# Patient Record
Sex: FEMALE | Race: WHITE | NOT HISPANIC OR LATINO | Employment: FULL TIME | URBAN - METROPOLITAN AREA
[De-identification: names, ages, dates, MRNs, and addresses within clinical notes are randomized per-mention and may not be internally consistent; named-entity substitution may affect disease eponyms.]

---

## 2017-01-05 ENCOUNTER — PATIENT MESSAGE (OUTPATIENT)
Dept: FAMILY MEDICINE | Facility: CLINIC | Age: 37
End: 2017-01-05

## 2017-01-05 ENCOUNTER — TELEPHONE (OUTPATIENT)
Dept: FAMILY MEDICINE | Facility: CLINIC | Age: 37
End: 2017-01-05

## 2017-01-05 ENCOUNTER — HOSPITAL ENCOUNTER (INPATIENT)
Facility: HOSPITAL | Age: 37
LOS: 2 days | Discharge: HOME | DRG: 690 | End: 2017-01-07
Attending: EMERGENCY MEDICINE | Admitting: INTERNAL MEDICINE
Payer: COMMERCIAL

## 2017-01-05 DIAGNOSIS — N10 PYELONEPHRITIS, ACUTE: Primary | ICD-10-CM

## 2017-01-05 DIAGNOSIS — R10.9 LEFT FLANK PAIN: ICD-10-CM

## 2017-01-05 DIAGNOSIS — R10.9 FLANK PAIN: ICD-10-CM

## 2017-01-05 DIAGNOSIS — R30.0 DYSURIA: Primary | ICD-10-CM

## 2017-01-05 LAB
ALBUMIN SERPL BCP-MCNC: 4.4 G/DL
ALP SERPL-CCNC: 61 U/L
ALT SERPL W/O P-5'-P-CCNC: 28 U/L
ANION GAP SERPL CALC-SCNC: 8 MMOL/L
AST SERPL-CCNC: 17 U/L
B-HCG UR QL: NEGATIVE
BACTERIA #/AREA URNS HPF: ABNORMAL /HPF
BASOPHILS # BLD AUTO: 0.03 K/UL
BASOPHILS NFR BLD: 0.3 %
BILIRUB SERPL-MCNC: 0.3 MG/DL
BILIRUB UR QL STRIP: NEGATIVE
BUN SERPL-MCNC: 11 MG/DL
CALCIUM SERPL-MCNC: 9.7 MG/DL
CHLORIDE SERPL-SCNC: 103 MMOL/L
CLARITY UR: ABNORMAL
CO2 SERPL-SCNC: 28 MMOL/L
COLOR UR: YELLOW
CREAT SERPL-MCNC: 0.8 MG/DL
CTP QC/QA: YES
DIFFERENTIAL METHOD: NORMAL
EOSINOPHIL # BLD AUTO: 0.2 K/UL
EOSINOPHIL NFR BLD: 1.8 %
ERYTHROCYTE [DISTWIDTH] IN BLOOD BY AUTOMATED COUNT: 12.3 %
EST. GFR  (AFRICAN AMERICAN): >60 ML/MIN/1.73 M^2
EST. GFR  (NON AFRICAN AMERICAN): >60 ML/MIN/1.73 M^2
GLUCOSE SERPL-MCNC: 102 MG/DL
GLUCOSE UR QL STRIP: NEGATIVE
HCT VFR BLD AUTO: 40.7 %
HGB BLD-MCNC: 14.3 G/DL
HGB UR QL STRIP: ABNORMAL
KETONES UR QL STRIP: NEGATIVE
LEUKOCYTE ESTERASE UR QL STRIP: ABNORMAL
LIPASE SERPL-CCNC: 55 U/L
LYMPHOCYTES # BLD AUTO: 3.1 K/UL
LYMPHOCYTES NFR BLD: 30.6 %
MCH RBC QN AUTO: 30.4 PG
MCHC RBC AUTO-ENTMCNC: 35.1 %
MCV RBC AUTO: 86 FL
MICROSCOPIC COMMENT: ABNORMAL
MONOCYTES # BLD AUTO: 0.7 K/UL
MONOCYTES NFR BLD: 7.2 %
NEUTROPHILS # BLD AUTO: 6.1 K/UL
NEUTROPHILS NFR BLD: 59.8 %
NITRITE UR QL STRIP: NEGATIVE
PH UR STRIP: 6 [PH] (ref 5–8)
PLATELET # BLD AUTO: 265 K/UL
PMV BLD AUTO: 10.3 FL
POTASSIUM SERPL-SCNC: 3.6 MMOL/L
PROT SERPL-MCNC: 7.2 G/DL
PROT UR QL STRIP: NEGATIVE
RBC # BLD AUTO: 4.71 M/UL
RBC #/AREA URNS HPF: 1 /HPF (ref 0–4)
SODIUM SERPL-SCNC: 139 MMOL/L
SP GR UR STRIP: 1.01 (ref 1–1.03)
SQUAMOUS #/AREA URNS HPF: 6 /HPF
URN SPEC COLLECT METH UR: ABNORMAL
UROBILINOGEN UR STRIP-ACNC: NEGATIVE EU/DL
WBC # BLD AUTO: 10.22 K/UL
WBC #/AREA URNS HPF: 4 /HPF (ref 0–5)

## 2017-01-05 PROCEDURE — 81000 URINALYSIS NONAUTO W/SCOPE: CPT | Mod: 91

## 2017-01-05 PROCEDURE — 87591 N.GONORRHOEAE DNA AMP PROB: CPT

## 2017-01-05 PROCEDURE — 80053 COMPREHEN METABOLIC PANEL: CPT

## 2017-01-05 PROCEDURE — 81025 URINE PREGNANCY TEST: CPT | Performed by: EMERGENCY MEDICINE

## 2017-01-05 PROCEDURE — 25000003 PHARM REV CODE 250: Performed by: PHYSICIAN ASSISTANT

## 2017-01-05 PROCEDURE — 87040 BLOOD CULTURE FOR BACTERIA: CPT | Mod: 59

## 2017-01-05 PROCEDURE — 87086 URINE CULTURE/COLONY COUNT: CPT | Mod: 59

## 2017-01-05 PROCEDURE — 87077 CULTURE AEROBIC IDENTIFY: CPT | Mod: 59

## 2017-01-05 PROCEDURE — 83690 ASSAY OF LIPASE: CPT

## 2017-01-05 PROCEDURE — 96365 THER/PROPH/DIAG IV INF INIT: CPT

## 2017-01-05 PROCEDURE — 87186 SC STD MICRODIL/AGAR DIL: CPT | Mod: 59

## 2017-01-05 PROCEDURE — 63600175 PHARM REV CODE 636 W HCPCS: Performed by: PHYSICIAN ASSISTANT

## 2017-01-05 PROCEDURE — 12000002 HC ACUTE/MED SURGE SEMI-PRIVATE ROOM

## 2017-01-05 PROCEDURE — 85025 COMPLETE CBC W/AUTO DIFF WBC: CPT

## 2017-01-05 PROCEDURE — 96375 TX/PRO/DX INJ NEW DRUG ADDON: CPT

## 2017-01-05 PROCEDURE — 87088 URINE BACTERIA CULTURE: CPT | Mod: 59

## 2017-01-05 PROCEDURE — 99285 EMERGENCY DEPT VISIT HI MDM: CPT | Mod: 25

## 2017-01-05 PROCEDURE — 96367 TX/PROPH/DG ADDL SEQ IV INF: CPT

## 2017-01-05 RX ORDER — ACETAMINOPHEN 325 MG/1
650 TABLET ORAL EVERY 8 HOURS PRN
Status: DISCONTINUED | OUTPATIENT
Start: 2017-01-05 | End: 2017-01-07 | Stop reason: HOSPADM

## 2017-01-05 RX ORDER — PANTOPRAZOLE SODIUM 40 MG/10ML
40 INJECTION, POWDER, LYOPHILIZED, FOR SOLUTION INTRAVENOUS DAILY
Status: DISCONTINUED | OUTPATIENT
Start: 2017-01-06 | End: 2017-01-06

## 2017-01-05 RX ORDER — SODIUM CHLORIDE 9 MG/ML
INJECTION, SOLUTION INTRAVENOUS CONTINUOUS
Status: DISCONTINUED | OUTPATIENT
Start: 2017-01-05 | End: 2017-01-07 | Stop reason: HOSPADM

## 2017-01-05 RX ORDER — ONDANSETRON 2 MG/ML
4 INJECTION INTRAMUSCULAR; INTRAVENOUS EVERY 12 HOURS PRN
Status: DISCONTINUED | OUTPATIENT
Start: 2017-01-05 | End: 2017-01-07 | Stop reason: HOSPADM

## 2017-01-05 RX ORDER — POLYETHYLENE GLYCOL 3350 17 G/17G
17 POWDER, FOR SOLUTION ORAL DAILY
Status: DISCONTINUED | OUTPATIENT
Start: 2017-01-06 | End: 2017-01-07 | Stop reason: HOSPADM

## 2017-01-05 RX ORDER — ONDANSETRON 2 MG/ML
8 INJECTION INTRAMUSCULAR; INTRAVENOUS
Status: COMPLETED | OUTPATIENT
Start: 2017-01-05 | End: 2017-01-05

## 2017-01-05 RX ORDER — OXYCODONE AND ACETAMINOPHEN 5; 325 MG/1; MG/1
1 TABLET ORAL
Status: COMPLETED | OUTPATIENT
Start: 2017-01-05 | End: 2017-01-05

## 2017-01-05 RX ORDER — HYDROCODONE BITARTRATE AND ACETAMINOPHEN 5; 325 MG/1; MG/1
1 TABLET ORAL EVERY 4 HOURS PRN
Status: DISCONTINUED | OUTPATIENT
Start: 2017-01-05 | End: 2017-01-07 | Stop reason: HOSPADM

## 2017-01-05 RX ORDER — HYDROMORPHONE HYDROCHLORIDE 2 MG/ML
0.5 INJECTION, SOLUTION INTRAMUSCULAR; INTRAVENOUS; SUBCUTANEOUS EVERY 4 HOURS PRN
Status: DISCONTINUED | OUTPATIENT
Start: 2017-01-05 | End: 2017-01-07 | Stop reason: HOSPADM

## 2017-01-05 RX ADMIN — SODIUM CHLORIDE: 0.9 INJECTION, SOLUTION INTRAVENOUS at 11:01

## 2017-01-05 RX ADMIN — CEFTRIAXONE 1 G: 1 INJECTION, SOLUTION INTRAVENOUS at 08:01

## 2017-01-05 RX ADMIN — GENTAMICIN SULFATE 288 MG: 40 INJECTION, SOLUTION INTRAMUSCULAR; INTRAVENOUS at 09:01

## 2017-01-05 RX ADMIN — SODIUM CHLORIDE 1000 ML: 0.9 INJECTION, SOLUTION INTRAVENOUS at 08:01

## 2017-01-05 RX ADMIN — ONDANSETRON 8 MG: 2 INJECTION INTRAMUSCULAR; INTRAVENOUS at 08:01

## 2017-01-05 RX ADMIN — PROMETHAZINE HYDROCHLORIDE 6.25 MG: 25 INJECTION, SOLUTION INTRAMUSCULAR; INTRAVENOUS at 11:01

## 2017-01-05 RX ADMIN — HYDROMORPHONE HYDROCHLORIDE 0.5 MG: 2 INJECTION INTRAMUSCULAR; INTRAVENOUS; SUBCUTANEOUS at 11:01

## 2017-01-05 RX ADMIN — OXYCODONE HYDROCHLORIDE AND ACETAMINOPHEN 1 TABLET: 5; 325 TABLET ORAL at 08:01

## 2017-01-05 NOTE — TELEPHONE ENCOUNTER
Returned call, patient states that the Past 3 days she has been having back pain, fever and vomiting. She had completed her antibiotics and is still having issues. Was inquiring about her urine culture results and wanted to know if we can add a urinalysis to her lab appt today, thinking she may be getting another UTI. Informed that I would send message to Dr. Montilla to add lab and check results from recent labs. Verbalized understanding.

## 2017-01-05 NOTE — TELEPHONE ENCOUNTER
Received a message from pt that she is having urinary symptoms c/w a UTI accompanied by back pain and subjective fever. I called pt and told her that an UA and UCx has been ordered for today and I'll send over Abx to her preferred pharmacy upon review of the UA later today. Also reviewed results of most recent UA and UCx for which pt reports completing her course of Augmentin and pt is unclear on whether or not she ever had complete resolution of symptoms from that infection or these symptoms are new.

## 2017-01-05 NOTE — PROGRESS NOTES
I called pt to inform her of the results of her UA. As discussed earlier, I planned to send Abx over to her preferred pharmacy but pt states she would rather go to the ED to get IV Abx. Pt states she will go whenever she gets off from work this evening. She is aware that they will be able to see the documentation in the chart from her UA today. Pt also requested to change primary care physicians over to myself and I accepted.

## 2017-01-05 NOTE — IP AVS SNAPSHOT
[place] [address] [address] [place] [address]  Phone: [phone]           Patient Discharge Instructions     Our goal is to set you up for success. This packet includes information on your condition, medications, and your home care. It will help you to care for yourself so you don't get sicker and need to go back to the hospital.     Please ask your nurse if you have any questions.        There are many details to remember when preparing to leave the hospital. Here is what you will need to do:    1. Take your medicine. If you are prescribed medications, review your Medication List in the following pages. You may have new medications to  at the pharmacy and others that you'll need to stop taking. Review the instructions for how and when to take your medications. Talk with your doctor or nurses if you are unsure of what to do.     2. Go to your follow-up appointments. Specific follow-up information is listed in the following pages. Your may be contacted by a transition nurse or clinical provider about future appointments. Be sure we have all of the phone numbers to reach you, if needed. Please contact your provider's office if you are unable to make an appointment.     3. Watch for warning signs. Your doctor or nurse will give you detailed warning signs to watch for and when to call for assistance. These instructions may also include educational information about your condition. If you experience any of warning signs to your health, call your doctor.               [facility]  Unless otherwise directed by your provider, please contact [facility], our nurse care line that is available for 24/7 assistance.     [phone] (toll-free)    Registered nurses in the [facility] provide clinical advisement, health education, appointment booking, and other advisory services.                    ** Verify the list of medication(s) below is accurate and up to date.  Carry this with you in case of emergency. If your medications have changed, please notify your healthcare provider.             Medication List      START taking these medications        Additional Info                      ciprofloxacin HCl 500 MG tablet   Commonly known as:  CIPRO   Quantity:  24 tablet   Refills:  0   Dose:  500 mg    Instructions:  Take 1 tablet (500 mg total) by mouth 2 (two) times daily.     Begin Date    AM    Noon    PM    Bedtime         CONTINUE taking these medications        Additional Info                      alprazolam 1 MG tablet   Commonly known as:  XANAX   Quantity:  30 tablet   Refills:  5    Instructions:  TAKE ONE TABLET EVERY EVENING AS NEEDED FOR ANXIETY MAY CAUSE DROWSINESS     Begin Date    AM    Noon    PM    Bedtime       hydrocodone-acetaminophen 7.5-325mg 7.5-325 mg per tablet   Commonly known as:  NORCO   Refills:  0    Instructions:  TK 1 T PO  Q 12 H PRN     Begin Date    AM    Noon    PM    Bedtime       meloxicam 7.5 MG tablet   Commonly known as:  MOBIC   Refills:  0    Instructions:  TK 1 T PO  HS AFTER MEALS     Begin Date    AM    Noon    PM    Bedtime            Where to Get Your Medications      You can get these medications from any pharmacy     Bring a paper prescription for each of these medications     ciprofloxacin HCl 500 MG tablet                  Please bring to all follow up appointments:    1. A copy of your discharge instructions.  2. All medicines you are currently taking in their original bottles.  3. Identification and insurance card.    Please arrive 15 minutes ahead of scheduled appointment time.    Please call 24 hours in advance if you must reschedule your appointment and/or time.        Follow-up Information     Follow up with Spring Montilla MD In 1 week.    Specialty:  Wound Care    Contact information:    [address] [place] [address]  WOUND CENTER  [facility] [id number] [phone]          Discharge Instructions     Future Orders    Activity as  "tolerated       Discharge References/Attachments     PYELONEPHRITIS, DISCHARGE INSTRUCTIONS (ENGLISH)    CIPROFLOXACIN HYDROCHLORIDE ORAL TABLET (ENGLISH)        Primary Diagnosis     Your primary diagnosis was:  Acute Pyelonephritis      Admission Information     Date & Time Provider Department CSN    1/5/2017  7:13 PM Roman Dwyer MD [facility] [id number]      Care Providers     Provider Role Specialty Primary office phone    Roman Dwyer MD Attending Provider Hospitalist [phone]      Your Vitals Were     BP Pulse Temp Resp Height Weight    127/65 (BP Location: Right arm, Patient Position: Sitting, BP Method: Automatic) 86 98.5 °F (36.9 °C) (Oral) 18 5' 2" (1.575 m) 74.3 kg (163 lb 12.8 oz)    Last Period SpO2 BMI          12/14/2016 (Exact Date) 100% 29.96 kg/m2        Recent Lab Values     No lab values to display.      Pending Labs     Order Current Status    C. trachomatis/N. gonorrhoeae by AMP DNA Urine In process    Blood Culture #1 **CANNOT BE ORDERED STAT** Preliminary result    Blood Culture #2 **CANNOT BE ORDERED STAT** Preliminary result      Allergies as of 1/7/2017        Reactions    Strawberry Anaphylaxis, Hives      Advance Directives     An advance directive is a document which, in the event you are no longer able to make decisions for yourself, tells your healthcare team what kind of treatment you do or do not want to receive, or who you would like to make those decisions for you.  If you do not currently have an advance directive, [facility] encourages you to create one.  For more information call:  [phone]), [phone] ([phone]),  or log on to [email or web address].        Smoking Cessation     If you would like to quit smoking:   You may be eligible for free services if you are a [place] resident and started smoking cigarettes before September 1, 1988.  Call the Smoking Cessation Trust (SCT) toll free at (968) 397-1985 or (387) " 091-3546.   Call 1-800-QUIT-NOW if you do not meet the above criteria.            Language Assistance Services     ATTENTION: Language assistance services are available, free of charge. Please call [phone].      ATENCIÓN: Si habla cristo, tiene a tong disposición servicios gratuitos de asistencia lingüística. Llame al [phone].     CHÚ Ý: N?u b?n nói Ti?ng Vi?t, có các d?ch v? h? tr? ngôn ng? mi?n phí dành cho b?n. G?i s? [phone].         [facility] complies with applicable Federal civil rights laws and does not discriminate on the basis of race, color, national origin, age, disability, or sex.

## 2017-01-05 NOTE — IP AVS SNAPSHOT
[address] [place] [address]  Phone: [phone]           I have received a copy of my After Visit Summary and discharge instructions from [facility].    INSTRUCTIONS RECEIVED AND UNDERSTOOD BY:                     Patient/Patient Representative: ________________________________________________________________     Date/Time: ________________________________________________________________                     Instructions Given By: ________________________________________________________________     Date/Time: ________________________________________________________________

## 2017-01-05 NOTE — TELEPHONE ENCOUNTER
----- Message from Ale Hamilton sent at 1/5/2017 10:11 AM CST -----  Contact: [phone]  Pt is requesting a order for a urinalysis today if possible she is having labs done at 11 today  Please call pt at your earliest convenience.  Thanks !

## 2017-01-06 PROBLEM — F19.10 SUBSTANCE ABUSE: Status: ACTIVE | Noted: 2017-01-06

## 2017-01-06 LAB
ALBUMIN SERPL BCP-MCNC: 3.6 G/DL
ALP SERPL-CCNC: 48 U/L
ALT SERPL W/O P-5'-P-CCNC: 24 U/L
ANION GAP SERPL CALC-SCNC: 7 MMOL/L
AST SERPL-CCNC: 16 U/L
BASOPHILS # BLD AUTO: 0.02 K/UL
BASOPHILS NFR BLD: 0.3 %
BILIRUB SERPL-MCNC: 0.2 MG/DL
BUN SERPL-MCNC: 9 MG/DL
CALCIUM SERPL-MCNC: 8.8 MG/DL
CHLORIDE SERPL-SCNC: 106 MMOL/L
CO2 SERPL-SCNC: 29 MMOL/L
CREAT SERPL-MCNC: 0.7 MG/DL
DIFFERENTIAL METHOD: ABNORMAL
EOSINOPHIL # BLD AUTO: 0.1 K/UL
EOSINOPHIL NFR BLD: 1.7 %
ERYTHROCYTE [DISTWIDTH] IN BLOOD BY AUTOMATED COUNT: 12.4 %
EST. GFR  (AFRICAN AMERICAN): >60 ML/MIN/1.73 M^2
EST. GFR  (NON AFRICAN AMERICAN): >60 ML/MIN/1.73 M^2
GLUCOSE SERPL-MCNC: 59 MG/DL
HCT VFR BLD AUTO: 36.8 %
HGB BLD-MCNC: 12.8 G/DL
HIV1+2 IGG SERPL QL IA.RAPID: NEGATIVE
LYMPHOCYTES # BLD AUTO: 2.5 K/UL
LYMPHOCYTES NFR BLD: 34.1 %
MCH RBC QN AUTO: 31.4 PG
MCHC RBC AUTO-ENTMCNC: 34.8 %
MCV RBC AUTO: 90 FL
MONOCYTES # BLD AUTO: 0.7 K/UL
MONOCYTES NFR BLD: 9.9 %
NEUTROPHILS # BLD AUTO: 3.9 K/UL
NEUTROPHILS NFR BLD: 54 %
PLATELET # BLD AUTO: 202 K/UL
PMV BLD AUTO: 10.6 FL
POTASSIUM SERPL-SCNC: 3.6 MMOL/L
PROT SERPL-MCNC: 5.8 G/DL
RBC # BLD AUTO: 4.08 M/UL
SODIUM SERPL-SCNC: 142 MMOL/L
WBC # BLD AUTO: 7.25 K/UL

## 2017-01-06 PROCEDURE — 85025 COMPLETE CBC W/AUTO DIFF WBC: CPT

## 2017-01-06 PROCEDURE — 25000003 PHARM REV CODE 250: Performed by: PHYSICIAN ASSISTANT

## 2017-01-06 PROCEDURE — 99223 1ST HOSP IP/OBS HIGH 75: CPT | Mod: ,,, | Performed by: UROLOGY

## 2017-01-06 PROCEDURE — 63600175 PHARM REV CODE 636 W HCPCS: Performed by: PHYSICIAN ASSISTANT

## 2017-01-06 PROCEDURE — 25000003 PHARM REV CODE 250: Performed by: HOSPITALIST

## 2017-01-06 PROCEDURE — 36415 COLL VENOUS BLD VENIPUNCTURE: CPT

## 2017-01-06 PROCEDURE — C9113 INJ PANTOPRAZOLE SODIUM, VIA: HCPCS | Performed by: PHYSICIAN ASSISTANT

## 2017-01-06 PROCEDURE — 63600175 PHARM REV CODE 636 W HCPCS: Performed by: INTERNAL MEDICINE

## 2017-01-06 PROCEDURE — 80053 COMPREHEN METABOLIC PANEL: CPT

## 2017-01-06 PROCEDURE — 86701 HIV-1ANTIBODY: CPT

## 2017-01-06 PROCEDURE — 12000002 HC ACUTE/MED SURGE SEMI-PRIVATE ROOM

## 2017-01-06 RX ORDER — ENOXAPARIN SODIUM 100 MG/ML
40 INJECTION SUBCUTANEOUS EVERY 24 HOURS
Status: DISCONTINUED | OUTPATIENT
Start: 2017-01-06 | End: 2017-01-07 | Stop reason: HOSPADM

## 2017-01-06 RX ADMIN — CEFTRIAXONE 1 G: 1 INJECTION, SOLUTION INTRAVENOUS at 08:01

## 2017-01-06 RX ADMIN — HYDROCODONE BITARTRATE AND ACETAMINOPHEN 1 TABLET: 5; 325 TABLET ORAL at 05:01

## 2017-01-06 RX ADMIN — DICYCLOMINE HYDROCHLORIDE: 10 SOLUTION ORAL at 12:01

## 2017-01-06 RX ADMIN — PANTOPRAZOLE SODIUM 40 MG: 40 INJECTION, POWDER, FOR SOLUTION INTRAVENOUS at 08:01

## 2017-01-06 RX ADMIN — ENOXAPARIN SODIUM 40 MG: 100 INJECTION SUBCUTANEOUS at 01:01

## 2017-01-06 RX ADMIN — POLYETHYLENE GLYCOL 3350 17 G: 17 POWDER, FOR SOLUTION ORAL at 08:01

## 2017-01-06 RX ADMIN — HYDROMORPHONE HYDROCHLORIDE 0.5 MG: 2 INJECTION INTRAMUSCULAR; INTRAVENOUS; SUBCUTANEOUS at 08:01

## 2017-01-06 RX ADMIN — HYDROMORPHONE HYDROCHLORIDE 0.5 MG: 2 INJECTION INTRAMUSCULAR; INTRAVENOUS; SUBCUTANEOUS at 01:01

## 2017-01-06 RX ADMIN — HYDROMORPHONE HYDROCHLORIDE 0.5 MG: 2 INJECTION INTRAMUSCULAR; INTRAVENOUS; SUBCUTANEOUS at 04:01

## 2017-01-06 RX ADMIN — CEFTRIAXONE 1 G: 1 INJECTION, SOLUTION INTRAVENOUS at 09:01

## 2017-01-06 RX ADMIN — ONDANSETRON 4 MG: 2 INJECTION INTRAMUSCULAR; INTRAVENOUS at 11:01

## 2017-01-06 RX ADMIN — PROMETHAZINE HYDROCHLORIDE 6.25 MG: 25 INJECTION, SOLUTION INTRAMUSCULAR; INTRAVENOUS at 05:01

## 2017-01-06 NOTE — SUBJECTIVE & OBJECTIVE
Past Medical History   Diagnosis Date    Acute encephalopathy 12/24/12     secondary to drug overdose    ARF (acute renal failure) 12/24/12     secondary to drug overdose    History of cardiac arrest 12/24/12     secondary to drug overdose    Hypertension     MRSA (methicillin resistant Staphylococcus aureus) 12/24/12    Nephritis     PMDD (premenstrual dysphoric disorder)     Polysubstance abuse 12/24/12    Systolic CHF, acute 12/24/12     secondary to drug overdose       Past Surgical History   Procedure Laterality Date    Tubal ligation      Cholecystectomy         Review of patient's allergies indicates:   Allergen Reactions    Strawberry Anaphylaxis and Hives       Family History     Problem Relation (Age of Onset)    Cancer Maternal Grandmother, Maternal Grandfather    Kidney disease Mother, Daughter          Social History Main Topics    Smoking status: Current Every Day Smoker     Packs/day: 0.75     Years: 20.00    Smokeless tobacco: Not on file      Comment: 1/2 half pack/day.     Alcohol use No    Drug use: No      Comment: PCP, patient states she had not done drugs since 2012    Sexual activity: Yes     Partners: Male       Review of Systems   Constitutional: Negative.    HENT: Negative.    Eyes: Negative.    Respiratory: Negative for cough, chest tightness and shortness of breath.    Cardiovascular: Negative for chest pain.   Gastrointestinal: Positive for nausea. Negative for constipation and diarrhea.   Genitourinary: Positive for flank pain.   Neurological: Negative.    Psychiatric/Behavioral: Negative.        Objective:     Temp:  [97.7 °F (36.5 °C)-100 °F (37.8 °C)] 97.7 °F (36.5 °C)  Pulse:  [] 98  Resp:  [16-18] 18  BP: (104-144)/(67-93) 118/75     Body mass index is 30.25 kg/(m^2).            Drains          No matching active lines, drains, or airways          Physical Exam   Nursing note and vitals reviewed.  Constitutional: She is oriented to person, place, and time.  She appears well-developed.   HENT:   Head: Normocephalic.   Eyes: Conjunctivae are normal.   Neck: Normal range of motion. No tracheal deviation present. No thyromegaly present.   Cardiovascular: Normal rate, normal heart sounds and normal pulses.    Pulmonary/Chest: Effort normal and breath sounds normal. No respiratory distress. She has no wheezes.   Abdominal: Soft. She exhibits no distension and no mass. There is no hepatosplenomegaly. There is no tenderness. There is no rebound, no guarding and no CVA tenderness. No hernia.   Musculoskeletal: Normal range of motion. She exhibits no edema or tenderness.   Lymphadenopathy:     She has no cervical adenopathy.   Neurological: She is alert and oriented to person, place, and time.   Skin: Skin is warm and dry. No rash noted. No erythema.     Psychiatric: She has a normal mood and affect. Her behavior is normal. Judgment and thought content normal.       Significant Labs:    BMP:    Recent Labs  Lab 01/05/17 2025 01/06/17  0419    142   K 3.6 3.6    106   CO2 28 29   BUN 11 9   CREATININE 0.8 0.7   CALCIUM 9.7 8.8       CBC:    Recent Labs  Lab 01/05/17 2025 01/06/17  0420   WBC 10.22 7.25   HGB 14.3 12.8   HCT 40.7 36.8*    202       Blood Culture:   Recent Labs  Lab 01/05/17 2044 01/05/17 2052   LABBLOO No Growth to date No Growth to date     Urine Culture:   Recent Labs  Lab 01/05/17  1222 01/05/17  2000   LABURIN GRAM NEGATIVE ALEJANDRO> 100,000 cfu/mlIdentification and susceptibility pending GRAM NEGATIVE ALEJANDRO> 100,000 cfu/mlIdentification and susceptibility pending       Significant Imaging:  CT: I have reviewed all results within the past 24 hours and my personal findings are:  CT from December 2016, no stranding or hydro.  Very small lower pole left sided stones.

## 2017-01-06 NOTE — ED TRIAGE NOTES
? Kidney infection again, saw MD got Augmentin 12/19 told she had pylonephritis. All week this week severe back pain and dysuria. Called PCP today and went and got another UA, which showed UTI so PCP told her to come here. She had a cystoscope and saw urologist about 6months ago. She has severe hesitency.

## 2017-01-06 NOTE — ASSESSMENT & PLAN NOTE
Follow up on Ultrasound that is being performed now  Unclear if this is truly pyelonephritis or cystitis.    Treat x 2 weeks with culture specific antibiotics  Follow up with Dr. Luther as scheduled  Okay to d/c from a  standpoint but will need to follow culture

## 2017-01-06 NOTE — PLAN OF CARE
Problem: Patient Care Overview  Goal: Plan of Care Review  Outcome: Ongoing (interventions implemented as appropriate)    01/05/17 2300   Coping/Psychosocial   Plan Of Care Reviewed With patient   Pt remained free of falls during current shift. Pt reported having back pain and receiving IV pain medications. IV fluids and IV antibiotics are being administered per MD order. Urology consulted and monitoring pt's output. Plan of care and fall precautions reviewed with pt and verbalized understanding. Bed locked, lowered, SR up x2 and call light placed within reach.

## 2017-01-06 NOTE — PLAN OF CARE
Problem: Patient Care Overview  Goal: Plan of Care Review    01/06/17 1754   Coping/Psychosocial   Plan Of Care Reviewed With patient         Problem: Fall Risk (Adult)  Goal: Absence of Falls  Patient will demonstrate the desired outcomes by discharge/transition of care.     01/06/17 1754   Fall Risk (Adult)   Absence of Falls making progress toward outcome         Problem: Pain, Acute (Adult)  Intervention: Monitor/Manage Analgesia    01/06/17 1754   Safety Interventions   Medication Review/Management medications reviewed;dosing adjusted         Goal: Acceptable Pain Control/Comfort Level  Patient will demonstrate the desired outcomes by discharge/transition of care.     01/06/17 1754   Pain, Acute (Adult)   Acceptable Pain Control/Comfort Level making progress toward outcome

## 2017-01-06 NOTE — NURSING
Pt received from ER assisted by ER nurse Neil RN via wheelchair. Pt's family is at pt's bedside. Pt ambulated to bed and bed locked, lowered, SR up x2. Vitals obtained and documented. Tele monitor initiated. Pt is AAO x4. Resp are even, unlabored, clear on RA. S1, S2 cardiac sound present upon ascultation. Abd is contour, active x 4 quads and tender x4 quads, Reports having last bowel movement on 1/5. Skin is c/d/i. 20g PIV to LAC. Pt reported having lower back pain at current time, however pt is in NAD. Assessment completed and documented. See doc flow sheet. Plan of care reviewed with pt and pt verbalized understanding. Call light placed within reach. Will continue to monitor pt closely.

## 2017-01-06 NOTE — CONSULTS
[facility]  Urology  Consult Note    Patient Name: Roslyn Brewster  MRN: [id number]  Admission Date: 1/5/2017  Hospital Length of Stay: 1   Code Status: Full Code   Attending Provider: Roman Dwyer MD   Consulting Provider: TOMMY Balderas MD  Primary Care Physician: Spring Montilla MD  Principal Problem:Pyelonephritis, acute    Inpatient consult to Urology  Consult performed by: PRAVIN BALDERAS  Consult ordered by: VINCE LOPEZ          Subjective:     HPI:     Mrs. Brewster is a 35 yo F with a history of recurrent UTI's. The patient was just diagnosed with a E-coli UTI on 12/19 and completed a 19 day course of Augmentin. The patient states that she never felt really back to normal. She presents to her PCP after several days of N/V and found to have another UTI and sent to the ED. She has been started on Ceftriaxone. Her urine cultures are growing a Gram negative catherine. The patient states that today, she feels much better. She is frustrated as to why she keeps developing these infections and has a follow up appointment with urology on 1/23 with Dr. Luther at . She is non- ill appearing and resting comfortably in her room. She complains of mainly L flank pain as well. She has hd no further vomiting since admission. Denies temps.       Past Medical History   Diagnosis Date    Acute encephalopathy 12/24/12     secondary to drug overdose    ARF (acute renal failure) 12/24/12     secondary to drug overdose    History of cardiac arrest 12/24/12     secondary to drug overdose    Hypertension     MRSA (methicillin resistant Staphylococcus aureus) 12/24/12    Nephritis     PMDD (premenstrual dysphoric disorder)     Polysubstance abuse 12/24/12    Systolic CHF, acute 12/24/12     secondary to drug overdose       Past Surgical History   Procedure Laterality Date    Tubal ligation      Cholecystectomy         Review of patient's allergies indicates:   Allergen Reactions    Strawberry  Anaphylaxis and Hives       Family History     Problem Relation (Age of Onset)    Cancer Maternal Grandmother, Maternal Grandfather    Kidney disease Mother, Daughter          Social History Main Topics    Smoking status: Current Every Day Smoker     Packs/day: 0.75     Years: 20.00    Smokeless tobacco: Not on file      Comment: 1/2 half pack/day.     Alcohol use No    Drug use: No      Comment: PCP, patient states she had not done drugs since 2012    Sexual activity: Yes     Partners: Male       Review of Systems   Constitutional: Negative.    HENT: Negative.    Eyes: Negative.    Respiratory: Negative for cough, chest tightness and shortness of breath.    Cardiovascular: Negative for chest pain.   Gastrointestinal: Positive for nausea. Negative for constipation and diarrhea.   Genitourinary: Positive for flank pain.   Neurological: Negative.    Psychiatric/Behavioral: Negative.        Objective:     Temp:  [97.7 °F (36.5 °C)-100 °F (37.8 °C)] 97.7 °F (36.5 °C)  Pulse:  [] 98  Resp:  [16-18] 18  BP: (104-144)/(67-93) 118/75     Body mass index is 30.25 kg/(m^2).            Drains          No matching active lines, drains, or airways          Physical Exam   Nursing note and vitals reviewed.  Constitutional: She is oriented to person, place, and time. She appears well-developed.   HENT:   Head: Normocephalic.   Eyes: Conjunctivae are normal.   Neck: Normal range of motion. No tracheal deviation present. No thyromegaly present.   Cardiovascular: Normal rate, normal heart sounds and normal pulses.    Pulmonary/Chest: Effort normal and breath sounds normal. No respiratory distress. She has no wheezes.   Abdominal: Soft. She exhibits no distension and no mass. There is no hepatosplenomegaly. There is no tenderness. There is no rebound, no guarding and no CVA tenderness. No hernia.   Musculoskeletal: Normal range of motion. She exhibits no edema or tenderness.   Lymphadenopathy:     She has no cervical  adenopathy.   Neurological: She is alert and oriented to person, place, and time.   Skin: Skin is warm and dry. No rash noted. No erythema.     Psychiatric: She has a normal mood and affect. Her behavior is normal. Judgment and thought content normal.       Significant Labs:    BMP:    Recent Labs  Lab 01/05/17 2025 01/06/17  0419    142   K 3.6 3.6    106   CO2 28 29   BUN 11 9   CREATININE 0.8 0.7   CALCIUM 9.7 8.8       CBC:    Recent Labs  Lab 01/05/17 2025 01/06/17  0420   WBC 10.22 7.25   HGB 14.3 12.8   HCT 40.7 36.8*    202       Blood Culture:   Recent Labs  Lab 01/05/17 2044 01/05/17 2052   LABBLOO No Growth to date No Growth to date     Urine Culture:   Recent Labs  Lab 01/05/17  1222 01/05/17 2000   LABURIN GRAM NEGATIVE ALEJANDRO> 100,000 cfu/mlIdentification and susceptibility pending GRAM NEGATIVE ALEJANDRO> 100,000 cfu/mlIdentification and susceptibility pending       Significant Imaging:  CT: I have reviewed all results within the past 24 hours and my personal findings are:  CT from December 2016, no stranding or hydro.  Very small lower pole left sided stones.                    Assessment and Plan:     Left flank pain  Follow up on Ultrasound that is being performed now  Unclear if this is truly pyelonephritis or cystitis.    Treat x 2 weeks with culture specific antibiotics  Follow up with Dr. Luther as scheduled  Okay to d/c from a  standpoint but will need to follow culture      VTE Risk Mitigation         Ordered     enoxaparin injection 40 mg  Daily     Route:  Subcutaneous        01/06/17 1047     Medium Risk of VTE  Once      01/05/17 2249     Place DOMINGA hose  Until discontinued      01/05/17 2249     Place sequential compression device  Until discontinued      01/05/17 2249          Thank you for your consult.     TOMMY Balderas MD  Urology  [facility]

## 2017-01-06 NOTE — SUBJECTIVE & OBJECTIVE
Past Medical History   Diagnosis Date    Acute encephalopathy 12/24/12     secondary to drug overdose    ARF (acute renal failure) 12/24/12     secondary to drug overdose    History of cardiac arrest 12/24/12     secondary to drug overdose    Hypertension     MRSA (methicillin resistant Staphylococcus aureus) 12/24/12    Nephritis     PMDD (premenstrual dysphoric disorder)     Polysubstance abuse 12/24/12    Systolic CHF, acute 12/24/12     secondary to drug overdose       Past Surgical History   Procedure Laterality Date    Tubal ligation      Cholecystectomy         Review of patient's allergies indicates:   Allergen Reactions    Strawberry Anaphylaxis and Hives       No current facility-administered medications on file prior to encounter.      Current Outpatient Prescriptions on File Prior to Encounter   Medication Sig    alprazolam (XANAX) 1 MG tablet TAKE ONE TABLET EVERY EVENING AS NEEDED FOR ANXIETY MAY CAUSE DROWSINESS    hydrocodone-acetaminophen 7.5-325mg (NORCO) 7.5-325 mg per tablet TK 1 T PO  Q 12 H PRN    meloxicam (MOBIC) 7.5 MG tablet TK 1 T PO  HS AFTER MEALS     Family History     Problem Relation (Age of Onset)    Cancer Maternal Grandmother, Maternal Grandfather    Kidney disease Mother, Daughter        Social History Main Topics    Smoking status: Current Every Day Smoker     Packs/day: 0.75     Years: 20.00    Smokeless tobacco: Not on file      Comment: 1/2 half pack/day.     Alcohol use No    Drug use: No      Comment: PCP, patient states she had not done drugs since 2012    Sexual activity: Yes     Partners: Male     Review of Systems   Constitutional: Negative for activity change.   HENT: Negative for congestion.    Eyes: Negative for discharge and redness.   Respiratory: Negative for chest tightness and shortness of breath.    Cardiovascular: Negative for chest pain and palpitations.   Gastrointestinal: Positive for nausea and vomiting. Negative for abdominal  distention, abdominal pain and blood in stool.   Genitourinary: Positive for flank pain. Negative for difficulty urinating, dyspareunia, dysuria, frequency, hematuria and menstrual problem.   Musculoskeletal: Positive for back pain. Negative for joint swelling.   Skin: Negative for color change.   Neurological: Negative for dizziness, facial asymmetry and headaches.   Psychiatric/Behavioral: Negative for agitation.     Objective:     Vital Signs (Most Recent):  Temp: 97.7 °F (36.5 °C) (01/06/17 0742)  Pulse: 98 (01/06/17 0742)  Resp: 18 (01/06/17 0742)  BP: 118/75 (01/06/17 0742)  SpO2: 96 % (01/06/17 0742) Vital Signs (24h Range):  Temp:  [97.7 °F (36.5 °C)-100 °F (37.8 °C)] 97.7 °F (36.5 °C)  Pulse:  [] 98  Resp:  [16-18] 18  BP: (104-144)/(67-93) 118/75     Weight: 75 kg (165 lb 6.4 oz)  Body mass index is 30.25 kg/(m^2).    Physical Exam   Constitutional: She is oriented to person, place, and time. She appears well-developed and well-nourished.   HENT:   Head: Normocephalic.   Eyes: Pupils are equal, round, and reactive to light.   Neck: Normal range of motion.   Cardiovascular: Normal rate.  Exam reveals no friction rub.    No murmur heard.  Pulmonary/Chest: Effort normal and breath sounds normal. No respiratory distress. She has no wheezes. She has no rales.   Abdominal: Soft. She exhibits no distension. There is no tenderness.   Neurological: She is alert and oriented to person, place, and time.   Skin: Skin is warm and dry.   Psychiatric: She has a normal mood and affect.   Nursing note and vitals reviewed.       Significant Labs:   BMP:   Recent Labs  Lab 01/06/17  0419   GLU 59*      K 3.6      CO2 29   BUN 9   CREATININE 0.7   CALCIUM 8.8     CBC:   Recent Labs  Lab 01/05/17 2025 01/06/17  0420   WBC 10.22 7.25   HGB 14.3 12.8   HCT 40.7 36.8*    202       Significant Imaging: x-ray of abdomen- stool  Renal ultrasound pending.       All labs reviewed and interpreted by myself.

## 2017-01-06 NOTE — H&P
[facility] Hospital Medicine  History & Physical    Patient Name: Roslyn Brewster  MRN: [id number]  Admission Date: 1/5/2017  Attending Physician: Roman Dwyer MD   Primary Care Provider: Spring Montilla MD         Patient information was obtained from patient and ER records.     Subjective:     Principal Problem:Pyelonephritis, acute    Chief Complaint:  N/V/flank pain    HPI: Mrs. Brewster is a 35 yo F with a history of recurrent UTI's. The patient was just diagnosed with a E-coli UTI on 12/19 and completed a 19 day course of Augmentin. The patient states that she never felt really back to normal. She presents to her PCP after several days of N/V and found to have another UTI and sent to the ED. She has been started on Ceftriaxone. Her urine cultures are growing a Gram negative catherine. The patient states that today, she feels much better.  She is frustrated as to why she keeps developing these infections and has a follow up appointment with urology on 1/23.  She is non- ill appearing and resting comfortably in her room.  She complains of mainly L flank pain as well.  She has hd no further vomiting since admission. Denies temps.     Past Medical History   Diagnosis Date    Acute encephalopathy 12/24/12     secondary to drug overdose    ARF (acute renal failure) 12/24/12     secondary to drug overdose    History of cardiac arrest 12/24/12     secondary to drug overdose    Hypertension     MRSA (methicillin resistant Staphylococcus aureus) 12/24/12    Nephritis     PMDD (premenstrual dysphoric disorder)     Polysubstance abuse 12/24/12    Systolic CHF, acute 12/24/12     secondary to drug overdose       Past Surgical History   Procedure Laterality Date    Tubal ligation      Cholecystectomy         Review of patient's allergies indicates:   Allergen Reactions    Strawberry Anaphylaxis and Hives       No current facility-administered medications on file prior to encounter.       Current Outpatient Prescriptions on File Prior to Encounter   Medication Sig    alprazolam (XANAX) 1 MG tablet TAKE ONE TABLET EVERY EVENING AS NEEDED FOR ANXIETY MAY CAUSE DROWSINESS    hydrocodone-acetaminophen 7.5-325mg (NORCO) 7.5-325 mg per tablet TK 1 T PO  Q 12 H PRN    meloxicam (MOBIC) 7.5 MG tablet TK 1 T PO  HS AFTER MEALS     Family History     Problem Relation (Age of Onset)    Cancer Maternal Grandmother, Maternal Grandfather    Kidney disease Mother, Daughter        Social History Main Topics    Smoking status: Current Every Day Smoker     Packs/day: 0.75     Years: 20.00    Smokeless tobacco: Not on file      Comment: 1/2 half pack/day.     Alcohol use No    Drug use: No      Comment: PCP, patient states she had not done drugs since 2012    Sexual activity: Yes     Partners: Male     Review of Systems   Constitutional: Negative for activity change.   HENT: Negative for congestion.    Eyes: Negative for discharge and redness.   Respiratory: Negative for chest tightness and shortness of breath.    Cardiovascular: Negative for chest pain and palpitations.   Gastrointestinal: Positive for nausea and vomiting. Negative for abdominal distention, abdominal pain and blood in stool.   Genitourinary: Positive for flank pain. Negative for difficulty urinating, dyspareunia, dysuria, frequency, hematuria and menstrual problem.   Musculoskeletal: Positive for back pain. Negative for joint swelling.   Skin: Negative for color change.   Neurological: Negative for dizziness, facial asymmetry and headaches.   Psychiatric/Behavioral: Negative for agitation.     Objective:     Vital Signs (Most Recent):  Temp: 97.7 °F (36.5 °C) (01/06/17 0742)  Pulse: 98 (01/06/17 0742)  Resp: 18 (01/06/17 0742)  BP: 118/75 (01/06/17 0742)  SpO2: 96 % (01/06/17 0742) Vital Signs (24h Range):  Temp:  [97.7 °F (36.5 °C)-100 °F (37.8 °C)] 97.7 °F (36.5 °C)  Pulse:  [] 98  Resp:  [16-18] 18  BP: (104-144)/(67-93) 118/75      Weight: 75 kg (165 lb 6.4 oz)  Body mass index is 30.25 kg/(m^2).    Physical Exam   Constitutional: She is oriented to person, place, and time. She appears well-developed and well-nourished.   HENT:   Head: Normocephalic.   Eyes: Pupils are equal, round, and reactive to light.   Neck: Normal range of motion.   Cardiovascular: Normal rate.  Exam reveals no friction rub.    No murmur heard.  Pulmonary/Chest: Effort normal and breath sounds normal. No respiratory distress. She has no wheezes. She has no rales.   Abdominal: Soft. She exhibits no distension. There is no tenderness.   Neurological: She is alert and oriented to person, place, and time.   Skin: Skin is warm and dry.   Psychiatric: She has a normal mood and affect.   Nursing note and vitals reviewed.       Significant Labs:   BMP:   Recent Labs  Lab 01/06/17  0419   GLU 59*      K 3.6      CO2 29   BUN 9   CREATININE 0.7   CALCIUM 8.8     CBC:   Recent Labs  Lab 01/05/17 2025 01/06/17  0420   WBC 10.22 7.25   HGB 14.3 12.8   HCT 40.7 36.8*    202       Significant Imaging: x-ray of abdomen- stool  Renal ultrasound pending.       All labs reviewed and interpreted by myself.       Assessment/Plan:     * Pyelonephritis, acute  With Gram negative catherine. Likely will be E-coli. ID and (S) pending. Continue Ceftriaxone for now. Patient clinically improved.  Has urology follow up. Will check renal ultrasound as well.  No sepsis present.  HIV pending.       Tobacco abuse  Smoking cessation education.       Anxiety  Stable.        Substance abuse  Patient has a history of cocaine abuse in the past but none in years she says.         VTE Risk Mitigation         Ordered     Medium Risk of VTE  Once      01/05/17 2249     Place DOMINGA hose  Until discontinued      01/05/17 2249     Place sequential compression device  Until discontinued      01/05/17 2249      Lovenox for DVT prophylaxis.   Follow urine cultures. Renal ultrasound. Ceftriaxone for now.  Clinically very stable.     Roman Hunt MD  Department of Hospital Medicine   [facility]

## 2017-01-06 NOTE — ED PROVIDER NOTES
"Encounter Date: 1/5/2017    SCRIBE #1 NOTE: I, Roslyn Perez, am scribing for, and in the presence of,  Manjinder Coronel PA-C. I have scribed the following portions of the note - Other sections scribed: HPI/ROS.       History     Chief Complaint   Patient presents with    Flank Pain     bilat flank pain for two weeks, sent by PCP, with fevers, nausea, diahrea, told she has kidney infection, denies medical hx, finished augmentin prescription, gave UA earlier in lab todayh     Review of patient's allergies indicates:   Allergen Reactions    Strawberry Hives     HPI Comments: CC: Flank Pain    HPI: This 36 y.o. female with hypertension and multiple episodes of pyelonephritis presents to the ED c/o a one month history of bilateral flank pain that is worse on the left, suprapubic abdominal pain, nausea, intermittent vomiting, dysuria and fever. Diagnosed with pyelonephritis on 12/19 in this ED, where she was prescribed Augmentin which she finished on 12/29.  When she was reevaluated today by her PCP the infection was still present on UA, so she was advised to come to the ED. She saw a urologist, Dr. Luther at [facility], 6 mo ago and has a follow up appointment coming up on 1/10. She has had less than 10 episodes of emesis since onset of symptoms and her urine is described as "lele colored".  The patient denies CP, SOB, vaginal bleeding/discharge, pelvic pain, increased frequency or any other associated symptoms.  LMP was 12/14/2016.      The history is provided by the patient.     Past Medical History   Diagnosis Date    Acute encephalopathy 12/24/12     secondary to drug overdose    ARF (acute renal failure) 12/24/12     secondary to drug overdose    History of cardiac arrest 12/24/12     secondary to drug overdose    Hypertension     MRSA (methicillin resistant Staphylococcus aureus) 12/24/12    PMDD (premenstrual dysphoric disorder)     Polysubstance abuse 12/24/12    Systolic CHF, acute 12/24/12     secondary to " drug overdose     No past medical history pertinent negatives.  Past Surgical History   Procedure Laterality Date    Tubal ligation      Cholecystectomy       Family History   Problem Relation Age of Onset    Kidney disease Mother     Cancer Maternal Grandmother      throat    Cancer Maternal Grandfather      prostate    Kidney disease Daughter     Breast cancer Neg Hx     Colon cancer Neg Hx     Ovarian cancer Neg Hx      Social History   Substance Use Topics    Smoking status: Current Every Day Smoker     Packs/day: 0.75     Years: 20.00    Smokeless tobacco: None    Alcohol use No     Review of Systems   Constitutional: Positive for fever.   HENT: Negative for ear pain and sore throat.    Eyes: Negative for visual disturbance.   Respiratory: Negative for cough and shortness of breath.    Cardiovascular: Negative for chest pain.   Gastrointestinal: Positive for abdominal pain (lower), diarrhea, nausea and vomiting.   Genitourinary: Positive for dysuria. Negative for flank pain, frequency, pelvic pain, vaginal bleeding and vaginal discharge.   Musculoskeletal: Positive for back pain (lower).   Skin: Negative for rash.   Neurological: Negative for headaches.       Physical Exam   Initial Vitals   BP Pulse Resp Temp SpO2   01/05/17 1840 01/05/17 1840 01/05/17 1840 01/05/17 1840 01/05/17 1840   108/71 106 16 100 °F (37.8 °C) 99 %     Physical Exam    Nursing note and vitals reviewed.  Constitutional: She appears well-developed and well-nourished. She is not diaphoretic. No distress.   HENT:   Head: Normocephalic and atraumatic.   Nose: Nose normal.   Eyes: Conjunctivae and EOM are normal. Right eye exhibits no discharge. Left eye exhibits no discharge.   Neck: Normal range of motion. No tracheal deviation present. No JVD present.   Cardiovascular: Normal rate, regular rhythm and normal heart sounds. Exam reveals no friction rub.    No murmur heard.  Pulmonary/Chest: Breath sounds normal. No stridor. No  respiratory distress. She has no wheezes. She has no rhonchi. She has no rales. She exhibits no tenderness.   Abdominal: Soft. She exhibits no distension. There is tenderness (mild suprapubic). There is CVA tenderness (L). There is no rigidity, no rebound, no guarding, no tenderness at McBurney's point and negative Larry's sign.   Musculoskeletal: Normal range of motion.   Neurological: She is alert and oriented to person, place, and time.   Skin: Skin is warm and dry. No rash and no abscess noted. No erythema. No pallor.         ED Course   Procedures  Labs Reviewed   URINALYSIS - Abnormal; Notable for the following:        Result Value    Appearance, UA Hazy (*)     Occult Blood UA 1+ (*)     Leukocytes, UA 1+ (*)     All other components within normal limits   URINALYSIS MICROSCOPIC - Abnormal; Notable for the following:     Bacteria, UA Many (*)     All other components within normal limits   CULTURE, URINE   C. TRACHOMATIS/N. GONORRHOEAE BY AMP DNA   CULTURE, BLOOD   CULTURE, BLOOD   CBC W/ AUTO DIFFERENTIAL   COMPREHENSIVE METABOLIC PANEL   LIPASE   POCT URINE PREGNANCY          X-Rays:   Independently Interpreted Readings:   Abdomen: KUB shows no evidence of renal stones.      Medical Decision Making:   History:   Old Medical Records: I decided to obtain old medical records.      This is an emergent evaluation of a 36 y.o. female with a PMHx of polysubstance abuse, HTN, anxiety, and multiple episodes of pyelonephritis presenting to the ED for bilateral flank pain associated with suprapubic abdominal pain and urinary issues despite completing a course of Augmentin (sensitive per culture). Denies fever. , Temp 100, vitals otherwise WNL. Patient is non-toxic appearing and in no acute distress. L CVA TTP with mild suprapubic TTP. UPT negative. UA shows 1+ leukocyte with many bacteria, which is very decreased from her UA performed today at her PCPs office. CBC, CMP, and lipase unremarkable. CT abdomen from  12/19 shows no acute findings.     Presentation consistent with failed outpatient therapy for pyelonephritis. Vitals signs at triage concerning for borderline urosepsis. No abdominal tenderness to suggest acute intestinal obstruction, diverticulitis, or appendicitis. Negative Larry's Sign, so cholecystitis unlikely. No rash to suggest Herpes Zoster. Given the above, I have also considered but doubt ovarian torsion/cyst rupture and acute mesenteric ischemia. No pulsatile abdominal mass, abdominal tenderness, or findings on imaging from 12/19 to suggest aortic aneurysm.     Started on IV NS, Rocephin, gentamicin, Percocet, and Zofran in ED with improvement of symptoms. Remains well appearing. Will admit to Dr. Dwyer for IV antibiotics. Patient agreeable to plan.     I discussed this patient with Dr. Marie who is in agreement with my assessment and plan.          Scribe Attestation:   Scribe #1: I performed the above scribed service and the documentation accurately describes the services I performed. I attest to the accuracy of the note.    Attending Attestation:           Physician Attestation for Scribe:  Physician Attestation Statement for Scribe #1: I, Manjinder Coronel PA-C, reviewed documentation, as scribed by Roslyn Perez in my presence, and it is both accurate and complete.                 ED Course     Clinical Impression:   The primary encounter diagnosis was Pyelonephritis, acute. A diagnosis of Flank pain was also pertinent to this visit.    Disposition:   Disposition: Admitted  Condition: Fair       Manjinder Coronel PA-C  01/05/17 5319

## 2017-01-06 NOTE — NURSING
Pt reported having epigastric pain and stomach pain which the pt described as severe.  Dr. Bella notified and informed pt recently received IV dilaudid. Order placed for a GI cocktail. Will continue to monitor pt closely.

## 2017-01-06 NOTE — PLAN OF CARE
01/06/17 1555   Discharge Assessment   Assessment Type Discharge Planning Assessment   Confirmed/corrected address and phone number on facesheet? Yes   Assessment information obtained from? Patient   Prior to hospitilization cognitive status: Alert/Oriented   Prior to hospitalization functional status: Independent   Current cognitive status: Alert/Oriented   Current Functional Status: Independent   Arrived From home or self-care   Lives With child(cristian), dependent;spouse   Able to Return to Prior Arrangements yes   Is patient able to care for self after discharge? Yes   How many people do you have in your home that can help with your care after discharge? 2   Who are your caregiver(s) and their phone number(s)? spouse- [phone] mother- Chelita [phone]   Patient's perception of discharge disposition home or selfcare   Readmission Within The Last 30 Days no previous admission in last 30 days   Patient currently being followed by outpatient case management? No   Patient currently receives home health services? No   Does the patient currently use HME? No   Patient currently receives private duty nursing? N/A   Patient currently receives any other outside agency services? No   Equipment Currently Used at Home none   Do you have any problems affording any of your prescribed medications? TBD   Is the patient taking medications as prescribed? yes   Do you have any financial concerns preventing you from receiving the healthcare you need? No   Does the patient have transportation to healthcare appointments? No   On Dialysis? No   Does the patient receive services at the Coumadin Clinic? No   Are there any open cases? No   Discharge Plan A Home   Discharge Plan B Home with family   Patient/Family In Agreement With Plan yes     [facility] [id number]  ANAI HARTMANN  [address] [facility] [address]  [address]  [place] [address]  Phone: [phone] Fax: [phone]    [facility] [address]  ANAI HARTMANN [address] AT [facility] [address] [place] [address]  [place] [address]  Phone: [phone] Fax: [phone]

## 2017-01-06 NOTE — ASSESSMENT & PLAN NOTE
With Gram negative catherine. Likely will be E-coli. ID and (S) pending. Continue Ceftriaxone for now. Patient clinically improved.  Has urology follow up. Will check renal ultrasound as well.  No sepsis present.  HIV pending.

## 2017-01-07 VITALS
TEMPERATURE: 99 F | OXYGEN SATURATION: 100 % | BODY MASS INDEX: 30.14 KG/M2 | WEIGHT: 163.81 LBS | DIASTOLIC BLOOD PRESSURE: 65 MMHG | HEART RATE: 86 BPM | HEIGHT: 62 IN | SYSTOLIC BLOOD PRESSURE: 127 MMHG | RESPIRATION RATE: 18 BRPM

## 2017-01-07 PROBLEM — F19.10 SUBSTANCE ABUSE: Status: RESOLVED | Noted: 2017-01-06 | Resolved: 2017-01-07

## 2017-01-07 PROBLEM — N10 PYELONEPHRITIS, ACUTE: Status: RESOLVED | Noted: 2017-01-05 | Resolved: 2017-01-07

## 2017-01-07 LAB
ALBUMIN SERPL BCP-MCNC: 3.3 G/DL
ALP SERPL-CCNC: 50 U/L
ALT SERPL W/O P-5'-P-CCNC: 31 U/L
ANION GAP SERPL CALC-SCNC: 8 MMOL/L
AST SERPL-CCNC: 18 U/L
BACTERIA UR CULT: NORMAL
BASOPHILS # BLD AUTO: 0.01 K/UL
BASOPHILS NFR BLD: 0.1 %
BILIRUB SERPL-MCNC: 0.2 MG/DL
BUN SERPL-MCNC: 7 MG/DL
CALCIUM SERPL-MCNC: 8.3 MG/DL
CHLORIDE SERPL-SCNC: 106 MMOL/L
CO2 SERPL-SCNC: 27 MMOL/L
CREAT SERPL-MCNC: 0.7 MG/DL
DIFFERENTIAL METHOD: NORMAL
EOSINOPHIL # BLD AUTO: 0.2 K/UL
EOSINOPHIL NFR BLD: 2.7 %
ERYTHROCYTE [DISTWIDTH] IN BLOOD BY AUTOMATED COUNT: 12.6 %
EST. GFR  (AFRICAN AMERICAN): >60 ML/MIN/1.73 M^2
EST. GFR  (NON AFRICAN AMERICAN): >60 ML/MIN/1.73 M^2
GLUCOSE SERPL-MCNC: 123 MG/DL
HCT VFR BLD AUTO: 38 %
HGB BLD-MCNC: 12.8 G/DL
LYMPHOCYTES # BLD AUTO: 2.2 K/UL
LYMPHOCYTES NFR BLD: 33.5 %
MCH RBC QN AUTO: 30.8 PG
MCHC RBC AUTO-ENTMCNC: 33.7 %
MCV RBC AUTO: 92 FL
MONOCYTES # BLD AUTO: 0.6 K/UL
MONOCYTES NFR BLD: 9 %
NEUTROPHILS # BLD AUTO: 3.7 K/UL
NEUTROPHILS NFR BLD: 54.7 %
PLATELET # BLD AUTO: 201 K/UL
PMV BLD AUTO: 10.6 FL
POTASSIUM SERPL-SCNC: 3.7 MMOL/L
PROT SERPL-MCNC: 5.5 G/DL
RBC # BLD AUTO: 4.15 M/UL
SODIUM SERPL-SCNC: 141 MMOL/L
WBC # BLD AUTO: 6.69 K/UL

## 2017-01-07 PROCEDURE — 80053 COMPREHEN METABOLIC PANEL: CPT

## 2017-01-07 PROCEDURE — 63600175 PHARM REV CODE 636 W HCPCS: Performed by: PHYSICIAN ASSISTANT

## 2017-01-07 PROCEDURE — 85025 COMPLETE CBC W/AUTO DIFF WBC: CPT

## 2017-01-07 PROCEDURE — 25000003 PHARM REV CODE 250: Performed by: PHYSICIAN ASSISTANT

## 2017-01-07 PROCEDURE — 99232 SBSQ HOSP IP/OBS MODERATE 35: CPT | Mod: ,,, | Performed by: UROLOGY

## 2017-01-07 PROCEDURE — 36415 COLL VENOUS BLD VENIPUNCTURE: CPT

## 2017-01-07 RX ORDER — CIPROFLOXACIN 500 MG/1
500 TABLET ORAL 2 TIMES DAILY
Qty: 24 TABLET | Refills: 0 | Status: SHIPPED | OUTPATIENT
Start: 2017-01-07 | End: 2017-01-19

## 2017-01-07 RX ADMIN — HYDROCODONE BITARTRATE AND ACETAMINOPHEN 1 TABLET: 5; 325 TABLET ORAL at 07:01

## 2017-01-07 RX ADMIN — HYDROMORPHONE HYDROCHLORIDE 0.5 MG: 2 INJECTION INTRAMUSCULAR; INTRAVENOUS; SUBCUTANEOUS at 12:01

## 2017-01-07 RX ADMIN — ONDANSETRON 4 MG: 2 INJECTION INTRAMUSCULAR; INTRAVENOUS at 04:01

## 2017-01-07 RX ADMIN — CEFTRIAXONE 1 G: 1 INJECTION, SOLUTION INTRAVENOUS at 09:01

## 2017-01-07 RX ADMIN — HYDROMORPHONE HYDROCHLORIDE 0.5 MG: 2 INJECTION INTRAMUSCULAR; INTRAVENOUS; SUBCUTANEOUS at 04:01

## 2017-01-07 RX ADMIN — PROMETHAZINE HYDROCHLORIDE 6.25 MG: 25 INJECTION, SOLUTION INTRAMUSCULAR; INTRAVENOUS at 07:01

## 2017-01-07 NOTE — PROGRESS NOTES
Head to toe assessment performed no complaints of pain or SOB noted bed in low position call light and phone within reach will continue to monitor.

## 2017-01-07 NOTE — SUBJECTIVE & OBJECTIVE
Interval History:  No new issues. Would like to go home.       Review of Systems   Respiratory: Negative for chest tightness and shortness of breath.    Cardiovascular: Negative for chest pain and palpitations.   Gastrointestinal: Negative for abdominal distention and abdominal pain.   Musculoskeletal: Negative for back pain.     Objective:     Vital Signs (Most Recent):  Temp: 98.5 °F (36.9 °C) (01/07/17 0737)  Pulse: 86 (01/07/17 0737)  Resp: 18 (01/07/17 0737)  BP: 127/65 (01/07/17 0737)  SpO2: 100 % (01/07/17 0737) Vital Signs (24h Range):  Temp:  [97.7 °F (36.5 °C)-99.7 °F (37.6 °C)] 98.5 °F (36.9 °C)  Pulse:  [84-98] 86  Resp:  [17-19] 18  BP: (103-127)/(56-70) 127/65     Weight: 74.3 kg (163 lb 12.8 oz)  Body mass index is 29.96 kg/(m^2).    Intake/Output Summary (Last 24 hours) at 01/07/17 1022  Last data filed at 01/07/17 0944   Gross per 24 hour   Intake             5377 ml   Output              700 ml   Net             4677 ml      Physical Exam   Constitutional: She is oriented to person, place, and time. She appears well-developed and well-nourished.   HENT:   Head: Normocephalic.   Cardiovascular: Normal rate.    Pulmonary/Chest: Effort normal and breath sounds normal.   Abdominal: Soft.   Neurological: She is alert and oriented to person, place, and time.       Significant Labs:   BMP:   Recent Labs  Lab 01/07/17  0440   *      K 3.7      CO2 27   BUN 7   CREATININE 0.7   CALCIUM 8.3*     CBC:   Recent Labs  Lab 01/05/17 2025 01/06/17  0420 01/07/17  0440   WBC 10.22 7.25 6.69   HGB 14.3 12.8 12.8   HCT 40.7 36.8* 38.0    202 201       Significant Imaging:  Renal ultrasound with 2 non-obstructing L stones.

## 2017-01-07 NOTE — PLAN OF CARE
Problem: Patient Care Overview  Goal: Plan of Care Review  Outcome: Ongoing (interventions implemented as appropriate)    01/06/17 1955   Coping/Psychosocial   Plan Of Care Reviewed With patient   Pt remained free of falls during current shift. Pt reported having back aches and received prn pain medications. IV fluids and IV antibiotics administered per MD order. Urology was consulted but signed off. of care and fall precautions reviewed with pt and verbalized understanding. Bed locked, lowered, SR up x2 and call light placed within reach.

## 2017-01-07 NOTE — PROGRESS NOTES
[facility]  Urology  Progress Note    Patient Name: Roslyn Brewster  MRN: [id number]  Admission Date: 1/5/2017  Hospital Length of Stay: 2 days  Code Status: Full Code   Attending Provider: Roman Dwyer MD   Primary Care Physician: Spring Montilla MD    Subjective:     HPI:     Mrs. Brewster is a 37 yo F with a history of recurrent UTI's. The patient was just diagnosed with a E-coli UTI on 12/19 and completed a 19 day course of Augmentin. The patient states that she never felt really back to normal. She presents to her PCP after several days of N/V and found to have another UTI and sent to the ED. She has been started on Ceftriaxone. Her urine cultures are growing a Gram negative catherine. The patient states that today, she feels much better. She is frustrated as to why she keeps developing these infections and has a follow up appointment with urology on 1/23 with Dr. Luther at . She is non- ill appearing and resting comfortably in her room. She complains of mainly L flank pain as well. She has hd no further vomiting since admission. Denies temps.       Interval History: She feels better and would like to go home.    Review of Systems   Constitutional: Negative.    HENT: Negative.    Eyes: Negative.    Respiratory: Negative for cough, chest tightness and shortness of breath.    Cardiovascular: Negative for chest pain.   Gastrointestinal: Negative.  Negative for constipation, diarrhea and nausea.   Genitourinary: Negative for difficulty urinating.   Musculoskeletal: Negative.    Neurological: Negative.    Psychiatric/Behavioral: Negative.      Objective:     Temp:  [97.7 °F (36.5 °C)-99.7 °F (37.6 °C)] 98.5 °F (36.9 °C)  Pulse:  [84-98] 86  Resp:  [17-19] 18  BP: (103-127)/(56-70) 127/65     Body mass index is 29.96 kg/(m^2).      Date 01/07/17 0700 - 01/08/17 0659   Shift 4249-1664 0976-0703 4560-3110 24 Hour Total   I  N  T  A  K  E   P.O. 480   480    Shift Total  (mL/kg) 480  (6.5)    480  (6.5)   O  U  T  P  U  T   Shift Total  (mL/kg)       Weight (kg) 74.3 74.3 74.3 74.3          Drains          No matching active lines, drains, or airways          Physical Exam   Nursing note and vitals reviewed.  Constitutional: She is oriented to person, place, and time. She appears well-developed.   HENT:   Head: Normocephalic.   Eyes: Conjunctivae are normal.   Neck: Normal range of motion. No tracheal deviation present. No thyromegaly present.   Cardiovascular: Normal rate, normal heart sounds and normal pulses.    Pulmonary/Chest: Effort normal and breath sounds normal. No respiratory distress. She has no wheezes.   Abdominal: Soft. She exhibits no distension and no mass. There is no hepatosplenomegaly. There is no tenderness. There is no rebound, no guarding and no CVA tenderness. No hernia.   Musculoskeletal: Normal range of motion. She exhibits no edema or tenderness.   Lymphadenopathy:     She has no cervical adenopathy.   Neurological: She is alert and oriented to person, place, and time.   Skin: Skin is warm and dry. No rash noted. No erythema.     Psychiatric: She has a normal mood and affect. Her behavior is normal. Judgment and thought content normal.       Significant Labs:    BMP:    Recent Labs  Lab 01/05/17 2025 01/06/17  0419 01/07/17  0440    142 141   K 3.6 3.6 3.7    106 106   CO2 28 29 27   BUN 11 9 7   CREATININE 0.8 0.7 0.7   CALCIUM 9.7 8.8 8.3*       CBC:     Recent Labs  Lab 01/05/17 2025 01/06/17  0420 01/07/17  0440   WBC 10.22 7.25 6.69   HGB 14.3 12.8 12.8   HCT 40.7 36.8* 38.0    202 201       Blood Culture:   Recent Labs  Lab 01/05/17 2044 01/05/17 2052   LABBLOO No Growth to date  No Growth to date No Growth to date  No Growth to date     Urine Culture:   Recent Labs  Lab 01/05/17  1222 01/05/17  2000   LABURIN CITROBACTER AMALONATICUS> 100,000 cfu/ml CITROBACTER AMALONATICUS> 100,000 cfu/ml       Significant Imaging:  U/S: I have reviewed all  results within the past 24 hours and my personal findings are:  left renal stones, no obstruction                  Assessment/Plan:     Pyelonephritis  Treat with Cipro x 2 weeks  Follow up with Dr. Luther as scheduled  Will sign off      VTE Risk Mitigation         Ordered     enoxaparin injection 40 mg  Daily     Route:  Subcutaneous        01/06/17 1047     Medium Risk of VTE  Once      01/05/17 2249     Place DOMINGA hose  Until discontinued      01/05/17 2249     Place sequential compression device  Until discontinued      01/05/17 2249          W Lopez Balderas MD  Urology  [facility]

## 2017-01-07 NOTE — PROGRESS NOTES
Patient refuse wheelchair and ambulated in prieto independently to 3rd floor and  Into transportation.

## 2017-01-07 NOTE — DISCHARGE SUMMARY
[facility] Hospital Medicine  Discharge Summary      Patient Name: Roslyn Brewster  MRN: [id number]  Admission Date: 1/5/2017  Hospital Length of Stay: 2 days  Discharge Date and Time: 1/7/2017 10:26 AM  Attending Physician: Roman Dwyer MD   Discharging Provider: Roman Dwyer MD  Primary Care Provider: Spring Montilla MD      HPI:   Mrs. Brewster is a 37 yo F with a history of recurrent UTI's. The patient was just diagnosed with a E-coli UTI on 12/19 and completed a 19 day course of Augmentin. The patient states that she never felt really back to normal. She presents to her PCP after several days of N/V and found to have another UTI and sent to the ED. She has been started on Ceftriaxone. Her urine cultures are growing a Gram negative catherine. The patient states that today, she feels much better.  She is frustrated as to why she keeps developing these infections and has a follow up appointment with urology on 1/23.  She is non- ill appearing and resting comfortably in her room.  She complains of mainly L flank pain as well.  She has hd no further vomiting since admission. Denies temps.     * No surgery found *      Indwelling Lines/Drains at time of discharge:   Lines/Drains/Airways          No matching active lines, drains, or airways        Hospital Course:   The patient was admitted to the hospital and started on Abx.  The patient liyah out Citrobactar A in her urine. She had a renal ultrasound that showed 2 L sided non-obstructing stones that the patient was aware of. I spoke with Urology and the patient can be discharged to home today. She has a follow up appointment with urology on 1/23.  The rest of the hospital course was unremarkable. HIV was negative. The patient will be discharged to home on Cipro x 12 days.  Follow up with PCP in one week. Activity as tolerated. Regular diet.          Consults:   Consults         Status Ordering Provider     Inpatient consult to Urology  Once      Provider:  Juan R Morgan MD    Completed SELENA WEBB          Significant Diagnostic Studies: Labs:  Urine- Citrobactar A.     Renal Ultrasound:  2 L sided non-obstructing stones.     Pending Diagnostic Studies:     None        Final Active Diagnoses:    Diagnosis Date Noted POA    Anxiety [F41.9] 06/28/2016 Yes    Tobacco abuse [Z72.0] 01/10/2013 Yes     Chronic      Problems Resolved During this Admission:    Diagnosis Date Noted Date Resolved POA    PRINCIPAL PROBLEM:  Pyelonephritis, acute [N10] 01/05/2017 01/07/2017 Yes    Substance abuse [F19.10] 01/06/2017 01/07/2017 No    Acute left flank pain [R10.9] 07/11/2014 06/22/2015 Yes    Left flank pain [R10.9] 07/11/2014 01/07/2017 Yes    History of substance abuse [Z87.898] 01/10/2013 10/06/2015 Yes      No new Assessment & Plan notes have been filed under this hospital service since the last note was generated.  Service: Hospital Medicine      Discharged Condition: good    Disposition: Home or Self Care    Follow Up:  Follow-up Information     Follow up with Spring Montilla MD In 1 week.    Specialty:  Wound Care    Contact information:    [address] [place] [address]  WOUND [facility]ENTER  [facility] [id number] [phone]          Patient Instructions:     Activity as tolerated       Medications:  Reconciled Home Medications:   Current Discharge Medication List      START taking these medications    Details   ciprofloxacin HCl (CIPRO) 500 MG tablet Take 1 tablet (500 mg total) by mouth 2 (two) times daily.  Qty: 24 tablet, Refills: 0         CONTINUE these medications which have NOT CHANGED    Details   alprazolam (XANAX) 1 MG tablet TAKE ONE TABLET EVERY EVENING AS NEEDED FOR ANXIETY MAY CAUSE DROWSINESS  Qty: 30 tablet, Refills: 5    Associated Diagnoses: Anxiety      hydrocodone-acetaminophen 7.5-325mg (NORCO) 7.5-325 mg per tablet TK 1 T PO  Q 12 H PRN  Refills: 0      meloxicam (MOBIC) 7.5 MG tablet TK 1 T PO  HS AFTER MEALS  Refills: 0           Time  spent on the discharge of patient:  < 30 minutes    Roman Hunt MD  Department of Hospital Medicine  [facility]

## 2017-01-07 NOTE — ASSESSMENT & PLAN NOTE
With Gram negative catherine. Likely will be E-coli. ID and (S) pending. Continue Ceftriaxone for now. Patient clinically improved.  Has urology follow up. Will check renal ultrasound as well.  No sepsis present.  HIV negative.  Patient has Citrobactar A growing.  Urology stated ok to d/c to home on 12 more days of Cipro.  Has follow up appointment on 1/23.

## 2017-01-07 NOTE — PROGRESS NOTES
LAC IV removed site without redness, swelling, or tenderness noted. Discharge instructions and prescription given to patient who verbalize understanding.

## 2017-01-07 NOTE — PROGRESS NOTES
[facility] Hospital Medicine  Progress Note    Patient Name: Roslyn Brewster  MRN: [id number]  Patient Class: IP- Inpatient   Admission Date: 1/5/2017  Length of Stay: 2 days  Expected Discharge Date:   Attending Physician: Roman Dwyer MD  Primary Care Provider: Spring Montilla MD        Subjective:     Principal Problem:Pyelonephritis, acute    HPI:  Mrs. Brewster is a 37 yo F with a history of recurrent UTI's. The patient was just diagnosed with a E-coli UTI on 12/19 and completed a 19 day course of Augmentin. The patient states that she never felt really back to normal. She presents to her PCP after several days of N/V and found to have another UTI and sent to the ED. She has been started on Ceftriaxone. Her urine cultures are growing a Gram negative catherine. The patient states that today, she feels much better.  She is frustrated as to why she keeps developing these infections and has a follow up appointment with urology on 1/23.  She is non- ill appearing and resting comfortably in her room.  She complains of mainly L flank pain as well.  She has hd no further vomiting since admission. Denies temps.     Hospital Course:  The patient was admitted to the hospital and started on Abx.  The patient liyah out Citrobactar A in her urine. She had a renal ultrasound that showed 2 L sided non-obstructing stones that the patient was aware of. I spoke with Urology and the patient can be discharged to home today. She has a follow up appointment with urology on 1/23.  The rest of the hospital course was unremarkable. HIV was negative. The patient will be discharged to home on Cipro x 12 days.  Follow up with PCP in one week. Activity as tolerated. Regular diet.         Interval History:  No new issues. Would like to go home.       Review of Systems   Respiratory: Negative for chest tightness and shortness of breath.    Cardiovascular: Negative for chest pain and palpitations.   Gastrointestinal:  Negative for abdominal distention and abdominal pain.   Musculoskeletal: Negative for back pain.     Objective:     Vital Signs (Most Recent):  Temp: 98.5 °F (36.9 °C) (01/07/17 0737)  Pulse: 86 (01/07/17 0737)  Resp: 18 (01/07/17 0737)  BP: 127/65 (01/07/17 0737)  SpO2: 100 % (01/07/17 0737) Vital Signs (24h Range):  Temp:  [97.7 °F (36.5 °C)-99.7 °F (37.6 °C)] 98.5 °F (36.9 °C)  Pulse:  [84-98] 86  Resp:  [17-19] 18  BP: (103-127)/(56-70) 127/65     Weight: 74.3 kg (163 lb 12.8 oz)  Body mass index is 29.96 kg/(m^2).    Intake/Output Summary (Last 24 hours) at 01/07/17 1022  Last data filed at 01/07/17 0944   Gross per 24 hour   Intake             5377 ml   Output              700 ml   Net             4677 ml      Physical Exam   Constitutional: She is oriented to person, place, and time. She appears well-developed and well-nourished.   HENT:   Head: Normocephalic.   Cardiovascular: Normal rate.    Pulmonary/Chest: Effort normal and breath sounds normal.   Abdominal: Soft.   Neurological: She is alert and oriented to person, place, and time.       Significant Labs:   BMP:   Recent Labs  Lab 01/07/17  0440   *      K 3.7      CO2 27   BUN 7   CREATININE 0.7   CALCIUM 8.3*     CBC:   Recent Labs  Lab 01/05/17 2025 01/06/17  0420 01/07/17  0440   WBC 10.22 7.25 6.69   HGB 14.3 12.8 12.8   HCT 40.7 36.8* 38.0    202 201       Significant Imaging:  Renal ultrasound with 2 non-obstructing L stones.     Assessment/Plan:      * Pyelonephritis, acute  With Gram negative catherine. Likely will be E-coli. ID and (S) pending. Continue Ceftriaxone for now. Patient clinically improved.  Has urology follow up. Will check renal ultrasound as well.  No sepsis present.  HIV negative.  Patient has Citrobactar A growing.  Urology stated ok to d/c to home on 12 more days of Cipro.  Has follow up appointment on 1/23.     Tobacco abuse  Smoking cessation education.       Left flank pain  Resolved.        Anxiety  Stable.        Substance abuse  Patient has a history of cocaine abuse in the past but none in years she says.         VTE Risk Mitigation         Ordered     enoxaparin injection 40 mg  Daily     Route:  Subcutaneous        01/06/17 1047     Medium Risk of VTE  Once      01/05/17 2249     Place DOMINGA hose  Until discontinued      01/05/17 2249     Place sequential compression device  Until discontinued      01/05/17 2249          oRman Hunt MD  Department of Hospital Medicine   [facility]

## 2017-01-07 NOTE — SUBJECTIVE & OBJECTIVE
Interval History: She feels better and would like to go home.    Review of Systems   Constitutional: Negative.    HENT: Negative.    Eyes: Negative.    Respiratory: Negative for cough, chest tightness and shortness of breath.    Cardiovascular: Negative for chest pain.   Gastrointestinal: Negative.  Negative for constipation, diarrhea and nausea.   Genitourinary: Negative for difficulty urinating.   Musculoskeletal: Negative.    Neurological: Negative.    Psychiatric/Behavioral: Negative.      Objective:     Temp:  [97.7 °F (36.5 °C)-99.7 °F (37.6 °C)] 98.5 °F (36.9 °C)  Pulse:  [84-98] 86  Resp:  [17-19] 18  BP: (103-127)/(56-70) 127/65     Body mass index is 29.96 kg/(m^2).      Date 01/07/17 0700 - 01/08/17 0659   Shift 3358-4186 5434-5852 9665-0643 24 Hour Total   I  N  T  A  K  E   P.O. 480   480    Shift Total  (mL/kg) 480  (6.5)   480  (6.5)   O  U  T  P  U  T   Shift Total  (mL/kg)       Weight (kg) 74.3 74.3 74.3 74.3          Drains          No matching active lines, drains, or airways          Physical Exam   Nursing note and vitals reviewed.  Constitutional: She is oriented to person, place, and time. She appears well-developed.   HENT:   Head: Normocephalic.   Eyes: Conjunctivae are normal.   Neck: Normal range of motion. No tracheal deviation present. No thyromegaly present.   Cardiovascular: Normal rate, normal heart sounds and normal pulses.    Pulmonary/Chest: Effort normal and breath sounds normal. No respiratory distress. She has no wheezes.   Abdominal: Soft. She exhibits no distension and no mass. There is no hepatosplenomegaly. There is no tenderness. There is no rebound, no guarding and no CVA tenderness. No hernia.   Musculoskeletal: Normal range of motion. She exhibits no edema or tenderness.   Lymphadenopathy:     She has no cervical adenopathy.   Neurological: She is alert and oriented to person, place, and time.   Skin: Skin is warm and dry. No rash noted. No erythema.     Psychiatric:  She has a normal mood and affect. Her behavior is normal. Judgment and thought content normal.       Significant Labs:    BMP:    Recent Labs  Lab 01/05/17 2025 01/06/17  0419 01/07/17  0440    142 141   K 3.6 3.6 3.7    106 106   CO2 28 29 27   BUN 11 9 7   CREATININE 0.8 0.7 0.7   CALCIUM 9.7 8.8 8.3*       CBC:     Recent Labs  Lab 01/05/17 2025 01/06/17  0420 01/07/17  0440   WBC 10.22 7.25 6.69   HGB 14.3 12.8 12.8   HCT 40.7 36.8* 38.0    202 201       Blood Culture:   Recent Labs  Lab 01/05/17 2044 01/05/17 2052   LABBLOO No Growth to date  No Growth to date No Growth to date  No Growth to date     Urine Culture:   Recent Labs  Lab 01/05/17  1222 01/05/17  2000   LABURIN CITROBACTER AMALONATICUS> 100,000 cfu/ml CITROBACTER AMALONATICUS> 100,000 cfu/ml       Significant Imaging:  U/S: I have reviewed all results within the past 24 hours and my personal findings are:  left renal stones, no obstruction

## 2017-01-07 NOTE — PLAN OF CARE
01/07/17 1122   Final Note   Assessment Type Final Discharge Note   Discharge Disposition Home   Discharge planning education complete? Yes   What phone number can be called within the next 1-3 days to see how you are doing after discharge? [phone]   Hospital Follow Up  Appt(s) scheduled? No   Discharge plans and expectations educations in teach back method with documentation complete? Yes   Offered [facility] -- Bedside Delivery? n/a   Discharge/Hospital Encounter Summary to (non-[facility]) PCP n/a   Referral to Outpatient Case Management complete? n/a   Referral to / orders for Home Health Complete? n/a   30 day supply of medicines given at discharge, if documented non-compliance / non-adherence? n/a   Any social issues identified prior to discharge? n/a   Did you assess the readiness or willingness of the family or caregiver to support self management of care? n/a   Right Care Referral Info   Post Acute Recommendation No Care

## 2017-01-07 NOTE — PLAN OF CARE
Problem: Patient Care Overview  Goal: Plan of Care Review  Outcome: Ongoing (interventions implemented as appropriate)  No falls this shift bed kept in low position call light and phone remain within reach. Patient able to reposition self in bed without assist and ambulate independently within room and prieto without assist . No evidence of skin breakdown noted.      Patient complain of lower back pain decreased by PRN pain medication.      01/05/17 xray of abdomen; Moderate amount of stool without evidence of bowel obstruction. Status post cholecystectomy.  01/06/17 US of kidney; There are 2 nonobstructing left renal calculi the largest of which measures 3 mm.  Patient dx with acute pyelonephritis   Urology following and signed off patient will follow up with urology outpatient. Patient also will be d/c with cirpo PO BID x 14 days.

## 2017-01-09 LAB
C TRACH DNA SPEC QL NAA+PROBE: NEGATIVE
N GONORRHOEA DNA SPEC QL NAA+PROBE: NEGATIVE

## 2017-01-10 LAB
BACTERIA BLD CULT: NORMAL
BACTERIA BLD CULT: NORMAL

## 2017-01-23 ENCOUNTER — PATIENT MESSAGE (OUTPATIENT)
Dept: FAMILY MEDICINE | Facility: CLINIC | Age: 37
End: 2017-01-23

## 2017-01-24 ENCOUNTER — OFFICE VISIT (OUTPATIENT)
Dept: FAMILY MEDICINE | Facility: CLINIC | Age: 37
End: 2017-01-24
Payer: COMMERCIAL

## 2017-01-24 VITALS
WEIGHT: 158.5 LBS | TEMPERATURE: 99 F | HEIGHT: 62 IN | DIASTOLIC BLOOD PRESSURE: 70 MMHG | OXYGEN SATURATION: 98 % | BODY MASS INDEX: 29.17 KG/M2 | RESPIRATION RATE: 16 BRPM | SYSTOLIC BLOOD PRESSURE: 110 MMHG | HEART RATE: 91 BPM

## 2017-01-24 DIAGNOSIS — J01.00 ACUTE MAXILLARY SINUSITIS, RECURRENCE NOT SPECIFIED: Primary | ICD-10-CM

## 2017-01-24 DIAGNOSIS — R39.15 URGENCY OF MICTURITION: ICD-10-CM

## 2017-01-24 LAB
BILIRUB SERPL-MCNC: NORMAL MG/DL
BLOOD URINE, POC: NORMAL
COLOR, POC UA: NORMAL
GLUCOSE UR QL STRIP: NORMAL
KETONES UR QL STRIP: NORMAL
LEUKOCYTE ESTERASE URINE, POC: NORMAL
NITRITE, POC UA: NORMAL
PH, POC UA: 5
PROTEIN, POC: NORMAL
SPECIFIC GRAVITY, POC UA: 1.02
UROBILINOGEN, POC UA: NORMAL

## 2017-01-24 PROCEDURE — 81002 URINALYSIS NONAUTO W/O SCOPE: CPT | Mod: S$GLB,,, | Performed by: INTERNAL MEDICINE

## 2017-01-24 PROCEDURE — 3074F SYST BP LT 130 MM HG: CPT | Mod: S$GLB,,, | Performed by: INTERNAL MEDICINE

## 2017-01-24 PROCEDURE — 87086 URINE CULTURE/COLONY COUNT: CPT

## 2017-01-24 PROCEDURE — 3078F DIAST BP <80 MM HG: CPT | Mod: S$GLB,,, | Performed by: INTERNAL MEDICINE

## 2017-01-24 PROCEDURE — 1159F MED LIST DOCD IN RCRD: CPT | Mod: S$GLB,,, | Performed by: INTERNAL MEDICINE

## 2017-01-24 PROCEDURE — 99999 PR PBB SHADOW E&M-EST. PATIENT-LVL III: CPT | Mod: PBBFAC,,, | Performed by: INTERNAL MEDICINE

## 2017-01-24 PROCEDURE — 99214 OFFICE O/P EST MOD 30 MIN: CPT | Mod: 25,S$GLB,, | Performed by: INTERNAL MEDICINE

## 2017-01-24 RX ORDER — DOXYCYCLINE 100 MG/1
100 CAPSULE ORAL 2 TIMES DAILY
Qty: 14 CAPSULE | Refills: 0 | Status: SHIPPED | OUTPATIENT
Start: 2017-01-24 | End: 2017-03-07

## 2017-01-24 RX ORDER — DICLOFENAC SODIUM 10 MG/G
GEL TOPICAL
Refills: 0 | COMMUNITY
Start: 2017-01-16 | End: 2017-12-06

## 2017-01-24 RX ORDER — OXYCODONE HYDROCHLORIDE 5 MG/1
TABLET ORAL
Refills: 0 | COMMUNITY
Start: 2017-01-16 | End: 2017-08-22

## 2017-01-24 RX ORDER — PROMETHAZINE HYDROCHLORIDE AND DEXTROMETHORPHAN HYDROBROMIDE 6.25; 15 MG/5ML; MG/5ML
5 SYRUP ORAL EVERY 12 HOURS PRN
Qty: 180 ML | Refills: 0 | Status: SHIPPED | OUTPATIENT
Start: 2017-01-24 | End: 2017-02-03

## 2017-01-24 RX ORDER — LEVOCETIRIZINE DIHYDROCHLORIDE 5 MG/1
5 TABLET, FILM COATED ORAL NIGHTLY
Qty: 30 TABLET | Refills: 1 | Status: SHIPPED | OUTPATIENT
Start: 2017-01-24 | End: 2017-07-10 | Stop reason: SDUPTHER

## 2017-01-24 NOTE — PROGRESS NOTES
SUBJECTIVE     Chief Complaint   Patient presents with    URI       HPI  Roslyn Brewster is a 36 y.o. female with multiple medical diagnoses as listed in the medical history and problem list that presents for evaluation of URI x 1 week. Pt says she has nasal congestion, post-nasal drip, and dry cough with chest pain 2/2 cough. Denies any sore throat, ear pain, fever, chills, and night sweats. Pt also reports a headache. Pt has been taking cough meds, Robitussin, and Nyquil without any relief of symptoms. +sick contacts(co-workers). Denies any recent travel.    PAST MEDICAL HISTORY:  Past Medical History   Diagnosis Date    Acute encephalopathy 12/24/12     secondary to drug overdose    ARF (acute renal failure) 12/24/12     secondary to drug overdose    History of cardiac arrest 12/24/12     secondary to drug overdose    Hypertension     MRSA (methicillin resistant Staphylococcus aureus) 12/24/12    Nephritis     PMDD (premenstrual dysphoric disorder)     Polysubstance abuse 12/24/12    Systolic CHF, acute 12/24/12     secondary to drug overdose       PAST SURGICAL HISTORY:  Past Surgical History   Procedure Laterality Date    Tubal ligation      Cholecystectomy         SOCIAL HISTORY:  Social History     Social History    Marital status: Single     Spouse name: N/A    Number of children: N/A    Years of education: N/A     Occupational History    Not on file.     Social History Main Topics    Smoking status: Current Every Day Smoker     Packs/day: 0.75     Years: 20.00    Smokeless tobacco: Not on file      Comment: 1/2 half pack/day.     Alcohol use No    Drug use: No      Comment: PCP, patient states she had not done drugs since 2012    Sexual activity: Yes     Partners: Male     Other Topics Concern    Not on file     Social History Narrative       FAMILY HISTORY:  Family History   Problem Relation Age of Onset    Kidney disease Mother     Cancer Maternal Grandmother      throat     Cancer Maternal Grandfather      prostate    Kidney disease Daughter     Breast cancer Neg Hx     Colon cancer Neg Hx     Ovarian cancer Neg Hx        ALLERGIES AND MEDICATIONS: updated and reviewed.  Review of patient's allergies indicates:   Allergen Reactions    Strawberry Anaphylaxis and Hives     Current Outpatient Prescriptions   Medication Sig Dispense Refill    alprazolam (XANAX) 1 MG tablet TAKE ONE TABLET EVERY EVENING AS NEEDED FOR ANXIETY MAY CAUSE DROWSINESS 30 tablet 5    meloxicam (MOBIC) 7.5 MG tablet TK 1 T PO  HS AFTER MEALS  0    diclofenac sodium 1 % Gel APPLY 2 GRAMS TO AFFECTED AREA BID  0    doxycycline (MONODOX) 100 MG capsule Take 1 capsule (100 mg total) by mouth 2 (two) times daily. 14 capsule 0    levocetirizine (XYZAL) 5 MG tablet Take 1 tablet (5 mg total) by mouth every evening. 30 tablet 1    oxycodone (ROXICODONE) 5 MG immediate release tablet TK 1 T PO Q 8-12 H PRN  0    promethazine-dextromethorphan (PROMETHAZINE-DM) 6.25-15 mg/5 mL Syrp Take 5 mLs by mouth every 12 (twelve) hours as needed. 180 mL 0     No current facility-administered medications for this visit.        ROS  Review of Systems   Constitutional: Negative for chills and fever.   HENT: Positive for congestion and postnasal drip. Negative for hearing loss and sore throat.    Eyes: Negative for visual disturbance.   Respiratory: Positive for cough. Negative for shortness of breath.    Cardiovascular: Negative for chest pain, palpitations and leg swelling.   Gastrointestinal: Negative for abdominal pain, constipation, diarrhea, nausea and vomiting.   Genitourinary: Positive for urgency. Negative for dysuria and frequency.   Musculoskeletal: Negative for arthralgias, joint swelling and myalgias.   Skin: Negative for rash and wound.   Neurological: Positive for headaches.   Psychiatric/Behavioral: Negative for agitation and confusion. The patient is not nervous/anxious.          OBJECTIVE     Physical  "Exam  Vitals:    01/24/17 1617   BP: 110/70   Pulse: 91   Resp: 16   Temp: 98.5 °F (36.9 °C)    Body mass index is 28.99 kg/(m^2).  Weight: 71.9 kg (158 lb 8.2 oz)   Height: 5' 2" (157.5 cm)     Physical Exam   Constitutional: She is oriented to person, place, and time. She appears well-developed and well-nourished. No distress.   HENT:   Head: Normocephalic and atraumatic.   Right Ear: External ear normal.   Left Ear: External ear normal.   Nose: Right sinus exhibits maxillary sinus tenderness. Right sinus exhibits no frontal sinus tenderness. Left sinus exhibits maxillary sinus tenderness. Left sinus exhibits no frontal sinus tenderness.   Mouth/Throat: No uvula swelling. Posterior oropharyngeal erythema present. No posterior oropharyngeal edema. No tonsillar exudate.   Eyes: Conjunctivae and EOM are normal. Pupils are equal, round, and reactive to light. Right eye exhibits no discharge. Left eye exhibits no discharge. No scleral icterus.   Neck: Normal range of motion. Neck supple. No JVD present. No tracheal deviation present.   Cardiovascular: Normal rate, regular rhythm, normal heart sounds and intact distal pulses.  Exam reveals no gallop and no friction rub.    No murmur heard.  Pulmonary/Chest: Effort normal and breath sounds normal. No respiratory distress. She has no wheezes.   Abdominal: Soft. Bowel sounds are normal. She exhibits no distension and no mass. There is no tenderness. There is no rebound and no guarding.   Musculoskeletal: Normal range of motion. She exhibits no edema, tenderness or deformity.   Neurological: She is alert and oriented to person, place, and time. She exhibits normal muscle tone. Coordination normal.   Skin: Skin is warm and dry. No rash noted. No erythema.   Psychiatric: She has a normal mood and affect. Her behavior is normal. Judgment and thought content normal.         Health Maintenance       Date Due Completion Date    TETANUS VACCINE 6/28/1997 ---    Pneumococcal PPSV23 " (Medium Risk) (1) 6/28/1997 ---    Influenza Vaccine 8/1/2016 11/23/2015 (Declined)    Override on 11/23/2015: Declined    Pap Smear 8/6/2016 8/6/2015            ASSESSMENT     36 y.o. female with     1. Acute maxillary sinusitis, recurrence not specified    2. Urgency of micturition        PLAN:     1. Acute maxillary sinusitis, recurrence not specified  - Take Abx to completion  - Rest and keep hydrated  - doxycycline (MONODOX) 100 MG capsule; Take 1 capsule (100 mg total) by mouth 2 (two) times daily.  Dispense: 14 capsule; Refill: 0  - levocetirizine (XYZAL) 5 MG tablet; Take 1 tablet (5 mg total) by mouth every evening.  Dispense: 30 tablet; Refill: 1  - promethazine-dextromethorphan (PROMETHAZINE-DM) 6.25-15 mg/5 mL Syrp; Take 5 mLs by mouth every 12 (twelve) hours as needed.  Dispense: 180 mL; Refill: 0    2. Urgency of micturition  - Will f/u on UCx  - POCT URINE DIPSTICK WITHOUT MICROSCOPE  - Urinalysis  - Urine culture        RTC in 3 months     Spring Montilla MD  01/24/2017 4:40 PM        No Follow-up on file.

## 2017-01-24 NOTE — LETTER
January 24, 2017      [facility]  [address]  [address]  [place] [address]  Phone: [phone]  Fax: [phone]       Patient: Roslyn Brewster   YOB: 1980  Date of Visit: 01/24/2017    To Whom It May Concern:    Roslyn was at [facility] on 01/24/2017. She may return to work/school on 1/25/17 with no restrictions. If you have any questions or concerns, or if I can be of further assistance, please do not hesitate to contact me.    Sincerely,    Spring Montilla MD

## 2017-01-25 ENCOUNTER — LAB VISIT (OUTPATIENT)
Dept: LAB | Facility: HOSPITAL | Age: 37
End: 2017-01-25
Attending: INTERNAL MEDICINE
Payer: COMMERCIAL

## 2017-01-25 DIAGNOSIS — R30.0 DYSURIA: ICD-10-CM

## 2017-01-25 LAB
BILIRUB UR QL STRIP: NEGATIVE
CLARITY UR: ABNORMAL
COLOR UR: YELLOW
GLUCOSE UR QL STRIP: NEGATIVE
HGB UR QL STRIP: NEGATIVE
KETONES UR QL STRIP: NEGATIVE
LEUKOCYTE ESTERASE UR QL STRIP: NEGATIVE
MICROSCOPIC COMMENT: NORMAL
NITRITE UR QL STRIP: NEGATIVE
PH UR STRIP: 6 [PH] (ref 5–8)
PROT UR QL STRIP: NEGATIVE
RBC #/AREA URNS HPF: 2 /HPF (ref 0–4)
SP GR UR STRIP: 1.02 (ref 1–1.03)
SQUAMOUS #/AREA URNS HPF: 3 /HPF
URN SPEC COLLECT METH UR: ABNORMAL
UROBILINOGEN UR STRIP-ACNC: NEGATIVE EU/DL

## 2017-01-25 PROCEDURE — 81000 URINALYSIS NONAUTO W/SCOPE: CPT

## 2017-01-25 PROCEDURE — 87086 URINE CULTURE/COLONY COUNT: CPT

## 2017-01-27 LAB
BACTERIA UR CULT: NORMAL
BACTERIA UR CULT: NORMAL

## 2017-02-05 ENCOUNTER — HOSPITAL ENCOUNTER (EMERGENCY)
Facility: OTHER | Age: 37
Discharge: HOME | End: 2017-02-06
Attending: EMERGENCY MEDICINE
Payer: COMMERCIAL

## 2017-02-05 DIAGNOSIS — G43.009 MIGRAINE WITHOUT AURA AND WITHOUT STATUS MIGRAINOSUS, NOT INTRACTABLE: Primary | ICD-10-CM

## 2017-02-05 PROCEDURE — 96372 THER/PROPH/DIAG INJ SC/IM: CPT

## 2017-02-05 PROCEDURE — 99283 EMERGENCY DEPT VISIT LOW MDM: CPT | Mod: 25

## 2017-02-05 PROCEDURE — 63600175 PHARM REV CODE 636 W HCPCS: Performed by: EMERGENCY MEDICINE

## 2017-02-05 RX ORDER — PROMETHAZINE HYDROCHLORIDE 25 MG/1
25 TABLET ORAL EVERY 6 HOURS PRN
Qty: 15 TABLET | Refills: 0 | Status: SHIPPED | OUTPATIENT
Start: 2017-02-05 | End: 2017-03-07

## 2017-02-05 RX ORDER — DEXAMETHASONE SODIUM PHOSPHATE 4 MG/ML
8 INJECTION, SOLUTION INTRA-ARTICULAR; INTRALESIONAL; INTRAMUSCULAR; INTRAVENOUS; SOFT TISSUE
Status: COMPLETED | OUTPATIENT
Start: 2017-02-05 | End: 2017-02-05

## 2017-02-05 RX ORDER — PROMETHAZINE HYDROCHLORIDE 25 MG/ML
25 INJECTION, SOLUTION INTRAMUSCULAR; INTRAVENOUS
Status: COMPLETED | OUTPATIENT
Start: 2017-02-05 | End: 2017-02-05

## 2017-02-05 RX ADMIN — PROMETHAZINE HYDROCHLORIDE 25 MG: 25 INJECTION INTRAMUSCULAR; INTRAVENOUS at 11:02

## 2017-02-05 RX ADMIN — DEXAMETHASONE SODIUM PHOSPHATE 8 MG: 4 INJECTION, SOLUTION INTRAMUSCULAR; INTRAVENOUS at 11:02

## 2017-02-05 NOTE — ED AVS SNAPSHOT
[facility] EMERGENCY DEPARTMENT  [address]  [place] [address]               Roslyn Brewster   2017 10:51 PM   ED    Description:  Female : 1980   Department:  [facility] Emergency Department           Your Care was Coordinated By:     Provider Role From To    Christine Hermosillo MD Attending Provider 17 0709 --      Reason for Visit     Headache     Nausea           Diagnoses this Visit        Comments    Migraine without aura and without status migrainosus, not intractable    -  Primary       ED Disposition     None           To Do List           Follow-up Information     Follow up with Spring Montilla MD.    Specialties:  Internal Medicine, Wound Care    Why:  As needed    Contact information:    [address] [place] [address]  [address]  [place] [id number] [phone]         These Medications        Disp Refills Start End    promethazine (PHENERGAN) 25 MG tablet 15 tablet 0 2017     Take 1 tablet (25 mg total) by mouth every 6 (six) hours as needed for Nausea. - Oral    Pharmacy: [facility] [address] [place] [address] [address] AT [facility] #: [phone]         [facility] On Call     [facility] On Call Nurse Care Line -  Assistance  Registered nurses in the [facility] On Call Center provide clinical advisement, health education, appointment booking, and other advisory services.  Call for this free service at [phone].             Medications           Message regarding Medications     Verify the changes and/or additions to your medication regime listed below are the same as discussed with your clinician today.  If any of these changes or additions are incorrect, please notify your healthcare provider.        START taking these NEW medications        Refills    promethazine (PHENERGAN) 25 MG tablet 0    Sig: Take 1 tablet (25 mg total) by mouth every 6 (six) hours as needed for Nausea.    Class: Print    Route: Oral      These medications were  "administered today        Dose Freq    promethazine injection 25 mg 25 mg ED 1 Time    Sig: Inject 1 mL (25 mg total) into the muscle ED 1 Time.    Class: Normal    Route: Intramuscular    dexamethasone injection 8 mg 8 mg ED 1 Time    Sig: Inject 2 mLs (8 mg total) into the muscle ED 1 Time.    Class: Normal    Route: Intramuscular           Verify that the below list of medications is an accurate representation of the medications you are currently taking.  If none reported, the list may be blank. If incorrect, please contact your healthcare provider. Carry this list with you in case of emergency.           Current Medications     alprazolam (XANAX) 1 MG tablet TAKE ONE TABLET EVERY EVENING AS NEEDED FOR ANXIETY MAY CAUSE DROWSINESS    diclofenac sodium 1 % Gel APPLY 2 GRAMS TO AFFECTED AREA BID    doxycycline (MONODOX) 100 MG capsule Take 1 capsule (100 mg total) by mouth 2 (two) times daily.    levocetirizine (XYZAL) 5 MG tablet Take 1 tablet (5 mg total) by mouth every evening.    meloxicam (MOBIC) 7.5 MG tablet TK 1 T PO  HS AFTER MEALS    oxycodone (ROXICODONE) 5 MG immediate release tablet TK 1 T PO Q 8-12 H PRN    promethazine (PHENERGAN) 25 MG tablet Take 1 tablet (25 mg total) by mouth every 6 (six) hours as needed for Nausea.           Clinical Reference Information           Your Vitals Were     BP Pulse Temp Resp Height Weight    134/93 (BP Location: Right arm, Patient Position: Sitting) 101 98 °F (36.7 °C) (Temporal) 16 5' 2" (1.575 m) 70.8 kg (156 lb)    Last Period SpO2 BMI          01/10/2017 97% 28.53 kg/m2        Allergies as of 2/5/2017        Reactions    Strawberry Anaphylaxis, Hives      Immunizations Administered on Date of Encounter - 2/5/2017     None      ED Micro, Lab, POCT     None      ED Imaging Orders     None        Discharge Instructions         Migraine Headache: Stages and Treatment  A migraine headache tends to progress in stages. Learning these stages can help you better " "understand what is happening. Then you can learn ways to reduce pain and relieve other symptoms. Methods for relieving your symptoms include self-care and medicines.  Migraine stages  Migraines tend to progress through 4 stages. Many people don't have all stages, and stages may differ with each headache:  · Prodrome. A few hours to a day or so before the headache, you may feel tired, (yawning many times), uneasy, or lopez. You may also feel bloated or crave certain foods.  · Aura. Up to an hour before the headache starts, some migraine sufferers experience aura--flashing lights, blind spots, other vision problems, confusion, difficulty speaking, or other neurologic symptoms.  · Headache. Moderate to severe pain affects one side of the head and then can spread to both sides, often along with nausea. You may be highly sensitive to light, sound, and odors. Vomiting or diarrhea may also happen. This stage lasts 4 to 72 hours.  · Postdrome. After your headache ends, you may feel tired, achy, and "washed out." This may last for a day or so.    Self-care during a migraine  Here is what you can do:  · Use a cold compress. Wrap a thin cloth around a cold pack, a cold can of soda, or a bag of frozen vegetables. Apply this to your temple or other pain site.  · Drink fluids. If nausea makes it hard to drink, try sucking on ice.  · Rest. If possible, lie down. Try not to bend over, as this may increase your pain. Sometimes laying in a dark quiet room can help the migraine from being aggravated.    · Try caffeine. Some people find that drinking fluids with caffeine, such as coffee or tea, helps to lessen migraine pain.  Using medicines  Work with your healthcare provider to find the right medicines for you. Medicines for migraine may relieve pain (analgesics), relieve nausea, or attack the migraine's root causes (migraine-specific medicines).  Rebound headache  Taking analgesics each day, or even several times a week, may lead to " more frequent and severe headaches. These are called rebound headaches. If you think you're having rebound headaches, tell your healthcare provider. He or she can help you safely decrease your medicine. Rebound caffeine withdrawal headaches can also happen.    Date Last Reviewed: 10/9/2015  © 5871-4843 [employer]. [address] [place] [address], [place] [address]. All rights reserved. This information is not intended as a substitute for professional medical care. Always follow your healthcare professional's instructions.          Smoking Cessation     If you would like to quit smoking:   You may be eligible for free services if you are a [place] resident and started smoking cigarettes before September 1, 1988.  Call the Smoking Cessation Trust ([facility]) toll free at (144) 674-7528 or (825) 808-5718.   Call -095-QUIT-NOW if you do not meet the above criteria.             [facility] Emergency Department complies with applicable Federal civil rights laws and does not discriminate on the basis of race, color, national origin, age, disability, or sex.        Language Assistance Services     ATTENTION: Language assistance services are available, free of charge. Please call [phone].      ATENCIÓN: Si habla español, tiene a tong disposición servicios gratuitos de asistencia lingüística. Llame al [phone].     CHÚ Ý: N?u b?n nói Ti?ng Vi?t, có các d?ch v? h? tr? ngôn ng? mi?n phí dành cho b?n. G?i s? [phone].

## 2017-02-06 VITALS
HEIGHT: 62 IN | OXYGEN SATURATION: 97 % | WEIGHT: 156 LBS | HEART RATE: 101 BPM | BODY MASS INDEX: 28.71 KG/M2 | TEMPERATURE: 98 F | DIASTOLIC BLOOD PRESSURE: 88 MMHG | SYSTOLIC BLOOD PRESSURE: 129 MMHG | RESPIRATION RATE: 16 BRPM

## 2017-02-06 PROCEDURE — 63600175 PHARM REV CODE 636 W HCPCS: Performed by: EMERGENCY MEDICINE

## 2017-02-06 PROCEDURE — 96372 THER/PROPH/DIAG INJ SC/IM: CPT

## 2017-02-06 RX ORDER — SUMATRIPTAN 6 MG/.5ML
6 INJECTION, SOLUTION SUBCUTANEOUS
Status: COMPLETED | OUTPATIENT
Start: 2017-02-06 | End: 2017-02-06

## 2017-02-06 RX ORDER — SUMATRIPTAN 6 MG/.5ML
6 INJECTION, SOLUTION SUBCUTANEOUS
Status: DISCONTINUED | OUTPATIENT
Start: 2017-02-06 | End: 2017-02-06

## 2017-02-06 RX ADMIN — SUMATRIPTAN 6 MG: 6 INJECTION, SOLUTION SUBCUTANEOUS at 12:02

## 2017-02-06 NOTE — ED PROVIDER NOTES
Encounter Date: 2/5/2017       History     Chief Complaint   Patient presents with    Headache     hx of migraines, states the headache started today, took OTC meds w/o relief,     Nausea     Review of patient's allergies indicates:   Allergen Reactions    Strawberry Anaphylaxis and Hives     The history is provided by the patient and a relative.    the patient presents emergency department with a headache and nausea vomiting that started 12 hours ago.  The patient states the headache is bifrontal she has photophobia along with that.  She has a history of migraines in the past but she states she has not had a migraine for a long time.  No neck stiffness no fever.  Past Medical History   Diagnosis Date    Acute encephalopathy 12/24/12     secondary to drug overdose    ARF (acute renal failure) 12/24/12     secondary to drug overdose    History of cardiac arrest 12/24/12     secondary to drug overdose    Hypertension     MRSA (methicillin resistant Staphylococcus aureus) 12/24/12    Nephritis     PMDD (premenstrual dysphoric disorder)     Polysubstance abuse 12/24/12    Systolic CHF, acute 12/24/12     secondary to drug overdose     No past medical history pertinent negatives.  Past Surgical History   Procedure Laterality Date    Tubal ligation      Cholecystectomy       Family History   Problem Relation Age of Onset    Kidney disease Mother     Cancer Maternal Grandmother      throat    Cancer Maternal Grandfather      prostate    Kidney disease Daughter     Breast cancer Neg Hx     Colon cancer Neg Hx     Ovarian cancer Neg Hx      Social History   Substance Use Topics    Smoking status: Current Every Day Smoker     Packs/day: 0.75     Years: 20.00    Smokeless tobacco: None      Comment: 1/2 half pack/day.     Alcohol use No     Review of Systems   Constitutional: Negative for fever.   HENT: Negative for sore throat.    Respiratory: Negative for shortness of breath.    Cardiovascular:  Negative for chest pain.   Gastrointestinal: Negative for nausea.   Genitourinary: Negative for dysuria.   Musculoskeletal: Negative for back pain.   Skin: Negative for rash.   Neurological: Negative for weakness.   Hematological: Does not bruise/bleed easily.       Physical Exam   Initial Vitals   BP Pulse Resp Temp SpO2   02/05/17 2251 02/05/17 2251 02/05/17 2251 02/05/17 2251 02/05/17 2251   134/93 101 16 98 °F (36.7 °C) 97 %     Physical Exam    Nursing note and vitals reviewed.  Constitutional: She appears well-developed and well-nourished.   HENT:   Head: Normocephalic and atraumatic.   Mouth/Throat: Oropharynx is clear and moist.   Eyes: Conjunctivae and EOM are normal. Pupils are equal, round, and reactive to light.   Neck: Normal range of motion. Neck supple.   Cardiovascular: Normal rate, regular rhythm, normal heart sounds and intact distal pulses. Exam reveals no gallop and no friction rub.    No murmur heard.  Pulmonary/Chest: Breath sounds normal.   Abdominal: Soft. Bowel sounds are normal. She exhibits no distension. There is no tenderness. There is no rebound and no guarding.   Musculoskeletal: Normal range of motion. She exhibits no edema or tenderness.   Lymphadenopathy:     She has no cervical adenopathy.   Neurological: She is alert and oriented to person, place, and time. She has normal strength and normal reflexes.   Skin: Skin is warm and dry.   Psychiatric: She has a normal mood and affect. Her behavior is normal. Judgment and thought content normal.         ED Course   Procedures  Labs Reviewed - No data to display          Medical Decision Making:   ED Management:  Patient is here with a migraine headache.  She was given Phenergan and Decadron in the emergency department.  HA persists. She was requesting midrin but we don't have midrin in the ED. She was given imitrex, she has had this before. She is feeling better and will be discharged.                   ED Course     Clinical Impression:    The encounter diagnosis was Migraine without aura and without status migrainosus, not intractable.          Christine Hermosillo MD  02/06/17 0010       Christine Hermosillo MD  02/06/17 0037

## 2017-02-07 ENCOUNTER — TELEPHONE (OUTPATIENT)
Dept: SMOKING CESSATION | Facility: CLINIC | Age: 37
End: 2017-02-07

## 2017-02-08 ENCOUNTER — CLINICAL SUPPORT (OUTPATIENT)
Dept: SMOKING CESSATION | Facility: CLINIC | Age: 37
End: 2017-02-08
Payer: COMMERCIAL

## 2017-02-08 DIAGNOSIS — F17.200 NICOTINE DEPENDENCE: Primary | ICD-10-CM

## 2017-02-08 PROCEDURE — 99407 BEHAV CHNG SMOKING > 10 MIN: CPT | Mod: S$GLB,,,

## 2017-03-07 ENCOUNTER — PATIENT MESSAGE (OUTPATIENT)
Dept: FAMILY MEDICINE | Facility: CLINIC | Age: 37
End: 2017-03-07

## 2017-03-07 ENCOUNTER — OFFICE VISIT (OUTPATIENT)
Dept: FAMILY MEDICINE | Facility: CLINIC | Age: 37
End: 2017-03-07
Payer: COMMERCIAL

## 2017-03-07 VITALS
RESPIRATION RATE: 17 BRPM | DIASTOLIC BLOOD PRESSURE: 70 MMHG | TEMPERATURE: 99 F | WEIGHT: 160 LBS | BODY MASS INDEX: 29.44 KG/M2 | OXYGEN SATURATION: 97 % | HEART RATE: 82 BPM | HEIGHT: 62 IN | SYSTOLIC BLOOD PRESSURE: 100 MMHG

## 2017-03-07 DIAGNOSIS — M79.10 MYALGIA: Primary | ICD-10-CM

## 2017-03-07 PROCEDURE — 99214 OFFICE O/P EST MOD 30 MIN: CPT | Mod: S$GLB,,, | Performed by: FAMILY MEDICINE

## 2017-03-07 PROCEDURE — 3074F SYST BP LT 130 MM HG: CPT | Mod: S$GLB,,, | Performed by: FAMILY MEDICINE

## 2017-03-07 PROCEDURE — 99999 PR PBB SHADOW E&M-EST. PATIENT-LVL III: CPT | Mod: PBBFAC,,, | Performed by: FAMILY MEDICINE

## 2017-03-07 PROCEDURE — 3078F DIAST BP <80 MM HG: CPT | Mod: S$GLB,,, | Performed by: FAMILY MEDICINE

## 2017-03-07 PROCEDURE — 1160F RVW MEDS BY RX/DR IN RCRD: CPT | Mod: S$GLB,,, | Performed by: FAMILY MEDICINE

## 2017-03-07 RX ORDER — TIZANIDINE 4 MG/1
4-8 TABLET ORAL NIGHTLY PRN
Qty: 90 TABLET | Refills: 0 | Status: SHIPPED | OUTPATIENT
Start: 2017-03-07 | End: 2017-03-17

## 2017-03-09 NOTE — PROGRESS NOTES
Chief Complaint   Patient presents with    Pyelonephritis       SUBJECTIVE:  Roslyn Brewster is a 36 y.o. female here for new problem of possible kidney infection with h/o them and she has left flank and back pain, no fever or chills, no real dysuria, has been there for a couple of days, severe at times.  Currently has co-morbidities including per problem list.      Past Medical History:   Diagnosis Date    Acute encephalopathy 12/24/12    secondary to drug overdose    ARF (acute renal failure) 12/24/12    secondary to drug overdose    History of cardiac arrest 12/24/12    secondary to drug overdose    Hypertension     MRSA (methicillin resistant Staphylococcus aureus) 12/24/12    Nephritis     PMDD (premenstrual dysphoric disorder)     Polysubstance abuse 12/24/12    Systolic CHF, acute 12/24/12    secondary to drug overdose     Past Surgical History:   Procedure Laterality Date    CHOLECYSTECTOMY      TUBAL LIGATION       Social History     Social History    Marital status: Single     Spouse name: N/A    Number of children: N/A    Years of education: N/A     Occupational History    Not on file.     Social History Main Topics    Smoking status: Current Every Day Smoker     Packs/day: 0.75     Years: 20.00    Smokeless tobacco: Not on file      Comment: 1/2 half pack/day.     Alcohol use No    Drug use: No      Comment: PCP, patient states she had not done drugs since 2012    Sexual activity: Yes     Partners: Male     Other Topics Concern    Not on file     Social History Narrative     Family History   Problem Relation Age of Onset    Kidney disease Mother     Cancer Maternal Grandmother      throat    Cancer Maternal Grandfather      prostate    Kidney disease Daughter     Breast cancer Neg Hx     Colon cancer Neg Hx     Ovarian cancer Neg Hx      Current Outpatient Prescriptions on File Prior to Visit   Medication Sig Dispense Refill    alprazolam (XANAX) 1 MG tablet TAKE ONE  "TABLET EVERY EVENING AS NEEDED FOR ANXIETY MAY CAUSE DROWSINESS 30 tablet 5    diclofenac sodium 1 % Gel APPLY 2 GRAMS TO AFFECTED AREA BID  0    levocetirizine (XYZAL) 5 MG tablet Take 1 tablet (5 mg total) by mouth every evening. 30 tablet 1    meloxicam (MOBIC) 7.5 MG tablet TK 1 T PO  HS AFTER MEALS  0    oxycodone (ROXICODONE) 5 MG immediate release tablet TK 1 T PO Q 8-12 H PRN  0     No current facility-administered medications on file prior to visit.      Review of patient's allergies indicates:   Allergen Reactions    Strawberry Anaphylaxis and Hives         ROS  Per HPI  OBJECTIVE:  /70 (BP Location: Left arm, Patient Position: Sitting, BP Method: Manual)  Pulse 82  Temp 98.5 °F (36.9 °C) (Oral)   Resp 17  Ht 5' 2" (1.575 m)  Wt 72.6 kg (160 lb)  LMP 02/21/2017  SpO2 97%  BMI 29.26 kg/m2    Wt Readings from Last 3 Encounters:   03/07/17 72.6 kg (160 lb)   02/05/17 70.8 kg (156 lb)   01/24/17 71.9 kg (158 lb 8.2 oz)     BP Readings from Last 3 Encounters:   03/07/17 100/70   02/06/17 129/88   01/24/17 110/70       She appears well, in no apparent distress.  Alert and oriented times three, pleasant and cooperative. Vital signs are as documented in vital signs section.  The abdomen is soft without tenderness, guarding, mass, rebound or organomegaly. Bowel sounds are normal. No CVA tenderness or inguinal adenopathy noted.  Left lower back with increased muscle tone  No neurological deficits    Review of old Records:      Review of old labs:      Review of old imaging:      ASSESSMENT:  Problem List Items Addressed This Visit     None      Visit Diagnoses     Myalgia    -  Primary    Relevant Medications    tizanidine (ZANAFLEX) 4 MG tablet          ICD-10-CM ICD-9-CM   1. Myalgia M79.1 729.1         PLAN:  1. Myalgia  Likely strain  Urine was normal  Potential medication side effects were discussed with the patient; let me know if any occur.  Counseled patient on usual course of back pain " secondary to a known injury that in general resolves on it's own in 6-8 weeks without treatment.  Treatment that consists of R.I.C.E. With prescriptions for muscle spasm, inflammation and pain to aid with adequate rest usually speeds the recovery process.  Counseled that early return to activity at a light to moderate work load is beneficial to the healing process.  Pain that lasts beyond 8 weeks is rare and suggests a more serious etiology that will require further tests and treatments.  Initial work up rarely requires any testing for non-traumatic back pain without red flags.  Patient voiced understanding.    - tizanidine (ZANAFLEX) 4 MG tablet; Take 1-2 tablets (4-8 mg total) by mouth nightly as needed.  Dispense: 90 tablet; Refill: 0      Medication List with Changes/Refills   New Medications    TIZANIDINE (ZANAFLEX) 4 MG TABLET    Take 1-2 tablets (4-8 mg total) by mouth nightly as needed.   Current Medications    ALPRAZOLAM (XANAX) 1 MG TABLET    TAKE ONE TABLET EVERY EVENING AS NEEDED FOR ANXIETY MAY CAUSE DROWSINESS    DICLOFENAC SODIUM 1 % GEL    APPLY 2 GRAMS TO AFFECTED AREA BID    LEVOCETIRIZINE (XYZAL) 5 MG TABLET    Take 1 tablet (5 mg total) by mouth every evening.    MELOXICAM (MOBIC) 7.5 MG TABLET    TK 1 T PO  HS AFTER MEALS    OXYCODONE (ROXICODONE) 5 MG IMMEDIATE RELEASE TABLET    TK 1 T PO Q 8-12 H PRN   Discontinued Medications    DOXYCYCLINE (MONODOX) 100 MG CAPSULE    Take 1 capsule (100 mg total) by mouth 2 (two) times daily.    PROMETHAZINE (PHENERGAN) 25 MG TABLET    Take 1 tablet (25 mg total) by mouth every 6 (six) hours as needed for Nausea.       Return in about 6 weeks (around 4/18/2017), or if symptoms worsen or fail to improve, for reassess acute condition.

## 2017-04-04 ENCOUNTER — HOSPITAL ENCOUNTER (EMERGENCY)
Facility: OTHER | Age: 37
Discharge: HOME | End: 2017-04-04
Attending: EMERGENCY MEDICINE
Payer: COMMERCIAL

## 2017-04-04 VITALS
TEMPERATURE: 98 F | HEIGHT: 62 IN | HEART RATE: 80 BPM | RESPIRATION RATE: 18 BRPM | DIASTOLIC BLOOD PRESSURE: 110 MMHG | BODY MASS INDEX: 28.16 KG/M2 | WEIGHT: 153 LBS | OXYGEN SATURATION: 98 % | SYSTOLIC BLOOD PRESSURE: 142 MMHG

## 2017-04-04 DIAGNOSIS — G43.909 MIGRAINE WITHOUT STATUS MIGRAINOSUS, NOT INTRACTABLE, UNSPECIFIED MIGRAINE TYPE: Primary | ICD-10-CM

## 2017-04-04 PROCEDURE — 96375 TX/PRO/DX INJ NEW DRUG ADDON: CPT

## 2017-04-04 PROCEDURE — 63600175 PHARM REV CODE 636 W HCPCS: Performed by: EMERGENCY MEDICINE

## 2017-04-04 PROCEDURE — 25000003 PHARM REV CODE 250: Performed by: EMERGENCY MEDICINE

## 2017-04-04 PROCEDURE — 99284 EMERGENCY DEPT VISIT MOD MDM: CPT | Mod: 25

## 2017-04-04 PROCEDURE — 96374 THER/PROPH/DIAG INJ IV PUSH: CPT

## 2017-04-04 RX ORDER — BUTALBITAL, ACETAMINOPHEN AND CAFFEINE 50; 325; 40 MG/1; MG/1; MG/1
1 TABLET ORAL EVERY 4 HOURS PRN
Qty: 20 TABLET | Refills: 0 | Status: SHIPPED | OUTPATIENT
Start: 2017-04-04 | End: 2017-05-04

## 2017-04-04 RX ORDER — IBUPROFEN 200 MG
400 TABLET ORAL EVERY 6 HOURS PRN
Qty: 30 TABLET | Refills: 0
Start: 2017-04-04 | End: 2017-05-22 | Stop reason: ALTCHOICE

## 2017-04-04 RX ORDER — SODIUM CHLORIDE 9 MG/ML
1000 INJECTION, SOLUTION INTRAVENOUS ONCE
Status: COMPLETED | OUTPATIENT
Start: 2017-04-04 | End: 2017-04-04

## 2017-04-04 RX ORDER — DIPHENHYDRAMINE HYDROCHLORIDE 50 MG/ML
25 INJECTION INTRAMUSCULAR; INTRAVENOUS
Status: COMPLETED | OUTPATIENT
Start: 2017-04-04 | End: 2017-04-04

## 2017-04-04 RX ORDER — METOCLOPRAMIDE 10 MG/1
10 TABLET ORAL EVERY 6 HOURS PRN
Qty: 30 TABLET | Refills: 0 | Status: SHIPPED | OUTPATIENT
Start: 2017-04-04 | End: 2017-08-22

## 2017-04-04 RX ORDER — ONDANSETRON 2 MG/ML
8 INJECTION INTRAMUSCULAR; INTRAVENOUS
Status: COMPLETED | OUTPATIENT
Start: 2017-04-04 | End: 2017-04-04

## 2017-04-04 RX ORDER — ACETAMINOPHEN 500 MG
1000 TABLET ORAL 3 TIMES DAILY PRN
Qty: 30 TABLET | Refills: 0
Start: 2017-04-04 | End: 2017-08-22

## 2017-04-04 RX ORDER — METOCLOPRAMIDE HYDROCHLORIDE 5 MG/ML
10 INJECTION INTRAMUSCULAR; INTRAVENOUS
Status: COMPLETED | OUTPATIENT
Start: 2017-04-04 | End: 2017-04-04

## 2017-04-04 RX ADMIN — ONDANSETRON 8 MG: 2 INJECTION INTRAMUSCULAR; INTRAVENOUS at 01:04

## 2017-04-04 RX ADMIN — DIPHENHYDRAMINE HYDROCHLORIDE 25 MG: 50 INJECTION, SOLUTION INTRAMUSCULAR; INTRAVENOUS at 01:04

## 2017-04-04 RX ADMIN — SODIUM CHLORIDE 1000 ML: 0.9 INJECTION, SOLUTION INTRAVENOUS at 01:04

## 2017-04-04 RX ADMIN — METOCLOPRAMIDE 10 MG: 5 INJECTION, SOLUTION INTRAMUSCULAR; INTRAVENOUS at 01:04

## 2017-04-04 NOTE — DISCHARGE INSTRUCTIONS
Preventing Migraine Headaches: Medicines and Lifestyle Changes  A migraine is a type of severe headache. Having a migraine can be very painful. But there are steps you can take to help prevent migraines.    Medicines to help prevent migraines  · Your healthcare provider may prescribe certain medicines to help prevent migraines. These medicines may need to be taken daily. Or they may only need to be taken at times when youre likely to have a migraine.  · Common medicines used to help prevent migraines include:  ¨ Triptans (serotonin receptor agonists)  ¨ Nonsteroidal anti-inflammatory drugs (available over-the-counter)  ¨ Beta-blockers  ¨ Anticonvulsants  ¨ Tricyclic antidepressants  ¨ Calcium channel blockers  ¨ Certain vitamins, minerals, and plant extracts  ¨ Botulinum toxin injection (Botox) for certain chronic migraines   ¨ CGRP (calcitonin gene-related peptide) agnonists are being reviewed by the Food and Drug Administration (FDA)  Lifestyle changes for long-term prevention  Here are some suggestions:  · Exercise. Regular exercise can help prevent migraines and improve your health. (If exercise triggers your migraines, talk to your healthcare provider.)  · Keep regular habits. Dont skip or delay meals. Drink plenty of water. And go to bed and get up at about the same time each day. This includes weekends.  · Try alternative treatments. These are treatments that do not involve the use of medicines or surgery. They may help relieve symptoms and prevent migraines. Some treatment options include biofeedback and acupuncture. Ask your healthcare provider to tell you more about these treatments if you have questions.  · Limit caffeine. You may find that caffeine helps relieve pain during an attack. But too much caffeine can also trigger migraines. So, limit the amount of caffeine you consume.  Date Last Reviewed: 10/11/2015  © 0994-6901 [employer]. [address] [place] [address], [place] [address]. All  rights reserved. This information is not intended as a substitute for professional medical care. Always follow your healthcare professional's instructions.          Preventing Migraine Headaches: Triggers  The first step in preventing migraines is to learn what triggers them. You may then be able to control your triggers to avoid or reduce the severity of your migraines.     Know your triggers  Be aware that you may have more than one trigger, and that some triggers may work together. Common migraine triggers include:  · Food and nutrition. Skipping meals or not drinking enough water can trigger headaches. So can certain foods, such as caffeine, monosodium glutamate (MSG), aged cheese, or sausage.  · Alcohol. Red wine and other alcoholic beverages are common migraine triggers.  · Chemicals. Scents, cleaning products, gasoline, glue, perfume, and paint can be triggers. So can tobacco smoke, including secondhand smoke.  · Emotions. Stress can trigger headaches or make them worse once they begin.  · Sleep disruption. Staying up late, sleeping late, and traveling across time zones can disrupt your sleep cycle, triggering headaches.  · Hormones. Many women notice that migraines tend to happen at a certain point in their menstrual cycle. Birth control pills or hormone replacement therapy may also trigger migraines.  · Environment and weather. Air travel, changes in altitude, air pressure changes, hot sun, or bright or flashing lights can be triggers.    Control your triggers  These are some of the things you can do to try to control triggers:  · Avoid triggers if you can. For example, stay clear of alcohol and foods that trigger your headaches. Use unscented household products. Keep regular sleep habits. Manage stress to help control emotional triggers.  · Change your behavior at times when triggers can't be avoided. For example, make sure to get enough rest and drink plenty of water while you're traveling. Make sure to  carry a hat, sunglasses, and your medicines. Be alert for migraine symptoms, so you can treat a migraine early if it happens.  Date Last Reviewed: 10/9/2015  © 4965-8979 [employer]. [address] [place] [address], [place] [address]. All rights reserved. This information is not intended as a substitute for professional medical care. Always follow your healthcare professional's instructions.

## 2017-04-04 NOTE — ED AVS SNAPSHOT
[facility] EMERGENCY DEPARTMENT  [address] [place] [address]               Roslyn Brewster   2017  1:12 AM   ED    Description:  Female : 1980   Department:  [facility] Emergency Department           Your Care was Coordinated By:     None      Reason for Visit     Migraine           Diagnoses this Visit        Comments    Migraine without status migrainosus, not intractable, unspecified migraine type    -  Primary       ED Disposition     ED Disposition Condition Comment    Discharge             To Do List           Follow-up Information     Follow up with Spring Montilla MD In 1 week(s).    Specialties:  Internal Medicine, Wound Care    Contact information:    [address] [place] [address]  [address]  [place] [address]  [phone]          Follow up with [phone].    Contact information:    Establish yourself with a neurologist that specializes in migraine headaches.       These Medications        Disp Refills Start End    ibuprofen (ADVIL,MOTRIN) 200 MG tablet 30 tablet 0 2017     Take 2 tablets (400 mg total) by mouth every 6 (six) hours as needed for Pain. - Oral    Pharmacy: [facility] [address] [place] [address] EXPY AT [facility] Ph #: [phone]       acetaminophen (TYLENOL) 500 MG tablet 30 tablet 0 2017     Take 2 tablets (1,000 mg total) by mouth 3 (three) times daily as needed for Pain. - Oral    Pharmacy: [facility] [address] [place] [address] EX[place] AT [facility] Ph #: [phone]       butalbital-acetaminophen-caffeine -40 mg (FIORICET, ESGIC) -40 mg per tablet 20 tablet 0 2017    Take 1 tablet by mouth every 4 (four) hours as needed for Pain. - Oral    Pharmacy: [facility] [address] [place] AT [facility] Ph #: [phone]       metoclopramide HCl (REGLAN) 10 MG tablet 30 tablet 0 2017     Take 1 tablet (10 mg  total) by mouth every 6 (six) hours as needed. - Oral    Pharmacy: [facility] [address] [place] [address] MARY KATE [address] [place] [facility] [phone]         [facility] On Call     [facility] On Call Nurse Care Line - 24/7 Assistance  Unless otherwise directed by your provider, please contact [facility], our nurse care line that is available for 24/7 assistance.     Registered nurses in the [facility] provide: appointment scheduling, clinical advisement, health education, and other advisory services.  Call: [phone] (toll free)               Medications           Message regarding Medications     Verify the changes and/or additions to your medication regime listed below are the same as discussed with your clinician today.  If any of these changes or additions are incorrect, please notify your healthcare provider.        START taking these NEW medications        Refills    ibuprofen (ADVIL,MOTRIN) 200 MG tablet 0    Sig: Take 2 tablets (400 mg total) by mouth every 6 (six) hours as needed for Pain.    Class: No Print    Route: Oral    acetaminophen (TYLENOL) 500 MG tablet 0    Sig: Take 2 tablets (1,000 mg total) by mouth 3 (three) times daily as needed for Pain.    Class: No Print    Route: Oral    butalbital-acetaminophen-caffeine -40 mg (FIORICET, ESGIC) -40 mg per tablet 0    Sig: Take 1 tablet by mouth every 4 (four) hours as needed for Pain.    Class: Print    Route: Oral    metoclopramide HCl (REGLAN) 10 MG tablet 0    Sig: Take 1 tablet (10 mg total) by mouth every 6 (six) hours as needed.    Class: Print    Route: Oral      These medications were administered today        Dose Freq    0.9%  NaCl infusion 1,000 mL Once    Sig: Inject 1,000 mLs into the vein once.    Class: Normal    Route: Intravenous    sodium chloride 0.9% bolus 1,000 mL 1,000 mL Once    Sig: Inject 1,000 mLs into the vein once.    Class: Normal    Route: Intravenous    ondansetron  injection 8 mg 8 mg ED 1 Time    Sig: Inject 8 mg into the vein ED 1 Time.    Class: Normal    Route: Intravenous    metoclopramide HCl injection 10 mg 10 mg ED 1 Time    Sig: Inject 2 mLs (10 mg total) into the vein ED 1 Time.    Class: Normal    Route: Intravenous    diphenhydrAMINE injection 25 mg 25 mg ED 1 Time    Sig: Inject 0.5 mLs (25 mg total) into the vein ED 1 Time.    Class: Normal    Route: Intravenous           Verify that the below list of medications is an accurate representation of the medications you are currently taking.  If none reported, the list may be blank. If incorrect, please contact your healthcare provider. Carry this list with you in case of emergency.           Current Medications     SUMATRIPTAN SUCCINATE (IMITREX ORAL) Take by mouth.    0.9%  NaCl infusion Inject 1,000 mLs into the vein once.    acetaminophen (TYLENOL) 500 MG tablet Take 2 tablets (1,000 mg total) by mouth 3 (three) times daily as needed for Pain.    alprazolam (XANAX) 1 MG tablet TAKE ONE TABLET EVERY EVENING AS NEEDED FOR ANXIETY MAY CAUSE DROWSINESS    butalbital-acetaminophen-caffeine -40 mg (FIORICET, ESGIC) -40 mg per tablet Take 1 tablet by mouth every 4 (four) hours as needed for Pain.    diclofenac sodium 1 % Gel APPLY 2 GRAMS TO AFFECTED AREA BID    diphenhydrAMINE injection 25 mg Inject 0.5 mLs (25 mg total) into the vein ED 1 Time.    ibuprofen (ADVIL,MOTRIN) 200 MG tablet Take 2 tablets (400 mg total) by mouth every 6 (six) hours as needed for Pain.    levocetirizine (XYZAL) 5 MG tablet Take 1 tablet (5 mg total) by mouth every evening.    meloxicam (MOBIC) 7.5 MG tablet TK 1 T PO  HS AFTER MEALS    metoclopramide HCl (REGLAN) 10 MG tablet Take 1 tablet (10 mg total) by mouth every 6 (six) hours as needed.    metoclopramide HCl injection 10 mg Inject 2 mLs (10 mg total) into the vein ED 1 Time.    ondansetron injection 8 mg Inject 8 mg into the vein ED 1 Time.    oxycodone (ROXICODONE) 5 MG  "immediate release tablet TK 1 T PO Q 8-12 H PRN    sodium chloride 0.9% bolus 1,000 mL Inject 1,000 mLs into the vein once.           Clinical Reference Information           Your Vitals Were     BP Pulse Temp Resp Height Weight    142/110 80 98.3 °F (36.8 °C) 18 5' 2" (1.575 m) 69.4 kg (153 lb)    Last Period SpO2 BMI          04/04/2017 (Exact Date) 98% 27.98 kg/m2        Allergies as of 4/4/2017        Reactions    Strawberry Anaphylaxis, Hives      Immunizations Administered on Date of Encounter - 4/4/2017     None      ED Micro, Lab, POCT     None      ED Imaging Orders     None        Discharge Instructions           Preventing Migraine Headaches: Medicines and Lifestyle Changes  A migraine is a type of severe headache. Having a migraine can be very painful. But there are steps you can take to help prevent migraines.    Medicines to help prevent migraines  · Your healthcare provider may prescribe certain medicines to help prevent migraines. These medicines may need to be taken daily. Or they may only need to be taken at times when youre likely to have a migraine.  · Common medicines used to help prevent migraines include:  ¨ Triptans (serotonin receptor agonists)  ¨ Nonsteroidal anti-inflammatory drugs (available over-the-counter)  ¨ Beta-blockers  ¨ Anticonvulsants  ¨ Tricyclic antidepressants  ¨ Calcium channel blockers  ¨ Certain vitamins, minerals, and plant extracts  ¨ Botulinum toxin injection (Botox) for certain chronic migraines   ¨ CGRP (calcitonin gene-related peptide) agnonists are being reviewed by the Food and Drug Administration (FDA)  Lifestyle changes for long-term prevention  Here are some suggestions:  · Exercise. Regular exercise can help prevent migraines and improve your health. (If exercise triggers your migraines, talk to your healthcare provider.)  · Keep regular habits. Dont skip or delay meals. Drink plenty of water. And go to bed and get up at about the same time each day. This " includes weekends.  · Try alternative treatments. These are treatments that do not involve the use of medicines or surgery. They may help relieve symptoms and prevent migraines. Some treatment options include biofeedback and acupuncture. Ask your healthcare provider to tell you more about these treatments if you have questions.  · Limit caffeine. You may find that caffeine helps relieve pain during an attack. But too much caffeine can also trigger migraines. So, limit the amount of caffeine you consume.  Date Last Reviewed: 10/11/2015  © 0975-5574 [employer]. [address] [place] [address], [place] [address]. All rights reserved. This information is not intended as a substitute for professional medical care. Always follow your healthcare professional's instructions.          Preventing Migraine Headaches: Triggers  The first step in preventing migraines is to learn what triggers them. You may then be able to control your triggers to avoid or reduce the severity of your migraines.     Know your triggers  Be aware that you may have more than one trigger, and that some triggers may work together. Common migraine triggers include:  · Food and nutrition. Skipping meals or not drinking enough water can trigger headaches. So can certain foods, such as caffeine, monosodium glutamate (MSG), aged cheese, or sausage.  · Alcohol. Red wine and other alcoholic beverages are common migraine triggers.  · Chemicals. Scents, cleaning products, gasoline, glue, perfume, and paint can be triggers. So can tobacco smoke, including secondhand smoke.  · Emotions. Stress can trigger headaches or make them worse once they begin.  · Sleep disruption. Staying up late, sleeping late, and traveling across time zones can disrupt your sleep cycle, triggering headaches.  · Hormones. Many women notice that migraines tend to happen at a certain point in their menstrual cycle. Birth control pills or hormone replacement therapy may also trigger  migraines.  · Environment and weather. Air travel, changes in altitude, air pressure changes, hot sun, or bright or flashing lights can be triggers.    Control your triggers  These are some of the things you can do to try to control triggers:  · Avoid triggers if you can. For example, stay clear of alcohol and foods that trigger your headaches. Use unscented household products. Keep regular sleep habits. Manage stress to help control emotional triggers.  · Change your behavior at times when triggers can't be avoided. For example, make sure to get enough rest and drink plenty of water while you're traveling. Make sure to carry a hat, sunglasses, and your medicines. Be alert for migraine symptoms, so you can treat a migraine early if it happens.  Date Last Reviewed: 10/9/2015  © 7051-6668 [employer]. [address] [place] [address]. All rights reserved. This information is not intended as a substitute for professional medical care. Always follow your healthcare professional's instructions.          Smoking Cessation     If you would like to quit smoking:   You may be eligible for free services if you are a [place] resident and started smoking cigarettes before September 1, 1988.  Call the Smoking Cessation Trust ([facility]) toll free at [phone] or [phone].   Call 1-215-QUIT-NOW if you do not meet the above criteria.   Contact us via email: [email or web address]   View our website for more information: [email or web address]         [facility] Emergency Department complies with applicable Federal civil rights laws and does not discriminate on the basis of race, color, national origin, age, disability, or sex.        Language Assistance Services     ATTENTION: Language assistance services are available, free of charge. Please call [phone].      ATENCIÓN: Si habla español, tiene a tong disposición servicios gratuitos de asistencia lingüística. Llame al  [phone].     ИРИНА Ý: N?u b?n nói Ti?ng Vi?t, có các d?ch v? h? tr? ngôn ng? mi?n phí dành cho b?n. G?i s? [phone].

## 2017-04-04 NOTE — ED PROVIDER NOTES
Encounter Date: 4/4/2017       History     Chief Complaint   Patient presents with    Migraine     posterior headache x 4 hours, took imitrex pta     Review of patient's allergies indicates:   Allergen Reactions    Strawberry Anaphylaxis and Hives     Patient is a 36 y.o. female presenting with the following complaint: headaches. The history is provided by the patient.   Headache    This is a recurrent problem. The current episode started yesterday. The problem occurs constantly. The problem has been unchanged. The pain is located in the frontal region. The pain does not radiate. The pain quality is similar to prior headaches. The quality of the pain is described as throbbing. The pain is at a severity of 7/10. Associated symptoms include nausea and photophobia. Pertinent negatives include no blurred vision, coughing, drainage, ear pain, fever, neck pain, numbness, phonophobia, rhinorrhea (mILD), scalp tenderness, seizures, sore throat, tingling or vomiting. Nothing aggravates the symptoms. She has tried acetaminophen and ergotamines for the symptoms. The treatment provided mild relief. Her past medical history is significant for migraine headaches.     Past Medical History:   Diagnosis Date    Acute encephalopathy 12/24/12    secondary to drug overdose    ARF (acute renal failure) 12/24/12    secondary to drug overdose    History of cardiac arrest 12/24/12    secondary to drug overdose    Hypertension     MRSA (methicillin resistant Staphylococcus aureus) 12/24/12    Nephritis     PMDD (premenstrual dysphoric disorder)     Polysubstance abuse 12/24/12    Systolic CHF, acute 12/24/12    secondary to drug overdose     Past Surgical History:   Procedure Laterality Date    CHOLECYSTECTOMY      TUBAL LIGATION       Family History   Problem Relation Age of Onset    Kidney disease Mother     Cancer Maternal Grandmother      throat    Cancer Maternal Grandfather      prostate    Kidney disease Daughter      Breast cancer Neg Hx     Colon cancer Neg Hx     Ovarian cancer Neg Hx      Social History   Substance Use Topics    Smoking status: Current Every Day Smoker     Packs/day: 0.75     Years: 20.00    Smokeless tobacco: None      Comment: 1/2 half pack/day.     Alcohol use No     Review of Systems   Constitutional: Negative.  Negative for fever.   HENT: Negative for ear pain, rhinorrhea (mILD) and sore throat.    Eyes: Positive for photophobia. Negative for blurred vision.   Respiratory: Negative.  Negative for cough.    Cardiovascular: Negative.    Gastrointestinal: Positive for nausea. Negative for vomiting.   Endocrine: Negative.    Genitourinary: Negative.    Musculoskeletal: Negative.  Negative for neck pain.   Skin: Negative.    Allergic/Immunologic: Negative.    Neurological: Positive for headaches. Negative for tingling, seizures and numbness.   Hematological: Negative.    Psychiatric/Behavioral: Negative.    All other systems reviewed and are negative.      Physical Exam   Initial Vitals   BP Pulse Resp Temp SpO2   04/04/17 0110 04/04/17 0110 04/04/17 0110 04/04/17 0110 04/04/17 0110   142/110 80 18 98.3 °F (36.8 °C) 98 %     Physical Exam    Nursing note and vitals reviewed.  Constitutional: Vital signs are normal. She appears well-developed. She is active and cooperative.   HENT:   Head: Normocephalic and atraumatic.   Eyes: Conjunctivae, EOM and lids are normal. Pupils are equal, round, and reactive to light.   Neck: Trachea normal and full passive range of motion without pain. Neck supple. No thyroid mass present. No muscular tenderness present. Normal range of motion present. No rigidity. No Brudzinski's sign and no Kernig's sign noted.   Cardiovascular: Normal rate, regular rhythm, S1 normal, S2 normal, normal heart sounds, intact distal pulses and normal pulses.   Abdominal: Soft. Normal appearance, normal aorta and bowel sounds are normal.   Musculoskeletal: Normal range of motion.    Lymphadenopathy:     She has no axillary adenopathy.   Neurological: She is alert and oriented to person, place, and time.   Skin: Skin is warm, dry and intact.   Psychiatric: She has a normal mood and affect. Her speech is normal and behavior is normal. Judgment and thought content normal. Cognition and memory are normal.         ED Course   Procedures  Labs Reviewed - No data to display                            ED Course     Clinical Impression:   The encounter diagnosis was Migraine without status migrainosus, not intractable, unspecified migraine type.          Gonzalo Epps MD  04/04/17 0126

## 2017-05-10 ENCOUNTER — OFFICE VISIT (OUTPATIENT)
Dept: FAMILY MEDICINE | Facility: CLINIC | Age: 37
End: 2017-05-10
Payer: COMMERCIAL

## 2017-05-10 VITALS
RESPIRATION RATE: 18 BRPM | HEART RATE: 101 BPM | BODY MASS INDEX: 29.3 KG/M2 | DIASTOLIC BLOOD PRESSURE: 80 MMHG | SYSTOLIC BLOOD PRESSURE: 110 MMHG | OXYGEN SATURATION: 98 % | WEIGHT: 159.19 LBS | TEMPERATURE: 99 F | HEIGHT: 62 IN

## 2017-05-10 DIAGNOSIS — N39.0 URINARY TRACT INFECTION WITHOUT HEMATURIA, SITE UNSPECIFIED: Primary | ICD-10-CM

## 2017-05-10 DIAGNOSIS — F39 MOOD DISORDER: ICD-10-CM

## 2017-05-10 PROCEDURE — 87077 CULTURE AEROBIC IDENTIFY: CPT

## 2017-05-10 PROCEDURE — 99214 OFFICE O/P EST MOD 30 MIN: CPT | Mod: 25,S$GLB,, | Performed by: INTERNAL MEDICINE

## 2017-05-10 PROCEDURE — 87186 SC STD MICRODIL/AGAR DIL: CPT

## 2017-05-10 PROCEDURE — 81000 URINALYSIS NONAUTO W/SCOPE: CPT

## 2017-05-10 PROCEDURE — 1160F RVW MEDS BY RX/DR IN RCRD: CPT | Mod: S$GLB,,, | Performed by: INTERNAL MEDICINE

## 2017-05-10 PROCEDURE — 3079F DIAST BP 80-89 MM HG: CPT | Mod: S$GLB,,, | Performed by: INTERNAL MEDICINE

## 2017-05-10 PROCEDURE — 87086 URINE CULTURE/COLONY COUNT: CPT

## 2017-05-10 PROCEDURE — 87088 URINE BACTERIA CULTURE: CPT

## 2017-05-10 PROCEDURE — 3074F SYST BP LT 130 MM HG: CPT | Mod: S$GLB,,, | Performed by: INTERNAL MEDICINE

## 2017-05-10 PROCEDURE — 99999 PR PBB SHADOW E&M-EST. PATIENT-LVL III: CPT | Mod: PBBFAC,,, | Performed by: INTERNAL MEDICINE

## 2017-05-10 PROCEDURE — 81002 URINALYSIS NONAUTO W/O SCOPE: CPT | Mod: S$GLB,,, | Performed by: INTERNAL MEDICINE

## 2017-05-10 RX ORDER — SUMATRIPTAN SUCCINATE 25 MG/1
TABLET ORAL
Refills: 0 | COMMUNITY
Start: 2017-02-06 | End: 2017-10-08

## 2017-05-10 RX ORDER — LAMOTRIGINE 25 MG/1
25 TABLET ORAL DAILY
Qty: 30 TABLET | Refills: 1 | Status: SHIPPED | OUTPATIENT
Start: 2017-05-10 | End: 2017-08-22

## 2017-05-10 RX ORDER — SULFAMETHOXAZOLE AND TRIMETHOPRIM 800; 160 MG/1; MG/1
1 TABLET ORAL 2 TIMES DAILY
Qty: 6 TABLET | Refills: 0 | Status: SHIPPED | OUTPATIENT
Start: 2017-05-10 | End: 2017-05-11 | Stop reason: CLARIF

## 2017-05-10 RX ORDER — SULFAMETHOXAZOLE AND TRIMETHOPRIM 800; 160 MG/1; MG/1
1 TABLET ORAL 2 TIMES DAILY
Qty: 6 TABLET | Refills: 0 | Status: SHIPPED | OUTPATIENT
Start: 2017-05-10 | End: 2017-05-10 | Stop reason: CLARIF

## 2017-05-10 RX ORDER — TIZANIDINE 4 MG/1
TABLET ORAL
Refills: 0 | COMMUNITY
Start: 2017-04-06 | End: 2017-08-22

## 2017-05-10 NOTE — PROGRESS NOTES
SUBJECTIVE     No chief complaint on file.      HPI  Roslyn Brewster is a 36 y.o. female with multiple medical diagnoses as listed in the medical history and problem list that presents for evaluation of generalized body aches yesterday. Pt says she woke up feeling nauseas and had R sided flank pain. Pt also had a fever of 101 at ~11pm last night. Pt has since been taking Tylenol with relief of the fever, but she still feels achy. Denies any dysuria, increased frequency, but has some urinary urgency.    PAST MEDICAL HISTORY:  Past Medical History:   Diagnosis Date    Acute encephalopathy 12/24/12    secondary to drug overdose    ARF (acute renal failure) 12/24/12    secondary to drug overdose    History of cardiac arrest 12/24/12    secondary to drug overdose    Hypertension     MRSA (methicillin resistant Staphylococcus aureus) 12/24/12    Nephritis     PMDD (premenstrual dysphoric disorder)     Polysubstance abuse 12/24/12    Systolic CHF, acute 12/24/12    secondary to drug overdose       PAST SURGICAL HISTORY:  Past Surgical History:   Procedure Laterality Date    CHOLECYSTECTOMY      TUBAL LIGATION         SOCIAL HISTORY:  Social History     Social History    Marital status: Single     Spouse name: N/A    Number of children: N/A    Years of education: N/A     Occupational History    Not on file.     Social History Main Topics    Smoking status: Current Every Day Smoker     Packs/day: 0.75     Years: 20.00    Smokeless tobacco: Not on file      Comment: 1/2 half pack/day.     Alcohol use No    Drug use: No      Comment: PCP, patient states she had not done drugs since 2012    Sexual activity: Yes     Partners: Male     Other Topics Concern    Not on file     Social History Narrative       FAMILY HISTORY:  Family History   Problem Relation Age of Onset    Kidney disease Mother     Cancer Maternal Grandmother      throat    Cancer Maternal Grandfather      prostate    Kidney disease  Daughter     Breast cancer Neg Hx     Colon cancer Neg Hx     Ovarian cancer Neg Hx        ALLERGIES AND MEDICATIONS: updated and reviewed.  Review of patient's allergies indicates:   Allergen Reactions    Strawberry Anaphylaxis and Hives     Current Outpatient Prescriptions   Medication Sig Dispense Refill    alprazolam (XANAX) 1 MG tablet TAKE ONE TABLET EVERY EVENING AS NEEDED FOR ANXIETY MAY CAUSE DROWSINESS 30 tablet 5    levocetirizine (XYZAL) 5 MG tablet Take 1 tablet (5 mg total) by mouth every evening. 30 tablet 1    acetaminophen (TYLENOL) 500 MG tablet Take 2 tablets (1,000 mg total) by mouth 3 (three) times daily as needed for Pain. 30 tablet 0    diclofenac sodium 1 % Gel APPLY 2 GRAMS TO AFFECTED AREA BID  0    ibuprofen (ADVIL,MOTRIN) 200 MG tablet Take 2 tablets (400 mg total) by mouth every 6 (six) hours as needed for Pain. 30 tablet 0    lamotrigine (LAMICTAL) 25 MG tablet Take 1 tablet (25 mg total) by mouth once daily. 30 tablet 1    meloxicam (MOBIC) 7.5 MG tablet TK 1 T PO  HS AFTER MEALS  0    metoclopramide HCl (REGLAN) 10 MG tablet Take 1 tablet (10 mg total) by mouth every 6 (six) hours as needed. 30 tablet 0    oxycodone (ROXICODONE) 5 MG immediate release tablet TK 1 T PO Q 8-12 H PRN  0    sulfamethoxazole-trimethoprim 800-160mg (BACTRIM DS) 800-160 mg Tab Take 1 tablet by mouth 2 (two) times daily. 6 tablet 0    sumatriptan (IMITREX) 25 MG Tab   0    tizanidine (ZANAFLEX) 4 MG tablet TK SS T PO HS PRN  0    VICODIN ES 7.5-300 mg Tab TK 1 T PO Q 8 TO 12 H PRN  0     No current facility-administered medications for this visit.        ROS  Review of Systems   Constitutional: Positive for fever. Negative for chills.   HENT: Negative for hearing loss and sore throat.    Eyes: Negative for visual disturbance.   Respiratory: Negative for cough and shortness of breath.    Cardiovascular: Negative for chest pain, palpitations and leg swelling.   Gastrointestinal: Negative for  "abdominal pain, constipation, diarrhea, nausea and vomiting.   Genitourinary: Positive for urgency. Negative for dysuria and frequency.   Musculoskeletal: Positive for back pain. Negative for arthralgias, joint swelling and myalgias.   Skin: Negative for rash and wound.   Neurological: Negative for headaches.   Psychiatric/Behavioral: Positive for dysphoric mood. Negative for agitation, confusion, self-injury and suicidal ideas. The patient is hyperactive. The patient is not nervous/anxious.          OBJECTIVE     Physical Exam  Vitals:    05/10/17 1023   BP: 110/80   Pulse: 101   Resp: 18   Temp: 98.7 °F (37.1 °C)    Body mass index is 29.11 kg/(m^2).  Weight: 72.2 kg (159 lb 2.8 oz)   Height: 5' 2" (157.5 cm)     Physical Exam   Constitutional: She is oriented to person, place, and time. She appears well-developed and well-nourished. No distress.   HENT:   Head: Normocephalic and atraumatic.   Right Ear: External ear normal.   Left Ear: External ear normal.   Nose: Nose normal.   Mouth/Throat: Oropharynx is clear and moist.   Eyes: Conjunctivae and EOM are normal. Right eye exhibits no discharge. Left eye exhibits no discharge. No scleral icterus.   Neck: Normal range of motion. Neck supple. No JVD present. No tracheal deviation present.   Cardiovascular: Normal rate, regular rhythm and intact distal pulses.  Exam reveals no gallop and no friction rub.    No murmur heard.  Pulmonary/Chest: Effort normal and breath sounds normal. No respiratory distress. She has no wheezes.   Abdominal: Soft. Bowel sounds are normal. She exhibits no distension and no mass. There is no tenderness. There is CVA tenderness (right). There is no rebound and no guarding.   Musculoskeletal: Normal range of motion. She exhibits no edema, tenderness or deformity.   Neurological: She is alert and oriented to person, place, and time. She exhibits normal muscle tone. Coordination normal.   Skin: Skin is warm and dry. No rash noted. No " erythema.   Psychiatric: She has a normal mood and affect. Her behavior is normal. Judgment and thought content normal.         Health Maintenance       Date Due Completion Date    TETANUS VACCINE 6/28/1997 ---    Pneumococcal PPSV23 (Medium Risk) (1) 6/28/1997 ---    Pap Smear 8/6/2016 8/6/2015    Influenza Vaccine 8/1/2017 11/23/2015 (Declined)    Override on 11/23/2015: Declined            ASSESSMENT     36 y.o. female with     1. Urinary tract infection without hematuria, site unspecified    2. Mood disorder        PLAN:     1. Urinary tract infection without hematuria, site unspecified  - POCT URINE DIPSTICK WITHOUT MICROSCOPE; +leuk, neg nitrites, trace blood  - Urine culture  - Urinalysis  - Given hx of recurrent pyelonephritis pt presented early at the start of symptoms; pt advised to hold off on starting Abx until she hears from me with results of formal UA  - Will f/u on UCx  - sulfamethoxazole-trimethoprim 800-160mg (BACTRIM DS) 800-160 mg Tab; Take 1 tablet by mouth 2 (two) times daily.  Dispense: 6 tablet; Refill: 0    2. Mood disorder  - Pt with ?hx of Bipolar D/O diagnosis per former PCP and started on Seroquel but pt discontinued 2/2 weight gain  - She is requesting a mood stabilizer as her mood is up and down  - Will start a 30 day trial course of Lamictal as below; pt made aware of side effect profile and she voiced understanding; pt also made aware that blood levels would have to be monitored while on med  - lamotrigine (LAMICTAL) 25 MG tablet; Take 1 tablet (25 mg total) by mouth once daily.  Dispense: 30 tablet; Refill: 1      RTC in 4 weeks     Spring Montilla MD  05/10/2017 10:54 AM        No Follow-up on file.

## 2017-05-11 ENCOUNTER — TELEPHONE (OUTPATIENT)
Dept: FAMILY MEDICINE | Facility: CLINIC | Age: 37
End: 2017-05-11

## 2017-05-11 LAB
BILIRUB UR QL STRIP: NEGATIVE
CLARITY UR: ABNORMAL
COLOR UR: ABNORMAL
GLUCOSE UR QL STRIP: NEGATIVE
HGB UR QL STRIP: NEGATIVE
KETONES UR QL STRIP: NEGATIVE
LEUKOCYTE ESTERASE UR QL STRIP: NEGATIVE
MICROSCOPIC COMMENT: NORMAL
NITRITE UR QL STRIP: NEGATIVE
PH UR STRIP: 5 [PH] (ref 5–8)
PROT UR QL STRIP: NEGATIVE
SP GR UR STRIP: 1.02 (ref 1–1.03)
SQUAMOUS #/AREA URNS HPF: 2 /HPF
URN SPEC COLLECT METH UR: ABNORMAL
UROBILINOGEN UR STRIP-ACNC: NEGATIVE EU/DL

## 2017-05-11 NOTE — TELEPHONE ENCOUNTER
Called pt to inform her that Abx are not needed, so do not get the Rx filled as her UA was normal. Will f/u on UCx. Pt states she has a kidney stone and she thinks it is just sticking her. I agreed that the stone is the cause of the pt's discomfort.

## 2017-05-12 ENCOUNTER — TELEPHONE (OUTPATIENT)
Dept: FAMILY MEDICINE | Facility: CLINIC | Age: 37
End: 2017-05-12

## 2017-05-12 NOTE — TELEPHONE ENCOUNTER
Called pt back and sent a [facility] message informing pt to start Abx as her UA was normal, but UCx grew out a significant amount of bacteria. Will f/u on culture next Monday, 5/15/17 to ensure Abx are appropriate.

## 2017-05-13 LAB — BACTERIA UR CULT: NORMAL

## 2017-05-15 DIAGNOSIS — N39.0 URINARY TRACT INFECTION WITHOUT HEMATURIA, SITE UNSPECIFIED: Primary | ICD-10-CM

## 2017-05-15 RX ORDER — CIPROFLOXACIN 500 MG/1
500 TABLET ORAL EVERY 12 HOURS
Qty: 14 TABLET | Refills: 0 | Status: SHIPPED | OUTPATIENT
Start: 2017-05-15 | End: 2017-05-22

## 2017-05-22 ENCOUNTER — TELEPHONE (OUTPATIENT)
Dept: FAMILY MEDICINE | Facility: CLINIC | Age: 37
End: 2017-05-22

## 2017-05-22 ENCOUNTER — HOSPITAL ENCOUNTER (EMERGENCY)
Facility: HOSPITAL | Age: 37
Discharge: HOME | End: 2017-05-22
Attending: EMERGENCY MEDICINE
Payer: COMMERCIAL

## 2017-05-22 VITALS
BODY MASS INDEX: 27.79 KG/M2 | OXYGEN SATURATION: 100 % | RESPIRATION RATE: 20 BRPM | TEMPERATURE: 99 F | SYSTOLIC BLOOD PRESSURE: 111 MMHG | DIASTOLIC BLOOD PRESSURE: 70 MMHG | HEART RATE: 70 BPM | HEIGHT: 62 IN | WEIGHT: 151 LBS

## 2017-05-22 DIAGNOSIS — R10.9 FLANK PAIN: ICD-10-CM

## 2017-05-22 DIAGNOSIS — N12 PYELONEPHRITIS: Primary | ICD-10-CM

## 2017-05-22 LAB
AMORPH CRY URNS QL MICRO: ABNORMAL
B-HCG UR QL: NEGATIVE
BACTERIA #/AREA URNS HPF: ABNORMAL /HPF
BILIRUB UR QL STRIP: NEGATIVE
CLARITY UR: ABNORMAL
COLOR UR: YELLOW
CTP QC/QA: YES
GLUCOSE UR QL STRIP: NEGATIVE
HGB UR QL STRIP: ABNORMAL
KETONES UR QL STRIP: NEGATIVE
LEUKOCYTE ESTERASE UR QL STRIP: ABNORMAL
MICROSCOPIC COMMENT: ABNORMAL
NITRITE UR QL STRIP: NEGATIVE
PH UR STRIP: 6 [PH] (ref 5–8)
PROT UR QL STRIP: NEGATIVE
RBC #/AREA URNS HPF: 2 /HPF (ref 0–4)
SP GR UR STRIP: 1.02 (ref 1–1.03)
SQUAMOUS #/AREA URNS HPF: 6 /HPF
URN SPEC COLLECT METH UR: ABNORMAL
UROBILINOGEN UR STRIP-ACNC: NEGATIVE EU/DL
WBC #/AREA URNS HPF: 2 /HPF (ref 0–5)

## 2017-05-22 PROCEDURE — 99283 EMERGENCY DEPT VISIT LOW MDM: CPT

## 2017-05-22 PROCEDURE — 87086 URINE CULTURE/COLONY COUNT: CPT

## 2017-05-22 PROCEDURE — 87088 URINE BACTERIA CULTURE: CPT

## 2017-05-22 PROCEDURE — 25000003 PHARM REV CODE 250: Performed by: NURSE PRACTITIONER

## 2017-05-22 PROCEDURE — 81025 URINE PREGNANCY TEST: CPT | Performed by: EMERGENCY MEDICINE

## 2017-05-22 PROCEDURE — 87077 CULTURE AEROBIC IDENTIFY: CPT

## 2017-05-22 PROCEDURE — 87186 SC STD MICRODIL/AGAR DIL: CPT

## 2017-05-22 PROCEDURE — 81000 URINALYSIS NONAUTO W/SCOPE: CPT

## 2017-05-22 RX ORDER — AMOXICILLIN AND CLAVULANATE POTASSIUM 875; 125 MG/1; MG/1
1 TABLET, FILM COATED ORAL
Status: COMPLETED | OUTPATIENT
Start: 2017-05-22 | End: 2017-05-22

## 2017-05-22 RX ORDER — NAPROXEN 500 MG/1
500 TABLET ORAL 2 TIMES DAILY PRN
Qty: 30 TABLET | Refills: 0 | Status: SHIPPED | OUTPATIENT
Start: 2017-05-22 | End: 2017-08-22

## 2017-05-22 RX ORDER — AMOXICILLIN AND CLAVULANATE POTASSIUM 875; 125 MG/1; MG/1
1 TABLET, FILM COATED ORAL 2 TIMES DAILY
Qty: 28 TABLET | Refills: 0 | Status: SHIPPED | OUTPATIENT
Start: 2017-05-22 | End: 2017-06-05

## 2017-05-22 RX ADMIN — AMOXICILLIN AND CLAVULANATE POTASSIUM 1 TABLET: 875; 125 TABLET, FILM COATED ORAL at 02:05

## 2017-05-22 NOTE — PROGRESS NOTES
Pt called stating she is still not feeling well. She has side pain and fever despite full compliance with Bactrim. Reviewed pt's chart and UCx is sensitive to Ampicillin and Cipro. Pt states she can not take Cipro, so will send Augmentin po BID x 14 days. Pt aware that Rx has been sent to pharmacy. All questions/concerns addressed and pt voiced understanding.

## 2017-05-22 NOTE — DISCHARGE INSTRUCTIONS
Follow up with Dr. Montilla if symptoms do not improve.    Take the Augmentin that Dr. Montilla prescribed to you until it is gone.    Return to the emergency department for any new or worsening symptoms.

## 2017-05-22 NOTE — TELEPHONE ENCOUNTER
----- Message from Piper Marie sent at 5/22/2017  8:07 AM CDT -----  Patient is calling regarding a kidney infection. Please call at [phone] Thank you!

## 2017-05-22 NOTE — ED PROVIDER NOTES
"Encounter Date: 5/22/2017    SCRIBE #1 NOTE: I, Olive Calvillo, am scribing for, and in the presence of,  Maurice Groves NP. I have scribed the following portions of the note - Other sections scribed: HPI/ROS.       History     Chief Complaint   Patient presents with    Flank Pain     more on right but also left side x a couple weeks. " I'm taking antibiotics but the pain is so bad."     Review of patient's allergies indicates:   Allergen Reactions    Strawberry Anaphylaxis and Hives     CC: Flank Pain    HPI: This 36 y.o. F who has history of acute encephalopathy, ARF, cardiac arrest, HTN, MRSA, nephritis, PMDD, polysubstance abuse, and systolic CHF presents to the ED for evaluation of acute onset severe right flank pain for 2 weeks. The pt reports associated left side thoracic back pain for about 2 weeks as well. She also reports a 101.9 fever last night that was alleviated by treatment with tylenol. The flank pain is exacerbated by palpation. She notes that she has recurrent ploynephritis. She is currently compliant with a 14 day course of Bactrim to treat polynephritis with no relief from the flank pain. The pt denies fever,chills, N/V/D, dysuria, frequency, and any other associated symptoms.       The history is provided by the patient. No  was used.     Past Medical History:   Diagnosis Date    Acute encephalopathy 12/24/12    secondary to drug overdose    ARF (acute renal failure) 12/24/12    secondary to drug overdose    History of cardiac arrest 12/24/12    secondary to drug overdose    Hypertension     MRSA (methicillin resistant Staphylococcus aureus) 12/24/12    Nephritis     PMDD (premenstrual dysphoric disorder)     Polysubstance abuse 12/24/12    Systolic CHF, acute 12/24/12    secondary to drug overdose     Past Surgical History:   Procedure Laterality Date    CHOLECYSTECTOMY      TUBAL LIGATION       Family History   Problem Relation Age of Onset    Kidney disease " Mother     Cancer Maternal Grandmother      throat    Cancer Maternal Grandfather      prostate    Kidney disease Daughter     Breast cancer Neg Hx     Colon cancer Neg Hx     Ovarian cancer Neg Hx      Social History   Substance Use Topics    Smoking status: Current Every Day Smoker     Packs/day: 0.75     Years: 20.00    Smokeless tobacco: Not on file      Comment: 1/2 half pack/day.     Alcohol use No     Review of Systems   Constitutional: Negative for chills and fever.   HENT: Negative for sore throat.    Respiratory: Negative for shortness of breath.    Cardiovascular: Negative for chest pain.   Gastrointestinal: Negative for abdominal pain, diarrhea, nausea and vomiting.   Genitourinary: Positive for flank pain (R side). Negative for dysuria and frequency.   Musculoskeletal: Positive for back pain (left side thoracic).   Skin: Negative for rash.   Neurological: Negative for headaches.   Hematological: Does not bruise/bleed easily.       Physical Exam     Initial Vitals [05/22/17 1146]   BP Pulse Resp Temp SpO2   119/75 96 18 98.4 °F (36.9 °C) 100 %     Physical Exam    Nursing note and vitals reviewed.  Constitutional: She appears well-developed and well-nourished. She is not diaphoretic. No distress.   HENT:   Head: Normocephalic and atraumatic.   Right Ear: External ear normal.   Left Ear: External ear normal.   Nose: Nose normal.   Eyes: Conjunctivae and EOM are normal. Pupils are equal, round, and reactive to light. Right eye exhibits no discharge. Left eye exhibits no discharge. No scleral icterus.   Neck: Normal range of motion. Neck supple. No thyromegaly present. No tracheal deviation present. No JVD present.   Cardiovascular: Normal rate, regular rhythm, normal heart sounds and intact distal pulses. Exam reveals no gallop and no friction rub.    No murmur heard.  Pulmonary/Chest: Breath sounds normal. No stridor. No respiratory distress. She has no wheezes. She has no rhonchi. She has no  rales.   Abdominal: Soft. Normal appearance and bowel sounds are normal. She exhibits no distension and no mass. There is no tenderness. There is CVA tenderness (right-sided). There is no rebound, no guarding, no tenderness at McBurney's point and negative Larry's sign.   Musculoskeletal: Normal range of motion. She exhibits no edema or tenderness.   Lymphadenopathy:     She has no cervical adenopathy.   Neurological: She is alert and oriented to person, place, and time. She has normal strength and normal reflexes. She displays normal reflexes. No cranial nerve deficit or sensory deficit.   Skin: Skin is warm and dry. No rash and no abscess noted. No erythema. No pallor.   Psychiatric: She has a normal mood and affect. Her behavior is normal. Judgment and thought content normal.         ED Course   Procedures  Labs Reviewed   URINALYSIS - Abnormal; Notable for the following:        Result Value    Appearance, UA Hazy (*)     Occult Blood UA 1+ (*)     Leukocytes, UA 1+ (*)     All other components within normal limits   URINALYSIS MICROSCOPIC - Abnormal; Notable for the following:     Bacteria, UA Few (*)     All other components within normal limits   CULTURE, URINE   POCT URINE PREGNANCY             Medical Decision Making:   Clinical Tests:   Lab Tests: Ordered and Reviewed  ED Management:  This is an afebrile 36 year old female presenting with right flank x 2 weeks. She reports fever of 101.9 at home. She denies dysuria,  Pt was seen by her PCP and diagnosed with pyelonephritis. Pt was started on Bactrim by her which she has been compliant with since it was prescribed. Pt was contacted today by her PCP and told that the urine culture grew enterococcus sensitive to Cipro and ampicillin. Pt was prescribed Augmentin which she has not yet begun taking. Pt states that symptoms are unchanged since being diagnosed with pyelonephritis. On exam there is right CVA tenderness. U/A shows positive leukocytes and bacteria.  Based on these findings I suspect pyelonephritis. I will instruct the pt to begin taking the Augmentin prescribed to her by her PCP. I considered but doubt nephrolithiasis, AAA, paraspinal abscess. Pt discharged home with instructions for follow up and supportive care. ED return precautions given. Pt verbalized understanding of all information and instructions given. This case was discussed with Eben Catalan MD who agreed with the assessment and plan.    Other:   I have discussed this case with another health care provider.            Scribe Attestation:   Scribe #1: I performed the above scribed service and the documentation accurately describes the services I performed. I attest to the accuracy of the note.    Attending Attestation:     Physician Attestation Statement for NP/PA:   I discussed this assessment and plan of this patient with the NP/PA, but I did not personally examine the patient. The face to face encounter was performed by the NP/PA.    Other NP/PA Attestation Additions:      Medical Decision Makin-year-old female presents complaining of persistent flank pain along with fever at home for the last 2 weeks, after having been diagnosed with pyelonephritis and prescribed Bactrim by her primary physician.  Urinalysis grew enterococcus, which was not sensitive to Bactrim.  I have reviewed the medical record and confirmed that the patient's primary physician called in a prescription for Augmentin earlier today.  She does not septic appearing at this time.  She has no abdominal or respiratory symptoms to suggest an alternate cause of her CVA tenderness and pain.  Will treat her pain and give her her first dose of Augmentin here, followed by discharge with recommendations for supportive care, filling her new prescription, and follow-up with primary physician.        Physician Attestation for Scribe:  Physician Attestation Statement for Scribe #1: I, Maurice Groves NP, reviewed documentation, as scribed by  Olive Calvillo in my presence, and it is both accurate and complete.                 ED Course     Clinical Impression:   The primary encounter diagnosis was Pyelonephritis. A diagnosis of Flank pain was also pertinent to this visit.          Maurice Groves NP  05/22/17 8511       Eben Catalan III, MD  05/22/17 4843

## 2017-05-22 NOTE — ED TRIAGE NOTES
Patient came in PER personal transport with c/o RIGHT and LEFT and flank pain. Patient states that she had an infection and was given bactrim that she has finished with no relief.

## 2017-05-22 NOTE — TELEPHONE ENCOUNTER
Patient stated that she is not feeling any better temp 101.0 last night took tylenol pain in right side a 9 out of 10 scale.

## 2017-05-24 LAB — BACTERIA UR CULT: NORMAL

## 2017-06-19 DIAGNOSIS — F32.81 PMDD (PREMENSTRUAL DYSPHORIC DISORDER): ICD-10-CM

## 2017-06-19 DIAGNOSIS — R11.0 NAUSEA: ICD-10-CM

## 2017-06-20 RX ORDER — ALPRAZOLAM 1 MG/1
TABLET ORAL
Qty: 25 TABLET | Refills: 5 | Status: SHIPPED | OUTPATIENT
Start: 2017-06-20 | End: 2017-07-10 | Stop reason: SDUPTHER

## 2017-07-10 ENCOUNTER — OFFICE VISIT (OUTPATIENT)
Dept: FAMILY MEDICINE | Facility: CLINIC | Age: 37
End: 2017-07-10
Payer: COMMERCIAL

## 2017-07-10 VITALS
BODY MASS INDEX: 28.64 KG/M2 | DIASTOLIC BLOOD PRESSURE: 60 MMHG | OXYGEN SATURATION: 98 % | TEMPERATURE: 98 F | WEIGHT: 161.63 LBS | SYSTOLIC BLOOD PRESSURE: 90 MMHG | HEIGHT: 63 IN | HEART RATE: 87 BPM

## 2017-07-10 DIAGNOSIS — J30.9 ALLERGIC SINUSITIS: Primary | ICD-10-CM

## 2017-07-10 DIAGNOSIS — H10.12 ALLERGIC CONJUNCTIVITIS OF LEFT EYE: ICD-10-CM

## 2017-07-10 DIAGNOSIS — R11.2 NAUSEA AND VOMITING, INTRACTABILITY OF VOMITING NOT SPECIFIED, UNSPECIFIED VOMITING TYPE: ICD-10-CM

## 2017-07-10 DIAGNOSIS — F32.81 PMDD (PREMENSTRUAL DYSPHORIC DISORDER): ICD-10-CM

## 2017-07-10 LAB
BILIRUB SERPL-MCNC: NEGATIVE MG/DL
BLOOD URINE, POC: NEGATIVE
COLOR, POC UA: YELLOW
GLUCOSE UR QL STRIP: NEGATIVE
KETONES UR QL STRIP: NEGATIVE
LEUKOCYTE ESTERASE URINE, POC: ABNORMAL
NITRITE, POC UA: NEGATIVE
PH, POC UA: 7
PROTEIN, POC: NEGATIVE
SPECIFIC GRAVITY, POC UA: 1
UROBILINOGEN, POC UA: NEGATIVE

## 2017-07-10 PROCEDURE — 81000 URINALYSIS NONAUTO W/SCOPE: CPT

## 2017-07-10 PROCEDURE — 99214 OFFICE O/P EST MOD 30 MIN: CPT | Mod: 25,S$GLB,, | Performed by: INTERNAL MEDICINE

## 2017-07-10 PROCEDURE — 87086 URINE CULTURE/COLONY COUNT: CPT

## 2017-07-10 PROCEDURE — 99999 PR PBB SHADOW E&M-EST. PATIENT-LVL III: CPT | Mod: PBBFAC,,, | Performed by: INTERNAL MEDICINE

## 2017-07-10 PROCEDURE — 81002 URINALYSIS NONAUTO W/O SCOPE: CPT | Mod: S$GLB,,, | Performed by: INTERNAL MEDICINE

## 2017-07-10 RX ORDER — ALPRAZOLAM 1 MG/1
TABLET ORAL
Qty: 25 TABLET | Refills: 2 | Status: SHIPPED | OUTPATIENT
Start: 2017-07-10 | End: 2018-06-04 | Stop reason: SDUPTHER

## 2017-07-10 RX ORDER — LEVOCETIRIZINE DIHYDROCHLORIDE 5 MG/1
5 TABLET, FILM COATED ORAL NIGHTLY
Qty: 30 TABLET | Refills: 1 | Status: SHIPPED | OUTPATIENT
Start: 2017-07-10 | End: 2017-07-10 | Stop reason: SDUPTHER

## 2017-07-10 RX ORDER — LEVOCETIRIZINE DIHYDROCHLORIDE 5 MG/1
5 TABLET, FILM COATED ORAL NIGHTLY
Qty: 30 TABLET | Refills: 1 | Status: SHIPPED | OUTPATIENT
Start: 2017-07-10 | End: 2017-08-22

## 2017-07-10 RX ORDER — AZELASTINE HYDROCHLORIDE 0.5 MG/ML
1 SOLUTION/ DROPS OPHTHALMIC 2 TIMES DAILY
Qty: 4 ML | Refills: 2 | Status: SHIPPED | OUTPATIENT
Start: 2017-07-10 | End: 2017-08-22

## 2017-07-10 NOTE — PROGRESS NOTES
SUBJECTIVE     Chief Complaint   Patient presents with    Urinary Tract Infection       HPI  Roslyn Brewster is a 37 y.o. female with multiple medical diagnoses as listed in the medical history and problem list that presents for evaluation of UTI x 3 days. Pt reports feeling nauseas with vomiting(bilious, non-bloody) x 1. Pt also has some pain the mid-epigastric that is sharp at a 4/10 and intermittent in nature. Pt reports some eyes burning and joints/knees are painful. Pt also has a headache. +temp of 100.1, denies any chills or night sweats.    PAST MEDICAL HISTORY:  Past Medical History:   Diagnosis Date    Acute encephalopathy 12/24/12    secondary to drug overdose    ARF (acute renal failure) 12/24/12    secondary to drug overdose    History of cardiac arrest 12/24/12    secondary to drug overdose    Hypertension     MRSA (methicillin resistant Staphylococcus aureus) 12/24/12    Nephritis     PMDD (premenstrual dysphoric disorder)     Polysubstance abuse 12/24/12    Systolic CHF, acute 12/24/12    secondary to drug overdose       PAST SURGICAL HISTORY:  Past Surgical History:   Procedure Laterality Date    CHOLECYSTECTOMY      TUBAL LIGATION         SOCIAL HISTORY:  Social History     Social History    Marital status: Single     Spouse name: N/A    Number of children: N/A    Years of education: N/A     Occupational History    Not on file.     Social History Main Topics    Smoking status: Current Every Day Smoker     Packs/day: 0.75     Years: 20.00    Smokeless tobacco: Not on file      Comment: 1/2 half pack/day.     Alcohol use No    Drug use: No      Comment: PCP, patient states she had not done drugs since 2012    Sexual activity: Yes     Partners: Male     Other Topics Concern    Not on file     Social History Narrative    No narrative on file       FAMILY HISTORY:  Family History   Problem Relation Age of Onset    Kidney disease Mother     Cancer Maternal Grandmother       throat    Cancer Maternal Grandfather      prostate    Kidney disease Daughter     Breast cancer Neg Hx     Colon cancer Neg Hx     Ovarian cancer Neg Hx        ALLERGIES AND MEDICATIONS: updated and reviewed.  Review of patient's allergies indicates:   Allergen Reactions    Strawberry Anaphylaxis and Hives     Current Outpatient Prescriptions   Medication Sig Dispense Refill    acetaminophen (TYLENOL) 500 MG tablet Take 2 tablets (1,000 mg total) by mouth 3 (three) times daily as needed for Pain. 30 tablet 0    alprazolam (XANAX) 1 MG tablet TAKE ONE TABLET EVERY EVENING AS NEEDED FOR ANXIETY *MAY CAUSE DROWSINESS* 25 tablet 2    diclofenac sodium 1 % Gel APPLY 2 GRAMS TO AFFECTED AREA BID  0    lamotrigine (LAMICTAL) 25 MG tablet Take 1 tablet (25 mg total) by mouth once daily. 30 tablet 1    levocetirizine (XYZAL) 5 MG tablet Take 1 tablet (5 mg total) by mouth every evening. 30 tablet 1    metoclopramide HCl (REGLAN) 10 MG tablet Take 1 tablet (10 mg total) by mouth every 6 (six) hours as needed. 30 tablet 0    naproxen (NAPROSYN) 500 MG tablet Take 1 tablet (500 mg total) by mouth 2 (two) times daily as needed (for pain). 30 tablet 0    oxycodone (ROXICODONE) 5 MG immediate release tablet TK 1 T PO Q 8-12 H PRN  0    sumatriptan (IMITREX) 25 MG Tab   0    tizanidine (ZANAFLEX) 4 MG tablet TK SS T PO HS PRN  0    VICODIN ES 7.5-300 mg Tab TK 1 T PO Q 8 TO 12 H PRN  0    azelastine (OPTIVAR) 0.05 % ophthalmic solution Place 1 drop into the left eye 2 (two) times daily. 4 mL 2     No current facility-administered medications for this visit.        ROS  Review of Systems   Constitutional: Negative for activity change and unexpected weight change.   HENT: Negative for hearing loss, rhinorrhea and trouble swallowing.    Eyes: Negative for discharge and visual disturbance.   Respiratory: Negative for chest tightness and wheezing.    Cardiovascular: Negative for chest pain and palpitations.  "  Gastrointestinal: Positive for vomiting. Negative for blood in stool, constipation and diarrhea.   Endocrine: Negative for polydipsia and polyuria.   Genitourinary: Positive for difficulty urinating and dysuria. Negative for hematuria and menstrual problem.   Musculoskeletal: Positive for arthralgias. Negative for joint swelling and neck pain.   Skin: Negative for rash and wound.   Neurological: Positive for weakness and headaches.   Psychiatric/Behavioral: Negative for confusion and dysphoric mood.         OBJECTIVE     Physical Exam  Vitals:    07/10/17 1638   BP: 90/60   Pulse: 87   Temp: 97.7 °F (36.5 °C)    Body mass index is 28.63 kg/m².  Weight: 73.3 kg (161 lb 9.6 oz)   Height: 5' 3" (160 cm)     Physical Exam   Constitutional: She is oriented to person, place, and time. She appears well-developed and well-nourished. No distress.   HENT:   Head: Normocephalic and atraumatic.   Right Ear: Hearing, tympanic membrane and external ear normal.   Left Ear: Hearing and external ear normal. A middle ear effusion is present.   Nose: Right sinus exhibits maxillary sinus tenderness. Right sinus exhibits no frontal sinus tenderness. Left sinus exhibits maxillary sinus tenderness. Left sinus exhibits no frontal sinus tenderness.   Mouth/Throat: No uvula swelling. Posterior oropharyngeal erythema present. No posterior oropharyngeal edema.   Eyes: Conjunctivae and EOM are normal. Right eye exhibits no discharge. Left eye exhibits no discharge. No scleral icterus.   Neck: Normal range of motion. Neck supple. No JVD present. No tracheal deviation present.   Cardiovascular: Normal rate, regular rhythm and intact distal pulses.  Exam reveals no gallop and no friction rub.    No murmur heard.  Pulmonary/Chest: Effort normal and breath sounds normal. No respiratory distress. She has no wheezes.   Abdominal: Soft. Bowel sounds are normal. She exhibits no distension and no mass. There is no tenderness. There is no rebound and no " guarding.   Musculoskeletal: Normal range of motion. She exhibits no edema, tenderness or deformity.   Neurological: She is alert and oriented to person, place, and time. She exhibits normal muscle tone. Coordination normal.   Skin: Skin is warm and dry. No rash noted. No erythema.   Psychiatric: She has a normal mood and affect. Her behavior is normal. Judgment and thought content normal.         Health Maintenance       Date Due Completion Date    TETANUS VACCINE 06/28/1997 ---    Pneumococcal PPSV23 (Medium Risk) (1) 06/28/1997 ---    Pap Smear 08/06/2016 8/6/2015    Influenza Vaccine 08/01/2017 11/23/2015 (Declined)    Override on 11/23/2015: Declined            ASSESSMENT     37 y.o. female with     1. Allergic sinusitis    2. PMDD (premenstrual dysphoric disorder)    3. Nausea and vomiting, intractability of vomiting not specified, unspecified vomiting type    4. Allergic conjunctivitis of left eye        PLAN:     1. Allergic sinusitis  - levocetirizine (XYZAL) 5 MG tablet; Take 1 tablet (5 mg total) by mouth every evening.  Dispense: 30 tablet; Refill: 1    2. PMDD (premenstrual dysphoric disorder)  - Stable; no acute issues  - The current medical regimen is effective;  continue present plan and medications.  - alprazolam (XANAX) 1 MG tablet; TAKE ONE TABLET EVERY EVENING AS NEEDED FOR ANXIETY *MAY CAUSE DROWSINESS*  Dispense: 25 tablet; Refill: 2    3. Nausea and vomiting, intractability of vomiting not specified, unspecified vomiting type  - POCT URINE DIPSTICK WITHOUT MICROSCOPE  - Urinalysis  - Urine culture    4. Allergic conjunctivitis of left eye  - azelastine (OPTIVAR) 0.05 % ophthalmic solution; Place 1 drop into the left eye 2 (two) times daily.  Dispense: 4 mL; Refill: 2        RTC in 2 weeks as needed for any acute worsening of current condition or failure to improve    Spring Montilla MD  07/10/2017 4:45 PM        No Follow-up on file.

## 2017-07-11 LAB
BACTERIA #/AREA URNS HPF: ABNORMAL /HPF
BILIRUB UR QL STRIP: NEGATIVE
CLARITY UR: ABNORMAL
COLOR UR: YELLOW
GLUCOSE UR QL STRIP: NEGATIVE
HGB UR QL STRIP: ABNORMAL
KETONES UR QL STRIP: NEGATIVE
LEUKOCYTE ESTERASE UR QL STRIP: ABNORMAL
MICROSCOPIC COMMENT: ABNORMAL
NITRITE UR QL STRIP: NEGATIVE
PH UR STRIP: 7 [PH] (ref 5–8)
PROT UR QL STRIP: NEGATIVE
RBC #/AREA URNS HPF: 1 /HPF (ref 0–4)
SP GR UR STRIP: 1.02 (ref 1–1.03)
SQUAMOUS #/AREA URNS HPF: 15 /HPF
URN SPEC COLLECT METH UR: ABNORMAL
UROBILINOGEN UR STRIP-ACNC: NEGATIVE EU/DL
WBC #/AREA URNS HPF: 2 /HPF (ref 0–5)

## 2017-07-13 LAB — BACTERIA UR CULT: NORMAL

## 2017-07-30 ENCOUNTER — NURSE TRIAGE (OUTPATIENT)
Dept: ADMINISTRATIVE | Facility: CLINIC | Age: 37
End: 2017-07-30

## 2017-07-30 NOTE — TELEPHONE ENCOUNTER
Reason for Disposition   Skin is cut or scraped, not punctured   Puncture wound from a sharp object that was very dirty    Protocols used: ST PUNCTURE WOUND-A-AH    Ms. Brewster states she was scratched underneath her eye by her cat that is up to date on shots. She cannot recall last tetanus booster so patient was advised to go to urgent care to have her eye area evaluated and receive immunization. Patient verbalized understanding.

## 2017-08-11 ENCOUNTER — TELEPHONE (OUTPATIENT)
Dept: SMOKING CESSATION | Facility: CLINIC | Age: 37
End: 2017-08-11

## 2017-08-11 NOTE — TELEPHONE ENCOUNTER
1st attempt unable to leave a message regarding smoking cessation quit 1 episode. No answer/busy.

## 2017-08-14 ENCOUNTER — TELEPHONE (OUTPATIENT)
Dept: SMOKING CESSATION | Facility: CLINIC | Age: 37
End: 2017-08-14

## 2017-08-14 NOTE — TELEPHONE ENCOUNTER
2nd attempt unable to leave a message regarding smoking cessation quit 1 episode. No answer/busy.

## 2017-08-16 ENCOUNTER — TELEPHONE (OUTPATIENT)
Dept: SMOKING CESSATION | Facility: CLINIC | Age: 37
End: 2017-08-16

## 2017-08-16 NOTE — TELEPHONE ENCOUNTER
3rd attempt unable to leave a message regarding smoking cessation quit 1 episode will attempt another call at a later date. No answer/busy.

## 2017-08-22 ENCOUNTER — OFFICE VISIT (OUTPATIENT)
Dept: FAMILY MEDICINE | Facility: CLINIC | Age: 37
End: 2017-08-22
Payer: COMMERCIAL

## 2017-08-22 VITALS
BODY MASS INDEX: 29.19 KG/M2 | OXYGEN SATURATION: 98 % | SYSTOLIC BLOOD PRESSURE: 120 MMHG | HEIGHT: 62 IN | RESPIRATION RATE: 18 BRPM | TEMPERATURE: 99 F | DIASTOLIC BLOOD PRESSURE: 78 MMHG | WEIGHT: 158.63 LBS | HEART RATE: 89 BPM

## 2017-08-22 DIAGNOSIS — F32.81 PMDD (PREMENSTRUAL DYSPHORIC DISORDER): ICD-10-CM

## 2017-08-22 DIAGNOSIS — L70.9 ACNE, UNSPECIFIED ACNE TYPE: ICD-10-CM

## 2017-08-22 DIAGNOSIS — F39 MOOD DISORDER: Primary | ICD-10-CM

## 2017-08-22 PROCEDURE — 99214 OFFICE O/P EST MOD 30 MIN: CPT | Mod: S$GLB,,, | Performed by: INTERNAL MEDICINE

## 2017-08-22 PROCEDURE — 3074F SYST BP LT 130 MM HG: CPT | Mod: S$GLB,,, | Performed by: INTERNAL MEDICINE

## 2017-08-22 PROCEDURE — 3008F BODY MASS INDEX DOCD: CPT | Mod: S$GLB,,, | Performed by: INTERNAL MEDICINE

## 2017-08-22 PROCEDURE — 99999 PR PBB SHADOW E&M-EST. PATIENT-LVL III: CPT | Mod: PBBFAC,,, | Performed by: INTERNAL MEDICINE

## 2017-08-22 PROCEDURE — 3078F DIAST BP <80 MM HG: CPT | Mod: S$GLB,,, | Performed by: INTERNAL MEDICINE

## 2017-08-22 RX ORDER — OXYCODONE AND ACETAMINOPHEN 7.5; 325 MG/1; MG/1
TABLET ORAL
Refills: 0 | COMMUNITY
Start: 2017-07-11 | End: 2017-08-22

## 2017-08-22 RX ORDER — CLINDAMYCIN PHOSPHATE 10 MG/G
GEL TOPICAL 2 TIMES DAILY
Qty: 60 G | Refills: 2 | Status: SHIPPED | OUTPATIENT
Start: 2017-08-22 | End: 2018-01-02

## 2017-08-22 RX ORDER — VENLAFAXINE HYDROCHLORIDE 37.5 MG/1
37.5 CAPSULE, EXTENDED RELEASE ORAL DAILY
Qty: 30 CAPSULE | Refills: 1 | Status: SHIPPED | OUTPATIENT
Start: 2017-08-22 | End: 2017-09-18 | Stop reason: ALTCHOICE

## 2017-08-22 NOTE — PROGRESS NOTES
"SUBJECTIVE     Chief Complaint   Patient presents with    Depression    Medication Refill       HPI  Roslyn Brewster is a 37 y.o. female with multiple medical diagnoses as listed in the medical history and problem list that presents for evaluation of depression x 2 weeks. She recently had a father pass shortly followed by her dog. She feels flat/empty without any "go" to do anything. She has sleep disturbance, feelings of guilt, and decreased concentration/appetite. Denies any SI/HI.     PAST MEDICAL HISTORY:  Past Medical History:   Diagnosis Date    Acute encephalopathy 12/24/12    secondary to drug overdose    ARF (acute renal failure) 12/24/12    secondary to drug overdose    History of cardiac arrest 12/24/12    secondary to drug overdose    Hypertension     MRSA (methicillin resistant Staphylococcus aureus) 12/24/12    Nephritis     PMDD (premenstrual dysphoric disorder)     Polysubstance abuse 12/24/12    Systolic CHF, acute 12/24/12    secondary to drug overdose       PAST SURGICAL HISTORY:  Past Surgical History:   Procedure Laterality Date    CHOLECYSTECTOMY      TUBAL LIGATION         SOCIAL HISTORY:  Social History     Social History    Marital status: Single     Spouse name: N/A    Number of children: N/A    Years of education: N/A     Occupational History    Not on file.     Social History Main Topics    Smoking status: Current Every Day Smoker     Packs/day: 0.75     Years: 20.00    Smokeless tobacco: Not on file      Comment: 1/2 half pack/day.     Alcohol use No    Drug use: No      Comment: PCP, patient states she had not done drugs since 2012    Sexual activity: Yes     Partners: Male     Other Topics Concern    Not on file     Social History Narrative    No narrative on file       FAMILY HISTORY:  Family History   Problem Relation Age of Onset    Kidney disease Mother     Cancer Maternal Grandmother      throat    Cancer Maternal Grandfather      prostate    Kidney " disease Daughter     Breast cancer Neg Hx     Colon cancer Neg Hx     Ovarian cancer Neg Hx        ALLERGIES AND MEDICATIONS: updated and reviewed.  Review of patient's allergies indicates:   Allergen Reactions    Strawberry Anaphylaxis and Hives     Current Outpatient Prescriptions   Medication Sig Dispense Refill    alprazolam (XANAX) 1 MG tablet TAKE ONE TABLET EVERY EVENING AS NEEDED FOR ANXIETY *MAY CAUSE DROWSINESS* 25 tablet 2    diclofenac sodium 1 % Gel APPLY 2 GRAMS TO AFFECTED AREA BID  0    sumatriptan (IMITREX) 25 MG Tab   0    clindamycin phosphate 1% (CLINDAGEL) 1 % gel Apply topically 2 (two) times daily. 60 g 2    venlafaxine (EFFEXOR-XR) 37.5 MG 24 hr capsule Take 1 capsule (37.5 mg total) by mouth once daily. 30 capsule 1     No current facility-administered medications for this visit.        ROS  Review of Systems   Constitutional: Positive for activity change. Negative for chills, fever and unexpected weight change.   HENT: Negative for hearing loss, rhinorrhea, sore throat and trouble swallowing.    Eyes: Negative for discharge and visual disturbance.   Respiratory: Positive for chest tightness. Negative for cough, shortness of breath and wheezing.    Cardiovascular: Positive for palpitations. Negative for chest pain and leg swelling.   Gastrointestinal: Positive for diarrhea. Negative for abdominal pain, blood in stool, constipation, nausea and vomiting.   Endocrine: Negative for polydipsia and polyuria.   Genitourinary: Negative for difficulty urinating, dysuria, frequency, hematuria, menstrual problem and urgency.   Musculoskeletal: Negative for arthralgias, joint swelling, myalgias and neck pain.   Skin: Negative for rash and wound.   Neurological: Positive for weakness. Negative for headaches.   Psychiatric/Behavioral: Positive for dysphoric mood and sleep disturbance. Negative for confusion, self-injury and suicidal ideas.         OBJECTIVE     Physical Exam  Vitals:    08/22/17  "1205   BP: 120/78   Pulse: 89   Resp: 18   Temp: 99.2 °F (37.3 °C)    Body mass index is 29.02 kg/m².  Weight: 72 kg (158 lb 10.3 oz)   Height: 5' 2" (157.5 cm)     Physical Exam   Constitutional: She is oriented to person, place, and time. She appears well-developed and well-nourished. No distress.   HENT:   Head: Normocephalic and atraumatic.   Right Ear: External ear normal.   Left Ear: External ear normal.   Nose: Nose normal.   Mouth/Throat: Oropharynx is clear and moist.   Eyes: Conjunctivae and EOM are normal. Right eye exhibits no discharge. Left eye exhibits no discharge. No scleral icterus.   Neck: Normal range of motion. Neck supple. No JVD present. No tracheal deviation present.   Pulmonary/Chest: Effort normal. No respiratory distress.   Musculoskeletal: Normal range of motion. She exhibits no deformity.   Neurological: She is alert and oriented to person, place, and time. She exhibits normal muscle tone. Coordination normal.   Skin: Skin is warm and dry. No rash noted. No erythema.   Psychiatric: She has a normal mood and affect. Her behavior is normal. Judgment and thought content normal.         Health Maintenance       Date Due Completion Date    TETANUS VACCINE 06/28/1997 ---    Pneumococcal PPSV23 (Medium Risk) (1) 06/28/1997 ---    Pap Smear 08/06/2016 8/6/2015    Influenza Vaccine 10/01/2017 (Originally 8/1/2017) 11/23/2015 (Declined)    Override on 11/23/2015: Declined            ASSESSMENT     37 y.o. female with     1. Mood disorder    2. PMDD (premenstrual dysphoric disorder)    3. Acne, unspecified acne type        PLAN:     1. Mood disorder  - Pt has tried and failed therapy with Celexa and Seroquel in the past  - Will start trial course of Venlafaxine as below; advised on adverse side effects and need to taper to prevent withdrawal  - venlafaxine (EFFEXOR-XR) 37.5 MG 24 hr capsule; Take 1 capsule (37.5 mg total) by mouth once daily.  Dispense: 30 capsule; Refill: 1    2. PMDD " (premenstrual dysphoric disorder)  - Follicle stimulating hormone; Future  - Luteinizing hormone; Future  - Estradiol; Future  - venlafaxine (EFFEXOR-XR) 37.5 MG 24 hr capsule; Take 1 capsule (37.5 mg total) by mouth once daily.  Dispense: 30 capsule; Refill: 1  - ESTROGENS, TOTAL; Future    3. Acne, unspecified acne type  - Stable; no acute issues  - The current medical regimen is effective;  continue present plan and medications.  - clindamycin phosphate 1% (CLINDAGEL) 1 % gel; Apply topically 2 (two) times daily.  Dispense: 60 g; Refill: 2      RTC in 4 weeks for repeat assessment of current treatment plan       Spring Montilla MD  08/22/2017 12:19 PM        No Follow-up on file.

## 2017-09-18 ENCOUNTER — PATIENT MESSAGE (OUTPATIENT)
Dept: FAMILY MEDICINE | Facility: CLINIC | Age: 37
End: 2017-09-18

## 2017-09-18 DIAGNOSIS — F39 MOOD DISORDER: ICD-10-CM

## 2017-09-18 DIAGNOSIS — F41.9 ANXIETY: Primary | ICD-10-CM

## 2017-09-18 RX ORDER — ESCITALOPRAM OXALATE 10 MG/1
10 TABLET ORAL DAILY
Qty: 30 TABLET | Refills: 1 | Status: SHIPPED | OUTPATIENT
Start: 2017-09-18 | End: 2017-09-21 | Stop reason: ALTCHOICE

## 2017-09-21 ENCOUNTER — OFFICE VISIT (OUTPATIENT)
Dept: FAMILY MEDICINE | Facility: CLINIC | Age: 37
End: 2017-09-21
Payer: COMMERCIAL

## 2017-09-21 VITALS
SYSTOLIC BLOOD PRESSURE: 116 MMHG | TEMPERATURE: 99 F | DIASTOLIC BLOOD PRESSURE: 76 MMHG | HEIGHT: 63 IN | HEART RATE: 116 BPM | BODY MASS INDEX: 28.13 KG/M2 | OXYGEN SATURATION: 100 % | WEIGHT: 158.75 LBS

## 2017-09-21 DIAGNOSIS — R00.0 TACHYCARDIA: ICD-10-CM

## 2017-09-21 DIAGNOSIS — F41.9 ANXIETY: ICD-10-CM

## 2017-09-21 DIAGNOSIS — F39 MOOD DISORDER: Primary | ICD-10-CM

## 2017-09-21 PROCEDURE — 3078F DIAST BP <80 MM HG: CPT | Mod: S$GLB,,, | Performed by: FAMILY MEDICINE

## 2017-09-21 PROCEDURE — 99999 PR PBB SHADOW E&M-EST. PATIENT-LVL III: CPT | Mod: PBBFAC,,, | Performed by: FAMILY MEDICINE

## 2017-09-21 PROCEDURE — 93005 ELECTROCARDIOGRAM TRACING: CPT | Mod: S$GLB,,, | Performed by: FAMILY MEDICINE

## 2017-09-21 PROCEDURE — 99214 OFFICE O/P EST MOD 30 MIN: CPT | Mod: S$GLB,,, | Performed by: FAMILY MEDICINE

## 2017-09-21 PROCEDURE — 3074F SYST BP LT 130 MM HG: CPT | Mod: S$GLB,,, | Performed by: FAMILY MEDICINE

## 2017-09-21 PROCEDURE — 93010 ELECTROCARDIOGRAM REPORT: CPT | Mod: S$GLB,,, | Performed by: INTERNAL MEDICINE

## 2017-09-21 PROCEDURE — 3008F BODY MASS INDEX DOCD: CPT | Mod: S$GLB,,, | Performed by: FAMILY MEDICINE

## 2017-09-21 RX ORDER — PROPRANOLOL HYDROCHLORIDE 40 MG/1
40 TABLET ORAL 2 TIMES DAILY
Qty: 60 TABLET | Refills: 11 | Status: SHIPPED | OUTPATIENT
Start: 2017-09-21 | End: 2017-11-29

## 2017-09-21 RX ORDER — ATOMOXETINE 40 MG/1
40 CAPSULE ORAL DAILY
Qty: 30 CAPSULE | Refills: 11 | Status: SHIPPED | OUTPATIENT
Start: 2017-09-21 | End: 2017-09-28 | Stop reason: SDUPTHER

## 2017-09-21 NOTE — PROGRESS NOTES
Chief Complaint   Patient presents with    Medication Refill       SUBJECTIVE:  Roslyn Brewster is a 37 y.o. female here for new problem of anxiety and concentration issues and she has PMDD that is improved, and she does smoke.  She is still dealing with tachycardia, she doesn't have any trouble with her urine today she states, no chest pain, but does have palpitations noted, no leg swelling.  Currently has co-morbidities including per problem list.      Past Medical History:   Diagnosis Date    Acute encephalopathy 12/24/12    secondary to drug overdose    ARF (acute renal failure) 12/24/12    secondary to drug overdose    History of cardiac arrest 12/24/12    secondary to drug overdose    Hypertension     MRSA (methicillin resistant Staphylococcus aureus) 12/24/12    Nephritis     PMDD (premenstrual dysphoric disorder)     Polysubstance abuse 12/24/12    Systolic CHF, acute 12/24/12    secondary to drug overdose     Past Surgical History:   Procedure Laterality Date    CHOLECYSTECTOMY      TUBAL LIGATION       Social History     Social History    Marital status: Single     Spouse name: N/A    Number of children: N/A    Years of education: N/A     Occupational History    Not on file.     Social History Main Topics    Smoking status: Current Every Day Smoker     Packs/day: 0.75     Years: 20.00    Smokeless tobacco: Never Used      Comment: 1/2 half pack/day.     Alcohol use No    Drug use: No      Comment: PCP, patient states she had not done drugs since 2012    Sexual activity: Yes     Partners: Male     Other Topics Concern    Not on file     Social History Narrative    No narrative on file     Family History   Problem Relation Age of Onset    Kidney disease Mother     Cancer Maternal Grandmother      throat    Cancer Maternal Grandfather      prostate    Kidney disease Daughter     Breast cancer Neg Hx     Colon cancer Neg Hx     Ovarian cancer Neg Hx      Current Outpatient  "Prescriptions on File Prior to Visit   Medication Sig Dispense Refill    alprazolam (XANAX) 1 MG tablet TAKE ONE TABLET EVERY EVENING AS NEEDED FOR ANXIETY *MAY CAUSE DROWSINESS* 25 tablet 2    clindamycin phosphate 1% (CLINDAGEL) 1 % gel Apply topically 2 (two) times daily. 60 g 2    diclofenac sodium 1 % Gel APPLY 2 GRAMS TO AFFECTED AREA BID  0    sumatriptan (IMITREX) 25 MG Tab   0    [DISCONTINUED] escitalopram oxalate (LEXAPRO) 10 MG tablet Take 1 tablet (10 mg total) by mouth once daily. 30 tablet 1     No current facility-administered medications on file prior to visit.      Review of patient's allergies indicates:   Allergen Reactions    Strawberry Anaphylaxis and Hives         Review of Systems   HENT: Negative for hearing loss.    Eyes: Negative for discharge.   Respiratory: Negative for wheezing.    Cardiovascular: Positive for chest pain and palpitations.   Gastrointestinal: Positive for constipation. Negative for blood in stool, diarrhea and vomiting.   Genitourinary: Negative for dysuria and hematuria.   Musculoskeletal: Negative for neck pain.   Neurological: Positive for headaches. Negative for weakness.   Endo/Heme/Allergies: Negative for polydipsia.       OBJECTIVE:  /76   Pulse (!) 116   Temp 98.8 °F (37.1 °C) (Oral)   Ht 5' 3" (1.6 m)   Wt 72 kg (158 lb 11.7 oz)   LMP 08/18/2017   SpO2 100%   BMI 28.12 kg/m²     Wt Readings from Last 3 Encounters:   09/21/17 72 kg (158 lb 11.7 oz)   08/22/17 72 kg (158 lb 10.3 oz)   07/10/17 73.3 kg (161 lb 9.6 oz)     BP Readings from Last 3 Encounters:   09/21/17 116/76   08/22/17 120/78   07/10/17 90/60       She appears well, in no apparent distress.  Alert and oriented times three, pleasant and cooperative. Vital signs are as documented in vital signs section.  Tachycardic, no murmurs noted  Lungs clear  Legs without swelling        Review of old Records:  Reviewed per [facility]    Review of old labs:      Review of old " imaging:      ASSESSMENT:  Problem List Items Addressed This Visit     Mood disorder - Primary    Relevant Medications    atomoxetine (STRATTERA) 40 MG capsule    Anxiety    Relevant Medications    atomoxetine (STRATTERA) 40 MG capsule    propranolol (INDERAL) 40 MG tablet      Other Visit Diagnoses     Tachycardia        Relevant Medications    propranolol (INDERAL) 40 MG tablet    Other Relevant Orders    POCT urinalysis, dipstick or tablet reag    Urinalysis          ICD-10-CM ICD-9-CM   1. Mood disorder F39 296.90   2. Anxiety F41.9 300.00   3. Tachycardia R00.0 785.0         PLAN:  1. Mood disorder  Potential medication side effects were discussed with the patient; let me know if any occur.  Get PA if necessary can help with mood and concentration issues  - atomoxetine (STRATTERA) 40 MG capsule; Take 1 capsule (40 mg total) by mouth once daily.  Dispense: 30 capsule; Refill: 11    2. Anxiety  See above  - atomoxetine (STRATTERA) 40 MG capsule; Take 1 capsule (40 mg total) by mouth once daily.  Dispense: 30 capsule; Refill: 11  - propranolol (INDERAL) 40 MG tablet; Take 1 tablet (40 mg total) by mouth 2 (two) times daily.  Dispense: 60 tablet; Refill: 11    3. Tachycardia  Start inderal for the HR  POCT urinalysis is negative.  - POCT urinalysis, dipstick or tablet reag  - Urinalysis; Future  - propranolol (INDERAL) 40 MG tablet; Take 1 tablet (40 mg total) by mouth 2 (two) times daily.  Dispense: 60 tablet; Refill: 11      Medication List with Changes/Refills   New Medications    ATOMOXETINE (STRATTERA) 40 MG CAPSULE    Take 1 capsule (40 mg total) by mouth once daily.    PROPRANOLOL (INDERAL) 40 MG TABLET    Take 1 tablet (40 mg total) by mouth 2 (two) times daily.   Current Medications    ALPRAZOLAM (XANAX) 1 MG TABLET    TAKE ONE TABLET EVERY EVENING AS NEEDED FOR ANXIETY *MAY CAUSE DROWSINESS*    CLINDAMYCIN PHOSPHATE 1% (CLINDAGEL) 1 % GEL    Apply topically 2 (two) times daily.    DICLOFENAC SODIUM 1 %  GEL    APPLY 2 GRAMS TO AFFECTED AREA BID    SUMATRIPTAN (IMITREX) 25 MG TAB       Discontinued Medications    ESCITALOPRAM OXALATE (LEXAPRO) 10 MG TABLET    Take 1 tablet (10 mg total) by mouth once daily.       No Follow-up on file.  Answers for HPI/ROS submitted by the patient on 9/20/2017   activity change: Yes  unexpected weight change: No  rhinorrhea: No  trouble swallowing: No  visual disturbance: No  chest tightness: Yes  polyuria: No  difficulty urinating: Yes  menstrual problem: No  joint swelling: No  confusion: No  dysphoric mood: Yes

## 2017-09-22 ENCOUNTER — LAB VISIT (OUTPATIENT)
Dept: LAB | Facility: HOSPITAL | Age: 37
End: 2017-09-22
Attending: FAMILY MEDICINE
Payer: COMMERCIAL

## 2017-09-22 DIAGNOSIS — R00.0 TACHYCARDIA: ICD-10-CM

## 2017-09-22 LAB
AMORPH CRY URNS QL MICRO: NORMAL
BACTERIA #/AREA URNS HPF: NORMAL /HPF
BILIRUB UR QL STRIP: NEGATIVE
CAOX CRY URNS QL MICRO: NORMAL
CLARITY UR: CLEAR
COLOR UR: YELLOW
GLUCOSE UR QL STRIP: NEGATIVE
HGB UR QL STRIP: ABNORMAL
KETONES UR QL STRIP: NEGATIVE
LEUKOCYTE ESTERASE UR QL STRIP: NEGATIVE
MICROSCOPIC COMMENT: NORMAL
NITRITE UR QL STRIP: NEGATIVE
PH UR STRIP: 5 [PH] (ref 5–8)
PROT UR QL STRIP: NEGATIVE
RBC #/AREA URNS HPF: 1 /HPF (ref 0–4)
SP GR UR STRIP: 1.01 (ref 1–1.03)
SQUAMOUS #/AREA URNS HPF: 3 /HPF
URN SPEC COLLECT METH UR: ABNORMAL
UROBILINOGEN UR STRIP-ACNC: NEGATIVE EU/DL
WBC #/AREA URNS HPF: 1 /HPF (ref 0–5)

## 2017-09-22 PROCEDURE — 81000 URINALYSIS NONAUTO W/SCOPE: CPT

## 2017-09-27 ENCOUNTER — PATIENT MESSAGE (OUTPATIENT)
Dept: FAMILY MEDICINE | Facility: CLINIC | Age: 37
End: 2017-09-27

## 2017-09-27 DIAGNOSIS — F39 MOOD DISORDER: ICD-10-CM

## 2017-09-27 DIAGNOSIS — F41.9 ANXIETY: ICD-10-CM

## 2017-09-28 RX ORDER — ATOMOXETINE 40 MG/1
40 CAPSULE ORAL DAILY
Qty: 30 CAPSULE | Refills: 11 | Status: SHIPPED | OUTPATIENT
Start: 2017-09-28 | End: 2018-01-02

## 2017-10-08 ENCOUNTER — HOSPITAL ENCOUNTER (EMERGENCY)
Facility: HOSPITAL | Age: 37
Discharge: HOME | End: 2017-10-08
Attending: EMERGENCY MEDICINE
Payer: COMMERCIAL

## 2017-10-08 VITALS
TEMPERATURE: 98 F | HEIGHT: 62 IN | WEIGHT: 153 LBS | BODY MASS INDEX: 28.16 KG/M2 | OXYGEN SATURATION: 97 % | DIASTOLIC BLOOD PRESSURE: 70 MMHG | SYSTOLIC BLOOD PRESSURE: 115 MMHG | RESPIRATION RATE: 20 BRPM | HEART RATE: 88 BPM

## 2017-10-08 DIAGNOSIS — N12 PYELONEPHRITIS: Primary | ICD-10-CM

## 2017-10-08 DIAGNOSIS — R10.9 FLANK PAIN: ICD-10-CM

## 2017-10-08 LAB
ALBUMIN SERPL BCP-MCNC: 4.1 G/DL
ALP SERPL-CCNC: 67 U/L
ALT SERPL W/O P-5'-P-CCNC: 36 U/L
ANION GAP SERPL CALC-SCNC: 10 MMOL/L
AST SERPL-CCNC: 22 U/L
B-HCG UR QL: NEGATIVE
BACTERIA #/AREA URNS HPF: ABNORMAL /HPF
BASOPHILS # BLD AUTO: 0.01 K/UL
BASOPHILS NFR BLD: 0.1 %
BILIRUB SERPL-MCNC: 0.8 MG/DL
BILIRUB UR QL STRIP: NEGATIVE
BUN SERPL-MCNC: 9 MG/DL
CALCIUM SERPL-MCNC: 9.4 MG/DL
CHLORIDE SERPL-SCNC: 103 MMOL/L
CLARITY UR: ABNORMAL
CO2 SERPL-SCNC: 26 MMOL/L
COLOR UR: YELLOW
CREAT SERPL-MCNC: 0.8 MG/DL
CTP QC/QA: YES
DIFFERENTIAL METHOD: ABNORMAL
EOSINOPHIL # BLD AUTO: 0.1 K/UL
EOSINOPHIL NFR BLD: 1.6 %
ERYTHROCYTE [DISTWIDTH] IN BLOOD BY AUTOMATED COUNT: 12.3 %
EST. GFR  (AFRICAN AMERICAN): >60 ML/MIN/1.73 M^2
EST. GFR  (NON AFRICAN AMERICAN): >60 ML/MIN/1.73 M^2
GLUCOSE SERPL-MCNC: 104 MG/DL
GLUCOSE UR QL STRIP: NEGATIVE
HCT VFR BLD AUTO: 42.9 %
HGB BLD-MCNC: 15.3 G/DL
HGB UR QL STRIP: ABNORMAL
KETONES UR QL STRIP: NEGATIVE
LEUKOCYTE ESTERASE UR QL STRIP: ABNORMAL
LIPASE SERPL-CCNC: 9 U/L
LYMPHOCYTES # BLD AUTO: 2 K/UL
LYMPHOCYTES NFR BLD: 22.3 %
MCH RBC QN AUTO: 31.4 PG
MCHC RBC AUTO-ENTMCNC: 35.7 G/DL
MCV RBC AUTO: 88 FL
MICROSCOPIC COMMENT: ABNORMAL
MONOCYTES # BLD AUTO: 1 K/UL
MONOCYTES NFR BLD: 11.3 %
NEUTROPHILS # BLD AUTO: 5.7 K/UL
NEUTROPHILS NFR BLD: 64.7 %
NITRITE UR QL STRIP: NEGATIVE
PH UR STRIP: 5 [PH] (ref 5–8)
PLATELET # BLD AUTO: 240 K/UL
PMV BLD AUTO: 9.9 FL
POTASSIUM SERPL-SCNC: 3.6 MMOL/L
PROT SERPL-MCNC: 7.5 G/DL
PROT UR QL STRIP: NEGATIVE
RBC # BLD AUTO: 4.87 M/UL
RBC #/AREA URNS HPF: 4 /HPF (ref 0–4)
SODIUM SERPL-SCNC: 139 MMOL/L
SP GR UR STRIP: 1.02 (ref 1–1.03)
SQUAMOUS #/AREA URNS HPF: 30 /HPF
URN SPEC COLLECT METH UR: ABNORMAL
UROBILINOGEN UR STRIP-ACNC: NEGATIVE EU/DL
WBC # BLD AUTO: 8.78 K/UL
WBC #/AREA URNS HPF: 5 /HPF (ref 0–5)

## 2017-10-08 PROCEDURE — 96375 TX/PRO/DX INJ NEW DRUG ADDON: CPT

## 2017-10-08 PROCEDURE — 81000 URINALYSIS NONAUTO W/SCOPE: CPT

## 2017-10-08 PROCEDURE — 87086 URINE CULTURE/COLONY COUNT: CPT

## 2017-10-08 PROCEDURE — 99284 EMERGENCY DEPT VISIT MOD MDM: CPT | Mod: 25

## 2017-10-08 PROCEDURE — 63600175 PHARM REV CODE 636 W HCPCS: Performed by: NURSE PRACTITIONER

## 2017-10-08 PROCEDURE — 80053 COMPREHEN METABOLIC PANEL: CPT

## 2017-10-08 PROCEDURE — 85025 COMPLETE CBC W/AUTO DIFF WBC: CPT

## 2017-10-08 PROCEDURE — 96365 THER/PROPH/DIAG IV INF INIT: CPT

## 2017-10-08 PROCEDURE — 83690 ASSAY OF LIPASE: CPT

## 2017-10-08 PROCEDURE — 96361 HYDRATE IV INFUSION ADD-ON: CPT

## 2017-10-08 PROCEDURE — 25000003 PHARM REV CODE 250: Performed by: NURSE PRACTITIONER

## 2017-10-08 PROCEDURE — 81025 URINE PREGNANCY TEST: CPT | Performed by: EMERGENCY MEDICINE

## 2017-10-08 RX ORDER — DICYCLOMINE HYDROCHLORIDE 10 MG/1
20 CAPSULE ORAL
Status: COMPLETED | OUTPATIENT
Start: 2017-10-08 | End: 2017-10-08

## 2017-10-08 RX ORDER — KETOROLAC TROMETHAMINE 30 MG/ML
30 INJECTION, SOLUTION INTRAMUSCULAR; INTRAVENOUS
Status: DISCONTINUED | OUTPATIENT
Start: 2017-10-08 | End: 2017-10-08 | Stop reason: HOSPADM

## 2017-10-08 RX ORDER — PHENAZOPYRIDINE HYDROCHLORIDE 200 MG/1
200 TABLET, FILM COATED ORAL 3 TIMES DAILY
Qty: 6 TABLET | Refills: 0 | Status: SHIPPED | OUTPATIENT
Start: 2017-10-08 | End: 2017-10-18

## 2017-10-08 RX ORDER — CEPHALEXIN 500 MG/1
500 CAPSULE ORAL EVERY 8 HOURS
Qty: 21 CAPSULE | Refills: 0 | Status: SHIPPED | OUTPATIENT
Start: 2017-10-08 | End: 2017-10-13

## 2017-10-08 RX ORDER — DICYCLOMINE HYDROCHLORIDE 20 MG/1
20 TABLET ORAL 2 TIMES DAILY
Qty: 20 TABLET | Refills: 0 | Status: SHIPPED | OUTPATIENT
Start: 2017-10-08 | End: 2017-11-07

## 2017-10-08 RX ORDER — ONDANSETRON 2 MG/ML
4 INJECTION INTRAMUSCULAR; INTRAVENOUS
Status: COMPLETED | OUTPATIENT
Start: 2017-10-08 | End: 2017-10-08

## 2017-10-08 RX ADMIN — SODIUM CHLORIDE 1000 ML: 0.9 INJECTION, SOLUTION INTRAVENOUS at 12:10

## 2017-10-08 RX ADMIN — ONDANSETRON 4 MG: 2 INJECTION INTRAMUSCULAR; INTRAVENOUS at 12:10

## 2017-10-08 RX ADMIN — CEFTRIAXONE 2 G: 2 INJECTION, SOLUTION INTRAVENOUS at 01:10

## 2017-10-08 RX ADMIN — DICYCLOMINE HYDROCHLORIDE 20 MG: 10 CAPSULE ORAL at 01:10

## 2017-10-08 NOTE — ED PROVIDER NOTES
"Encounter Date: 10/8/2017    SCRIBE #1 NOTE: I, Mj Fuchs, am scribing for, and in the presence of,  Margarita Auguts NP. I have scribed the following portions of the note - Other sections scribed: HPI and ROS.       History     Chief Complaint   Patient presents with    Diarrhea     diarrhea, right side abd/flank pain, "stabbing pain when I eat;" symptoms since Friday; denies pregnancy; reports takes propranolol daily for HR; denies dysuria    Flank Pain    Abdominal Pain     CC: Diarrhea, Flank pain and Abdominal pain     HPI: This 37 y.o F with acute encephalopathy; ARF; HTN; MRSA; Nephritis; Polysubstance abuse; and Systolic CHF, acute and PMHx of cardiac arrest presents to the ED c/o acute onset of constant and moderate (7/10) diarrhea described as "yellow and watery" with associated RLQ abdominal pain and R flank pain x2 days. The pt also reports nausea and subjective fever (100.0 F). The pt describes her abdominal pain as a "sharp pain" and her R flank pain as a "stabbing pain." The pt adds, "I get pyelonephritis a lot and I don't know why." The pt denies emesis, diaphoresis, chills, dysuria and hematuria. No prior tx.       The history is provided by the patient. No  was used.     Review of patient's allergies indicates:   Allergen Reactions    Strawberry Anaphylaxis and Hives     Past Medical History:   Diagnosis Date    Acute encephalopathy 12/24/12    secondary to drug overdose    ARF (acute renal failure) 12/24/12    secondary to drug overdose    History of cardiac arrest 12/24/12    secondary to drug overdose    Hypertension     MRSA (methicillin resistant Staphylococcus aureus) 12/24/12    Nephritis     PMDD (premenstrual dysphoric disorder)     Polysubstance abuse 12/24/12    Systolic CHF, acute 12/24/12    secondary to drug overdose     Past Surgical History:   Procedure Laterality Date    CHOLECYSTECTOMY      TUBAL LIGATION       Family History   Problem " Relation Age of Onset    Kidney disease Mother     Cancer Maternal Grandmother      throat    Cancer Maternal Grandfather      prostate    Kidney disease Daughter     Breast cancer Neg Hx     Colon cancer Neg Hx     Ovarian cancer Neg Hx      Social History   Substance Use Topics    Smoking status: Former Smoker     Packs/day: 0.75     Years: 20.00    Smokeless tobacco: Never Used      Comment: 1/2 half pack/day.     Alcohol use No     Review of Systems   Constitutional: Positive for fever (subjective, 100.0F). Negative for chills and diaphoresis.   HENT: Negative for rhinorrhea and sore throat.    Eyes: Negative for redness.   Respiratory: Negative for cough and shortness of breath.    Cardiovascular: Negative for chest pain.   Gastrointestinal: Positive for diarrhea and nausea. Negative for abdominal pain and vomiting.   Genitourinary: Positive for flank pain (R). Negative for dysuria, frequency, hematuria and urgency.   Musculoskeletal: Negative for back pain and neck pain.   Skin: Negative for rash.   Psychiatric/Behavioral: The patient is not nervous/anxious.        Physical Exam     Initial Vitals [10/08/17 1024]   BP Pulse Resp Temp SpO2   106/67 102 20 99.1 °F (37.3 °C) 98 %      MAP       80         Physical Exam    Nursing note and vitals reviewed.  Constitutional: She appears well-developed and well-nourished.   HENT:   Head: Normocephalic.   Mouth/Throat: Oropharynx is clear and moist.   Eyes: Conjunctivae are normal.   Neck: Normal range of motion. Neck supple.   Cardiovascular: Normal rate, regular rhythm and normal heart sounds.   Pulmonary/Chest: Breath sounds normal.   Abdominal: Soft. She exhibits no distension. There is tenderness. There is rebound.   Hyperactive bowel sounds  Tenderness over McBurney's point  Abdomen is soft   Musculoskeletal: Normal range of motion.   Neurological: She is alert and oriented to person, place, and time.   Skin: Skin is warm and dry. Capillary refill  takes less than 2 seconds.         ED Course   Procedures  Labs Reviewed   CBC W/ AUTO DIFFERENTIAL - Abnormal; Notable for the following:        Result Value    MCH 31.4 (*)     All other components within normal limits   URINALYSIS - Abnormal; Notable for the following:     Appearance, UA Cloudy (*)     Occult Blood UA 1+ (*)     Leukocytes, UA Trace (*)     All other components within normal limits   URINALYSIS MICROSCOPIC - Abnormal; Notable for the following:     Bacteria, UA Many (*)     All other components within normal limits   CULTURE, URINE   COMPREHENSIVE METABOLIC PANEL   LIPASE   POCT URINE PREGNANCY             Medical Decision Making:   Initial Assessment:   37-year-old female presents with diarrhea and flank pain.  Differential Diagnosis:   Renal colic  Pyelonephritis  Gastroenteritis    ED Management:  Diagnosis management comments: This is an urgent evaluation of a 37-year-old female that presented to the ER with c/o diarrhea and right flank pain . Pts exam was as above.     Labs and CT were reviewed and discussed with pt.     Based on exam today - I have low suspicion for medical, surgical or other life threatening condition.  I sent a urine culture and will treat patient with 2 g of Rocephin and discharge her with Keflex based on her history of pyelonephritis.  I believe pt is safe for discharge and outpatient f/u with her urologist Dr. sam    Pt, and , verbalizes understanding of d/c instructions and will return for worsening condition.    Case discussed with attending who agrees with assessment and plan.               Scribe Attestation:   Scribe #1: I performed the above scribed service and the documentation accurately describes the services I performed. I attest to the accuracy of the note.    Attending Attestation:     Physician Attestation Statement for NP/PA:   I discussed this assessment and plan of this patient with the NP/PA, but I did not personally examine the patient. The  face to face encounter was performed by the NP/PA.        Physician Attestation for Scribe:  Physician Attestation Statement for Scribe #1: I, Margarita August NP, reviewed documentation, as scribed by Mj Fuchs in my presence, and it is both accurate and complete.                 ED Course      Clinical Impression:   The primary encounter diagnosis was Pyelonephritis. A diagnosis of Flank pain was also pertinent to this visit.    Disposition:   Disposition: Discharged  Condition: Stable                        Margarita August NP  10/08/17 5085       Eben Kennedy MD  10/10/17 3499

## 2017-10-08 NOTE — ED NOTES
Pt refused the Toradol. States the last time she had it she became nauseated with chills and a headache. Added to allergy list

## 2017-10-10 LAB — BACTERIA UR CULT: NORMAL

## 2017-11-16 ENCOUNTER — OFFICE VISIT (OUTPATIENT)
Dept: FAMILY MEDICINE | Facility: CLINIC | Age: 37
End: 2017-11-16
Payer: COMMERCIAL

## 2017-11-16 VITALS
HEART RATE: 104 BPM | DIASTOLIC BLOOD PRESSURE: 80 MMHG | TEMPERATURE: 99 F | BODY MASS INDEX: 28.82 KG/M2 | HEIGHT: 63 IN | SYSTOLIC BLOOD PRESSURE: 120 MMHG | OXYGEN SATURATION: 98 % | WEIGHT: 162.69 LBS

## 2017-11-16 DIAGNOSIS — N30.90 CYSTITIS: Primary | ICD-10-CM

## 2017-11-16 LAB
BACTERIA #/AREA URNS HPF: ABNORMAL /HPF
MICROSCOPIC COMMENT: ABNORMAL
RBC #/AREA URNS HPF: 2 /HPF (ref 0–4)
WBC #/AREA URNS HPF: 2 /HPF (ref 0–5)

## 2017-11-16 PROCEDURE — 87186 SC STD MICRODIL/AGAR DIL: CPT

## 2017-11-16 PROCEDURE — 87086 URINE CULTURE/COLONY COUNT: CPT

## 2017-11-16 PROCEDURE — 99214 OFFICE O/P EST MOD 30 MIN: CPT | Mod: S$GLB,,, | Performed by: NURSE PRACTITIONER

## 2017-11-16 PROCEDURE — 87077 CULTURE AEROBIC IDENTIFY: CPT

## 2017-11-16 PROCEDURE — 87088 URINE BACTERIA CULTURE: CPT

## 2017-11-16 PROCEDURE — 81002 URINALYSIS NONAUTO W/O SCOPE: CPT | Mod: S$GLB,,, | Performed by: NURSE PRACTITIONER

## 2017-11-16 PROCEDURE — 99999 PR PBB SHADOW E&M-EST. PATIENT-LVL III: CPT | Mod: PBBFAC,,, | Performed by: NURSE PRACTITIONER

## 2017-11-16 PROCEDURE — 81000 URINALYSIS NONAUTO W/SCOPE: CPT

## 2017-11-16 RX ORDER — OXYCODONE HYDROCHLORIDE 5 MG/1
TABLET ORAL
Refills: 0 | COMMUNITY
Start: 2017-11-10 | End: 2017-11-16

## 2017-11-16 RX ORDER — CIPROFLOXACIN 500 MG/1
500 TABLET ORAL EVERY 12 HOURS
Qty: 28 TABLET | Refills: 0 | Status: SHIPPED | OUTPATIENT
Start: 2017-11-16 | End: 2017-11-30

## 2017-11-16 RX ORDER — TRIAMCINOLONE ACETONIDE 1 MG/G
CREAM TOPICAL
Refills: 0 | COMMUNITY
Start: 2017-10-03 | End: 2017-11-16

## 2017-11-16 RX ORDER — TIZANIDINE 4 MG/1
TABLET ORAL
Refills: 0 | COMMUNITY
Start: 2017-11-10 | End: 2017-11-16

## 2017-11-16 RX ORDER — OXYCODONE AND ACETAMINOPHEN 5; 325 MG/1; MG/1
TABLET ORAL
Refills: 0 | COMMUNITY
Start: 2017-08-21 | End: 2017-11-16

## 2017-11-16 RX ORDER — OXYCODONE AND ACETAMINOPHEN 7.5; 325 MG/1; MG/1
TABLET ORAL
Refills: 0 | COMMUNITY
Start: 2017-10-03 | End: 2017-11-16

## 2017-11-16 RX ORDER — DOXYCYCLINE 100 MG/1
100 CAPSULE ORAL DAILY
Refills: 3 | COMMUNITY
Start: 2017-09-15 | End: 2017-11-16

## 2017-11-17 ENCOUNTER — PATIENT MESSAGE (OUTPATIENT)
Dept: FAMILY MEDICINE | Facility: CLINIC | Age: 37
End: 2017-11-17

## 2017-11-17 NOTE — TELEPHONE ENCOUNTER
Spoke to pt, add keflex 500 mg bid to regimen, awaiting urine culture, ER for workup if not responding to abx over the weekend, hydration, pt verbalize understanding

## 2017-11-18 ENCOUNTER — HOSPITAL ENCOUNTER (EMERGENCY)
Facility: HOSPITAL | Age: 37
Discharge: HOME | End: 2017-11-18
Attending: EMERGENCY MEDICINE
Payer: COMMERCIAL

## 2017-11-18 VITALS
BODY MASS INDEX: 28.16 KG/M2 | TEMPERATURE: 98 F | DIASTOLIC BLOOD PRESSURE: 68 MMHG | RESPIRATION RATE: 18 BRPM | WEIGHT: 153 LBS | HEIGHT: 62 IN | HEART RATE: 87 BPM | SYSTOLIC BLOOD PRESSURE: 104 MMHG | OXYGEN SATURATION: 100 %

## 2017-11-18 DIAGNOSIS — N10 PYELONEPHRITIS, ACUTE: Primary | ICD-10-CM

## 2017-11-18 DIAGNOSIS — M54.50 ACUTE RIGHT-SIDED LOW BACK PAIN WITHOUT SCIATICA: ICD-10-CM

## 2017-11-18 DIAGNOSIS — R10.9 RIGHT SIDED ABDOMINAL PAIN: ICD-10-CM

## 2017-11-18 DIAGNOSIS — R30.0 DYSURIA: ICD-10-CM

## 2017-11-18 LAB
ALBUMIN SERPL BCP-MCNC: 4 G/DL
ALP SERPL-CCNC: 65 U/L
ALT SERPL W/O P-5'-P-CCNC: 25 U/L
ANION GAP SERPL CALC-SCNC: 12 MMOL/L
AST SERPL-CCNC: 18 U/L
B-HCG UR QL: NEGATIVE
BACTERIA #/AREA URNS HPF: ABNORMAL /HPF
BACTERIA UR CULT: NORMAL
BASOPHILS # BLD AUTO: 0.01 K/UL
BASOPHILS NFR BLD: 0.1 %
BILIRUB SERPL-MCNC: 0.3 MG/DL
BILIRUB UR QL STRIP: NEGATIVE
BUN SERPL-MCNC: 14 MG/DL
CALCIUM SERPL-MCNC: 9.1 MG/DL
CHLORIDE SERPL-SCNC: 103 MMOL/L
CLARITY UR: ABNORMAL
CO2 SERPL-SCNC: 26 MMOL/L
COLOR UR: YELLOW
CREAT SERPL-MCNC: 0.8 MG/DL
CTP QC/QA: YES
DIFFERENTIAL METHOD: ABNORMAL
EOSINOPHIL # BLD AUTO: 0.2 K/UL
EOSINOPHIL NFR BLD: 2 %
ERYTHROCYTE [DISTWIDTH] IN BLOOD BY AUTOMATED COUNT: 12.5 %
EST. GFR  (AFRICAN AMERICAN): >60 ML/MIN/1.73 M^2
EST. GFR  (NON AFRICAN AMERICAN): >60 ML/MIN/1.73 M^2
GLUCOSE SERPL-MCNC: 97 MG/DL
GLUCOSE UR QL STRIP: NEGATIVE
HCT VFR BLD AUTO: 41.7 %
HGB BLD-MCNC: 14.7 G/DL
HGB UR QL STRIP: ABNORMAL
HYALINE CASTS #/AREA URNS LPF: 0 /LPF
KETONES UR QL STRIP: NEGATIVE
LEUKOCYTE ESTERASE UR QL STRIP: ABNORMAL
LIPASE SERPL-CCNC: 39 U/L
LYMPHOCYTES # BLD AUTO: 3 K/UL
LYMPHOCYTES NFR BLD: 37.1 %
MCH RBC QN AUTO: 31.5 PG
MCHC RBC AUTO-ENTMCNC: 35.3 G/DL
MCV RBC AUTO: 89 FL
MICROSCOPIC COMMENT: ABNORMAL
MONOCYTES # BLD AUTO: 0.7 K/UL
MONOCYTES NFR BLD: 8.2 %
NEUTROPHILS # BLD AUTO: 4.3 K/UL
NEUTROPHILS NFR BLD: 52.6 %
NITRITE UR QL STRIP: NEGATIVE
PH UR STRIP: 7 [PH] (ref 5–8)
PLATELET # BLD AUTO: 256 K/UL
PMV BLD AUTO: 10.2 FL
POTASSIUM SERPL-SCNC: 3.4 MMOL/L
PROT SERPL-MCNC: 7.3 G/DL
PROT UR QL STRIP: ABNORMAL
RBC # BLD AUTO: 4.67 M/UL
RBC #/AREA URNS HPF: 2 /HPF (ref 0–4)
SODIUM SERPL-SCNC: 141 MMOL/L
SP GR UR STRIP: 1.02 (ref 1–1.03)
SQUAMOUS #/AREA URNS HPF: 3 /HPF
URN SPEC COLLECT METH UR: ABNORMAL
UROBILINOGEN UR STRIP-ACNC: ABNORMAL EU/DL
WBC # BLD AUTO: 8.19 K/UL
WBC #/AREA URNS HPF: 20 /HPF (ref 0–5)

## 2017-11-18 PROCEDURE — 85025 COMPLETE CBC W/AUTO DIFF WBC: CPT

## 2017-11-18 PROCEDURE — 80053 COMPREHEN METABOLIC PANEL: CPT

## 2017-11-18 PROCEDURE — 87086 URINE CULTURE/COLONY COUNT: CPT

## 2017-11-18 PROCEDURE — 25500020 PHARM REV CODE 255: Performed by: EMERGENCY MEDICINE

## 2017-11-18 PROCEDURE — 96372 THER/PROPH/DIAG INJ SC/IM: CPT | Mod: 59

## 2017-11-18 PROCEDURE — 87186 SC STD MICRODIL/AGAR DIL: CPT

## 2017-11-18 PROCEDURE — 96361 HYDRATE IV INFUSION ADD-ON: CPT

## 2017-11-18 PROCEDURE — 81000 URINALYSIS NONAUTO W/SCOPE: CPT

## 2017-11-18 PROCEDURE — 96375 TX/PRO/DX INJ NEW DRUG ADDON: CPT

## 2017-11-18 PROCEDURE — 83690 ASSAY OF LIPASE: CPT

## 2017-11-18 PROCEDURE — 87088 URINE BACTERIA CULTURE: CPT

## 2017-11-18 PROCEDURE — 87077 CULTURE AEROBIC IDENTIFY: CPT

## 2017-11-18 PROCEDURE — 25000003 PHARM REV CODE 250: Performed by: NURSE PRACTITIONER

## 2017-11-18 PROCEDURE — 63600175 PHARM REV CODE 636 W HCPCS: Performed by: EMERGENCY MEDICINE

## 2017-11-18 PROCEDURE — 63600175 PHARM REV CODE 636 W HCPCS: Performed by: NURSE PRACTITIONER

## 2017-11-18 PROCEDURE — 96365 THER/PROPH/DIAG IV INF INIT: CPT

## 2017-11-18 PROCEDURE — 99284 EMERGENCY DEPT VISIT MOD MDM: CPT | Mod: 25

## 2017-11-18 PROCEDURE — 81025 URINE PREGNANCY TEST: CPT | Performed by: PHYSICIAN ASSISTANT

## 2017-11-18 RX ORDER — HYDROMORPHONE HYDROCHLORIDE 2 MG/ML
0.5 INJECTION, SOLUTION INTRAMUSCULAR; INTRAVENOUS; SUBCUTANEOUS
Status: DISCONTINUED | OUTPATIENT
Start: 2017-11-18 | End: 2017-11-18

## 2017-11-18 RX ORDER — HYDROMORPHONE HYDROCHLORIDE 2 MG/ML
0.25 INJECTION, SOLUTION INTRAMUSCULAR; INTRAVENOUS; SUBCUTANEOUS
Status: COMPLETED | OUTPATIENT
Start: 2017-11-18 | End: 2017-11-18

## 2017-11-18 RX ORDER — DICYCLOMINE HYDROCHLORIDE 10 MG/ML
20 INJECTION INTRAMUSCULAR
Status: COMPLETED | OUTPATIENT
Start: 2017-11-18 | End: 2017-11-18

## 2017-11-18 RX ORDER — ONDANSETRON 4 MG/1
4 TABLET, FILM COATED ORAL EVERY 8 HOURS PRN
Qty: 12 TABLET | Refills: 0 | Status: SHIPPED | OUTPATIENT
Start: 2017-11-18 | End: 2017-11-29

## 2017-11-18 RX ORDER — PHENAZOPYRIDINE HYDROCHLORIDE 100 MG/1
200 TABLET, FILM COATED ORAL
Status: COMPLETED | OUTPATIENT
Start: 2017-11-18 | End: 2017-11-18

## 2017-11-18 RX ORDER — ONDANSETRON 2 MG/ML
4 INJECTION INTRAMUSCULAR; INTRAVENOUS
Status: COMPLETED | OUTPATIENT
Start: 2017-11-18 | End: 2017-11-18

## 2017-11-18 RX ORDER — MORPHINE SULFATE 10 MG/ML
6 INJECTION INTRAMUSCULAR; INTRAVENOUS; SUBCUTANEOUS
Status: COMPLETED | OUTPATIENT
Start: 2017-11-18 | End: 2017-11-18

## 2017-11-18 RX ORDER — PHENAZOPYRIDINE HYDROCHLORIDE 200 MG/1
200 TABLET, FILM COATED ORAL 3 TIMES DAILY
Qty: 6 TABLET | Refills: 0 | Status: SHIPPED | OUTPATIENT
Start: 2017-11-18 | End: 2017-11-29

## 2017-11-18 RX ADMIN — HYDROMORPHONE HYDROCHLORIDE 0.25 MG: 2 INJECTION, SOLUTION INTRAMUSCULAR; INTRAVENOUS; SUBCUTANEOUS at 08:11

## 2017-11-18 RX ADMIN — ONDANSETRON 4 MG: 2 INJECTION INTRAMUSCULAR; INTRAVENOUS at 07:11

## 2017-11-18 RX ADMIN — PHENAZOPYRIDINE HYDROCHLORIDE 200 MG: 100 TABLET ORAL at 07:11

## 2017-11-18 RX ADMIN — MORPHINE SULFATE 6 MG: 10 INJECTION, SOLUTION INTRAMUSCULAR; INTRAVENOUS at 07:11

## 2017-11-18 RX ADMIN — LIDOCAINE HYDROCHLORIDE: 20 SOLUTION ORAL; TOPICAL at 08:11

## 2017-11-18 RX ADMIN — SODIUM CHLORIDE 1000 ML: 0.9 INJECTION, SOLUTION INTRAVENOUS at 07:11

## 2017-11-18 RX ADMIN — IOHEXOL 75 ML: 350 INJECTION, SOLUTION INTRAVENOUS at 08:11

## 2017-11-18 RX ADMIN — DICYCLOMINE HYDROCHLORIDE 20 MG: 10 INJECTION INTRAMUSCULAR at 07:11

## 2017-11-18 RX ADMIN — CEFTRIAXONE 2 G: 2 INJECTION, SOLUTION INTRAVENOUS at 09:11

## 2017-11-19 NOTE — ED TRIAGE NOTES
"Pt arrived to ER with complaints of " I have the worst bladder and kidney infection going on. I was started on Cipro and she told me to come in. When I pee, its like razor blades and feels like needles in there". Pt rates the pain as 9/10 at this time. Pt reports intermittent nausea and one episode of vomiting on Thursday.   Past Medical History:   Diagnosis Date    Acute encephalopathy 12/24/12    secondary to drug overdose    ARF (acute renal failure) 12/24/12    secondary to drug overdose    History of cardiac arrest 12/24/12    secondary to drug overdose    Hypertension     MRSA (methicillin resistant Staphylococcus aureus) 12/24/12    Nephritis     PMDD (premenstrual dysphoric disorder)     Polysubstance abuse 12/24/12    Systolic CHF, acute 12/24/12    secondary to drug overdose     Past Surgical History:   Procedure Laterality Date    CHOLECYSTECTOMY      TUBAL LIGATION         "

## 2017-11-19 NOTE — DISCHARGE INSTRUCTIONS
Please return to the Emergency Department for any new or worsening symptoms including: worsening or changes in your symptoms, not able to take medications, new or changes in your pain, fever, chest pain, shortness of breath, loss of consciousness, dizziness, weakness, or any other concerns.     Please follow up with your Primary Care Provider on Monday for reevaluation.    Please take all medication as prescribed.  Please continue to take your Cipro and Keflex as prescribed.  I will add Pyridium to help with urinary burning and pain.

## 2017-11-19 NOTE — ED PROVIDER NOTES
"Encounter Date: 11/18/2017    SCRIBE #1 NOTE: I, Jordan Whiting II, am scribing for, and in the presence of,  MEHREEN Hedrick. I have scribed the following portions of the note - Other sections scribed: HPI and ROS.       History     Chief Complaint   Patient presents with    Dysuria     " I went to the DR Wednesday and was put on Cipro, I was dx w/ a UTI. I have been having alot of pain for the past 4 days. Hx of polynephritis      CC: Dysuria     HPI: This 37 y.o. female with past medical history of frequent episodes of pyelonephritis, substance abuse, acute renal failure, cardiac arrest, acute systolic CHF, hypertension following suspected overdose.  She reports no standing effects and no persistent issues with congestive heart failure at this time. She presents today to the ED c/o acute-on-chronic, dysuria with right sided abdominal and flank pain x4 days. Pt states she was seen by her PCP for dysuria and was prescribed Cipro and reports adherent to the medication with no improvement in symptoms.  She describes pain in the right lower abdomen and right side as unlike her usual pyelonephritis symptoms.  She describes her urinary symptoms as feeling of razor blades in her side.  She has seen urology for frequent episodes of pyelonephritis without resolution of this point.  She denies fever, nausea, vomiting, or diarrhea.      The history is provided by the patient. No  was used.     Review of patient's allergies indicates:   Allergen Reactions    Strawberry Anaphylaxis and Hives    Toradol [ketorolac]      Past Medical History:   Diagnosis Date    Acute encephalopathy 12/24/12    secondary to drug overdose    ARF (acute renal failure) 12/24/12    secondary to drug overdose    History of cardiac arrest 12/24/12    secondary to drug overdose    Hypertension     MRSA (methicillin resistant Staphylococcus aureus) 12/24/12    Nephritis     PMDD (premenstrual dysphoric disorder)     " Polysubstance abuse 12/24/12    Systolic CHF, acute 12/24/12    secondary to drug overdose     Past Surgical History:   Procedure Laterality Date    CHOLECYSTECTOMY      TUBAL LIGATION       Family History   Problem Relation Age of Onset    Kidney disease Mother     Cancer Maternal Grandmother      throat    Cancer Maternal Grandfather      prostate    Kidney disease Daughter     Breast cancer Neg Hx     Colon cancer Neg Hx     Ovarian cancer Neg Hx      Social History   Substance Use Topics    Smoking status: Former Smoker     Packs/day: 0.75     Years: 20.00    Smokeless tobacco: Never Used      Comment: 1/2 half pack/day.     Alcohol use No     Review of Systems   Constitutional: Negative for chills and fever.   HENT: Negative for sore throat and trouble swallowing.    Respiratory: Negative for shortness of breath.    Cardiovascular: Negative for chest pain and leg swelling.   Gastrointestinal: Positive for abdominal pain (right lower and right lateral). Negative for nausea and vomiting.   Genitourinary: Positive for dysuria, flank pain and urgency. Negative for hematuria, vaginal bleeding and vaginal discharge.   Musculoskeletal: Positive for back pain (right lower). Negative for neck pain and neck stiffness.   Skin: Negative for rash and wound.   Neurological: Negative for syncope and weakness.   Hematological: Does not bruise/bleed easily.   Psychiatric/Behavioral: Negative for confusion.       Physical Exam     Initial Vitals [11/18/17 1805]   BP Pulse Resp Temp SpO2   (!) 140/74 109 20 98.4 °F (36.9 °C) 98 %      MAP       96         Physical Exam    Nursing note and vitals reviewed.  Constitutional: She appears well-developed and well-nourished. She is not diaphoretic. She is cooperative.  Non-toxic appearance. No distress.   HENT:   Head: Normocephalic and atraumatic.   Right Ear: External ear normal.   Left Ear: External ear normal.   Eyes: Conjunctivae and EOM are normal.   Neck: Normal  range of motion. No tracheal deviation present.   Cardiovascular: Normal rate, regular rhythm and normal heart sounds. Exam reveals no gallop and no friction rub.    No murmur heard.  Pulmonary/Chest: Effort normal and breath sounds normal. No stridor. No tachypnea and no bradypnea. No respiratory distress. She has no wheezes. She has no rhonchi. She has no rales. She exhibits no tenderness.   Abdominal: Soft. Bowel sounds are normal. She exhibits no distension and no mass. There is tenderness in the right lower quadrant and suprapubic area. There is CVA tenderness (right). There is no guarding.   Musculoskeletal:        Lumbar back: She exhibits tenderness (right lower back - muscular) and pain. She exhibits no bony tenderness, no swelling and no spasm.   Neurological: She is alert and oriented to person, place, and time. She has normal strength. No sensory deficit. Coordination and gait normal. GCS eye subscore is 4. GCS verbal subscore is 5. GCS motor subscore is 6.   Skin: Skin is warm, dry and intact. Capillary refill takes less than 2 seconds. No bruising and no rash noted. No cyanosis or erythema. Nails show no clubbing.   No bruising or erythema over the flanks.   Psychiatric: She has a normal mood and affect. Her speech is normal and behavior is normal. Thought content normal.         ED Course   Procedures  Labs Reviewed   URINALYSIS - Abnormal; Notable for the following:        Result Value    Appearance, UA Hazy (*)     Protein, UA 1+ (*)     Occult Blood UA 1+ (*)     Urobilinogen, UA 2.0-3.0 (*)     Leukocytes, UA 2+ (*)     All other components within normal limits   URINALYSIS MICROSCOPIC - Abnormal; Notable for the following:     WBC, UA 20 (*)     All other components within normal limits   CBC W/ AUTO DIFFERENTIAL - Abnormal; Notable for the following:     MCH 31.5 (*)     All other components within normal limits   COMPREHENSIVE METABOLIC PANEL - Abnormal; Notable for the following:     Potassium  3.4 (*)     All other components within normal limits   CULTURE, URINE   LIPASE   POCT URINE PREGNANCY             Medical Decision Making:   History:   Old Medical Records: I decided to obtain old medical records.  Old Records Summarized: records from previous admission(s), records from clinic visits and other records.       <> Summary of Records: She was evaluated in this emergency room previously for pyelonephritis.  Patient was seen in primary care 11/16/2017, given a prescription for Cipro.  Patient was contacted yesterday on 11/17/2017 and Keflex was added on.  Urine culture resulted showing sensitivity for Cipro.  There was no testing completed for cefazolin or Keflex.  reviewed: Patient received 25 tablets of alprazolam from her primary care doctor on 11/16/2017.  She received 45 tablets of oxycodone 5 mg on 10/19/2017 and 45 tablets of oxycodone 5 mg on 11/10/2017.       APC / Resident Notes:   This is an evaluation of a 37 y.o. female that presents to the Emergency Department for right flank pain, right lateral abdomen and right lower quadrant abdominal pain.  She was diagnosed with UTI 2 days ago started on Cipro.  She reports her symptoms have not improved.  She does have a history of pyelonephritis reports this does not feel like her usual pyelonephritis symptoms. Physical Exam shows a non-toxic, afebrile, and uncomfortable however overall well appearing female.  Breath sounds clear and equal auscultation.  Heart regular rhythm.  Her abdomen is soft with tenderness to palpation in the right far lateral mid abdomen and right lower quadrant of the abdomen.  She does have CVA tenderness on the right side.  There is some right lower lumbar muscular back pain and tenderness without bruising, erythema, or increased warmth.  Vital Signs Are Reassuring. If available, previous records reviewed. RESULTS: CBC without evidence of leukocytosis or anemia.  Normal platelet count.  CMP with potassium 3.4,, otherwise  unremarkable.  Normal lipase.  UPT negative.  UA with 2+ leukocytes and 20 WBCs.  Nitrate negative.  CT the abdomen and pelvis - follows unremarkable.  Appendix partially visualized with visualized portion within normal limits.  2 mm nonobstructing stone in the left kidney.  No stones identified in the right kidney.  Hyperdensities in the left kidney to small to characterize.  Adnexal and uterine structures unremarkable.  Small amount of free fluid in the pelvis.  Given the patient is afebrile with normal labs and is tolerating oral fluids, do not have an indication for admission at this time.  The bacteria from the culture 2 days ago is sensitive to Cipro.  We'll have her continue the Cipro and ensure she is taking the Keflex as prescribed.  Will add on Pyridium to help with urinary symptoms.    My overall impression is pyelonephritis with right abdominal pain and right flank pain. I considered, but at this time, do not suspect bowel obstruction, bowel perforation, appendicitis, significant electrolyte abnormality, urosepsis, infected stone, AAA.    After speaking with the patient, she was not taking the Keflex as she was instructed.  I advised her the importance of continuing this medication ED Course: Rocephin, IV fluids, pain medication. D/C Meds: Pyridium and Bentyl. Additional D/C Information: Continue Keflex and Cipro, hydration. The diagnosis, treatment plan, instructions for follow-up and reevaluation with PCP on Monday as well as ED return precautions were discussed and understanding was verbalized. All questions or concerns have been addressed. This case was discussed with Dr. Kohli who is in agreement with my assessment and plan. ANNA Craft, FNP-C        Scribe Attestation:   Scribe #1: I performed the above scribed service and the documentation accurately describes the services I performed. I attest to the accuracy of the note.    Attending Attestation:           Physician Attestation for  Scribe:  Physician Attestation Statement for Scribe #1: I, MEHREEN Hedrick, reviewed documentation, as scribed by Jordan Whiting II in my presence, and it is both accurate and complete.                 ED Course      Clinical Impression:   The primary encounter diagnosis was Pyelonephritis, acute. Diagnoses of Right sided abdominal pain, Acute right-sided low back pain without sciatica, and Dysuria were also pertinent to this visit.    Disposition:   Disposition: Discharged  Condition: Stable                        MEHREEN Patrick  11/18/17 5674

## 2017-11-20 ENCOUNTER — PATIENT MESSAGE (OUTPATIENT)
Dept: FAMILY MEDICINE | Facility: CLINIC | Age: 37
End: 2017-11-20

## 2017-11-20 LAB — BACTERIA UR CULT: NORMAL

## 2017-11-28 ENCOUNTER — PATIENT MESSAGE (OUTPATIENT)
Dept: FAMILY MEDICINE | Facility: CLINIC | Age: 37
End: 2017-11-28

## 2017-11-28 ENCOUNTER — TELEPHONE (OUTPATIENT)
Dept: FAMILY MEDICINE | Facility: CLINIC | Age: 37
End: 2017-11-28

## 2017-11-28 DIAGNOSIS — R30.0 DYSURIA: Primary | ICD-10-CM

## 2017-11-28 DIAGNOSIS — N39.0 RECURRENT UTI: ICD-10-CM

## 2017-11-28 RX ORDER — TAMSULOSIN HYDROCHLORIDE 0.4 MG/1
0.4 CAPSULE ORAL DAILY
Qty: 30 CAPSULE | Refills: 0 | Status: SHIPPED | OUTPATIENT
Start: 2017-11-28 | End: 2017-12-22

## 2017-11-28 NOTE — TELEPHONE ENCOUNTER
----- Message from Dawn Ohara sent at 11/28/2017  2:46 PM CST -----  Contact: self  Patient was seen for a UTI and was given Cipro and it doesn't work. Patient need to talk with doctor today. Patient can be reached at [phone].      Thanks,

## 2017-11-29 ENCOUNTER — PATIENT MESSAGE (OUTPATIENT)
Dept: FAMILY MEDICINE | Facility: CLINIC | Age: 37
End: 2017-11-29

## 2017-11-29 ENCOUNTER — HOSPITAL ENCOUNTER (EMERGENCY)
Facility: HOSPITAL | Age: 37
Discharge: HOME | End: 2017-11-29
Attending: EMERGENCY MEDICINE
Payer: COMMERCIAL

## 2017-11-29 VITALS
HEART RATE: 85 BPM | HEIGHT: 62 IN | SYSTOLIC BLOOD PRESSURE: 115 MMHG | DIASTOLIC BLOOD PRESSURE: 73 MMHG | RESPIRATION RATE: 18 BRPM | OXYGEN SATURATION: 99 % | BODY MASS INDEX: 28.52 KG/M2 | TEMPERATURE: 98 F | WEIGHT: 155 LBS

## 2017-11-29 DIAGNOSIS — N12 PYELONEPHRITIS: Primary | ICD-10-CM

## 2017-11-29 DIAGNOSIS — R10.9 RIGHT FLANK PAIN: ICD-10-CM

## 2017-11-29 DIAGNOSIS — N30.01 ACUTE CYSTITIS WITH HEMATURIA: ICD-10-CM

## 2017-11-29 LAB
ALBUMIN SERPL BCP-MCNC: 4.2 G/DL
ALP SERPL-CCNC: 66 U/L
ALT SERPL W/O P-5'-P-CCNC: 39 U/L
ANION GAP SERPL CALC-SCNC: 11 MMOL/L
AST SERPL-CCNC: 27 U/L
B-HCG UR QL: NEGATIVE
BACTERIA #/AREA URNS HPF: ABNORMAL /HPF
BASOPHILS # BLD AUTO: 0.02 K/UL
BASOPHILS NFR BLD: 0.2 %
BILIRUB SERPL-MCNC: 0.3 MG/DL
BILIRUB UR QL STRIP: NEGATIVE
BUN SERPL-MCNC: 8 MG/DL
CALCIUM SERPL-MCNC: 9.5 MG/DL
CHLORIDE SERPL-SCNC: 103 MMOL/L
CLARITY UR: CLEAR
CO2 SERPL-SCNC: 26 MMOL/L
COLOR UR: YELLOW
CREAT SERPL-MCNC: 0.7 MG/DL
CTP QC/QA: YES
DIFFERENTIAL METHOD: ABNORMAL
EOSINOPHIL # BLD AUTO: 0.2 K/UL
EOSINOPHIL NFR BLD: 1.8 %
ERYTHROCYTE [DISTWIDTH] IN BLOOD BY AUTOMATED COUNT: 12.3 %
EST. GFR  (AFRICAN AMERICAN): >60 ML/MIN/1.73 M^2
EST. GFR  (NON AFRICAN AMERICAN): >60 ML/MIN/1.73 M^2
GLUCOSE SERPL-MCNC: 110 MG/DL
GLUCOSE UR QL STRIP: NEGATIVE
HCT VFR BLD AUTO: 42.3 %
HGB BLD-MCNC: 15.1 G/DL
HGB UR QL STRIP: ABNORMAL
KETONES UR QL STRIP: NEGATIVE
LEUKOCYTE ESTERASE UR QL STRIP: ABNORMAL
LYMPHOCYTES # BLD AUTO: 3 K/UL
LYMPHOCYTES NFR BLD: 31.7 %
MCH RBC QN AUTO: 31.2 PG
MCHC RBC AUTO-ENTMCNC: 35.7 G/DL
MCV RBC AUTO: 87 FL
MICROSCOPIC COMMENT: ABNORMAL
MONOCYTES # BLD AUTO: 0.7 K/UL
MONOCYTES NFR BLD: 7.8 %
NEUTROPHILS # BLD AUTO: 5.5 K/UL
NEUTROPHILS NFR BLD: 58.5 %
NITRITE UR QL STRIP: POSITIVE
PH UR STRIP: 5 [PH] (ref 5–8)
PLATELET # BLD AUTO: 248 K/UL
PMV BLD AUTO: 9.9 FL
POTASSIUM SERPL-SCNC: 3.8 MMOL/L
PROT SERPL-MCNC: 7.6 G/DL
PROT UR QL STRIP: NEGATIVE
RBC # BLD AUTO: 4.84 M/UL
RBC #/AREA URNS HPF: 2 /HPF (ref 0–4)
SODIUM SERPL-SCNC: 140 MMOL/L
SP GR UR STRIP: 1.02 (ref 1–1.03)
SQUAMOUS #/AREA URNS HPF: 6 /HPF
URN SPEC COLLECT METH UR: ABNORMAL
UROBILINOGEN UR STRIP-ACNC: NEGATIVE EU/DL
WBC # BLD AUTO: 9.45 K/UL
WBC #/AREA URNS HPF: 15 /HPF (ref 0–5)

## 2017-11-29 PROCEDURE — 96374 THER/PROPH/DIAG INJ IV PUSH: CPT

## 2017-11-29 PROCEDURE — 80053 COMPREHEN METABOLIC PANEL: CPT

## 2017-11-29 PROCEDURE — 81025 URINE PREGNANCY TEST: CPT | Performed by: EMERGENCY MEDICINE

## 2017-11-29 PROCEDURE — 87186 SC STD MICRODIL/AGAR DIL: CPT

## 2017-11-29 PROCEDURE — 87088 URINE BACTERIA CULTURE: CPT

## 2017-11-29 PROCEDURE — 96361 HYDRATE IV INFUSION ADD-ON: CPT

## 2017-11-29 PROCEDURE — 96376 TX/PRO/DX INJ SAME DRUG ADON: CPT

## 2017-11-29 PROCEDURE — 81000 URINALYSIS NONAUTO W/SCOPE: CPT

## 2017-11-29 PROCEDURE — 99284 EMERGENCY DEPT VISIT MOD MDM: CPT | Mod: 25

## 2017-11-29 PROCEDURE — 25000003 PHARM REV CODE 250: Performed by: NURSE PRACTITIONER

## 2017-11-29 PROCEDURE — 87086 URINE CULTURE/COLONY COUNT: CPT

## 2017-11-29 PROCEDURE — 63600175 PHARM REV CODE 636 W HCPCS: Performed by: NURSE PRACTITIONER

## 2017-11-29 PROCEDURE — 25500020 PHARM REV CODE 255: Performed by: EMERGENCY MEDICINE

## 2017-11-29 PROCEDURE — 87077 CULTURE AEROBIC IDENTIFY: CPT

## 2017-11-29 PROCEDURE — 96375 TX/PRO/DX INJ NEW DRUG ADDON: CPT

## 2017-11-29 PROCEDURE — 85025 COMPLETE CBC W/AUTO DIFF WBC: CPT

## 2017-11-29 RX ORDER — HYDROMORPHONE HYDROCHLORIDE 2 MG/ML
0.5 INJECTION, SOLUTION INTRAMUSCULAR; INTRAVENOUS; SUBCUTANEOUS
Status: COMPLETED | OUTPATIENT
Start: 2017-11-29 | End: 2017-11-29

## 2017-11-29 RX ORDER — ONDANSETRON 2 MG/ML
4 INJECTION INTRAMUSCULAR; INTRAVENOUS
Status: COMPLETED | OUTPATIENT
Start: 2017-11-29 | End: 2017-11-29

## 2017-11-29 RX ORDER — NAPROXEN 500 MG/1
500 TABLET ORAL 2 TIMES DAILY WITH MEALS
Qty: 10 TABLET | Refills: 0 | Status: SHIPPED | OUTPATIENT
Start: 2017-11-29 | End: 2017-12-06

## 2017-11-29 RX ORDER — SULFAMETHOXAZOLE AND TRIMETHOPRIM 800; 160 MG/1; MG/1
1 TABLET ORAL 2 TIMES DAILY
Qty: 28 TABLET | Refills: 0 | Status: SHIPPED | OUTPATIENT
Start: 2017-11-30 | End: 2017-12-06 | Stop reason: SDUPTHER

## 2017-11-29 RX ORDER — ONDANSETRON 4 MG/1
4 TABLET, FILM COATED ORAL EVERY 6 HOURS PRN
Qty: 12 TABLET | Refills: 0 | Status: SHIPPED | OUTPATIENT
Start: 2017-11-29 | End: 2018-01-02

## 2017-11-29 RX ORDER — SULFAMETHOXAZOLE AND TRIMETHOPRIM 800; 160 MG/1; MG/1
1 TABLET ORAL
Status: COMPLETED | OUTPATIENT
Start: 2017-11-29 | End: 2017-11-29

## 2017-11-29 RX ORDER — SULFAMETHOXAZOLE AND TRIMETHOPRIM 800; 160 MG/1; MG/1
1 TABLET ORAL 2 TIMES DAILY
Qty: 14 TABLET | Refills: 0 | Status: SHIPPED | OUTPATIENT
Start: 2017-11-30 | End: 2017-11-29

## 2017-11-29 RX ADMIN — HYDROMORPHONE HYDROCHLORIDE 0.5 MG: 2 INJECTION INTRAMUSCULAR; INTRAVENOUS; SUBCUTANEOUS at 10:11

## 2017-11-29 RX ADMIN — SODIUM CHLORIDE 1000 ML: 0.9 INJECTION, SOLUTION INTRAVENOUS at 08:11

## 2017-11-29 RX ADMIN — ONDANSETRON 4 MG: 2 INJECTION INTRAMUSCULAR; INTRAVENOUS at 09:11

## 2017-11-29 RX ADMIN — ONDANSETRON 4 MG: 2 INJECTION INTRAMUSCULAR; INTRAVENOUS at 08:11

## 2017-11-29 RX ADMIN — HYDROMORPHONE HYDROCHLORIDE 0.5 MG: 2 INJECTION INTRAMUSCULAR; INTRAVENOUS; SUBCUTANEOUS at 09:11

## 2017-11-29 RX ADMIN — IOHEXOL 70 ML: 350 INJECTION, SOLUTION INTRAVENOUS at 09:11

## 2017-11-29 RX ADMIN — PROMETHAZINE HYDROCHLORIDE 12.5 MG: 25 INJECTION INTRAMUSCULAR; INTRAVENOUS at 10:11

## 2017-11-29 RX ADMIN — SULFAMETHOXAZOLE AND TRIMETHOPRIM 1 TABLET: 800; 160 TABLET ORAL at 10:11

## 2017-11-30 NOTE — ED TRIAGE NOTES
Pt presents to ED with c/o dysuria and right sided flank pain x 2 weeks. Pt was seen in ED on 11/10 and was treated for polynephrotitis. Pt reports back to ER with continuing of symptoms.

## 2017-11-30 NOTE — ED NOTES
"Requesting something for nausea, will notify doctor, states " can you see if they can do something beside zofran, cause it's not doing nothing and i've had two doses of them"   "

## 2017-11-30 NOTE — DISCHARGE INSTRUCTIONS
You have been prescribed an antibiotic.  You were given the first of this antibiotic tonight in the ED.  Take this medication 2 times daily for 14 days.  Take all of this medication even if you start to feel better.    You need to follow up with urology and your PCP for this problem.    You have been prescribed Naproxen for pain. This is an Non-Steroidal Anti-Inflammatory (NSAID) Medication. Please do not take any additional NSAIDs while you are taking this medication including (Advil, Aleve, Motrin, Ibuprofen, Mobic\meloxicam, Naprosyn, etc.). Please stop taking this medication if you experience: weakness, itching, yellow skin or eyes, joint pains, vomiting blood, blood or black stools, unusual weight gain, or swelling in your arms, legs, hands, or feet.     Please return to the Emergency Department for any new or worsening symptoms including: Difficulty urinating, abdominal pain, fever, chest pain, shortness of breath, loss of consciousness, dizziness, weakness, or any other concerns.     Please follow up with your Primary Care Provider within in the week. If you do not have one, you may contact the one listed on your discharge paperwork or you may also call the [facility] Appointment Desk at [phone] to schedule an appointment with one.     Please take all medication as prescribed.

## 2017-11-30 NOTE — ED PROVIDER NOTES
Encounter Date: 11/29/2017       History     Chief Complaint   Patient presents with    Flank Pain     right flank pain x 2-3 weeks ago.  Dx: pyelonephritis  was seen here on 11/10.  symptoms were some better then they became much worse.     CC: Flank pain    HPI: The patient is a 37-year-old female with a history of frequent UTIs and pyelonephritis, who presents today with right flank pain, dysuria, fever, chills,increased urinary frequency and urgency for 3 weeks.  She was seen by her PCP on 11/16 for similar complaints, sent home with Cipro and Keflex.  She then reported to the ED on 11/18 due to increased pain and dysuria.  She was treated with Rocephin injection, Cipro and Keflex were continued.  She reports having a few days of relief of symptoms, but symptoms then returned.  She has been taking ibuprofen at home which has not helped relieve the pain.  She admits to periods of non-adherence to antibiotic regimen.  She denies abdominal pain, bloating, diarrhea, nausea, vomiting, constipation, blood in her urine, vaginal discharge.    She has been seen by urology for this problem without resolution of symptoms.       The history is provided by the patient. No  was used.     Review of patient's allergies indicates:   Allergen Reactions    Strawberry Anaphylaxis and Hives    Morphine Other (See Comments)     Burns stomach    Toradol [ketorolac]     Tramadol      Abdominal pain     Past Medical History:   Diagnosis Date    Acute encephalopathy 12/24/12    secondary to drug overdose    ARF (acute renal failure) 12/24/12    secondary to drug overdose    History of cardiac arrest 12/24/12    secondary to drug overdose    Hypertension     MRSA (methicillin resistant Staphylococcus aureus) 12/24/12    Nephritis     PMDD (premenstrual dysphoric disorder)     Polysubstance abuse 12/24/12    Systolic CHF, acute 12/24/12    secondary to drug overdose     Past Surgical History:   Procedure  Laterality Date    CHOLECYSTECTOMY      TUBAL LIGATION       Family History   Problem Relation Age of Onset    Kidney disease Mother     Cancer Maternal Grandmother      throat    Cancer Maternal Grandfather      prostate    Kidney disease Daughter     Breast cancer Neg Hx     Colon cancer Neg Hx     Ovarian cancer Neg Hx      Social History   Substance Use Topics    Smoking status: Former Smoker     Packs/day: 0.75     Years: 20.00    Smokeless tobacco: Never Used      Comment: 1/2 half pack/day.     Alcohol use No     Review of Systems   Constitutional: Positive for chills and fever.   HENT: Negative for sore throat.    Respiratory: Negative for chest tightness, shortness of breath and wheezing.    Cardiovascular: Negative for chest pain, palpitations and leg swelling.   Gastrointestinal: Negative for abdominal pain, constipation, diarrhea, nausea and vomiting.   Genitourinary: Positive for difficulty urinating, dysuria, flank pain, frequency and urgency. Negative for hematuria, pelvic pain, vaginal discharge and vaginal pain.   Musculoskeletal: Positive for back pain.   Skin: Negative for rash.   Neurological: Negative for weakness, light-headedness, numbness and headaches.   Hematological: Does not bruise/bleed easily.       Physical Exam     Initial Vitals [11/29/17 1900]   BP Pulse Resp Temp SpO2   123/75 103 16 99.5 °F (37.5 °C) 98 %      MAP       91         Physical Exam    Constitutional: She appears well-developed and well-nourished. She is not diaphoretic. No distress.   HENT:   Head: Normocephalic and atraumatic.   Neck: Normal range of motion.   Cardiovascular: Normal rate, regular rhythm and normal heart sounds. Exam reveals no gallop and no friction rub.    No murmur heard.  Pulmonary/Chest: Breath sounds normal. No respiratory distress. She has no wheezes. She has no rhonchi. She has no rales.   Abdominal: Soft. Bowel sounds are normal. She exhibits no distension and no mass. There is no  hepatosplenomegaly. There is tenderness in the right lower quadrant and suprapubic area. There is CVA tenderness (right). There is no rigidity, no rebound, no guarding, no tenderness at McBurney's point and negative Larry's sign.   Musculoskeletal: Normal range of motion.   Neurological: She is alert and oriented to person, place, and time.   Skin: Skin is warm and dry.   Psychiatric: She has a normal mood and affect. Her behavior is normal.         ED Course   Procedures  Labs Reviewed   URINALYSIS - Abnormal; Notable for the following:        Result Value    Occult Blood UA 1+ (*)     Nitrite, UA Positive (*)     Leukocytes, UA 1+ (*)     All other components within normal limits   URINALYSIS MICROSCOPIC - Abnormal; Notable for the following:     WBC, UA 15 (*)     Bacteria, UA Many (*)     All other components within normal limits   CBC W/ AUTO DIFFERENTIAL - Abnormal; Notable for the following:     MCH 31.2 (*)     All other components within normal limits   CULTURE, URINE   COMPREHENSIVE METABOLIC PANEL   POCT URINE PREGNANCY             Medical Decision Making:   ED Management:  This is an evaluation of a 37 y.o. female with history of recurrent pyelonephritis and UTIs that presents to the Emergency Department for dysuria. ROS positive for: dysuria, fever, chills, increased urinary frequency and urgency, right-sided flank/side pain; ROS negative for abdominal pain, bloating, nausea, vomiting, diarrhea, constipation, vaginal bleeding or discharge.. The patient is a non-toxic, afebrile, and well appearing female. On physical exam, there is tenderness with palpation to the suprapubic and right lower abdominal quadrant, right CVA and flank tenderness; she has no abdominal distention or peritoneal signs.    Vital Signs: Patient is afebrile.  She is not tachycardic.  Lab\Radiology\Other Procedure RESULTS:  CBC was negativefor leukocytosis  indicating unlikely systemic infection, the H&H was stable and was within  normal limits and indicating absence of anemia. The platelet count was normal indicating the absence of a tendency toward bleeding.  The chemistry was negative for hypo-or hyper natremia, kalemia, chloridemia, or other electrolyte abnormalities; BUN and creatinine were within normal limits indicating normal kidney function, ALT and AST were within normal limits indicating normal liver function, there was no transaminitis.    UA is positive for nitrites.  There are 1+ leukocytes and many bacteria.  Microscopic UA with 15 WBCs  Urine Culture is pending.     CT of abdomen and pelvis with contrast was obtained showing no acute intra-abdominal or pelvic findings.  The kidneys, ureters, urinary bladder are within normal limits.      Given her right flank pain and CVA tenderness, I will treat her for acute uncomplicated pyelonephritis.  Since the patient is afebrile and does not exhibit leukocytosis, I believe she can be treated on an outpatient basis with antibiotics and do not believe this warrants inpatient therapy with IV antibiotics at this time. Given the above findings, I do not think the patient has an STI, bowel obstruction, bowel perforation, appendicitis, significant electrolyte abnormality, urosepsis, kidney stone, AAA.    During her stay in the ED, the patient has been given IV fluids, Dilaudid, Zofran, Phenergan, Bactrim with good relief of her symptoms. The patient will be discharged home with a 2 week course of Bactrim, Zofran and naprosyn for symptomatic relief.  The diagnosis, treatment plan, instructions for follow-up and reevaluation with her PCP and urology as well as ED return precautions have been discussed with the patient and she has verbalized an understanding of the information. All questions or concerns from the patient have been addressed.     This case was discussed with Dr. Savage  who is in agreement with my assessment and plan.                   ED Course      Clinical Impression:   The  primary encounter diagnosis was Pyelonephritis. Diagnoses of Right flank pain and Acute cystitis with hematuria were also pertinent to this visit.    Disposition:   Disposition: Discharged  Condition: Stable                        Esther Peacock NP  11/29/17 0586

## 2017-12-01 ENCOUNTER — TELEPHONE (OUTPATIENT)
Dept: ADMINISTRATIVE | Facility: HOSPITAL | Age: 37
End: 2017-12-01

## 2017-12-01 LAB — BACTERIA UR CULT: NORMAL

## 2017-12-04 ENCOUNTER — TELEPHONE (OUTPATIENT)
Dept: ADMINISTRATIVE | Facility: HOSPITAL | Age: 37
End: 2017-12-04

## 2017-12-06 ENCOUNTER — TELEPHONE (OUTPATIENT)
Dept: UROLOGY | Facility: CLINIC | Age: 37
End: 2017-12-06

## 2017-12-06 ENCOUNTER — OFFICE VISIT (OUTPATIENT)
Dept: FAMILY MEDICINE | Facility: CLINIC | Age: 37
End: 2017-12-06
Payer: COMMERCIAL

## 2017-12-06 VITALS
DIASTOLIC BLOOD PRESSURE: 80 MMHG | HEART RATE: 99 BPM | OXYGEN SATURATION: 98 % | TEMPERATURE: 99 F | WEIGHT: 165.38 LBS | HEIGHT: 62 IN | BODY MASS INDEX: 30.44 KG/M2 | RESPIRATION RATE: 16 BRPM | SYSTOLIC BLOOD PRESSURE: 106 MMHG

## 2017-12-06 DIAGNOSIS — Z23 NEED FOR PROPHYLACTIC VACCINATION AND INOCULATION AGAINST INFLUENZA: ICD-10-CM

## 2017-12-06 DIAGNOSIS — R21 RASH: ICD-10-CM

## 2017-12-06 DIAGNOSIS — N30.01 ACUTE CYSTITIS WITH HEMATURIA: Primary | ICD-10-CM

## 2017-12-06 LAB
BILIRUB SERPL-MCNC: ABNORMAL MG/DL
BILIRUB SERPL-MCNC: NORMAL MG/DL
BLOOD URINE, POC: 250
BLOOD URINE, POC: NORMAL
COLOR, POC UA: ABNORMAL
COLOR, POC UA: NORMAL
GLUCOSE UR QL STRIP: ABNORMAL
GLUCOSE UR QL STRIP: NORMAL
KETONES UR QL STRIP: ABNORMAL
KETONES UR QL STRIP: NORMAL
LEUKOCYTE ESTERASE URINE, POC: ABNORMAL
LEUKOCYTE ESTERASE URINE, POC: NORMAL
NITRITE, POC UA: ABNORMAL
NITRITE, POC UA: NORMAL
PH, POC UA: 5
PH, POC UA: 6
PROTEIN, POC: ABNORMAL
PROTEIN, POC: POSITIVE
SPECIFIC GRAVITY, POC UA: 1.01
SPECIFIC GRAVITY, POC UA: 1015
UROBILINOGEN, POC UA: ABNORMAL
UROBILINOGEN, POC UA: NORMAL

## 2017-12-06 PROCEDURE — 99214 OFFICE O/P EST MOD 30 MIN: CPT | Mod: 25,S$GLB,, | Performed by: FAMILY MEDICINE

## 2017-12-06 PROCEDURE — 99999 PR PBB SHADOW E&M-EST. PATIENT-LVL III: CPT | Mod: PBBFAC,,, | Performed by: FAMILY MEDICINE

## 2017-12-06 PROCEDURE — 87088 URINE BACTERIA CULTURE: CPT

## 2017-12-06 PROCEDURE — 87186 SC STD MICRODIL/AGAR DIL: CPT

## 2017-12-06 PROCEDURE — 87086 URINE CULTURE/COLONY COUNT: CPT

## 2017-12-06 PROCEDURE — 81001 URINALYSIS AUTO W/SCOPE: CPT | Mod: S$GLB,,, | Performed by: FAMILY MEDICINE

## 2017-12-06 PROCEDURE — 87077 CULTURE AEROBIC IDENTIFY: CPT

## 2017-12-06 PROCEDURE — 96372 THER/PROPH/DIAG INJ SC/IM: CPT | Mod: S$GLB,,, | Performed by: FAMILY MEDICINE

## 2017-12-06 RX ORDER — OXYCODONE HYDROCHLORIDE 5 MG/1
TABLET ORAL
Refills: 0 | COMMUNITY
Start: 2017-12-01 | End: 2017-12-06

## 2017-12-06 RX ORDER — NITROFURANTOIN 25; 75 MG/1; MG/1
100 CAPSULE ORAL 2 TIMES DAILY
Qty: 14 CAPSULE | Refills: 0 | Status: SHIPPED | OUTPATIENT
Start: 2017-12-06 | End: 2017-12-12 | Stop reason: ALTCHOICE

## 2017-12-06 RX ORDER — CEFTRIAXONE 1 G/1
1 INJECTION, POWDER, FOR SOLUTION INTRAMUSCULAR; INTRAVENOUS
Status: COMPLETED | OUTPATIENT
Start: 2017-12-06 | End: 2017-12-06

## 2017-12-06 RX ORDER — PHENAZOPYRIDINE HYDROCHLORIDE 200 MG/1
200 TABLET, FILM COATED ORAL 3 TIMES DAILY PRN
Qty: 30 TABLET | Refills: 0 | Status: SHIPPED | OUTPATIENT
Start: 2017-12-06 | End: 2017-12-16

## 2017-12-06 RX ADMIN — CEFTRIAXONE 1 G: 1 INJECTION, POWDER, FOR SOLUTION INTRAMUSCULAR; INTRAVENOUS at 11:12

## 2017-12-06 NOTE — PROGRESS NOTES
Flu consent signed by patient. Flu vaccine administered. Patient also tolerated Rocephin 1g IM well and without reaction.

## 2017-12-06 NOTE — PROGRESS NOTES
Patient Name: Roslyn Brewster    : 1980  MRN: [id number]    Subjective:  Roslyn is a 37 y.o. female who presents today for     1. Urinary frequency, urgency, and pain  x 1 day, right flank pain, n/v due to right flank pain, reports fever 101 yesterday.     Past Medical History  Past Medical History:   Diagnosis Date    Acute encephalopathy 12    secondary to drug overdose    ARF (acute renal failure) 12    secondary to drug overdose    History of cardiac arrest 12    secondary to drug overdose    Hypertension     MRSA (methicillin resistant Staphylococcus aureus) 12    Nephritis     PMDD (premenstrual dysphoric disorder)     Polysubstance abuse 12    Systolic CHF, acute 12    secondary to drug overdose       Past Surgical History  Past Surgical History:   Procedure Laterality Date    CHOLECYSTECTOMY      TUBAL LIGATION         Family History  Family History   Problem Relation Age of Onset    Kidney disease Mother     Cancer Maternal Grandmother      throat    Cancer Maternal Grandfather      prostate    Kidney disease Daughter     Breast cancer Neg Hx     Colon cancer Neg Hx     Ovarian cancer Neg Hx        Social History  Social History     Social History    Marital status: Single     Spouse name: N/A    Number of children: N/A    Years of education: N/A     Occupational History    Not on file.     Social History Main Topics    Smoking status: Former Smoker     Packs/day: 0.75     Years: 20.00    Smokeless tobacco: Never Used      Comment: 1/2 half pack/day.     Alcohol use No    Drug use: No      Comment: PCP, patient states she had not done drugs since     Sexual activity: Yes     Partners: Male     Other Topics Concern    Not on file     Social History Narrative    No narrative on file       Allergies  Review of patient's allergies indicates:   Allergen Reactions    Strawberry Anaphylaxis and Hives    Morphine Other (See Comments)     " Burns stomach    Toradol [ketorolac]     Tramadol      Abdominal pain    -reviewed and updated      Medications  Reviewed and updated.   Current Outpatient Prescriptions   Medication Sig Dispense Refill    alprazolam (XANAX) 1 MG tablet TAKE ONE TABLET EVERY EVENING AS NEEDED FOR ANXIETY *MAY CAUSE DROWSINESS* 25 tablet 2    atomoxetine (STRATTERA) 40 MG capsule Take 1 capsule (40 mg total) by mouth once daily. 30 capsule 11    clindamycin phosphate 1% (CLINDAGEL) 1 % gel Apply topically 2 (two) times daily. 60 g 2    nitrofurantoin, macrocrystal-monohydrate, (MACROBID) 100 MG capsule Take 1 capsule (100 mg total) by mouth 2 (two) times daily. 14 capsule 0    ondansetron (ZOFRAN) 4 MG tablet Take 1 tablet (4 mg total) by mouth every 6 (six) hours as needed for Nausea. 12 tablet 0    phenazopyridine (PYRIDIUM) 200 MG tablet Take 1 tablet (200 mg total) by mouth 3 (three) times daily as needed for Pain. 30 tablet 0    tamsulosin (FLOMAX) 0.4 mg Cp24 Take 1 capsule (0.4 mg total) by mouth once daily. 30 capsule 0     No current facility-administered medications for this visit.          Review of Systems   Constitutional: Positive for fever and malaise/fatigue.   Respiratory: Negative for shortness of breath.    Cardiovascular: Negative for chest pain.   Gastrointestinal: Positive for abdominal pain, nausea and vomiting. Negative for constipation and diarrhea.   Genitourinary: Positive for dysuria, flank pain, frequency and urgency.         Physical Exam  /80   Pulse 104   Temp 98.7 °F (37.1 °C) (Oral)   Ht 5' 3" (1.6 m)   Wt 73.8 kg (162 lb 11.2 oz)   SpO2 98%   BMI 28.82 kg/m²   Physical Exam   Constitutional: She appears well-developed. No distress.   Cardiovascular: Regular rhythm and normal heart sounds.    Borderline tachycardic   Pulmonary/Chest: Effort normal and breath sounds normal.   Abdominal: Soft. There is tenderness (right flank).   Skin: She is not diaphoretic.     poct with trace " leukocytes and 250 blood    Assessment/Plan:  Roslyn Brewster is a 37 y.o. female who presents today for :    Cystitis  Started on cipro, obtain culture, preventive measures  -     POCT URINE DIPSTICK WITHOUT MICROSCOPE  -     Urine culture  -     Urinalysis Microscopic  -     ciprofloxacin HCl (CIPRO) 500 MG tablet; Take 1 tablet (500 mg total) by mouth every 12 (twelve) hours.  Dispense: 28 tablet; Refill: 0        Return if symptoms worsen or fail to improve.

## 2017-12-06 NOTE — TELEPHONE ENCOUNTER
----- Message from Sarah Lawton sent at 12/6/2017  9:46 AM CST -----  Contact: self  Pt calling to reschedule consult appt. Please call [phone].

## 2017-12-06 NOTE — PROGRESS NOTES
"Chief Complaint   Patient presents with    Urinary Tract Infection       SUBJECTIVE:   Roslyn Brewster is a 37 y.o. female who complains of UTI despite the antibiotics with dysuria and nausea  Social History   Substance Use Topics    Smoking status: Former Smoker     Packs/day: 0.75     Years: 20.00    Smokeless tobacco: Never Used      Comment: 1/2 half pack/day.     Alcohol use No       ROS  No fever or chills  Patient denies any exertional chest pain, dyspnea, palpitations, syncope, orthopnea, edema or paroxysmal nocturnal dyspnea.    OBJECTIVE:  /80   Pulse 99   Temp 98.7 °F (37.1 °C) (Oral)   Resp 16   Ht 5' 2" (1.575 m)   Wt 75 kg (165 lb 5.5 oz)   LMP 11/07/2017   SpO2 98%   BMI 30.24 kg/m²     She appears well, vital signs are as noted. Ears normal.  Throat and pharynx normal.  Neck supple. No adenopathy in the neck. Nose is congested. Sinuses normal tender. The chest is clear, without wheezes or rales.  No CVAT    ASSESSMENT:   Viral URI vs. Viral sinusitis    ICD-10-CM ICD-9-CM   1. Acute cystitis with hematuria N30.01 595.0   2. Rash R21 782.1       PLAN:  Explained usual causes of sinusitis and the over all low likelihood of bacterial sinusitis except in rare cases or with co-morbidity or increased length of time for disease.  Most usual cause is virus or allergy.  I have reviewed pictures showing possible causes and most antibiotics that will be prescribed are to only be taken if the duration of disease on my treatment is >10 days or if patient runs consistent fever.  The mainstay of treatment is still increased water, good rest and time.  The goal of antibiotics and steroids is reserved to try to increase function and avoid work time loss.  Patient voiced understanding.    "

## 2017-12-08 ENCOUNTER — PATIENT MESSAGE (OUTPATIENT)
Dept: FAMILY MEDICINE | Facility: CLINIC | Age: 37
End: 2017-12-08

## 2017-12-09 LAB — BACTERIA UR CULT: NORMAL

## 2017-12-12 ENCOUNTER — TELEPHONE (OUTPATIENT)
Dept: FAMILY MEDICINE | Facility: CLINIC | Age: 37
End: 2017-12-12

## 2017-12-12 DIAGNOSIS — N30.01 ACUTE CYSTITIS WITH HEMATURIA: Primary | ICD-10-CM

## 2017-12-12 PROCEDURE — 90471 IMMUNIZATION ADMIN: CPT | Mod: S$GLB,,, | Performed by: FAMILY MEDICINE

## 2017-12-12 PROCEDURE — 90686 IIV4 VACC NO PRSV 0.5 ML IM: CPT | Mod: S$GLB,,, | Performed by: FAMILY MEDICINE

## 2017-12-12 RX ORDER — SULFAMETHOXAZOLE AND TRIMETHOPRIM 800; 160 MG/1; MG/1
1 TABLET ORAL 2 TIMES DAILY
Qty: 20 TABLET | Refills: 0 | Status: SHIPPED | OUTPATIENT
Start: 2017-12-12 | End: 2017-12-22

## 2017-12-13 NOTE — ED PROVIDER NOTES
Encounter Date: 11/29/2017       History     Chief Complaint   Patient presents with    Flank Pain     right flank pain x 2-3 weeks ago.  Dx: pyelonephritis  was seen here on 11/10.  symptoms were some better then they became much worse.     HPI  Review of patient's allergies indicates:   Allergen Reactions    Strawberry Anaphylaxis and Hives    Morphine Other (See Comments)     Burns stomach    Toradol [ketorolac]     Tramadol      Abdominal pain     Past Medical History:   Diagnosis Date    Acute encephalopathy 12/24/12    secondary to drug overdose    ARF (acute renal failure) 12/24/12    secondary to drug overdose    History of cardiac arrest 12/24/12    secondary to drug overdose    Hypertension     MRSA (methicillin resistant Staphylococcus aureus) 12/24/12    Nephritis     PMDD (premenstrual dysphoric disorder)     Polysubstance abuse 12/24/12    Systolic CHF, acute 12/24/12    secondary to drug overdose     Past Surgical History:   Procedure Laterality Date    CHOLECYSTECTOMY      TUBAL LIGATION       Family History   Problem Relation Age of Onset    Kidney disease Mother     Cancer Maternal Grandmother      throat    Cancer Maternal Grandfather      prostate    Kidney disease Daughter     Breast cancer Neg Hx     Colon cancer Neg Hx     Ovarian cancer Neg Hx      Social History   Substance Use Topics    Smoking status: Former Smoker     Packs/day: 0.75     Years: 20.00    Smokeless tobacco: Never Used      Comment: 1/2 half pack/day.     Alcohol use No     Review of Systems    Physical Exam     Initial Vitals [11/29/17 1900]   BP Pulse Resp Temp SpO2   123/75 103 16 99.5 °F (37.5 °C) 98 %      MAP       91         Physical Exam    ED Course   Procedures  Labs Reviewed   URINALYSIS - Abnormal; Notable for the following:        Result Value    Occult Blood UA 1+ (*)     Nitrite, UA Positive (*)     Leukocytes, UA 1+ (*)     All other components within normal limits   URINALYSIS  MICROSCOPIC - Abnormal; Notable for the following:     WBC, UA 15 (*)     Bacteria, UA Many (*)     All other components within normal limits   CBC W/ AUTO DIFFERENTIAL - Abnormal; Notable for the following:     MCH 31.2 (*)     All other components within normal limits   CULTURE, URINE   COMPREHENSIVE METABOLIC PANEL   POCT URINE PREGNANCY                               ED Course      Clinical Impression:   The primary encounter diagnosis was Pyelonephritis. Diagnoses of Right flank pain and Acute cystitis with hematuria were also pertinent to this visit.    Disposition:     Physician Attestation Statement for NP/PA:   I discussed this assessment and plan of this patient with the NP/PA, but I did not personally examine the patient. The face to face encounter was performed by the NP/PA.                                  Moe Savage MD  12/13/17 0669

## 2017-12-19 ENCOUNTER — TELEPHONE (OUTPATIENT)
Dept: FAMILY MEDICINE | Facility: CLINIC | Age: 37
End: 2017-12-19

## 2017-12-19 ENCOUNTER — HOSPITAL ENCOUNTER (EMERGENCY)
Facility: HOSPITAL | Age: 37
Discharge: HOME | End: 2017-12-19
Attending: EMERGENCY MEDICINE
Payer: COMMERCIAL

## 2017-12-19 ENCOUNTER — PATIENT MESSAGE (OUTPATIENT)
Dept: FAMILY MEDICINE | Facility: CLINIC | Age: 37
End: 2017-12-19

## 2017-12-19 VITALS
WEIGHT: 153 LBS | HEIGHT: 62 IN | SYSTOLIC BLOOD PRESSURE: 117 MMHG | TEMPERATURE: 99 F | HEART RATE: 80 BPM | DIASTOLIC BLOOD PRESSURE: 66 MMHG | BODY MASS INDEX: 28.16 KG/M2 | RESPIRATION RATE: 16 BRPM | OXYGEN SATURATION: 99 %

## 2017-12-19 DIAGNOSIS — Z87.898 HISTORY OF DYSURIA: Primary | ICD-10-CM

## 2017-12-19 LAB
ALBUMIN SERPL BCP-MCNC: 4.1 G/DL
ALP SERPL-CCNC: 63 U/L
ALT SERPL W/O P-5'-P-CCNC: 29 U/L
ANION GAP SERPL CALC-SCNC: 10 MMOL/L
AST SERPL-CCNC: 17 U/L
B-HCG UR QL: NEGATIVE
BACTERIA #/AREA URNS HPF: NORMAL /HPF
BACTERIA GENITAL QL WET PREP: ABNORMAL
BASOPHILS # BLD AUTO: 0.02 K/UL
BASOPHILS NFR BLD: 0.3 %
BILIRUB SERPL-MCNC: 0.3 MG/DL
BILIRUB UR QL STRIP: NEGATIVE
BUN SERPL-MCNC: 9 MG/DL
CALCIUM SERPL-MCNC: 9.3 MG/DL
CHLORIDE SERPL-SCNC: 104 MMOL/L
CLARITY UR: CLEAR
CLUE CELLS VAG QL WET PREP: ABNORMAL
CO2 SERPL-SCNC: 24 MMOL/L
COLOR UR: ABNORMAL
CREAT SERPL-MCNC: 0.8 MG/DL
CTP QC/QA: YES
DIFFERENTIAL METHOD: ABNORMAL
EOSINOPHIL # BLD AUTO: 0.2 K/UL
EOSINOPHIL NFR BLD: 2.3 %
ERYTHROCYTE [DISTWIDTH] IN BLOOD BY AUTOMATED COUNT: 12.4 %
EST. GFR  (AFRICAN AMERICAN): >60 ML/MIN/1.73 M^2
EST. GFR  (NON AFRICAN AMERICAN): >60 ML/MIN/1.73 M^2
FILAMENT FUNGI VAG WET PREP-#/AREA: ABNORMAL
GLUCOSE SERPL-MCNC: 107 MG/DL
GLUCOSE UR QL STRIP: NEGATIVE
HCT VFR BLD AUTO: 41.1 %
HGB BLD-MCNC: 15.1 G/DL
HGB UR QL STRIP: ABNORMAL
KETONES UR QL STRIP: NEGATIVE
LEUKOCYTE ESTERASE UR QL STRIP: ABNORMAL
LIPASE SERPL-CCNC: 36 U/L
LYMPHOCYTES # BLD AUTO: 1.9 K/UL
LYMPHOCYTES NFR BLD: 27 %
MCH RBC QN AUTO: 32.4 PG
MCHC RBC AUTO-ENTMCNC: 36.7 G/DL
MCV RBC AUTO: 88 FL
MICROSCOPIC COMMENT: NORMAL
MONOCYTES # BLD AUTO: 0.6 K/UL
MONOCYTES NFR BLD: 8.8 %
NEUTROPHILS # BLD AUTO: 4.4 K/UL
NEUTROPHILS NFR BLD: 61.6 %
NITRITE UR QL STRIP: NEGATIVE
PH UR STRIP: 6 [PH] (ref 5–8)
PLATELET # BLD AUTO: 240 K/UL
PMV BLD AUTO: 10 FL
POTASSIUM SERPL-SCNC: 3.7 MMOL/L
PROT SERPL-MCNC: 7.3 G/DL
PROT UR QL STRIP: NEGATIVE
RBC # BLD AUTO: 4.66 M/UL
RBC #/AREA URNS HPF: 1 /HPF (ref 0–4)
SODIUM SERPL-SCNC: 138 MMOL/L
SP GR UR STRIP: 1 (ref 1–1.03)
SPECIMEN SOURCE: ABNORMAL
SQUAMOUS #/AREA URNS HPF: 2 /HPF
T VAGINALIS GENITAL QL WET PREP: ABNORMAL
URN SPEC COLLECT METH UR: ABNORMAL
UROBILINOGEN UR STRIP-ACNC: NEGATIVE EU/DL
WBC # BLD AUTO: 7.07 K/UL
WBC #/AREA URNS HPF: 2 /HPF (ref 0–5)
WBC #/AREA VAG WET PREP: ABNORMAL
YEAST GENITAL QL WET PREP: ABNORMAL

## 2017-12-19 PROCEDURE — 87077 CULTURE AEROBIC IDENTIFY: CPT

## 2017-12-19 PROCEDURE — 87088 URINE BACTERIA CULTURE: CPT

## 2017-12-19 PROCEDURE — 99284 EMERGENCY DEPT VISIT MOD MDM: CPT | Mod: 25

## 2017-12-19 PROCEDURE — 81025 URINE PREGNANCY TEST: CPT | Performed by: EMERGENCY MEDICINE

## 2017-12-19 PROCEDURE — 87186 SC STD MICRODIL/AGAR DIL: CPT

## 2017-12-19 PROCEDURE — 63600175 PHARM REV CODE 636 W HCPCS: Performed by: NURSE PRACTITIONER

## 2017-12-19 PROCEDURE — 87086 URINE CULTURE/COLONY COUNT: CPT

## 2017-12-19 PROCEDURE — 96374 THER/PROPH/DIAG INJ IV PUSH: CPT

## 2017-12-19 PROCEDURE — 80053 COMPREHEN METABOLIC PANEL: CPT

## 2017-12-19 PROCEDURE — 25500020 PHARM REV CODE 255: Performed by: EMERGENCY MEDICINE

## 2017-12-19 PROCEDURE — 87591 N.GONORRHOEAE DNA AMP PROB: CPT

## 2017-12-19 PROCEDURE — 81000 URINALYSIS NONAUTO W/SCOPE: CPT

## 2017-12-19 PROCEDURE — 96361 HYDRATE IV INFUSION ADD-ON: CPT

## 2017-12-19 PROCEDURE — 87210 SMEAR WET MOUNT SALINE/INK: CPT

## 2017-12-19 PROCEDURE — 85025 COMPLETE CBC W/AUTO DIFF WBC: CPT

## 2017-12-19 PROCEDURE — 83690 ASSAY OF LIPASE: CPT

## 2017-12-19 PROCEDURE — 25000003 PHARM REV CODE 250: Performed by: NURSE PRACTITIONER

## 2017-12-19 RX ORDER — ONDANSETRON 2 MG/ML
8 INJECTION INTRAMUSCULAR; INTRAVENOUS
Status: COMPLETED | OUTPATIENT
Start: 2017-12-19 | End: 2017-12-19

## 2017-12-19 RX ORDER — CIPROFLOXACIN 500 MG/1
500 TABLET ORAL
COMMUNITY
End: 2017-12-22

## 2017-12-19 RX ORDER — NAPROXEN 500 MG/1
500 TABLET ORAL 2 TIMES DAILY WITH MEALS
Qty: 10 TABLET | Refills: 0 | Status: SHIPPED | OUTPATIENT
Start: 2017-12-19 | End: 2017-12-22

## 2017-12-19 RX ORDER — PHENAZOPYRIDINE HYDROCHLORIDE 200 MG/1
200 TABLET, FILM COATED ORAL 3 TIMES DAILY
Qty: 15 TABLET | Refills: 0 | Status: SHIPPED | OUTPATIENT
Start: 2017-12-19 | End: 2017-12-24

## 2017-12-19 RX ADMIN — SODIUM CHLORIDE 1000 ML: 0.9 INJECTION, SOLUTION INTRAVENOUS at 01:12

## 2017-12-19 RX ADMIN — ONDANSETRON 8 MG: 2 INJECTION, SOLUTION INTRAMUSCULAR; INTRAVENOUS at 01:12

## 2017-12-19 RX ADMIN — IOHEXOL 75 ML: 350 INJECTION, SOLUTION INTRAVENOUS at 02:12

## 2017-12-19 NOTE — ED TRIAGE NOTES
"Pt with dysuria, flank pain, low back pain, nausea, body aches x 1 month. Pt states, "I have a UTI and have been taking antibiotics that's not working."  "

## 2017-12-19 NOTE — DISCHARGE INSTRUCTIONS
Continue taking antibiotics (bactrim) as prescribed.  Follow-up with your primary care physician today.  Follow-up with urology today.  Continue to drink plenty of fluids.     Please return to the Emergency Department for any new or worsening symptoms including: abdominal pain, fever, chest pain, shortness of breath, loss of consciousness, dizziness, weakness, or any other concerns.     Please follow up with your Primary Care Provider within in the week. If you do not have one, you may contact the one listed on your discharge paperwork or you may also call the [facility] Appointment Desk at [phone] to schedule an appointment with one.     Please take all medication as prescribed.

## 2017-12-19 NOTE — TELEPHONE ENCOUNTER
----- Message from Alexis Chaudhry sent at 12/19/2017  8:24 AM CST -----  Contact: Self/[phone]  Patient would like to speak to the staff. She states that she's experiencing pain in her back and kidney when she urinate. Thank you.

## 2017-12-19 NOTE — TELEPHONE ENCOUNTER
Patient stated that she is experiencing pain in her back/kidney area when she urinates - pain rate 8/10; over 10/10 when she urinates-- worst pain she has experienced with this issue.  Stated this has been going on for a couple of months.  Stated she has been prescribed several different types of antibiotics but is still experiencing the pain.  Would like to have something prescribed to help with the pain as it hurts to walk and she does not want to have to deal with the pain during the [place] Holiday.  Please advise.

## 2017-12-20 LAB
C TRACH DNA SPEC QL NAA+PROBE: NOT DETECTED
N GONORRHOEA DNA SPEC QL NAA+PROBE: NOT DETECTED

## 2017-12-20 NOTE — ED PROVIDER NOTES
"Encounter Date: 12/19/2017       History     Chief Complaint   Patient presents with    Urinary Tract Infection     abx x 1 month.  "feels like I'm peeing glass".  + nausea, low grade fever.  denies vomiting or diarrhea.     The patient is a 37-year-old female with a history of frequent UTIs and pyelonephritis, who presents today with right flank pain and dysuria for one day. She reports she woke up this morning and when she urinated it felt like she was "peeing glass". She was seen by here in the ED on 11/29 for similar complaints and was discharged home on bactrim. She followed up with her PCP on 12/6 where she was given rocephin injection and treatment was changed to macrobid. She was then prescribed bactrim on 12/12 once urine cultures resulted. She reports taking the medication as prescribed.  She reports having a few days of relief of symptoms, but symptoms then returned today.  She has been taking ibuprofen at home which has not helped relieve the pain. She denies abdominal pain, bloating, diarrhea, nausea, vomiting, constipation, blood in her urine, vaginal discharge. She has a urology appointment with Dr. Balderas on Friday 12/22.       The history is provided by the patient. No  was used.     Review of patient's allergies indicates:   Allergen Reactions    Strawberry Anaphylaxis and Hives    Morphine Other (See Comments)     Burns stomach    Toradol [ketorolac]     Tramadol      Abdominal pain     Past Medical History:   Diagnosis Date    Acute encephalopathy 12/24/12    secondary to drug overdose    ARF (acute renal failure) 12/24/12    secondary to drug overdose    History of cardiac arrest 12/24/12    secondary to drug overdose    Hypertension     MRSA (methicillin resistant Staphylococcus aureus) 12/24/12    Nephritis     PMDD (premenstrual dysphoric disorder)     Polysubstance abuse 12/24/12    Systolic CHF, acute 12/24/12    secondary to drug overdose    UTI (urinary " tract infection)      Past Surgical History:   Procedure Laterality Date    CHOLECYSTECTOMY      TUBAL LIGATION       Family History   Problem Relation Age of Onset    Kidney disease Mother     Cancer Maternal Grandmother      throat    Cancer Maternal Grandfather      prostate    Kidney disease Daughter     Breast cancer Neg Hx     Colon cancer Neg Hx     Ovarian cancer Neg Hx      Social History   Substance Use Topics    Smoking status: Current Every Day Smoker     Packs/day: 0.75     Years: 20.00     Types: Cigarettes    Smokeless tobacco: Never Used      Comment: 1/2 half pack/day.     Alcohol use No     Review of Systems   Constitutional: Negative for chills and fever.   HENT: Negative for sore throat.    Respiratory: Negative for shortness of breath.    Cardiovascular: Negative for chest pain.   Gastrointestinal: Positive for nausea. Negative for abdominal distention, abdominal pain, constipation, diarrhea and vomiting.   Genitourinary: Positive for dysuria and flank pain (right). Negative for decreased urine volume, difficulty urinating, frequency, hematuria and urgency.   Musculoskeletal: Negative for back pain.   Skin: Negative for rash.   Neurological: Negative for weakness.   Hematological: Does not bruise/bleed easily.       Physical Exam     Initial Vitals [12/19/17 1154]   BP Pulse Resp Temp SpO2   120/72 95 15 98.5 °F (36.9 °C) 99 %      MAP       88         Physical Exam    Constitutional: She appears well-developed and well-nourished. She is not diaphoretic. No distress.   HENT:   Head: Normocephalic and atraumatic.   Neck: Normal range of motion.   Cardiovascular: Normal rate, regular rhythm and normal heart sounds. Exam reveals no gallop and no friction rub.    No murmur heard.  Pulmonary/Chest: Breath sounds normal. No respiratory distress. She has no wheezes. She has no rhonchi. She has no rales.   Abdominal: Soft. Bowel sounds are normal. She exhibits no distension and no mass.  There is no hepatosplenomegaly. There is tenderness (right upper/middle quadrant) in the right upper quadrant. There is CVA tenderness (right ). There is no rigidity, no rebound, no guarding, no tenderness at McBurney's point and negative Larry's sign.   Genitourinary: Vagina normal and uterus normal. There is no rash, tenderness, lesion or injury on the right labia. There is no rash, tenderness, lesion or injury on the left labia. Uterus is not tender. Cervix exhibits discharge (small amount bloody mucoid drainage). Cervix exhibits no motion tenderness and no friability. Right adnexum displays no mass, no tenderness and no fullness. Left adnexum displays no mass, no tenderness and no fullness.   Musculoskeletal: Normal range of motion.   Neurological: She is alert and oriented to person, place, and time.   Skin: Skin is warm and dry.   Psychiatric: She has a normal mood and affect. Her behavior is normal.         ED Course   Procedures  Labs Reviewed   URINALYSIS - Abnormal; Notable for the following:        Result Value    Occult Blood UA 1+ (*)     Leukocytes, UA 1+ (*)     All other components within normal limits   CBC W/ AUTO DIFFERENTIAL - Abnormal; Notable for the following:     MCH 32.4 (*)     MCHC 36.7 (*)     All other components within normal limits   VAGINAL SCREEN - Abnormal; Notable for the following:     Bacteria - Vaginal Screen Few (*)     All other components within normal limits   CULTURE, URINE   C. TRACHOMATIS/N. GONORRHOEAE BY AMP DNA   COMPREHENSIVE METABOLIC PANEL   LIPASE   URINALYSIS MICROSCOPIC   POCT URINE PREGNANCY             Medical Decision Making:   ED Management:  This is an evaluation of a 37 y.o. female with extensive history of UTI and pyelonephritis that presents to the Emergency Department for dysuria. ROS positive for: dysuria, right flank pain, nausea; ROS negative for fever, chills, abdominal pain. The patient is a non-toxic, afebrile, and well appearing female. On  physical exam, there is right CVA tenderness, righ upper/middle abdominal tenderness with palpation; she has no abdominal distention or peritoneal signs. Pelvic exam was fairly unremarkable.    Vital Signs: Temp 98.5, HR 95, /72.     Lab\Radiology\Other Procedure RESULTS:     UPT negative.  UA with 1+ occult blood, 1+ leukocytes. Negative nitrites present. Urine culture pending.    CBC was negativefor leukocytosis  indicating unlikely systemic infection, the H&H was stable and was within normal limits and indicating absence of anemia. The platelet count was normal indicating the absence of a tendency toward bleeding.    The chemistry was negative for hypo-or hyper natremia, kalemia, chloridemia, or other electrolyte abnormalities; BUN and creatinine were within normal limits indicating normal kidney function, ALT and AST were within normal limits indicating normal liver function, there was no transaminitis.  Lipase within normal limits indicating normal pancreatic function.    Vaginal screen with few bacteria.  G/C pending.    CT scan abdomen and pelvis with nonobstructive left kidney calculi. Cholecystectomy.     Given the above findings, my overall impression is UTI. Given the above findings, I do not think the patient has pyelonephritis, STI, appendicitis, pancreatitis, TOA.    During her stay in the ED, the patient has been given iv fluids, zofran with fair relief of her symptoms. The patient will be discharged home with pyridium and naprosyn. She was instructed to continue taking bactrim as prescribed until she follows-up with urology on Friday. Additional home care recommendations include increase fluid intake. The diagnosis, treatment plan, instructions for follow-up and reevaluation with her urologist as well as ED return precautions have been discussed with the patient and she has verbalized an understanding of the information. All questions or concerns from the patient have been addressed.     This  case was discussed with Dr. Savage who is in agreement with my assessment and plan.                   ED Course      Clinical Impression:   The encounter diagnosis was History of dysuria.    Disposition:   Disposition: Discharged  Condition: Stable                        Esther Peacock NP  12/20/17 8585

## 2017-12-22 ENCOUNTER — OFFICE VISIT (OUTPATIENT)
Dept: UROLOGY | Facility: CLINIC | Age: 37
End: 2017-12-22
Payer: COMMERCIAL

## 2017-12-22 VITALS
SYSTOLIC BLOOD PRESSURE: 112 MMHG | DIASTOLIC BLOOD PRESSURE: 76 MMHG | RESPIRATION RATE: 14 BRPM | HEIGHT: 62 IN | WEIGHT: 159.19 LBS | BODY MASS INDEX: 29.3 KG/M2 | HEART RATE: 68 BPM

## 2017-12-22 DIAGNOSIS — R30.0 DYSURIA: ICD-10-CM

## 2017-12-22 DIAGNOSIS — R39.15 URINARY URGENCY: ICD-10-CM

## 2017-12-22 DIAGNOSIS — N20.0 KIDNEY STONES: ICD-10-CM

## 2017-12-22 DIAGNOSIS — N11.9 CHRONIC PYELONEPHRITIS WITHOUT LESION OF RENAL MEDULLARY NECROSIS: Primary | ICD-10-CM

## 2017-12-22 LAB
BACTERIA UR CULT: NORMAL
BILIRUB SERPL-MCNC: ABNORMAL MG/DL
BLOOD URINE, POC: ABNORMAL
COLOR, POC UA: ABNORMAL
GLUCOSE UR QL STRIP: NORMAL
KETONES UR QL STRIP: ABNORMAL
LEUKOCYTE ESTERASE URINE, POC: ABNORMAL
NITRITE, POC UA: ABNORMAL
PH, POC UA: ABNORMAL
PROTEIN, POC: ABNORMAL
SPECIFIC GRAVITY, POC UA: 1015
UROBILINOGEN, POC UA: 1

## 2017-12-22 PROCEDURE — 87086 URINE CULTURE/COLONY COUNT: CPT

## 2017-12-22 PROCEDURE — 99999 PR PBB SHADOW E&M-EST. PATIENT-LVL III: CPT | Mod: PBBFAC,,, | Performed by: UROLOGY

## 2017-12-22 PROCEDURE — 99244 OFF/OP CNSLTJ NEW/EST MOD 40: CPT | Mod: 25,S$GLB,, | Performed by: UROLOGY

## 2017-12-22 PROCEDURE — 81001 URINALYSIS AUTO W/SCOPE: CPT | Mod: S$GLB,,, | Performed by: UROLOGY

## 2017-12-22 PROCEDURE — 87077 CULTURE AEROBIC IDENTIFY: CPT

## 2017-12-22 PROCEDURE — 87186 SC STD MICRODIL/AGAR DIL: CPT

## 2017-12-22 PROCEDURE — 87088 URINE BACTERIA CULTURE: CPT

## 2017-12-22 RX ORDER — CIPROFLOXACIN 2 MG/ML
400 INJECTION, SOLUTION INTRAVENOUS
Status: CANCELLED | OUTPATIENT
Start: 2017-12-22

## 2017-12-22 NOTE — PROGRESS NOTES
Subjective:       Patient ID: Roslyn Brewster is a 37 y.o. female who was referred by Demi Wang NP    Chief Complaint:   Chief Complaint   Patient presents with    Urinary Tract Infection     Patient has been having recurrent UTI for a while and has had five positive cultures with the same organism in the last month. Patient states she is taking the antibiotics she feels better for a day and symptoms return. Patient has taken bactrim, cipro, and macrobid.       Recurrent Pyelonephritis  Since around 2014 she has had multiple episodes of pyelonephritis.  She was seeing Dr. Luther at  who did a cystoscopy and per patient report did not find anything definitive.  Her symptoms include flank pain (left usually), malaise, and fever at times.  She normally does not have dysuria but current she has dysuria and frequency and urgency.  She knows about her small left kidney stone but has never required a procedure for stones.    ACTIVE MEDICAL ISSUES:  Patient Active Problem List   Diagnosis    Tobacco abuse    PMDD (premenstrual dysphoric disorder)    Routine gynecological examination    Anxiety    Mood disorder       PAST MEDICAL HISTORY  Past Medical History:   Diagnosis Date    Acute encephalopathy 12/24/12    secondary to drug overdose    ARF (acute renal failure) 12/24/12    secondary to drug overdose    History of cardiac arrest 12/24/12    secondary to drug overdose    Hypertension     MRSA (methicillin resistant Staphylococcus aureus) 12/24/12    Nephritis     PMDD (premenstrual dysphoric disorder)     Polysubstance abuse 12/24/12    Systolic CHF, acute 12/24/12    secondary to drug overdose    UTI (urinary tract infection)        PAST SURGICAL HISTORY:  Past Surgical History:   Procedure Laterality Date    CHOLECYSTECTOMY      TUBAL LIGATION         SOCIAL HISTORY:  Social History   Substance Use Topics    Smoking status: Current Every Day Smoker     Packs/day: 0.75     Years: 20.00      Types: Cigarettes    Smokeless tobacco: Never Used      Comment: 1/2 half pack/day.     Alcohol use No       FAMILY HISTORY:  Family History   Problem Relation Age of Onset    Kidney disease Mother     Cancer Maternal Grandmother      throat    Cancer Maternal Grandfather      prostate    Kidney disease Daughter     Breast cancer Neg Hx     Colon cancer Neg Hx     Ovarian cancer Neg Hx        ALLERGIES AND MEDICATIONS: updated and reviewed.  Review of patient's allergies indicates:   Allergen Reactions    Strawberry Anaphylaxis and Hives    Morphine Other (See Comments)     Burns stomach    Toradol [ketorolac]     Tramadol      Abdominal pain     Current Outpatient Prescriptions   Medication Sig    alprazolam (XANAX) 1 MG tablet TAKE ONE TABLET EVERY EVENING AS NEEDED FOR ANXIETY *MAY CAUSE DROWSINESS*    atomoxetine (STRATTERA) 40 MG capsule Take 1 capsule (40 mg total) by mouth once daily.    CEFTRIAXONE SODIUM (ROCEPHIN INJ) Inject as directed.    clindamycin phosphate 1% (CLINDAGEL) 1 % gel Apply topically 2 (two) times daily.    ondansetron (ZOFRAN) 4 MG tablet Take 1 tablet (4 mg total) by mouth every 6 (six) hours as needed for Nausea.    phenazopyridine (PYRIDIUM) 200 MG tablet Take 1 tablet (200 mg total) by mouth 3 (three) times daily.    sulfamethoxazole-trimethoprim 800-160mg (BACTRIM DS) 800-160 mg Tab Take 1 tablet by mouth 2 (two) times daily.     No current facility-administered medications for this visit.        Review of Systems   Constitutional: Negative for activity change, fatigue, fever and unexpected weight change.   Eyes: Negative for redness and visual disturbance.   Respiratory: Negative for chest tightness and shortness of breath.    Cardiovascular: Negative for chest pain and leg swelling.   Gastrointestinal: Negative for abdominal distention, abdominal pain, constipation, diarrhea, nausea and vomiting.   Genitourinary: Negative for difficulty urinating, dysuria,  "flank pain, frequency, hematuria, pelvic pain, urgency and vaginal bleeding.   Musculoskeletal: Negative for arthralgias and joint swelling.   Neurological: Negative for dizziness, weakness and headaches.   Psychiatric/Behavioral: Negative for confusion. The patient is not nervous/anxious.    All other systems reviewed and are negative.      Objective:      Vitals:    12/22/17 1523   BP: 112/76   Pulse: 68   Resp: 14   Weight: 72.2 kg (159 lb 2.8 oz)   Height: 5' 2" (1.575 m)     Physical Exam   Nursing note and vitals reviewed.  Constitutional: She is oriented to person, place, and time. She appears well-developed.   HENT:   Head: Normocephalic.   Eyes: Conjunctivae are normal.   Neck: Normal range of motion. No tracheal deviation present. No thyromegaly present.   Cardiovascular: Normal rate, normal heart sounds and normal pulses.    Pulmonary/Chest: Effort normal and breath sounds normal. No respiratory distress. She has no wheezes.   Abdominal: Soft. She exhibits no distension and no mass. There is no hepatosplenomegaly. There is no tenderness. There is no rebound, no guarding and no CVA tenderness. No hernia.   Musculoskeletal: Normal range of motion. She exhibits no edema or tenderness.   Lymphadenopathy:     She has no cervical adenopathy.   Neurological: She is alert and oriented to person, place, and time.   Skin: Skin is warm and dry. No rash noted. No erythema.     Psychiatric: She has a normal mood and affect. Her behavior is normal. Judgment and thought content normal.       Urine dipstick shows negative for all components.  Micro exam: negative for WBC's or RBC's.  CT Abdomen Pelvis With Contrast   Status: Final result   MyChart Results Release     [employer] Status: Active Results Release   PACS Images     Show images for CT Abdomen Pelvis With Contrast   External Result Report     External Result Report   Narrative     CT abdomen and pelvis with  IV contrast.    Comparison: The patient has 10 prior " abdomen and pelvic CTs.    Results: 5 mm axial images of the abdomen and pelvis were obtained after the administration of 75 cc of omni-350 IV contrast .  Delayed images of the abdomen and pelvis were also acquired. Coronal and sagittal reformat images were generated.    The lung bases are clear.  The liver, stomach, pancreas, spleen, adrenal glands all appear within normal limits.  Cholecystectomy clips.  Bilateral kidneys concentrate and excrete contrast normally.  There are 2 forming stones at the lower pole of the left kidney, the largest measures 3 mm, they are nonobstructive.  No stones seen along the bilateral ureters or within the bladder.  The uterus is present.  The ovaries demonstrate nothing unusual.  Moderate amount of retained stool.  No inflammatory changes of the bowel seen.  The appendix is normal.  The bilateral inguinal regions appear normal.  The osseous structures demonstrate no osseous lesions.   Impression         Nonobstructive left kidney calculi.  Cholecystectomy.      Electronically signed by: KATHY LUND MD  Date: 12/19/17  Time: 15:00         CT Abdomen Pelvis With Contrast   Status: Final result   [employer] Results Release     [employer] Status: Active Results Release   PACS Images     Show images for CT Abdomen Pelvis With Contrast   External Result Report     External Result Report   Narrative     Exam: [id number] 11/18/17  19:25:59 [id number] ([facility]) : CT ABDOMEN PELVIS WITH CONTRAST    Technique:    Axial CT Scan of the abdomen and pelvis was performed from the lung base to the public symphysis after the intravenous administration of  75 cc of Omni 350. Coronal and Sagittal reformats were obtained. Delayed images were also obtained    Comparison:    10/8/17    Findings:      The lung bases are within normal limits.  The heart is unremarkable.  The vessels are unremarkable.  There is no evidence of lymphadenopathy in the abdomen or pelvis.    The esophagus is unremarkable.  There is  nonspecific distention of the stomach.  The duodenum and the small bowel loops are unremarkable.  The appendix is partially visualized.  The visualized portions of the appendix are within normal limits.  The large bowel is unremarkable.    The liver is within normal limits.  The patient is status post cholecystectomy.  The biliary tree is within normal limits.  There is stable borderline splenomegaly.  The pancreas and adrenal glands are within normal limits.    There is a stable 2 mm nonobstructing stone in the lower pole of the left kidney.  No stones are identified in the right kidney.  There are subcentimeter hypodensities in the left kidney too small for complete characterization.  The ureters are within normal limits.  The urinary bladder is unremarkable.  There is no evidence of a striated nephrogram.  The uterus is unremarkable.  The adnexal structures are grossly unremarkable.    There is stable small amount of free fluid in the pelvis.  There is no evidence of free air.  There is no evidence of pneumatosis.    There are changes of bilateral breast prosthesis.  The abdominal wall is otherwise unremarkable.  The osseous structures demonstrates Schmorl's nodes in the visualized thoracic spine.  There is no evidence of a fracture.   Impression        2 mm nonobstructing stone in the lower pole of the left kidney.  No CT evidence of complicated urinary tract infection including renal abscess or obstructive uropathy.    Stable small amount of free fluid in the pelvis.    Nonspecific distention of the stomach.                    Assessment:       1. Chronic pyelonephritis without lesion of renal medullary necrosis    2. Kidney stones    3. Urinary urgency    4. Dysuria          Plan:       1. Chronic pyelonephritis without lesion of renal medullary necrosis  She is presently on Bactrim.  Plan for cystoscopy with bilateral retrograde pyelogram on Monday 1/8/2018.    - Urine culture  - POCT urinalysis, dipstick or  tablet reag    2. Kidney stones  She has a small stone and recurrent Enterococcus infections.  I will plan for left ureteroscopy also on 1/8/2018    3. Urinary urgency  stable    4. Dysuria  Urine culture            No Follow-up on file.

## 2017-12-22 NOTE — LETTER
December 22, 2017      Demi Wang, NP  [address] [place] [address]           [facility]  [address] [facility] [address]  [facility] [address]  Phone: [phone]          Patient: Roslyn Brewster   MR Number: [id number]   YOB: 1980   Date of Visit: 12/22/2017       Dear Demi Wang:    Thank you for referring Roslyn Brewster to me for evaluation. Attached you will find relevant portions of my assessment and plan of care.    If you have questions, please do not hesitate to call me. I look forward to following Roslyn Brewster along with you.    Sincerely,    PRAVIN Balderas MD    Enclosure  CC:  No Recipients    If you would like to receive this communication electronically, please contact [email or web address] or [phone] to request more information on [facility] access.    For providers and/or their staff who would like to refer a patient to [facility], please contact us through our one-stop-shop provider referral line, [facility] , at [phone].    If you feel you have received this communication in error or would no longer like to receive these types of communications, please e-mail [email or web address]

## 2017-12-22 NOTE — H&P
Subjective:       Patient ID: Roslyn Brewster is a 37 y.o. female who was referred by Demi Wang NP    Chief Complaint:   Chief Complaint   Patient presents with    Urinary Tract Infection     Patient has been having recurrent UTI for a while and has had five positive cultures with the same organism in the last month. Patient states she is taking the antibiotics she feels better for a day and symptoms return. Patient has taken bactrim, cipro, and macrobid.       Recurrent Pyelonephritis  Since around 2014 she has had multiple episodes of pyelonephritis.  She was seeing Dr. Luther at  who did a cystoscopy and per patient report did not find anything definitive.  Her symptoms include flank pain (left usually), malaise, and fever at times.  She normally does not have dysuria but current she has dysuria and frequency and urgency.  She knows about her small left kidney stone but has never required a procedure for stones.    ACTIVE MEDICAL ISSUES:  Patient Active Problem List   Diagnosis    Tobacco abuse    PMDD (premenstrual dysphoric disorder)    Routine gynecological examination    Anxiety    Mood disorder       PAST MEDICAL HISTORY  Past Medical History:   Diagnosis Date    Acute encephalopathy 12/24/12    secondary to drug overdose    ARF (acute renal failure) 12/24/12    secondary to drug overdose    History of cardiac arrest 12/24/12    secondary to drug overdose    Hypertension     MRSA (methicillin resistant Staphylococcus aureus) 12/24/12    Nephritis     PMDD (premenstrual dysphoric disorder)     Polysubstance abuse 12/24/12    Systolic CHF, acute 12/24/12    secondary to drug overdose    UTI (urinary tract infection)        PAST SURGICAL HISTORY:  Past Surgical History:   Procedure Laterality Date    CHOLECYSTECTOMY      TUBAL LIGATION         SOCIAL HISTORY:  Social History   Substance Use Topics    Smoking status: Current Every Day Smoker     Packs/day: 0.75     Years: 20.00      Types: Cigarettes    Smokeless tobacco: Never Used      Comment: 1/2 half pack/day.     Alcohol use No       FAMILY HISTORY:  Family History   Problem Relation Age of Onset    Kidney disease Mother     Cancer Maternal Grandmother      throat    Cancer Maternal Grandfather      prostate    Kidney disease Daughter     Breast cancer Neg Hx     Colon cancer Neg Hx     Ovarian cancer Neg Hx        ALLERGIES AND MEDICATIONS: updated and reviewed.  Review of patient's allergies indicates:   Allergen Reactions    Strawberry Anaphylaxis and Hives    Morphine Other (See Comments)     Burns stomach    Toradol [ketorolac]     Tramadol      Abdominal pain     Current Outpatient Prescriptions   Medication Sig    alprazolam (XANAX) 1 MG tablet TAKE ONE TABLET EVERY EVENING AS NEEDED FOR ANXIETY *MAY CAUSE DROWSINESS*    atomoxetine (STRATTERA) 40 MG capsule Take 1 capsule (40 mg total) by mouth once daily.    CEFTRIAXONE SODIUM (ROCEPHIN INJ) Inject as directed.    clindamycin phosphate 1% (CLINDAGEL) 1 % gel Apply topically 2 (two) times daily.    ondansetron (ZOFRAN) 4 MG tablet Take 1 tablet (4 mg total) by mouth every 6 (six) hours as needed for Nausea.    phenazopyridine (PYRIDIUM) 200 MG tablet Take 1 tablet (200 mg total) by mouth 3 (three) times daily.    sulfamethoxazole-trimethoprim 800-160mg (BACTRIM DS) 800-160 mg Tab Take 1 tablet by mouth 2 (two) times daily.     No current facility-administered medications for this visit.        Review of Systems   Constitutional: Negative for activity change, fatigue, fever and unexpected weight change.   Eyes: Negative for redness and visual disturbance.   Respiratory: Negative for chest tightness and shortness of breath.    Cardiovascular: Negative for chest pain and leg swelling.   Gastrointestinal: Negative for abdominal distention, abdominal pain, constipation, diarrhea, nausea and vomiting.   Genitourinary: Negative for difficulty urinating, dysuria,  "flank pain, frequency, hematuria, pelvic pain, urgency and vaginal bleeding.   Musculoskeletal: Negative for arthralgias and joint swelling.   Neurological: Negative for dizziness, weakness and headaches.   Psychiatric/Behavioral: Negative for confusion. The patient is not nervous/anxious.    All other systems reviewed and are negative.      Objective:      Vitals:    12/22/17 1523   BP: 112/76   Pulse: 68   Resp: 14   Weight: 72.2 kg (159 lb 2.8 oz)   Height: 5' 2" (1.575 m)     Physical Exam   Nursing note and vitals reviewed.  Constitutional: She is oriented to person, place, and time. She appears well-developed.   HENT:   Head: Normocephalic.   Eyes: Conjunctivae are normal.   Neck: Normal range of motion. No tracheal deviation present. No thyromegaly present.   Cardiovascular: Normal rate, normal heart sounds and normal pulses.    Pulmonary/Chest: Effort normal and breath sounds normal. No respiratory distress. She has no wheezes.   Abdominal: Soft. She exhibits no distension and no mass. There is no hepatosplenomegaly. There is no tenderness. There is no rebound, no guarding and no CVA tenderness. No hernia.   Musculoskeletal: Normal range of motion. She exhibits no edema or tenderness.   Lymphadenopathy:     She has no cervical adenopathy.   Neurological: She is alert and oriented to person, place, and time.   Skin: Skin is warm and dry. No rash noted. No erythema.     Psychiatric: She has a normal mood and affect. Her behavior is normal. Judgment and thought content normal.       Urine dipstick shows negative for all components.  Micro exam: negative for WBC's or RBC's.  CT Abdomen Pelvis With Contrast   Status: Final result   MyChart Results Release     [employer] Status: Active Results Release   PACS Images     Show images for CT Abdomen Pelvis With Contrast   External Result Report     External Result Report   Narrative     CT abdomen and pelvis with  IV contrast.    Comparison: The patient has 10 prior " abdomen and pelvic CTs.    Results: 5 mm axial images of the abdomen and pelvis were obtained after the administration of 75 cc of omni-350 IV contrast .  Delayed images of the abdomen and pelvis were also acquired. Coronal and sagittal reformat images were generated.    The lung bases are clear.  The liver, stomach, pancreas, spleen, adrenal glands all appear within normal limits.  Cholecystectomy clips.  Bilateral kidneys concentrate and excrete contrast normally.  There are 2 forming stones at the lower pole of the left kidney, the largest measures 3 mm, they are nonobstructive.  No stones seen along the bilateral ureters or within the bladder.  The uterus is present.  The ovaries demonstrate nothing unusual.  Moderate amount of retained stool.  No inflammatory changes of the bowel seen.  The appendix is normal.  The bilateral inguinal regions appear normal.  The osseous structures demonstrate no osseous lesions.   Impression         Nonobstructive left kidney calculi.  Cholecystectomy.      Electronically signed by: KATHY LUND MD  Date: 12/19/17  Time: 15:00         CT Abdomen Pelvis With Contrast   Status: Final result   [employer] Results Release     [employer] Status: Active Results Release   PACS Images     Show images for CT Abdomen Pelvis With Contrast   External Result Report     External Result Report   Narrative     Exam: [id number] 11/18/17  19:25:59 [id number] ([facility]) : CT ABDOMEN PELVIS WITH CONTRAST    Technique:    Axial CT Scan of the abdomen and pelvis was performed from the lung base to the public symphysis after the intravenous administration of  75 cc of Omni 350. Coronal and Sagittal reformats were obtained. Delayed images were also obtained    Comparison:    10/8/17    Findings:      The lung bases are within normal limits.  The heart is unremarkable.  The vessels are unremarkable.  There is no evidence of lymphadenopathy in the abdomen or pelvis.    The esophagus is unremarkable.  There is  nonspecific distention of the stomach.  The duodenum and the small bowel loops are unremarkable.  The appendix is partially visualized.  The visualized portions of the appendix are within normal limits.  The large bowel is unremarkable.    The liver is within normal limits.  The patient is status post cholecystectomy.  The biliary tree is within normal limits.  There is stable borderline splenomegaly.  The pancreas and adrenal glands are within normal limits.    There is a stable 2 mm nonobstructing stone in the lower pole of the left kidney.  No stones are identified in the right kidney.  There are subcentimeter hypodensities in the left kidney too small for complete characterization.  The ureters are within normal limits.  The urinary bladder is unremarkable.  There is no evidence of a striated nephrogram.  The uterus is unremarkable.  The adnexal structures are grossly unremarkable.    There is stable small amount of free fluid in the pelvis.  There is no evidence of free air.  There is no evidence of pneumatosis.    There are changes of bilateral breast prosthesis.  The abdominal wall is otherwise unremarkable.  The osseous structures demonstrates Schmorl's nodes in the visualized thoracic spine.  There is no evidence of a fracture.   Impression        2 mm nonobstructing stone in the lower pole of the left kidney.  No CT evidence of complicated urinary tract infection including renal abscess or obstructive uropathy.    Stable small amount of free fluid in the pelvis.    Nonspecific distention of the stomach.                    Assessment:       1. Chronic pyelonephritis without lesion of renal medullary necrosis    2. Kidney stones    3. Urinary urgency    4. Dysuria          Plan:       1. Chronic pyelonephritis without lesion of renal medullary necrosis  She is presently on Bactrim.  Plan for cystoscopy with bilateral retrograde pyelogram on Monday 1/8/2018.    - Urine culture  - POCT urinalysis, dipstick or  tablet reag    2. Kidney stones  She has a small stone and recurrent Enterococcus infections.  I will plan for left ureteroscopy also on 1/8/2018    3. Urinary urgency  stable    4. Dysuria  Urine culture            No Follow-up on file.

## 2017-12-24 LAB — BACTERIA UR CULT: NORMAL

## 2017-12-26 ENCOUNTER — TELEPHONE (OUTPATIENT)
Dept: UROLOGY | Facility: CLINIC | Age: 37
End: 2017-12-26

## 2018-01-02 ENCOUNTER — HOSPITAL ENCOUNTER (OUTPATIENT)
Dept: PREADMISSION TESTING | Facility: HOSPITAL | Age: 38
Discharge: HOME | End: 2018-01-02
Attending: UROLOGY
Payer: COMMERCIAL

## 2018-01-02 VITALS
HEART RATE: 89 BPM | DIASTOLIC BLOOD PRESSURE: 83 MMHG | TEMPERATURE: 98 F | SYSTOLIC BLOOD PRESSURE: 116 MMHG | RESPIRATION RATE: 18 BRPM | HEIGHT: 63 IN | BODY MASS INDEX: 28.35 KG/M2 | OXYGEN SATURATION: 98 % | WEIGHT: 160 LBS

## 2018-01-02 DIAGNOSIS — N20.0 KIDNEY STONES: ICD-10-CM

## 2018-01-02 DIAGNOSIS — N11.9 CHRONIC PYELONEPHRITIS WITHOUT LESION OF RENAL MEDULLARY NECROSIS: ICD-10-CM

## 2018-01-02 LAB
ANION GAP SERPL CALC-SCNC: 6 MMOL/L
BASOPHILS # BLD AUTO: 0.01 K/UL
BASOPHILS NFR BLD: 0.1 %
BUN SERPL-MCNC: 10 MG/DL
CALCIUM SERPL-MCNC: 9.8 MG/DL
CHLORIDE SERPL-SCNC: 104 MMOL/L
CO2 SERPL-SCNC: 28 MMOL/L
CREAT SERPL-MCNC: 0.8 MG/DL
DIFFERENTIAL METHOD: ABNORMAL
EOSINOPHIL # BLD AUTO: 0.1 K/UL
EOSINOPHIL NFR BLD: 1.4 %
ERYTHROCYTE [DISTWIDTH] IN BLOOD BY AUTOMATED COUNT: 12.5 %
EST. GFR  (AFRICAN AMERICAN): >60 ML/MIN/1.73 M^2
EST. GFR  (NON AFRICAN AMERICAN): >60 ML/MIN/1.73 M^2
GLUCOSE SERPL-MCNC: 108 MG/DL
HCT VFR BLD AUTO: 41.9 %
HGB BLD-MCNC: 15.1 G/DL
LYMPHOCYTES # BLD AUTO: 2.1 K/UL
LYMPHOCYTES NFR BLD: 27.9 %
MCH RBC QN AUTO: 31.5 PG
MCHC RBC AUTO-ENTMCNC: 36 G/DL
MCV RBC AUTO: 87 FL
MONOCYTES # BLD AUTO: 0.6 K/UL
MONOCYTES NFR BLD: 8.1 %
NEUTROPHILS # BLD AUTO: 4.6 K/UL
NEUTROPHILS NFR BLD: 62.5 %
PLATELET # BLD AUTO: 235 K/UL
PMV BLD AUTO: 9.6 FL
POTASSIUM SERPL-SCNC: 4.5 MMOL/L
RBC # BLD AUTO: 4.8 M/UL
SODIUM SERPL-SCNC: 138 MMOL/L
WBC # BLD AUTO: 7.39 K/UL

## 2018-01-02 PROCEDURE — 85025 COMPLETE CBC W/AUTO DIFF WBC: CPT

## 2018-01-02 PROCEDURE — 80048 BASIC METABOLIC PNL TOTAL CA: CPT

## 2018-01-02 NOTE — DISCHARGE INSTRUCTIONS
Your surgery is scheduled for___1/8/2018______________.    Call [phone] between 2 pm and 5 pm ___1/5/2018_________ to find out your arrival time for the day of surgery.    Report to SAME DAY SURGERY UNIT at _______am on the 2nd floor of the hospital.  Use the front entrance of the hospital before 6 am.  If you need wheelchair assistance, call [phone] from your cell phone,  or call 0 from the courtesy phone in the hospital lobby.    Important instructions:   Do not eat or drink after 12 midnight, including water.  It is okay to brush your teeth.  Do not have gum, candy or mints.     Please shower the night before and the morning of your surgery.       Do not wear make- up, including mascara.     You may wear deodorant only.      Do not wear powder, body lotion or cologne.     Do not wear any jewelry or have any metal on your body.     Please bring any documents given to you by your doctor.     If you are going home on the same day of surgery, you must have arrangements for a ride home.  You will not be able to drive home if you were given anesthesia or sedation.     Stop taking Aspirin, Ibuprofen, Motrin and Aleve at least 7 days before your surgery. You may use Tylenol.     Stop taking fish oil and vitamin E for least 7 days before surgery.     Wear loose fitting clothes allowing for bandages.     Please leave money and valuables home.       You may bring your cell phone.     Call the doctor if fever or illness should occur before your surgery.    Call [phone] to contact us here at [facility] if needed.

## 2018-01-08 ENCOUNTER — ANESTHESIA EVENT (OUTPATIENT)
Dept: SURGERY | Facility: HOSPITAL | Age: 38
End: 2018-01-08
Payer: COMMERCIAL

## 2018-01-08 ENCOUNTER — TELEPHONE (OUTPATIENT)
Dept: UROLOGY | Facility: CLINIC | Age: 38
End: 2018-01-08

## 2018-01-08 ENCOUNTER — SURGERY (OUTPATIENT)
Age: 38
End: 2018-01-08

## 2018-01-08 ENCOUNTER — HOSPITAL ENCOUNTER (OUTPATIENT)
Facility: HOSPITAL | Age: 38
Discharge: HOME | End: 2018-01-08
Attending: UROLOGY | Admitting: UROLOGY
Payer: COMMERCIAL

## 2018-01-08 ENCOUNTER — ANESTHESIA (OUTPATIENT)
Dept: SURGERY | Facility: HOSPITAL | Age: 38
End: 2018-01-08
Payer: COMMERCIAL

## 2018-01-08 VITALS
RESPIRATION RATE: 18 BRPM | TEMPERATURE: 98 F | SYSTOLIC BLOOD PRESSURE: 113 MMHG | WEIGHT: 160 LBS | BODY MASS INDEX: 28.35 KG/M2 | DIASTOLIC BLOOD PRESSURE: 69 MMHG | HEIGHT: 63 IN | HEART RATE: 89 BPM | OXYGEN SATURATION: 97 %

## 2018-01-08 DIAGNOSIS — N11.9 CHRONIC PYELONEPHRITIS WITHOUT LESION OF RENAL MEDULLARY NECROSIS: Primary | ICD-10-CM

## 2018-01-08 DIAGNOSIS — N20.0 KIDNEY STONES: ICD-10-CM

## 2018-01-08 DIAGNOSIS — N20.0 KIDNEY STONE: Primary | ICD-10-CM

## 2018-01-08 PROCEDURE — 36000706: Performed by: UROLOGY

## 2018-01-08 PROCEDURE — 52332 CYSTOSCOPY AND TREATMENT: CPT | Mod: 51,LT,, | Performed by: UROLOGY

## 2018-01-08 PROCEDURE — 71000033 HC RECOVERY, INTIAL HOUR: Performed by: UROLOGY

## 2018-01-08 PROCEDURE — D9220A PRA ANESTHESIA: Mod: CRNA,,, | Performed by: NURSE ANESTHETIST, CERTIFIED REGISTERED

## 2018-01-08 PROCEDURE — 71000015 HC POSTOP RECOV 1ST HR: Performed by: UROLOGY

## 2018-01-08 PROCEDURE — D9220A PRA ANESTHESIA: Mod: ANES,,, | Performed by: ANESTHESIOLOGY

## 2018-01-08 PROCEDURE — 37000009 HC ANESTHESIA EA ADD 15 MINS: Performed by: UROLOGY

## 2018-01-08 PROCEDURE — 36000707: Performed by: UROLOGY

## 2018-01-08 PROCEDURE — 27200651 HC AIRWAY, LMA: Performed by: NURSE ANESTHETIST, CERTIFIED REGISTERED

## 2018-01-08 PROCEDURE — 71000016 HC POSTOP RECOV ADDL HR: Performed by: UROLOGY

## 2018-01-08 PROCEDURE — 25500020 PHARM REV CODE 255: Performed by: UROLOGY

## 2018-01-08 PROCEDURE — 63600175 PHARM REV CODE 636 W HCPCS: Performed by: ANESTHESIOLOGY

## 2018-01-08 PROCEDURE — 25000003 PHARM REV CODE 250: Performed by: NURSE ANESTHETIST, CERTIFIED REGISTERED

## 2018-01-08 PROCEDURE — C1894 INTRO/SHEATH, NON-LASER: HCPCS | Performed by: UROLOGY

## 2018-01-08 PROCEDURE — C2617 STENT, NON-COR, TEM W/O DEL: HCPCS | Performed by: UROLOGY

## 2018-01-08 PROCEDURE — 63600175 PHARM REV CODE 636 W HCPCS: Performed by: NURSE ANESTHETIST, CERTIFIED REGISTERED

## 2018-01-08 PROCEDURE — 63600175 PHARM REV CODE 636 W HCPCS: Performed by: UROLOGY

## 2018-01-08 PROCEDURE — 37000008 HC ANESTHESIA 1ST 15 MINUTES: Performed by: UROLOGY

## 2018-01-08 PROCEDURE — 74420 UROGRAPHY RTRGR +-KUB: CPT | Mod: 26,,, | Performed by: UROLOGY

## 2018-01-08 PROCEDURE — C1758 CATHETER, URETERAL: HCPCS | Performed by: UROLOGY

## 2018-01-08 PROCEDURE — 52351 CYSTOURETERO & OR PYELOSCOPE: CPT | Mod: LT,,, | Performed by: UROLOGY

## 2018-01-08 PROCEDURE — 76000 FLUOROSCOPY <1 HR PHYS/QHP: CPT | Mod: 26,59,, | Performed by: UROLOGY

## 2018-01-08 PROCEDURE — 25000003 PHARM REV CODE 250: Performed by: UROLOGY

## 2018-01-08 PROCEDURE — C1769 GUIDE WIRE: HCPCS | Performed by: UROLOGY

## 2018-01-08 DEVICE — STENT URET PERCUFLEX 6FR 24CM: Type: IMPLANTABLE DEVICE | Site: URETER | Status: FUNCTIONAL

## 2018-01-08 RX ORDER — OXYBUTYNIN CHLORIDE 5 MG/1
5 TABLET ORAL 3 TIMES DAILY
Qty: 90 TABLET | Refills: 1 | Status: SHIPPED | OUTPATIENT
Start: 2018-01-08 | End: 2018-02-07

## 2018-01-08 RX ORDER — SODIUM CHLORIDE 0.9 G/100ML
IRRIGANT IRRIGATION
Status: DISCONTINUED | OUTPATIENT
Start: 2018-01-08 | End: 2018-01-08 | Stop reason: HOSPADM

## 2018-01-08 RX ORDER — DEXAMETHASONE SODIUM PHOSPHATE 4 MG/ML
INJECTION, SOLUTION INTRA-ARTICULAR; INTRALESIONAL; INTRAMUSCULAR; INTRAVENOUS; SOFT TISSUE
Status: DISCONTINUED | OUTPATIENT
Start: 2018-01-08 | End: 2018-01-08

## 2018-01-08 RX ORDER — LIDOCAINE HCL/PF 100 MG/5ML
SYRINGE (ML) INTRAVENOUS
Status: DISCONTINUED | OUTPATIENT
Start: 2018-01-08 | End: 2018-01-08

## 2018-01-08 RX ORDER — CIPROFLOXACIN 2 MG/ML
400 INJECTION, SOLUTION INTRAVENOUS
Status: CANCELLED | OUTPATIENT
Start: 2018-01-08

## 2018-01-08 RX ORDER — OXYBUTYNIN CHLORIDE 5 MG/1
5 TABLET ORAL 3 TIMES DAILY
Status: DISCONTINUED | OUTPATIENT
Start: 2018-01-08 | End: 2018-01-08 | Stop reason: HOSPADM

## 2018-01-08 RX ORDER — SODIUM CHLORIDE, SODIUM LACTATE, POTASSIUM CHLORIDE, CALCIUM CHLORIDE 600; 310; 30; 20 MG/100ML; MG/100ML; MG/100ML; MG/100ML
INJECTION, SOLUTION INTRAVENOUS CONTINUOUS PRN
Status: DISCONTINUED | OUTPATIENT
Start: 2018-01-08 | End: 2018-01-08

## 2018-01-08 RX ORDER — PHENAZOPYRIDINE HYDROCHLORIDE 200 MG/1
200 TABLET, FILM COATED ORAL 3 TIMES DAILY PRN
Qty: 21 TABLET | Refills: 0 | Status: SHIPPED | OUTPATIENT
Start: 2018-01-08 | End: 2018-02-07

## 2018-01-08 RX ORDER — SODIUM CHLORIDE 0.9 % (FLUSH) 0.9 %
3 SYRINGE (ML) INJECTION
Status: DISCONTINUED | OUTPATIENT
Start: 2018-01-08 | End: 2018-01-08 | Stop reason: HOSPADM

## 2018-01-08 RX ORDER — CIPROFLOXACIN 2 MG/ML
400 INJECTION, SOLUTION INTRAVENOUS
Status: COMPLETED | OUTPATIENT
Start: 2018-01-08 | End: 2018-01-08

## 2018-01-08 RX ORDER — PHENAZOPYRIDINE HYDROCHLORIDE 100 MG/1
200 TABLET, FILM COATED ORAL ONCE
Status: COMPLETED | OUTPATIENT
Start: 2018-01-08 | End: 2018-01-08

## 2018-01-08 RX ORDER — METOCLOPRAMIDE HYDROCHLORIDE 5 MG/ML
INJECTION INTRAMUSCULAR; INTRAVENOUS
Status: DISCONTINUED | OUTPATIENT
Start: 2018-01-08 | End: 2018-01-08

## 2018-01-08 RX ORDER — ONDANSETRON 2 MG/ML
INJECTION INTRAMUSCULAR; INTRAVENOUS
Status: DISCONTINUED | OUTPATIENT
Start: 2018-01-08 | End: 2018-01-08

## 2018-01-08 RX ORDER — MIDAZOLAM HYDROCHLORIDE 1 MG/ML
INJECTION, SOLUTION INTRAMUSCULAR; INTRAVENOUS
Status: DISCONTINUED | OUTPATIENT
Start: 2018-01-08 | End: 2018-01-08

## 2018-01-08 RX ORDER — HYDROCODONE BITARTRATE AND ACETAMINOPHEN 5; 325 MG/1; MG/1
1 TABLET ORAL EVERY 4 HOURS PRN
Status: DISCONTINUED | OUTPATIENT
Start: 2018-01-08 | End: 2018-01-08 | Stop reason: HOSPADM

## 2018-01-08 RX ORDER — CIPROFLOXACIN 500 MG/1
500 TABLET ORAL 2 TIMES DAILY
Qty: 6 TABLET | Refills: 0 | Status: SHIPPED | OUTPATIENT
Start: 2018-01-08 | End: 2018-01-11

## 2018-01-08 RX ORDER — LORAZEPAM 2 MG/ML
0.25 INJECTION INTRAMUSCULAR ONCE AS NEEDED
Status: DISCONTINUED | OUTPATIENT
Start: 2018-01-08 | End: 2018-01-08 | Stop reason: HOSPADM

## 2018-01-08 RX ORDER — GLYCOPYRROLATE 0.2 MG/ML
INJECTION INTRAMUSCULAR; INTRAVENOUS
Status: DISCONTINUED | OUTPATIENT
Start: 2018-01-08 | End: 2018-01-08

## 2018-01-08 RX ORDER — FENTANYL CITRATE 50 UG/ML
25 INJECTION, SOLUTION INTRAMUSCULAR; INTRAVENOUS EVERY 5 MIN PRN
Status: DISCONTINUED | OUTPATIENT
Start: 2018-01-08 | End: 2018-01-08 | Stop reason: HOSPADM

## 2018-01-08 RX ORDER — PROMETHAZINE HYDROCHLORIDE 25 MG/ML
12.5 INJECTION, SOLUTION INTRAMUSCULAR; INTRAVENOUS ONCE
Status: COMPLETED | OUTPATIENT
Start: 2018-01-08 | End: 2018-01-08

## 2018-01-08 RX ORDER — SODIUM CHLORIDE, SODIUM LACTATE, POTASSIUM CHLORIDE, CALCIUM CHLORIDE 600; 310; 30; 20 MG/100ML; MG/100ML; MG/100ML; MG/100ML
INJECTION, SOLUTION INTRAVENOUS CONTINUOUS
Status: DISCONTINUED | OUTPATIENT
Start: 2018-01-08 | End: 2018-01-08 | Stop reason: HOSPADM

## 2018-01-08 RX ORDER — HYDROCODONE BITARTRATE AND ACETAMINOPHEN 5; 325 MG/1; MG/1
1 TABLET ORAL EVERY 6 HOURS PRN
Qty: 30 TABLET | Refills: 0 | Status: SHIPPED | OUTPATIENT
Start: 2018-01-08 | End: 2018-02-07

## 2018-01-08 RX ORDER — PROPOFOL 10 MG/ML
VIAL (ML) INTRAVENOUS
Status: DISCONTINUED | OUTPATIENT
Start: 2018-01-08 | End: 2018-01-08

## 2018-01-08 RX ORDER — HYDROMORPHONE HYDROCHLORIDE 2 MG/ML
0.2 INJECTION, SOLUTION INTRAMUSCULAR; INTRAVENOUS; SUBCUTANEOUS EVERY 5 MIN PRN
Status: DISCONTINUED | OUTPATIENT
Start: 2018-01-08 | End: 2018-01-08 | Stop reason: HOSPADM

## 2018-01-08 RX ORDER — FENTANYL CITRATE 50 UG/ML
INJECTION, SOLUTION INTRAMUSCULAR; INTRAVENOUS
Status: DISCONTINUED | OUTPATIENT
Start: 2018-01-08 | End: 2018-01-08

## 2018-01-08 RX ADMIN — GLYCOPYRROLATE 0.2 MG: 0.2 INJECTION, SOLUTION INTRAMUSCULAR; INTRAVENOUS at 07:01

## 2018-01-08 RX ADMIN — HYDROMORPHONE HYDROCHLORIDE 0.2 MG: 2 INJECTION, SOLUTION INTRAMUSCULAR; INTRAVENOUS; SUBCUTANEOUS at 08:01

## 2018-01-08 RX ADMIN — MIDAZOLAM HYDROCHLORIDE 2 MG: 1 INJECTION, SOLUTION INTRAMUSCULAR; INTRAVENOUS at 07:01

## 2018-01-08 RX ADMIN — METOCLOPRAMIDE 10 MG: 5 INJECTION, SOLUTION INTRAMUSCULAR; INTRAVENOUS at 07:01

## 2018-01-08 RX ADMIN — PROPOFOL 200 MG: 10 INJECTION, EMULSION INTRAVENOUS at 07:01

## 2018-01-08 RX ADMIN — PHENAZOPYRIDINE HYDROCHLORIDE 200 MG: 100 TABLET ORAL at 08:01

## 2018-01-08 RX ADMIN — HYDROMORPHONE HYDROCHLORIDE 0.2 MG: 2 INJECTION, SOLUTION INTRAMUSCULAR; INTRAVENOUS; SUBCUTANEOUS at 09:01

## 2018-01-08 RX ADMIN — FENTANYL CITRATE 50 MCG: 50 INJECTION INTRAMUSCULAR; INTRAVENOUS at 08:01

## 2018-01-08 RX ADMIN — DEXAMETHASONE SODIUM PHOSPHATE 4 MG: 4 INJECTION, SOLUTION INTRAMUSCULAR; INTRAVENOUS at 07:01

## 2018-01-08 RX ADMIN — LIDOCAINE HYDROCHLORIDE 100 MG: 20 INJECTION, SOLUTION INTRAVENOUS at 07:01

## 2018-01-08 RX ADMIN — SODIUM CHLORIDE, SODIUM LACTATE, POTASSIUM CHLORIDE, AND CALCIUM CHLORIDE: .6; .31; .03; .02 INJECTION, SOLUTION INTRAVENOUS at 06:01

## 2018-01-08 RX ADMIN — CIPROFLOXACIN 400 MG: 2 INJECTION, SOLUTION INTRAVENOUS at 07:01

## 2018-01-08 RX ADMIN — PROMETHAZINE HYDROCHLORIDE 12.5 MG: 25 INJECTION INTRAMUSCULAR; INTRAVENOUS at 09:01

## 2018-01-08 RX ADMIN — FENTANYL CITRATE 50 MCG: 50 INJECTION INTRAMUSCULAR; INTRAVENOUS at 07:01

## 2018-01-08 RX ADMIN — SODIUM CHLORIDE 6000 ML: 0.9 IRRIGANT IRRIGATION at 07:01

## 2018-01-08 RX ADMIN — ONDANSETRON 4 MG: 2 INJECTION, SOLUTION INTRAMUSCULAR; INTRAVENOUS at 07:01

## 2018-01-08 RX ADMIN — HYDROCODONE BITARTRATE AND ACETAMINOPHEN 1 TABLET: 5; 325 TABLET ORAL at 09:01

## 2018-01-08 RX ADMIN — IOHEXOL 100 ML: 300 INJECTION, SOLUTION INTRAVENOUS at 08:01

## 2018-01-08 NOTE — BRIEF OP NOTE
[facility]  Brief Operative Note     SUMMARY     Surgery Date: 1/8/2018     Surgeon(s) and Role:     * PRAVIN Balderas MD - Primary    Assisting Surgeon: None    Pre-op Diagnosis:  Chronic pyelonephritis without lesion of renal medullary necrosis [N11.9]  Kidney stones [N20.0]    Post-op Diagnosis:  Post-Op Diagnosis Codes:     * Chronic pyelonephritis without lesion of renal medullary necrosis [N11.9]     * Kidney stones [N20.0]    Procedure(s) (LRB):  CYSTOSCOPY, RETROGRADE, URETEROSCOPY, STENT PLACEMENT (Left)    Anesthesia: General    Description of the findings of the procedure: no lesions in bladder, bilateral retrograde normal.  Left ureter small and would not accept sheath/scope up to kidney therefore stent placed.    Findings/Key Components: as above    Estimated Blood Loss: * No values recorded between 1/8/2018  7:43 AM and 1/8/2018  8:14 AM *         Specimens:   Specimen (12h ago through future)    None          Discharge Note    SUMMARY     Admit Date: 1/8/2018    Discharge Date and Time:  01/08/2018 8:15 AM    Hospital Course (synopsis of major diagnoses, care, treatment, and services provided during the course of the hospital stay): Patient was admitted for an outpatient procedure and tolerated the procedure well with no complications.       Final Diagnosis: Post-Op Diagnosis Codes:     * Chronic pyelonephritis without lesion of renal medullary necrosis [N11.9]     * Kidney stones [N20.0]    Disposition: Home or Self Care    Follow Up/Patient Instructions:     Medications:  Reconciled Home Medications:   Current Discharge Medication List      START taking these medications    Details   ciprofloxacin HCl (CIPRO) 500 MG tablet Take 1 tablet (500 mg total) by mouth 2 (two) times daily.  Qty: 6 tablet, Refills: 0    Associated Diagnoses: Chronic pyelonephritis without lesion of renal medullary necrosis; Kidney stones      hydrocodone-acetaminophen 5-325mg (NORCO) 5-325 mg per tablet  Take 1 tablet by mouth every 6 (six) hours as needed for Pain.  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  Qty: 90 tablet, Refills: 1    Associated Diagnoses: Chronic pyelonephritis without lesion of renal medullary necrosis; Kidney stones      phenazopyridine (PYRIDIUM) 200 MG tablet Take 1 tablet (200 mg total) by mouth 3 (three) times daily as needed for Pain (Burning).  Qty: 21 tablet, Refills: 0    Associated Diagnoses: Chronic pyelonephritis without lesion of renal medullary necrosis; Kidney stones         CONTINUE these medications which have NOT CHANGED    Details   alprazolam (XANAX) 1 MG tablet TAKE ONE TABLET EVERY EVENING AS NEEDED FOR ANXIETY *MAY CAUSE DROWSINESS*  Qty: 25 tablet, Refills: 2    Comments: Lowered to 25 per month for PRN use  Associated Diagnoses: PMDD (premenstrual dysphoric disorder)             Discharge Procedure Orders  Diet general     Activity as tolerated     Call MD for:   Order Comments: Significant Hematuria       Follow-up Information     W Lopez Balderas MD. Schedule an appointment as soon as possible for a visit in 2 weeks.    Specialty:  Urology  Why:  Post op Check  Contact information:  [address] [place] [id number] [phone]

## 2018-01-08 NOTE — ANESTHESIA PREPROCEDURE EVALUATION
01/08/2018  Roslyn Brewster is a 37 y.o., female.    Anesthesia Evaluation    I have reviewed the Patient Summary Reports.    I have reviewed the Nursing Notes.   I have reviewed the Medications.     Review of Systems  Anesthesia Hx:  No problems with previous Anesthesia Denies Hx of Anesthetic complications  Neg history of prior surgery. Denies Family Hx of Anesthesia complications.   Denies Personal Hx of Anesthesia complications.   Social:  No Alcohol Use, Smoker    Hematology/Oncology:  Hematology Normal   Oncology Normal     EENT/Dental:EENT/Dental Normal   Cardiovascular:   Exercise tolerance: good Hypertension CHF    Pulmonary:  Pulmonary Normal    Renal/:   Chronic Renal Disease    Hepatic/GI:  Hepatic/GI Normal    Musculoskeletal:  Musculoskeletal Normal    Neurological:  Neurology Normal    Endocrine:  Endocrine Normal    Dermatological:  Skin Normal    Psych:   anxiety          Physical Exam  General:  Well nourished    Airway/Jaw/Neck:  Airway Findings: Mouth Opening: Normal General Airway Assessment: Adult  Mallampati: II  TM Distance: Normal, at least 6 cm         Dental:  DENTAL FINDINGS: Normal   Chest/Lungs:  Chest/Lungs Clear    Heart/Vascular:  Heart Findings: Normal Heart murmur: negative       Mental Status:  Mental Status Findings:  Cooperative, Alert and Oriented         Anesthesia Plan  Type of Anesthesia, risks & benefits discussed:  Anesthesia Type:  general  Patient's Preference:   Intra-op Monitoring Plan:   Intra-op Monitoring Plan Comments:   Post Op Pain Control Plan:   Post Op Pain Control Plan Comments:   Induction:   IV  Beta Blocker:  Patient is not currently on a Beta-Blocker (No further documentation required).       Informed Consent: Patient understands risks and agrees with Anesthesia plan.  Questions answered. Anesthesia consent signed with patient.  ASA Score: 2      Day of Surgery Review of History & Physical:            Ready For Surgery From Anesthesia Perspective.

## 2018-01-08 NOTE — INTERVAL H&P NOTE
The patient has been examined and the H&P has been reviewed:    I concur with the findings and no changes have occurred since H&P was written.    Anesthesia/Surgery risks, benefits and alternative options discussed and understood by patient/family.          Active Hospital Problems    Diagnosis  POA    Chronic pyelonephritis without lesion of renal medullary necrosis [N11.9]  Yes      Resolved Hospital Problems    Diagnosis Date Resolved POA   No resolved problems to display.

## 2018-01-08 NOTE — OP NOTE
DATE OF PROCEDURE:  01/08/2018    PREOPERATIVE DIAGNOSES:  Chronic pyelonephritis and left kidney stones.    POSTOPERATIVE DIAGNOSES:  Chronic pyelonephritis and left kidney stones.    PROCEDURE PERFORMED:  Cystoscopy with bilateral retrograde pyelogram, left   ureteroscopy, left ureteral stent placement, fluoroscopy and Boothe catheter   placement.    PRIMARY SURGEON:  Justice Balderas M.D.    ANESTHESIA:  General.    ESTIMATED BLOOD LOSS:  Minimal.    DRAINS:  A 6-Central [place] 24 cm double-J stent, no string and 16-[place] African Boothe   catheter.    SPECIMENS REMOVED:  None.    COMPLICATIONS:  None.    INDICATIONS:  Roslyn Brewster is a 37-year-old woman with a history of recurrent   pyelonephritis, mostly on the left side.  She also has a small kidney stone.  It   was recommended that she undergo cystoscopy with bilateral retrograde pyelogram   for evaluation of her recurrent pyelonephritis as well as ureteroscopy for   clearance of the stone since that is the side that she always has infections on.    Roslyn Brewster was taken to the Operating Room where she was positively   identified by mio.  She was placed supine on the operating room table.    Following induction of adequate general anesthesia, she was placed in the dorsal   lithotomy position and her external genitalia were prepped and draped in usual   sterile fashion.    A preoperative timeout was performed as well as confirmation of preoperative   antibiotics and preoperative marking on the left side.    A  film was taken using fluoroscopy.    A 21-[place] rigid cystoscope was then passed per urethra into the bladder under   direct vision.  Both ureteral orifices were identified.  They were seen to be in   their orthotopic position.  She did have some squamous metaplasia noted in the   trigone.    Posterior bladder wall did show some evidence of some cystitis.  However, there   was no evidence of any fistulas.  The bladder was inspected with a 30 and 70    degree lenses.  There were no other abnormalities seen.    A 5-Citizen of [place] open-ended catheter was then used to intubate the right ureteral   orifice.  Retrograde pyelogram was taken.  There were no filling defects seen   within the ureter or the pyelocalyceal system.  There was no evidence of any   fistula and contrast was seen draining from the right ureter promptly.    I then performed a left retrograde pyelogram.  Contrast was seen traveling up   the ureter into the pyelocalyceal system.  There was no evidence of any   hydronephrosis or hydroureter.  There was no evidence of any fistulas.    I then withdrew the open-ended catheter.  Contrast mixed with white debris was   seen draining from the left side.    I reintroduced the 5-Citizen [place] open-ended catheter and then I passed a 0.035 inch   Bentson wire intubating the left ureteral orifice.  The wire was passed up into   the level of the kidney, then withdrew the scope and the catheter, leaving the   wire in place for safety.    I then reintroduced the scope and catheter and passed the second Bentson wire   alongside the first up to the level of the kidney.  I then withdrew the scope   and the catheter, leaving the wire in place.    I then attempted to pass a regular flexible ureteroscope over the wire; however,   would not go past the ureteral orifice based on fluoroscopy.  Therefore, I   withdrew the scope.    I then passed an 8/10 Citizen [place] coaxial dilator set over the wire, which passed up   to the level of the kidney without difficulty.  I then withdrew the dilator set   leaving the wire in place.    I then passed the flexible ureteroscope over the wire; however, it only   negotiated up to approximately the mid ureter.    I then passed an 11/13 Citizen [place] ureteral access sheath passing the obturator   first, which went all the way up to the mid ureter.  I then advanced the   obturator and sheath up to the same level, mid ureter, and then I withdrew the   obturator and  wire leaving the sheath in place.    I then reintroduced the ureteroscope.  The ureteroscope was passed to the mid   ureter to the mid proximal ureter; however, the ureter was very narrow at this   area.  There was no discrete stricture; however, just the entire ureter was   somewhat narrow.  Instead of actively dilating with the balloon system, I   decided to simply leave the stent for passive dilation and ureteroscopy for   further ureteroscopy at a later date.    I then withdrew the ureteroscope and the sheath together.  There was no evidence   of any injury to the ureter.    I then passed a 6-Faro[place] 24 cm double-J stent over the wire, deploying a coil   within the left kidney and a coil within the bladder, confirmed on fluoroscopy.    I then passed a 16-[place]aro[place] Boothe catheter for postoperative drainage.    Her anesthesia was reversed.  She was taken to the Recovery Room in stable   condition.      THOMPSON/IN  dd: 01/08/2018 08:20:57 (CST)  td: 01/08/2018 10:05:00 (CST)  Doc ID   #[id number]  Job ID #[id number]    CC:

## 2018-01-08 NOTE — TRANSFER OF CARE
"Anesthesia Transfer of Care Note    Patient: Roslyn Brewster    Procedure(s) Performed: Procedure(s) (LRB):  CYSTOSCOPY, RETROGRADE, URETEROSCOPY, STENT PLACEMENT (Left)    Patient location: PACU    Anesthesia Type: general    Transport from OR: Transported from OR on room air with adequate spontaneous ventilation    Post pain: adequate analgesia    Post assessment: no apparent anesthetic complications and tolerated procedure well    Post vital signs: stable    Level of consciousness: sedated and responds to stimulation    Nausea/Vomiting: no nausea/vomiting    Complications: none    Transfer of care protocol was followed      Last vitals:   Visit Vitals  /63 (BP Location: Left arm, Patient Position: Lying)   Pulse 108   Temp 37 °C (98.6 °F) (Oral)   Resp 18   Ht 5' 3" (1.6 m)   Wt 72.6 kg (160 lb)   SpO2 98%   Breastfeeding? No   BMI 28.34 kg/m²     "

## 2018-01-08 NOTE — DISCHARGE INSTRUCTIONS
ACTIVITY LEVEL: If you have received sedation or an anesthetic, you may feel sleepy for several hours. Rest until you are more awake. Gradually resume your normal activities.       DIET: You may resume your home diet. If nausea is present, increase your diet gradually with fluids and bland foods.      Medications: Pain medication should be taken only if needed and as directed. If antibiotics are prescribed, the medication should be taken until completed. You will be given an updated list of you medications.  ? No driving, alcoholic beverages or signing legal documents for next 24 hours or while taking pain medication        CALL THE DOCTOR:       · Fever over 101°F  · Severe pain that doesnt go away with medication.  · Upset stomach and vomiting that is persistent.  · Problems urinating-unable to urinate or heavy bleeding (with or without clots)    Fall Prevention  Millions of people fall every year and injure themselves. You may have had anesthesia or sedation which may increase your risk of falling. You may have health issues that put you at an increased risk of falling.     Here are ways to reduce your risk of falling.  ·   · Make your home safe by keeping walkways clear of objects you may trip over.  · Use non-slip pads under rugs. Do not use area rugs or small throw rugs.  · Use non-slip mats in bathtubs and showers.  · Install handrails and lights on staircases.  · Do not walk in poorly lit areas.  · Do not stand on chairs or wobbly ladders.  · Use caution when reaching overhead or looking upward. This position can cause a loss of balance.  · Be sure your shoes fit properly, have non-slip bottoms and are in good condition.   · Wear shoes both inside and out. Avoid going barefoot or wearing slippers.  · Be cautious when going up and down stairs, curbs, and when walking on uneven sidewalks.  · If your balance is poor, consider using a cane or walker.  · If your fall was related to alcohol use, stop or limit  alcohol intake.   · If your fall was related to use of sleeping medicines, talk to your doctor about this. You may need to reduce your dosage at bedtime if you awaken during the night to go to the bathroom.    · To reduce the need for nighttime bathroom trips:  ¨ Avoid drinking fluids for several hours before going to bed  ¨ Empty your bladder before going to bed  ¨ Men can keep a urinal at the bedside  · Stay as active as you can. Balance, flexibility, strength, and endurance all come from exercise. They all play a role in preventing falls. Ask your healthcare provider which types of activity are right for you.  · Get your vision checked on a regular basis.  · If you have pets, know where they are before you stand up or walk so you don't trip over them.  Use night lights.

## 2018-01-08 NOTE — ANESTHESIA POSTPROCEDURE EVALUATION
"Anesthesia Post Evaluation    Patient: Roslyn Brewster    Procedure(s) Performed: Procedure(s) (LRB):  CYSTOSCOPY, RETROGRADE, URETEROSCOPY, STENT PLACEMENT (Left)  PLACEMENT OF DORSEY (N/A)    Final Anesthesia Type: general  Patient location during evaluation: PACU  Patient participation: Yes- Able to Participate  Level of consciousness: awake and alert, oriented and awake  Post-procedure vital signs: reviewed and stable  Airway patency: patent  PONV status at discharge: No PONV  Anesthetic complications: no      Cardiovascular status: blood pressure returned to baseline  Respiratory status: unassisted, spontaneous ventilation and room air  Hydration status: euvolemic  Follow-up not needed.        Visit Vitals  /69   Pulse 89   Temp 36.7 °C (98 °F) (Oral)   Resp 18   Ht 5' 3" (1.6 m)   Wt 72.6 kg (160 lb)   SpO2 97%   Breastfeeding? No   BMI 28.34 kg/m²       Pain/Luci Score: Pain Assessment Performed: Yes (1/8/2018  9:15 AM)  Presence of Pain: complains of pain/discomfort (1/8/2018 10:55 AM)  Pain Rating Prior to Med Admin: 6 (1/8/2018 10:40 AM)  Pain Rating Post Med Admin: 2 (1/8/2018 10:40 AM)  Luci Score: 10 (1/8/2018 10:55 AM)  Modified Luci Score: 19 (1/8/2018 10:55 AM)      "

## 2018-01-08 NOTE — DISCHARGE SUMMARY
[facility]  Discharge Note  Short Stay    Admit Date: 1/8/2018    Discharge Date and Time: 01/08/2018 8:15 AM      Attending Physician: PRAVIN Balderas MD     Discharge Provider: TOMMY Balderas    Diagnoses:  Active Hospital Problems    Diagnosis  POA    *Chronic pyelonephritis without lesion of renal medullary necrosis [N11.9]  Yes    Kidney stones [N20.0]  Yes      Resolved Hospital Problems    Diagnosis Date Resolved POA   No resolved problems to display.       Discharged Condition: stable    Hospital Course: Patient was admitted for an outpatient procedure and tolerated the procedure well with no complications.    Final Diagnoses: Same as principal problem.    Disposition: Home or Self Care    Follow up/Patient Instructions:    Medications:  Reconciled Home Medications:   Current Discharge Medication List      START taking these medications    Details   ciprofloxacin HCl (CIPRO) 500 MG tablet Take 1 tablet (500 mg total) by mouth 2 (two) times daily.  Qty: 6 tablet, Refills: 0    Associated Diagnoses: Chronic pyelonephritis without lesion of renal medullary necrosis; Kidney stones      hydrocodone-acetaminophen 5-325mg (NORCO) 5-325 mg per tablet Take 1 tablet by mouth every 6 (six) hours as needed for Pain.  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  Qty: 90 tablet, Refills: 1    Associated Diagnoses: Chronic pyelonephritis without lesion of renal medullary necrosis; Kidney stones      phenazopyridine (PYRIDIUM) 200 MG tablet Take 1 tablet (200 mg total) by mouth 3 (three) times daily as needed for Pain (Burning).  Qty: 21 tablet, Refills: 0    Associated Diagnoses: Chronic pyelonephritis without lesion of renal medullary necrosis; Kidney stones         CONTINUE these medications which have NOT CHANGED    Details    alprazolam (XANAX) 1 MG tablet TAKE ONE TABLET EVERY EVENING AS NEEDED FOR ANXIETY *MAY CAUSE DROWSINESS*  Qty: 25 tablet, Refills: 2    Comments: Lowered to 25 per month for PRN use  Associated Diagnoses: PMDD (premenstrual dysphoric disorder)             Discharge Procedure Orders  Diet general     Activity as tolerated     Call MD for:   Order Comments: Significant Hematuria       Follow-up Information     W Lopez Balderas MD. Schedule an appointment as soon as possible for a visit in 2 weeks.    Specialty:  Urology  Why:  Post op Check  Contact information:  [address] [place] [address] [id number] [phone]                   Discharge Procedure Orders (must include Diet, Follow-up, Activity):    Discharge Procedure Orders (must include Diet, Follow-up, Activity)  Diet general     Activity as tolerated     Call MD for:   Order Comments: Significant Hematuria

## 2018-01-08 NOTE — H&P (VIEW-ONLY)
Subjective:       Patient ID: Roslyn Brewster is a 37 y.o. female who was referred by Dmei Wang NP    Chief Complaint:   Chief Complaint   Patient presents with    Urinary Tract Infection     Patient has been having recurrent UTI for a while and has had five positive cultures with the same organism in the last month. Patient states she is taking the antibiotics she feels better for a day and symptoms return. Patient has taken bactrim, cipro, and macrobid.       Recurrent Pyelonephritis  Since around 2014 she has had multiple episodes of pyelonephritis.  She was seeing Dr. Luther at  who did a cystoscopy and per patient report did not find anything definitive.  Her symptoms include flank pain (left usually), malaise, and fever at times.  She normally does not have dysuria but current she has dysuria and frequency and urgency.  She knows about her small left kidney stone but has never required a procedure for stones.    ACTIVE MEDICAL ISSUES:  Patient Active Problem List   Diagnosis    Tobacco abuse    PMDD (premenstrual dysphoric disorder)    Routine gynecological examination    Anxiety    Mood disorder       PAST MEDICAL HISTORY  Past Medical History:   Diagnosis Date    Acute encephalopathy 12/24/12    secondary to drug overdose    ARF (acute renal failure) 12/24/12    secondary to drug overdose    History of cardiac arrest 12/24/12    secondary to drug overdose    Hypertension     MRSA (methicillin resistant Staphylococcus aureus) 12/24/12    Nephritis     PMDD (premenstrual dysphoric disorder)     Polysubstance abuse 12/24/12    Systolic CHF, acute 12/24/12    secondary to drug overdose    UTI (urinary tract infection)        PAST SURGICAL HISTORY:  Past Surgical History:   Procedure Laterality Date    CHOLECYSTECTOMY      TUBAL LIGATION         SOCIAL HISTORY:  Social History   Substance Use Topics    Smoking status: Current Every Day Smoker     Packs/day: 0.75     Years: 20.00      Types: Cigarettes    Smokeless tobacco: Never Used      Comment: 1/2 half pack/day.     Alcohol use No       FAMILY HISTORY:  Family History   Problem Relation Age of Onset    Kidney disease Mother     Cancer Maternal Grandmother      throat    Cancer Maternal Grandfather      prostate    Kidney disease Daughter     Breast cancer Neg Hx     Colon cancer Neg Hx     Ovarian cancer Neg Hx        ALLERGIES AND MEDICATIONS: updated and reviewed.  Review of patient's allergies indicates:   Allergen Reactions    Strawberry Anaphylaxis and Hives    Morphine Other (See Comments)     Burns stomach    Toradol [ketorolac]     Tramadol      Abdominal pain     Current Outpatient Prescriptions   Medication Sig    alprazolam (XANAX) 1 MG tablet TAKE ONE TABLET EVERY EVENING AS NEEDED FOR ANXIETY *MAY CAUSE DROWSINESS*    atomoxetine (STRATTERA) 40 MG capsule Take 1 capsule (40 mg total) by mouth once daily.    CEFTRIAXONE SODIUM (ROCEPHIN INJ) Inject as directed.    clindamycin phosphate 1% (CLINDAGEL) 1 % gel Apply topically 2 (two) times daily.    ondansetron (ZOFRAN) 4 MG tablet Take 1 tablet (4 mg total) by mouth every 6 (six) hours as needed for Nausea.    phenazopyridine (PYRIDIUM) 200 MG tablet Take 1 tablet (200 mg total) by mouth 3 (three) times daily.    sulfamethoxazole-trimethoprim 800-160mg (BACTRIM DS) 800-160 mg Tab Take 1 tablet by mouth 2 (two) times daily.     No current facility-administered medications for this visit.        Review of Systems   Constitutional: Negative for activity change, fatigue, fever and unexpected weight change.   Eyes: Negative for redness and visual disturbance.   Respiratory: Negative for chest tightness and shortness of breath.    Cardiovascular: Negative for chest pain and leg swelling.   Gastrointestinal: Negative for abdominal distention, abdominal pain, constipation, diarrhea, nausea and vomiting.   Genitourinary: Negative for difficulty urinating, dysuria,  "flank pain, frequency, hematuria, pelvic pain, urgency and vaginal bleeding.   Musculoskeletal: Negative for arthralgias and joint swelling.   Neurological: Negative for dizziness, weakness and headaches.   Psychiatric/Behavioral: Negative for confusion. The patient is not nervous/anxious.    All other systems reviewed and are negative.      Objective:      Vitals:    12/22/17 1523   BP: 112/76   Pulse: 68   Resp: 14   Weight: 72.2 kg (159 lb 2.8 oz)   Height: 5' 2" (1.575 m)     Physical Exam   Nursing note and vitals reviewed.  Constitutional: She is oriented to person, place, and time. She appears well-developed.   HENT:   Head: Normocephalic.   Eyes: Conjunctivae are normal.   Neck: Normal range of motion. No tracheal deviation present. No thyromegaly present.   Cardiovascular: Normal rate, normal heart sounds and normal pulses.    Pulmonary/Chest: Effort normal and breath sounds normal. No respiratory distress. She has no wheezes.   Abdominal: Soft. She exhibits no distension and no mass. There is no hepatosplenomegaly. There is no tenderness. There is no rebound, no guarding and no CVA tenderness. No hernia.   Musculoskeletal: Normal range of motion. She exhibits no edema or tenderness.   Lymphadenopathy:     She has no cervical adenopathy.   Neurological: She is alert and oriented to person, place, and time.   Skin: Skin is warm and dry. No rash noted. No erythema.     Psychiatric: She has a normal mood and affect. Her behavior is normal. Judgment and thought content normal.       Urine dipstick shows negative for all components.  Micro exam: negative for WBC's or RBC's.  CT Abdomen Pelvis With Contrast   Status: Final result   MyChart Results Release     [employer] Status: Active Results Release   PACS Images     Show images for CT Abdomen Pelvis With Contrast   External Result Report     External Result Report   Narrative     CT abdomen and pelvis with  IV contrast.    Comparison: The patient has 10 prior " abdomen and pelvic CTs.    Results: 5 mm axial images of the abdomen and pelvis were obtained after the administration of 75 cc of omni-350 IV contrast .  Delayed images of the abdomen and pelvis were also acquired. Coronal and sagittal reformat images were generated.    The lung bases are clear.  The liver, stomach, pancreas, spleen, adrenal glands all appear within normal limits.  Cholecystectomy clips.  Bilateral kidneys concentrate and excrete contrast normally.  There are 2 forming stones at the lower pole of the left kidney, the largest measures 3 mm, they are nonobstructive.  No stones seen along the bilateral ureters or within the bladder.  The uterus is present.  The ovaries demonstrate nothing unusual.  Moderate amount of retained stool.  No inflammatory changes of the bowel seen.  The appendix is normal.  The bilateral inguinal regions appear normal.  The osseous structures demonstrate no osseous lesions.   Impression         Nonobstructive left kidney calculi.  Cholecystectomy.      Electronically signed by: KATHY LUND MD  Date: 12/19/17  Time: 15:00         CT Abdomen Pelvis With Contrast   Status: Final result   [employer] Results Release     [employer] Status: Active Results Release   PACS Images     Show images for CT Abdomen Pelvis With Contrast   External Result Report     External Result Report   Narrative     Exam: [id number] 11/18/17  19:25:59 [id number] ([facility]) : CT ABDOMEN PELVIS WITH CONTRAST    Technique:    Axial CT Scan of the abdomen and pelvis was performed from the lung base to the public symphysis after the intravenous administration of  75 cc of Omni 350. Coronal and Sagittal reformats were obtained. Delayed images were also obtained    Comparison:    10/8/17    Findings:      The lung bases are within normal limits.  The heart is unremarkable.  The vessels are unremarkable.  There is no evidence of lymphadenopathy in the abdomen or pelvis.    The esophagus is unremarkable.  There is  nonspecific distention of the stomach.  The duodenum and the small bowel loops are unremarkable.  The appendix is partially visualized.  The visualized portions of the appendix are within normal limits.  The large bowel is unremarkable.    The liver is within normal limits.  The patient is status post cholecystectomy.  The biliary tree is within normal limits.  There is stable borderline splenomegaly.  The pancreas and adrenal glands are within normal limits.    There is a stable 2 mm nonobstructing stone in the lower pole of the left kidney.  No stones are identified in the right kidney.  There are subcentimeter hypodensities in the left kidney too small for complete characterization.  The ureters are within normal limits.  The urinary bladder is unremarkable.  There is no evidence of a striated nephrogram.  The uterus is unremarkable.  The adnexal structures are grossly unremarkable.    There is stable small amount of free fluid in the pelvis.  There is no evidence of free air.  There is no evidence of pneumatosis.    There are changes of bilateral breast prosthesis.  The abdominal wall is otherwise unremarkable.  The osseous structures demonstrates Schmorl's nodes in the visualized thoracic spine.  There is no evidence of a fracture.   Impression        2 mm nonobstructing stone in the lower pole of the left kidney.  No CT evidence of complicated urinary tract infection including renal abscess or obstructive uropathy.    Stable small amount of free fluid in the pelvis.    Nonspecific distention of the stomach.                    Assessment:       1. Chronic pyelonephritis without lesion of renal medullary necrosis    2. Kidney stones    3. Urinary urgency    4. Dysuria          Plan:       1. Chronic pyelonephritis without lesion of renal medullary necrosis  She is presently on Bactrim.  Plan for cystoscopy with bilateral retrograde pyelogram on Monday 1/8/2018.    - Urine culture  - POCT urinalysis, dipstick or  tablet reag    2. Kidney stones  She has a small stone and recurrent Enterococcus infections.  I will plan for left ureteroscopy also on 1/8/2018    3. Urinary urgency  stable    4. Dysuria  Urine culture            No Follow-up on file.

## 2018-01-08 NOTE — TELEPHONE ENCOUNTER
----- Message from PRAVIN Balderas MD sent at 1/8/2018 10:49 AM CST -----  Roslyn Clemenscem needs to be scheduled for a left ureteroscopy on Monday 1/22/2018.  Please put in outlook.    Thanks

## 2018-01-08 NOTE — H&P
H&P        Subjective:       Patient ID: Roslyn Brewster is a 37 y.o. female who was referred by Demi Wang NP     Chief Complaint:        Chief Complaint   Patient presents with    Urinary Tract Infection       Patient has been having recurrent UTI for a while and has had five positive cultures with the same organism in the last month. Patient states she is taking the antibiotics she feels better for a day and symptoms return. Patient has taken bactrim, cipro, and macrobid.         Recurrent Pyelonephritis  Since around 2014 she has had multiple episodes of pyelonephritis.  She was seeing Dr. Luther at  who did a cystoscopy and per patient report did not find anything definitive.  Her symptoms include flank pain (left usually), malaise, and fever at times.  She normally does not have dysuria but current she has dysuria and frequency and urgency.  She knows about her small left kidney stone but has never required a procedure for stones.    She underwent cystoscopy with bilateral retrograde pyelogram, left ureteroscopy on 1/8/2018.  Her left mid/proximal ureter was too narrow to accept a flexible ureteroscope.  A Stent was placed.     ACTIVE MEDICAL ISSUES:      Patient Active Problem List   Diagnosis    Tobacco abuse    PMDD (premenstrual dysphoric disorder)    Routine gynecological examination    Anxiety    Mood disorder         PAST MEDICAL HISTORY       Past Medical History:   Diagnosis Date    Acute encephalopathy 12/24/12     secondary to drug overdose    ARF (acute renal failure) 12/24/12     secondary to drug overdose    History of cardiac arrest 12/24/12     secondary to drug overdose    Hypertension      MRSA (methicillin resistant Staphylococcus aureus) 12/24/12    Nephritis      PMDD (premenstrual dysphoric disorder)      Polysubstance abuse 12/24/12    Systolic CHF, acute 12/24/12     secondary to drug overdose    UTI (urinary tract infection)           PAST SURGICAL  HISTORY:        Past Surgical History:   Procedure Laterality Date    CHOLECYSTECTOMY        TUBAL LIGATION             SOCIAL HISTORY:         Social History   Substance Use Topics    Smoking status: Current Every Day Smoker       Packs/day: 0.75       Years: 20.00       Types: Cigarettes    Smokeless tobacco: Never Used         Comment: 1/2 half pack/day.     Alcohol use No         FAMILY HISTORY:         Family History   Problem Relation Age of Onset    Kidney disease Mother      Cancer Maternal Grandmother         throat    Cancer Maternal Grandfather         prostate    Kidney disease Daughter      Breast cancer Neg Hx      Colon cancer Neg Hx      Ovarian cancer Neg Hx           ALLERGIES AND MEDICATIONS: updated and reviewed.        Review of patient's allergies indicates:   Allergen Reactions    Strawberry Anaphylaxis and Hives    Morphine Other (See Comments)       Burns stomach    Toradol [ketorolac]      Tramadol         Abdominal pain           Current Outpatient Prescriptions   Medication Sig    alprazolam (XANAX) 1 MG tablet TAKE ONE TABLET EVERY EVENING AS NEEDED FOR ANXIETY *MAY CAUSE DROWSINESS*    atomoxetine (STRATTERA) 40 MG capsule Take 1 capsule (40 mg total) by mouth once daily.    CEFTRIAXONE SODIUM (ROCEPHIN INJ) Inject as directed.    clindamycin phosphate 1% (CLINDAGEL) 1 % gel Apply topically 2 (two) times daily.    ondansetron (ZOFRAN) 4 MG tablet Take 1 tablet (4 mg total) by mouth every 6 (six) hours as needed for Nausea.    phenazopyridine (PYRIDIUM) 200 MG tablet Take 1 tablet (200 mg total) by mouth 3 (three) times daily.    sulfamethoxazole-trimethoprim 800-160mg (BACTRIM DS) 800-160 mg Tab Take 1 tablet by mouth 2 (two) times daily.      No current facility-administered medications for this visit.          Review of Systems   Constitutional: Negative for activity change, fatigue, fever and unexpected weight change.   Eyes: Negative for redness and visual  "disturbance.   Respiratory: Negative for chest tightness and shortness of breath.    Cardiovascular: Negative for chest pain and leg swelling.   Gastrointestinal: Negative for abdominal distention, abdominal pain, constipation, diarrhea, nausea and vomiting.   Genitourinary: Negative for difficulty urinating, dysuria, flank pain, frequency, hematuria, pelvic pain, urgency and vaginal bleeding.   Musculoskeletal: Negative for arthralgias and joint swelling.   Neurological: Negative for dizziness, weakness and headaches.   Psychiatric/Behavioral: Negative for confusion. The patient is not nervous/anxious.    All other systems reviewed and are negative.      Objective:          Vitals:     12/22/17 1523   BP: 112/76   Pulse: 68   Resp: 14   Weight: 72.2 kg (159 lb 2.8 oz)   Height: 5' 2" (1.575 m)      Physical Exam   Nursing note and vitals reviewed.  Constitutional: She is oriented to person, place, and time. She appears well-developed.   HENT:   Head: Normocephalic.   Eyes: Conjunctivae are normal.   Neck: Normal range of motion. No tracheal deviation present. No thyromegaly present.   Cardiovascular: Normal rate, normal heart sounds and normal pulses.    Pulmonary/Chest: Effort normal and breath sounds normal. No respiratory distress. She has no wheezes.   Abdominal: Soft. She exhibits no distension and no mass. There is no hepatosplenomegaly. There is no tenderness. There is no rebound, no guarding and no CVA tenderness. No hernia.   Musculoskeletal: Normal range of motion. She exhibits no edema or tenderness.   Lymphadenopathy:     She has no cervical adenopathy.   Neurological: She is alert and oriented to person, place, and time.   Skin: Skin is warm and dry. No rash noted. No erythema.     Psychiatric: She has a normal mood and affect. Her behavior is normal. Judgment and thought content normal.       Urine dipstick shows negative for all components.  Micro exam: negative for WBC's or RBC's.  CT Abdomen Pelvis " With Contrast   Status: Final result   MyChart Results Release      Judys Bookt Status: Active Results Release   PACS Images      Show images for CT Abdomen Pelvis With Contrast   External Result Report      External Result Report   Narrative      CT abdomen and pelvis with  IV contrast.    Comparison: The patient has 10 prior abdomen and pelvic CTs.    Results: 5 mm axial images of the abdomen and pelvis were obtained after the administration of 75 cc of omni-350 IV contrast .  Delayed images of the abdomen and pelvis were also acquired. Coronal and sagittal reformat images were generated.    The lung bases are clear.  The liver, stomach, pancreas, spleen, adrenal glands all appear within normal limits.  Cholecystectomy clips.  Bilateral kidneys concentrate and excrete contrast normally.  There are 2 forming stones at the lower pole of the left kidney, the largest measures 3 mm, they are nonobstructive.  No stones seen along the bilateral ureters or within the bladder.  The uterus is present.  The ovaries demonstrate nothing unusual.  Moderate amount of retained stool.  No inflammatory changes of the bowel seen.  The appendix is normal.  The bilateral inguinal regions appear normal.  The osseous structures demonstrate no osseous lesions.   Impression          Nonobstructive left kidney calculi.  Cholecystectomy.      Electronically signed by: KATHY LUND MD  Date: 12/19/17  Time: 15:00          CT Abdomen Pelvis With Contrast   Status: Final result   Judys Bookt Results Release      [facility]CP[facility]annelt Status: Active Results Release   PACS Images      Show images for CT Abdomen Pelvis With Contrast   External Result Report      External Result Report   Narrative      Exam: [id number] 11/18/17  19:25:59 [id number] ([facility]) : CT ABDOMEN PELVIS WITH CONTRAST    Technique:    Axial CT Scan of the abdomen and pelvis was performed from the lung base to the public symphysis after the intravenous administration of  75 cc of Omni 350. Coronal  and Sagittal reformats were obtained. Delayed images were also obtained    Comparison:    10/8/17    Findings:      The lung bases are within normal limits.  The heart is unremarkable.  The vessels are unremarkable.  There is no evidence of lymphadenopathy in the abdomen or pelvis.    The esophagus is unremarkable.  There is nonspecific distention of the stomach.  The duodenum and the small bowel loops are unremarkable.  The appendix is partially visualized.  The visualized portions of the appendix are within normal limits.  The large bowel is unremarkable.    The liver is within normal limits.  The patient is status post cholecystectomy.  The biliary tree is within normal limits.  There is stable borderline splenomegaly.  The pancreas and adrenal glands are within normal limits.    There is a stable 2 mm nonobstructing stone in the lower pole of the left kidney.  No stones are identified in the right kidney.  There are subcentimeter hypodensities in the left kidney too small for complete characterization.  The ureters are within normal limits.  The urinary bladder is unremarkable.  There is no evidence of a striated nephrogram.  The uterus is unremarkable.  The adnexal structures are grossly unremarkable.    There is stable small amount of free fluid in the pelvis.  There is no evidence of free air.  There is no evidence of pneumatosis.    There are changes of bilateral breast prosthesis.  The abdominal wall is otherwise unremarkable.  The osseous structures demonstrates Schmorl's nodes in the visualized thoracic spine.  There is no evidence of a fracture.   Impression         2 mm nonobstructing stone in the lower pole of the left kidney.  No CT evidence of complicated urinary tract infection including renal abscess or obstructive uropathy.    Stable small amount of free fluid in the pelvis.    Nonspecific distention of the stomach.                      Assessment:       1. Chronic pyelonephritis without lesion of  renal medullary necrosis    2. Kidney stones    3. Urinary urgency    4. Dysuria           Plan:       1. Chronic pyelonephritis without lesion of renal medullary necrosis    S/p negative cystoscopy 1/8/2108     2. Kidney stones  Stent placed on 1/8/2018  Plan for completion ureteroscopy and stone extraction on Monday 1/22/2018     3. Urinary urgency  stable     4. Dysuria  Urine culture

## 2018-01-10 ENCOUNTER — NURSE TRIAGE (OUTPATIENT)
Dept: ADMINISTRATIVE | Facility: CLINIC | Age: 38
End: 2018-01-10

## 2018-01-11 NOTE — TELEPHONE ENCOUNTER
Lets see if she can come in to see Connie later today or tomorrow.  I would prefer to keep the stent so I can complete her work up and try to get the stone.

## 2018-01-11 NOTE — TELEPHONE ENCOUNTER
"On call provider for Dr PRAVIN Balderas is paged for Roslny, who is reporting severe (10/10) pain to lower left abdomen, especially when urinating. She has kidney stone with chronic pyelonephritis; had stent placed 01/08/18. Dr Ozzie Fuchs is on call for Dr Balderas, per Steph, hospital . Called x 2, but goes to voice mail, per .  Patient information given to Steph, and she will assist Roslyn in reaching Dr Fuchs.  Instructed Roslyn to call back if she does not hear from Dr Fuchs in 30 minutes.  She states she will do so.  Message to Dr Fuchs, and to Dr Balderas. Please contact caller directly with any additional care advice.      Reason for Disposition   [1] SEVERE post-op pain (e.g., excruciating, pain scale 8-10) AND [2] not controlled with pain medications    Answer Assessment - Initial Assessment Questions  1. SYMPTOM: "What's the main symptom you're concerned about?" (e.g., pain, fever, vomiting)      Left lower abdominal pain.    2. ONSET: "When did ________  start?"      Since I got home monday, but worse today.    3. SURGERY: "What surgery was performed?"      Cystoscopy with stent placed.    4. DATE of SURGERY: "When was surgery performed?"       Monday 01/08/18    5. ANESTHESIA: " What type of anesthesia did you have?" (e.g., general, spinal, epidural, local)        6. PAIN: "Is there any pain?" If so, ask: "How bad is it?"  (Scale 1-10; or mild, moderate, severe)      Pain is 9/10 when sitting, but when trying to urinate is 10/10.  Hard to walk.    7. FEVER: "Do you have a fever?" If so, ask: "What is your temperature, how was it measured, and when did it start?"      No fever.  Subjective.    8. VOMITING: "Is there any vomiting?" If yes, ask: "How many times?"      No, but nausea when I am trying to urinate from the pain.     9. BLEEDING: "Is there any bleeding?" If so, ask: "How much?" and "Where?"      Just spotting when I urinate.    10. OTHER SYMPTOMS: "Do you have any " "other symptoms?" (e.g., drainage from wound, painful urination, constipation)        Painful urination.  Constipated since 01/08/18    Protocols used: ST POST-OP SYMPTOMS AND [id number]      "

## 2018-01-11 NOTE — TELEPHONE ENCOUNTER
Patient states she is in severe pain, she is taking pain medication and it is not helping. She wants stent out. She states pain is so bad that she has not been to work all week, she can't function.

## 2018-01-11 NOTE — TELEPHONE ENCOUNTER
Spoke to patient and advised that provider wanted her to come in to address pain. Patient states that she would like to hold off on an apt for now . She will call back if she decides to come in.

## 2018-01-14 ENCOUNTER — HOSPITAL ENCOUNTER (EMERGENCY)
Facility: HOSPITAL | Age: 38
Discharge: HOME | End: 2018-01-14
Attending: EMERGENCY MEDICINE
Payer: COMMERCIAL

## 2018-01-14 VITALS
HEIGHT: 62 IN | SYSTOLIC BLOOD PRESSURE: 113 MMHG | OXYGEN SATURATION: 96 % | DIASTOLIC BLOOD PRESSURE: 75 MMHG | TEMPERATURE: 99 F | BODY MASS INDEX: 28.16 KG/M2 | RESPIRATION RATE: 16 BRPM | HEART RATE: 90 BPM | WEIGHT: 153 LBS

## 2018-01-14 DIAGNOSIS — T83.84XA PAIN DUE TO URETERAL STENT, INITIAL ENCOUNTER: Primary | ICD-10-CM

## 2018-01-14 DIAGNOSIS — K59.00 CONSTIPATION, UNSPECIFIED CONSTIPATION TYPE: ICD-10-CM

## 2018-01-14 LAB
ALBUMIN SERPL BCP-MCNC: 3.9 G/DL
ALP SERPL-CCNC: 60 U/L
ALT SERPL W/O P-5'-P-CCNC: 24 U/L
ANION GAP SERPL CALC-SCNC: 9 MMOL/L
AST SERPL-CCNC: 13 U/L
B-HCG UR QL: NEGATIVE
BACTERIA #/AREA URNS HPF: ABNORMAL /HPF
BASOPHILS # BLD AUTO: 0.01 K/UL
BASOPHILS NFR BLD: 0.2 %
BILIRUB SERPL-MCNC: 0.6 MG/DL
BILIRUB UR QL STRIP: NEGATIVE
BUN SERPL-MCNC: 14 MG/DL
CALCIUM SERPL-MCNC: 9.5 MG/DL
CHLORIDE SERPL-SCNC: 104 MMOL/L
CLARITY UR: ABNORMAL
CO2 SERPL-SCNC: 24 MMOL/L
COLOR UR: ABNORMAL
CREAT SERPL-MCNC: 0.8 MG/DL
CTP QC/QA: YES
DIFFERENTIAL METHOD: ABNORMAL
EOSINOPHIL # BLD AUTO: 0.2 K/UL
EOSINOPHIL NFR BLD: 2.9 %
ERYTHROCYTE [DISTWIDTH] IN BLOOD BY AUTOMATED COUNT: 12.2 %
EST. GFR  (AFRICAN AMERICAN): >60 ML/MIN/1.73 M^2
EST. GFR  (NON AFRICAN AMERICAN): >60 ML/MIN/1.73 M^2
GLUCOSE SERPL-MCNC: 130 MG/DL
GLUCOSE UR QL STRIP: NEGATIVE
HCT VFR BLD AUTO: 40 %
HGB BLD-MCNC: 14.5 G/DL
HGB UR QL STRIP: ABNORMAL
HYALINE CASTS #/AREA URNS LPF: 0 /LPF
KETONES UR QL STRIP: NEGATIVE
LEUKOCYTE ESTERASE UR QL STRIP: ABNORMAL
LYMPHOCYTES # BLD AUTO: 1.7 K/UL
LYMPHOCYTES NFR BLD: 25.7 %
MCH RBC QN AUTO: 32.1 PG
MCHC RBC AUTO-ENTMCNC: 36.3 G/DL
MCV RBC AUTO: 89 FL
MICROSCOPIC COMMENT: ABNORMAL
MONOCYTES # BLD AUTO: 0.7 K/UL
MONOCYTES NFR BLD: 10.6 %
NEUTROPHILS # BLD AUTO: 3.9 K/UL
NEUTROPHILS NFR BLD: 60.6 %
NITRITE UR QL STRIP: POSITIVE
PH UR STRIP: 6 [PH] (ref 5–8)
PLATELET # BLD AUTO: 247 K/UL
PMV BLD AUTO: 9.6 FL
POTASSIUM SERPL-SCNC: 3.8 MMOL/L
PROT SERPL-MCNC: 6.8 G/DL
PROT UR QL STRIP: ABNORMAL
RBC # BLD AUTO: 4.52 M/UL
RBC #/AREA URNS HPF: >100 /HPF (ref 0–4)
SODIUM SERPL-SCNC: 137 MMOL/L
SP GR UR STRIP: 1.02 (ref 1–1.03)
SQUAMOUS #/AREA URNS HPF: 4 /HPF
URN SPEC COLLECT METH UR: ABNORMAL
UROBILINOGEN UR STRIP-ACNC: ABNORMAL EU/DL
WBC # BLD AUTO: 6.49 K/UL
WBC #/AREA URNS HPF: 3 /HPF (ref 0–5)

## 2018-01-14 PROCEDURE — 96375 TX/PRO/DX INJ NEW DRUG ADDON: CPT

## 2018-01-14 PROCEDURE — 81000 URINALYSIS NONAUTO W/SCOPE: CPT

## 2018-01-14 PROCEDURE — 81025 URINE PREGNANCY TEST: CPT | Performed by: EMERGENCY MEDICINE

## 2018-01-14 PROCEDURE — 80053 COMPREHEN METABOLIC PANEL: CPT

## 2018-01-14 PROCEDURE — 85025 COMPLETE CBC W/AUTO DIFF WBC: CPT

## 2018-01-14 PROCEDURE — 63600175 PHARM REV CODE 636 W HCPCS: Performed by: EMERGENCY MEDICINE

## 2018-01-14 PROCEDURE — 96374 THER/PROPH/DIAG INJ IV PUSH: CPT

## 2018-01-14 PROCEDURE — 96376 TX/PRO/DX INJ SAME DRUG ADON: CPT

## 2018-01-14 PROCEDURE — 99285 EMERGENCY DEPT VISIT HI MDM: CPT | Mod: 25

## 2018-01-14 RX ORDER — PROMETHAZINE HYDROCHLORIDE 12.5 MG/1
12.5 TABLET ORAL EVERY 6 HOURS PRN
Qty: 18 TABLET | Refills: 0 | Status: SHIPPED | OUTPATIENT
Start: 2018-01-14 | End: 2018-02-07

## 2018-01-14 RX ORDER — POLYETHYLENE GLYCOL 3350 17 G/17G
17 POWDER, FOR SOLUTION ORAL DAILY PRN
Qty: 1 BOTTLE | Refills: 0 | Status: SHIPPED | OUTPATIENT
Start: 2018-01-14 | End: 2018-02-07

## 2018-01-14 RX ORDER — ONDANSETRON 2 MG/ML
4 INJECTION INTRAMUSCULAR; INTRAVENOUS
Status: COMPLETED | OUTPATIENT
Start: 2018-01-14 | End: 2018-01-14

## 2018-01-14 RX ORDER — OXYCODONE AND ACETAMINOPHEN 10; 325 MG/1; MG/1
1 TABLET ORAL EVERY 4 HOURS PRN
Qty: 18 TABLET | Refills: 0 | Status: SHIPPED | OUTPATIENT
Start: 2018-01-14 | End: 2018-02-07

## 2018-01-14 RX ORDER — HYDROMORPHONE HYDROCHLORIDE 2 MG/ML
1 INJECTION, SOLUTION INTRAMUSCULAR; INTRAVENOUS; SUBCUTANEOUS
Status: COMPLETED | OUTPATIENT
Start: 2018-01-14 | End: 2018-01-14

## 2018-01-14 RX ORDER — OXYCODONE AND ACETAMINOPHEN 7.5; 325 MG/1; MG/1
1 TABLET ORAL EVERY 4 HOURS PRN
COMMUNITY
End: 2018-01-14 | Stop reason: ALTCHOICE

## 2018-01-14 RX ADMIN — ONDANSETRON 4 MG: 2 INJECTION INTRAMUSCULAR; INTRAVENOUS at 05:01

## 2018-01-14 RX ADMIN — HYDROMORPHONE HYDROCHLORIDE 1 MG: 2 INJECTION INTRAMUSCULAR; INTRAVENOUS; SUBCUTANEOUS at 05:01

## 2018-01-14 RX ADMIN — HYDROMORPHONE HYDROCHLORIDE 1 MG: 2 INJECTION INTRAMUSCULAR; INTRAVENOUS; SUBCUTANEOUS at 06:01

## 2018-01-14 NOTE — DISCHARGE INSTRUCTIONS
Use medication as needed for pain.  You can start taking 1 of the Percocet pills prescribed to see how it affects your pain and to make sure it doesn't make you too sleepy.  If your pain is persistent you can take up to 2 of them as needed for pain.  Use the enema to treat her constipation as well as the MiraLAX.  Continue taking the remainder of your medications as prescribed.  Contact your urologist as soon as possible to see if he can be seen earlier for management of your stent and your kidney stone.  Return to the emergency room for any new or worsening symptoms.

## 2018-01-14 NOTE — ED TRIAGE NOTES
Pt reports to ED via personal transportation with c/o left sided flank pain and nausea; pt reports that she had a urethral stent put in on Monday, has been having pain since, but states that the pain was worse this morning; pt reports that she last took her Percocet around 2am this morning; no distress noted; pt AAOx4; pt's fiance at bedside; will continue to monitor.

## 2018-01-14 NOTE — ED PROVIDER NOTES
"Encounter Date: 1/14/2018    SCRIBE #1 NOTE: I, Brittni Dash, am scribing for, and in the presence of,  Jessika Duffy MD. I have scribed the following portions of the note - Other sections scribed: HPI, ROS, PE.       History     Chief Complaint   Patient presents with    Flank Pain     Pt reports had urinary stent placed on Monday, now having severe pain & pain meds not working. Pt reports severe left side pain with nausea.     CC: Flank Pain    HPI: 37 year old female smoker with chronic pyelonephritis and renal stones presents to the ED c/o L flank pain, L-sided abdominal pain, nausea, and urinary urgency that has been ongoing for the past 6 days and worsened this morning. Pain is 10/10. Pt states she had a urinary stent placed to the L flank 6 days ago on 1/8/18 performed by Dr. Balderas. Pt was discharged home the same day on percocet. Pt reports the pain was slightly improving but worsened this morning, waking her up out of her sleep. Pt states she has not been able to sleep because the pain medications keep her up. Pt reports she is constipated. She is not on any stool softeners. Her last BM was yesterday but, "It was just little balls. It wasn't a full one." The current pain is constant, sharp, and radiating down to her leg. Pt took a hot bath PTA with no improvement. Pt is scheduled for follow-up appointment with urology on 1/22/18. Pt reports no further complaints. She otherwise denies fever, chills, chest pain, vomiting, diarrhea, hematuria, numbness, weakness, leg swelling, and any other associated symptoms. No alleviating factors.      The history is provided by the patient. No  was used.     Review of patient's allergies indicates:   Allergen Reactions    Strawberry Anaphylaxis and Hives    Morphine Other (See Comments)     Burns stomach    Toradol [ketorolac]     Tramadol      Abdominal pain     Past Medical History:   Diagnosis Date    Acute encephalopathy 12/24/12    " secondary to drug overdose    ARF (acute renal failure) 12/24/12    secondary to drug overdose    History of cardiac arrest 12/24/12    secondary to drug overdose    Hypertension     MRSA (methicillin resistant Staphylococcus aureus) 12/24/12    Nephritis     PMDD (premenstrual dysphoric disorder)     Polysubstance abuse 12/24/12    Systolic CHF, acute 12/24/12    secondary to drug overdose    UTI (urinary tract infection)      Past Surgical History:   Procedure Laterality Date    CHOLECYSTECTOMY      TUBAL LIGATION      urinary stents       Family History   Problem Relation Age of Onset    Kidney disease Mother     Cancer Maternal Grandmother      throat    Cancer Maternal Grandfather      prostate    Kidney disease Daughter     Breast cancer Neg Hx     Colon cancer Neg Hx     Ovarian cancer Neg Hx      Social History   Substance Use Topics    Smoking status: Current Every Day Smoker     Packs/day: 0.50     Years: 20.00     Types: Cigarettes    Smokeless tobacco: Never Used      Comment: 1/2 half pack/day.     Alcohol use No     Review of Systems   Constitutional: Negative for chills, diaphoresis and fever.   HENT: Negative for ear pain and sore throat.    Eyes: Negative for photophobia and visual disturbance.   Respiratory: Negative for cough and shortness of breath.    Cardiovascular: Negative for chest pain and leg swelling.   Gastrointestinal: Positive for abdominal pain (L-sided), constipation and nausea. Negative for blood in stool, diarrhea and vomiting.   Genitourinary: Positive for flank pain (L) and urgency. Negative for dysuria and hematuria.   Musculoskeletal: Negative for back pain.   Skin: Negative for rash.   Neurological: Negative for headaches.       Physical Exam     Initial Vitals [01/14/18 0451]   BP Pulse Resp Temp SpO2   127/81 109 18 98.6 °F (37 °C) 96 %      MAP       96.33         Physical Exam    Nursing note and vitals reviewed.  Constitutional: She appears  well-developed and well-nourished.  Non-toxic appearance. She does not appear ill.   Appears uncomfortable. Moderate painful distress   HENT:   Head: Normocephalic and atraumatic.   Eyes: EOM are normal.   Neck: Neck supple. No JVD present.   Cardiovascular: Normal rate and regular rhythm.   Pulmonary/Chest: Effort normal and breath sounds normal. No respiratory distress.   Abdominal: Soft. Normal appearance and bowel sounds are normal. She exhibits no distension. There is no tenderness. There is CVA tenderness (left).   Musculoskeletal: Normal range of motion. She exhibits no edema.   Neurological: She is alert.   Skin: Skin is warm and dry.   Psychiatric: She has a normal mood and affect.         ED Course   Procedures  Labs Reviewed   CBC W/ AUTO DIFFERENTIAL - Abnormal; Notable for the following:        Result Value    MCH 32.1 (*)     MCHC 36.3 (*)     All other components within normal limits   COMPREHENSIVE METABOLIC PANEL - Abnormal; Notable for the following:     Glucose 130 (*)     All other components within normal limits   URINALYSIS - Abnormal; Notable for the following:     Color, UA Blue (*)     Appearance, UA Hazy (*)     Protein, UA 2+ (*)     Occult Blood UA 3+ (*)     Nitrite, UA Positive (*)     Urobilinogen, UA 4.0-6.0 (*)     Leukocytes, UA Trace (*)     All other components within normal limits   URINALYSIS MICROSCOPIC - Abnormal; Notable for the following:     RBC, UA >100 (*)     All other components within normal limits   POCT URINE PREGNANCY          X-Rays:   Independently Interpreted Readings:   Abdomen: Nonspecific bowel gas.  No free air under diaphragm.  No air fluid levels or signs of obstruction.     Medical Decision Making:   History:   Old Medical Records: I decided to obtain old medical records.  Old Records Summarized: records from previous admission(s).  Initial Assessment:   Left flank pain.  Patient with history of kidney stones, chronic pyelonephritis and recent ureteral  stent placement.  Patient is afebrile on arrival but in moderate painful distress.  She will be given analgesia and IV hydration.  Basic labs will be sent off to rule out leukocytosis, UTI, renal failure, electrolyte  Derangement.,  Patient will have a KUB x-ray to evaluate stent position and degree of constipation.  Differential Diagnosis:   Symptomatic kidney stone  Symptomatic ureteral stent  Inadequate analgesia  Pyelonephritis  UTI  Constipation  Independently Interpreted Test(s):   I have ordered and independently interpreted X-rays - see prior notes.  Clinical Tests:   Lab Tests: Ordered and Reviewed  Radiological Study: Ordered and Reviewed  ED Management:  Given analgesia with some pain releif.  Labs reviewed and within acceptable limits with normal renal function.  UA noted to be nitrite positive. KUB demonstrates a left ureteral stent to be in place and large colonic stool burden. Case discussed with Dr. Lewis of urology. Patient currently on Pyridium which could be responsible for the nitrite positiveUA. Patient currently taking a course of ciprofloxacin which recent urine culture showed that her past UTIs have been sensitive to.  Patient is medically stableand has no indication for acute intervention.  Discussed extensively with patient mechanisms for pain control.  Discussed with patient concern about the large opioids that she is taking and responsible use of opioids. She does have acute pain and recent intervention that could cause her pain for this reason I will prescribe her a short course of a stronger percocet.  Patient counseled extensively to avoid driving and operating heavy machinery while taking pain medications also to ensure that she is not alone and has someone to monitor her when she started these medications.. Counseled on supportive care and appropriate medication usage. Dicussed extensively concerning symptoms for which to return to ER and the importance of follow up . Patient and  family member expressed understanding and agreement with treatment plan.             Scribe Attestation:   Scribe #1: I performed the above scribed service and the documentation accurately describes the services I performed. I attest to the accuracy of the note.    Attending Attestation:           Physician Attestation for Scribe:  Physician Attestation Statement for Scribe #1: I, Jessika Duffy MD, reviewed documentation, as scribed by Brittni Dash in my presence, and it is both accurate and complete.                 ED Course      Clinical Impression:   The primary encounter diagnosis was Pain due to ureteral stent, initial encounter. A diagnosis of Constipation, unspecified constipation type was also pertinent to this visit.    Disposition:   Disposition: Discharged  Condition: Stable                        Jessika Duffy MD  01/14/18 2057

## 2018-01-14 NOTE — ED NOTES
Pt reports that her pain is 10/10 and is requesting more pain medication; MD at bedside speaking with pt.

## 2018-01-15 ENCOUNTER — ANESTHESIA EVENT (OUTPATIENT)
Dept: SURGERY | Facility: HOSPITAL | Age: 38
End: 2018-01-15
Payer: COMMERCIAL

## 2018-01-15 ENCOUNTER — HOSPITAL ENCOUNTER (OUTPATIENT)
Facility: HOSPITAL | Age: 38
Discharge: HOME | End: 2018-01-16
Attending: EMERGENCY MEDICINE | Admitting: UROLOGY
Payer: COMMERCIAL

## 2018-01-15 ENCOUNTER — SURGERY (OUTPATIENT)
Age: 38
End: 2018-01-15

## 2018-01-15 ENCOUNTER — TELEPHONE (OUTPATIENT)
Dept: UROLOGY | Facility: CLINIC | Age: 38
End: 2018-01-15

## 2018-01-15 ENCOUNTER — ANESTHESIA (OUTPATIENT)
Dept: SURGERY | Facility: HOSPITAL | Age: 38
End: 2018-01-15
Payer: COMMERCIAL

## 2018-01-15 DIAGNOSIS — N20.0 KIDNEY STONES: ICD-10-CM

## 2018-01-15 DIAGNOSIS — T83.84XD PAIN DUE TO URETERAL STENT, SUBSEQUENT ENCOUNTER: ICD-10-CM

## 2018-01-15 DIAGNOSIS — R52 INTRACTABLE PAIN: Primary | ICD-10-CM

## 2018-01-15 PROBLEM — J02.9 SORE THROAT: Status: ACTIVE | Noted: 2018-01-15

## 2018-01-15 LAB — DEPRECATED S PYO AG THROAT QL EIA: NEGATIVE

## 2018-01-15 PROCEDURE — G0378 HOSPITAL OBSERVATION PER HR: HCPCS

## 2018-01-15 PROCEDURE — 99284 EMERGENCY DEPT VISIT MOD MDM: CPT

## 2018-01-15 PROCEDURE — 87081 CULTURE SCREEN ONLY: CPT

## 2018-01-15 PROCEDURE — 63600175 PHARM REV CODE 636 W HCPCS: Performed by: NURSE ANESTHETIST, CERTIFIED REGISTERED

## 2018-01-15 PROCEDURE — D9220A PRA ANESTHESIA: Mod: CRNA,,, | Performed by: NURSE ANESTHETIST, CERTIFIED REGISTERED

## 2018-01-15 PROCEDURE — 25000003 PHARM REV CODE 250: Performed by: NURSE ANESTHETIST, CERTIFIED REGISTERED

## 2018-01-15 PROCEDURE — 37000008 HC ANESTHESIA 1ST 15 MINUTES: Performed by: UROLOGY

## 2018-01-15 PROCEDURE — 96374 THER/PROPH/DIAG INJ IV PUSH: CPT

## 2018-01-15 PROCEDURE — D9220A PRA ANESTHESIA: Mod: ANES,,, | Performed by: ANESTHESIOLOGY

## 2018-01-15 PROCEDURE — 36000706: Performed by: UROLOGY

## 2018-01-15 PROCEDURE — 96376 TX/PRO/DX INJ SAME DRUG ADON: CPT

## 2018-01-15 PROCEDURE — 87147 CULTURE TYPE IMMUNOLOGIC: CPT

## 2018-01-15 PROCEDURE — 25000003 PHARM REV CODE 250: Performed by: EMERGENCY MEDICINE

## 2018-01-15 PROCEDURE — 71000033 HC RECOVERY, INTIAL HOUR: Performed by: UROLOGY

## 2018-01-15 PROCEDURE — 36000707: Performed by: UROLOGY

## 2018-01-15 PROCEDURE — 96361 HYDRATE IV INFUSION ADD-ON: CPT

## 2018-01-15 PROCEDURE — 99220 PR INITIAL OBSERVATION CARE,LEVL III: CPT | Mod: 25,,, | Performed by: UROLOGY

## 2018-01-15 PROCEDURE — 63600175 PHARM REV CODE 636 W HCPCS: Performed by: UROLOGY

## 2018-01-15 PROCEDURE — 88300 SURGICAL PATH GROSS: CPT | Mod: 26,,,

## 2018-01-15 PROCEDURE — 63600175 PHARM REV CODE 636 W HCPCS: Performed by: EMERGENCY MEDICINE

## 2018-01-15 PROCEDURE — 96375 TX/PRO/DX INJ NEW DRUG ADDON: CPT

## 2018-01-15 PROCEDURE — 52310 CYSTOSCOPY AND TREATMENT: CPT | Mod: ,,, | Performed by: UROLOGY

## 2018-01-15 PROCEDURE — 63600175 PHARM REV CODE 636 W HCPCS: Performed by: ANESTHESIOLOGY

## 2018-01-15 PROCEDURE — 88300 SURGICAL PATH GROSS: CPT

## 2018-01-15 PROCEDURE — 87880 STREP A ASSAY W/OPTIC: CPT

## 2018-01-15 PROCEDURE — 37000009 HC ANESTHESIA EA ADD 15 MINS: Performed by: UROLOGY

## 2018-01-15 PROCEDURE — 25000003 PHARM REV CODE 250: Performed by: UROLOGY

## 2018-01-15 RX ORDER — ZOLPIDEM TARTRATE 5 MG/1
5 TABLET ORAL NIGHTLY PRN
Status: DISCONTINUED | OUTPATIENT
Start: 2018-01-15 | End: 2018-01-16 | Stop reason: HOSPADM

## 2018-01-15 RX ORDER — GLYCOPYRROLATE 0.2 MG/ML
INJECTION INTRAMUSCULAR; INTRAVENOUS
Status: DISCONTINUED | OUTPATIENT
Start: 2018-01-15 | End: 2018-01-15

## 2018-01-15 RX ORDER — HYDROMORPHONE HYDROCHLORIDE 2 MG/ML
0.5 INJECTION, SOLUTION INTRAMUSCULAR; INTRAVENOUS; SUBCUTANEOUS
Status: DISCONTINUED | OUTPATIENT
Start: 2018-01-15 | End: 2018-01-16

## 2018-01-15 RX ORDER — SODIUM CHLORIDE 0.9 % (FLUSH) 0.9 %
5 SYRINGE (ML) INJECTION
Status: DISCONTINUED | OUTPATIENT
Start: 2018-01-15 | End: 2018-01-16 | Stop reason: HOSPADM

## 2018-01-15 RX ORDER — SODIUM CHLORIDE 0.9 % (FLUSH) 0.9 %
3 SYRINGE (ML) INJECTION
Status: DISCONTINUED | OUTPATIENT
Start: 2018-01-15 | End: 2018-01-16 | Stop reason: HOSPADM

## 2018-01-15 RX ORDER — SODIUM CHLORIDE 9 MG/ML
1000 INJECTION, SOLUTION INTRAVENOUS
Status: COMPLETED | OUTPATIENT
Start: 2018-01-15 | End: 2018-01-15

## 2018-01-15 RX ORDER — ONDANSETRON 2 MG/ML
4 INJECTION INTRAMUSCULAR; INTRAVENOUS EVERY 8 HOURS PRN
Status: DISCONTINUED | OUTPATIENT
Start: 2018-01-15 | End: 2018-01-16 | Stop reason: HOSPADM

## 2018-01-15 RX ORDER — AMOXICILLIN 250 MG
1 CAPSULE ORAL 2 TIMES DAILY
Status: DISCONTINUED | OUTPATIENT
Start: 2018-01-15 | End: 2018-01-16 | Stop reason: HOSPADM

## 2018-01-15 RX ORDER — PROMETHAZINE HYDROCHLORIDE 25 MG/1
25 TABLET ORAL EVERY 6 HOURS PRN
Status: DISCONTINUED | OUTPATIENT
Start: 2018-01-15 | End: 2018-01-16 | Stop reason: HOSPADM

## 2018-01-15 RX ORDER — ONDANSETRON 2 MG/ML
4 INJECTION INTRAMUSCULAR; INTRAVENOUS
Status: COMPLETED | OUTPATIENT
Start: 2018-01-15 | End: 2018-01-15

## 2018-01-15 RX ORDER — FAMOTIDINE 20 MG/1
20 TABLET, FILM COATED ORAL EVERY 12 HOURS PRN
Status: DISCONTINUED | OUTPATIENT
Start: 2018-01-15 | End: 2018-01-16 | Stop reason: HOSPADM

## 2018-01-15 RX ORDER — CIPROFLOXACIN 2 MG/ML
400 INJECTION, SOLUTION INTRAVENOUS
Status: DISCONTINUED | OUTPATIENT
Start: 2018-01-15 | End: 2018-01-16 | Stop reason: HOSPADM

## 2018-01-15 RX ORDER — HYDROMORPHONE HYDROCHLORIDE 2 MG/ML
0.5 INJECTION, SOLUTION INTRAMUSCULAR; INTRAVENOUS; SUBCUTANEOUS EVERY 6 HOURS PRN
Status: DISCONTINUED | OUTPATIENT
Start: 2018-01-15 | End: 2018-01-15

## 2018-01-15 RX ORDER — MIDAZOLAM HYDROCHLORIDE 1 MG/ML
INJECTION, SOLUTION INTRAMUSCULAR; INTRAVENOUS
Status: DISCONTINUED | OUTPATIENT
Start: 2018-01-15 | End: 2018-01-15

## 2018-01-15 RX ORDER — ACETAMINOPHEN 325 MG/1
650 TABLET ORAL EVERY 6 HOURS PRN
Status: DISCONTINUED | OUTPATIENT
Start: 2018-01-15 | End: 2018-01-16 | Stop reason: HOSPADM

## 2018-01-15 RX ORDER — SODIUM CHLORIDE 9 MG/ML
INJECTION, SOLUTION INTRAVENOUS CONTINUOUS
Status: DISCONTINUED | OUTPATIENT
Start: 2018-01-15 | End: 2018-01-16 | Stop reason: HOSPADM

## 2018-01-15 RX ORDER — ALPRAZOLAM 0.5 MG/1
0.5 TABLET ORAL 3 TIMES DAILY PRN
Status: DISCONTINUED | OUTPATIENT
Start: 2018-01-15 | End: 2018-01-16 | Stop reason: HOSPADM

## 2018-01-15 RX ORDER — POLYETHYLENE GLYCOL 3350 17 G/17G
17 POWDER, FOR SOLUTION ORAL EVERY 12 HOURS PRN
Status: DISCONTINUED | OUTPATIENT
Start: 2018-01-15 | End: 2018-01-16 | Stop reason: HOSPADM

## 2018-01-15 RX ORDER — FENTANYL CITRATE 50 UG/ML
25 INJECTION, SOLUTION INTRAMUSCULAR; INTRAVENOUS EVERY 5 MIN PRN
Status: DISCONTINUED | OUTPATIENT
Start: 2018-01-15 | End: 2018-01-16 | Stop reason: HOSPADM

## 2018-01-15 RX ORDER — LIDOCAINE HCL/PF 100 MG/5ML
SYRINGE (ML) INTRAVENOUS
Status: DISCONTINUED | OUTPATIENT
Start: 2018-01-15 | End: 2018-01-15

## 2018-01-15 RX ORDER — ONDANSETRON 2 MG/ML
INJECTION INTRAMUSCULAR; INTRAVENOUS
Status: DISCONTINUED | OUTPATIENT
Start: 2018-01-15 | End: 2018-01-15

## 2018-01-15 RX ORDER — MAG HYDROX/ALUMINUM HYD/SIMETH 200-200-20
15 SUSPENSION, ORAL (FINAL DOSE FORM) ORAL EVERY 6 HOURS PRN
Status: DISCONTINUED | OUTPATIENT
Start: 2018-01-15 | End: 2018-01-16 | Stop reason: HOSPADM

## 2018-01-15 RX ORDER — SODIUM CHLORIDE, SODIUM LACTATE, POTASSIUM CHLORIDE, CALCIUM CHLORIDE 600; 310; 30; 20 MG/100ML; MG/100ML; MG/100ML; MG/100ML
INJECTION, SOLUTION INTRAVENOUS CONTINUOUS PRN
Status: DISCONTINUED | OUTPATIENT
Start: 2018-01-15 | End: 2018-01-15

## 2018-01-15 RX ORDER — ONDANSETRON 2 MG/ML
4 INJECTION INTRAMUSCULAR; INTRAVENOUS ONCE AS NEEDED
Status: ACTIVE | OUTPATIENT
Start: 2018-01-15 | End: 2018-01-15

## 2018-01-15 RX ORDER — OXYCODONE HYDROCHLORIDE 5 MG/1
10 TABLET ORAL EVERY 6 HOURS PRN
Status: DISCONTINUED | OUTPATIENT
Start: 2018-01-15 | End: 2018-01-16 | Stop reason: HOSPADM

## 2018-01-15 RX ORDER — HYDROMORPHONE HYDROCHLORIDE 2 MG/ML
1 INJECTION, SOLUTION INTRAMUSCULAR; INTRAVENOUS; SUBCUTANEOUS
Status: COMPLETED | OUTPATIENT
Start: 2018-01-15 | End: 2018-01-15

## 2018-01-15 RX ORDER — PROPOFOL 10 MG/ML
VIAL (ML) INTRAVENOUS
Status: DISCONTINUED | OUTPATIENT
Start: 2018-01-15 | End: 2018-01-15

## 2018-01-15 RX ORDER — PHENAZOPYRIDINE HYDROCHLORIDE 100 MG/1
200 TABLET, FILM COATED ORAL
Status: DISCONTINUED | OUTPATIENT
Start: 2018-01-15 | End: 2018-01-16 | Stop reason: HOSPADM

## 2018-01-15 RX ADMIN — HYDROMORPHONE HYDROCHLORIDE 0.5 MG: 2 INJECTION INTRAMUSCULAR; INTRAVENOUS; SUBCUTANEOUS at 02:01

## 2018-01-15 RX ADMIN — FENTANYL CITRATE 25 MCG: 50 INJECTION, SOLUTION INTRAMUSCULAR; INTRAVENOUS at 12:01

## 2018-01-15 RX ADMIN — ONDANSETRON 4 MG: 2 INJECTION INTRAMUSCULAR; INTRAVENOUS at 09:01

## 2018-01-15 RX ADMIN — HYDROMORPHONE HYDROCHLORIDE 0.5 MG: 2 INJECTION INTRAMUSCULAR; INTRAVENOUS; SUBCUTANEOUS at 08:01

## 2018-01-15 RX ADMIN — HYDROMORPHONE HYDROCHLORIDE 1 MG: 2 INJECTION INTRAMUSCULAR; INTRAVENOUS; SUBCUTANEOUS at 02:01

## 2018-01-15 RX ADMIN — ONDANSETRON 4 MG: 2 INJECTION, SOLUTION INTRAMUSCULAR; INTRAVENOUS at 11:01

## 2018-01-15 RX ADMIN — PROPOFOL 200 MG: 10 INJECTION, EMULSION INTRAVENOUS at 11:01

## 2018-01-15 RX ADMIN — GLYCOPYRROLATE 0.2 MG: 0.2 INJECTION, SOLUTION INTRAMUSCULAR; INTRAVENOUS at 11:01

## 2018-01-15 RX ADMIN — MIDAZOLAM HYDROCHLORIDE 2 MG: 1 INJECTION, SOLUTION INTRAMUSCULAR; INTRAVENOUS at 11:01

## 2018-01-15 RX ADMIN — HYDROMORPHONE HYDROCHLORIDE 0.5 MG: 2 INJECTION INTRAMUSCULAR; INTRAVENOUS; SUBCUTANEOUS at 05:01

## 2018-01-15 RX ADMIN — LIDOCAINE HYDROCHLORIDE 100 MG: 20 INJECTION, SOLUTION INTRAVENOUS at 11:01

## 2018-01-15 RX ADMIN — ONDANSETRON 4 MG: 2 INJECTION INTRAMUSCULAR; INTRAVENOUS at 02:01

## 2018-01-15 RX ADMIN — SODIUM CHLORIDE: 0.9 INJECTION, SOLUTION INTRAVENOUS at 08:01

## 2018-01-15 RX ADMIN — SODIUM CHLORIDE: 0.9 INJECTION, SOLUTION INTRAVENOUS at 09:01

## 2018-01-15 RX ADMIN — PHENAZOPYRIDINE HYDROCHLORIDE 200 MG: 100 TABLET ORAL at 12:01

## 2018-01-15 RX ADMIN — SODIUM CHLORIDE, SODIUM LACTATE, POTASSIUM CHLORIDE, AND CALCIUM CHLORIDE: .6; .31; .03; .02 INJECTION, SOLUTION INTRAVENOUS at 11:01

## 2018-01-15 RX ADMIN — DOCUSATE SODIUM AND SENNOSIDES 1 TABLET: 8.6; 5 TABLET, FILM COATED ORAL at 08:01

## 2018-01-15 RX ADMIN — CIPROFLOXACIN 400 MG: 2 INJECTION INTRAVENOUS at 11:01

## 2018-01-15 RX ADMIN — DOCUSATE SODIUM AND SENNOSIDES 1 TABLET: 8.6; 5 TABLET, FILM COATED ORAL at 12:01

## 2018-01-15 RX ADMIN — PROMETHAZINE HYDROCHLORIDE 25 MG: 25 TABLET ORAL at 02:01

## 2018-01-15 RX ADMIN — OXYCODONE HYDROCHLORIDE 10 MG: 5 TABLET ORAL at 03:01

## 2018-01-15 RX ADMIN — SODIUM CHLORIDE: 0.9 INJECTION, SOLUTION INTRAVENOUS at 05:01

## 2018-01-15 RX ADMIN — SODIUM CHLORIDE 1000 ML: 0.9 INJECTION, SOLUTION INTRAVENOUS at 02:01

## 2018-01-15 RX ADMIN — ZOLPIDEM TARTRATE 5 MG: 5 TABLET, FILM COATED ORAL at 08:01

## 2018-01-15 NOTE — TELEPHONE ENCOUNTER
Patient is in  B- she is requesting that the stent be removed today as it is causing too much pain. She is currently NPO and states that if the stent can not come out today she wants to know if she can eat. Please advise.

## 2018-01-15 NOTE — TELEPHONE ENCOUNTER
Nurse gave Morphine and dilaudid to patient, but she is c/o breakthrough pain and is requesting a heating pad- also wants to be test for strep throat. Please advise

## 2018-01-15 NOTE — PROGRESS NOTES
WRITTEN HEALTHCARE DISCHARGE INFORMATION     Things that YOU are responsible for to Manage Your Care At Home:  1. Getting your prescriptions filled.  2. Taking you medications as directed. DO NOT MISS ANY DOSES!  3. Going to your follow-up doctor appointments. This is important because it allows the doctor to monitor your progress and to determine if any changes need to be made to your treatment plan.    If you are unable to make your follow up appointments, please call the number listed and reschedule this appointment.     After discharge, if you need assistance, you can call [facility] On Call Nurse Care Line for 24/7 assistance at [phone]    If you are experience any signs or symptoms, Call your doctor or Call 911 and come to your nearest Emergency Room.    Thank you for choosing [facility] and allowing us to care for you.   From your care management team: Tracy VANCE,RSW [phone]     You should receive a call from [facility] Discharge Department within 48-72 hours to help manage your care after discharge. Please try to make sure that you answer your phone for this important phone call.     Follow-up Information     W Lopez Balderas MD On 2/2/2018.    Specialty:  Urology  Why:  Friday at 3:15pm.   Contact information:  [address] [place], [place] [id number] [phone]

## 2018-01-15 NOTE — TRANSFER OF CARE
"Anesthesia Transfer of Care Note    Patient: Roslyn Brewster    Procedure(s) Performed: Procedure(s) (LRB):  REMOVAL-STENT (Left)    Patient location: PACU    Anesthesia Type: general    Transport from OR: Transported from OR on room air with adequate spontaneous ventilation    Post pain: adequate analgesia    Post assessment: no apparent anesthetic complications    Post vital signs: stable    Level of consciousness: awake, alert and oriented    Nausea/Vomiting: no nausea/vomiting    Complications: none    Transfer of care protocol was followed      Last vitals:   Visit Vitals  BP 93/64 (BP Location: Left arm, Patient Position: Lying)   Pulse (!) 116   Temp 36.6 °C (97.9 °F) (Oral)   Resp 12   Ht 5' 2" (1.575 m)   Wt 69.4 kg (153 lb)   LMP 01/10/2018   SpO2 97%   Breastfeeding? No   BMI 27.98 kg/m²     "

## 2018-01-15 NOTE — ASSESSMENT & PLAN NOTE
I discussed ureteroscopy today vs stent removal.  Ureteroscopy would necessitate a new stent at least for a few days.  She does not want another stent so she is requesting the stent be removed.    Keep NPO  Cipro OCTOR  Will likely be able to discharge home later today

## 2018-01-15 NOTE — PLAN OF CARE
"SW met with pt at bedside. SW reviewed discharge education sheet "Stenting" with pt. Pt voiced understanding. Teachback method applied with pt. SW also reviewed with pt what she is responsible for in order to manage her health at home.     1) Getting medications taking from pharmacy.  2) Taking medications as prescribed.  3) Making Appointments that are scheduled.    Pt verbalized understanding.      01/15/18 1547   Final Note   Assessment Type Final Discharge Note   Discharge Disposition Home   What phone number can be called within the next 1-3 days to see how you are doing after discharge? [phone]   Hospital Follow Up  Appt(s) scheduled? Yes   Discharge plans and expectations educations in teach back method with documentation complete? Yes   Right Care Referral Info   Post Acute Recommendation No Care     "

## 2018-01-15 NOTE — PROGRESS NOTES
[facility]  Urology  Progress Note    Patient Name: Roslyn Brewster  MRN: [id number]  Admission Date: 1/15/2018  Hospital Length of Stay: 0 days  Code Status: Full Code   Attending Provider: PRAVIN Balderas MD   Primary Care Physician: Spring Montilla MD    Subjective:     HPI:     Recurrent Pyelonephritis  Since around 2014 she has had multiple episodes of pyelonephritis.  She was seeing Dr. Luther at  who did a cystoscopy and per patient report did not find anything definitive.  Her symptoms include flank pain (left usually), malaise, and fever at times.  She normally does not have dysuria but current she has dysuria and frequency and urgency.  She knows about her small left kidney stone but has never required a procedure for stones.    She underwent ureteroscopy on 1/8/2018.  She had a narrow ureter and ureteroscopy could not reach the kidney.   A stent was placed with planned repeat ureteroscopy later this month.  She has not been able to tolerate the stent.  She has been taking oxybutynin and Norco but she continues with significant left flank and abdominal pain.    Interval History: She is complaining of a sore throat, she is concerned it is Strep Throat    Review of Systems   Constitutional: Positive for fever.   HENT: Positive for sore throat.    Eyes: Negative.    Respiratory: Negative for cough, chest tightness and shortness of breath.    Cardiovascular: Negative for chest pain.   Gastrointestinal: Positive for nausea and vomiting. Negative for constipation and diarrhea.   Genitourinary: Positive for flank pain and pelvic pain.   Neurological: Negative.    Psychiatric/Behavioral: Negative.      Objective:     Temp:  [97.8 °F (36.6 °C)-98.5 °F (36.9 °C)] 98.5 °F (36.9 °C)  Pulse:  [] 94  Resp:  [12-18] 18  SpO2:  [95 %-98 %] 98 %  BP: ()/(57-87) 119/87     Body mass index is 27.98 kg/m².      Date 01/15/18 0700 - 01/16/18 0659   Shift 0798-9830 4901-5038 3988-4802 24 Hour  Total   I  N  T  A  K  E   I.V.  (mL/kg) 500  (7.2)   500  (7.2)    Shift Total  (mL/kg) 500  (7.2)   500  (7.2)   O  U  T  P  U  T   Urine  (mL/kg/hr)  100  100    Shift Total  (mL/kg)  100  (1.4)  100  (1.4)   Weight (kg) 69.4 69.4 69.4 69.4          Drains          No matching active lines, drains, or airways          Physical Exam   Nursing note and vitals reviewed.  Constitutional: She is oriented to person, place, and time. She appears well-developed.   tearful   HENT:   Head: Normocephalic.   Eyes: Conjunctivae are normal.   Neck: Normal range of motion. No tracheal deviation present. No thyromegaly present.   Cardiovascular: Normal rate, normal heart sounds and normal pulses.    Pulmonary/Chest: Effort normal and breath sounds normal. No respiratory distress. She has no wheezes.   Abdominal: Soft. She exhibits no distension and no mass. There is no hepatosplenomegaly. There is tenderness. There is no rebound, no guarding and no CVA tenderness. No hernia.   Tender on left to superficial exam   Musculoskeletal: Normal range of motion. She exhibits no edema or tenderness.   Lymphadenopathy:     She has no cervical adenopathy.   Neurological: She is alert and oriented to person, place, and time.   Skin: Skin is warm and dry. No rash noted. No erythema.     Psychiatric: She has a normal mood and affect. Her behavior is normal. Judgment and thought content normal.       Significant Labs:    BMP:    Recent Labs  Lab 01/14/18  0530      K 3.8      CO2 24   BUN 14   CREATININE 0.8   CALCIUM 9.5       CBC:     Recent Labs  Lab 01/14/18  0530   WBC 6.49   HGB 14.5   HCT 40.0              Significant Imaging:                    Assessment/Plan:     * Intractable pain    Stay today  Plan for CT in AM  Labs in AM        Sore throat    Will consult hospital medicine for work up of Strep Throat        Kidney stones    Will monitor, not a likely source of pain            VTE Risk Mitigation         Ordered      Low Risk of VTE  Once      01/15/18 1249          W Lopez Balderas MD  Urology  [facility]

## 2018-01-15 NOTE — ED PROVIDER NOTES
Encounter Date: 1/15/2018    SCRIBE #1 NOTE: I, Rod Son, am scribing for, and in the presence of, Jessika Duffy MD. Other sections scribed: HPI, ROS.       History     Chief Complaint   Patient presents with    Flank Pain     Pt c/o left flank pain. Pt had kidney stent placed monday due to polynephritis. PT states she has zero to very little urine output daily     CC: Flank Pain  HPI: This 37 y.o. Female smoker with Hx of chronic pyelonephritis, HTN, drug abuse, renal stents presents to the the ED c/o severe L flank pain radiating into L side of abdomen with associated nausea that developed a week ago. Pt states that she had renal stent placed by Dr. Balderas a week ago, and has not been able to tolerate the pain. Was seen in the ER yesterday and had pain medication increased, but reports that her pain continues to be uncontrolled. She denies fever, chills, chest pain, V/D, hematuria.       The history is provided by the patient.     Review of patient's allergies indicates:   Allergen Reactions    Strawberry Anaphylaxis and Hives    Morphine Other (See Comments)     Burns stomach    Toradol [ketorolac]     Tramadol      Abdominal pain     Past Medical History:   Diagnosis Date    Acute encephalopathy 12/24/12    secondary to drug overdose    ARF (acute renal failure) 12/24/12    secondary to drug overdose    History of cardiac arrest 12/24/12    secondary to drug overdose    Hypertension     MRSA (methicillin resistant Staphylococcus aureus) 12/24/12    Nephritis     PMDD (premenstrual dysphoric disorder)     Polysubstance abuse 12/24/12    Systolic CHF, acute 12/24/12    secondary to drug overdose    UTI (urinary tract infection)      Past Surgical History:   Procedure Laterality Date    CHOLECYSTECTOMY      TUBAL LIGATION      urinary stents       Family History   Problem Relation Age of Onset    Kidney disease Mother     Cancer Maternal Grandmother      throat    Cancer Maternal  Grandfather      prostate    Kidney disease Daughter     Breast cancer Neg Hx     Colon cancer Neg Hx     Ovarian cancer Neg Hx      Social History   Substance Use Topics    Smoking status: Current Every Day Smoker     Packs/day: 0.50     Years: 20.00     Types: Cigarettes    Smokeless tobacco: Never Used      Comment: 1/2 half pack/day.     Alcohol use No     Review of Systems   Constitutional: Negative for chills and fever.   HENT: Negative for congestion, ear pain and sore throat.    Eyes: Negative for pain and visual disturbance.   Respiratory: Negative for cough and shortness of breath.    Cardiovascular: Negative for chest pain.   Gastrointestinal: Positive for abdominal pain and nausea. Negative for diarrhea and vomiting.   Genitourinary: Positive for decreased urine volume and flank pain (L). Negative for dysuria and hematuria.   Musculoskeletal: Negative for arthralgias and myalgias.   Skin: Negative for rash.   Neurological: Negative for dizziness and headaches.       Physical Exam     Initial Vitals [01/15/18 0123]   BP Pulse Resp Temp SpO2   (!) 146/84 106 16 98.2 °F (36.8 °C) 95 %      MAP       104.67         Physical Exam    Nursing note and vitals reviewed.  Constitutional: She appears well-developed and well-nourished. She is not diaphoretic. She appears distressed.   Tearful.   HENT:   Head: Normocephalic and atraumatic.   Eyes: EOM are normal.   Neck: Normal range of motion.   Cardiovascular: Normal rate and regular rhythm.   Pulmonary/Chest: Breath sounds normal. No respiratory distress.   Abdominal: Bowel sounds are normal. She exhibits no distension. There is no tenderness. There is CVA tenderness. There is no rebound and no guarding.   Musculoskeletal: Normal range of motion. She exhibits no edema.   Neurological: She is alert and oriented to person, place, and time.   Skin: Skin is warm and dry.   Psychiatric: Thought content normal.         ED Course   Procedures  Labs Reviewed - No  data to display          Medical Decision Making:   History:   Old Medical Records: I decided to obtain old medical records.  Initial Assessment:   Symptomatic L ureteral stent  Clinical Tests:   Lab Tests: Reviewed  Radiological Study: Reviewed  ED Management:  Labs and imaging from yesterday reviewed. Vitals within accpetable limits. Given IV hydration and analgesia. Case discissed with Dr. Avelar of Urology who is covering for Dr. Batista. Pt to be plzced in obs for intractable pain  Other:   I have discussed this case with another health care provider.            Scribe Attestation:   Scribe #1: I performed the above scribed service and the documentation accurately describes the services I performed. I attest to the accuracy of the note.    Attending Attestation:           Physician Attestation for Scribe:  Physician Attestation Statement for Scribe #1: I, Jessika Duffy MD, reviewed documentation, as scribed by Rod Son in my presence, and it is both accurate and complete.                 ED Course      Clinical Impression:   The primary encounter diagnosis was Intractable pain. Diagnoses of Pain due to ureteral stent, subsequent encounter and Kidney stones were also pertinent to this visit.    Disposition:   Disposition: Placed in Observation  Condition: Stable                        Jessika Duffy MD  01/16/18 2486

## 2018-01-15 NOTE — H&P
[facility]  Urology  History & Physical    Patient Name: Roslyn Brewster  MRN: [id number]  Admission Date: 1/15/2018  Code Status: Full Code   Attending Provider: PRAVIN Balderas MD   Primary Care Physician: Spring Montilla MD  Principal Problem:Intractable pain    Subjective:     HPI:     Recurrent Pyelonephritis  Since around 2014 she has had multiple episodes of pyelonephritis.  She was seeing Dr. Luther at  who did a cystoscopy and per patient report did not find anything definitive.  Her symptoms include flank pain (left usually), malaise, and fever at times.  She normally does not have dysuria but current she has dysuria and frequency and urgency.  She knows about her small left kidney stone but has never required a procedure for stones.    She underwent ureteroscopy on 1/8/2018.  She had a narrow ureter and ureteroscopy could not reach the kidney.   A stent was placed with planned repeat ureteroscopy later this month.  She has not been able to tolerate the stent.  She has been taking oxybutynin and Norco but she continues with significant left flank and abdominal pain.    Past Medical History:   Diagnosis Date    Acute encephalopathy 12/24/12    secondary to drug overdose    ARF (acute renal failure) 12/24/12    secondary to drug overdose    History of cardiac arrest 12/24/12    secondary to drug overdose    Hypertension     MRSA (methicillin resistant Staphylococcus aureus) 12/24/12    Nephritis     PMDD (premenstrual dysphoric disorder)     Polysubstance abuse 12/24/12    Systolic CHF, acute 12/24/12    secondary to drug overdose    UTI (urinary tract infection)        Past Surgical History:   Procedure Laterality Date    CHOLECYSTECTOMY      TUBAL LIGATION      urinary stents         Review of patient's allergies indicates:   Allergen Reactions    Strawberry Anaphylaxis and Hives    Morphine Other (See Comments)     Burns stomach    Toradol [ketorolac]     Tramadol       Abdominal pain       Family History     Problem Relation (Age of Onset)    Cancer Maternal Grandmother, Maternal Grandfather    Kidney disease Mother, Daughter          Social History Main Topics    Smoking status: Current Every Day Smoker     Packs/day: 0.50     Years: 20.00     Types: Cigarettes    Smokeless tobacco: Never Used      Comment: 1/2 half pack/day.     Alcohol use No    Drug use: No      Comment: PCP, patient states she had not done drugs since 2012    Sexual activity: Yes     Partners: Male       Review of Systems   Constitutional: Negative.  Negative for fever.   HENT: Negative.    Eyes: Negative.    Respiratory: Negative for cough, chest tightness and shortness of breath.    Cardiovascular: Negative for chest pain.   Gastrointestinal: Negative.  Negative for constipation, diarrhea and nausea.   Genitourinary: Positive for flank pain.   Neurological: Negative.    Psychiatric/Behavioral: Negative.        Objective:     Temp:  [98.2 °F (36.8 °C)] 98.2 °F (36.8 °C)  Pulse:  [] 86  Resp:  [16] 16  SpO2:  [95 %-97 %] 97 %  BP: (110-146)/(59-84) 110/59     Body mass index is 27.98 kg/m².    No intake/output data recorded.       Drains     Drain                 Ureteral Drain/Stent 01/08/18 0806 Left ureter 6 Fr. 7 days                Physical Exam   Nursing note and vitals reviewed.  Constitutional: She is oriented to person, place, and time. She appears well-developed.   HENT:   Head: Normocephalic.   Eyes: Conjunctivae are normal.   Neck: Normal range of motion. No tracheal deviation present. No thyromegaly present.   Cardiovascular: Normal rate, normal heart sounds and normal pulses.    Pulmonary/Chest: Effort normal and breath sounds normal. No respiratory distress. She has no wheezes.   Abdominal: Soft. She exhibits no distension and no mass. There is no hepatosplenomegaly. There is no tenderness. There is no rebound, no guarding and no CVA tenderness. No hernia.   Musculoskeletal:  Normal range of motion. She exhibits no edema or tenderness.   Lymphadenopathy:     She has no cervical adenopathy.   Neurological: She is alert and oriented to person, place, and time.   Skin: Skin is warm and dry. No rash noted. No erythema.     Psychiatric: She has a normal mood and affect. Her behavior is normal. Judgment and thought content normal.       Significant Labs:    BMP:    Recent Labs  Lab 01/14/18  0530      K 3.8      CO2 24   BUN 14   CREATININE 0.8   CALCIUM 9.5       CBC:    Recent Labs  Lab 01/14/18  0530   WBC 6.49   HGB 14.5   HCT 40.0          Blood Culture: No results for input(s): LABBLOO in the last 168 hours.  Urine Culture: No results for input(s): LABURIN in the last 168 hours.    Significant Imaging:                  Assessment and Plan:     * Intractable pain    I discussed ureteroscopy today vs stent removal.  Ureteroscopy would necessitate a new stent at least for a few days.  She does not want another stent so she is requesting the stent be removed.    Keep NPO  Cipro OCTOR  Will likely be able to discharge home later today        Kidney stones    Will monitor, not a likely source of pain            VTE Risk Mitigation     None          TOMMY Balderas MD  Urology  [facility]

## 2018-01-15 NOTE — PLAN OF CARE
SW met with pt at bedside. SW explained role in . SW inquired about HELP AT HOME. Pt stated that pt will have help at home with Spouse Loco. SW reviewed HELP AT HOME and DISCHARGE PLANNING brochure of questions to think about while in the hospital. Pt voiced understanding. SW inquired about what pt feels she is RESPONSIBLE for to MANAGE HEALTH AT HOME. Pt stated going to MD appointments and medications. SW voiced that taking medications at the appropriate time is important with managing care. SW informed pt that a DISCHARGE EDUCATION SHEET will be provided during stay. Pt voiced understanding.     [facility] [place] - [place], [place] - [address]  [address]  [place] [address]  Phone: [phone] Fax: [phone]    [facility] [address] [place] [address] [place] - [address] AT [facility] [address]  [address]  [place] [address]  Phone: [phone] Fax: [phone]       01/15/18 1529   Discharge Assessment   Assessment Type Discharge Planning Assessment   Confirmed/corrected address and phone number on facesheet? Yes   Assessment information obtained from? Patient   Prior to hospitilization cognitive status: Alert/Oriented   Prior to hospitalization functional status: Independent   Current cognitive status: Alert/Oriented   Current Functional Status: Independent   Lives With spouse;child(cristian), dependent   Able to Return to Prior Arrangements yes   Is patient able to care for self after discharge? Yes   Who are your caregiver(s) and their phone number(s)? Spouse Loco   Patient's perception of discharge disposition home or selfcare   Readmission Within The Last 30 Days no previous admission in last 30 days   Equipment Currently Used at Home none   Do you have any problems affording any of your prescribed medications? No   Is the patient taking medications as prescribed? yes   Does the patient have transportation home? Yes   Transportation Available car;family or  friend will provide   Discharge Plan A Home   Discharge Plan B Home   Patient/Family In Agreement With Plan yes

## 2018-01-15 NOTE — ANESTHESIA PREPROCEDURE EVALUATION
01/15/2018  Roslyn Brewster is a 37 y.o., female.    Pre-op Assessment    I have reviewed the Patient Summary Reports.      I have reviewed the Medications.     Review of Systems  Anesthesia Hx:  No problems with previous Anesthesia Denies Hx of Anesthetic complications  Neg history of prior surgery. Denies Family Hx of Anesthesia complications.   Denies Personal Hx of Anesthesia complications.   Social:  No Alcohol Use, Smoker    Hematology/Oncology:  Hematology Normal   Oncology Normal     EENT/Dental:EENT/Dental Normal   Cardiovascular:   Exercise tolerance: good Hypertension CHF    Pulmonary:  Pulmonary Normal    Renal/:   Chronic Renal Disease    Hepatic/GI:  Hepatic/GI Normal    Musculoskeletal:  Musculoskeletal Normal    Neurological:  Neurology Normal    Endocrine:  Endocrine Normal    Dermatological:  Skin Normal    Psych:   anxiety          Physical Exam  General:  Well nourished    Airway/Jaw/Neck:  Airway Findings: Mouth Opening: Normal General Airway Assessment: Adult  Mallampati: II  TM Distance: Normal, at least 6 cm         Dental:  DENTAL FINDINGS: Normal   Chest/Lungs:  Chest/Lungs Clear    Heart/Vascular:  Heart Findings: Normal Heart murmur: negative       Mental Status:  Mental Status Findings:  Cooperative, Alert and Oriented         Anesthesia Plan  Type of Anesthesia, risks & benefits discussed:  Anesthesia Type:  general  Patient's Preference:   Intra-op Monitoring Plan: standard ASA monitors  Intra-op Monitoring Plan Comments:   Post Op Pain Control Plan: multimodal analgesia and IV/PO Opioids PRN  Post Op Pain Control Plan Comments:   Induction:   IV  Beta Blocker:  Patient is not currently on a Beta-Blocker (No further documentation required).       Informed Consent: Patient understands risks and agrees with Anesthesia plan.  Questions answered. Anesthesia consent signed with  patient.  ASA Score: 2     Day of Surgery Review of History & Physical:    H&P update referred to the surgeon.         Ready For Surgery From Anesthesia Perspective.

## 2018-01-15 NOTE — TELEPHONE ENCOUNTER
----- Message from Dawn Ohara sent at 1/15/2018  3:16 PM CST -----  Contact: Kim with [facility]  Patient is complaining of possible Strep-throat,also pain would like to have a heating pad. Please have doctor call Kim @[phone]

## 2018-01-15 NOTE — SUBJECTIVE & OBJECTIVE
Interval History: She is complaining of a sore throat, she is concerned it is Strep Throat    Review of Systems   Constitutional: Positive for fever.   HENT: Positive for sore throat.    Eyes: Negative.    Respiratory: Negative for cough, chest tightness and shortness of breath.    Cardiovascular: Negative for chest pain.   Gastrointestinal: Positive for nausea and vomiting. Negative for constipation and diarrhea.   Genitourinary: Positive for flank pain and pelvic pain.   Neurological: Negative.    Psychiatric/Behavioral: Negative.      Objective:     Temp:  [97.8 °F (36.6 °C)-98.5 °F (36.9 °C)] 98.5 °F (36.9 °C)  Pulse:  [] 94  Resp:  [12-18] 18  SpO2:  [95 %-98 %] 98 %  BP: ()/(57-87) 119/87     Body mass index is 27.98 kg/m².      Date 01/15/18 0700 - 01/16/18 0659   Shift 9373-9878 5516-6536 6301-2097 24 Hour Total   I  N  T  A  K  E   I.V.  (mL/kg) 500  (7.2)   500  (7.2)    Shift Total  (mL/kg) 500  (7.2)   500  (7.2)   O  U  T  P  U  T   Urine  (mL/kg/hr)  100  100    Shift Total  (mL/kg)  100  (1.4)  100  (1.4)   Weight (kg) 69.4 69.4 69.4 69.4          Drains          No matching active lines, drains, or airways          Physical Exam   Nursing note and vitals reviewed.  Constitutional: She is oriented to person, place, and time. She appears well-developed.   tearful   HENT:   Head: Normocephalic.   Eyes: Conjunctivae are normal.   Neck: Normal range of motion. No tracheal deviation present. No thyromegaly present.   Cardiovascular: Normal rate, normal heart sounds and normal pulses.    Pulmonary/Chest: Effort normal and breath sounds normal. No respiratory distress. She has no wheezes.   Abdominal: Soft. She exhibits no distension and no mass. There is no hepatosplenomegaly. There is tenderness. There is no rebound, no guarding and no CVA tenderness. No hernia.   Tender on left to superficial exam   Musculoskeletal: Normal range of motion. She exhibits no edema or tenderness.    Lymphadenopathy:     She has no cervical adenopathy.   Neurological: She is alert and oriented to person, place, and time.   Skin: Skin is warm and dry. No rash noted. No erythema.     Psychiatric: She has a normal mood and affect. Her behavior is normal. Judgment and thought content normal.       Significant Labs:    BMP:    Recent Labs  Lab 01/14/18  0530      K 3.8      CO2 24   BUN 14   CREATININE 0.8   CALCIUM 9.5       CBC:     Recent Labs  Lab 01/14/18  0530   WBC 6.49   HGB 14.5   HCT 40.0              Significant Imaging:

## 2018-01-15 NOTE — SUBJECTIVE & OBJECTIVE
Interval History: Stent removed today.  She tells me the pain is still present and not improved.      Review of Systems   Constitutional: Negative.  Negative for fever.   HENT: Negative.    Eyes: Negative.    Respiratory: Negative for cough, chest tightness and shortness of breath.    Cardiovascular: Negative for chest pain.   Gastrointestinal: Positive for nausea and vomiting. Negative for constipation and diarrhea.   Genitourinary: Positive for flank pain and pelvic pain.   Neurological: Negative.    Psychiatric/Behavioral: Negative.      Objective:     Temp:  [97.8 °F (36.6 °C)-98.5 °F (36.9 °C)] 98.5 °F (36.9 °C)  Pulse:  [] 94  Resp:  [12-18] 18  SpO2:  [95 %-98 %] 98 %  BP: ()/(57-87) 119/87     Body mass index is 27.98 kg/m².      Date 01/15/18 0700 - 01/16/18 0659   Shift 2876-2498 9392-2735 2610-8403 24 Hour Total   I  N  T  A  K  E   I.V.  (mL/kg) 500  (7.2)   500  (7.2)    Shift Total  (mL/kg) 500  (7.2)   500  (7.2)   O  U  T  P  U  T   Urine  (mL/kg/hr)  100  100    Shift Total  (mL/kg)  100  (1.4)  100  (1.4)   Weight (kg) 69.4 69.4 69.4 69.4          Drains          No matching active lines, drains, or airways          Physical Exam   Nursing note and vitals reviewed.  Constitutional: She is oriented to person, place, and time. She appears well-developed.   tearful   HENT:   Head: Normocephalic.   Eyes: Conjunctivae are normal.   Neck: Normal range of motion. No tracheal deviation present. No thyromegaly present.   Cardiovascular: Normal rate, normal heart sounds and normal pulses.    Pulmonary/Chest: Effort normal and breath sounds normal. No respiratory distress. She has no wheezes.   Abdominal: Soft. She exhibits no distension and no mass. There is no hepatosplenomegaly. There is tenderness. There is no rebound, no guarding and no CVA tenderness. No hernia.   Left sided tenderness to superficial exam   Musculoskeletal: Normal range of motion. She exhibits no edema or tenderness.    Lymphadenopathy:     She has no cervical adenopathy.   Neurological: She is alert and oriented to person, place, and time.   Skin: Skin is warm and dry. No rash noted. No erythema.     Psychiatric: She has a normal mood and affect. Her behavior is normal. Judgment and thought content normal.       Significant Labs:    BMP:    Recent Labs  Lab 01/14/18  0530      K 3.8      CO2 24   BUN 14   CREATININE 0.8   CALCIUM 9.5       CBC:     Recent Labs  Lab 01/14/18  0530   WBC 6.49   HGB 14.5   HCT 40.0          Blood Culture: No results for input(s): LABBLOO in the last 168 hours.  Urine Culture: No results for input(s): LABURIN in the last 168 hours.    Significant Imaging:

## 2018-01-15 NOTE — NURSING
Report to Annie; sent unopened bag of Cipro on call to surg. Verbalized understanding. Pt going to surgery via stretcher with surg tech. No distress noted.

## 2018-01-15 NOTE — ANESTHESIA POSTPROCEDURE EVALUATION
"Anesthesia Post Evaluation    Patient: Roslyn Brewster    Procedure(s) Performed: Procedure(s) (LRB):  REMOVAL-STENT (Left)    Final Anesthesia Type: general  Patient location during evaluation: PACU  Patient participation: Yes- Able to Participate  Level of consciousness: awake and alert and oriented  Post-procedure vital signs: reviewed and stable  Pain management: adequate  Airway patency: patent  PONV status at discharge: No PONV  Anesthetic complications: no      Cardiovascular status: blood pressure returned to baseline, hemodynamically stable and tachycardic  Respiratory status: unassisted, spontaneous ventilation and room air  Hydration status: euvolemic  Follow-up not needed.        Visit Vitals  BP 93/64 (BP Location: Left arm, Patient Position: Lying)   Pulse (!) 116   Temp 36.6 °C (97.9 °F) (Oral)   Resp 12   Ht 5' 2" (1.575 m)   Wt 69.4 kg (153 lb)   LMP 01/10/2018   SpO2 97%   Breastfeeding? No   BMI 27.98 kg/m²       Pain/Luci Score: Pain Assessment Performed: Yes (1/15/2018 11:45 AM)  Presence of Pain: denies (1/15/2018 11:45 AM)  Pain Rating Prior to Med Admin: 5 (1/15/2018 12:03 PM)  Luci Score: 8 (1/15/2018 11:45 AM)      "

## 2018-01-15 NOTE — TELEPHONE ENCOUNTER
----- Message from Alexis Chaudhry sent at 1/15/2018  8:27 AM CST -----  Contact: [facility]/[phone]  Kim called to state that the patient has been admitted to the [facility] in room 433B. She would like the staff to call her. Thank you.

## 2018-01-15 NOTE — NURSING
Notified Dr Vivar's nurse/Natali that pt has been admitted. Verbalized understanding, Dr Vivar's in surg and she will let him know.

## 2018-01-15 NOTE — OP NOTE
DATE OF PROCEDURE:  01/15/2018.    PREOPERATIVE DIAGNOSIS:  Intractable pain due to ureteral stent.    POSTOPERATIVE DIAGNOSIS:  Intractable pain due to ureteral stent.    PROCEDURE PERFORMED:  Cystoscopy with left ureteral stent removal.    PRIMARY SURGEON:  Justice Balderas M.D.    ANESTHESIA:  MAC.    ESTIMATED BLOOD LOSS:  Minimal.    DRAINS:  None.    COMPLICATIONS:  None.    INDICATIONS:  Roslyn Brewster is a 37-year-old woman with history of intractable   pain due to ureteral stent.  She was given her options of ureteroscopy versus   stent removal.  She elected for stent removal.    Roslyn Brewster was taken to the Operating Room where she was positively   identified by mio.  She was placed supine on the operating room table.    Following induction of adequate general anesthesia, she was placed in the dorsal   lithotomy position and her external genitalia were prepped and draped in the   usual sterile fashion.    A preoperative timeout was performed as well as confirmation of preoperative   antibiotics.    A 21-Montserratian rigid cystoscope was then passed per urethra into the bladder under   direct vision.  The bladder was inspected.  There were no lesions seen.  No   tumors seen.    The previously placed left ureteral stent was visualized, grasped with flexible   graspers and removed without difficulty.    The bladder was reinspected.  There was no evidence of any damage to the bladder   or ureter.    The bladder was then drained using the scope.  The scope was then withdrawn.    Her anesthesia was reversed.  She was taken to Recovery Room in stable   condition.      MANNY  dd: 01/15/2018 11:42:34 (CST)  td: 01/15/2018 13:40:14 (CST)  Doc ID   #[id number]  Job ID #[id number]    CC:

## 2018-01-15 NOTE — BRIEF OP NOTE
[facility]  Brief Operative Note    SUMMARY     Surgery Date: 1/15/2018     Surgeon(s) and Role:     * PRAVIN Balderas MD - Primary    Assisting Surgeon: None    Pre-op Diagnosis:  Intractable pain [R52]    Post-op Diagnosis:  Post-Op Diagnosis Codes:     * Intractable pain [R52]    Procedure(s) (LRB):  REMOVAL-STENT (Left)    Anesthesia: General    Description of Procedure: stent removed without difficulty    Description of the findings of the procedure: as above    Estimated Blood Loss: * No values recorded between 1/15/2018 11:34 AM and 1/15/2018 11:40 AM *         Specimens:   Specimen (12h ago through future)    Start     Ordered    01/15/18 1135  Specimen to Pathology - Surgery  Once     Comments:  Left ureteral stent Gross only      01/15/18 1121

## 2018-01-15 NOTE — SUBJECTIVE & OBJECTIVE
Past Medical History:   Diagnosis Date    Acute encephalopathy 12/24/12    secondary to drug overdose    ARF (acute renal failure) 12/24/12    secondary to drug overdose    History of cardiac arrest 12/24/12    secondary to drug overdose    Hypertension     MRSA (methicillin resistant Staphylococcus aureus) 12/24/12    Nephritis     PMDD (premenstrual dysphoric disorder)     Polysubstance abuse 12/24/12    Systolic CHF, acute 12/24/12    secondary to drug overdose    UTI (urinary tract infection)        Past Surgical History:   Procedure Laterality Date    CHOLECYSTECTOMY      TUBAL LIGATION      urinary stents         Review of patient's allergies indicates:   Allergen Reactions    Strawberry Anaphylaxis and Hives    Morphine Other (See Comments)     Burns stomach    Toradol [ketorolac]     Tramadol      Abdominal pain       Family History     Problem Relation (Age of Onset)    Cancer Maternal Grandmother, Maternal Grandfather    Kidney disease Mother, Daughter          Social History Main Topics    Smoking status: Current Every Day Smoker     Packs/day: 0.50     Years: 20.00     Types: Cigarettes    Smokeless tobacco: Never Used      Comment: 1/2 half pack/day.     Alcohol use No    Drug use: No      Comment: PCP, patient states she had not done drugs since 2012    Sexual activity: Yes     Partners: Male       Review of Systems   Constitutional: Negative.  Negative for fever.   HENT: Negative.    Eyes: Negative.    Respiratory: Negative for cough, chest tightness and shortness of breath.    Cardiovascular: Negative for chest pain.   Gastrointestinal: Negative.  Negative for constipation, diarrhea and nausea.   Genitourinary: Positive for flank pain.   Neurological: Negative.    Psychiatric/Behavioral: Negative.        Objective:     Temp:  [98.2 °F (36.8 °C)] 98.2 °F (36.8 °C)  Pulse:  [] 86  Resp:  [16] 16  SpO2:  [95 %-97 %] 97 %  BP: (110-146)/(59-84) 110/59     Body mass index is  27.98 kg/m².    No intake/output data recorded.       Drains     Drain                 Ureteral Drain/Stent 01/08/18 0806 Left ureter 6 Fr. 7 days                Physical Exam   Nursing note and vitals reviewed.  Constitutional: She is oriented to person, place, and time. She appears well-developed.   HENT:   Head: Normocephalic.   Eyes: Conjunctivae are normal.   Neck: Normal range of motion. No tracheal deviation present. No thyromegaly present.   Cardiovascular: Normal rate, normal heart sounds and normal pulses.    Pulmonary/Chest: Effort normal and breath sounds normal. No respiratory distress. She has no wheezes.   Abdominal: Soft. She exhibits no distension and no mass. There is no hepatosplenomegaly. There is no tenderness. There is no rebound, no guarding and no CVA tenderness. No hernia.   Musculoskeletal: Normal range of motion. She exhibits no edema or tenderness.   Lymphadenopathy:     She has no cervical adenopathy.   Neurological: She is alert and oriented to person, place, and time.   Skin: Skin is warm and dry. No rash noted. No erythema.     Psychiatric: She has a normal mood and affect. Her behavior is normal. Judgment and thought content normal.       Significant Labs:    BMP:    Recent Labs  Lab 01/14/18  0530      K 3.8      CO2 24   BUN 14   CREATININE 0.8   CALCIUM 9.5       CBC:    Recent Labs  Lab 01/14/18  0530   WBC 6.49   HGB 14.5   HCT 40.0          Blood Culture: No results for input(s): LABBLOO in the last 168 hours.  Urine Culture: No results for input(s): LABURIN in the last 168 hours.    Significant Imaging:

## 2018-01-16 VITALS
HEIGHT: 62 IN | DIASTOLIC BLOOD PRESSURE: 70 MMHG | WEIGHT: 154.81 LBS | OXYGEN SATURATION: 96 % | RESPIRATION RATE: 18 BRPM | BODY MASS INDEX: 28.49 KG/M2 | SYSTOLIC BLOOD PRESSURE: 115 MMHG | TEMPERATURE: 98 F | HEART RATE: 83 BPM

## 2018-01-16 LAB
ANION GAP SERPL CALC-SCNC: 9 MMOL/L
BASOPHILS # BLD AUTO: 0.01 K/UL
BASOPHILS NFR BLD: 0.1 %
BUN SERPL-MCNC: 5 MG/DL
CALCIUM SERPL-MCNC: 8.8 MG/DL
CHLORIDE SERPL-SCNC: 102 MMOL/L
CO2 SERPL-SCNC: 28 MMOL/L
CREAT SERPL-MCNC: 0.7 MG/DL
DIFFERENTIAL METHOD: ABNORMAL
EOSINOPHIL # BLD AUTO: 0.2 K/UL
EOSINOPHIL NFR BLD: 2.9 %
ERYTHROCYTE [DISTWIDTH] IN BLOOD BY AUTOMATED COUNT: 12.1 %
EST. GFR  (AFRICAN AMERICAN): >60 ML/MIN/1.73 M^2
EST. GFR  (NON AFRICAN AMERICAN): >60 ML/MIN/1.73 M^2
GLUCOSE SERPL-MCNC: 101 MG/DL
HCT VFR BLD AUTO: 36.8 %
HGB BLD-MCNC: 12.8 G/DL
LYMPHOCYTES # BLD AUTO: 1.7 K/UL
LYMPHOCYTES NFR BLD: 22 %
MCH RBC QN AUTO: 31.1 PG
MCHC RBC AUTO-ENTMCNC: 34.8 G/DL
MCV RBC AUTO: 90 FL
MONOCYTES # BLD AUTO: 0.9 K/UL
MONOCYTES NFR BLD: 11.3 %
NEUTROPHILS # BLD AUTO: 5 K/UL
NEUTROPHILS NFR BLD: 63.3 %
PLATELET # BLD AUTO: 254 K/UL
PMV BLD AUTO: 10.3 FL
POTASSIUM SERPL-SCNC: 3.3 MMOL/L
RBC # BLD AUTO: 4.11 M/UL
SODIUM SERPL-SCNC: 139 MMOL/L
WBC # BLD AUTO: 7.88 K/UL

## 2018-01-16 PROCEDURE — 25500020 PHARM REV CODE 255: Performed by: UROLOGY

## 2018-01-16 PROCEDURE — 63600175 PHARM REV CODE 636 W HCPCS: Performed by: UROLOGY

## 2018-01-16 PROCEDURE — 25000003 PHARM REV CODE 250: Performed by: EMERGENCY MEDICINE

## 2018-01-16 PROCEDURE — 25000003 PHARM REV CODE 250: Performed by: UROLOGY

## 2018-01-16 PROCEDURE — 36415 COLL VENOUS BLD VENIPUNCTURE: CPT

## 2018-01-16 PROCEDURE — 80048 BASIC METABOLIC PNL TOTAL CA: CPT

## 2018-01-16 PROCEDURE — 85025 COMPLETE CBC W/AUTO DIFF WBC: CPT

## 2018-01-16 PROCEDURE — G0378 HOSPITAL OBSERVATION PER HR: HCPCS

## 2018-01-16 PROCEDURE — 99213 OFFICE O/P EST LOW 20 MIN: CPT | Mod: ,,, | Performed by: UROLOGY

## 2018-01-16 RX ORDER — TAMSULOSIN HYDROCHLORIDE 0.4 MG/1
0.4 CAPSULE ORAL DAILY
Status: DISCONTINUED | OUTPATIENT
Start: 2018-01-16 | End: 2018-01-16 | Stop reason: HOSPADM

## 2018-01-16 RX ADMIN — DOCUSATE SODIUM AND SENNOSIDES 1 TABLET: 8.6; 5 TABLET, FILM COATED ORAL at 09:01

## 2018-01-16 RX ADMIN — IOHEXOL 125 ML: 350 INJECTION, SOLUTION INTRAVENOUS at 08:01

## 2018-01-16 RX ADMIN — HYDROMORPHONE HYDROCHLORIDE 0.5 MG: 2 INJECTION INTRAMUSCULAR; INTRAVENOUS; SUBCUTANEOUS at 06:01

## 2018-01-16 RX ADMIN — HYDROMORPHONE HYDROCHLORIDE 0.5 MG: 2 INJECTION INTRAMUSCULAR; INTRAVENOUS; SUBCUTANEOUS at 09:01

## 2018-01-16 RX ADMIN — HYDROMORPHONE HYDROCHLORIDE 0.5 MG: 2 INJECTION INTRAMUSCULAR; INTRAVENOUS; SUBCUTANEOUS at 03:01

## 2018-01-16 RX ADMIN — SODIUM CHLORIDE: 0.9 INJECTION, SOLUTION INTRAVENOUS at 07:01

## 2018-01-16 RX ADMIN — HYDROMORPHONE HYDROCHLORIDE 0.5 MG: 2 INJECTION INTRAMUSCULAR; INTRAVENOUS; SUBCUTANEOUS at 12:01

## 2018-01-16 RX ADMIN — TAMSULOSIN HYDROCHLORIDE 0.4 MG: 0.4 CAPSULE ORAL at 11:01

## 2018-01-16 RX ADMIN — PHENAZOPYRIDINE HYDROCHLORIDE 200 MG: 100 TABLET ORAL at 09:01

## 2018-01-16 NOTE — SUBJECTIVE & OBJECTIVE
Interval History: Rapid Strep culture negative. Stent removed yesterday by Dr Balderas. She is asking to go home. Pain well controlled.    CT - L ureteral prominence, no obstruction, ureteral jets noted.    Review of Systems   Constitutional: Negative for appetite change, chills and fever.   HENT: Negative for congestion, sore throat and trouble swallowing.    Eyes: Negative for pain and itching.   Respiratory: Negative for cough and shortness of breath.    Cardiovascular: Negative for chest pain, palpitations and leg swelling.   Gastrointestinal: Negative for abdominal distention, abdominal pain, constipation, diarrhea, nausea and vomiting.   Genitourinary: Positive for flank pain. Negative for difficulty urinating, dysuria, hematuria, nocturia and urgency.   Musculoskeletal: Negative for back pain, neck pain and neck stiffness.   Skin: Negative for rash and wound.   Neurological: Negative for dizziness and seizures.   Hematological: Negative for adenopathy. Does not bruise/bleed easily.   Psychiatric/Behavioral: Negative for confusion. The patient is not nervous/anxious.    All other systems reviewed and are negative.    Objective:     Temp:  [97.9 °F (36.6 °C)-98.7 °F (37.1 °C)] 98.4 °F (36.9 °C)  Pulse:  [] 83  Resp:  [12-18] 18  SpO2:  [95 %-98 %] 96 %  BP: ()/(57-87) 115/70     Body mass index is 28.32 kg/m².            Drains          No matching active lines, drains, or airways          Physical Exam   Vitals reviewed.  Constitutional: She is oriented to person, place, and time. She appears well-developed and well-nourished. No distress.   HENT:   Head: Normocephalic and atraumatic.   Neck: Normal range of motion.   Cardiovascular: Normal rate and regular rhythm.    Pulmonary/Chest: Effort normal and breath sounds normal. No respiratory distress.   Abdominal: Soft. She exhibits no distension and no mass. There is no tenderness. There is no rebound and no guarding.   Musculoskeletal: Normal range  of motion.   Neurological: She is alert and oriented to person, place, and time.   Skin: Skin is warm and dry. No rash noted. She is not diaphoretic. No erythema.     Psychiatric: She has a normal mood and affect. Her behavior is normal.       Significant Labs:    BMP:    Recent Labs  Lab 01/14/18  0530 01/16/18  0405    139   K 3.8 3.3*    102   CO2 24 28   BUN 14 5*   CREATININE 0.8 0.7   CALCIUM 9.5 8.8       CBC:     Recent Labs  Lab 01/14/18  0530 01/16/18  0405   WBC 6.49 7.88   HGB 14.5 12.8   HCT 40.0 36.8*    254       Urine Culture: No results for input(s): LABURIN in the last 168 hours.  Urine Studies:   Recent Labs  Lab 01/14/18  0455   COLORU Blue*   APPEARANCEUA Hazy*   PHUR 6.0   SPECGRAV 1.020   PROTEINUA 2+*   GLUCUA Negative   KETONESU Negative   BILIRUBINUA Negative   OCCULTUA 3+*   NITRITE Positive*   UROBILINOGEN 4.0-6.0*   LEUKOCYTESUR Trace*   RBCUA >100*   WBCUA 3   BACTERIA Rare   SQUAMEPITHEL 4   HYALINECASTS 0       Significant Imaging:  All pertinent imaging results/findings from the past 24 hours have been reviewed.

## 2018-01-16 NOTE — PLAN OF CARE
Problem: Patient Care Overview  Goal: Plan of Care Review  Outcome: Ongoing (interventions implemented as appropriate)  No falls, trauma or injury this shift. Pain managed with IV dilaudid every 3 hours. Iv fluids infusing with out difficulty. Patient continues to wake during the night sleep even with Ambien given. Continue with plan of care and continue to monitor patient.

## 2018-01-16 NOTE — PLAN OF CARE
"Problem: Patient Care Overview  Goal: Plan of Care Review  Outcome: Ongoing (interventions implemented as appropriate)   01/15/18 1700   Coping/Psychosocial   Plan Of Care Reviewed With patient;significant other   Awake alert and oriented. Medicated for left flank pain with IV and po narcotic as ordered and N/V as ordered. IVF hydration, ambulating to the BR instructed to use hat, clear lele urine. Pt complained of increased left flank pain and sore throat, thinks she has "strep"; Dr Mariscal's office nurse notified who notified Dr Vivar. Throat sample was collected as ordered and pending CT tomorrow. Continue plan of care as ordered.       "

## 2018-01-16 NOTE — DISCHARGE SUMMARY
[facility]  Urology  Discharge Summary      Patient Name: Roslyn Brewster  MRN: [id number]  Admission Date: 1/15/2018  Hospital Length of Stay: 0 days  Discharge Date and Time:  01/16/2018 12:42 PM  Attending Physician: PRAVIN Balderas MD   Discharging Provider: Ninoska Lewis MD  Primary Care Physician: Spring Mnotilla MD    HPI:      Recurrent Pyelonephritis  Since around 2014 she has had multiple episodes of pyelonephritis.  She was seeing Dr. Luther at  who did a cystoscopy and per patient report did not find anything definitive.  Her symptoms include flank pain (left usually), malaise, and fever at times.  She normally does not have dysuria but current she has dysuria and frequency and urgency.  She knows about her small left kidney stone but has never required a procedure for stones.    She underwent ureteroscopy on 1/8/2018.  She had a narrow ureter and ureteroscopy could not reach the kidney.   A stent was placed with planned repeat ureteroscopy later this month.  She has not been able to tolerate the stent.  She has been taking oxybutynin and Norco but she continues with significant left flank and abdominal pain.    Procedure(s) (LRB):  REMOVAL-STENT (Left)     Indwelling Lines/Drains at time of discharge:   Lines/Drains/Airways     Airway                 Oral Airway 01/15/18 1131 [address] 90 1 day                Hospital Course (synopsis of major diagnoses, care, treatment, and services provided during the course of the hospital stay): Initially with pain after stent removed on HD 1. Pain now resolved. Rapid strep negative, likely irritation from anesthesia.    Consults:   Consults         Status Ordering Provider     Inpatient consult to Hospitalist  Once     Provider:  Roslyn Syed MD    Acknowledged PRAVIN BALDERAS          Significant Diagnostic Studies: CT - no obstruction, b/l ureteral jets    Pending Diagnostic Studies:     None          Final Active Diagnoses:     Diagnosis Date Noted POA    PRINCIPAL PROBLEM:  Intractable pain [R52] 01/15/2018 Yes    Sore throat [J02.9] 01/15/2018 No    Kidney stones [N20.0] 01/08/2018 Yes      Problems Resolved During this Admission:    Diagnosis Date Noted Date Resolved POA         Discharged Condition: good    Disposition:     Follow Up:  Follow-up Information     TOMMY Balderas MD On 2/2/2018.    Specialty:  Urology  Why:  Friday at 3:15pm.   Contact information:  [address] [place], [place] [id number] [phone]                 Patient Instructions:   No discharge procedures on file.  Medications:  Reconciled Home Medications:   Current Discharge Medication List      CONTINUE these medications which have NOT CHANGED    Details   alprazolam (XANAX) 1 MG tablet TAKE ONE TABLET EVERY EVENING AS NEEDED FOR ANXIETY *MAY CAUSE DROWSINESS*  Qty: 25 tablet, Refills: 2    Comments: Lowered to 25 per month for PRN use  Associated Diagnoses: PMDD (premenstrual dysphoric disorder)      hydrocodone-acetaminophen 5-325mg (NORCO) 5-325 mg per tablet Take 1 tablet by mouth every 6 (six) hours as needed for Pain.  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  Qty: 18 tablet, Refills: 0      phenazopyridine (PYRIDIUM) 200 MG tablet Take 1 tablet (200 mg total) by mouth 3 (three) times daily as needed for Pain (Burning).  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  Qty: 1 Bottle, Refills: 0      promethazine (PHENERGAN) 12.5 MG Tab Take 1 tablet (12.5 mg total) by mouth every 6 (six) hours as needed (nausea, vomiting).  Qty: 18 tablet, Refills: 0         STOP taking these medications       sodium phosphates (FLEET ENEMA) 19-7 gram/118 mL Enem Comments:   Reason for Stopping:               Time spent on the discharge of patient: 20 minutes    Ninoska Lewis MD  Urology  [facility]

## 2018-01-16 NOTE — PLAN OF CARE
Problem: Fall Risk (Adult)  Goal: Absence of Falls  Patient will demonstrate the desired outcomes by discharge/transition of care.   Outcome: Ongoing (interventions implemented as appropriate)   01/15/18 1700   Fall Risk (Adult)   Absence of Falls making progress toward outcome   Hourly rounds, call light in reach instructed to call for assist if needed. Free of falls

## 2018-01-16 NOTE — PROGRESS NOTES
[facility] [place]  Urology  Progress Note    Patient Name: Roslyn Brewster  MRN: [id number]  Admission Date: 1/15/2018  Hospital Length of Stay: 0 days  Code Status: Full Code   Attending Provider: PRAVIN Balderas MD   Primary Care Physician: Spring Montilla MD    Subjective:     HPI:     Recurrent Pyelonephritis  Since around 2014 she has had multiple episodes of pyelonephritis.  She was seeing Dr. Luther at  who did a cystoscopy and per patient report did not find anything definitive.  Her symptoms include flank pain (left usually), malaise, and fever at times.  She normally does not have dysuria but current she has dysuria and frequency and urgency.  She knows about her small left kidney stone but has never required a procedure for stones.    She underwent ureteroscopy on 1/8/2018.  She had a narrow ureter and ureteroscopy could not reach the kidney.   A stent was placed with planned repeat ureteroscopy later this month.  She has not been able to tolerate the stent.  She has been taking oxybutynin and Norco but she continues with significant left flank and abdominal pain.    Interval History: Rapid Strep culture negative. Stent removed yesterday by Dr Balderas. She is asking to go home. Pain well controlled.    CT - L ureteral prominence, no obstruction, ureteral jets noted.    Review of Systems   Constitutional: Negative for appetite change, chills and fever.   HENT: Negative for congestion, sore throat and trouble swallowing.    Eyes: Negative for pain and itching.   Respiratory: Negative for cough and shortness of breath.    Cardiovascular: Negative for chest pain, palpitations and leg swelling.   Gastrointestinal: Negative for abdominal distention, abdominal pain, constipation, diarrhea, nausea and vomiting.   Genitourinary: Positive for flank pain. Negative for difficulty urinating, dysuria, hematuria, nocturia and urgency.   Musculoskeletal: Negative for back pain, neck pain and neck  stiffness.   Skin: Negative for rash and wound.   Neurological: Negative for dizziness and seizures.   Hematological: Negative for adenopathy. Does not bruise/bleed easily.   Psychiatric/Behavioral: Negative for confusion. The patient is not nervous/anxious.    All other systems reviewed and are negative.    Objective:     Temp:  [97.9 °F (36.6 °C)-98.7 °F (37.1 °C)] 98.4 °F (36.9 °C)  Pulse:  [] 83  Resp:  [12-18] 18  SpO2:  [95 %-98 %] 96 %  BP: ()/(57-87) 115/70     Body mass index is 28.32 kg/m².            Drains          No matching active lines, drains, or airways          Physical Exam   Vitals reviewed.  Constitutional: She is oriented to person, place, and time. She appears well-developed and well-nourished. No distress.   HENT:   Head: Normocephalic and atraumatic.   Neck: Normal range of motion.   Cardiovascular: Normal rate and regular rhythm.    Pulmonary/Chest: Effort normal and breath sounds normal. No respiratory distress.   Abdominal: Soft. She exhibits no distension and no mass. There is no tenderness. There is no rebound and no guarding.   Musculoskeletal: Normal range of motion.   Neurological: She is alert and oriented to person, place, and time.   Skin: Skin is warm and dry. No rash noted. She is not diaphoretic. No erythema.     Psychiatric: She has a normal mood and affect. Her behavior is normal.       Significant Labs:    BMP:    Recent Labs  Lab 01/14/18  0530 01/16/18  0405    139   K 3.8 3.3*    102   CO2 24 28   BUN 14 5*   CREATININE 0.8 0.7   CALCIUM 9.5 8.8       CBC:     Recent Labs  Lab 01/14/18  0530 01/16/18  0405   WBC 6.49 7.88   HGB 14.5 12.8   HCT 40.0 36.8*    254       Urine Culture: No results for input(s): LABURIN in the last 168 hours.  Urine Studies:   Recent Labs  Lab 01/14/18  0455   COLORU Blue*   APPEARANCEUA Hazy*   PHUR 6.0   SPECGRAV 1.020   PROTEINUA 2+*   GLUCUA Negative   KETONESU Negative   BILIRUBINUA Negative   OCCULTUA 3+*    NITRITE Positive*   UROBILINOGEN 4.0-6.0*   LEUKOCYTESUR Trace*   RBCUA >100*   WBCUA 3   BACTERIA Rare   SQUAMEPITHEL 4   HYALINECASTS 0       Significant Imaging:  All pertinent imaging results/findings from the past 24 hours have been reviewed.                  Assessment/Plan:     * Intractable pain    Improved  Pain medication at home. Declines need for rx at discharge.        Sore throat    Rapid Strep negative  Likely related to anesthesia        Kidney stones    Will monitor, not a likely source of pain  Noted on CT, nonobstructive            VTE Risk Mitigation         Ordered     Low Risk of VTE  Once      01/15/18 1249          Ninoska Lewis MD  Urology  [facility]

## 2018-01-18 LAB — BACTERIA THROAT CULT: NORMAL

## 2018-02-07 ENCOUNTER — OFFICE VISIT (OUTPATIENT)
Dept: FAMILY MEDICINE | Facility: CLINIC | Age: 38
End: 2018-02-07
Payer: COMMERCIAL

## 2018-02-07 VITALS
WEIGHT: 158.31 LBS | RESPIRATION RATE: 16 BRPM | TEMPERATURE: 98 F | BODY MASS INDEX: 29.13 KG/M2 | OXYGEN SATURATION: 98 % | HEIGHT: 62 IN | SYSTOLIC BLOOD PRESSURE: 120 MMHG | DIASTOLIC BLOOD PRESSURE: 80 MMHG | HEART RATE: 95 BPM

## 2018-02-07 DIAGNOSIS — R30.0 DYSURIA: Primary | ICD-10-CM

## 2018-02-07 DIAGNOSIS — R50.9 FEVER, UNSPECIFIED FEVER CAUSE: ICD-10-CM

## 2018-02-07 DIAGNOSIS — J06.9 UPPER RESPIRATORY TRACT INFECTION, UNSPECIFIED TYPE: ICD-10-CM

## 2018-02-07 LAB
BILIRUB SERPL-MCNC: NORMAL MG/DL
BLOOD URINE, POC: NORMAL
COLOR, POC UA: YELLOW
CTP QC/QA: YES
CTP QC/QA: YES
FLUAV AG NPH QL: NEGATIVE
FLUBV AG NPH QL: NEGATIVE
GLUCOSE UR QL STRIP: NORMAL
KETONES UR QL STRIP: NORMAL
LEUKOCYTE ESTERASE URINE, POC: NORMAL
NITRITE, POC UA: NORMAL
PH, POC UA: 5
PROTEIN, POC: NORMAL
S PYO RRNA THROAT QL PROBE: NEGATIVE
SPECIFIC GRAVITY, POC UA: 1.02
UROBILINOGEN, POC UA: NORMAL

## 2018-02-07 PROCEDURE — 87804 INFLUENZA ASSAY W/OPTIC: CPT | Mod: ,,, | Performed by: PHYSICIAN ASSISTANT

## 2018-02-07 PROCEDURE — 87880 STREP A ASSAY W/OPTIC: CPT | Mod: ,,, | Performed by: PHYSICIAN ASSISTANT

## 2018-02-07 PROCEDURE — 87086 URINE CULTURE/COLONY COUNT: CPT

## 2018-02-07 PROCEDURE — 81001 URINALYSIS AUTO W/SCOPE: CPT | Mod: ,,, | Performed by: PHYSICIAN ASSISTANT

## 2018-02-07 PROCEDURE — 99999 PR PBB SHADOW E&M-EST. PATIENT-LVL III: CPT | Mod: PBBFAC,,, | Performed by: PHYSICIAN ASSISTANT

## 2018-02-07 PROCEDURE — 99213 OFFICE O/P EST LOW 20 MIN: CPT | Mod: 25,S$GLB,, | Performed by: PHYSICIAN ASSISTANT

## 2018-02-07 PROCEDURE — 3008F BODY MASS INDEX DOCD: CPT | Mod: S$GLB,,, | Performed by: PHYSICIAN ASSISTANT

## 2018-02-07 PROCEDURE — 81000 URINALYSIS NONAUTO W/SCOPE: CPT

## 2018-02-07 NOTE — LETTER
February 7, 2018               [facility]  Family Medicine  [address]  [address]  [place] [address]  Phone: [phone]  Fax: [phone]   February 7, 2018     Patient: Roslyn Brewster   YOB: 1980   Date of Visit: 2/7/2018       To Whom it May Concern:    Roslyn Brewster was seen in my clinic on 2/7/2018. She may return to work on 2/13/18.    If you have any questions or concerns, please don't hesitate to call.    Sincerely,         URIEL Inman

## 2018-02-07 NOTE — PROGRESS NOTES
Subjective:       Patient ID: Roslyn Brewster is a 37 y.o. female with multiple medical diagnoses as listed in the medical history and problem list that presents for Fever (exposed to strep and flu) and URI  .    Chief Complaint: Fever (exposed to strep and flu) and URI      Fever    This is a new problem. The current episode started yesterday. The maximum temperature noted was 101 to 101.9 F. Associated symptoms include coughing, headaches and a sore throat. Pertinent negatives include no abdominal pain, congestion, diarrhea, ear pain, nausea, vomiting or wheezing. She has tried NSAIDs (1 hour ago ) for the symptoms. The treatment provided moderate relief.   URI    This is a new problem. The current episode started yesterday. Associated symptoms include coughing, headaches and a sore throat. Pertinent negatives include no abdominal pain, congestion, diarrhea, ear pain, nausea, rhinorrhea, sinus pain, sneezing, vomiting or wheezing. She has tried NSAIDs for the symptoms. The treatment provided moderate relief.     Flu and strep exposure.     Review of Systems   Constitutional: Positive for chills, fatigue and fever (101.1 last night ).        Sweats   HENT: Positive for postnasal drip and sore throat. Negative for congestion, ear pain, rhinorrhea, sinus pain, sinus pressure, sneezing and trouble swallowing.    Eyes: Negative for pain, discharge, redness and itching.   Respiratory: Positive for cough and shortness of breath. Negative for chest tightness and wheezing.    Gastrointestinal: Negative for abdominal pain, diarrhea, nausea and vomiting.        Reflux    Musculoskeletal: Positive for myalgias.   Neurological: Positive for headaches.         PAST MEDICAL HISTORY:  Past Medical History:   Diagnosis Date    Acute encephalopathy 12/24/12    secondary to drug overdose    ARF (acute renal failure) 12/24/12    secondary to drug overdose    History of cardiac arrest 12/24/12    secondary to drug overdose     "Hypertension     MRSA (methicillin resistant Staphylococcus aureus) 12/24/12    Nephritis     PMDD (premenstrual dysphoric disorder)     Polysubstance abuse 12/24/12    Systolic CHF, acute 12/24/12    secondary to drug overdose    UTI (urinary tract infection)        SOCIAL HISTORY:  Social History     Social History    Marital status: Single     Spouse name: N/A    Number of children: N/A    Years of education: N/A     Occupational History    Not on file.     Social History Main Topics    Smoking status: Current Every Day Smoker     Packs/day: 0.50     Years: 20.00     Types: Cigarettes    Smokeless tobacco: Never Used      Comment: 1/2 half pack/day.     Alcohol use No    Drug use: No      Comment: PCP, patient states she had not done drugs since 2012    Sexual activity: Yes     Partners: Male     Other Topics Concern    Not on file     Social History Narrative    No narrative on file       ALLERGIES AND MEDICATIONS: updated and reviewed.  Review of patient's allergies indicates:   Allergen Reactions    Strawberry Anaphylaxis and Hives    Morphine Other (See Comments)     Burns stomach    Toradol [ketorolac]     Tramadol      Abdominal pain     Current Outpatient Prescriptions   Medication Sig Dispense Refill    alprazolam (XANAX) 1 MG tablet TAKE ONE TABLET EVERY EVENING AS NEEDED FOR ANXIETY *MAY CAUSE DROWSINESS* (Patient taking differently: Take 0.5 mg by mouth once daily. TAKE ONE TABLET EVERY EVENING AS NEEDED FOR ANXIETY *MAY CAUSE DROWSINESS*) 25 tablet 2     No current facility-administered medications for this visit.          Objective:   /80   Pulse 95   Temp 98.3 °F (36.8 °C) (Oral)   Resp 16   Ht 5' 2" (1.575 m)   Wt 71.8 kg (158 lb 4.6 oz)   LMP 01/10/2018 (Exact Date)   SpO2 98%   BMI 28.95 kg/m²      Physical Exam   Constitutional: She is oriented to person, place, and time. No distress.   HENT:   Head: Normocephalic and atraumatic.   Right Ear: External ear and " ear canal normal. Tympanic membrane is injected.   Left Ear: External ear and ear canal normal. Tympanic membrane is injected.   Nose: Nose normal.   Mouth/Throat: Uvula is midline and mucous membranes are normal. Posterior oropharyngeal erythema (mild PND) present. No oropharyngeal exudate.   Eyes: Conjunctivae and EOM are normal.   Cardiovascular: Normal rate and regular rhythm.    Pulmonary/Chest: Effort normal and breath sounds normal.   Musculoskeletal: Normal range of motion.   Lymphadenopathy:     She has no cervical adenopathy.   Neurological: She is alert and oriented to person, place, and time.   Skin: Skin is warm. No erythema.           Assessment:       1. Dysuria    2. Fever, unspecified fever cause    3. Upper respiratory tract infection, unspecified type        Plan:       Dysuria  -     POCT URINE DIPSTICK WITH MICROSCOPE, AUTOMATED: +leuko, trace blood  -     Urinalysis; Future; Expected date: 02/07/2018  -     Urine culture; Future; Expected date: 02/07/2018    -will contact with results     Fever, unspecified fever cause  -     POCT Rapid Strep A: negative  -     POCT Influenza A/B:negative     Upper respiratory tract infection, unspecified type  OTC cold medication  Rest and fluids          No Follow-up on file.

## 2018-02-08 LAB
AMORPH CRY URNS QL MICRO: NORMAL
BACTERIA #/AREA URNS HPF: NORMAL /HPF
BILIRUB UR QL STRIP: NEGATIVE
CAOX CRY URNS QL MICRO: NORMAL
CLARITY UR: ABNORMAL
COLOR UR: ABNORMAL
GLUCOSE UR QL STRIP: NEGATIVE
HGB UR QL STRIP: ABNORMAL
KETONES UR QL STRIP: NEGATIVE
LEUKOCYTE ESTERASE UR QL STRIP: ABNORMAL
MICROSCOPIC COMMENT: NORMAL
NITRITE UR QL STRIP: NEGATIVE
PH UR STRIP: 5 [PH] (ref 5–8)
PROT UR QL STRIP: NEGATIVE
RBC #/AREA URNS HPF: 1 /HPF (ref 0–4)
SP GR UR STRIP: 1.02 (ref 1–1.03)
SQUAMOUS #/AREA URNS HPF: 4 /HPF
URN SPEC COLLECT METH UR: ABNORMAL
UROBILINOGEN UR STRIP-ACNC: NEGATIVE EU/DL
WBC #/AREA URNS HPF: 1 /HPF (ref 0–5)

## 2018-02-10 LAB — BACTERIA UR CULT: NORMAL

## 2018-02-28 ENCOUNTER — TELEPHONE (OUTPATIENT)
Dept: FAMILY MEDICINE | Facility: CLINIC | Age: 38
End: 2018-02-28

## 2018-02-28 DIAGNOSIS — T36.95XA ANTIBIOTIC-INDUCED YEAST INFECTION: Primary | ICD-10-CM

## 2018-02-28 DIAGNOSIS — B37.9 ANTIBIOTIC-INDUCED YEAST INFECTION: Primary | ICD-10-CM

## 2018-02-28 RX ORDER — FLUCONAZOLE 150 MG/1
150 TABLET ORAL ONCE
Qty: 1 TABLET | Refills: 0 | Status: SHIPPED | OUTPATIENT
Start: 2018-02-28 | End: 2018-02-28

## 2018-02-28 NOTE — TELEPHONE ENCOUNTER
Diflucan sent over to pt's pharmacy. Attempted to call pt, but when calling the 1st number listed I was told I had the wrong number. The 2nd number listed was no answer and voicemail is not set up.

## 2018-02-28 NOTE — TELEPHONE ENCOUNTER
----- Message from Nora Greenwood sent at 2/28/2018  1:19 PM CST -----  Contact: [facility]   Pt is requesting a antibiotic for a yeast infection, she was on other antibiotics from her denist. Pls call markus [phone]. Thanks.....Janeth

## 2018-03-21 DIAGNOSIS — R11.0 NAUSEA: ICD-10-CM

## 2018-03-21 DIAGNOSIS — F32.81 PMDD (PREMENSTRUAL DYSPHORIC DISORDER): ICD-10-CM

## 2018-03-22 RX ORDER — ALPRAZOLAM 1 MG/1
TABLET ORAL
Qty: 20 TABLET | Refills: 5 | Status: SHIPPED | OUTPATIENT
Start: 2018-03-22 | End: 2018-06-19 | Stop reason: SDUPTHER

## 2018-04-03 ENCOUNTER — OFFICE VISIT (OUTPATIENT)
Dept: FAMILY MEDICINE | Facility: CLINIC | Age: 38
End: 2018-04-03
Payer: COMMERCIAL

## 2018-04-03 VITALS
DIASTOLIC BLOOD PRESSURE: 62 MMHG | SYSTOLIC BLOOD PRESSURE: 116 MMHG | BODY MASS INDEX: 28.8 KG/M2 | OXYGEN SATURATION: 98 % | TEMPERATURE: 98 F | HEIGHT: 62 IN | HEART RATE: 98 BPM | WEIGHT: 156.5 LBS

## 2018-04-03 DIAGNOSIS — F39 MOOD DISORDER: ICD-10-CM

## 2018-04-03 DIAGNOSIS — M25.551 RIGHT HIP PAIN: Primary | ICD-10-CM

## 2018-04-03 PROCEDURE — 99214 OFFICE O/P EST MOD 30 MIN: CPT | Mod: S$GLB,,, | Performed by: FAMILY MEDICINE

## 2018-04-03 PROCEDURE — 3074F SYST BP LT 130 MM HG: CPT | Mod: CPTII,S$GLB,, | Performed by: FAMILY MEDICINE

## 2018-04-03 PROCEDURE — 3078F DIAST BP <80 MM HG: CPT | Mod: CPTII,S$GLB,, | Performed by: FAMILY MEDICINE

## 2018-04-03 PROCEDURE — 99999 PR PBB SHADOW E&M-EST. PATIENT-LVL III: CPT | Mod: PBBFAC,,, | Performed by: FAMILY MEDICINE

## 2018-04-03 RX ORDER — METHYLPREDNISOLONE ACETATE 80 MG/ML
80 INJECTION, SUSPENSION INTRA-ARTICULAR; INTRALESIONAL; INTRAMUSCULAR; SOFT TISSUE ONCE
Status: DISCONTINUED | OUTPATIENT
Start: 2018-04-03 | End: 2019-02-19

## 2018-04-03 RX ORDER — NAPROXEN SODIUM 550 MG/1
550 TABLET ORAL 2 TIMES DAILY WITH MEALS
Qty: 60 TABLET | Refills: 0 | Status: SHIPPED | OUTPATIENT
Start: 2018-04-03 | End: 2018-06-04

## 2018-04-03 RX ORDER — TIZANIDINE 4 MG/1
16 TABLET ORAL NIGHTLY PRN
Qty: 120 TABLET | Refills: 0 | Status: SHIPPED | OUTPATIENT
Start: 2018-04-03 | End: 2018-05-03

## 2018-04-03 RX ORDER — ATOMOXETINE 40 MG/1
40 CAPSULE ORAL DAILY
Qty: 30 CAPSULE | Refills: 5 | Status: SHIPPED | OUTPATIENT
Start: 2018-04-03 | End: 2018-06-04

## 2018-04-03 NOTE — PROGRESS NOTES
Chief Complaint   Patient presents with    Leg Pain       SUBJECTIVE:  Roslyn Brewster is a 37 y.o. female here for new problem of leg pain.  Currently has co-morbidities including per problem list.      Past Medical History:   Diagnosis Date    Acute encephalopathy 12/24/12    secondary to drug overdose    ARF (acute renal failure) 12/24/12    secondary to drug overdose    History of cardiac arrest 12/24/12    secondary to drug overdose    Hypertension     MRSA (methicillin resistant Staphylococcus aureus) 12/24/12    Nephritis     PMDD (premenstrual dysphoric disorder)     Polysubstance abuse 12/24/12    Systolic CHF, acute 12/24/12    secondary to drug overdose    UTI (urinary tract infection)      Past Surgical History:   Procedure Laterality Date    CHOLECYSTECTOMY      TUBAL LIGATION      urinary stents       Social History     Social History    Marital status: Single     Spouse name: N/A    Number of children: N/A    Years of education: N/A     Occupational History    Not on file.     Social History Main Topics    Smoking status: Current Every Day Smoker     Packs/day: 0.50     Years: 20.00     Types: Cigarettes    Smokeless tobacco: Never Used      Comment: 1/2 half pack/day.     Alcohol use No    Drug use: No      Comment: PCP, patient states she had not done drugs since 2012    Sexual activity: Yes     Partners: Male     Other Topics Concern    Not on file     Social History Narrative    No narrative on file     Family History   Problem Relation Age of Onset    Kidney disease Mother     Cancer Maternal Grandmother      throat    Cancer Maternal Grandfather      prostate    Kidney disease Daughter     Breast cancer Neg Hx     Colon cancer Neg Hx     Ovarian cancer Neg Hx      Current Outpatient Prescriptions on File Prior to Visit   Medication Sig Dispense Refill    alprazolam (XANAX) 1 MG tablet TAKE ONE TABLET EVERY EVENING AS NEEDED FOR ANXIETY *MAY CAUSE DROWSINESS*  "(Patient taking differently: Take 0.5 mg by mouth once daily. TAKE ONE TABLET EVERY EVENING AS NEEDED FOR ANXIETY *MAY CAUSE DROWSINESS*) 25 tablet 2    ALPRAZolam (XANAX) 1 MG tablet TAKE ONE TABLET BY MOUTH EVERY EVENING AS NEEDED FOR ANXIETY 20 tablet 5     No current facility-administered medications on file prior to visit.      Review of patient's allergies indicates:   Allergen Reactions    Strawberry Anaphylaxis and Hives    Morphine Other (See Comments)     Burns stomach    Toradol [ketorolac]     Tramadol      Abdominal pain         ROS    OBJECTIVE:  /62   Pulse 98   Temp 97.9 °F (36.6 °C) (Oral)   Ht 5' 2" (1.575 m)   Wt 71 kg (156 lb 8.4 oz)   LMP 04/03/2018   SpO2 98%   BMI 28.63 kg/m²     Wt Readings from Last 3 Encounters:   04/03/18 71 kg (156 lb 8.4 oz)   02/07/18 71.8 kg (158 lb 4.6 oz)   01/15/18 70.2 kg (154 lb 13.3 oz)     BP Readings from Last 3 Encounters:   04/03/18 116/62   02/07/18 120/80   01/16/18 115/70       Right inner thigh and ligament pain, joint seems normal  No trauma, no bruising  Abdomen is normal  Review of old Records:      Review of old labs:      Review of old imaging:      ASSESSMENT:  Problem List Items Addressed This Visit     Mood disorder      Other Visit Diagnoses     Right hip pain    -  Primary    Relevant Medications    methylPREDNISolone acetate injection 80 mg    atomoxetine (STRATTERA) 40 MG capsule    naproxen sodium (ANAPROX) 550 MG tablet    tiZANidine (ZANAFLEX) 4 MG tablet          ICD-10-CM ICD-9-CM   1. Right hip pain M25.551 719.45   2. Mood disorder F39 296.90         PLAN:refill medication  1. Mood disorder      2. Right hip pain  Potential medication side effects were discussed with the patient; let me know if any occur.  HEP  - methylPREDNISolone acetate injection 80 mg; Inject 1 mL (80 mg total) into the muscle once.  - atomoxetine (STRATTERA) 40 MG capsule; Take 1 capsule (40 mg total) by mouth once daily.  Dispense: 30 capsule; " Refill: 5  - naproxen sodium (ANAPROX) 550 MG tablet; Take 1 tablet (550 mg total) by mouth 2 (two) times daily with meals.  Dispense: 60 tablet; Refill: 0  - tiZANidine (ZANAFLEX) 4 MG tablet; Take 4 tablets (16 mg total) by mouth nightly as needed (titrate up slowly).  Dispense: 120 tablet; Refill: 0      Medication List with Changes/Refills   New Medications    ATOMOXETINE (STRATTERA) 40 MG CAPSULE    Take 1 capsule (40 mg total) by mouth once daily.    NAPROXEN SODIUM (ANAPROX) 550 MG TABLET    Take 1 tablet (550 mg total) by mouth 2 (two) times daily with meals.    TIZANIDINE (ZANAFLEX) 4 MG TABLET    Take 4 tablets (16 mg total) by mouth nightly as needed (titrate up slowly).   Current Medications    ALPRAZOLAM (XANAX) 1 MG TABLET    TAKE ONE TABLET EVERY EVENING AS NEEDED FOR ANXIETY *MAY CAUSE DROWSINESS*    ALPRAZOLAM (XANAX) 1 MG TABLET    TAKE ONE TABLET BY MOUTH EVERY EVENING AS NEEDED FOR ANXIETY       No Follow-up on file.

## 2018-04-18 ENCOUNTER — OFFICE VISIT (OUTPATIENT)
Dept: OBSTETRICS AND GYNECOLOGY | Facility: CLINIC | Age: 38
End: 2018-04-18
Payer: COMMERCIAL

## 2018-04-18 VITALS
SYSTOLIC BLOOD PRESSURE: 128 MMHG | BODY MASS INDEX: 29.01 KG/M2 | HEIGHT: 62 IN | WEIGHT: 157.63 LBS | DIASTOLIC BLOOD PRESSURE: 76 MMHG

## 2018-04-18 DIAGNOSIS — Z00.00 ANNUAL PHYSICAL EXAM: Primary | ICD-10-CM

## 2018-04-18 DIAGNOSIS — N85.2 ENLARGED UTERUS: ICD-10-CM

## 2018-04-18 DIAGNOSIS — Z01.419 ENCOUNTER FOR GYNECOLOGICAL EXAMINATION WITHOUT ABNORMAL FINDING: ICD-10-CM

## 2018-04-18 DIAGNOSIS — N93.9 ABNORMAL UTERINE BLEEDING: ICD-10-CM

## 2018-04-18 PROCEDURE — 3078F DIAST BP <80 MM HG: CPT | Mod: CPTII,S$GLB,, | Performed by: OBSTETRICS & GYNECOLOGY

## 2018-04-18 PROCEDURE — 88175 CYTOPATH C/V AUTO FLUID REDO: CPT

## 2018-04-18 PROCEDURE — 99999 PR PBB SHADOW E&M-EST. PATIENT-LVL III: CPT | Mod: PBBFAC,,, | Performed by: OBSTETRICS & GYNECOLOGY

## 2018-04-18 PROCEDURE — 99395 PREV VISIT EST AGE 18-39: CPT | Mod: S$GLB,,, | Performed by: OBSTETRICS & GYNECOLOGY

## 2018-04-18 PROCEDURE — 3074F SYST BP LT 130 MM HG: CPT | Mod: CPTII,S$GLB,, | Performed by: OBSTETRICS & GYNECOLOGY

## 2018-04-18 NOTE — PATIENT INSTRUCTIONS

## 2018-04-18 NOTE — PROGRESS NOTES
Subjective:      Chief Complaint:    Chief Complaint   Patient presents with    Gynecologic Exam       Menstrual History:    OB History      Para Term  AB Living    3         2    SAB TAB Ectopic Multiple Live Births                       Menarche age: 12     Patient's last menstrual period was 2018.            Objective:        History of Present Illness AND  Examination detailed DICTATE:       PRESENT ILLNESS AND PHYSICAL EXAMINATION NOTE:  The patient is 37 years of age,   here for annual exam.  Last visit was in .   3, para 2.  Pap smear in    was negative.  The patient's past history reviewed.  Last menstrual period   was 2018.  Past medical, urinary tract infection, pyelonephritis, PMDD,   substance abuse history, congestive heart failure.  Surgery, tubal ligation,   cholecystectomy, breast augmentation, retrograde and cysto.  The patient is   complaining of bleeding between menstrual cycle; however, only happened this   last episode.  Her menstrual cycle usually irregular, heavy with severe   dysmenorrhea.    REVIEW OF SYSTEMS:  HEAD, EAR, EYES, NOSE, AND THROAT:  Negative.  CARDIORESPIRATORY:  Negative.  GASTROINTESTINAL:  Negative.  GENITOURINARY:  See present illness.  NEUROMUSCULAR:  No problem.    PHYSICAL EXAMINATION:  GENERAL:  Well-developed, well-nourished, alert, oriented female, not in acute   distress.  VITAL SIGNS:  Blood pressure 128/76, weight 157.  HEAD:  Normocephalic.  EYES:  React.  NECK:  Supple.  Thyroid is not palpable.  No nodes.  CHEST:  Clear.  HEART:  Regular sinus rhythm, no murmur.  BREASTS:  No lumps, masses, discharge, skin changes, retraction, nipple changes.    Axilla negative.  Augmentation noted.  ABDOMEN:  Upper abdomen normal.  Lower abdomen normal.  No guarding, rebound or   tenderness.  Bowel sounds normal.  PELVIC:  External normal.  Vulva normal.  [place], urethral and [place] glands   are negative.  Vagina, dark blood noted.   Cervix, minimal spotting noted.    Uterus is slightly enlarged, soft.  Adnexa is negative.  Good pelvic support   noted.  RECTAL:  Negative.  MUSCULOSKELETAL:  Normal range of motion.  SKIN:  Clear.  Blood vessels palpable and equal.    IMPRESSION:  Essentially exam normal with a slightly enlarged uterus.    PLAN:  Pap smear.  We will do a pelvic ultrasound to evaluate the uterus, also   do a baseline mammogram.  Continue with calcium, vitamin D, multivitamins and   depending on the results, we will decide on followup with treatment.      GEORGINA  dd: 04/18/2018 09:08:14 (CDT)  td: 04/19/2018 05:50:06 (CDT)  Doc ID   #[id number]  Job ID #[id number]    CC:           Assessment:      Diagnosis:annual   Exam   IRREGULAR   CYCLE       ENLARGED   UTERUS       Plan:      Return in 12  months

## 2018-04-19 ENCOUNTER — PATIENT MESSAGE (OUTPATIENT)
Dept: FAMILY MEDICINE | Facility: CLINIC | Age: 38
End: 2018-04-19

## 2018-06-04 ENCOUNTER — OFFICE VISIT (OUTPATIENT)
Dept: FAMILY MEDICINE | Facility: CLINIC | Age: 38
End: 2018-06-04
Payer: COMMERCIAL

## 2018-06-04 VITALS
RESPIRATION RATE: 16 BRPM | DIASTOLIC BLOOD PRESSURE: 80 MMHG | BODY MASS INDEX: 27.99 KG/M2 | SYSTOLIC BLOOD PRESSURE: 110 MMHG | HEIGHT: 62 IN | TEMPERATURE: 99 F | WEIGHT: 152.13 LBS | OXYGEN SATURATION: 98 % | HEART RATE: 86 BPM

## 2018-06-04 DIAGNOSIS — R21 RASH: ICD-10-CM

## 2018-06-04 DIAGNOSIS — B37.9 ANTIBIOTIC-INDUCED YEAST INFECTION: ICD-10-CM

## 2018-06-04 DIAGNOSIS — N20.0 NEPHROLITHIASIS: ICD-10-CM

## 2018-06-04 DIAGNOSIS — T36.95XA ANTIBIOTIC-INDUCED YEAST INFECTION: ICD-10-CM

## 2018-06-04 DIAGNOSIS — N12 PYELONEPHRITIS: Primary | ICD-10-CM

## 2018-06-04 LAB
BILIRUB SERPL-MCNC: NEGATIVE MG/DL
BLOOD URINE, POC: 250
COLOR, POC UA: ABNORMAL
GLUCOSE UR QL STRIP: NEGATIVE
KETONES UR QL STRIP: ABNORMAL
LEUKOCYTE ESTERASE URINE, POC: ABNORMAL
NITRITE, POC UA: POSITIVE
PH, POC UA: 6
PROTEIN, POC: ABNORMAL
SPECIFIC GRAVITY, POC UA: 1.02
UROBILINOGEN, POC UA: NEGATIVE

## 2018-06-04 PROCEDURE — 99214 OFFICE O/P EST MOD 30 MIN: CPT | Mod: 25,S$GLB,, | Performed by: PHYSICIAN ASSISTANT

## 2018-06-04 PROCEDURE — 87186 SC STD MICRODIL/AGAR DIL: CPT

## 2018-06-04 PROCEDURE — 87088 URINE BACTERIA CULTURE: CPT

## 2018-06-04 PROCEDURE — 87077 CULTURE AEROBIC IDENTIFY: CPT

## 2018-06-04 PROCEDURE — 3079F DIAST BP 80-89 MM HG: CPT | Mod: CPTII,S$GLB,, | Performed by: PHYSICIAN ASSISTANT

## 2018-06-04 PROCEDURE — 81000 URINALYSIS NONAUTO W/SCOPE: CPT

## 2018-06-04 PROCEDURE — 81001 URINALYSIS AUTO W/SCOPE: CPT | Mod: S$GLB,,, | Performed by: PHYSICIAN ASSISTANT

## 2018-06-04 PROCEDURE — 3008F BODY MASS INDEX DOCD: CPT | Mod: CPTII,S$GLB,, | Performed by: PHYSICIAN ASSISTANT

## 2018-06-04 PROCEDURE — 99999 PR PBB SHADOW E&M-EST. PATIENT-LVL IV: CPT | Mod: PBBFAC,,, | Performed by: PHYSICIAN ASSISTANT

## 2018-06-04 PROCEDURE — 87086 URINE CULTURE/COLONY COUNT: CPT

## 2018-06-04 PROCEDURE — 3074F SYST BP LT 130 MM HG: CPT | Mod: CPTII,S$GLB,, | Performed by: PHYSICIAN ASSISTANT

## 2018-06-04 RX ORDER — AMOXICILLIN AND CLAVULANATE POTASSIUM 500; 125 MG/1; MG/1
1 TABLET, FILM COATED ORAL 2 TIMES DAILY
Qty: 14 TABLET | Refills: 0 | Status: SHIPPED | OUTPATIENT
Start: 2018-06-04 | End: 2018-06-11

## 2018-06-04 RX ORDER — FLUCONAZOLE 150 MG/1
150 TABLET ORAL ONCE
Qty: 1 TABLET | Refills: 0 | Status: SHIPPED | OUTPATIENT
Start: 2018-06-04 | End: 2018-06-04

## 2018-06-04 NOTE — PROGRESS NOTES
Subjective:       Patient ID: Roslyn Brewster is a 37 y.o. female with multiple medical diagnoses as listed in the medical history and problem list that presents for Urinary Tract Infection and Rash  .    Chief Complaint: Urinary Tract Infection and Rash      Urinary Tract Infection    This is a new problem. The current episode started in the past 7 days (a week ). The problem occurs every urination. The pain is at a severity of 5/10. The maximum temperature recorded prior to her arrival was 102 - 102.9 F. The fever has been present for less than 1 day. She is sexually active. There is a history of pyelonephritis. Associated symptoms include flank pain, hematuria, nausea, vomiting and rash. Pertinent negatives include no chills, discharge, frequency, sweats, urgency or bubble bath use. She has tried nothing for the symptoms.   Rash   This is a new problem. The current episode started today. The problem has been gradually worsening since onset. Location: foot now up her legs  Associated with: sun yesterday tanning; not products on skin  Associated symptoms include a fever (102-tylenol last night and this am and last dose was an hour ago ) and vomiting. Pertinent negatives include no diarrhea or shortness of breath. Past treatments include nothing.    she was in the sun the past two days.   she started with urinary symptoms about a week ago   no new products or meds.         Review of Systems   Constitutional: Positive for fever (102-tylenol last night and this am and last dose was an hour ago ). Negative for chills.   Respiratory: Negative for shortness of breath.    Gastrointestinal: Positive for nausea and vomiting. Negative for diarrhea. Abdominal pain: lower abdominal    Genitourinary: Positive for flank pain and hematuria. Negative for difficulty urinating, dysuria, frequency and urgency.   Musculoskeletal: Positive for back pain (left).   Skin: Positive for rash.   Neurological: Positive for dizziness.  Negative for light-headedness and headaches.         PAST MEDICAL HISTORY:  Past Medical History:   Diagnosis Date    Acute encephalopathy 12/24/12    secondary to drug overdose    ARF (acute renal failure) 12/24/12    secondary to drug overdose    History of cardiac arrest 12/24/12    secondary to drug overdose    Hypertension     MRSA (methicillin resistant Staphylococcus aureus) 12/24/12    Nephritis     PMDD (premenstrual dysphoric disorder)     Polysubstance abuse 12/24/12    Systolic CHF, acute 12/24/12    secondary to drug overdose    UTI (urinary tract infection)        SOCIAL HISTORY:  Social History     Social History    Marital status: Single     Spouse name: N/A    Number of children: N/A    Years of education: N/A     Occupational History    Not on file.     Social History Main Topics    Smoking status: Current Every Day Smoker     Packs/day: 0.50     Years: 20.00     Types: Cigarettes    Smokeless tobacco: Never Used      Comment: 1/2 half pack/day.     Alcohol use No    Drug use: No      Comment: PCP, patient states she had not done drugs since 2012    Sexual activity: Yes     Partners: Male     Other Topics Concern    Not on file     Social History Narrative    No narrative on file       ALLERGIES AND MEDICATIONS: updated and reviewed.  Review of patient's allergies indicates:   Allergen Reactions    Strawberry Anaphylaxis and Hives    Morphine Other (See Comments)     Burns stomach    Toradol [ketorolac]     Tramadol      Abdominal pain     Current Outpatient Prescriptions   Medication Sig Dispense Refill    ALPRAZolam (XANAX) 1 MG tablet TAKE ONE TABLET BY MOUTH EVERY EVENING AS NEEDED FOR ANXIETY 20 tablet 5    amoxicillin-clavulanate 500-125mg (AUGMENTIN) 500-125 mg Tab Take 1 tablet (500 mg total) by mouth 2 (two) times daily. 14 tablet 0    fluconazole (DIFLUCAN) 150 MG Tab Take 1 tablet (150 mg total) by mouth once. 1 tablet 0     Current Facility-Administered  "Medications   Medication Dose Route Frequency Provider Last Rate Last Dose    methylPREDNISolone acetate injection 80 mg  80 mg Intramuscular Once Hermes Colvin MD             Objective:   /80   Pulse 86   Temp 98.7 °F (37.1 °C) (Oral)   Resp 16   Ht 5' 2" (1.575 m)   Wt 69 kg (152 lb 1.9 oz)   LMP 05/22/2018 (Approximate)   SpO2 98%   BMI 27.82 kg/m²      Physical Exam   Constitutional: She is oriented to person, place, and time. No distress.   HENT:   Head: Normocephalic and atraumatic.   Cardiovascular: Normal rate and regular rhythm.    Pulmonary/Chest: Effort normal and breath sounds normal. She has no wheezes.   Abdominal: Soft. Normal appearance. There is tenderness in the suprapubic area. There is CVA tenderness (left).   Musculoskeletal: Normal range of motion.   Neurological: She is alert and oriented to person, place, and time.   Skin: Skin is warm and dry. Rash noted.   Throughout legs hypopigmented halo with central redness   Right leg more erythematous as well              Narrative     CT abdomen and pelvis with  IV contrast.    Comparison: The patient has 10 prior abdomen and pelvic CTs.    Results: 5 mm axial images of the abdomen and pelvis were obtained after the administration of 75 cc of omni-350 IV contrast .  Delayed images of the abdomen and pelvis were also acquired. Coronal and sagittal reformat images were generated.    The lung bases are clear.  The liver, stomach, pancreas, spleen, adrenal glands all appear within normal limits.  Cholecystectomy clips.  Bilateral kidneys concentrate and excrete contrast normally.  There are 2 forming stones at the lower pole of the left kidney, the largest measures 3 mm, they are nonobstructive.  No stones seen along the bilateral ureters or within the bladder.  The uterus is present.  The ovaries demonstrate nothing unusual.  Moderate amount of retained stool.  No inflammatory changes of the bowel seen.  The appendix is normal.  The " bilateral inguinal regions appear normal.  The osseous structures demonstrate no osseous lesions.   Impression         Nonobstructive left kidney calculi.  Cholecystectomy.       Assessment:       1. Pyelonephritis    2. Rash    3. Nephrolithiasis    4. Antibiotic-induced yeast infection        Plan:       Pyelonephritis  -     amoxicillin-clavulanate 500-125mg (AUGMENTIN) 500-125 mg Tab; Take 1 tablet (500 mg total) by mouth 2 (two) times daily.  Dispense: 14 tablet; Refill: 0  Call nephrology if no improvement in 2-3 days.     Rash  -     Ambulatory referral to Dermatology  appt tomorrow     Nephrolithiasis  -     amoxicillin-clavulanate 500-125mg (AUGMENTIN) 500-125 mg Tab; Take 1 tablet (500 mg total) by mouth 2 (two) times daily.  Dispense: 14 tablet; Refill: 0    Antibiotic-induced yeast infection  -     fluconazole (DIFLUCAN) 150 MG Tab; Take 1 tablet (150 mg total) by mouth once.  Dispense: 1 tablet; Refill: 0            No Follow-up on file.

## 2018-06-05 LAB
BACTERIA #/AREA URNS HPF: ABNORMAL /HPF
BILIRUB UR QL STRIP: NEGATIVE
CLARITY UR: CLEAR
COLOR UR: YELLOW
GLUCOSE UR QL STRIP: NEGATIVE
HGB UR QL STRIP: ABNORMAL
HYALINE CASTS #/AREA URNS LPF: 0 /LPF
KETONES UR QL STRIP: NEGATIVE
LEUKOCYTE ESTERASE UR QL STRIP: ABNORMAL
MICROSCOPIC COMMENT: ABNORMAL
NITRITE UR QL STRIP: NEGATIVE
NON-SQ EPI CELLS #/AREA URNS HPF: ABNORMAL /HPF
PH UR STRIP: 6 [PH] (ref 5–8)
PROT UR QL STRIP: ABNORMAL
RBC #/AREA URNS HPF: 20 /HPF (ref 0–4)
SP GR UR STRIP: 1.01 (ref 1–1.03)
SQUAMOUS #/AREA URNS HPF: ABNORMAL /HPF
URN SPEC COLLECT METH UR: ABNORMAL
UROBILINOGEN UR STRIP-ACNC: NEGATIVE EU/DL
WBC #/AREA URNS HPF: 30 /HPF (ref 0–5)
WBC CLUMPS URNS QL MICRO: ABNORMAL
YEAST URNS QL MICRO: ABNORMAL

## 2018-06-07 LAB — BACTERIA UR CULT: NORMAL

## 2018-06-19 ENCOUNTER — PATIENT MESSAGE (OUTPATIENT)
Dept: FAMILY MEDICINE | Facility: CLINIC | Age: 38
End: 2018-06-19

## 2018-06-19 DIAGNOSIS — R11.0 NAUSEA: ICD-10-CM

## 2018-06-19 DIAGNOSIS — F32.81 PMDD (PREMENSTRUAL DYSPHORIC DISORDER): ICD-10-CM

## 2018-06-19 DIAGNOSIS — F39 MOOD DISORDER: Primary | ICD-10-CM

## 2018-06-19 RX ORDER — ESCITALOPRAM OXALATE 10 MG/1
10 TABLET ORAL DAILY
Qty: 30 TABLET | Refills: 1 | Status: SHIPPED | OUTPATIENT
Start: 2018-06-19 | End: 2018-12-02

## 2018-06-19 RX ORDER — ALPRAZOLAM 1 MG/1
1 TABLET ORAL NIGHTLY
Qty: 20 TABLET | Refills: 5 | Status: SHIPPED | OUTPATIENT
Start: 2018-06-19 | End: 2018-10-30 | Stop reason: SDUPTHER

## 2018-06-20 ENCOUNTER — PATIENT MESSAGE (OUTPATIENT)
Dept: FAMILY MEDICINE | Facility: CLINIC | Age: 38
End: 2018-06-20

## 2018-06-25 ENCOUNTER — OFFICE VISIT (OUTPATIENT)
Dept: FAMILY MEDICINE | Facility: CLINIC | Age: 38
End: 2018-06-25
Payer: COMMERCIAL

## 2018-06-25 VITALS
BODY MASS INDEX: 27.07 KG/M2 | HEART RATE: 81 BPM | DIASTOLIC BLOOD PRESSURE: 84 MMHG | SYSTOLIC BLOOD PRESSURE: 110 MMHG | TEMPERATURE: 99 F | HEIGHT: 63 IN | WEIGHT: 152.75 LBS | OXYGEN SATURATION: 98 %

## 2018-06-25 DIAGNOSIS — N30.00 ACUTE CYSTITIS WITHOUT HEMATURIA: Primary | ICD-10-CM

## 2018-06-25 DIAGNOSIS — N39.0 RECURRENT UTI: Primary | ICD-10-CM

## 2018-06-25 LAB
BACTERIA #/AREA URNS HPF: ABNORMAL /HPF
BILIRUB SERPL-MCNC: NORMAL MG/DL
BILIRUB UR QL STRIP: NEGATIVE
BLOOD URINE, POC: NORMAL
CLARITY UR: ABNORMAL
COLOR UR: ABNORMAL
COLOR, POC UA: YELLOW
GLUCOSE UR QL STRIP: NEGATIVE
GLUCOSE UR QL STRIP: NORMAL
HGB UR QL STRIP: ABNORMAL
KETONES UR QL STRIP: NEGATIVE
KETONES UR QL STRIP: NORMAL
LEUKOCYTE ESTERASE UR QL STRIP: ABNORMAL
LEUKOCYTE ESTERASE URINE, POC: NORMAL
MICROSCOPIC COMMENT: ABNORMAL
NITRITE UR QL STRIP: NEGATIVE
NITRITE, POC UA: NORMAL
PH UR STRIP: 6 [PH] (ref 5–8)
PH, POC UA: 6
PROT UR QL STRIP: NEGATIVE
PROTEIN, POC: NORMAL
RBC #/AREA URNS HPF: 2 /HPF (ref 0–4)
SP GR UR STRIP: 1.02 (ref 1–1.03)
SPECIFIC GRAVITY, POC UA: 1.01
SQUAMOUS #/AREA URNS HPF: 3 /HPF
URN SPEC COLLECT METH UR: ABNORMAL
UROBILINOGEN UR STRIP-ACNC: NEGATIVE EU/DL
UROBILINOGEN, POC UA: NORMAL
WBC #/AREA URNS HPF: 4 /HPF (ref 0–5)

## 2018-06-25 PROCEDURE — 87186 SC STD MICRODIL/AGAR DIL: CPT

## 2018-06-25 PROCEDURE — 96372 THER/PROPH/DIAG INJ SC/IM: CPT | Mod: S$GLB,,, | Performed by: INTERNAL MEDICINE

## 2018-06-25 PROCEDURE — 3079F DIAST BP 80-89 MM HG: CPT | Mod: CPTII,S$GLB,, | Performed by: INTERNAL MEDICINE

## 2018-06-25 PROCEDURE — 87077 CULTURE AEROBIC IDENTIFY: CPT

## 2018-06-25 PROCEDURE — 87088 URINE BACTERIA CULTURE: CPT

## 2018-06-25 PROCEDURE — 81002 URINALYSIS NONAUTO W/O SCOPE: CPT | Mod: S$GLB,,, | Performed by: INTERNAL MEDICINE

## 2018-06-25 PROCEDURE — 99214 OFFICE O/P EST MOD 30 MIN: CPT | Mod: 25,S$GLB,, | Performed by: INTERNAL MEDICINE

## 2018-06-25 PROCEDURE — 99999 PR PBB SHADOW E&M-EST. PATIENT-LVL III: CPT | Mod: PBBFAC,,, | Performed by: INTERNAL MEDICINE

## 2018-06-25 PROCEDURE — 87086 URINE CULTURE/COLONY COUNT: CPT

## 2018-06-25 PROCEDURE — 81000 URINALYSIS NONAUTO W/SCOPE: CPT

## 2018-06-25 PROCEDURE — 3008F BODY MASS INDEX DOCD: CPT | Mod: CPTII,S$GLB,, | Performed by: INTERNAL MEDICINE

## 2018-06-25 PROCEDURE — 3074F SYST BP LT 130 MM HG: CPT | Mod: CPTII,S$GLB,, | Performed by: INTERNAL MEDICINE

## 2018-06-25 RX ORDER — CEPHALEXIN 500 MG/1
500 CAPSULE ORAL EVERY 12 HOURS
Qty: 20 CAPSULE | Refills: 0 | Status: SHIPPED | OUTPATIENT
Start: 2018-06-25 | End: 2018-07-05

## 2018-06-25 RX ORDER — CEFTRIAXONE 500 MG/1
500 INJECTION, POWDER, FOR SOLUTION INTRAMUSCULAR; INTRAVENOUS
Status: COMPLETED | OUTPATIENT
Start: 2018-06-25 | End: 2018-06-25

## 2018-06-25 RX ADMIN — CEFTRIAXONE 500 MG: 500 INJECTION, POWDER, FOR SOLUTION INTRAMUSCULAR; INTRAVENOUS at 11:06

## 2018-06-25 NOTE — PROGRESS NOTES
SUBJECTIVE     Chief Complaint   Patient presents with    Urinary Tract Infection     ongoing x couple weeks. been treated once before.       HPI  Roslyn Brewster is a 37 y.o. female with multiple medical diagnoses as listed in the medical history and problem list that presents for evaluation of UTI x 2 weeks. Pt reports nausea, low back pain, and foul smelling urine. She just received and completed a course of Augmentin without any relief of symptoms. Denies any dysuria, increased urinary frequency, urgency, or hematuria. +subjective fever, chills, and night sweats. Pt has been taking Tylenol with minimal relief of pain.     PAST MEDICAL HISTORY:  Past Medical History:   Diagnosis Date    Acute encephalopathy 12/24/12    secondary to drug overdose    ARF (acute renal failure) 12/24/12    secondary to drug overdose    History of cardiac arrest 12/24/12    secondary to drug overdose    Hypertension     MRSA (methicillin resistant Staphylococcus aureus) 12/24/12    Nephritis     PMDD (premenstrual dysphoric disorder)     Polysubstance abuse 12/24/12    Systolic CHF, acute 12/24/12    secondary to drug overdose    UTI (urinary tract infection)        PAST SURGICAL HISTORY:  Past Surgical History:   Procedure Laterality Date    CHOLECYSTECTOMY      TUBAL LIGATION      urinary stents         SOCIAL HISTORY:  Social History     Social History    Marital status: Single     Spouse name: N/A    Number of children: N/A    Years of education: N/A     Occupational History    Not on file.     Social History Main Topics    Smoking status: Current Every Day Smoker     Packs/day: 0.50     Years: 20.00     Types: Cigarettes    Smokeless tobacco: Never Used      Comment: 1/2 half pack/day.     Alcohol use No    Drug use: No      Comment: PCP, patient states she had not done drugs since 2012    Sexual activity: Yes     Partners: Male     Other Topics Concern    Not on file     Social History Narrative     No narrative on file       FAMILY HISTORY:  Family History   Problem Relation Age of Onset    Kidney disease Mother     Cancer Maternal Grandmother         throat    Cancer Maternal Grandfather         prostate    Kidney disease Daughter     Breast cancer Neg Hx     Colon cancer Neg Hx     Ovarian cancer Neg Hx        ALLERGIES AND MEDICATIONS: updated and reviewed.  Review of patient's allergies indicates:   Allergen Reactions    Strawberry Anaphylaxis and Hives    Morphine Other (See Comments)     Burns stomach    Toradol [ketorolac]     Tramadol      Abdominal pain     Current Outpatient Prescriptions   Medication Sig Dispense Refill    ALPRAZolam (XANAX) 1 MG tablet Take 1 tablet (1 mg total) by mouth every evening. 20 tablet 5    cephALEXin (KEFLEX) 500 MG capsule Take 1 capsule (500 mg total) by mouth every 12 (twelve) hours. for 10 days 20 capsule 0    escitalopram oxalate (LEXAPRO) 10 MG tablet Take 1 tablet (10 mg total) by mouth once daily. 30 tablet 1     Current Facility-Administered Medications   Medication Dose Route Frequency Provider Last Rate Last Dose    methylPREDNISolone acetate injection 80 mg  80 mg Intramuscular Once Hermes Colvin MD           ROS  Review of Systems   Constitutional: Negative for activity change and unexpected weight change.   HENT: Negative for hearing loss, rhinorrhea and trouble swallowing.    Eyes: Negative for discharge and visual disturbance.   Respiratory: Negative for chest tightness and wheezing.    Cardiovascular: Negative for chest pain and palpitations.   Gastrointestinal: Positive for vomiting. Negative for blood in stool, constipation and diarrhea.   Endocrine: Negative for polydipsia and polyuria.   Genitourinary: Positive for difficulty urinating, dysuria and hematuria. Negative for menstrual problem.   Musculoskeletal: Negative for arthralgias, joint swelling and neck pain.   Skin: Negative for rash and wound.   Neurological: Positive for  "weakness. Negative for headaches.   Psychiatric/Behavioral: Negative for confusion and dysphoric mood.         OBJECTIVE     Physical Exam  Vitals:    06/25/18 1104   BP: 110/84   Pulse: 81   Temp: 98.6 °F (37 °C)    Body mass index is 27.06 kg/m².  Weight: 69.3 kg (152 lb 12.5 oz)   Height: 5' 3" (160 cm)     Physical Exam   Constitutional: She is oriented to person, place, and time. She appears well-developed and well-nourished. No distress.   HENT:   Head: Normocephalic and atraumatic.   Right Ear: External ear normal.   Left Ear: External ear normal.   Nose: Nose normal.   Mouth/Throat: Oropharynx is clear and moist.   Eyes: Conjunctivae and EOM are normal. Right eye exhibits no discharge. Left eye exhibits no discharge. No scleral icterus.   Neck: Normal range of motion. Neck supple. No JVD present. No tracheal deviation present.   Cardiovascular: Normal rate, regular rhythm and intact distal pulses.  Exam reveals no gallop and no friction rub.    No murmur heard.  Pulmonary/Chest: Effort normal and breath sounds normal. No respiratory distress. She has no wheezes.   Abdominal: Soft. Bowel sounds are normal. She exhibits no distension and no mass. There is no tenderness. There is no rebound and no guarding.   Musculoskeletal: Normal range of motion. She exhibits no edema, tenderness or deformity.   Neurological: She is alert and oriented to person, place, and time. She exhibits normal muscle tone. Coordination normal.   Skin: Skin is warm and dry. No rash noted. No erythema.   Psychiatric: She has a normal mood and affect. Her behavior is normal. Judgment and thought content normal.         Health Maintenance       Date Due Completion Date    TETANUS VACCINE 06/28/1997 ---    Pneumococcal PPSV23 (Medium Risk) (1) 02/23/2019 (Originally 6/28/1997) ---    Influenza Vaccine 08/01/2018 12/12/2017    Override on 11/16/2017: Declined    Override on 11/23/2015: Declined    Pap Smear 04/18/2019 4/18/2018    "         ASSESSMENT     37 y.o. female with     1. Acute cystitis without hematuria        PLAN:     1. Acute cystitis without hematuria  - Pt advised to take Abx to completion  - cefTRIAXone injection 500 mg; Inject 0.5 g (500 mg total) into the muscle one time.  - cephALEXin (KEFLEX) 500 MG capsule; Take 1 capsule (500 mg total) by mouth every 12 (twelve) hours. for 10 days  Dispense: 20 capsule; Refill: 0  - POCT URINE DIPSTICK WITHOUT MICROSCOPE  - Urinalysis; Future  - Urine culture; Future  - Urinalysis  - Urine culture        RTC in 3-5 days for any acute worsening of current condition or failure to improve     Spring Montilla MD  06/25/2018 11:14 AM        No Follow-up on file.

## 2018-06-27 LAB — BACTERIA UR CULT: NORMAL

## 2018-08-14 ENCOUNTER — OFFICE VISIT (OUTPATIENT)
Dept: FAMILY MEDICINE | Facility: CLINIC | Age: 38
End: 2018-08-14
Payer: COMMERCIAL

## 2018-08-14 VITALS
HEART RATE: 93 BPM | TEMPERATURE: 99 F | BODY MASS INDEX: 28.24 KG/M2 | OXYGEN SATURATION: 99 % | DIASTOLIC BLOOD PRESSURE: 70 MMHG | SYSTOLIC BLOOD PRESSURE: 102 MMHG | WEIGHT: 153.44 LBS | HEIGHT: 62 IN

## 2018-08-14 DIAGNOSIS — B37.9 ANTIBIOTIC-INDUCED YEAST INFECTION: ICD-10-CM

## 2018-08-14 DIAGNOSIS — N39.0 RECURRENT UTI: ICD-10-CM

## 2018-08-14 DIAGNOSIS — N10 ACUTE PYELONEPHRITIS: Primary | ICD-10-CM

## 2018-08-14 DIAGNOSIS — T36.95XA ANTIBIOTIC-INDUCED YEAST INFECTION: ICD-10-CM

## 2018-08-14 LAB
BACTERIA #/AREA URNS HPF: ABNORMAL /HPF
BILIRUB SERPL-MCNC: NORMAL MG/DL
BILIRUB UR QL STRIP: NEGATIVE
BLOOD URINE, POC: NORMAL
CLARITY UR: CLEAR
COLOR UR: ABNORMAL
COLOR, POC UA: NORMAL
GLUCOSE UR QL STRIP: NEGATIVE
GLUCOSE UR QL STRIP: NORMAL
HGB UR QL STRIP: ABNORMAL
KETONES UR QL STRIP: NEGATIVE
KETONES UR QL STRIP: NORMAL
LEUKOCYTE ESTERASE UR QL STRIP: NEGATIVE
LEUKOCYTE ESTERASE URINE, POC: NORMAL
MICROSCOPIC COMMENT: ABNORMAL
NITRITE UR QL STRIP: NEGATIVE
NITRITE, POC UA: NORMAL
PH UR STRIP: 6 [PH] (ref 5–8)
PH, POC UA: 6
PROT UR QL STRIP: NEGATIVE
PROTEIN, POC: NORMAL
RBC #/AREA URNS HPF: 2 /HPF (ref 0–4)
SP GR UR STRIP: 1.01 (ref 1–1.03)
SPECIFIC GRAVITY, POC UA: 1.01
SQUAMOUS #/AREA URNS HPF: 3 /HPF
URN SPEC COLLECT METH UR: ABNORMAL
UROBILINOGEN UR STRIP-ACNC: NEGATIVE EU/DL
UROBILINOGEN, POC UA: NORMAL
WBC #/AREA URNS HPF: 1 /HPF (ref 0–5)

## 2018-08-14 PROCEDURE — 81000 URINALYSIS NONAUTO W/SCOPE: CPT

## 2018-08-14 PROCEDURE — 99214 OFFICE O/P EST MOD 30 MIN: CPT | Mod: 25,S$GLB,, | Performed by: INTERNAL MEDICINE

## 2018-08-14 PROCEDURE — 3078F DIAST BP <80 MM HG: CPT | Mod: CPTII,S$GLB,, | Performed by: INTERNAL MEDICINE

## 2018-08-14 PROCEDURE — 3074F SYST BP LT 130 MM HG: CPT | Mod: CPTII,S$GLB,, | Performed by: INTERNAL MEDICINE

## 2018-08-14 PROCEDURE — 87086 URINE CULTURE/COLONY COUNT: CPT

## 2018-08-14 PROCEDURE — 81002 URINALYSIS NONAUTO W/O SCOPE: CPT | Mod: S$GLB,,, | Performed by: INTERNAL MEDICINE

## 2018-08-14 PROCEDURE — 99999 PR PBB SHADOW E&M-EST. PATIENT-LVL III: CPT | Mod: PBBFAC,,, | Performed by: INTERNAL MEDICINE

## 2018-08-14 PROCEDURE — 87186 SC STD MICRODIL/AGAR DIL: CPT

## 2018-08-14 PROCEDURE — 87077 CULTURE AEROBIC IDENTIFY: CPT

## 2018-08-14 PROCEDURE — 87088 URINE BACTERIA CULTURE: CPT

## 2018-08-14 PROCEDURE — 3008F BODY MASS INDEX DOCD: CPT | Mod: CPTII,S$GLB,, | Performed by: INTERNAL MEDICINE

## 2018-08-14 RX ORDER — FLUCONAZOLE 150 MG/1
150 TABLET ORAL ONCE
Refills: 0 | COMMUNITY
Start: 2018-06-04 | End: 2018-08-14 | Stop reason: SDUPTHER

## 2018-08-14 RX ORDER — AMOXICILLIN AND CLAVULANATE POTASSIUM 875; 125 MG/1; MG/1
1 TABLET, FILM COATED ORAL 2 TIMES DAILY
Qty: 20 TABLET | Refills: 0 | Status: SHIPPED | OUTPATIENT
Start: 2018-08-14 | End: 2018-08-24

## 2018-08-14 RX ORDER — FLUCONAZOLE 150 MG/1
150 TABLET ORAL ONCE
Qty: 1 TABLET | Refills: 0 | Status: SHIPPED | OUTPATIENT
Start: 2018-08-14 | End: 2018-08-14

## 2018-08-14 RX ORDER — CEPHALEXIN 250 MG/1
250 CAPSULE ORAL DAILY
Qty: 90 CAPSULE | Refills: 0 | Status: SHIPPED | OUTPATIENT
Start: 2018-08-24 | End: 2018-12-02

## 2018-08-14 NOTE — PROGRESS NOTES
SUBJECTIVE     Chief Complaint   Patient presents with    Urinary Tract Infection     sx x 1 week        HPI  Roslyn Brewster is a 38 y.o. female with multiple medical diagnoses as listed in the medical history and problem list that presents for evaluation of UTI x 2-3 days. Pt reports B/L lower back pain like shooting spasms at a 9/10 and constant in nature with radiation to the mid-back. Denies any trauma, falls, or heavy lifting. Denies any dysuria, increased urinary frequency, or urgency. Denies any hematuria. +foul smelling urine. +fever of 102 last night with last known fever of 101 at 6am. Denies any chills, but has night sweats.     PAST MEDICAL HISTORY:  Past Medical History:   Diagnosis Date    Acute encephalopathy 12/24/12    secondary to drug overdose    ARF (acute renal failure) 12/24/12    secondary to drug overdose    History of cardiac arrest 12/24/12    secondary to drug overdose    Hypertension     MRSA (methicillin resistant Staphylococcus aureus) 12/24/12    Nephritis     PMDD (premenstrual dysphoric disorder)     Polysubstance abuse 12/24/12    Systolic CHF, acute 12/24/12    secondary to drug overdose    UTI (urinary tract infection)        PAST SURGICAL HISTORY:  Past Surgical History:   Procedure Laterality Date    CHOLECYSTECTOMY      TUBAL LIGATION      urinary stents         SOCIAL HISTORY:  Social History     Socioeconomic History    Marital status: Single     Spouse name: Not on file    Number of children: Not on file    Years of education: Not on file    Highest education level: Not on file   Social Needs    Financial resource strain: Not on file    Food insecurity - worry: Not on file    Food insecurity - inability: Not on file    Transportation needs - medical: Not on file    Transportation needs - non-medical: Not on file   Occupational History    Not on file   Tobacco Use    Smoking status: Current Every Day Smoker     Packs/day: 0.50     Years: 20.00      Pack years: 10.00     Types: Cigarettes    Smokeless tobacco: Never Used    Tobacco comment: 1/2 half pack/day.    Substance and Sexual Activity    Alcohol use: No    Drug use: No     Comment: PCP, patient states she had not done drugs since 2012    Sexual activity: Yes     Partners: Male   Other Topics Concern    Not on file   Social History Narrative    Not on file       FAMILY HISTORY:  Family History   Problem Relation Age of Onset    Kidney disease Mother     Cancer Maternal Grandmother         throat    Cancer Maternal Grandfather         prostate    Kidney disease Daughter     Breast cancer Neg Hx     Colon cancer Neg Hx     Ovarian cancer Neg Hx        ALLERGIES AND MEDICATIONS: updated and reviewed.  Review of patient's allergies indicates:   Allergen Reactions    Strawberry Anaphylaxis and Hives    Morphine Other (See Comments)     Burns stomach    Toradol [ketorolac]     Tramadol      Abdominal pain     Current Outpatient Medications   Medication Sig Dispense Refill    ALPRAZolam (XANAX) 1 MG tablet Take 1 tablet (1 mg total) by mouth every evening. 20 tablet 5    escitalopram oxalate (LEXAPRO) 10 MG tablet Take 1 tablet (10 mg total) by mouth once daily. 30 tablet 1    amoxicillin-clavulanate 875-125mg (AUGMENTIN) 875-125 mg per tablet Take 1 tablet by mouth 2 (two) times daily. for 10 days 20 tablet 0    [START ON 8/24/2018] cephALEXin (KEFLEX) 250 MG capsule Take 1 capsule (250 mg total) by mouth once daily. 90 capsule 0    fluconazole (DIFLUCAN) 150 MG Tab Take 1 tablet (150 mg total) by mouth once. for 1 dose 1 tablet 0     Current Facility-Administered Medications   Medication Dose Route Frequency Provider Last Rate Last Dose    methylPREDNISolone acetate injection 80 mg  80 mg Intramuscular Once Hermes Colvin MD           ROS  Review of Systems   Constitutional: Negative for activity change and unexpected weight change.   HENT: Negative for hearing loss, rhinorrhea and  "trouble swallowing.    Eyes: Negative for discharge and visual disturbance.   Respiratory: Negative for chest tightness and wheezing.    Cardiovascular: Negative for chest pain and palpitations.   Gastrointestinal: Positive for nausea. Negative for blood in stool, constipation, diarrhea and vomiting.   Endocrine: Negative for polydipsia and polyuria.   Genitourinary: Positive for difficulty urinating, dysuria and flank pain. Negative for frequency, hematuria, menstrual problem and urgency.   Musculoskeletal: Positive for back pain. Negative for arthralgias, joint swelling and neck pain.   Skin: Negative for rash and wound.   Neurological: Positive for weakness. Negative for headaches.   Psychiatric/Behavioral: Negative for confusion and dysphoric mood.         OBJECTIVE     Physical Exam  Vitals:    08/14/18 1025   BP: 102/70   Pulse: 93   Temp: 98.8 °F (37.1 °C)    Body mass index is 28.06 kg/m².  Weight: 69.6 kg (153 lb 7 oz)   Height: 5' 2" (157.5 cm)     Physical Exam   Constitutional: She is oriented to person, place, and time. She appears well-developed and well-nourished. No distress.   HENT:   Head: Normocephalic and atraumatic.   Right Ear: External ear normal.   Left Ear: External ear normal.   Nose: Nose normal.   Mouth/Throat: Oropharynx is clear and moist.   Eyes: Conjunctivae and EOM are normal. Right eye exhibits no discharge. Left eye exhibits no discharge. No scleral icterus.   Neck: Normal range of motion. Neck supple. No JVD present. No tracheal deviation present.   Cardiovascular: Normal rate, regular rhythm and intact distal pulses. Exam reveals no gallop and no friction rub.   No murmur heard.  Pulmonary/Chest: Effort normal and breath sounds normal. No respiratory distress. She has no wheezes.   Abdominal: Soft. Bowel sounds are normal. She exhibits no distension and no mass. There is no tenderness. There is CVA tenderness (B/L). There is no rebound and no guarding.   Musculoskeletal: Normal " range of motion. She exhibits no edema, tenderness or deformity.   Neurological: She is alert and oriented to person, place, and time. She exhibits normal muscle tone. Coordination normal.   Skin: Skin is warm and dry. No rash noted. No erythema.   Psychiatric: She has a normal mood and affect. Her behavior is normal. Judgment and thought content normal.         Health Maintenance       Date Due Completion Date    TETANUS VACCINE 06/28/1997 ---    Influenza Vaccine 08/01/2018 12/12/2017    Override on 11/16/2017: Declined    Override on 11/23/2015: Declined    Pneumococcal PPSV23 (Medium Risk) (1) 02/23/2019 (Originally 6/28/1997) ---    Pap Smear 04/18/2019 4/18/2018            ASSESSMENT     38 y.o. female with     1. Acute pyelonephritis    2. Recurrent UTI    3. Antibiotic-induced yeast infection        PLAN:     1. Acute pyelonephritis  - Pt to start oral Abx as below and take to completion  - POCT URINE DIPSTICK WITHOUT MICROSCOPE  - Urinalysis  - Urine culture  - amoxicillin-clavulanate 875-125mg (AUGMENTIN) 875-125 mg per tablet; Take 1 tablet by mouth 2 (two) times daily. for 10 days  Dispense: 20 tablet; Refill: 0    2. Recurrent UTI  - Upon completion of Abx as above; will start trial course of daily prophylactic Keflex as below  - cephALEXin (KEFLEX) 250 MG capsule; Take 1 capsule (250 mg total) by mouth once daily.  Dispense: 90 capsule; Refill: 0    3. Antibiotic-induced yeast infection  - fluconazole (DIFLUCAN) 150 MG Tab; Take 1 tablet (150 mg total) by mouth once. for 1 dose  Dispense: 1 tablet; Refill: 0        RTC in 1-2 weeks as needed for any acute worsening of current condition or failure to improve     Spring Montilla MD  08/14/2018 10:57 AM        No Follow-up on file.

## 2018-08-16 LAB — BACTERIA UR CULT: NORMAL

## 2018-08-21 ENCOUNTER — OFFICE VISIT (OUTPATIENT)
Dept: NEPHROLOGY | Facility: CLINIC | Age: 38
End: 2018-08-21
Payer: COMMERCIAL

## 2018-08-21 VITALS
SYSTOLIC BLOOD PRESSURE: 98 MMHG | WEIGHT: 151.44 LBS | BODY MASS INDEX: 27.87 KG/M2 | HEIGHT: 62 IN | OXYGEN SATURATION: 98 % | DIASTOLIC BLOOD PRESSURE: 62 MMHG | HEART RATE: 84 BPM

## 2018-08-21 DIAGNOSIS — N39.0 UTI (URINARY TRACT INFECTION), UNCOMPLICATED: Primary | ICD-10-CM

## 2018-08-21 PROCEDURE — 99499 UNLISTED E&M SERVICE: CPT | Mod: S$GLB,,, | Performed by: INTERNAL MEDICINE

## 2018-08-21 PROCEDURE — 99999 PR PBB SHADOW E&M-EST. PATIENT-LVL III: CPT | Mod: PBBFAC,,, | Performed by: INTERNAL MEDICINE

## 2018-08-21 RX ORDER — FLUCONAZOLE 150 MG/1
TABLET ORAL
COMMUNITY
Start: 2018-08-14 | End: 2018-12-02

## 2018-08-21 NOTE — LETTER
August 28, 2018      Spring Montlila MD  [address]  [address]  [place] [address]           [facility]  [address]  [place] [address]  Phone: [phone]          Patient: Roslyn Brewster   MR Number: [id number]   YOB: 1980   Date of Visit: 8/21/2018       Dear Dr. Spring Montilla:    Thank you for referring Rolsyn Brewster to me for evaluation. Attached you will find relevant portions of my assessment and plan of care.    If you have questions, please do not hesitate to call me. I look forward to following Roslyn Brewster along with you.    Sincerely,    Lee Fischer MD    Enclosure  CC:  No Recipients    If you would like to receive this communication electronically, please contact [email or web address] or [phone] to request more information on [employer] access.    For providers and/or their staff who would like to refer a patient to [facility], please contact us through our one-stop-shop provider referral line, [facility], at [phone].    If you feel you have received this communication in error or would no longer like to receive these types of communications, please e-mail [email or web address]

## 2018-08-29 ENCOUNTER — TELEPHONE (OUTPATIENT)
Dept: PSYCHIATRY | Facility: CLINIC | Age: 38
End: 2018-08-29

## 2018-08-29 NOTE — PROGRESS NOTES
Ms. Brewster is a 38 year old woman with medical history of pyelonephritis presenting for evaluation of recurrent UTI.  Reviewed prior renal US and recent urine studies (patient with well-preserved renal function).  Advised on 2L daily fluid intake, along with sodium/protein restriction.  Will consult UroGyn for further management recommendations.  Patient voiced understanding of above, agreeable to plan.

## 2018-09-02 ENCOUNTER — HOSPITAL ENCOUNTER (EMERGENCY)
Facility: HOSPITAL | Age: 38
Discharge: HOME | End: 2018-09-02
Attending: EMERGENCY MEDICINE
Payer: COMMERCIAL

## 2018-09-02 VITALS
WEIGHT: 147 LBS | HEART RATE: 92 BPM | OXYGEN SATURATION: 100 % | RESPIRATION RATE: 19 BRPM | TEMPERATURE: 98 F | BODY MASS INDEX: 27.05 KG/M2 | DIASTOLIC BLOOD PRESSURE: 82 MMHG | HEIGHT: 62 IN | SYSTOLIC BLOOD PRESSURE: 134 MMHG

## 2018-09-02 DIAGNOSIS — M89.8X1 PAIN OF LEFT CLAVICLE: Primary | ICD-10-CM

## 2018-09-02 DIAGNOSIS — M89.8X1 CLAVICLE PAIN: ICD-10-CM

## 2018-09-02 LAB
B-HCG UR QL: NEGATIVE
CTP QC/QA: YES

## 2018-09-02 PROCEDURE — 99284 EMERGENCY DEPT VISIT MOD MDM: CPT | Mod: 25

## 2018-09-02 PROCEDURE — 81025 URINE PREGNANCY TEST: CPT | Performed by: PHYSICIAN ASSISTANT

## 2018-09-03 NOTE — ED TRIAGE NOTES
"38 y.o. Female presents to the ED with chief complaint of shoulder pain. Patient states, x4 days ago, patient was lifting some wood and felt her left shoulder pop. Patient states now, "there is a bone that is popped out of my left shoulder." Patient states when she lifts her shoulder, pain is 10/10. When arm is left to side, pain is minimal. Patient resting in bed in NAD. Side rails up x2.   "

## 2018-09-03 NOTE — ED PROVIDER NOTES
"Encounter Date: 9/2/2018    SORT:  38 y.o. female presenting to ED for L shoulder pain after moving a pile of wood. Denies CP and SOB. Limited triage exam:  Non-toxic. If orders were placed, they are pending.     Manjinder Coronel PA-C  09/02/2018  8:22 PM     History     Chief Complaint   Patient presents with    Shoulder Pain     " Two days ago I was picking up some wood and I felt something pop in my left shoulder. It was broken years ago."      HPI  Review of patient's allergies indicates:   Allergen Reactions    Strawberry Anaphylaxis and Hives    Morphine Other (See Comments)     Burns stomach    Toradol [ketorolac]     Tramadol      Abdominal pain     Past Medical History:   Diagnosis Date    Acute encephalopathy 12/24/12    secondary to drug overdose    ARF (acute renal failure) 12/24/12    secondary to drug overdose    History of cardiac arrest 12/24/12    secondary to drug overdose    Hypertension     MRSA (methicillin resistant Staphylococcus aureus) 12/24/12    Nephritis     PMDD (premenstrual dysphoric disorder)     Polysubstance abuse 12/24/12    Systolic CHF, acute 12/24/12    secondary to drug overdose    UTI (urinary tract infection)      Past Surgical History:   Procedure Laterality Date    CHOLECYSTECTOMY      TUBAL LIGATION      urinary stents       Family History   Problem Relation Age of Onset    Kidney disease Mother     Cancer Maternal Grandmother         throat    Cancer Maternal Grandfather         prostate    Kidney disease Daughter     Breast cancer Neg Hx     Colon cancer Neg Hx     Ovarian cancer Neg Hx      Social History     Tobacco Use    Smoking status: Current Every Day Smoker     Packs/day: 0.50     Years: 20.00     Pack years: 10.00     Types: Cigarettes    Smokeless tobacco: Never Used    Tobacco comment: 1/2 half pack/day.    Substance Use Topics    Alcohol use: No    Drug use: No     Comment: PCP, patient states she had not done drugs since 2012 "     Review of Systems    Physical Exam     Initial Vitals [09/02/18 2020]   BP Pulse Resp Temp SpO2   128/64 92 20 98.2 °F (36.8 °C) 100 %      MAP       --         Physical Exam    ED Course   Procedures  Labs Reviewed   POCT URINE PREGNANCY          Imaging Results    None                               Clinical Impression:   There were no encounter diagnoses.

## 2018-09-23 ENCOUNTER — HOSPITAL ENCOUNTER (EMERGENCY)
Facility: HOSPITAL | Age: 38
Discharge: HOME | End: 2018-09-23
Attending: EMERGENCY MEDICINE
Payer: COMMERCIAL

## 2018-09-23 VITALS
RESPIRATION RATE: 20 BRPM | TEMPERATURE: 99 F | HEART RATE: 95 BPM | OXYGEN SATURATION: 100 % | SYSTOLIC BLOOD PRESSURE: 134 MMHG | BODY MASS INDEX: 27.05 KG/M2 | WEIGHT: 147 LBS | DIASTOLIC BLOOD PRESSURE: 75 MMHG | HEIGHT: 62 IN

## 2018-09-23 DIAGNOSIS — K04.7 DENTAL ABSCESS: Primary | ICD-10-CM

## 2018-09-23 PROCEDURE — 99283 EMERGENCY DEPT VISIT LOW MDM: CPT

## 2018-09-23 PROCEDURE — 25000003 PHARM REV CODE 250: Performed by: EMERGENCY MEDICINE

## 2018-09-23 RX ORDER — PENICILLIN V POTASSIUM 250 MG/1
250 TABLET, FILM COATED ORAL 4 TIMES DAILY
Qty: 35 TABLET | Refills: 0 | Status: SHIPPED | OUTPATIENT
Start: 2018-09-23 | End: 2018-09-30

## 2018-09-23 RX ORDER — OXYCODONE AND ACETAMINOPHEN 5; 325 MG/1; MG/1
2 TABLET ORAL
Status: COMPLETED | OUTPATIENT
Start: 2018-09-23 | End: 2018-09-23

## 2018-09-23 RX ORDER — PENICILLIN V POTASSIUM 250 MG/1
500 TABLET, FILM COATED ORAL
Status: COMPLETED | OUTPATIENT
Start: 2018-09-23 | End: 2018-09-23

## 2018-09-23 RX ORDER — OXYCODONE AND ACETAMINOPHEN 5; 325 MG/1; MG/1
1 TABLET ORAL EVERY 4 HOURS PRN
Qty: 4 TABLET | Refills: 0 | Status: SHIPPED | OUTPATIENT
Start: 2018-09-23 | End: 2018-12-02

## 2018-09-23 RX ORDER — ONDANSETRON 4 MG/1
4 TABLET, ORALLY DISINTEGRATING ORAL
Status: COMPLETED | OUTPATIENT
Start: 2018-09-23 | End: 2018-09-23

## 2018-09-23 RX ADMIN — ONDANSETRON 4 MG: 4 TABLET, ORALLY DISINTEGRATING ORAL at 06:09

## 2018-09-23 RX ADMIN — OXYCODONE HYDROCHLORIDE AND ACETAMINOPHEN 2 TABLET: 5; 325 TABLET ORAL at 05:09

## 2018-09-23 RX ADMIN — PENICILLIN V POTASIUM 500 MG: 250 TABLET ORAL at 05:09

## 2018-09-23 NOTE — ED PROVIDER NOTES
Encounter Date: 9/23/2018    SCRIBE #1 NOTE: I, Hyacinth Cee, am scribing for, and in the presence of,  Dr. Alvarez. I have scribed the following portions of the note - Other sections scribed: HPI, ROS, PE.       History     Chief Complaint   Patient presents with    Dental Problem     Patient had right lower molar pulled yesterday  Patient with severe right lower gum and jaw pain     Chief Complaint: Dental Pain  38 y.o female complains of dental pain after having her right lower molar removed yesterday.  She said she had the tooth pulled in an emergency situation She had a broken crown so the tooth was removed and she says she was not prescribed antibiotics. She also notes the dentist did not pack the wound. She has been using a 2% lidocaine mouth wash for her pain. She says her dental pain radiates to her right ear. She has associated right sided facial swelling. No fever      The history is provided by the patient.     Review of patient's allergies indicates:   Allergen Reactions    Strawberry Anaphylaxis and Hives    Morphine Other (See Comments)     Burns stomach    Toradol [ketorolac]     Tramadol      Abdominal pain     Past Medical History:   Diagnosis Date    Acute encephalopathy 12/24/12    secondary to drug overdose    ARF (acute renal failure) 12/24/12    secondary to drug overdose    History of cardiac arrest 12/24/12    secondary to drug overdose    Hypertension     MRSA (methicillin resistant Staphylococcus aureus) 12/24/12    Nephritis     PMDD (premenstrual dysphoric disorder)     Polysubstance abuse 12/24/12    Systolic CHF, acute 12/24/12    secondary to drug overdose    UTI (urinary tract infection)      Past Surgical History:   Procedure Laterality Date    CHOLECYSTECTOMY      CYSTOSCOPY, RETROGRADE, URETEROSCOPY, STENT PLACEMENT Left 1/8/2018    Per[facility] OR    PLACEMENT OF DORSEY N/A 1/8/2018    [facility] OR     REMOVAL-STENT Left 1/15/2018    [facility]    TUBAL LIGATION      urinary stents       Family History   Problem Relation Age of Onset    Kidney disease Mother     Cancer Maternal Grandmother         throat    Cancer Maternal Grandfather         prostate    Kidney disease Daughter     Breast cancer Neg Hx     Colon cancer Neg Hx     Ovarian cancer Neg Hx      Social History     Tobacco Use    Smoking status: Current Every Day Smoker     Packs/day: 0.50     Years: 20.00     Pack years: 10.00     Types: Cigarettes    Smokeless tobacco: Never Used    Tobacco comment: 1/2 half pack/day.    Substance Use Topics    Alcohol use: No    Drug use: No     Comment: PCP, patient states she had not done drugs since 2012     Review of Systems   Constitutional: Negative for chills and fever.   HENT: Positive for dental problem and facial swelling.        Physical Exam     Initial Vitals [09/23/18 1738]   BP Pulse Resp Temp SpO2   134/75 95 20 98.5 °F (36.9 °C) 100 %      MAP       --         Physical Exam    Nursing note and vitals reviewed.  Constitutional: She appears well-developed and well-nourished. She is not diaphoretic. No distress.   HENT:   Head: Normocephalic.       Mouth/Throat: Uvula is midline, oropharynx is clear and moist and mucous membranes are normal.       Eyes: EOM are normal.   Pulmonary/Chest: No respiratory distress.   Neurological: She is alert and oriented to person, place, and time.   Skin: Skin is warm and dry.         ED Course   Procedures  Labs Reviewed - No data to display       Imaging Results    None          Medical Decision Making:   Initial Assessment:   This is a 38 y.o female presents with right lower dental pain. Physical exam findings are significant for right sided facial swelling and tendernes and swelling around the area where the 2nd right lower molar is located.   ED Management:  I will order 2 tablets of 5-325 mg of oxycodone and 500  mg of penicillin v potassium.   Procedure note:  Topical Lidocaine was applied to the gum area and  0.5% bupivacaine (.5 cc)  was instilled into the gum area with improvement of her pain.  Patient will be discharged on penicillin and 4  Percocet.  She will follow up with her regular dentist tomorrow.            Scribe Attestation:   Scribe #1: I performed the above scribed service and the documentation accurately describes the services I performed. I attest to the accuracy of the note.           I, Dr. Kaycee Alvarez, personally performed the services described in this documentation. All medical record entries made by the scribe were at my direction and in my presence.  I have reviewed the chart and agree that the record reflects my personal performance and is accurate and complete. Kaycee Alvarez MD.  6:10 PM 09/23/2018          Clinical Impression:     1. Dental abscess                               Kaycee Alvarez MD  09/23/18 8224

## 2018-10-30 ENCOUNTER — PATIENT MESSAGE (OUTPATIENT)
Dept: FAMILY MEDICINE | Facility: CLINIC | Age: 38
End: 2018-10-30

## 2018-10-30 DIAGNOSIS — F32.81 PMDD (PREMENSTRUAL DYSPHORIC DISORDER): ICD-10-CM

## 2018-10-30 DIAGNOSIS — R11.0 NAUSEA: ICD-10-CM

## 2018-10-30 RX ORDER — NITROFURANTOIN 25; 75 MG/1; MG/1
100 CAPSULE ORAL 2 TIMES DAILY
Qty: 10 CAPSULE | Refills: 0 | Status: SHIPPED | OUTPATIENT
Start: 2018-10-30 | End: 2018-11-04

## 2018-10-30 RX ORDER — ALPRAZOLAM 1 MG/1
1 TABLET ORAL NIGHTLY
Qty: 20 TABLET | Refills: 5 | Status: SHIPPED | OUTPATIENT
Start: 2018-10-30 | End: 2019-05-13

## 2018-10-31 ENCOUNTER — PATIENT MESSAGE (OUTPATIENT)
Dept: FAMILY MEDICINE | Facility: CLINIC | Age: 38
End: 2018-10-31

## 2018-12-02 ENCOUNTER — HOSPITAL ENCOUNTER (EMERGENCY)
Facility: HOSPITAL | Age: 38
Discharge: HOME | End: 2018-12-03
Attending: EMERGENCY MEDICINE
Payer: COMMERCIAL

## 2018-12-02 DIAGNOSIS — R10.9 FLANK PAIN: ICD-10-CM

## 2018-12-02 DIAGNOSIS — N12 PYELONEPHRITIS: Primary | ICD-10-CM

## 2018-12-02 DIAGNOSIS — N20.0 RENAL STONE: ICD-10-CM

## 2018-12-02 LAB
ALBUMIN SERPL BCP-MCNC: 4.3 G/DL
ALP SERPL-CCNC: 67 U/L
ALT SERPL W/O P-5'-P-CCNC: 24 U/L
ANION GAP SERPL CALC-SCNC: 10 MMOL/L
AST SERPL-CCNC: 16 U/L
B-HCG UR QL: NEGATIVE
BACTERIA #/AREA URNS HPF: ABNORMAL /HPF
BASOPHILS # BLD AUTO: 0.02 K/UL
BASOPHILS NFR BLD: 0.2 %
BILIRUB SERPL-MCNC: 0.4 MG/DL
BILIRUB UR QL STRIP: NEGATIVE
BUN SERPL-MCNC: 12 MG/DL
CALCIUM SERPL-MCNC: 9.6 MG/DL
CHLORIDE SERPL-SCNC: 103 MMOL/L
CLARITY UR: CLEAR
CO2 SERPL-SCNC: 26 MMOL/L
COLOR UR: YELLOW
CREAT SERPL-MCNC: 0.8 MG/DL
CTP QC/QA: YES
DIFFERENTIAL METHOD: ABNORMAL
EOSINOPHIL # BLD AUTO: 0.2 K/UL
EOSINOPHIL NFR BLD: 2 %
ERYTHROCYTE [DISTWIDTH] IN BLOOD BY AUTOMATED COUNT: 12.7 %
EST. GFR  (AFRICAN AMERICAN): >60 ML/MIN/1.73 M^2
EST. GFR  (NON AFRICAN AMERICAN): >60 ML/MIN/1.73 M^2
GLUCOSE SERPL-MCNC: 109 MG/DL
GLUCOSE UR QL STRIP: NEGATIVE
HCT VFR BLD AUTO: 41.7 %
HGB BLD-MCNC: 14.7 G/DL
HGB UR QL STRIP: ABNORMAL
KETONES UR QL STRIP: NEGATIVE
LACTATE SERPL-SCNC: 2.5 MMOL/L
LEUKOCYTE ESTERASE UR QL STRIP: ABNORMAL
LYMPHOCYTES # BLD AUTO: 2.9 K/UL
LYMPHOCYTES NFR BLD: 32.3 %
MCH RBC QN AUTO: 31.3 PG
MCHC RBC AUTO-ENTMCNC: 35.3 G/DL
MCV RBC AUTO: 89 FL
MICROSCOPIC COMMENT: ABNORMAL
MONOCYTES # BLD AUTO: 0.7 K/UL
MONOCYTES NFR BLD: 7.8 %
NEUTROPHILS # BLD AUTO: 5.1 K/UL
NEUTROPHILS NFR BLD: 57.7 %
NITRITE UR QL STRIP: NEGATIVE
PH UR STRIP: 6 [PH] (ref 5–8)
PLATELET # BLD AUTO: 142 K/UL
PMV BLD AUTO: 10.2 FL
POTASSIUM SERPL-SCNC: 3.6 MMOL/L
PROT SERPL-MCNC: 7.1 G/DL
PROT UR QL STRIP: NEGATIVE
RBC # BLD AUTO: 4.69 M/UL
RBC #/AREA URNS HPF: 5 /HPF (ref 0–4)
SODIUM SERPL-SCNC: 139 MMOL/L
SP GR UR STRIP: 1.02 (ref 1–1.03)
SQUAMOUS #/AREA URNS HPF: 10 /HPF
URN SPEC COLLECT METH UR: ABNORMAL
UROBILINOGEN UR STRIP-ACNC: NEGATIVE EU/DL
WBC # BLD AUTO: 8.92 K/UL
WBC #/AREA URNS HPF: 28 /HPF (ref 0–5)
WBC CLUMPS URNS QL MICRO: ABNORMAL

## 2018-12-02 PROCEDURE — 87086 URINE CULTURE/COLONY COUNT: CPT

## 2018-12-02 PROCEDURE — 63600175 PHARM REV CODE 636 W HCPCS: Performed by: PHYSICIAN ASSISTANT

## 2018-12-02 PROCEDURE — 96375 TX/PRO/DX INJ NEW DRUG ADDON: CPT

## 2018-12-02 PROCEDURE — 96361 HYDRATE IV INFUSION ADD-ON: CPT

## 2018-12-02 PROCEDURE — 80053 COMPREHEN METABOLIC PANEL: CPT

## 2018-12-02 PROCEDURE — 93010 ELECTROCARDIOGRAM REPORT: CPT | Mod: ,,, | Performed by: INTERNAL MEDICINE

## 2018-12-02 PROCEDURE — 99284 EMERGENCY DEPT VISIT MOD MDM: CPT | Mod: 25

## 2018-12-02 PROCEDURE — 87088 URINE BACTERIA CULTURE: CPT

## 2018-12-02 PROCEDURE — 93005 ELECTROCARDIOGRAM TRACING: CPT

## 2018-12-02 PROCEDURE — 25000003 PHARM REV CODE 250: Performed by: EMERGENCY MEDICINE

## 2018-12-02 PROCEDURE — 87040 BLOOD CULTURE FOR BACTERIA: CPT

## 2018-12-02 PROCEDURE — 96365 THER/PROPH/DIAG IV INF INIT: CPT

## 2018-12-02 PROCEDURE — 63600175 PHARM REV CODE 636 W HCPCS: Performed by: EMERGENCY MEDICINE

## 2018-12-02 PROCEDURE — 87186 SC STD MICRODIL/AGAR DIL: CPT

## 2018-12-02 PROCEDURE — 83605 ASSAY OF LACTIC ACID: CPT

## 2018-12-02 PROCEDURE — 81025 URINE PREGNANCY TEST: CPT | Performed by: EMERGENCY MEDICINE

## 2018-12-02 PROCEDURE — 81000 URINALYSIS NONAUTO W/SCOPE: CPT

## 2018-12-02 PROCEDURE — 96376 TX/PRO/DX INJ SAME DRUG ADON: CPT

## 2018-12-02 PROCEDURE — 85025 COMPLETE CBC W/AUTO DIFF WBC: CPT

## 2018-12-02 PROCEDURE — 87077 CULTURE AEROBIC IDENTIFY: CPT

## 2018-12-02 RX ORDER — ONDANSETRON 2 MG/ML
4 INJECTION INTRAMUSCULAR; INTRAVENOUS
Status: COMPLETED | OUTPATIENT
Start: 2018-12-02 | End: 2018-12-02

## 2018-12-02 RX ORDER — HYDROMORPHONE HYDROCHLORIDE 2 MG/ML
1 INJECTION, SOLUTION INTRAMUSCULAR; INTRAVENOUS; SUBCUTANEOUS
Status: COMPLETED | OUTPATIENT
Start: 2018-12-02 | End: 2018-12-02

## 2018-12-02 RX ORDER — ACETAMINOPHEN 500 MG
1000 TABLET ORAL
Status: COMPLETED | OUTPATIENT
Start: 2018-12-02 | End: 2018-12-02

## 2018-12-02 RX ADMIN — CEFTRIAXONE 1 G: 1 INJECTION, SOLUTION INTRAVENOUS at 09:12

## 2018-12-02 RX ADMIN — ACETAMINOPHEN 1000 MG: 500 TABLET, FILM COATED ORAL at 09:12

## 2018-12-02 RX ADMIN — ONDANSETRON 4 MG: 2 INJECTION INTRAMUSCULAR; INTRAVENOUS at 09:12

## 2018-12-02 RX ADMIN — HYDROMORPHONE HYDROCHLORIDE 1 MG: 2 INJECTION, SOLUTION INTRAMUSCULAR; INTRAVENOUS; SUBCUTANEOUS at 09:12

## 2018-12-02 RX ADMIN — SODIUM CHLORIDE 2000 ML: 0.9 INJECTION, SOLUTION INTRAVENOUS at 09:12

## 2018-12-02 RX ADMIN — ONDANSETRON 4 MG: 2 INJECTION INTRAMUSCULAR; INTRAVENOUS at 11:12

## 2018-12-03 ENCOUNTER — PATIENT MESSAGE (OUTPATIENT)
Dept: FAMILY MEDICINE | Facility: CLINIC | Age: 38
End: 2018-12-03

## 2018-12-03 VITALS
WEIGHT: 143 LBS | RESPIRATION RATE: 16 BRPM | SYSTOLIC BLOOD PRESSURE: 106 MMHG | OXYGEN SATURATION: 99 % | HEIGHT: 62 IN | TEMPERATURE: 98 F | DIASTOLIC BLOOD PRESSURE: 58 MMHG | HEART RATE: 85 BPM | BODY MASS INDEX: 26.31 KG/M2

## 2018-12-03 RX ORDER — NITROFURANTOIN 25; 75 MG/1; MG/1
100 CAPSULE ORAL 2 TIMES DAILY
Qty: 28 CAPSULE | Refills: 0 | Status: SHIPPED | OUTPATIENT
Start: 2018-12-03 | End: 2018-12-17

## 2018-12-03 RX ORDER — CIPROFLOXACIN 500 MG/1
500 TABLET ORAL 2 TIMES DAILY
Qty: 28 TABLET | Refills: 0 | Status: SHIPPED | OUTPATIENT
Start: 2018-12-03 | End: 2018-12-17

## 2018-12-03 RX ORDER — PHENAZOPYRIDINE HYDROCHLORIDE 200 MG/1
200 TABLET, FILM COATED ORAL 3 TIMES DAILY
Qty: 9 TABLET | Refills: 0 | Status: SHIPPED | OUTPATIENT
Start: 2018-12-03 | End: 2018-12-13

## 2018-12-03 NOTE — ED PROVIDER NOTES
Encounter Date: 12/2/2018       History     Chief Complaint   Patient presents with    Flank Pain     left flank pain with dysuria and mild hematuria; reports hx frequent kidney infection with intermittent stones; reports this incident's symptoms started with the last week     38 y.o. female Past Medical History:  12/24/12: Acute encephalopathy      Comment:  secondary to drug overdose  12/24/12: ARF (acute renal failure)      Comment:  secondary to drug overdose  12/24/12: History of cardiac arrest      Comment:  secondary to drug overdose  No date: Hypertension  12/24/12: MRSA (methicillin resistant Staphylococcus aureus)  No date: Nephritis  No date: PMDD (premenstrual dysphoric disorder)  12/24/12: Polysubstance abuse  12/24/12: Systolic CHF, acute      Comment:  secondary to drug overdose  No date: UTI (urinary tract infection)     Notes numerous prior kidney infections. States that she has had dysuria for the past few days and then started today with L flank pain, denies n/v, endorses fevers/chills .           Review of patient's allergies indicates:   Allergen Reactions    Strawberry Anaphylaxis and Hives    Morphine Other (See Comments)     Burns stomach    Toradol [ketorolac]     Tramadol      Abdominal pain     Past Medical History:   Diagnosis Date    Acute encephalopathy 12/24/12    secondary to drug overdose    ARF (acute renal failure) 12/24/12    secondary to drug overdose    History of cardiac arrest 12/24/12    secondary to drug overdose    Hypertension     MRSA (methicillin resistant Staphylococcus aureus) 12/24/12    Nephritis     PMDD (premenstrual dysphoric disorder)     Polysubstance abuse 12/24/12    Systolic CHF, acute 12/24/12    secondary to drug overdose    UTI (urinary tract infection)      Past Surgical History:   Procedure Laterality Date    CHOLECYSTECTOMY      CYSTOSCOPY, RETROGRADE, URETEROSCOPY, STENT PLACEMENT Left 1/8/2018    [facility]    PLACEMENT OF DORSEY N/A 1/8/2018    [facility] OR    REMOVAL-STENT Left 1/15/2018    [facility] OR    TUBAL LIGATION      urinary stents       Family History   Problem Relation Age of Onset    Kidney disease Mother     Cancer Maternal Grandmother         throat    Cancer Maternal Grandfather         prostate    Kidney disease Daughter     Breast cancer Neg Hx     Colon cancer Neg Hx     Ovarian cancer Neg Hx      Social History     Tobacco Use    Smoking status: Current Every Day Smoker     Packs/day: 0.50     Years: 20.00     Pack years: 10.00     Types: Cigarettes    Smokeless tobacco: Never Used    Tobacco comment: 1/2 half pack/day.    Substance Use Topics    Alcohol use: No    Drug use: No     Comment: PCP, patient states she had not done drugs since 2012     Review of Systems   Constitutional: Negative for fever.   HENT: Negative for sore throat.    Respiratory: Negative for shortness of breath.    Cardiovascular: Negative for chest pain.   Gastrointestinal: Negative for nausea.   Genitourinary: Negative for dysuria.   Musculoskeletal: Negative for back pain.   Skin: Negative for rash.   Neurological: Negative for weakness.   Hematological: Does not bruise/bleed easily.   All other systems reviewed and are negative.      Physical Exam     Initial Vitals [12/02/18 2058]   BP Pulse Resp Temp SpO2   132/77 102 16 (!) 101.3 °F (38.5 °C) 100 %      MAP       --         Physical Exam    Nursing note and vitals reviewed.  Constitutional: She appears well-developed and well-nourished.   HENT:   Head: Normocephalic and atraumatic.   Eyes: Conjunctivae and EOM are normal. Pupils are equal, round, and reactive to light.   Neck: Normal range of motion.   Cardiovascular: Normal rate.   Pulmonary/Chest: No respiratory distress.   Abdominal: She exhibits no distension.   Musculoskeletal: Normal range of motion.   Neurological: She is  alert. No cranial nerve deficit. GCS score is 15. GCS eye subscore is 4. GCS verbal subscore is 5. GCS motor subscore is 6.   Skin: Skin is warm and dry.   Psychiatric: She has a normal mood and affect. Thought content normal.       Mild cva ttp, no abd ttp  ED Course   Procedures  Labs Reviewed   CULTURE, BLOOD   CULTURE, BLOOD   CBC W/ AUTO DIFFERENTIAL   COMPREHENSIVE METABOLIC PANEL   LACTIC ACID, PLASMA   URINALYSIS, REFLEX TO URINE CULTURE   POCT URINE PREGNANCY          Imaging Results    None          Medical Decision Making:   Initial Assessment:   Pt here with dysuria/flank pain  Differential Diagnosis:   Differential includes uti/pyelo, review of records shows pt has had ecoli pan sensitive on prior utis', will do screening labs, ua/ ct to r/o stone.                        CT Abdomen Pelvis  Without Contrast   Final Result      4 mm nonobstructing stone in the lower pole of the left kidney.  No evidence of obstructive uropathy.      Marked distention of the stomach and proximal aspect of the duodenum.  The findings may represent evolving gastric outlet obstruction.  Suggest clinical correlation and follow-up.      Hepatosplenomegaly.      Diverticulosis coli without evidence of acute diverticulitis.      Nonspecific prominence of the right adnexa.  Pelvic ultrasound may be obtained for further assessment.         Electronically signed by: Nikita Christensen MD   Date:    12/03/2018   Time:    00:01      X-Ray Chest AP Portable   Final Result      1. No acute cardiopulmonary process.         Electronically signed by: Tommy Grier MD   Date:    12/02/2018   Time:    21:27          Labs Reviewed   CBC W/ AUTO DIFFERENTIAL - Abnormal; Notable for the following components:       Result Value    MCH 31.3 (*)     Platelets 142 (*)     All other components within normal limits   LACTIC ACID, PLASMA - Abnormal; Notable for the following components:    Lactate (Lactic Acid) 2.5 (*)     All other components within  normal limits   URINALYSIS, REFLEX TO URINE CULTURE - Abnormal; Notable for the following components:    Occult Blood UA 1+ (*)     Leukocytes, UA Trace (*)     All other components within normal limits    Narrative:     Preferred Collection Type->Urine, Clean Catch   URINALYSIS MICROSCOPIC - Abnormal; Notable for the following components:    RBC, UA 5 (*)     WBC, UA 28 (*)     WBC Clumps, UA Occasional (*)     Bacteria, UA Few (*)     All other components within normal limits    Narrative:     Preferred Collection Type->Urine, Clean Catch   CULTURE, BLOOD   CULTURE, BLOOD   CULTURE, URINE   COMPREHENSIVE METABOLIC PANEL   LACTIC ACID, PLASMA   POCT URINE PREGNANCY       Evaluation c/w pyelonephritis. Labs reviewed.  Clinical Impression:   The primary encounter diagnosis was Pyelonephritis. Diagnoses of Flank pain and Renal stone were also pertinent to this visit.                             Adilson Goss MD  12/03/18 0022

## 2018-12-03 NOTE — ED TRIAGE NOTES
Pt reports to ED via personal transportation with c/o left flank pain, dysuria, hematuria (1 episode), and frequency ongoing for a few days; pt reports a hx with frequent UTIs and has seen a few urologists; pt took Macrobid & amoxacillin for the past 2 days; pt AAOx4; spouse at bedside; no acute distress noted;

## 2018-12-04 ENCOUNTER — PATIENT MESSAGE (OUTPATIENT)
Dept: FAMILY MEDICINE | Facility: CLINIC | Age: 38
End: 2018-12-04

## 2018-12-04 LAB — BACTERIA UR CULT: NORMAL

## 2018-12-04 RX ORDER — TAMSULOSIN HYDROCHLORIDE 0.4 MG/1
0.4 CAPSULE ORAL DAILY
Qty: 30 CAPSULE | Refills: 0 | Status: SHIPPED | OUTPATIENT
Start: 2018-12-04 | End: 2019-01-07

## 2018-12-07 LAB
BACTERIA BLD CULT: NORMAL
BACTERIA BLD CULT: NORMAL

## 2018-12-19 ENCOUNTER — HOSPITAL ENCOUNTER (EMERGENCY)
Facility: HOSPITAL | Age: 38
Discharge: HOME | End: 2018-12-19
Attending: EMERGENCY MEDICINE
Payer: COMMERCIAL

## 2018-12-19 VITALS
RESPIRATION RATE: 16 BRPM | BODY MASS INDEX: 27.6 KG/M2 | TEMPERATURE: 99 F | DIASTOLIC BLOOD PRESSURE: 59 MMHG | SYSTOLIC BLOOD PRESSURE: 102 MMHG | WEIGHT: 150 LBS | HEART RATE: 72 BPM | HEIGHT: 62 IN | OXYGEN SATURATION: 97 %

## 2018-12-19 DIAGNOSIS — N12 PYELONEPHRITIS: Primary | ICD-10-CM

## 2018-12-19 DIAGNOSIS — R10.9 LEFT FLANK PAIN: ICD-10-CM

## 2018-12-19 LAB
ALBUMIN SERPL BCP-MCNC: 4.4 G/DL
ALP SERPL-CCNC: 70 U/L
ALT SERPL W/O P-5'-P-CCNC: 11 U/L
ANION GAP SERPL CALC-SCNC: 10 MMOL/L
AST SERPL-CCNC: 13 U/L
B-HCG UR QL: NEGATIVE
BACTERIA #/AREA URNS HPF: ABNORMAL /HPF
BASOPHILS # BLD AUTO: 0.02 K/UL
BASOPHILS NFR BLD: 0.2 %
BILIRUB SERPL-MCNC: 0.3 MG/DL
BILIRUB UR QL STRIP: NEGATIVE
BUN SERPL-MCNC: 11 MG/DL
CALCIUM SERPL-MCNC: 8.9 MG/DL
CHLORIDE SERPL-SCNC: 105 MMOL/L
CLARITY UR: ABNORMAL
CO2 SERPL-SCNC: 25 MMOL/L
COLOR UR: YELLOW
CREAT SERPL-MCNC: 0.7 MG/DL
CTP QC/QA: YES
DIFFERENTIAL METHOD: ABNORMAL
EOSINOPHIL # BLD AUTO: 0.2 K/UL
EOSINOPHIL NFR BLD: 2.1 %
ERYTHROCYTE [DISTWIDTH] IN BLOOD BY AUTOMATED COUNT: 12.7 %
EST. GFR  (AFRICAN AMERICAN): >60 ML/MIN/1.73 M^2
EST. GFR  (NON AFRICAN AMERICAN): >60 ML/MIN/1.73 M^2
GLUCOSE SERPL-MCNC: 99 MG/DL
GLUCOSE UR QL STRIP: NEGATIVE
HCT VFR BLD AUTO: 40.9 %
HGB BLD-MCNC: 14.6 G/DL
HGB UR QL STRIP: ABNORMAL
HYALINE CASTS #/AREA URNS LPF: 0 /LPF
KETONES UR QL STRIP: NEGATIVE
LEUKOCYTE ESTERASE UR QL STRIP: ABNORMAL
LIPASE SERPL-CCNC: 44 U/L
LYMPHOCYTES # BLD AUTO: 2 K/UL
LYMPHOCYTES NFR BLD: 23.1 %
MCH RBC QN AUTO: 31.3 PG
MCHC RBC AUTO-ENTMCNC: 35.7 G/DL
MCV RBC AUTO: 88 FL
MICROSCOPIC COMMENT: ABNORMAL
MONOCYTES # BLD AUTO: 0.6 K/UL
MONOCYTES NFR BLD: 7.2 %
NEUTROPHILS # BLD AUTO: 5.9 K/UL
NEUTROPHILS NFR BLD: 67.1 %
NITRITE UR QL STRIP: NEGATIVE
PH UR STRIP: 6 [PH] (ref 5–8)
PLATELET # BLD AUTO: 283 K/UL
PMV BLD AUTO: 9.8 FL
POTASSIUM SERPL-SCNC: 3.3 MMOL/L
PROT SERPL-MCNC: 7.3 G/DL
PROT UR QL STRIP: ABNORMAL
RBC # BLD AUTO: 4.66 M/UL
RBC #/AREA URNS HPF: 4 /HPF (ref 0–4)
SODIUM SERPL-SCNC: 140 MMOL/L
SP GR UR STRIP: 1.02 (ref 1–1.03)
SQUAMOUS #/AREA URNS HPF: 4 /HPF
URN SPEC COLLECT METH UR: ABNORMAL
UROBILINOGEN UR STRIP-ACNC: ABNORMAL EU/DL
WBC # BLD AUTO: 8.85 K/UL
WBC #/AREA URNS HPF: 8 /HPF (ref 0–5)

## 2018-12-19 PROCEDURE — 96376 TX/PRO/DX INJ SAME DRUG ADON: CPT

## 2018-12-19 PROCEDURE — 96361 HYDRATE IV INFUSION ADD-ON: CPT

## 2018-12-19 PROCEDURE — 99284 EMERGENCY DEPT VISIT MOD MDM: CPT | Mod: 25

## 2018-12-19 PROCEDURE — 25000003 PHARM REV CODE 250: Performed by: PHYSICIAN ASSISTANT

## 2018-12-19 PROCEDURE — 81000 URINALYSIS NONAUTO W/SCOPE: CPT

## 2018-12-19 PROCEDURE — 83690 ASSAY OF LIPASE: CPT

## 2018-12-19 PROCEDURE — 80053 COMPREHEN METABOLIC PANEL: CPT

## 2018-12-19 PROCEDURE — 96375 TX/PRO/DX INJ NEW DRUG ADDON: CPT

## 2018-12-19 PROCEDURE — 96365 THER/PROPH/DIAG IV INF INIT: CPT

## 2018-12-19 PROCEDURE — 87086 URINE CULTURE/COLONY COUNT: CPT

## 2018-12-19 PROCEDURE — 63600175 PHARM REV CODE 636 W HCPCS: Performed by: PHYSICIAN ASSISTANT

## 2018-12-19 PROCEDURE — 85025 COMPLETE CBC W/AUTO DIFF WBC: CPT

## 2018-12-19 PROCEDURE — 63600175 PHARM REV CODE 636 W HCPCS: Performed by: NURSE PRACTITIONER

## 2018-12-19 PROCEDURE — 81025 URINE PREGNANCY TEST: CPT | Performed by: PHYSICIAN ASSISTANT

## 2018-12-19 RX ORDER — CEPHALEXIN 500 MG/1
500 CAPSULE ORAL EVERY 12 HOURS
Qty: 28 CAPSULE | Refills: 0 | Status: SHIPPED | OUTPATIENT
Start: 2018-12-19 | End: 2019-01-02

## 2018-12-19 RX ORDER — ONDANSETRON 2 MG/ML
4 INJECTION INTRAMUSCULAR; INTRAVENOUS
Status: COMPLETED | OUTPATIENT
Start: 2018-12-19 | End: 2018-12-19

## 2018-12-19 RX ORDER — HYDROMORPHONE HYDROCHLORIDE 2 MG/ML
0.5 INJECTION, SOLUTION INTRAMUSCULAR; INTRAVENOUS; SUBCUTANEOUS
Status: COMPLETED | OUTPATIENT
Start: 2018-12-19 | End: 2018-12-19

## 2018-12-19 RX ORDER — PHENAZOPYRIDINE HYDROCHLORIDE 200 MG/1
200 TABLET, FILM COATED ORAL 3 TIMES DAILY
Qty: 12 TABLET | Refills: 0 | Status: SHIPPED | OUTPATIENT
Start: 2018-12-19 | End: 2018-12-29

## 2018-12-19 RX ORDER — HYDROCODONE BITARTRATE AND ACETAMINOPHEN 5; 325 MG/1; MG/1
1 TABLET ORAL EVERY 6 HOURS PRN
Qty: 12 TABLET | Refills: 0 | Status: SHIPPED | OUTPATIENT
Start: 2018-12-19 | End: 2019-01-07

## 2018-12-19 RX ORDER — FLUOXETINE 10 MG/1
10 CAPSULE ORAL DAILY
COMMUNITY
End: 2019-01-30

## 2018-12-19 RX ORDER — PRAZOSIN HYDROCHLORIDE 1 MG/1
1 CAPSULE ORAL 2 TIMES DAILY
COMMUNITY
End: 2019-05-13

## 2018-12-19 RX ORDER — LAMOTRIGINE 25 MG/1
25 TABLET ORAL DAILY
COMMUNITY
End: 2019-01-07 | Stop reason: DRUGHIGH

## 2018-12-19 RX ADMIN — HYDROMORPHONE HYDROCHLORIDE 0.5 MG: 2 INJECTION INTRAMUSCULAR; INTRAVENOUS; SUBCUTANEOUS at 04:12

## 2018-12-19 RX ADMIN — HYDROMORPHONE HYDROCHLORIDE 0.5 MG: 2 INJECTION INTRAMUSCULAR; INTRAVENOUS; SUBCUTANEOUS at 06:12

## 2018-12-19 RX ADMIN — CEFTRIAXONE 1 G: 1 INJECTION, SOLUTION INTRAVENOUS at 06:12

## 2018-12-19 RX ADMIN — SODIUM CHLORIDE 1000 ML: 0.9 INJECTION, SOLUTION INTRAVENOUS at 04:12

## 2018-12-19 RX ADMIN — ONDANSETRON 4 MG: 2 INJECTION INTRAMUSCULAR; INTRAVENOUS at 05:12

## 2018-12-19 NOTE — ED TRIAGE NOTES
Pt. Reports left side flank pain. Pt. Reports constant need to urinate. Pt. States pain with urination. Pt. States pain is 8/10.

## 2018-12-19 NOTE — ED PROVIDER NOTES
Encounter Date: 12/19/2018  SORT:   37 y/o female with history of HTN, polysubstance abuse presenting for evaluation of 3 day history of L flank pain and lower abdominal pain, dysuria, urinary urgency and frequency and chills.  Seen here on 12/2/18 for similar symptoms, diagnosed with pyelonephritis. Completed course of cipro and macrobid with resolution of symptoms. Urine culture shows sensitivity to those meds. Denies fever, nausea, vomiting. Last saw Urology [facility] 1/2018; has appt with Urogynecologist 1/2019. Initial orders placed. ABELARDO Godinez PA-C   SCRIBE #1 NOTE: I, Kiko Mccallum, chris scribing for, and in the presence of,  Lopez Teixeira PA-C. I have scribed the following portions of the note - Other sections scribed: HPI/ROS.       History     Chief Complaint   Patient presents with    Dysuria     Pt reports painful urination and lower abdominal pain nausea, Pt reports history of uti.      Flank Pain     pain x 2 days      CC: Flank Pain; Dysuria     HPI: This 38 y.o. female presents to the ED for an emergent evaluation of acute onset, severe (8/10) L flank pain with associated dysuria x 2 days. There is also associated pain to the L side of abdomen, nausea, and diaphoresis. Pain becomes worse with ambulation and movement. Pt was evaluated at this ED on 12/2/18 where it was noted that she has a stone present to the L kidney. She attempted tx with Flomax and Ibuprofen with no relief. No alleviating factors. Otherwise, pt denies fever, chills, hematuria, vomiting, diarrhea, and any other associated symptoms.         The history is provided by the patient. No  was used.     Review of patient's allergies indicates:   Allergen Reactions    Strawberry Anaphylaxis and Hives    Morphine Other (See Comments)     Burns stomach    Toradol [ketorolac]     Tramadol      Abdominal pain     Past Medical History:   Diagnosis Date    Acute encephalopathy 12/24/12    secondary to drug overdose     ARF (acute renal failure) 12/24/12    secondary to drug overdose    History of cardiac arrest 12/24/12    secondary to drug overdose    Hypertension     MRSA (methicillin resistant Staphylococcus aureus) 12/24/12    Nephritis     PMDD (premenstrual dysphoric disorder)     Polysubstance abuse 12/24/12    Systolic CHF, acute 12/24/12    secondary to drug overdose    UTI (urinary tract infection)      Past Surgical History:   Procedure Laterality Date    CHOLECYSTECTOMY      CYSTOSCOPY, RETROGRADE, URETEROSCOPY, STENT PLACEMENT Left 1/8/2018    [facility]    PLACEMENT OF DORSEY N/A 1/8/2018    [facility]    REMOVAL-STENT Left 1/15/2018    [facility]    TUBAL LIGATION      urinary stents       Family History   Problem Relation Age of Onset    Kidney disease Mother     Cancer Maternal Grandmother         throat    Cancer Maternal Grandfather         prostate    Kidney disease Daughter     Breast cancer Neg Hx     Colon cancer Neg Hx     Ovarian cancer Neg Hx      Social History     Tobacco Use    Smoking status: Current Every Day Smoker     Packs/day: 0.50     Years: 20.00     Pack years: 10.00     Types: Cigarettes    Smokeless tobacco: Never Used    Tobacco comment: 1/2 half pack/day.    Substance Use Topics    Alcohol use: No    Drug use: No     Comment: PCP, patient states she had not done drugs since 2012     Review of Systems   Constitutional: Positive for diaphoresis. Negative for chills and fever.   HENT: Negative for congestion, ear pain, rhinorrhea and sore throat.    Eyes: Negative for pain and visual disturbance.   Respiratory: Negative for cough and shortness of breath.    Cardiovascular: Negative for chest pain.   Gastrointestinal: Positive for abdominal pain and nausea. Negative for diarrhea and vomiting.   Genitourinary: Positive for dysuria and flank pain (L). Negative for  hematuria.   Musculoskeletal: Negative for back pain and neck pain.   Skin: Negative for rash.   Neurological: Negative for headaches.   All other systems reviewed and are negative.      Physical Exam     Initial Vitals [12/19/18 1550]   BP Pulse Resp Temp SpO2   114/72 110 20 99.3 °F (37.4 °C) 97 %      MAP       --         Physical Exam    Vitals reviewed.  Constitutional: She appears well-developed and well-nourished. She is not diaphoretic. No distress.   HENT:   Head: Normocephalic and atraumatic.   Right Ear: External ear normal.   Left Ear: External ear normal.   Nose: Nose normal.   Eyes: Conjunctivae are normal. No scleral icterus.   Neck: Normal range of motion. Neck supple.   Cardiovascular: Normal rate, regular rhythm, normal heart sounds and intact distal pulses.   Pulmonary/Chest: Breath sounds normal. No respiratory distress. She has no wheezes. She has no rhonchi. She has no rales. She exhibits no tenderness.   Abdominal: Soft. She exhibits no distension, no pulsatile midline mass and no mass. There is tenderness in the suprapubic area. There is CVA tenderness (left). There is no rebound and no guarding.   Musculoskeletal: Normal range of motion.   Neurological: She is alert and oriented to person, place, and time.   Skin: Skin is warm and dry.         ED Course   Procedures  Labs Reviewed   URINALYSIS, REFLEX TO URINE CULTURE - Abnormal; Notable for the following components:       Result Value    Appearance, UA Hazy (*)     Protein, UA 2+ (*)     Occult Blood UA 1+ (*)     Urobilinogen, UA 2.0-3.0 (*)     Leukocytes, UA 3+ (*)     All other components within normal limits    Narrative:     Preferred Collection Type->Urine, Clean Catch   COMPREHENSIVE METABOLIC PANEL - Abnormal; Notable for the following components:    Potassium 3.3 (*)     All other components within normal limits   CBC W/ AUTO DIFFERENTIAL - Abnormal; Notable for the following components:    MCH 31.3 (*)     All other components  within normal limits   URINALYSIS MICROSCOPIC - Abnormal; Notable for the following components:    WBC, UA 8 (*)     All other components within normal limits    Narrative:     Preferred Collection Type->Urine, Clean Catch   CULTURE, URINE   LIPASE   POCT URINE PREGNANCY          Imaging Results          US Retroperitoneal Complete (Final result)  Result time 12/19/18 18:17:00   Procedure changed from US Retroperitoneal Limited     Final result by Shawn Guerrero MD (12/19/18 18:17:00)                 Impression:      Left renal lower pole 2 small nonobstructing nephroliths.      Electronically signed by: Shawn Guerrero MD  Date:    12/19/2018  Time:    18:17             Narrative:    EXAMINATION:  US RETROPERITONEAL COMPLETE    CLINICAL HISTORY:  left flank pain; Unspecified abdominal pain    TECHNIQUE:  Ultrasound of the kidneys and urinary bladder was performed including color flow and Doppler evaluation of the kidneys.    COMPARISON:  CT abdomen and pelvis 12/02/2018    FINDINGS:  Right kidney: The right kidney measures 11.3 cm. No cortical thinning. No loss of corticomedullary distinction. Resistive index measures 0.68.  No mass. No renal stone. No hydronephrosis.    Left kidney: The left kidney measures 11.6 cm. No cortical thinning. No loss of corticomedullary distinction. Resistive index measures 0.63.  No mass. Two small echogenic foci ranging 4-6 mm noted at the left renal lower pole.  No hydronephrosis.    The bladder is partially distended at the time of scanning and has an unremarkable appearance.                              X-Rays:   Independently Interpreted Readings:   Other Readings:  Renal ultrasound shows no evidence of hydronephrosis    Medical Decision Making:   ED Management:  38-year-old female with history of kidney stones and pyelonephritis presents with left flank pain and dysuria.  She is in obvious discomfort, but nontoxic and afebrile.  Slightly tachycardic initially.  CVA tenderness with  left abdominal tenderness. UA with hematuria and evidence for possible infection.  Will send for culture.  Retroperitoneal ultrasound obtained over CT given amount of abdominal CTs patient has had.  No evidence of hydronephrosis on ultrasound.  Lab evaluation shows no leukocytosis or kidney injury. Patient treated with IV fluids, Rocephin, discharged with antibiotics, pain medication, and given return precautions.  She has a urology appointment next month.            Scribe Attestation:   Scribe #1: I performed the above scribed service and the documentation accurately describes the services I performed. I attest to the accuracy of the note.    Attending Attestation:           Physician Attestation for Scribe:  Physician Attestation Statement for Scribe #1: I, Lopez Teixeira PA-C, reviewed documentation, as scribed by Kiko Mccallum in my presence, and it is both accurate and complete.                    Clinical Impression:   The primary encounter diagnosis was Pyelonephritis. A diagnosis of Left flank pain was also pertinent to this visit.                             Lopez Teixeira PA-C  12/19/18 3178       Lopez Teixeira PA-C  12/30/18 1142

## 2018-12-20 ENCOUNTER — TELEPHONE (OUTPATIENT)
Dept: FAMILY MEDICINE | Facility: CLINIC | Age: 38
End: 2018-12-20

## 2018-12-20 NOTE — TELEPHONE ENCOUNTER
Please advise.     Received the following via IV:      1651   0.9 % sodium chloride 1000 mL   1652   hydromorphone HCl/PF 0.5 mg   1714   ondansetron HCl/PF 4 mg        1819   hydromorphone HCl/PF 0.5 mg   1859   ceftriaxone sodium 1 g

## 2018-12-20 NOTE — TELEPHONE ENCOUNTER
----- Message from Nora Greenwood sent at 12/20/2018 10:39 AM CST -----  Contact: self  [phone]  Pt is requesting to speak to you asap, she had ti go to the  [facility] ER last night , the gave her  IV and now she has a very bad rash on her right arm .Pls call pt [phone]. Thanks......Janeth

## 2018-12-21 LAB — BACTERIA UR CULT: NORMAL

## 2019-01-02 DIAGNOSIS — F32.81 PMDD (PREMENSTRUAL DYSPHORIC DISORDER): ICD-10-CM

## 2019-01-02 DIAGNOSIS — R11.0 NAUSEA: ICD-10-CM

## 2019-01-02 RX ORDER — ALPRAZOLAM 1 MG/1
1 TABLET ORAL NIGHTLY
Qty: 20 TABLET | Refills: 5 | OUTPATIENT
Start: 2019-01-02

## 2019-01-02 RX ORDER — ALPRAZOLAM 1 MG/1
TABLET ORAL
Qty: 20 TABLET | OUTPATIENT
Start: 2019-01-02

## 2019-01-07 ENCOUNTER — OFFICE VISIT (OUTPATIENT)
Dept: FAMILY MEDICINE | Facility: CLINIC | Age: 39
End: 2019-01-07
Payer: COMMERCIAL

## 2019-01-07 VITALS
WEIGHT: 152.13 LBS | BODY MASS INDEX: 27.99 KG/M2 | SYSTOLIC BLOOD PRESSURE: 116 MMHG | DIASTOLIC BLOOD PRESSURE: 64 MMHG | HEIGHT: 62 IN | RESPIRATION RATE: 16 BRPM | OXYGEN SATURATION: 98 % | HEART RATE: 97 BPM | TEMPERATURE: 99 F

## 2019-01-07 DIAGNOSIS — J01.90 ACUTE SINUSITIS, RECURRENCE NOT SPECIFIED, UNSPECIFIED LOCATION: Primary | ICD-10-CM

## 2019-01-07 PROCEDURE — 99214 PR OFFICE/OUTPT VISIT, EST, LEVL IV, 30-39 MIN: ICD-10-PCS | Mod: S$GLB,,, | Performed by: PHYSICIAN ASSISTANT

## 2019-01-07 PROCEDURE — 3078F DIAST BP <80 MM HG: CPT | Mod: CPTII,S$GLB,, | Performed by: PHYSICIAN ASSISTANT

## 2019-01-07 PROCEDURE — 3078F PR MOST RECENT DIASTOLIC BLOOD PRESSURE < 80 MM HG: ICD-10-PCS | Mod: CPTII,S$GLB,, | Performed by: PHYSICIAN ASSISTANT

## 2019-01-07 PROCEDURE — 99214 OFFICE O/P EST MOD 30 MIN: CPT | Mod: S$GLB,,, | Performed by: PHYSICIAN ASSISTANT

## 2019-01-07 PROCEDURE — 3008F BODY MASS INDEX DOCD: CPT | Mod: CPTII,S$GLB,, | Performed by: PHYSICIAN ASSISTANT

## 2019-01-07 PROCEDURE — 3074F SYST BP LT 130 MM HG: CPT | Mod: CPTII,S$GLB,, | Performed by: PHYSICIAN ASSISTANT

## 2019-01-07 PROCEDURE — 99999 PR PBB SHADOW E&M-EST. PATIENT-LVL IV: ICD-10-PCS | Mod: PBBFAC,,, | Performed by: PHYSICIAN ASSISTANT

## 2019-01-07 PROCEDURE — 3008F PR BODY MASS INDEX (BMI) DOCUMENTED: ICD-10-PCS | Mod: CPTII,S$GLB,, | Performed by: PHYSICIAN ASSISTANT

## 2019-01-07 PROCEDURE — 99999 PR PBB SHADOW E&M-EST. PATIENT-LVL IV: CPT | Mod: PBBFAC,,, | Performed by: PHYSICIAN ASSISTANT

## 2019-01-07 PROCEDURE — 3074F PR MOST RECENT SYSTOLIC BLOOD PRESSURE < 130 MM HG: ICD-10-PCS | Mod: CPTII,S$GLB,, | Performed by: PHYSICIAN ASSISTANT

## 2019-01-07 RX ORDER — FLUTICASONE PROPIONATE 50 MCG
1 SPRAY, SUSPENSION (ML) NASAL 2 TIMES DAILY
Qty: 16 G | Refills: 12 | Status: SHIPPED | OUTPATIENT
Start: 2019-01-07 | End: 2019-02-06

## 2019-01-07 RX ORDER — AMOXICILLIN AND CLAVULANATE POTASSIUM 500; 125 MG/1; MG/1
1 TABLET, FILM COATED ORAL 2 TIMES DAILY
Qty: 14 TABLET | Refills: 0 | Status: SHIPPED | OUTPATIENT
Start: 2019-01-07 | End: 2019-01-14

## 2019-01-07 RX ORDER — PROMETHAZINE HYDROCHLORIDE AND DEXTROMETHORPHAN HYDROBROMIDE 6.25; 15 MG/5ML; MG/5ML
5 SYRUP ORAL 4 TIMES DAILY PRN
Qty: 120 ML | Refills: 0 | Status: SHIPPED | OUTPATIENT
Start: 2019-01-07 | End: 2019-01-14

## 2019-01-07 RX ORDER — LAMOTRIGINE 100 MG/1
50 TABLET ORAL 2 TIMES DAILY
Refills: 0 | COMMUNITY
Start: 2019-01-02 | End: 2019-06-06 | Stop reason: SDUPTHER

## 2019-01-07 RX ORDER — ALBUTEROL SULFATE 90 UG/1
2 AEROSOL, METERED RESPIRATORY (INHALATION) EVERY 6 HOURS PRN
Qty: 1 EACH | Refills: 11 | Status: SHIPPED | OUTPATIENT
Start: 2019-01-07 | End: 2019-05-13

## 2019-01-07 RX ORDER — QUETIAPINE FUMARATE 100 MG/1
TABLET, FILM COATED ORAL
Refills: 0 | COMMUNITY
Start: 2019-01-02 | End: 2019-01-30

## 2019-01-07 RX ORDER — DESLORATADINE 5 MG/1
5 TABLET ORAL DAILY
Qty: 30 TABLET | Refills: 11 | Status: SHIPPED | OUTPATIENT
Start: 2019-01-07 | End: 2019-05-13

## 2019-01-07 NOTE — PROGRESS NOTES
Subjective:       Patient ID: Roslyn Brewster is a 38 y.o. female with multiple medical diagnoses as listed in the medical history and problem list that presents for URI  .    Chief Complaint: URI      URI    This is a new problem. The current episode started yesterday. There has been no fever. Associated symptoms include chest pain, congestion, coughing, ear pain, rhinorrhea and a sore throat. Pertinent negatives include no abdominal pain, diarrhea, headaches, nausea, rash, sinus pain, sneezing, vomiting or wheezing. She has tried acetaminophen for the symptoms.   Cough   The current episode started yesterday. The problem has been rapidly worsening. The problem occurs every few minutes. The cough is non-productive. Associated symptoms include chest pain, chills, ear pain, myalgias, nasal congestion, postnasal drip, rhinorrhea, a sore throat and shortness of breath. Pertinent negatives include no ear congestion, eye redness, fever, headaches, heartburn, hemoptysis, rash, sweats, weight loss or wheezing. Risk factors for lung disease include smoking/tobacco exposure. She has tried OTC cough suppressant for the symptoms. The treatment provided no relief. Her past medical history is significant for bronchitis. There is no history of asthma, bronchiectasis, COPD, emphysema, environmental allergies or pneumonia.     Review of Systems   Constitutional: Positive for chills. Negative for fever and weight loss.   HENT: Positive for congestion, ear pain, postnasal drip, rhinorrhea and sore throat. Negative for sinus pressure, sinus pain, sneezing and trouble swallowing.    Eyes: Positive for discharge and itching. Negative for photophobia, pain and redness.   Respiratory: Positive for cough, chest tightness and shortness of breath. Negative for hemoptysis and wheezing.    Cardiovascular: Positive for chest pain.   Gastrointestinal: Negative for abdominal pain, constipation, diarrhea, heartburn, nausea and vomiting.    Musculoskeletal: Positive for myalgias.   Skin: Negative for rash.   Allergic/Immunologic: Negative for environmental allergies.   Neurological: Negative for headaches.         PAST MEDICAL HISTORY:  Past Medical History:   Diagnosis Date    Acute encephalopathy 12/24/12    secondary to drug overdose    ARF (acute renal failure) 12/24/12    secondary to drug overdose    History of cardiac arrest 12/24/12    secondary to drug overdose    Hypertension     MRSA (methicillin resistant Staphylococcus aureus) 12/24/12    Nephritis     PMDD (premenstrual dysphoric disorder)     Polysubstance abuse 12/24/12    Systolic CHF, acute 12/24/12    secondary to drug overdose    UTI (urinary tract infection)        SOCIAL HISTORY:  Social History     Socioeconomic History    Marital status: Single     Spouse name: Not on file    Number of children: Not on file    Years of education: Not on file    Highest education level: Not on file   Social Needs    Financial resource strain: Not on file    Food insecurity - worry: Not on file    Food insecurity - inability: Not on file    Transportation needs - medical: Not on file    Transportation needs - non-medical: Not on file   Occupational History    Not on file   Tobacco Use    Smoking status: Current Every Day Smoker     Packs/day: 0.50     Years: 20.00     Pack years: 10.00     Types: Cigarettes    Smokeless tobacco: Never Used    Tobacco comment: 1/2 half pack/day.    Substance and Sexual Activity    Alcohol use: No    Drug use: No     Comment: PCP, patient states she had not done drugs since 2012    Sexual activity: Yes     Partners: Male   Other Topics Concern    Not on file   Social History Narrative    Not on file       ALLERGIES AND MEDICATIONS: updated and reviewed.  Review of patient's allergies indicates:   Allergen Reactions    Strawberry Anaphylaxis and Hives    Morphine Other (See Comments)     Burns stomach    Toradol [ketorolac]      "Tramadol      Abdominal pain     Current Outpatient Medications   Medication Sig Dispense Refill    ALPRAZolam (XANAX) 1 MG tablet Take 1 tablet (1 mg total) by mouth every evening. 20 tablet 5    FLUoxetine 10 MG capsule Take 10 mg by mouth once daily.      lamoTRIgine (LAMICTAL) 100 MG tablet TK 1 T PO BID  0    prazosin (MINIPRESS) 1 MG Cap Take 1 mg by mouth 2 (two) times daily.      QUEtiapine (SEROQUEL) 100 MG Tab TK 1 T PO QHS  0    albuterol (PROVENTIL/VENTOLIN HFA) 90 mcg/actuation inhaler Inhale 2 puffs into the lungs every 6 (six) hours as needed for Wheezing. 1 each 11    amoxicillin-clavulanate 500-125mg (AUGMENTIN) 500-125 mg Tab Take 1 tablet (500 mg total) by mouth 2 (two) times daily. for 7 days 14 tablet 0    desloratadine (CLARINEX) 5 mg tablet Take 1 tablet (5 mg total) by mouth once daily. 30 tablet 11    fluticasone (FLONASE) 50 mcg/actuation nasal spray 1 spray (50 mcg total) by Each Nare route 2 (two) times daily. 16 g 12    promethazine-dextromethorphan (PROMETHAZINE-DM) 6.25-15 mg/5 mL Syrp Take 5 mLs by mouth 4 (four) times daily as needed. 120 mL 0     Current Facility-Administered Medications   Medication Dose Route Frequency Provider Last Rate Last Dose    methylPREDNISolone acetate injection 80 mg  80 mg Intramuscular Once Hermes Colvin MD             Objective:   /64   Pulse 97   Temp 98.5 °F (36.9 °C) (Oral)   Resp 16   Ht 5' 2" (1.575 m)   Wt 69 kg (152 lb 1.9 oz)   LMP 12/14/2018 (Approximate)   SpO2 98%   BMI 27.82 kg/m²      Physical Exam   Constitutional: She is oriented to person, place, and time. No distress.   HENT:   Head: Normocephalic and atraumatic.   Right Ear: External ear and ear canal normal. No middle ear effusion.   Left Ear: External ear and ear canal normal.  No middle ear effusion.   Nose: Rhinorrhea present. No mucosal edema or nose lacerations. Right sinus exhibits no maxillary sinus tenderness and no frontal sinus tenderness. Left " sinus exhibits no maxillary sinus tenderness and no frontal sinus tenderness.   Mouth/Throat: Uvula is midline and mucous membranes are normal. No oropharyngeal exudate or posterior oropharyngeal erythema. No tonsillar exudate.   Air fluid levels   Bilateral purulent nasal discharge    Eyes: Conjunctivae and EOM are normal.   Cardiovascular: Normal rate and regular rhythm.   Pulmonary/Chest: Effort normal and breath sounds normal. She has no wheezes.   Neg egophony    Lymphadenopathy:     She has no cervical adenopathy.   Neurological: She is alert and oriented to person, place, and time.   Skin: Skin is warm.           Assessment:       1. Acute sinusitis, recurrence not specified, unspecified location        Plan:       Acute sinusitis, recurrence not specified, unspecified location  -     amoxicillin-clavulanate 500-125mg (AUGMENTIN) 500-125 mg Tab; Take 1 tablet (500 mg total) by mouth 2 (two) times daily. for 7 days  Dispense: 14 tablet; Refill: 0  -     desloratadine (CLARINEX) 5 mg tablet; Take 1 tablet (5 mg total) by mouth once daily.  Dispense: 30 tablet; Refill: 11  -     fluticasone (FLONASE) 50 mcg/actuation nasal spray; 1 spray (50 mcg total) by Each Nare route 2 (two) times daily.  Dispense: 16 g; Refill: 12    Other orders  -     albuterol (PROVENTIL/VENTOLIN HFA) 90 mcg/actuation inhaler; Inhale 2 puffs into the lungs every 6 (six) hours as needed for Wheezing.  Dispense: 1 each; Refill: 11  -     promethazine-dextromethorphan (PROMETHAZINE-DM) 6.25-15 mg/5 mL Syrp; Take 5 mLs by mouth 4 (four) times daily as needed.  Dispense: 120 mL; Refill: 0            No Follow-up on file.

## 2019-01-07 NOTE — PROGRESS NOTES
Answers for HPI/ROS submitted by the patient on 1/7/2019   Cough  Onset: yesterday  Progression since onset: rapidly worsening  Frequency: every few minutes  Cough characteristics: non-productive  chest pain: Yes  chills: Yes  ear congestion: No  ear pain: Yes  fever: Yes  headaches: No  heartburn: No  hemoptysis: No  myalgias: Yes  nasal congestion: Yes  postnasal drip: No  rash: No  rhinorrhea: No  shortness of breath: Yes  sore throat: Yes  sweats: No  weight loss: No  wheezing: No  Risk factors for lung disease: smoking/tobacco exposure  asthma: No  bronchiectasis: No  bronchitis: Yes  COPD: No  emphysema: No  environmental allergies: No  pneumonia: No  Treatments tried: OTC cough suppressant  Improvement on treatment: no relief

## 2019-01-08 ENCOUNTER — PATIENT MESSAGE (OUTPATIENT)
Dept: FAMILY MEDICINE | Facility: CLINIC | Age: 39
End: 2019-01-08

## 2019-01-20 ENCOUNTER — PATIENT MESSAGE (OUTPATIENT)
Dept: FAMILY MEDICINE | Facility: CLINIC | Age: 39
End: 2019-01-20

## 2019-01-21 ENCOUNTER — TELEPHONE (OUTPATIENT)
Dept: FAMILY MEDICINE | Facility: CLINIC | Age: 39
End: 2019-01-21

## 2019-01-21 DIAGNOSIS — J20.9 ACUTE BRONCHITIS, UNSPECIFIED ORGANISM: Primary | ICD-10-CM

## 2019-01-21 RX ORDER — METHYLPREDNISOLONE 4 MG/1
TABLET ORAL
Qty: 1 PACKAGE | Refills: 0 | Status: SHIPPED | OUTPATIENT
Start: 2019-01-21 | End: 2019-01-30

## 2019-01-21 NOTE — TELEPHONE ENCOUNTER
Spoke with pt she states she is worse, the cough makes her head and chest hurt. She states you saw her boyfriend and he was given a z pack and he is getting better. Pt wants to know if there is anything you can give her. Also states she currently has no fever.

## 2019-01-21 NOTE — TELEPHONE ENCOUNTER
Please Advise.  Do you need patient to come in to be reevaluated or will you just call something in?

## 2019-01-22 ENCOUNTER — TELEPHONE (OUTPATIENT)
Dept: FAMILY MEDICINE | Facility: CLINIC | Age: 39
End: 2019-01-22

## 2019-01-22 NOTE — TELEPHONE ENCOUNTER
Returned a phone call as requested per pt on [employer], but no answer after several rings and no voicemail available. Sent message to pt informing her that I prefer to continue communicating via [employer].

## 2019-01-30 ENCOUNTER — OFFICE VISIT (OUTPATIENT)
Dept: FAMILY MEDICINE | Facility: CLINIC | Age: 39
End: 2019-01-30
Payer: COMMERCIAL

## 2019-01-30 VITALS
DIASTOLIC BLOOD PRESSURE: 80 MMHG | HEIGHT: 62 IN | SYSTOLIC BLOOD PRESSURE: 118 MMHG | HEART RATE: 84 BPM | BODY MASS INDEX: 27.99 KG/M2 | WEIGHT: 152.13 LBS | RESPIRATION RATE: 17 BRPM | OXYGEN SATURATION: 98 % | TEMPERATURE: 99 F

## 2019-01-30 DIAGNOSIS — R10.9 LEFT FLANK PAIN: ICD-10-CM

## 2019-01-30 DIAGNOSIS — J20.9 ACUTE BRONCHITIS, UNSPECIFIED ORGANISM: Primary | ICD-10-CM

## 2019-01-30 DIAGNOSIS — F39 MOOD DISORDER: ICD-10-CM

## 2019-01-30 PROCEDURE — 3074F SYST BP LT 130 MM HG: CPT | Mod: CPTII,S$GLB,, | Performed by: INTERNAL MEDICINE

## 2019-01-30 PROCEDURE — 99214 PR OFFICE/OUTPT VISIT, EST, LEVL IV, 30-39 MIN: ICD-10-PCS | Mod: 25,S$GLB,, | Performed by: INTERNAL MEDICINE

## 2019-01-30 PROCEDURE — 99999 PR PBB SHADOW E&M-EST. PATIENT-LVL IV: CPT | Mod: PBBFAC,,, | Performed by: INTERNAL MEDICINE

## 2019-01-30 PROCEDURE — 3008F PR BODY MASS INDEX (BMI) DOCUMENTED: ICD-10-PCS | Mod: CPTII,S$GLB,, | Performed by: INTERNAL MEDICINE

## 2019-01-30 PROCEDURE — 3079F PR MOST RECENT DIASTOLIC BLOOD PRESSURE 80-89 MM HG: ICD-10-PCS | Mod: CPTII,S$GLB,, | Performed by: INTERNAL MEDICINE

## 2019-01-30 PROCEDURE — 87086 URINE CULTURE/COLONY COUNT: CPT

## 2019-01-30 PROCEDURE — 3074F PR MOST RECENT SYSTOLIC BLOOD PRESSURE < 130 MM HG: ICD-10-PCS | Mod: CPTII,S$GLB,, | Performed by: INTERNAL MEDICINE

## 2019-01-30 PROCEDURE — 81003 URINALYSIS AUTO W/O SCOPE: CPT

## 2019-01-30 PROCEDURE — 99999 PR PBB SHADOW E&M-EST. PATIENT-LVL IV: ICD-10-PCS | Mod: PBBFAC,,, | Performed by: INTERNAL MEDICINE

## 2019-01-30 PROCEDURE — 96372 PR INJECTION,THERAP/PROPH/DIAG2ST, IM OR SUBCUT: ICD-10-PCS | Mod: S$GLB,,, | Performed by: INTERNAL MEDICINE

## 2019-01-30 PROCEDURE — 99214 OFFICE O/P EST MOD 30 MIN: CPT | Mod: 25,S$GLB,, | Performed by: INTERNAL MEDICINE

## 2019-01-30 PROCEDURE — 3008F BODY MASS INDEX DOCD: CPT | Mod: CPTII,S$GLB,, | Performed by: INTERNAL MEDICINE

## 2019-01-30 PROCEDURE — 96372 THER/PROPH/DIAG INJ SC/IM: CPT | Mod: S$GLB,,, | Performed by: INTERNAL MEDICINE

## 2019-01-30 PROCEDURE — 3079F DIAST BP 80-89 MM HG: CPT | Mod: CPTII,S$GLB,, | Performed by: INTERNAL MEDICINE

## 2019-01-30 RX ORDER — DOXYCYCLINE HYCLATE 100 MG
100 TABLET ORAL 2 TIMES DAILY
Qty: 20 TABLET | Refills: 0 | Status: SHIPPED | OUTPATIENT
Start: 2019-01-30 | End: 2019-02-09

## 2019-01-30 RX ORDER — TRIAMCINOLONE ACETONIDE 40 MG/ML
40 INJECTION, SUSPENSION INTRA-ARTICULAR; INTRAMUSCULAR
Status: COMPLETED | OUTPATIENT
Start: 2019-01-30 | End: 2019-01-30

## 2019-01-30 RX ORDER — PROMETHAZINE HYDROCHLORIDE AND CODEINE PHOSPHATE 6.25; 1 MG/5ML; MG/5ML
5 SOLUTION ORAL NIGHTLY PRN
Qty: 120 ML | Refills: 0 | Status: SHIPPED | OUTPATIENT
Start: 2019-01-30 | End: 2019-02-09

## 2019-01-30 RX ORDER — FLUOXETINE HYDROCHLORIDE 20 MG/1
20 CAPSULE ORAL DAILY
Qty: 90 CAPSULE | Refills: 1 | Status: SHIPPED | OUTPATIENT
Start: 2019-01-30 | End: 2019-06-06 | Stop reason: SDUPTHER

## 2019-01-30 RX ADMIN — TRIAMCINOLONE ACETONIDE 40 MG: 40 INJECTION, SUSPENSION INTRA-ARTICULAR; INTRAMUSCULAR at 03:01

## 2019-01-30 NOTE — PROGRESS NOTES
SUBJECTIVE     Chief Complaint   Patient presents with    Cough    Sore Throat       HPI  Roslyn Brewster is a 38 y.o. female with multiple medical diagnoses as listed in the medical history and problem list that presents for evaluation of URI x 3 weeks. Pt reports a productive cough and R sided sore throat. +nausea and SOB. Denies any fever, chills, or night sweats. Pt has been taking a 10 day course of Abx and completed a Medrol Dose pack with minimal improvement of symptoms. Denies any recent travel. +sick contacts(co-workers and family with URI).    PAST MEDICAL HISTORY:  Past Medical History:   Diagnosis Date    Acute encephalopathy 12/24/12    secondary to drug overdose    ARF (acute renal failure) 12/24/12    secondary to drug overdose    History of cardiac arrest 12/24/12    secondary to drug overdose    Hypertension     MRSA (methicillin resistant Staphylococcus aureus) 12/24/12    Nephritis     PMDD (premenstrual dysphoric disorder)     Polysubstance abuse 12/24/12    Systolic CHF, acute 12/24/12    secondary to drug overdose    UTI (urinary tract infection)        PAST SURGICAL HISTORY:  Past Surgical History:   Procedure Laterality Date    CHOLECYSTECTOMY      CYSTOSCOPY, RETROGRADE, URETEROSCOPY, STENT PLACEMENT Left 1/8/2018    [facility]    PLACEMENT OF DORSEY N/A 1/8/2018    [facility]    REMOVAL-STENT Left 1/15/2018    [facility] at [facility]    TUBAL LIGATION      urinary stents         SOCIAL HISTORY:  Social History     Socioeconomic History    Marital status: Single     Spouse name: Not on file    Number of children: Not on file    Years of education: Not on file    Highest education level: Not on file   Social Needs    Financial resource strain: Not on file    Food insecurity - worry: Not on file    Food insecurity - inability: Not on file    Transportation needs - medical: Not  on file    Transportation needs - non-medical: Not on file   Occupational History    Not on file   Tobacco Use    Smoking status: Current Every Day Smoker     Packs/day: 0.50     Years: 20.00     Pack years: 10.00     Types: Cigarettes    Smokeless tobacco: Never Used    Tobacco comment: 1/2 half pack/day.    Substance and Sexual Activity    Alcohol use: No    Drug use: No     Comment: PCP, patient states she had not done drugs since 2012    Sexual activity: Yes     Partners: Male   Other Topics Concern    Not on file   Social History Narrative    Not on file       FAMILY HISTORY:  Family History   Problem Relation Age of Onset    Kidney disease Mother     Cancer Maternal Grandmother         throat    Cancer Maternal Grandfather         prostate    Kidney disease Daughter     Breast cancer Neg Hx     Colon cancer Neg Hx     Ovarian cancer Neg Hx        ALLERGIES AND MEDICATIONS: updated and reviewed.  Review of patient's allergies indicates:   Allergen Reactions    Strawberry Anaphylaxis and Hives    Morphine Other (See Comments)     Burns stomach    Toradol [ketorolac]     Tramadol      Abdominal pain     Current Outpatient Medications   Medication Sig Dispense Refill    albuterol (PROVENTIL/VENTOLIN HFA) 90 mcg/actuation inhaler Inhale 2 puffs into the lungs every 6 (six) hours as needed for Wheezing. 1 each 11    ALPRAZolam (XANAX) 1 MG tablet Take 1 tablet (1 mg total) by mouth every evening. 20 tablet 5    desloratadine (CLARINEX) 5 mg tablet Take 1 tablet (5 mg total) by mouth once daily. 30 tablet 11    fluticasone (FLONASE) 50 mcg/actuation nasal spray 1 spray (50 mcg total) by Each Nare route 2 (two) times daily. 16 g 12    lamoTRIgine (LAMICTAL) 100 MG tablet Take 50 mg by mouth 2 (two) times daily.  0    prazosin (MINIPRESS) 1 MG Cap Take 1 mg by mouth 2 (two) times daily.      doxycycline (VIBRA-TABS) 100 MG tablet Take 1 tablet (100 mg total) by mouth 2 (two) times daily. for  "10 days 20 tablet 0    FLUoxetine 20 MG capsule Take 1 capsule (20 mg total) by mouth once daily. 90 capsule 1    promethazine-codeine 6.25-10 mg/5 ml (PHENERGAN WITH CODEINE) 6.25-10 mg/5 mL syrup Take 5 mLs by mouth nightly as needed for Cough. 120 mL 0     Current Facility-Administered Medications   Medication Dose Route Frequency Provider Last Rate Last Dose    methylPREDNISolone acetate injection 80 mg  80 mg Intramuscular Once Hermes Colvin MD           ROS  Review of Systems   Constitutional: Negative for chills and fever.   HENT: Negative for hearing loss and sore throat.    Eyes: Negative for visual disturbance.   Respiratory: Positive for cough and shortness of breath. Negative for chest tightness and wheezing.    Cardiovascular: Negative for chest pain, palpitations and leg swelling.   Gastrointestinal: Positive for nausea. Negative for abdominal pain, constipation, diarrhea and vomiting.   Genitourinary: Negative for dysuria, frequency and urgency.   Musculoskeletal: Negative for arthralgias, joint swelling and myalgias.   Skin: Negative for rash and wound.   Neurological: Negative for headaches.   Psychiatric/Behavioral: Negative for agitation and confusion. The patient is not nervous/anxious.          OBJECTIVE     Physical Exam  Vitals:    01/30/19 1431   BP: 118/80   Pulse: 84   Resp: 17   Temp: 98.9 °F (37.2 °C)    Body mass index is 27.82 kg/m².  Weight: 69 kg (152 lb 1.9 oz)   Height: 5' 2" (157.5 cm)     Physical Exam   Constitutional: She is oriented to person, place, and time. She appears well-developed and well-nourished. No distress.   HENT:   Head: Normocephalic and atraumatic.   Right Ear: External ear normal.   Left Ear: External ear normal.   Nose: Nose normal.   Mouth/Throat: No uvula swelling. Posterior oropharyngeal erythema present. No posterior oropharyngeal edema.   Eyes: Conjunctivae and EOM are normal. Right eye exhibits no discharge. Left eye exhibits no discharge. No scleral " icterus.   Neck: Normal range of motion. Neck supple. No JVD present. No tracheal deviation present.   Cardiovascular: Normal rate, regular rhythm and intact distal pulses. Exam reveals no gallop and no friction rub.   No murmur heard.  Pulmonary/Chest: Effort normal and breath sounds normal. No respiratory distress. She has no wheezes.   No egophany, no dullness to percussion, normal fremitus   Abdominal: Soft. Bowel sounds are normal. She exhibits no distension and no mass. There is no tenderness. There is CVA tenderness (left). There is no rebound and no guarding.   Musculoskeletal: Normal range of motion. She exhibits no edema, tenderness or deformity.   Neurological: She is alert and oriented to person, place, and time. She exhibits normal muscle tone. Coordination normal.   Skin: Skin is warm and dry. No rash noted. No erythema.   Psychiatric: She has a normal mood and affect. Her behavior is normal. Judgment and thought content normal.         Health Maintenance       Date Due Completion Date    TETANUS VACCINE 06/28/1997 ---    Influenza Vaccine 08/01/2018 12/12/2017    Override on 11/16/2017: Declined    Override on 11/23/2015: Declined    Pneumococcal Vaccine (Medium Risk) (1 of 1 - PPSV23) 02/23/2019 (Originally 6/28/1998) ---    Pap Smear 04/18/2019 4/18/2018            ASSESSMENT     38 y.o. female with     1. Acute bronchitis, unspecified organism    2. Mood disorder    3. Left flank pain        PLAN:     1. Acute bronchitis, unspecified organism  - No improvement with 7 days of Augmentin followed by 7 days or Medrol dose pack, so will change to Doxycycline as below  - doxycycline (VIBRA-TABS) 100 MG tablet; Take 1 tablet (100 mg total) by mouth 2 (two) times daily. for 10 days  Dispense: 20 tablet; Refill: 0  - triamcinolone acetonide injection 40 mg  - promethazine-codeine 6.25-10 mg/5 ml (PHENERGAN WITH CODEINE) 6.25-10 mg/5 mL syrup; Take 5 mLs by mouth nightly as needed for Cough.  Dispense: 120  mL; Refill: 0    2. Mood disorder  - Not well controlled  - Will increase Prozac from 10 to 20 mg po 24  - FLUoxetine 20 MG capsule; Take 1 capsule (20 mg total) by mouth once daily.  Dispense: 90 capsule; Refill: 1    3. Left flank pain  - R/O UTI  - Urinalysis  - Urine culture        RTC in 1-2 weeks as needed for any acute worsening of current condition or failure to improve       Spring Montilla MD  01/30/2019 2:45 PM        No Follow-up on file.

## 2019-01-31 LAB
BILIRUB UR QL STRIP: NEGATIVE
CLARITY UR: ABNORMAL
COLOR UR: YELLOW
GLUCOSE UR QL STRIP: NEGATIVE
HGB UR QL STRIP: NEGATIVE
KETONES UR QL STRIP: NEGATIVE
LEUKOCYTE ESTERASE UR QL STRIP: NEGATIVE
NITRITE UR QL STRIP: NEGATIVE
PH UR STRIP: 7 [PH] (ref 5–8)
PROT UR QL STRIP: NEGATIVE
SP GR UR STRIP: 1.02 (ref 1–1.03)
URN SPEC COLLECT METH UR: ABNORMAL
UROBILINOGEN UR STRIP-ACNC: NEGATIVE EU/DL

## 2019-02-02 LAB — BACTERIA UR CULT: NORMAL

## 2019-02-19 ENCOUNTER — OFFICE VISIT (OUTPATIENT)
Dept: FAMILY MEDICINE | Facility: CLINIC | Age: 39
End: 2019-02-19
Payer: COMMERCIAL

## 2019-02-19 ENCOUNTER — HOSPITAL ENCOUNTER (OUTPATIENT)
Dept: RADIOLOGY | Facility: HOSPITAL | Age: 39
Discharge: HOME | End: 2019-02-19
Attending: INTERNAL MEDICINE
Payer: COMMERCIAL

## 2019-02-19 VITALS
TEMPERATURE: 98 F | SYSTOLIC BLOOD PRESSURE: 104 MMHG | RESPIRATION RATE: 18 BRPM | DIASTOLIC BLOOD PRESSURE: 72 MMHG | HEART RATE: 98 BPM | BODY MASS INDEX: 27.87 KG/M2 | WEIGHT: 151.44 LBS | HEIGHT: 62 IN | OXYGEN SATURATION: 98 %

## 2019-02-19 DIAGNOSIS — J20.9 BRONCHITIS WITH BRONCHOSPASM: Primary | ICD-10-CM

## 2019-02-19 DIAGNOSIS — R11.0 NAUSEA: ICD-10-CM

## 2019-02-19 DIAGNOSIS — J20.9 BRONCHITIS WITH BRONCHOSPASM: ICD-10-CM

## 2019-02-19 DIAGNOSIS — H65.92 FLUID LEVEL BEHIND TYMPANIC MEMBRANE OF LEFT EAR: ICD-10-CM

## 2019-02-19 PROCEDURE — 99214 PR OFFICE/OUTPT VISIT, EST, LEVL IV, 30-39 MIN: ICD-10-PCS | Mod: S$GLB,,, | Performed by: INTERNAL MEDICINE

## 2019-02-19 PROCEDURE — 99999 PR PBB SHADOW E&M-EST. PATIENT-LVL III: CPT | Mod: PBBFAC,,, | Performed by: INTERNAL MEDICINE

## 2019-02-19 PROCEDURE — 3008F PR BODY MASS INDEX (BMI) DOCUMENTED: ICD-10-PCS | Mod: CPTII,S$GLB,, | Performed by: INTERNAL MEDICINE

## 2019-02-19 PROCEDURE — 3074F PR MOST RECENT SYSTOLIC BLOOD PRESSURE < 130 MM HG: ICD-10-PCS | Mod: CPTII,S$GLB,, | Performed by: INTERNAL MEDICINE

## 2019-02-19 PROCEDURE — 3078F PR MOST RECENT DIASTOLIC BLOOD PRESSURE < 80 MM HG: ICD-10-PCS | Mod: CPTII,S$GLB,, | Performed by: INTERNAL MEDICINE

## 2019-02-19 PROCEDURE — 3074F SYST BP LT 130 MM HG: CPT | Mod: CPTII,S$GLB,, | Performed by: INTERNAL MEDICINE

## 2019-02-19 PROCEDURE — 3008F BODY MASS INDEX DOCD: CPT | Mod: CPTII,S$GLB,, | Performed by: INTERNAL MEDICINE

## 2019-02-19 PROCEDURE — 99214 OFFICE O/P EST MOD 30 MIN: CPT | Mod: S$GLB,,, | Performed by: INTERNAL MEDICINE

## 2019-02-19 PROCEDURE — 3078F DIAST BP <80 MM HG: CPT | Mod: CPTII,S$GLB,, | Performed by: INTERNAL MEDICINE

## 2019-02-19 PROCEDURE — 71046 X-RAY EXAM CHEST 2 VIEWS: CPT | Mod: TC,FY

## 2019-02-19 PROCEDURE — 71046 X-RAY EXAM CHEST 2 VIEWS: CPT | Mod: 26,,, | Performed by: RADIOLOGY

## 2019-02-19 PROCEDURE — 71046 XR CHEST PA AND LATERAL: ICD-10-PCS | Mod: 26,,, | Performed by: RADIOLOGY

## 2019-02-19 PROCEDURE — 99999 PR PBB SHADOW E&M-EST. PATIENT-LVL III: ICD-10-PCS | Mod: PBBFAC,,, | Performed by: INTERNAL MEDICINE

## 2019-02-19 RX ORDER — PREDNISONE 50 MG/1
50 TABLET ORAL DAILY
Qty: 5 TABLET | Refills: 0 | Status: SHIPPED | OUTPATIENT
Start: 2019-02-19 | End: 2019-02-24

## 2019-02-19 NOTE — PROGRESS NOTES
SUBJECTIVE     Chief Complaint   Patient presents with    Follow-up     cold cough       HPI  Roslyn Brewster is a 38 y.o. female with multiple medical diagnoses as listed in the medical history and problem list that presents for evaluation of URI x 2 months. Pt reports a productive cough, L back and chest pain, SOB, chest tightness, and sore throat. +nausea, but denies any vomiting. Denies any fever, chills, but has had night sweats. Pt has been taking Augmentin, Medrol Dose Pack, Doxycycline, antihistamines, and cough syrup without improvement. Denies any sick contacts/recent travel.    PAST MEDICAL HISTORY:  Past Medical History:   Diagnosis Date    Acute encephalopathy 12/24/12    secondary to drug overdose    ARF (acute renal failure) 12/24/12    secondary to drug overdose    History of cardiac arrest 12/24/12    secondary to drug overdose    Hypertension     MRSA (methicillin resistant Staphylococcus aureus) 12/24/12    Nephritis     PMDD (premenstrual dysphoric disorder)     Polysubstance abuse 12/24/12    Systolic CHF, acute 12/24/12    secondary to drug overdose    UTI (urinary tract infection)        PAST SURGICAL HISTORY:  Past Surgical History:   Procedure Laterality Date    CHOLECYSTECTOMY      CYSTOSCOPY, RETROGRADE, URETEROSCOPY, STENT PLACEMENT Left 1/8/2018    [facility] OR    PLACEMENT OF DORSEY N/A 1/8/2018    [facility] at [facility] OR    REMOVAL-STENT Left 1/15/2018    [facility] OR    TUBAL LIGATION      urinary stents         SOCIAL HISTORY:  Social History     Socioeconomic History    Marital status: Single     Spouse name: Not on file    Number of children: Not on file    Years of education: Not on file    Highest education level: Not on file   Social Needs    Financial resource strain: Not on file    Food insecurity - worry: Not on file    Food insecurity - inability: Not on file     Transportation needs - medical: Not on file    Transportation needs - non-medical: Not on file   Occupational History    Not on file   Tobacco Use    Smoking status: Current Every Day Smoker     Packs/day: 0.50     Years: 20.00     Pack years: 10.00     Types: Cigarettes    Smokeless tobacco: Never Used    Tobacco comment: 1/2 half pack/day.    Substance and Sexual Activity    Alcohol use: No    Drug use: No     Comment: PCP, patient states she had not done drugs since 2012    Sexual activity: Yes     Partners: Male   Other Topics Concern    Not on file   Social History Narrative    Not on file       FAMILY HISTORY:  Family History   Problem Relation Age of Onset    Kidney disease Mother     Cancer Maternal Grandmother         throat    Cancer Maternal Grandfather         prostate    Kidney disease Daughter     Breast cancer Neg Hx     Colon cancer Neg Hx     Ovarian cancer Neg Hx        ALLERGIES AND MEDICATIONS: updated and reviewed.  Review of patient's allergies indicates:   Allergen Reactions    Strawberry Anaphylaxis and Hives    Morphine Other (See Comments)     Burns stomach    Toradol [ketorolac]     Tramadol      Abdominal pain     Current Outpatient Medications   Medication Sig Dispense Refill    albuterol (PROVENTIL/VENTOLIN HFA) 90 mcg/actuation inhaler Inhale 2 puffs into the lungs every 6 (six) hours as needed for Wheezing. 1 each 11    ALPRAZolam (XANAX) 1 MG tablet Take 1 tablet (1 mg total) by mouth every evening. 20 tablet 5    desloratadine (CLARINEX) 5 mg tablet Take 1 tablet (5 mg total) by mouth once daily. 30 tablet 11    lamoTRIgine (LAMICTAL) 100 MG tablet Take 50 mg by mouth 2 (two) times daily.  0    FLUoxetine 20 MG capsule Take 1 capsule (20 mg total) by mouth once daily. 90 capsule 1    prazosin (MINIPRESS) 1 MG Cap Take 1 mg by mouth 2 (two) times daily.      predniSONE (DELTASONE) 50 MG Tab Take 1 tablet (50 mg total) by mouth once daily. for 5 days 5  "tablet 0     No current facility-administered medications for this visit.        ROS  Review of Systems   Constitutional: Negative for chills and fever.   HENT: Negative for hearing loss and sore throat.    Eyes: Negative for visual disturbance.   Respiratory: Positive for cough, chest tightness, shortness of breath and wheezing.    Cardiovascular: Negative for chest pain, palpitations and leg swelling.   Gastrointestinal: Positive for nausea. Negative for abdominal pain, constipation, diarrhea and vomiting.   Genitourinary: Negative for dysuria, frequency and urgency.   Musculoskeletal: Negative for arthralgias, joint swelling and myalgias.   Skin: Negative for rash and wound.   Neurological: Negative for headaches.   Psychiatric/Behavioral: Negative for agitation and confusion. The patient is not nervous/anxious.          OBJECTIVE     Physical Exam  Vitals:    02/19/19 1133   BP: 104/72   Pulse: 98   Resp: 18   Temp: 98.3 °F (36.8 °C)    Body mass index is 27.7 kg/m².  Weight: 68.7 kg (151 lb 7.3 oz)   Height: 5' 2" (157.5 cm)     Physical Exam   Constitutional: She is oriented to person, place, and time. She appears well-developed and well-nourished. No distress.   HENT:   Head: Normocephalic and atraumatic.   Right Ear: Hearing, tympanic membrane and external ear normal.   Left Ear: Hearing and external ear normal. A middle ear effusion is present.   Nose: Nose normal.   Mouth/Throat: Oropharynx is clear and moist.   Eyes: Conjunctivae and EOM are normal. Right eye exhibits no discharge. Left eye exhibits no discharge. No scleral icterus.   Neck: Normal range of motion. Neck supple. No JVD present. No tracheal deviation present.   Cardiovascular: Normal rate, regular rhythm and intact distal pulses. Exam reveals no gallop and no friction rub.   No murmur heard.  Pulmonary/Chest: Effort normal. No respiratory distress. She has decreased breath sounds in the right lower field and the left lower field. She has " wheezes in the right upper field and the left upper field.   Abdominal: Soft. Bowel sounds are normal. She exhibits no distension and no mass. There is no tenderness. There is no rebound and no guarding.   Musculoskeletal: Normal range of motion. She exhibits no edema, tenderness or deformity.   Neurological: She is alert and oriented to person, place, and time. She exhibits normal muscle tone. Coordination normal.   Skin: Skin is warm and dry. No rash noted. No erythema.   Psychiatric: She has a normal mood and affect. Her behavior is normal. Judgment and thought content normal.         Health Maintenance       Date Due Completion Date    TETANUS VACCINE 06/28/1997 ---    Influenza Vaccine 08/01/2018 12/12/2017    Override on 11/16/2017: Declined    Override on 11/23/2015: Declined    Pap Smear 04/18/2019 4/18/2018    Pneumococcal Vaccine (Medium Risk) (1 of 1 - PPSV23) 02/23/2019 (Originally 6/28/1998) ---            ASSESSMENT     38 y.o. female with     1. Bronchitis with bronchospasm    2. Fluid level behind tympanic membrane of left ear    3. Nausea        PLAN:     1. Bronchitis with bronchospasm  - Continue symptomatic treatment with rest, increase fluid intake, tylenol or ibuprofen PRN fever(temp >/= 100.4) or body aches. Okay to take OTC antihistamines, i.e. Bendaryl, Claritin, Allegra, etc. as needed.  - Okay to gargle with warm, salt water or use throat lozenges as needed  - Continue cough meds as previously prescribed  - X-Ray Chest PA And Lateral; Future  - predniSONE (DELTASONE) 50 MG Tab; Take 1 tablet (50 mg total) by mouth once daily. for 5 days  Dispense: 5 tablet; Refill: 0    2. Fluid level behind tympanic membrane of left ear  - Pt to take OTC antihistamines and decongestants as above     3. Nausea  - Stable; no acute issues  - Monitor        RTC in 1-2 weeks as needed for any acute worsening of current condition or failure to improve       Spring Montilla MD  02/19/2019 11:38 AM        No  Follow-up on file.

## 2019-03-06 DIAGNOSIS — R11.0 NAUSEA: ICD-10-CM

## 2019-03-06 DIAGNOSIS — F32.81 PMDD (PREMENSTRUAL DYSPHORIC DISORDER): ICD-10-CM

## 2019-03-06 RX ORDER — ALPRAZOLAM 1 MG/1
TABLET ORAL
Qty: 20 TABLET | Refills: 0 | Status: SHIPPED | OUTPATIENT
Start: 2019-03-06 | End: 2019-04-08 | Stop reason: SDUPTHER

## 2019-03-17 ENCOUNTER — HOSPITAL ENCOUNTER (EMERGENCY)
Facility: HOSPITAL | Age: 39
Discharge: HOME | End: 2019-03-17
Attending: EMERGENCY MEDICINE
Payer: COMMERCIAL

## 2019-03-17 VITALS
HEART RATE: 88 BPM | SYSTOLIC BLOOD PRESSURE: 111 MMHG | TEMPERATURE: 99 F | HEIGHT: 62 IN | RESPIRATION RATE: 18 BRPM | WEIGHT: 149 LBS | DIASTOLIC BLOOD PRESSURE: 65 MMHG | BODY MASS INDEX: 27.42 KG/M2 | OXYGEN SATURATION: 99 %

## 2019-03-17 DIAGNOSIS — J06.9 UPPER RESPIRATORY TRACT INFECTION, UNSPECIFIED TYPE: Primary | ICD-10-CM

## 2019-03-17 LAB
B-HCG UR QL: NEGATIVE
CTP QC/QA: YES
FLUAV AG NPH QL: NEGATIVE
FLUBV AG NPH QL: NEGATIVE
S PYO RRNA THROAT QL PROBE: NEGATIVE

## 2019-03-17 PROCEDURE — 81025 URINE PREGNANCY TEST: CPT | Mod: ER | Performed by: NURSE PRACTITIONER

## 2019-03-17 PROCEDURE — 87081 CULTURE SCREEN ONLY: CPT

## 2019-03-17 PROCEDURE — 99284 EMERGENCY DEPT VISIT MOD MDM: CPT | Mod: 25,ER

## 2019-03-17 PROCEDURE — 87804 INFLUENZA ASSAY W/OPTIC: CPT | Mod: ER

## 2019-03-17 PROCEDURE — 25000242 PHARM REV CODE 250 ALT 637 W/ HCPCS: Mod: ER | Performed by: NURSE PRACTITIONER

## 2019-03-17 PROCEDURE — 87880 STREP A ASSAY W/OPTIC: CPT | Mod: ER

## 2019-03-17 PROCEDURE — 94760 N-INVAS EAR/PLS OXIMETRY 1: CPT | Mod: ER

## 2019-03-17 PROCEDURE — 63600175 PHARM REV CODE 636 W HCPCS: Mod: ER | Performed by: NURSE PRACTITIONER

## 2019-03-17 PROCEDURE — 94640 AIRWAY INHALATION TREATMENT: CPT | Mod: ER

## 2019-03-17 PROCEDURE — 96372 THER/PROPH/DIAG INJ SC/IM: CPT | Mod: ER

## 2019-03-17 RX ORDER — CETIRIZINE HYDROCHLORIDE 10 MG/1
10 TABLET ORAL DAILY PRN
Qty: 30 TABLET | Refills: 0 | Status: SHIPPED | OUTPATIENT
Start: 2019-03-17 | End: 2019-05-13

## 2019-03-17 RX ORDER — BENZONATATE 200 MG/1
200 CAPSULE ORAL 3 TIMES DAILY PRN
Qty: 30 CAPSULE | Refills: 0 | Status: SHIPPED | OUTPATIENT
Start: 2019-03-17 | End: 2019-03-27

## 2019-03-17 RX ORDER — IPRATROPIUM BROMIDE AND ALBUTEROL SULFATE 2.5; .5 MG/3ML; MG/3ML
3 SOLUTION RESPIRATORY (INHALATION) ONCE
Status: COMPLETED | OUTPATIENT
Start: 2019-03-17 | End: 2019-03-17

## 2019-03-17 RX ORDER — DEXAMETHASONE SODIUM PHOSPHATE 4 MG/ML
12 INJECTION, SOLUTION INTRA-ARTICULAR; INTRALESIONAL; INTRAMUSCULAR; INTRAVENOUS; SOFT TISSUE
Status: COMPLETED | OUTPATIENT
Start: 2019-03-17 | End: 2019-03-17

## 2019-03-17 RX ADMIN — DEXAMETHASONE SODIUM PHOSPHATE 12 MG: 4 INJECTION, SOLUTION INTRAMUSCULAR; INTRAVENOUS at 07:03

## 2019-03-17 RX ADMIN — IPRATROPIUM BROMIDE AND ALBUTEROL SULFATE 3 ML: .5; 3 SOLUTION RESPIRATORY (INHALATION) at 07:03

## 2019-03-17 RX ADMIN — IPRATROPIUM BROMIDE AND ALBUTEROL SULFATE 3 ML: .5; 3 SOLUTION RESPIRATORY (INHALATION) at 06:03

## 2019-03-17 NOTE — ED PROVIDER NOTES
5Encounter Date: 3/17/2019    SCRIBE #1 NOTE: I, Hyacinth Cee, am scribing for, and in the presence of,  Kelly Jimenez NP. I have scribed the following portions of the note - Other sections scribed: HPI, ROS, PE.       History   No chief complaint on file.    38 y.o female with HTN presents with a sore throat that started today. She notes having cold symptoms for a couple of days (cough, fever, and congestion). She denies SOB or N/V/D. Current smoker.       The history is provided by the patient. No  was used.     Review of patient's allergies indicates:   Allergen Reactions    Strawberry Anaphylaxis and Hives    Morphine Other (See Comments)     Burns stomach    Toradol [ketorolac]     Tramadol      Abdominal pain     Past Medical History:   Diagnosis Date    Acute encephalopathy 12/24/12    secondary to drug overdose    ARF (acute renal failure) 12/24/12    secondary to drug overdose    History of cardiac arrest 12/24/12    secondary to drug overdose    Hypertension     MRSA (methicillin resistant Staphylococcus aureus) 12/24/12    Nephritis     PMDD (premenstrual dysphoric disorder)     Polysubstance abuse 12/24/12    Systolic CHF, acute 12/24/12    secondary to drug overdose    UTI (urinary tract infection)      Past Surgical History:   Procedure Laterality Date    CHOLECYSTECTOMY      CYSTOSCOPY, RETROGRADE, URETEROSCOPY, STENT PLACEMENT Left 1/8/2018    [facility]    PLACEMENT OF DORSEY N/A 1/8/2018    [facility] OR    REMOVAL-STENT Left 1/15/2018    [facility] at [facility]    TUBAL LIGATION      urinary stents       Family History   Problem Relation Age of Onset    Kidney disease Mother     Cancer Maternal Grandmother         throat    Cancer Maternal Grandfather         prostate    Kidney disease Daughter     Breast cancer Neg Hx     Colon cancer Neg Hx     Ovarian cancer Neg Hx       Social History     Tobacco Use    Smoking status: Current Every Day Smoker     Packs/day: 0.50     Years: 20.00     Pack years: 10.00     Types: Cigarettes    Smokeless tobacco: Never Used    Tobacco comment: 1/2 half pack/day.    Substance Use Topics    Alcohol use: No    Drug use: No     Comment: PCP, patient states she had not done drugs since 2012     Review of Systems   Constitutional: Positive for fever.   HENT: Positive for congestion and sore throat.    Eyes: Negative.    Respiratory: Positive for cough. Negative for shortness of breath.    Gastrointestinal: Negative for diarrhea, nausea and vomiting.   Endocrine: Negative.    Musculoskeletal: Negative.    Allergic/Immunologic: Negative.    Neurological: Negative.    Hematological: Negative.    Psychiatric/Behavioral: Negative.    All other systems reviewed and are negative.      Physical Exam     Initial Vitals   BP Pulse Resp Temp SpO2   -- -- -- -- --      MAP       --         Physical Exam    Nursing note and vitals reviewed.  Constitutional: She appears well-developed. She is not diaphoretic.   HENT:   Right Ear: External ear normal.   Left Ear: External ear normal.   Nose: Nose normal.   Mouth/Throat: Uvula is midline and mucous membranes are normal. Posterior oropharyngeal erythema present. No oropharyngeal exudate, posterior oropharyngeal edema or tonsillar abscesses.   Eyes: Conjunctivae are normal.   Neck: Normal range of motion. Neck supple.   Cardiovascular: Normal rate, regular rhythm, S1 normal, S2 normal and normal heart sounds.   Pulmonary/Chest: Breath sounds normal. No respiratory distress.   Abdominal: Soft. There is no tenderness.   Musculoskeletal: Normal range of motion.   Neurological: She is alert and oriented to person, place, and time. No cranial nerve deficit.   Skin: Skin is warm, dry and intact.   Psychiatric: She has a normal mood and affect.         ED Course   Procedures  Labs Reviewed   CULTURE, STREP A,  THROAT    POCT URINE PREGNANCY   POCT INFLUENZA A/B   POCT RAPID STREP A          Imaging Results    None       Imaging Results          X-Ray Chest PA And Lateral (Final result)  Result time 03/17/19 18:52:50    Final result by Mague Gardner MD (03/17/19 18:52:50)                 Impression:      No acute cardiopulmonary process identified.      Electronically signed by: Mageu Gardner MD  Date:    03/17/2019  Time:    18:52             Narrative:    EXAMINATION:  XR CHEST PA AND LATERAL    CLINICAL HISTORY:  cough;    TECHNIQUE:  PA and lateral views of the chest were performed.    COMPARISON:  February 2019.    FINDINGS:  Cardiac silhouette is normal in size.  Lungs are symmetrically expanded.  No evidence of focal consolidative process, pneumothorax, or significant effusion.  No acute osseous abnormality identified.  Remote healed fracture deformity is seen at the left clavicle.                                    Medical Decision Making:   Initial Assessment:   38 y.o female with HTN presents with a sore throat that started today. She notes having cold symptoms for a couple of days (cough, fever, and congestion). She denies SOB or N/V/D. Current smoker.   Differential Diagnosis:   Influenza, Strep swab, Acute sinusitis, Acute bronchitis, Pneumonia   Clinical Tests:   Lab Tests: Ordered and Reviewed  Radiological Study: Ordered and Reviewed  ED Management:  DuoNeb x2. Wheezing resolved.   Decadron 12 mg IM.   Discharge home with Tessalon Perles and Zyrtec.  Follow-up with PCP in 1-2 days.  Return ED for worsening of symptoms            Scribe Attestation:   Scribe #1: I performed the above scribed service and the documentation accurately describes the services I performed. I attest to the accuracy of the note.    This document was produced by a scribe under my direction and in my presence. I agree with the content of the note and have made any necessary edits.     MEHREEN Ayala    03/17/2019 7:47 PM            Clinical Impression:     1. Upper respiratory tract infection, unspecified type                                 Kelly Jimenez, MEHREEN  03/17/19 1947

## 2019-03-19 ENCOUNTER — PATIENT MESSAGE (OUTPATIENT)
Dept: FAMILY MEDICINE | Facility: CLINIC | Age: 39
End: 2019-03-19

## 2019-03-19 DIAGNOSIS — R05.3 CHRONIC COUGH: ICD-10-CM

## 2019-03-20 ENCOUNTER — CLINICAL SUPPORT (OUTPATIENT)
Dept: FAMILY MEDICINE | Facility: CLINIC | Age: 39
End: 2019-03-20
Payer: COMMERCIAL

## 2019-03-20 VITALS
HEART RATE: 98 BPM | SYSTOLIC BLOOD PRESSURE: 110 MMHG | OXYGEN SATURATION: 94 % | TEMPERATURE: 98 F | RESPIRATION RATE: 20 BRPM | DIASTOLIC BLOOD PRESSURE: 70 MMHG

## 2019-03-20 DIAGNOSIS — R06.02 SOB (SHORTNESS OF BREATH): Primary | ICD-10-CM

## 2019-03-20 LAB — BACTERIA THROAT CULT: NORMAL

## 2019-03-20 PROCEDURE — 94640 AIRWAY INHALATION TREATMENT: CPT | Mod: S$GLB,,, | Performed by: INTERNAL MEDICINE

## 2019-03-20 PROCEDURE — 94640 PR INHAL RX, AIRWAY OBST/DX SPUTUM INDUCT: ICD-10-PCS | Mod: S$GLB,,, | Performed by: INTERNAL MEDICINE

## 2019-03-20 PROCEDURE — 99999 PR PBB SHADOW E&M-EST. PATIENT-LVL II: ICD-10-PCS | Mod: PBBFAC,,,

## 2019-03-20 PROCEDURE — 99999 PR PBB SHADOW E&M-EST. PATIENT-LVL II: CPT | Mod: PBBFAC,,,

## 2019-03-20 RX ORDER — ALBUTEROL SULFATE 0.83 MG/ML
2.5 SOLUTION RESPIRATORY (INHALATION)
Status: COMPLETED | OUTPATIENT
Start: 2019-03-20 | End: 2019-03-20

## 2019-03-20 RX ADMIN — ALBUTEROL SULFATE 2.5 MG: 0.83 SOLUTION RESPIRATORY (INHALATION) at 12:03

## 2019-03-20 NOTE — PROGRESS NOTES
Administered Albuterol 2.5 mg orally via nebulizer.  Patient tolerated treatment well, no adverse reactions noted.

## 2019-03-27 ENCOUNTER — PATIENT MESSAGE (OUTPATIENT)
Dept: FAMILY MEDICINE | Facility: CLINIC | Age: 39
End: 2019-03-27

## 2019-03-27 ENCOUNTER — TELEPHONE (OUTPATIENT)
Dept: ADMINISTRATIVE | Facility: HOSPITAL | Age: 39
End: 2019-03-27

## 2019-03-27 DIAGNOSIS — T36.95XA ANTIBIOTIC-INDUCED YEAST INFECTION: Primary | ICD-10-CM

## 2019-03-27 DIAGNOSIS — B37.9 ANTIBIOTIC-INDUCED YEAST INFECTION: Primary | ICD-10-CM

## 2019-03-27 RX ORDER — FLUCONAZOLE 150 MG/1
150 TABLET ORAL
Qty: 3 TABLET | Refills: 0 | Status: SHIPPED | OUTPATIENT
Start: 2019-03-27 | End: 2019-05-13

## 2019-04-01 ENCOUNTER — CLINICAL SUPPORT (OUTPATIENT)
Dept: SMOKING CESSATION | Facility: CLINIC | Age: 39
End: 2019-04-01
Payer: COMMERCIAL

## 2019-04-01 DIAGNOSIS — F17.200 NICOTINE DEPENDENCE: Primary | ICD-10-CM

## 2019-04-01 PROCEDURE — 99999 PR PBB SHADOW E&M-EST. PATIENT-LVL I: CPT | Mod: PBBFAC,,,

## 2019-04-01 PROCEDURE — 99404 PR PREVENT COUNSEL,INDIV,60 MIN: ICD-10-PCS | Mod: S$GLB,,,

## 2019-04-01 PROCEDURE — 99404 PREV MED CNSL INDIV APPRX 60: CPT | Mod: S$GLB,,,

## 2019-04-01 PROCEDURE — 99999 PR PBB SHADOW E&M-EST. PATIENT-LVL I: ICD-10-PCS | Mod: PBBFAC,,,

## 2019-04-01 RX ORDER — IBUPROFEN 200 MG
1 TABLET ORAL DAILY
Qty: 14 PATCH | Refills: 0 | Status: SHIPPED | OUTPATIENT
Start: 2019-04-01 | End: 2019-04-01

## 2019-04-01 RX ORDER — VARENICLINE TARTRATE 0.5 (11)-1
KIT ORAL
Qty: 1 PACKAGE | Refills: 0 | Status: SHIPPED | OUTPATIENT
Start: 2019-04-01 | End: 2019-04-08 | Stop reason: SDDI

## 2019-04-01 RX ORDER — IBUPROFEN 200 MG
1 TABLET ORAL DAILY
Qty: 14 PATCH | Refills: 0 | Status: SHIPPED | OUTPATIENT
Start: 2019-04-01 | End: 2019-04-17 | Stop reason: SDUPTHER

## 2019-04-01 NOTE — PROGRESS NOTES
Patient will be participating in biweekly tobacco cessation meetings and will begin the prescribed tobacco cessation medication regime of Chantix starter pack  and 21 mg nicotine patch QD  Patient denies any low moods or SI/HI . She has used Chantix on a previous quit attempt without any s/e.   She currently smokes 20 cigarettes per day.  Pt started on rate reduction and wait time of 15 min prior to smoking. Pt's exhaled carbon monoxide level was  11 ppm as per Smokerlyzer. (non- smoker = 0-5 ppm.) Will see pt back in office inext week.

## 2019-04-02 RX ORDER — METRONIDAZOLE 500 MG/1
500 TABLET ORAL EVERY 12 HOURS
Qty: 14 TABLET | Refills: 0 | Status: SHIPPED | OUTPATIENT
Start: 2019-04-02 | End: 2019-04-09

## 2019-04-06 DIAGNOSIS — F32.81 PMDD (PREMENSTRUAL DYSPHORIC DISORDER): ICD-10-CM

## 2019-04-06 DIAGNOSIS — R11.0 NAUSEA: ICD-10-CM

## 2019-04-08 ENCOUNTER — CLINICAL SUPPORT (OUTPATIENT)
Dept: SMOKING CESSATION | Facility: CLINIC | Age: 39
End: 2019-04-08
Payer: COMMERCIAL

## 2019-04-08 DIAGNOSIS — F17.200 NICOTINE DEPENDENCE: Primary | ICD-10-CM

## 2019-04-08 DIAGNOSIS — R11.0 NAUSEA: ICD-10-CM

## 2019-04-08 DIAGNOSIS — F32.81 PMDD (PREMENSTRUAL DYSPHORIC DISORDER): ICD-10-CM

## 2019-04-08 PROCEDURE — 99999 PR PBB SHADOW E&M-EST. PATIENT-LVL I: CPT | Mod: PBBFAC,,,

## 2019-04-08 PROCEDURE — 99999 PR PBB SHADOW E&M-EST. PATIENT-LVL I: ICD-10-PCS | Mod: PBBFAC,,,

## 2019-04-08 PROCEDURE — 99402 PR PREVENT COUNSEL,INDIV,30 MIN: ICD-10-PCS | Mod: S$GLB,,,

## 2019-04-08 PROCEDURE — 99402 PREV MED CNSL INDIV APPRX 30: CPT | Mod: S$GLB,,,

## 2019-04-08 RX ORDER — MICONAZOLE NITRATE 2 %
CREAM (GRAM) TOPICAL
Qty: 190 EACH | Refills: 0 | Status: SHIPPED | OUTPATIENT
Start: 2019-04-08 | End: 2019-08-02

## 2019-04-08 RX ORDER — ALPRAZOLAM 1 MG/1
TABLET ORAL
Qty: 20 TABLET | OUTPATIENT
Start: 2019-04-08

## 2019-04-08 RX ORDER — ALPRAZOLAM 1 MG/1
1 TABLET ORAL NIGHTLY
Qty: 20 TABLET | Refills: 0 | Status: SHIPPED | OUTPATIENT
Start: 2019-04-08 | End: 2019-05-08 | Stop reason: SDUPTHER

## 2019-04-08 NOTE — PROGRESS NOTES
Individual Follow-Up Form    4/8/2019    Quit Date:     Clinical Status of Patient: Outpatient    Length of Service: 30 minutes    Continuing Medication: yes  Patches    Other Medications: Chantix     Target Symptoms: Withdrawal and medication side effects. The following were  rated moderate (3) to severe (4) on TCRS:  · Moderate (3): restless sleep, vivid dreaming.    · Severe (4): none    Comments: Patient presents for follow up smoking 7 cigarettes per day. Pt remains on tobacco cessation medication of  21 mg nicotine patch QD and today added  2 mg nicotine  gum PRN (1-2 per hour in place of cigarettes.) No adverse effects noted at this time. Patient was taking 0.5 mg Chantix QD but she discontinued taking it because of very vivid dreaming and restless sleep. She said she feels very comfortable using the patches and the gum.  Pt doing well with rate reduction and wait times prior to smoking.   Reviewed coping strategies/habitual behavior/relapse prevention with patient. Exhaled carbon monoxide level was 8 ppm per Smokerlyzer  ( non- smoker = 0-5 ppm .)  Will see pt back in office next week. Congratulated patient for such a great start to becoming smoke free.       Diagnosis: F17.200    Next Visit: 1 week

## 2019-04-08 NOTE — Clinical Note
Patient presents for follow up smoking 7 cigarettes per day. Pt remains on tobacco cessation medication of  21 mg nicotine patch QD and today added  2 mg nicotine  gum PRN (1-2 per hour in place of cigarettes.) No adverse effects noted at this time. Patient was taking 0.5 mg Chantix QD but she discontinued taking it because of very vivid dreaming and restless sleep. She said she feels very comfortable using the patches and the gum.  Pt doing well with rate reduction and wait times prior to smoking.   Reviewed coping strategies/habitual behavior/relapse prevention with patient. Exhaled carbon monoxide level was 8 ppm per Smokerlyzer  ( non- smoker = 0-5 ppm .)  Will see pt back in office next week. Congratulated patient for such a great start to becoming smoke free.

## 2019-04-11 ENCOUNTER — CLINICAL SUPPORT (OUTPATIENT)
Dept: SMOKING CESSATION | Facility: CLINIC | Age: 39
End: 2019-04-11
Payer: COMMERCIAL

## 2019-04-11 DIAGNOSIS — F17.200 NICOTINE DEPENDENCE: Primary | ICD-10-CM

## 2019-04-11 PROCEDURE — 99407 PR TOBACCO USE CESSATION INTENSIVE >10 MINUTES: ICD-10-PCS | Mod: S$GLB,,,

## 2019-04-11 PROCEDURE — 99407 BEHAV CHNG SMOKING > 10 MIN: CPT | Mod: S$GLB,,,

## 2019-04-17 ENCOUNTER — CLINICAL SUPPORT (OUTPATIENT)
Dept: SMOKING CESSATION | Facility: CLINIC | Age: 39
End: 2019-04-17
Payer: COMMERCIAL

## 2019-04-17 DIAGNOSIS — F17.200 NICOTINE DEPENDENCE: ICD-10-CM

## 2019-04-17 PROCEDURE — 99407 BEHAV CHNG SMOKING > 10 MIN: CPT | Mod: S$GLB,,,

## 2019-04-17 PROCEDURE — 99407 PR TOBACCO USE CESSATION INTENSIVE >10 MINUTES: ICD-10-PCS | Mod: S$GLB,,,

## 2019-04-17 RX ORDER — IBUPROFEN 200 MG
1 TABLET ORAL DAILY
Qty: 28 PATCH | Refills: 0 | Status: SHIPPED | OUTPATIENT
Start: 2019-04-17 | End: 2019-05-15

## 2019-04-29 ENCOUNTER — PATIENT MESSAGE (OUTPATIENT)
Dept: FAMILY MEDICINE | Facility: CLINIC | Age: 39
End: 2019-04-29

## 2019-05-07 DIAGNOSIS — R11.0 NAUSEA: ICD-10-CM

## 2019-05-07 DIAGNOSIS — F32.81 PMDD (PREMENSTRUAL DYSPHORIC DISORDER): ICD-10-CM

## 2019-05-07 RX ORDER — ALPRAZOLAM 1 MG/1
TABLET ORAL
Qty: 20 TABLET | OUTPATIENT
Start: 2019-05-07

## 2019-05-08 RX ORDER — ALPRAZOLAM 1 MG/1
1 TABLET ORAL NIGHTLY
Qty: 20 TABLET | Refills: 0 | Status: SHIPPED | OUTPATIENT
Start: 2019-05-08 | End: 2019-06-06 | Stop reason: SDUPTHER

## 2019-05-13 ENCOUNTER — OFFICE VISIT (OUTPATIENT)
Dept: FAMILY MEDICINE | Facility: CLINIC | Age: 39
End: 2019-05-13
Payer: COMMERCIAL

## 2019-05-13 VITALS
HEART RATE: 97 BPM | HEIGHT: 62 IN | RESPIRATION RATE: 16 BRPM | TEMPERATURE: 99 F | WEIGHT: 160.25 LBS | OXYGEN SATURATION: 98 % | SYSTOLIC BLOOD PRESSURE: 118 MMHG | DIASTOLIC BLOOD PRESSURE: 80 MMHG | BODY MASS INDEX: 29.49 KG/M2

## 2019-05-13 DIAGNOSIS — M75.41 IMPINGEMENT SYNDROME OF RIGHT SHOULDER: Primary | ICD-10-CM

## 2019-05-13 DIAGNOSIS — L08.9 INFECTED CYST OF SKIN: ICD-10-CM

## 2019-05-13 DIAGNOSIS — F39 MOOD DISORDER: ICD-10-CM

## 2019-05-13 DIAGNOSIS — L72.9 INFECTED CYST OF SKIN: ICD-10-CM

## 2019-05-13 PROBLEM — J02.9 SORE THROAT: Status: RESOLVED | Noted: 2018-01-15 | Resolved: 2019-05-13

## 2019-05-13 PROCEDURE — 3079F PR MOST RECENT DIASTOLIC BLOOD PRESSURE 80-89 MM HG: ICD-10-PCS | Mod: CPTII,S$GLB,, | Performed by: PHYSICIAN ASSISTANT

## 2019-05-13 PROCEDURE — 3074F PR MOST RECENT SYSTOLIC BLOOD PRESSURE < 130 MM HG: ICD-10-PCS | Mod: CPTII,S$GLB,, | Performed by: PHYSICIAN ASSISTANT

## 2019-05-13 PROCEDURE — 3008F PR BODY MASS INDEX (BMI) DOCUMENTED: ICD-10-PCS | Mod: CPTII,S$GLB,, | Performed by: PHYSICIAN ASSISTANT

## 2019-05-13 PROCEDURE — 3079F DIAST BP 80-89 MM HG: CPT | Mod: CPTII,S$GLB,, | Performed by: PHYSICIAN ASSISTANT

## 2019-05-13 PROCEDURE — 99214 OFFICE O/P EST MOD 30 MIN: CPT | Mod: S$GLB,,, | Performed by: PHYSICIAN ASSISTANT

## 2019-05-13 PROCEDURE — 99999 PR PBB SHADOW E&M-EST. PATIENT-LVL IV: ICD-10-PCS | Mod: PBBFAC,,, | Performed by: PHYSICIAN ASSISTANT

## 2019-05-13 PROCEDURE — 3074F SYST BP LT 130 MM HG: CPT | Mod: CPTII,S$GLB,, | Performed by: PHYSICIAN ASSISTANT

## 2019-05-13 PROCEDURE — 99214 PR OFFICE/OUTPT VISIT, EST, LEVL IV, 30-39 MIN: ICD-10-PCS | Mod: S$GLB,,, | Performed by: PHYSICIAN ASSISTANT

## 2019-05-13 PROCEDURE — 99999 PR PBB SHADOW E&M-EST. PATIENT-LVL IV: CPT | Mod: PBBFAC,,, | Performed by: PHYSICIAN ASSISTANT

## 2019-05-13 PROCEDURE — 3008F BODY MASS INDEX DOCD: CPT | Mod: CPTII,S$GLB,, | Performed by: PHYSICIAN ASSISTANT

## 2019-05-13 RX ORDER — INDOMETHACIN 50 MG/1
50 CAPSULE ORAL 2 TIMES DAILY WITH MEALS
Qty: 30 CAPSULE | Refills: 0 | Status: SHIPPED | OUTPATIENT
Start: 2019-05-13 | End: 2019-06-06 | Stop reason: SDUPTHER

## 2019-05-13 RX ORDER — SULFAMETHOXAZOLE AND TRIMETHOPRIM 800; 160 MG/1; MG/1
1 TABLET ORAL 2 TIMES DAILY
Qty: 14 TABLET | Refills: 0 | Status: SHIPPED | OUTPATIENT
Start: 2019-05-13 | End: 2019-05-20

## 2019-05-13 RX ORDER — METHYLPREDNISOLONE 4 MG/1
TABLET ORAL
Qty: 1 PACKAGE | Refills: 0 | Status: SHIPPED | OUTPATIENT
Start: 2019-05-13 | End: 2019-06-06 | Stop reason: SDUPTHER

## 2019-05-13 RX ORDER — BACLOFEN 10 MG/1
10 TABLET ORAL NIGHTLY PRN
Qty: 30 TABLET | Refills: 0 | Status: SHIPPED | OUTPATIENT
Start: 2019-05-13 | End: 2019-06-06 | Stop reason: SDUPTHER

## 2019-05-13 NOTE — PROGRESS NOTES
Subjective:       Patient ID: Roslyn Brewster is a 38 y.o. female with multiple medical diagnoses as listed in the medical history and problem list that presents for Shoulder Pain (right, for over a week, fell 1 1/2 weeks ago)  .    Chief Complaint: Shoulder Pain (right, for over a week, fell 1 1/2 weeks ago)      Shoulder Pain    Pain location: right anterior shoulder and neck pain radiates down her hand to and 4th and 5th digit  This is a new (right handed ) problem. The current episode started 1 to 4 weeks ago (1.5 weeks did recall falling 2 on steps and caught herself with her right arm ). There has been no history of extremity trauma. The problem occurs constantly. The problem has been gradually worsening (intense and frequency ). The quality of the pain is described as burning and sharp. The pain is at a severity of 7/10. Associated symptoms include a limited range of motion, numbness and tingling. Pertinent negatives include no joint locking, joint swelling or stiffness. The symptoms are aggravated by lying down and activity. She has tried NSAIDS, heat and movement (aleve and mobic once a day) for the symptoms. The treatment provided no relief.     Review of Systems   Musculoskeletal: Negative for stiffness.   Neurological: Positive for tingling and numbness.         PAST MEDICAL HISTORY:  Past Medical History:   Diagnosis Date    Acute encephalopathy 12/24/12    secondary to drug overdose    ARF (acute renal failure) 12/24/12    secondary to drug overdose    History of cardiac arrest 12/24/12    secondary to drug overdose    Hypertension     MRSA (methicillin resistant Staphylococcus aureus) 12/24/12    Nephritis     PMDD (premenstrual dysphoric disorder)     Polysubstance abuse 12/24/12    Systolic CHF, acute 12/24/12    secondary to drug overdose    UTI (urinary tract infection)        SOCIAL HISTORY:  Social History     Socioeconomic History    Marital status: Single     Spouse name: Not on  file    Number of children: Not on file    Years of education: Not on file    Highest education level: Not on file   Occupational History    Not on file   Social Needs    Financial resource strain: Not on file    Food insecurity:     Worry: Not on file     Inability: Not on file    Transportation needs:     Medical: Not on file     Non-medical: Not on file   Tobacco Use    Smoking status: Current Every Day Smoker     Packs/day: 0.50     Years: 20.00     Pack years: 10.00     Types: Cigarettes    Smokeless tobacco: Never Used   Substance and Sexual Activity    Alcohol use: No    Drug use: No     Types: Cocaine, Methamphetamines     Comment: PCP, patient states she had not done drugs since 2012    Sexual activity: Yes     Partners: Male   Lifestyle    Physical activity:     Days per week: Not on file     Minutes per session: Not on file    Stress: Not on file   Relationships    Social connections:     Talks on phone: Not on file     Gets together: Not on file     Attends [employer] service: Not on file     Active member of club or organization: Not on file     Attends meetings of clubs or organizations: Not on file     Relationship status: Not on file   Other Topics Concern    Not on file   Social History Narrative    Not on file       ALLERGIES AND MEDICATIONS: updated and reviewed.  Review of patient's allergies indicates:   Allergen Reactions    Strawberry Anaphylaxis and Hives    Morphine Other (See Comments)     Burns stomach    Toradol [ketorolac]     Tramadol      Abdominal pain     Current Outpatient Medications   Medication Sig Dispense Refill    ALPRAZolam (XANAX) 1 MG tablet Take 1 tablet (1 mg total) by mouth every evening. 20 tablet 0    FLUoxetine 20 MG capsule Take 1 capsule (20 mg total) by mouth once daily. 90 capsule 1    lamoTRIgine (LAMICTAL) 100 MG tablet Take 50 mg by mouth 2 (two) times daily.  0    nicotine, polacrilex, (NICORETTE) 2 mg Gum 1-2 per hour as needed in  "place of a cigarette, max 20 per day. - Oral 190 each 0    baclofen (LIORESAL) 10 MG tablet Take 1 tablet (10 mg total) by mouth nightly as needed. 30 tablet 0    indomethacin (INDOCIN) 50 MG capsule Take 1 capsule (50 mg total) by mouth 2 (two) times daily with meals. 30 capsule 0    methylPREDNISolone (MEDROL DOSEPACK) 4 mg tablet Take as directed 1 Package 0    nicotine (NICODERM CQ) 21 mg/24 hr Place 1 patch onto the skin once daily. 28 patch 0    sulfamethoxazole-trimethoprim 800-160mg (BACTRIM DS) 800-160 mg Tab Take 1 tablet by mouth 2 (two) times daily. for 7 days 14 tablet 0     No current facility-administered medications for this visit.          Objective:   /80   Pulse 97   Temp 98.8 °F (37.1 °C) (Oral)   Resp 16   Ht 5' 2" (1.575 m)   Wt 72.7 kg (160 lb 4.4 oz)   LMP 04/28/2019 (Approximate)   SpO2 98%   BMI 29.31 kg/m²      Physical Exam   Constitutional: She is oriented to person, place, and time. No distress.   HENT:   Head: Normocephalic and atraumatic.   Eyes: Conjunctivae and EOM are normal.   Cardiovascular: Normal rate and regular rhythm.   Pulmonary/Chest: Effort normal and breath sounds normal.   Musculoskeletal:        Right shoulder: She exhibits tenderness and pain. She exhibits normal range of motion, no bony tenderness, no swelling, no effusion, no crepitus, no deformity, no spasm, normal pulse and normal strength.        Left shoulder: Normal.   Pain with internal rotation  Pain with resistance  Pain with Neer's    Neurological: She is alert and oriented to person, place, and time.   Skin: Skin is warm. No erythema.                Assessment:       1. Impingement syndrome of right shoulder    2. Infected cyst of skin    3. Mood disorder        Plan:       Impingement syndrome of right shoulder  -     methylPREDNISolone (MEDROL DOSEPACK) 4 mg tablet; Take as directed  Dispense: 1 Package; Refill: 0  -     baclofen (LIORESAL) 10 MG tablet; Take 1 tablet (10 mg total) by " mouth nightly as needed.  Dispense: 30 tablet; Refill: 0  -     indomethacin (INDOCIN) 50 MG capsule; Take 1 capsule (50 mg total) by mouth 2 (two) times daily with meals.  Dispense: 30 capsule; Refill: 0  Ice and heat  If not improved told her Dr. Hager could do so steroid injection into the joint     Infected cyst of skin  -     sulfamethoxazole-trimethoprim 800-160mg (BACTRIM DS) 800-160 mg Tab; Take 1 tablet by mouth 2 (two) times daily. for 7 days  Dispense: 14 tablet; Refill: 0  Derm if not improved. Its over her achilles area.     Mood disorder  The current medical regimen is effective;  continue present plan and medications.            No follow-ups on file.

## 2019-05-13 NOTE — PATIENT INSTRUCTIONS
Epidermoid Cyst (Sebaceous Cyst), Infected (Antibiotic Treatment)  You have an epidermoid cyst. This is a small, painless lump under your skin. An epidermoid cyst (often called a sebaceous cyst, epidermal cyst, or epidermal inclusion cyst) is a term most often used for 2 similar types of cysts:  · Epidermoid cysts. These cysts form slowly under the skin. They can be found on most parts of the body. But they are most often found on areas with more hair such as the scalp, face, upper back, and genitals.  · Pilar cysts. These are similar to epidermoid cysts. But they start from a different part of the hair follicle. They are more likely to be on the scalp.  Here are some general facts about these cysts:  · A cyst is a sac filled with material that is often cheesy, fatty, oily, or stringy. The material inside can be thick. Or it can be a liquid.  · You can usually move the cyst slightly if you try.  · The cysts can be smaller than a pea or as large as a few inches.  · The cysts are usually not painful, unless they become inflamed or infected.  · The area around the cyst may smell bad. If the cyst breaks open, the material inside it often smells bad as well.  Your cyst became infected and your healthcare provider wanted to treat it with antibiotics. If the antibiotics dont clear up the infection, the cyst will need to be drained by making a small cut (incision). Local anesthesia will be used to numb the area.  Home care  · Resist the temptation to squeeze or pop the cyst, stick a needle in it, or cut it open. This often leads to a worsening infection and scarring.  · Take the antibiotic as directed until it is all used up.  · Soak the affected area in hot water or apply a hot pack (a thin, clean towel soaked in hot water) for 20 minutes at a time. Do this 3 to 4 times a day.  · Apply antibiotic cream or ointment 3 times a day.  · You may use over-the-counter pain medicine to control pain, unless another medicine was  given. If you have chronic liver or kidney disease or ever had a stomach ulcer or GI bleeding, talk with your healthcare provider before using these medicines.  Prevention  Once this infection has healed, reduce the risk of future infections by:  · Keeping the cyst area clean by bathing or showering daily  · Avoiding tight-fitting clothing in the cyst area  Follow-up care  Follow up with your healthcare provider, or as advised. If a gauze packing was put in your wound, it should be removed in a few days as advised by your healthcare provider. Check your wound every day for the signs listed below.  When to seek medical advice  Call your healthcare provider right away if any of these occur:  · Pus coming from the cyst  · Increasing redness around the wound  · Increasing local pain or swelling  · Fever of 100.4°F (38ºC) or higher, or as directed by your provider  Date Last Reviewed: 10/5/2016  © 0194-9120 [employer]. [address] [place] [address]. All rights reserved. This information is not intended as a substitute for professional medical care. Always follow your healthcare professional's instructions.        Shoulder Impingement Syndrome  The rotator cuff is a group of muscles and tendons that surround the shoulder joint. These muscles and tendons hold the arm in its joint. They help the shoulder move. The rotator cuff muscles and tendons can become irritated from repeated rubbing against the shoulder bone. This is called shoulder impingement syndrome or rotator cuff tendonitis.     If your case is mild, you may only need to rest the shoulder and then do certain exercises to strengthen the muscles. You can also take anti-inflammatory medicines. Steroid injections into the shoulder can ease inflammation. But you can have only a limited number of these. If the condition gets worse, your shoulder muscles may become thin and weak. This can lead to a rotator cuff tear.  Symptoms of shoulder  impingement syndrome may include:  · Shoulder pain that gets worse when you raise your arm overhead  · Weakness of the shoulder muscles when you use your arm overhead  · Popping and clicking when you move your shoulder  · Shoulder pain that wakes you up at night, especially when you sleep on the affected shoulder  · Sudden pain in your shoulder when you lift or reach  Home care  Follow these tips to take care of yourself at home:  · Avoid activities that make your pain worse. These include raising your arms overhead, repeating the same motion over and over, or lifting heavy objects.  · Dont hold your arm in one position for a long time. Keep it moving.  · Put an ice pack on the sore area for 20 minutes every 1 to 2 hours for the first day. You can make an ice pack by putting ice cubes in a plastic bag. Wrap the bag in a towel before putting it on your shoulder. A frozen bag of peas or something similar can also be used as an ice pack. Use the ice packs 3 to 4 times a day for the next 2 days. Continue using the ice to relieve of pain and swelling as needed.  · You may take acetaminophen or ibuprofen to control pain, unless another medicine was prescribed. If prednisone was prescribed, dont take anti-inflammatory medicines. If you have chronic liver or kidney disease or ever had a stomach ulcer or gastrointestinal bleeding, talk with your doctor before using these medicines.  · After your symptoms ease, you may get physical therapy or start a home exercise program. This can strengthen your shoulder muscles and help your range of motion. Talk with your doctor about what is best for your condition.  Follow-up care  Follow up with your healthcare provider, or as advised.  When to seek medical advice  Call your healthcare provider right away if any of these occur:  · Shoulder pain that gets worse and wakes you up at night  · Your shoulder or arm swells  · Numbness, tingling, or pain that travels down the arm to the  hand  · Loss of shoulder strength  · Fever or chills  Date Last Reviewed: 8/1/2016  © 7463-0399 [employer]. [address] [place] [address], [place] [address]. All rights reserved. This information is not intended as a substitute for professional medical care. Always follow your healthcare professional's instructions.

## 2019-06-03 ENCOUNTER — PATIENT MESSAGE (OUTPATIENT)
Dept: FAMILY MEDICINE | Facility: CLINIC | Age: 39
End: 2019-06-03

## 2019-06-03 DIAGNOSIS — F41.9 ANXIETY: Primary | ICD-10-CM

## 2019-06-05 RX ORDER — DULOXETIN HYDROCHLORIDE 30 MG/1
30 CAPSULE, DELAYED RELEASE ORAL DAILY
Qty: 30 CAPSULE | Refills: 11 | Status: SHIPPED | OUTPATIENT
Start: 2019-06-05 | End: 2019-06-06

## 2019-06-06 ENCOUNTER — OFFICE VISIT (OUTPATIENT)
Dept: FAMILY MEDICINE | Facility: CLINIC | Age: 39
End: 2019-06-06
Payer: COMMERCIAL

## 2019-06-06 VITALS — HEIGHT: 65 IN | BODY MASS INDEX: 27.18 KG/M2 | WEIGHT: 163.13 LBS

## 2019-06-06 DIAGNOSIS — R11.0 NAUSEA: ICD-10-CM

## 2019-06-06 DIAGNOSIS — F32.81 PMDD (PREMENSTRUAL DYSPHORIC DISORDER): ICD-10-CM

## 2019-06-06 DIAGNOSIS — F39 MOOD DISORDER: Primary | ICD-10-CM

## 2019-06-06 PROCEDURE — 99999 PR PBB SHADOW E&M-EST. PATIENT-LVL II: CPT | Mod: PBBFAC,,, | Performed by: FAMILY MEDICINE

## 2019-06-06 PROCEDURE — 3008F BODY MASS INDEX DOCD: CPT | Mod: CPTII,S$GLB,, | Performed by: FAMILY MEDICINE

## 2019-06-06 PROCEDURE — 3008F PR BODY MASS INDEX (BMI) DOCUMENTED: ICD-10-PCS | Mod: CPTII,S$GLB,, | Performed by: FAMILY MEDICINE

## 2019-06-06 PROCEDURE — 99214 PR OFFICE/OUTPT VISIT, EST, LEVL IV, 30-39 MIN: ICD-10-PCS | Mod: S$GLB,,, | Performed by: FAMILY MEDICINE

## 2019-06-06 PROCEDURE — 99999 PR PBB SHADOW E&M-EST. PATIENT-LVL II: ICD-10-PCS | Mod: PBBFAC,,, | Performed by: FAMILY MEDICINE

## 2019-06-06 PROCEDURE — 99214 OFFICE O/P EST MOD 30 MIN: CPT | Mod: S$GLB,,, | Performed by: FAMILY MEDICINE

## 2019-06-06 RX ORDER — LAMOTRIGINE 25 MG/1
50 TABLET ORAL 2 TIMES DAILY
Qty: 120 TABLET | Refills: 5 | Status: SHIPPED | OUTPATIENT
Start: 2019-06-06 | End: 2019-06-21 | Stop reason: SINTOL

## 2019-06-06 RX ORDER — ALPRAZOLAM 1 MG/1
1 TABLET ORAL NIGHTLY
Qty: 20 TABLET | Refills: 4 | Status: SHIPPED | OUTPATIENT
Start: 2019-06-06 | End: 2019-10-22 | Stop reason: SDUPTHER

## 2019-06-09 NOTE — PROGRESS NOTES
Chief Complaint   Patient presents with    Medication Refill       SUBJECTIVE:  Roslyn Brewster is a 38 y.o. female here for new problem of mood disorder and she is doing better with sexual function off of the paxil but she is have more manic thoughts she states.  She wants to get a new regimen..  Currently has co-morbidities including per problem list.      Past Medical History:   Diagnosis Date    Acute encephalopathy 12/24/12    secondary to drug overdose    ARF (acute renal failure) 12/24/12    secondary to drug overdose    History of cardiac arrest 12/24/12    secondary to drug overdose    Hypertension     MRSA (methicillin resistant Staphylococcus aureus) 12/24/12    Nephritis     PMDD (premenstrual dysphoric disorder)     Polysubstance abuse 12/24/12    Systolic CHF, acute 12/24/12    secondary to drug overdose    UTI (urinary tract infection)      Past Surgical History:   Procedure Laterality Date    CHOLECYSTECTOMY      CYSTOSCOPY, RETROGRADE, URETEROSCOPY, STENT PLACEMENT Left 1/8/2018    [facility]    PLACEMENT OF DORSEY N/A 1/8/2018    [facility]    REMOVAL-STENT Left 1/15/2018    [facility]    TUBAL LIGATION      urinary stents       Social History     Socioeconomic History    Marital status: Single     Spouse name: Not on file    Number of children: Not on file    Years of education: Not on file    Highest education level: Not on file   Occupational History    Not on file   Social Needs    Financial resource strain: Not on file    Food insecurity:     Worry: Not on file     Inability: Not on file    Transportation needs:     Medical: Not on file     Non-medical: Not on file   Tobacco Use    Smoking status: Current Every Day Smoker     Packs/day: 0.50     Years: 20.00     Pack years: 10.00     Types: Cigarettes    Smokeless tobacco: Never Used   Substance and Sexual  "Activity    Alcohol use: No    Drug use: No     Types: Cocaine, Methamphetamines     Comment: PCP, patient states she had not done drugs since 2012    Sexual activity: Yes     Partners: Male   Lifestyle    Physical activity:     Days per week: Not on file     Minutes per session: Not on file    Stress: Not on file   Relationships    Social connections:     Talks on phone: Not on file     Gets together: Not on file     Attends Shinto service: Not on file     Active member of club or organization: Not on file     Attends meetings of clubs or organizations: Not on file     Relationship status: Not on file   Other Topics Concern    Not on file   Social History Narrative    Not on file     Family History   Problem Relation Age of Onset    Kidney disease Mother     Cancer Maternal Grandmother         throat    Cancer Maternal Grandfather         prostate    Kidney disease Daughter     Breast cancer Neg Hx     Colon cancer Neg Hx     Ovarian cancer Neg Hx      Current Outpatient Medications on File Prior to Visit   Medication Sig Dispense Refill    nicotine, polacrilex, (NICORETTE) 2 mg Gum 1-2 per hour as needed in place of a cigarette, max 20 per day. - Oral 190 each 0     No current facility-administered medications on file prior to visit.      Review of patient's allergies indicates:   Allergen Reactions    Strawberry Anaphylaxis and Hives    Morphine Other (See Comments)     Burns stomach    Toradol [ketorolac]     Tramadol      Abdominal pain         ROS  Answers for HPI/ROS submitted by the patient on 6/5/2019   activity change: No  unexpected weight change: No  rhinorrhea: No  trouble swallowing: No  visual disturbance: No  chest tightness: No  polyuria: No  difficulty urinating: No  menstrual problem: No  joint swelling: No  arthralgias: No  confusion: No  dysphoric mood: No    OBJECTIVE:  Ht 5' 5" (1.651 m)   Wt 74 kg (163 lb 2.3 oz)   BMI 27.15 kg/m²     Wt Readings from Last 3 " Encounters:   06/06/19 74 kg (163 lb 2.3 oz)   05/13/19 72.7 kg (160 lb 4.4 oz)   03/17/19 67.6 kg (149 lb)     BP Readings from Last 3 Encounters:   05/13/19 118/80   03/20/19 110/70   03/17/19 111/65       She appears well, in no apparent distress.  Alert and oriented times three, pleasant and cooperative. Vital signs are as documented in vital signs section.  S1 and S2 normal, no murmurs, clicks, gallops or rubs. Regular rate and rhythm. Chest is clear; no wheezes or rales. No edema or JVD.      Review of old Records:  Reviewed per epic and [facility]    Review of old labs:  Reviewed prior labs    Review of old imaging:  Reviewed per epic    ASSESSMENT:  Problem List Items Addressed This Visit     PMDD (premenstrual dysphoric disorder)    Relevant Medications    ALPRAZolam (XANAX) 1 MG tablet    Mood disorder - Primary    Relevant Medications    lamoTRIgine (LAMICTAL) 25 MG tablet    cariprazine (VRAYLAR) 6 mg Cap      Other Visit Diagnoses     Nausea        Relevant Medications    ALPRAZolam (XANAX) 1 MG tablet          ICD-10-CM ICD-9-CM   1. Mood disorder F39 296.90   2. Nausea R11.0 787.02   3. PMDD (premenstrual dysphoric disorder) F32.81 625.4         PLAN:  1. Nausea  Passed since her kidney issues, still rare issues with it.  - ALPRAZolam (XANAX) 1 MG tablet; Take 1 tablet (1 mg total) by mouth every evening.  Dispense: 20 tablet; Refill: 4    2. PMDD (premenstrual dysphoric disorder)  The current medical regimen is effective;  continue present plan and medications.  Use more PRN  - ALPRAZolam (XANAX) 1 MG tablet; Take 1 tablet (1 mg total) by mouth every evening.  Dispense: 20 tablet; Refill: 4    3. Mood disorder  Start with vraylar as adjuct, hard to get her with a specialist.  - lamoTRIgine (LAMICTAL) 25 MG tablet; Take 2 tablets (50 mg total) by mouth 2 (two) times daily.  Dispense: 120 tablet; Refill: 5  - cariprazine (VRAYLAR) 6 mg Cap; Take 6 mg by mouth once daily.  Dispense: 30 capsule; Refill:  2      Medication List with Changes/Refills   New Medications    CARIPRAZINE (VRAYLAR) 6 MG CAP    Take 6 mg by mouth once daily.   Current Medications    NICOTINE, POLACRILEX, (NICORETTE) 2 MG GUM    1-2 per hour as needed in place of a cigarette, max 20 per day. - Oral   Changed and/or Refilled Medications    Modified Medication Previous Medication    ALPRAZOLAM (XANAX) 1 MG TABLET ALPRAZolam (XANAX) 1 MG tablet       Take 1 tablet (1 mg total) by mouth every evening.    Take 1 tablet (1 mg total) by mouth every evening.    LAMOTRIGINE (LAMICTAL) 25 MG TABLET lamoTRIgine (LAMICTAL) 100 MG tablet       Take 2 tablets (50 mg total) by mouth 2 (two) times daily.    Take 50 mg by mouth 2 (two) times daily.   Discontinued Medications    BACLOFEN (LIORESAL) 10 MG TABLET    Take 1 tablet (10 mg total) by mouth nightly as needed.    DULOXETINE (CYMBALTA) 30 MG CAPSULE    Take 1 capsule (30 mg total) by mouth once daily.    FLUOXETINE 20 MG CAPSULE    Take 1 capsule (20 mg total) by mouth once daily.    INDOMETHACIN (INDOCIN) 50 MG CAPSULE    Take 1 capsule (50 mg total) by mouth 2 (two) times daily with meals.    METHYLPREDNISOLONE (MEDROL DOSEPACK) 4 MG TABLET    Take as directed       No follow-ups on file.

## 2019-06-10 ENCOUNTER — PATIENT MESSAGE (OUTPATIENT)
Dept: FAMILY MEDICINE | Facility: CLINIC | Age: 39
End: 2019-06-10

## 2019-06-10 DIAGNOSIS — F39 MOOD DISORDER: ICD-10-CM

## 2019-06-10 DIAGNOSIS — F32.81 PMDD (PREMENSTRUAL DYSPHORIC DISORDER): Primary | ICD-10-CM

## 2019-06-21 RX ORDER — FLUOXETINE HYDROCHLORIDE 90 MG/1
90 CAPSULE, DELAYED RELEASE PELLETS ORAL
Qty: 4 CAPSULE | Refills: 11 | Status: SHIPPED | OUTPATIENT
Start: 2019-06-21 | End: 2019-07-26

## 2019-06-24 ENCOUNTER — PATIENT MESSAGE (OUTPATIENT)
Dept: FAMILY MEDICINE | Facility: CLINIC | Age: 39
End: 2019-06-24

## 2019-07-03 DIAGNOSIS — Z12.39 BREAST CANCER SCREENING: ICD-10-CM

## 2019-07-22 ENCOUNTER — PATIENT MESSAGE (OUTPATIENT)
Dept: FAMILY MEDICINE | Facility: CLINIC | Age: 39
End: 2019-07-22

## 2019-07-22 DIAGNOSIS — F39 MOOD DISORDER: ICD-10-CM

## 2019-07-22 DIAGNOSIS — F32.81 PMDD (PREMENSTRUAL DYSPHORIC DISORDER): ICD-10-CM

## 2019-07-26 ENCOUNTER — PATIENT MESSAGE (OUTPATIENT)
Dept: FAMILY MEDICINE | Facility: CLINIC | Age: 39
End: 2019-07-26

## 2019-07-26 RX ORDER — FLUOXETINE HYDROCHLORIDE 90 MG/1
90 CAPSULE, DELAYED RELEASE PELLETS ORAL
Qty: 4 CAPSULE | Refills: 11 | Status: SHIPPED | OUTPATIENT
Start: 2019-07-26 | End: 2019-08-21 | Stop reason: SDUPTHER

## 2019-07-26 RX ORDER — FLUOXETINE 10 MG/1
10 CAPSULE ORAL
Qty: 12 CAPSULE | Refills: 11 | Status: SHIPPED | OUTPATIENT
Start: 2019-07-26 | End: 2019-08-21

## 2019-08-02 ENCOUNTER — OFFICE VISIT (OUTPATIENT)
Dept: FAMILY MEDICINE | Facility: CLINIC | Age: 39
End: 2019-08-02
Payer: COMMERCIAL

## 2019-08-02 VITALS
OXYGEN SATURATION: 98 % | HEIGHT: 62 IN | WEIGHT: 170.63 LBS | DIASTOLIC BLOOD PRESSURE: 70 MMHG | SYSTOLIC BLOOD PRESSURE: 110 MMHG | TEMPERATURE: 98 F | BODY MASS INDEX: 31.4 KG/M2 | HEART RATE: 93 BPM | RESPIRATION RATE: 16 BRPM

## 2019-08-02 DIAGNOSIS — N30.01 ACUTE CYSTITIS WITH HEMATURIA: ICD-10-CM

## 2019-08-02 DIAGNOSIS — R82.90 ABNORMAL URINE ODOR: Primary | ICD-10-CM

## 2019-08-02 PROBLEM — R05.3 CHRONIC COUGH: Status: RESOLVED | Noted: 2019-03-19 | Resolved: 2019-08-02

## 2019-08-02 LAB
BACTERIA #/AREA URNS HPF: ABNORMAL /HPF
BILIRUB UR QL STRIP: NEGATIVE
CLARITY UR: ABNORMAL
COLOR UR: YELLOW
GLUCOSE UR QL STRIP: NEGATIVE
HGB UR QL STRIP: ABNORMAL
KETONES UR QL STRIP: NEGATIVE
LEUKOCYTE ESTERASE UR QL STRIP: ABNORMAL
MICROSCOPIC COMMENT: ABNORMAL
NITRITE UR QL STRIP: NEGATIVE
PH UR STRIP: 5 [PH] (ref 5–8)
PROT UR QL STRIP: NEGATIVE
RBC #/AREA URNS HPF: 3 /HPF (ref 0–4)
SP GR UR STRIP: 1.01 (ref 1–1.03)
SQUAMOUS #/AREA URNS HPF: 8 /HPF
URN SPEC COLLECT METH UR: ABNORMAL
UROBILINOGEN UR STRIP-ACNC: NEGATIVE EU/DL
WBC #/AREA URNS HPF: 7 /HPF (ref 0–5)

## 2019-08-02 PROCEDURE — 99999 PR PBB SHADOW E&M-EST. PATIENT-LVL III: CPT | Mod: PBBFAC,,, | Performed by: PHYSICIAN ASSISTANT

## 2019-08-02 PROCEDURE — 3074F PR MOST RECENT SYSTOLIC BLOOD PRESSURE < 130 MM HG: ICD-10-PCS | Mod: CPTII,S$GLB,, | Performed by: PHYSICIAN ASSISTANT

## 2019-08-02 PROCEDURE — 3078F PR MOST RECENT DIASTOLIC BLOOD PRESSURE < 80 MM HG: ICD-10-PCS | Mod: CPTII,S$GLB,, | Performed by: PHYSICIAN ASSISTANT

## 2019-08-02 PROCEDURE — 3074F SYST BP LT 130 MM HG: CPT | Mod: CPTII,S$GLB,, | Performed by: PHYSICIAN ASSISTANT

## 2019-08-02 PROCEDURE — 99999 PR PBB SHADOW E&M-EST. PATIENT-LVL III: ICD-10-PCS | Mod: PBBFAC,,, | Performed by: PHYSICIAN ASSISTANT

## 2019-08-02 PROCEDURE — 3078F DIAST BP <80 MM HG: CPT | Mod: CPTII,S$GLB,, | Performed by: PHYSICIAN ASSISTANT

## 2019-08-02 PROCEDURE — 87086 URINE CULTURE/COLONY COUNT: CPT

## 2019-08-02 PROCEDURE — 81000 URINALYSIS NONAUTO W/SCOPE: CPT

## 2019-08-02 PROCEDURE — 99214 PR OFFICE/OUTPT VISIT, EST, LEVL IV, 30-39 MIN: ICD-10-PCS | Mod: S$GLB,,, | Performed by: PHYSICIAN ASSISTANT

## 2019-08-02 PROCEDURE — 99214 OFFICE O/P EST MOD 30 MIN: CPT | Mod: S$GLB,,, | Performed by: PHYSICIAN ASSISTANT

## 2019-08-02 PROCEDURE — 3008F PR BODY MASS INDEX (BMI) DOCUMENTED: ICD-10-PCS | Mod: CPTII,S$GLB,, | Performed by: PHYSICIAN ASSISTANT

## 2019-08-02 PROCEDURE — 3008F BODY MASS INDEX DOCD: CPT | Mod: CPTII,S$GLB,, | Performed by: PHYSICIAN ASSISTANT

## 2019-08-02 RX ORDER — PHENAZOPYRIDINE HYDROCHLORIDE 100 MG/1
100 TABLET, FILM COATED ORAL 3 TIMES DAILY PRN
Qty: 9 TABLET | Refills: 0 | Status: SHIPPED | OUTPATIENT
Start: 2019-08-02 | End: 2019-08-12

## 2019-08-02 RX ORDER — NITROFURANTOIN 25; 75 MG/1; MG/1
100 CAPSULE ORAL 2 TIMES DAILY
Qty: 6 CAPSULE | Refills: 0 | Status: SHIPPED | OUTPATIENT
Start: 2019-08-02 | End: 2019-08-05

## 2019-08-02 NOTE — PROGRESS NOTES
Subjective:       Patient ID: Roslyn Brewster is a 39 y.o. female with multiple medical diagnoses as listed in the medical history and problem list that presents for Urinary Tract Infection  .    Chief Complaint: Urinary Tract Infection      Urinary Tract Infection    This is a new problem. The current episode started in the past 7 days. The problem has been gradually worsening. The maximum temperature recorded prior to her arrival was 101 - 101.9 F. The fever has been present for less than 1 day. She is sexually active. There is a history of pyelonephritis. Associated symptoms include chills, frequency and urgency. Pertinent negatives include no discharge, flank pain, hematuria, nausea, sweats, vomiting or bubble bath use. Associated symptoms comments: Urinary odor . She has tried nothing for the symptoms. Her past medical history is significant for recurrent UTIs.     Review of Systems   Constitutional: Positive for chills and fever.        Night sweats    Gastrointestinal: Negative for abdominal pain, diarrhea, nausea and vomiting.   Genitourinary: Positive for frequency and urgency. Negative for flank pain and hematuria.   Musculoskeletal: Positive for back pain (low).         PAST MEDICAL HISTORY:  Past Medical History:   Diagnosis Date    Acute encephalopathy 12/24/12    secondary to drug overdose    ARF (acute renal failure) 12/24/12    secondary to drug overdose    History of cardiac arrest 12/24/12    secondary to drug overdose    Hypertension     MRSA (methicillin resistant Staphylococcus aureus) 12/24/12    Nephritis     PMDD (premenstrual dysphoric disorder)     Polysubstance abuse 12/24/12    Systolic CHF, acute 12/24/12    secondary to drug overdose    UTI (urinary tract infection)        SOCIAL HISTORY:  Social History     Socioeconomic History    Marital status: Single     Spouse name: Not on file    Number of children: Not on file    Years of education: Not on file    Highest  education level: Not on file   Occupational History    Not on file   Social Needs    Financial resource strain: Not on file    Food insecurity:     Worry: Not on file     Inability: Not on file    Transportation needs:     Medical: Not on file     Non-medical: Not on file   Tobacco Use    Smoking status: Current Every Day Smoker     Packs/day: 0.50     Years: 20.00     Pack years: 10.00     Types: Cigarettes    Smokeless tobacco: Never Used   Substance and Sexual Activity    Alcohol use: No    Drug use: No     Types: Cocaine, Methamphetamines     Comment: PCP, patient states she had not done drugs since 2012    Sexual activity: Yes     Partners: Male   Lifestyle    Physical activity:     Days per week: Not on file     Minutes per session: Not on file    Stress: Not on file   Relationships    Social connections:     Talks on phone: Not on file     Gets together: Not on file     Attends Confucianist service: Not on file     Active member of club or organization: Not on file     Attends meetings of clubs or organizations: Not on file     Relationship status: Not on file   Other Topics Concern    Not on file   Social History Narrative    Not on file       ALLERGIES AND MEDICATIONS: updated and reviewed.  Review of patient's allergies indicates:   Allergen Reactions    Strawberry Anaphylaxis and Hives    Morphine Other (See Comments)     Burns stomach    Toradol [ketorolac]     Tramadol      Abdominal pain     Current Outpatient Medications   Medication Sig Dispense Refill    ALPRAZolam (XANAX) 1 MG tablet Take 1 tablet (1 mg total) by mouth every evening. 20 tablet 4    FLUoxetine (PROZAC WEEKLY) 90 mg DR capsule Take 1 capsule (90 mg total) by mouth every 7 days. 4 capsule 11    FLUoxetine 10 MG capsule Take 1 capsule (10 mg total) by mouth 3 (three) times a week. 12 capsule 11    nitrofurantoin, macrocrystal-monohydrate, (MACROBID) 100 MG capsule Take 1 capsule (100 mg total) by mouth 2 (two) times  "daily. for 3 days 6 capsule 0    phenazopyridine (PYRIDIUM) 100 MG tablet Take 1 tablet (100 mg total) by mouth 3 (three) times daily as needed for Pain. 9 tablet 0     No current facility-administered medications for this visit.          Objective:   /70   Pulse 93   Temp 98 °F (36.7 °C) (Oral)   Resp 16   Ht 5' 2" (1.575 m)   Wt 77.4 kg (170 lb 10.2 oz)   LMP 07/21/2019   SpO2 98%   BMI 31.21 kg/m²      Physical Exam   Constitutional: She is oriented to person, place, and time. She appears well-developed and well-nourished. No distress.   Cardiovascular: Normal rate and normal heart sounds.   Pulmonary/Chest: Effort normal and breath sounds normal.   Abdominal: Soft. Normal appearance. There is no tenderness. There is no CVA tenderness.   Musculoskeletal:        Lumbar back: She exhibits tenderness.   Neurological: She is alert and oriented to person, place, and time.   Skin: Skin is warm and dry. No rash noted.   Psychiatric: She has a normal mood and affect. Her behavior is normal.           Assessment:       1. Abnormal urine odor    2. Acute cystitis with hematuria        Plan:       Abnormal urine odor  -     POCT urinalysis, dipstick or tablet reag    Acute cystitis with hematuria  -     Urine culture; Future; Expected date: 08/02/2019  -     Urinalysis; Future; Expected date: 08/03/2019  -     nitrofurantoin, macrocrystal-monohydrate, (MACROBID) 100 MG capsule; Take 1 capsule (100 mg total) by mouth 2 (two) times daily. for 3 days  Dispense: 6 capsule; Refill: 0  -     phenazopyridine (PYRIDIUM) 100 MG tablet; Take 1 tablet (100 mg total) by mouth 3 (three) times daily as needed for Pain.  Dispense: 9 tablet; Refill: 0    -will contact with results           No follow-ups on file.  "

## 2019-08-04 LAB — BACTERIA UR CULT: NORMAL

## 2019-08-06 ENCOUNTER — HOSPITAL ENCOUNTER (OUTPATIENT)
Dept: RADIOLOGY | Facility: HOSPITAL | Age: 39
Discharge: HOME | End: 2019-08-06
Attending: INTERNAL MEDICINE
Payer: COMMERCIAL

## 2019-08-06 DIAGNOSIS — Z12.39 BREAST CANCER SCREENING: ICD-10-CM

## 2019-08-06 PROCEDURE — 77063 BREAST TOMOSYNTHESIS BI: CPT | Mod: 26,,, | Performed by: RADIOLOGY

## 2019-08-06 PROCEDURE — 77063 MAMMO DIGITAL SCREENING BILAT WITH TOMOSYNTHESIS_CAD: ICD-10-PCS | Mod: 26,,, | Performed by: RADIOLOGY

## 2019-08-06 PROCEDURE — 77067 MAMMO DIGITAL SCREENING BILAT WITH TOMOSYNTHESIS_CAD: ICD-10-PCS | Mod: 26,,, | Performed by: RADIOLOGY

## 2019-08-06 PROCEDURE — 77067 SCR MAMMO BI INCL CAD: CPT | Mod: 26,,, | Performed by: RADIOLOGY

## 2019-08-06 PROCEDURE — 77067 SCR MAMMO BI INCL CAD: CPT | Mod: TC

## 2019-08-07 ENCOUNTER — HOSPITAL ENCOUNTER (EMERGENCY)
Facility: HOSPITAL | Age: 39
Discharge: HOME | End: 2019-08-07
Attending: EMERGENCY MEDICINE
Payer: COMMERCIAL

## 2019-08-07 VITALS
DIASTOLIC BLOOD PRESSURE: 57 MMHG | HEART RATE: 83 BPM | OXYGEN SATURATION: 96 % | HEIGHT: 62 IN | RESPIRATION RATE: 17 BRPM | BODY MASS INDEX: 30 KG/M2 | SYSTOLIC BLOOD PRESSURE: 103 MMHG | WEIGHT: 163 LBS | TEMPERATURE: 98 F

## 2019-08-07 DIAGNOSIS — N20.0 LEFT NEPHROLITHIASIS: Primary | ICD-10-CM

## 2019-08-07 DIAGNOSIS — R10.9 ACUTE LEFT FLANK PAIN: ICD-10-CM

## 2019-08-07 LAB
ALBUMIN SERPL BCP-MCNC: 4.3 G/DL (ref 3.5–5.2)
ALP SERPL-CCNC: 63 U/L (ref 55–135)
ALT SERPL W/O P-5'-P-CCNC: 69 U/L (ref 10–44)
ANION GAP SERPL CALC-SCNC: 11 MMOL/L (ref 8–16)
AST SERPL-CCNC: 31 U/L (ref 10–40)
B-HCG UR QL: NEGATIVE
BACTERIA #/AREA URNS HPF: ABNORMAL /HPF
BASOPHILS # BLD AUTO: 0.02 K/UL (ref 0–0.2)
BASOPHILS NFR BLD: 0.2 % (ref 0–1.9)
BILIRUB SERPL-MCNC: 0.3 MG/DL (ref 0.1–1)
BILIRUB UR QL STRIP: NEGATIVE
BUN SERPL-MCNC: 11 MG/DL (ref 6–20)
CALCIUM SERPL-MCNC: 9.5 MG/DL (ref 8.7–10.5)
CHLORIDE SERPL-SCNC: 103 MMOL/L (ref 95–110)
CLARITY UR: ABNORMAL
CO2 SERPL-SCNC: 26 MMOL/L (ref 23–29)
COLOR UR: YELLOW
CREAT SERPL-MCNC: 0.8 MG/DL (ref 0.5–1.4)
CTP QC/QA: YES
DIFFERENTIAL METHOD: ABNORMAL
EOSINOPHIL # BLD AUTO: 0.2 K/UL (ref 0–0.5)
EOSINOPHIL NFR BLD: 1.8 % (ref 0–8)
ERYTHROCYTE [DISTWIDTH] IN BLOOD BY AUTOMATED COUNT: 12.2 % (ref 11.5–14.5)
EST. GFR  (AFRICAN AMERICAN): >60 ML/MIN/1.73 M^2
EST. GFR  (NON AFRICAN AMERICAN): >60 ML/MIN/1.73 M^2
GLUCOSE SERPL-MCNC: 108 MG/DL (ref 70–110)
GLUCOSE UR QL STRIP: NEGATIVE
HCT VFR BLD AUTO: 44.1 % (ref 37–48.5)
HGB BLD-MCNC: 15.1 G/DL (ref 12–16)
HGB UR QL STRIP: ABNORMAL
KETONES UR QL STRIP: NEGATIVE
LEUKOCYTE ESTERASE UR QL STRIP: ABNORMAL
LYMPHOCYTES # BLD AUTO: 2.2 K/UL (ref 1–4.8)
LYMPHOCYTES NFR BLD: 24.7 % (ref 18–48)
MCH RBC QN AUTO: 31.1 PG (ref 27–31)
MCHC RBC AUTO-ENTMCNC: 34.2 G/DL (ref 32–36)
MCV RBC AUTO: 91 FL (ref 82–98)
MICROSCOPIC COMMENT: ABNORMAL
MONOCYTES # BLD AUTO: 0.7 K/UL (ref 0.3–1)
MONOCYTES NFR BLD: 7.3 % (ref 4–15)
NEUTROPHILS # BLD AUTO: 6 K/UL (ref 1.8–7.7)
NEUTROPHILS NFR BLD: 66.7 % (ref 38–73)
NITRITE UR QL STRIP: NEGATIVE
PH UR STRIP: 6 [PH] (ref 5–8)
PLATELET # BLD AUTO: 253 K/UL (ref 150–350)
PMV BLD AUTO: 10.2 FL (ref 9.2–12.9)
POTASSIUM SERPL-SCNC: 3.5 MMOL/L (ref 3.5–5.1)
PROT SERPL-MCNC: 7.3 G/DL (ref 6–8.4)
PROT UR QL STRIP: NEGATIVE
RBC # BLD AUTO: 4.86 M/UL (ref 4–5.4)
RBC #/AREA URNS HPF: 5 /HPF (ref 0–4)
SODIUM SERPL-SCNC: 140 MMOL/L (ref 136–145)
SP GR UR STRIP: 1.02 (ref 1–1.03)
SQUAMOUS #/AREA URNS HPF: 10 /HPF
URN SPEC COLLECT METH UR: ABNORMAL
UROBILINOGEN UR STRIP-ACNC: NEGATIVE EU/DL
WBC # BLD AUTO: 9.04 K/UL (ref 3.9–12.7)
WBC #/AREA URNS HPF: 3 /HPF (ref 0–5)

## 2019-08-07 PROCEDURE — 96374 THER/PROPH/DIAG INJ IV PUSH: CPT

## 2019-08-07 PROCEDURE — 25000003 PHARM REV CODE 250: Performed by: PHYSICIAN ASSISTANT

## 2019-08-07 PROCEDURE — 99284 EMERGENCY DEPT VISIT MOD MDM: CPT | Mod: 25

## 2019-08-07 PROCEDURE — 25000003 PHARM REV CODE 250: Performed by: NURSE PRACTITIONER

## 2019-08-07 PROCEDURE — 63600175 PHARM REV CODE 636 W HCPCS: Performed by: PHYSICIAN ASSISTANT

## 2019-08-07 PROCEDURE — 63600175 PHARM REV CODE 636 W HCPCS: Performed by: NURSE PRACTITIONER

## 2019-08-07 PROCEDURE — 96361 HYDRATE IV INFUSION ADD-ON: CPT

## 2019-08-07 PROCEDURE — 81025 URINE PREGNANCY TEST: CPT | Performed by: PHYSICIAN ASSISTANT

## 2019-08-07 PROCEDURE — 81000 URINALYSIS NONAUTO W/SCOPE: CPT

## 2019-08-07 PROCEDURE — 96376 TX/PRO/DX INJ SAME DRUG ADON: CPT

## 2019-08-07 PROCEDURE — 96375 TX/PRO/DX INJ NEW DRUG ADDON: CPT

## 2019-08-07 PROCEDURE — 85025 COMPLETE CBC W/AUTO DIFF WBC: CPT

## 2019-08-07 PROCEDURE — 80053 COMPREHEN METABOLIC PANEL: CPT

## 2019-08-07 RX ORDER — PROMETHAZINE HYDROCHLORIDE 25 MG/1
25 TABLET ORAL EVERY 6 HOURS PRN
Qty: 15 TABLET | Refills: 0 | Status: SHIPPED | OUTPATIENT
Start: 2019-08-07 | End: 2019-09-18

## 2019-08-07 RX ORDER — OXYCODONE AND ACETAMINOPHEN 5; 325 MG/1; MG/1
1 TABLET ORAL EVERY 6 HOURS PRN
Qty: 12 TABLET | Refills: 0 | Status: SHIPPED | OUTPATIENT
Start: 2019-08-07 | End: 2019-08-10

## 2019-08-07 RX ORDER — ACETAMINOPHEN 325 MG/1
650 TABLET ORAL
Status: COMPLETED | OUTPATIENT
Start: 2019-08-07 | End: 2019-08-07

## 2019-08-07 RX ORDER — ONDANSETRON 2 MG/ML
8 INJECTION INTRAMUSCULAR; INTRAVENOUS
Status: COMPLETED | OUTPATIENT
Start: 2019-08-07 | End: 2019-08-07

## 2019-08-07 RX ORDER — HYDROMORPHONE HYDROCHLORIDE 2 MG/ML
1 INJECTION, SOLUTION INTRAMUSCULAR; INTRAVENOUS; SUBCUTANEOUS
Status: COMPLETED | OUTPATIENT
Start: 2019-08-07 | End: 2019-08-07

## 2019-08-07 RX ORDER — TAMSULOSIN HYDROCHLORIDE 0.4 MG/1
0.4 CAPSULE ORAL DAILY
Qty: 10 CAPSULE | Refills: 0 | Status: SHIPPED | OUTPATIENT
Start: 2019-08-07 | End: 2019-09-01 | Stop reason: SDUPTHER

## 2019-08-07 RX ORDER — IBUPROFEN 600 MG/1
600 TABLET ORAL
Status: COMPLETED | OUTPATIENT
Start: 2019-08-07 | End: 2019-08-07

## 2019-08-07 RX ORDER — NITROFURANTOIN (MACROCRYSTALS) 100 MG/1
100 CAPSULE ORAL EVERY 12 HOURS
COMMUNITY
End: 2019-09-18

## 2019-08-07 RX ADMIN — ONDANSETRON 8 MG: 2 INJECTION INTRAMUSCULAR; INTRAVENOUS at 07:08

## 2019-08-07 RX ADMIN — IBUPROFEN 600 MG: 600 TABLET, FILM COATED ORAL at 09:08

## 2019-08-07 RX ADMIN — ACETAMINOPHEN 650 MG: 325 TABLET, FILM COATED ORAL at 07:08

## 2019-08-07 RX ADMIN — HYDROMORPHONE HYDROCHLORIDE 1 MG: 2 INJECTION, SOLUTION INTRAMUSCULAR; INTRAVENOUS; SUBCUTANEOUS at 07:08

## 2019-08-07 RX ADMIN — SODIUM CHLORIDE 1000 ML: 0.9 INJECTION, SOLUTION INTRAVENOUS at 08:08

## 2019-08-07 RX ADMIN — HYDROMORPHONE HYDROCHLORIDE 1 MG: 2 INJECTION, SOLUTION INTRAMUSCULAR; INTRAVENOUS; SUBCUTANEOUS at 09:08

## 2019-08-08 NOTE — DISCHARGE INSTRUCTIONS
Please stay well hydrated and get plenty of rest.    Take ibuprofen for pain and inflammation, 600 mg every 6 hr.  Take oxycodone for breakthrough pain, as needed.  Please do not take oxycodone while working, drinking alcohol, driving/operating heavy machinery, swimming. It may cause drowsiness, impair judgment, and reduce physical capabilities.    You can take Phenergan to control nausea and vomiting, as needed.  Phenergan may also cause drowsiness, so please use with caution.    Take Flomax once daily until your symptoms have resolved.    Follow-up with Urology as soon as possible for further evaluation and management of your kidney stones.    Return to the emergency room for any new or worsening symptoms or concerns.

## 2019-08-08 NOTE — ED PROVIDER NOTES
Encounter Date: 8/7/2019    SCRIBE #1 NOTE: I, Susana Rodriguez, am scribing for, and in the presence of,  Yisel Roth NP. I have scribed the following portions of the note - Other sections scribed: HPI, ROS.       History     Chief Complaint   Patient presents with    Back Pain     Pt reports back pain, vomiting, diarrhea. Pt states she has hx of UTI's. Last UTI was 6 months ago.      CC: Back Pain    HPI: The patient is a 39 y.o female with a PMHx of kidney stones and recurrent UTIs that presents to the ED with bilateral back pain that began 1 week ago. The patient reports that the pain is worse on the left side than the right of her back. She had an appointment at her PCP, Dr. Colvin's office, where the nurse practitioner reported a UA showing a possible UTI. Therefore, she was prescribed macrobid which did not alleviate Sx. The patient has a frequent Hx of UTIs prompting her to see a urologist who did not find a cause for her reoccurring UTI. The patients reports malaise that began a few days ago with intermittent nausea, emesis, diarrhea, and diaphoresis. The patient reported that she took tylenol for pain around 3 or 4 pm today with no relief. No alleviating factors.     The history is provided by the patient. No  was used.     Review of patient's allergies indicates:   Allergen Reactions    Strawberry Anaphylaxis and Hives    Morphine Other (See Comments)     Burns stomach    Toradol [ketorolac]     Tramadol      Abdominal pain     Past Medical History:   Diagnosis Date    Acute encephalopathy 12/24/12    secondary to drug overdose    ARF (acute renal failure) 12/24/12    secondary to drug overdose    History of cardiac arrest 12/24/12    secondary to drug overdose    Hypertension     MRSA (methicillin resistant Staphylococcus aureus) 12/24/12    Nephritis     PMDD (premenstrual dysphoric disorder)     Polysubstance abuse 12/24/12    Systolic CHF, acute 12/24/12    secondary to  drug overdose    UTI (urinary tract infection)      Past Surgical History:   Procedure Laterality Date    AUGMENTATION OF BREAST Bilateral 2001    CHOLECYSTECTOMY      CYSTOSCOPY, RETROGRADE, URETEROSCOPY, STENT PLACEMENT Left 1/8/2018    [facility]    PLACEMENT OF DORSEY N/A 1/8/2018    [facility] OR    REMOVAL-STENT Left 1/15/2018    [facility]    TUBAL LIGATION      urinary stents       Family History   Problem Relation Age of Onset    Kidney disease Mother     Ovarian cancer Mother     Cancer Maternal Grandmother         throat    Cancer Maternal Grandfather         prostate    Kidney disease Daughter     Breast cancer Neg Hx     Colon cancer Neg Hx      Social History     Tobacco Use    Smoking status: Current Every Day Smoker     Packs/day: 0.50     Years: 20.00     Pack years: 10.00     Types: Cigarettes    Smokeless tobacco: Never Used   Substance Use Topics    Alcohol use: No    Drug use: No     Types: Cocaine, Methamphetamines     Comment: PCP, patient states she had not done drugs since 2012     Review of Systems   Constitutional: Positive for diaphoresis. Negative for fever.        (+) malaise   HENT: Negative for sore throat.    Respiratory: Negative for shortness of breath.    Cardiovascular: Negative for chest pain.   Gastrointestinal: Positive for diarrhea and nausea.   Genitourinary: Negative for dysuria.   Musculoskeletal: Positive for back pain (bilateral back pain; worse on left side).   Skin: Negative for rash.   Neurological: Negative for weakness.   Hematological: Does not bruise/bleed easily.       Physical Exam     Initial Vitals [08/07/19 1909]   BP Pulse Resp Temp SpO2   (!) 115/59 101 18 (!) 100.8 °F (38.2 °C) 98 %      MAP       --         Physical Exam    Nursing note and vitals reviewed.  Constitutional: She appears well-developed and well-nourished. She is not  diaphoretic.   Eyes: Conjunctivae and EOM are normal. Pupils are equal, round, and reactive to light.   Neck: Normal range of motion. Neck supple.   Pulmonary/Chest: No respiratory distress.   Abdominal: Soft. Normal appearance. There is no rigidity, no rebound, no guarding, no tenderness at McBurney's point and negative Larry's sign.   No anterior abdominal tenderness. Bilateral lumbar back/flank tenderness (L>R)   Musculoskeletal: Normal range of motion.   Neurological: She is alert and oriented to person, place, and time. She has normal strength.   Skin: Skin is warm and dry.   Psychiatric: She has a normal mood and affect.         ED Course   Procedures  Labs Reviewed   URINALYSIS, REFLEX TO URINE CULTURE - Abnormal; Notable for the following components:       Result Value    Appearance, UA Hazy (*)     Occult Blood UA 1+ (*)     Leukocytes, UA 1+ (*)     All other components within normal limits    Narrative:     Preferred Collection Type->Urine, Clean Catch   URINALYSIS MICROSCOPIC - Abnormal; Notable for the following components:    RBC, UA 5 (*)     Bacteria Few (*)     All other components within normal limits    Narrative:     Preferred Collection Type->Urine, Clean Catch   CBC W/ AUTO DIFFERENTIAL - Abnormal; Notable for the following components:    Mean Corpuscular Hemoglobin 31.1 (*)     All other components within normal limits   COMPREHENSIVE METABOLIC PANEL - Abnormal; Notable for the following components:    ALT 69 (*)     All other components within normal limits   POCT URINE PREGNANCY          Imaging Results          US Retroperitoneal Complete (Final result)  Result time 08/07/19 21:36:07   Procedure changed from US Retroperitoneal Limited     Final result by Tommy Grier MD (08/07/19 21:36:07)                 Impression:      Elevated right renal resistive index as compared to the left although the right renal index is at the upper limit of normal.  There is a cyst of index appears  grossly stable as compared to the previous examination, without findings of hydronephrosis or obstruction.  Finding is nonspecific.    Left nonobstructive nephrolithiasis, no hydronephrosis.  Distal left ureteral calculus is not excluded noting no secondary findings to support the same.    Findings suggesting hepatic steatosis, correlation with LFTs advised.      Electronically signed by: Tommy Grier MD  Date:    08/07/2019  Time:    21:36             Narrative:    EXAMINATION:  US RETROPERITONEAL COMPLETE    CLINICAL HISTORY:  acute left flank pain; Unspecified abdominal pain    TECHNIQUE:  Ultrasound of the kidneys and urinary bladder was performed including color flow and Doppler evaluation of the kidneys.    COMPARISON:  Ultrasound 12/19/2018, CT 12/02/2018    FINDINGS:  Right kidney: The right kidney measures 11.1 cm. No cortical thinning. No loss of corticomedullary distinction. Resistive index measures 0.7.  No mass. No renal stone. No hydronephrosis.    Left kidney: The left kidney measures 11.8 cm. No cortical thinning. No loss of corticomedullary distinction. Resistive index measures 0.5.  No mass. There is nonobstructive nephrolithiasis, largest calculus within the lower pole measures up to 0.8 cm.  No hydronephrosis.    The urinary bladder is decompressed, limiting evaluation.    The hepatic parenchyma is echogenic suggesting steatosis.                                 Medical Decision Making:   History:   Old Medical Records: I decided to obtain old medical records.  Old Records Summarized: records from clinic visits and records from previous admission(s).  Initial Assessment:   Nontoxic, interactive.  Mild-to-moderate tenderness over bilateral flanks.  Differential Diagnosis:   UTI/pyelo, nephrolithiasis, hydronephrosis, renal insufficiency  Clinical Tests:   Lab Tests: Ordered and Reviewed  The following lab test(s) were unremarkable: CBC, CMP, Urinalysis and UPT       <> Summary of Lab: No  leukocytosis, left shift, renal sufficiency, transaminitis.  UPT is negative. Urine not convincing for infection.  Culture is pending.  Radiological Study: Ordered and Reviewed  ED Management:  This is an urgent evaluation of a 39-year-old female that presents to the emergency room complaining of bilateral flank pain for several days.  Patient reports associated nausea and vomiting when pain abruptly starts; symptoms are waxing and waning.  She has a history significant for recurring UTIs and kidney stones.  I decided to obtain old medical records; she was seen at her PCPs office on 08/02/2019 and treated for a presumptive urinary tract infection with Macrobid.  Her urine culture from that day showed no significant growth bacterium patient reports no improvement of her symptoms after taking the antibiotics.  She presented to the emergency room with a low-grade fever and a heart rate of 101.  She was otherwise nontoxic-appearing.  She was referred for labs and retroperitoneal ultrasound.  Labs are unremarkable. Her renal function was normal.  Urine not convincing for infection, but will send culture again.  Retroperitoneal ultrasound showed left nonobstructed nephrolithiasis with the largest stone measuring approximately 0.8 cm.  There is no hydronephrosis.  Symptoms are most consistent with renal colic.  I believe she is stable for discharge and outpatient management.  Will Rx Flomax, pain control, and antiemetics.  Encouraged hydration and rest.  To follow up with Urology as soon as possible for further evaluation and management of her symptoms.  Return precautions.  Case discussed with attending, , and she is in agreement with plan.              Scribe Attestation:   Scribe #1: I performed the above scribed service and the documentation accurately describes the services I performed. I attest to the accuracy of the note.           I, Yisel Roth, personally performed the services described in this  documentation. All medical record entries made by the scribe were at my direction and in my presence.  I have reviewed the chart and agree that the record reflects my personal performance and is accurate and complete.       Clinical Impression:       ICD-10-CM ICD-9-CM   1. Left nephrolithiasis N20.0 592.0   2. Acute left flank pain R10.9 789.09     338.19         Disposition:   Disposition: Discharged  Condition: Stable                        Yisel Roth NP  08/07/19 2233       Yisel Roth NP  08/15/19 1342

## 2019-08-21 ENCOUNTER — PATIENT MESSAGE (OUTPATIENT)
Dept: FAMILY MEDICINE | Facility: CLINIC | Age: 39
End: 2019-08-21

## 2019-08-21 RX ORDER — FLUOXETINE HYDROCHLORIDE 40 MG/1
40 CAPSULE ORAL
Qty: 90 CAPSULE | Refills: 3 | Status: SHIPPED | OUTPATIENT
Start: 2019-08-21 | End: 2019-08-23 | Stop reason: SDUPTHER

## 2019-08-23 ENCOUNTER — PATIENT MESSAGE (OUTPATIENT)
Dept: FAMILY MEDICINE | Facility: CLINIC | Age: 39
End: 2019-08-23

## 2019-08-23 RX ORDER — FLUOXETINE HYDROCHLORIDE 40 MG/1
40 CAPSULE ORAL DAILY
Qty: 90 CAPSULE | Refills: 3 | Status: SHIPPED | OUTPATIENT
Start: 2019-08-23 | End: 2020-11-17 | Stop reason: ALTCHOICE

## 2019-09-01 ENCOUNTER — HOSPITAL ENCOUNTER (EMERGENCY)
Facility: HOSPITAL | Age: 39
Discharge: HOME | End: 2019-09-01
Attending: EMERGENCY MEDICINE
Payer: COMMERCIAL

## 2019-09-01 VITALS
BODY MASS INDEX: 28.52 KG/M2 | WEIGHT: 155 LBS | HEIGHT: 62 IN | RESPIRATION RATE: 18 BRPM | SYSTOLIC BLOOD PRESSURE: 114 MMHG | HEART RATE: 90 BPM | TEMPERATURE: 98 F | DIASTOLIC BLOOD PRESSURE: 60 MMHG | OXYGEN SATURATION: 98 %

## 2019-09-01 DIAGNOSIS — R10.9 LEFT FLANK PAIN: Primary | ICD-10-CM

## 2019-09-01 DIAGNOSIS — R30.0 DYSURIA: ICD-10-CM

## 2019-09-01 LAB
ALBUMIN SERPL BCP-MCNC: 4.3 G/DL (ref 3.5–5.2)
ALP SERPL-CCNC: 63 U/L (ref 55–135)
ALT SERPL W/O P-5'-P-CCNC: 73 U/L (ref 10–44)
ANION GAP SERPL CALC-SCNC: 10 MMOL/L (ref 8–16)
AST SERPL-CCNC: 31 U/L (ref 10–40)
B-HCG UR QL: NEGATIVE
BASOPHILS # BLD AUTO: 0.02 K/UL (ref 0–0.2)
BASOPHILS NFR BLD: 0.3 % (ref 0–1.9)
BILIRUB SERPL-MCNC: 0.5 MG/DL (ref 0.1–1)
BILIRUB UR QL STRIP: NEGATIVE
BUN SERPL-MCNC: 16 MG/DL (ref 6–20)
CALCIUM SERPL-MCNC: 9.2 MG/DL (ref 8.7–10.5)
CHLORIDE SERPL-SCNC: 103 MMOL/L (ref 95–110)
CLARITY UR: CLEAR
CO2 SERPL-SCNC: 25 MMOL/L (ref 23–29)
COLOR UR: YELLOW
CREAT SERPL-MCNC: 1.1 MG/DL (ref 0.5–1.4)
CTP QC/QA: YES
DIFFERENTIAL METHOD: ABNORMAL
EOSINOPHIL # BLD AUTO: 0.2 K/UL (ref 0–0.5)
EOSINOPHIL NFR BLD: 2.7 % (ref 0–8)
ERYTHROCYTE [DISTWIDTH] IN BLOOD BY AUTOMATED COUNT: 12.3 % (ref 11.5–14.5)
EST. GFR  (AFRICAN AMERICAN): >60 ML/MIN/1.73 M^2
EST. GFR  (NON AFRICAN AMERICAN): >60 ML/MIN/1.73 M^2
GLUCOSE SERPL-MCNC: 108 MG/DL (ref 70–110)
GLUCOSE UR QL STRIP: NEGATIVE
HCT VFR BLD AUTO: 40.1 % (ref 37–48.5)
HGB BLD-MCNC: 14 G/DL (ref 12–16)
HGB UR QL STRIP: ABNORMAL
KETONES UR QL STRIP: NEGATIVE
LEUKOCYTE ESTERASE UR QL STRIP: NEGATIVE
LYMPHOCYTES # BLD AUTO: 1.3 K/UL (ref 1–4.8)
LYMPHOCYTES NFR BLD: 17 % (ref 18–48)
MCH RBC QN AUTO: 31.4 PG (ref 27–31)
MCHC RBC AUTO-ENTMCNC: 34.9 G/DL (ref 32–36)
MCV RBC AUTO: 90 FL (ref 82–98)
MICROSCOPIC COMMENT: NORMAL
MONOCYTES # BLD AUTO: 1 K/UL (ref 0.3–1)
MONOCYTES NFR BLD: 12.2 % (ref 4–15)
NEUTROPHILS # BLD AUTO: 5.3 K/UL (ref 1.8–7.7)
NEUTROPHILS NFR BLD: 67.8 % (ref 38–73)
NITRITE UR QL STRIP: NEGATIVE
PH UR STRIP: 6 [PH] (ref 5–8)
PLATELET # BLD AUTO: 224 K/UL (ref 150–350)
PMV BLD AUTO: 9.7 FL (ref 9.2–12.9)
POTASSIUM SERPL-SCNC: 4 MMOL/L (ref 3.5–5.1)
PROT SERPL-MCNC: 7.1 G/DL (ref 6–8.4)
PROT UR QL STRIP: NEGATIVE
RBC # BLD AUTO: 4.46 M/UL (ref 4–5.4)
RBC #/AREA URNS HPF: 1 /HPF (ref 0–4)
SODIUM SERPL-SCNC: 138 MMOL/L (ref 136–145)
SP GR UR STRIP: 1.01 (ref 1–1.03)
SQUAMOUS #/AREA URNS HPF: 5 /HPF
URN SPEC COLLECT METH UR: ABNORMAL
UROBILINOGEN UR STRIP-ACNC: NEGATIVE EU/DL
WBC # BLD AUTO: 7.9 K/UL (ref 3.9–12.7)
WBC #/AREA URNS HPF: 1 /HPF (ref 0–5)

## 2019-09-01 PROCEDURE — 63600175 PHARM REV CODE 636 W HCPCS: Performed by: PHYSICIAN ASSISTANT

## 2019-09-01 PROCEDURE — 99284 EMERGENCY DEPT VISIT MOD MDM: CPT | Mod: 25

## 2019-09-01 PROCEDURE — 87086 URINE CULTURE/COLONY COUNT: CPT

## 2019-09-01 PROCEDURE — 25000003 PHARM REV CODE 250: Performed by: PHYSICIAN ASSISTANT

## 2019-09-01 PROCEDURE — 81000 URINALYSIS NONAUTO W/SCOPE: CPT

## 2019-09-01 PROCEDURE — 81025 URINE PREGNANCY TEST: CPT | Performed by: PHYSICIAN ASSISTANT

## 2019-09-01 PROCEDURE — 96361 HYDRATE IV INFUSION ADD-ON: CPT

## 2019-09-01 PROCEDURE — 96374 THER/PROPH/DIAG INJ IV PUSH: CPT

## 2019-09-01 PROCEDURE — 96375 TX/PRO/DX INJ NEW DRUG ADDON: CPT

## 2019-09-01 PROCEDURE — 80053 COMPREHEN METABOLIC PANEL: CPT

## 2019-09-01 PROCEDURE — 85025 COMPLETE CBC W/AUTO DIFF WBC: CPT

## 2019-09-01 RX ORDER — CYCLOBENZAPRINE HCL 10 MG
10 TABLET ORAL 3 TIMES DAILY PRN
Qty: 15 TABLET | Refills: 0 | Status: SHIPPED | OUTPATIENT
Start: 2019-09-01 | End: 2019-09-06

## 2019-09-01 RX ORDER — HYDROMORPHONE HYDROCHLORIDE 2 MG/ML
0.5 INJECTION, SOLUTION INTRAMUSCULAR; INTRAVENOUS; SUBCUTANEOUS
Status: COMPLETED | OUTPATIENT
Start: 2019-09-01 | End: 2019-09-01

## 2019-09-01 RX ORDER — TAMSULOSIN HYDROCHLORIDE 0.4 MG/1
0.4 CAPSULE ORAL DAILY
Qty: 10 CAPSULE | Refills: 0 | Status: SHIPPED | OUTPATIENT
Start: 2019-09-01 | End: 2019-09-18

## 2019-09-01 RX ORDER — OXYCODONE AND ACETAMINOPHEN 10; 325 MG/1; MG/1
1 TABLET ORAL
Status: COMPLETED | OUTPATIENT
Start: 2019-09-01 | End: 2019-09-01

## 2019-09-01 RX ORDER — ONDANSETRON 4 MG/1
4 TABLET, ORALLY DISINTEGRATING ORAL EVERY 12 HOURS PRN
Qty: 10 TABLET | Refills: 0 | Status: SHIPPED | OUTPATIENT
Start: 2019-09-01 | End: 2019-09-18

## 2019-09-01 RX ORDER — IBUPROFEN 600 MG/1
600 TABLET ORAL
Status: COMPLETED | OUTPATIENT
Start: 2019-09-01 | End: 2019-09-01

## 2019-09-01 RX ORDER — ONDANSETRON 2 MG/ML
4 INJECTION INTRAMUSCULAR; INTRAVENOUS
Status: COMPLETED | OUTPATIENT
Start: 2019-09-01 | End: 2019-09-01

## 2019-09-01 RX ORDER — IBUPROFEN 600 MG/1
600 TABLET ORAL EVERY 6 HOURS PRN
Qty: 20 TABLET | Refills: 0 | Status: SHIPPED | OUTPATIENT
Start: 2019-09-01 | End: 2019-09-18

## 2019-09-01 RX ADMIN — SODIUM CHLORIDE 1000 ML: 0.9 INJECTION, SOLUTION INTRAVENOUS at 04:09

## 2019-09-01 RX ADMIN — OXYCODONE HYDROCHLORIDE AND ACETAMINOPHEN 1 TABLET: 10; 325 TABLET ORAL at 06:09

## 2019-09-01 RX ADMIN — HYDROMORPHONE HYDROCHLORIDE 0.5 MG: 2 INJECTION, SOLUTION INTRAMUSCULAR; INTRAVENOUS; SUBCUTANEOUS at 05:09

## 2019-09-01 RX ADMIN — IBUPROFEN 600 MG: 600 TABLET, FILM COATED ORAL at 04:09

## 2019-09-01 RX ADMIN — ONDANSETRON HYDROCHLORIDE 4 MG: 2 SOLUTION INTRAMUSCULAR; INTRAVENOUS at 05:09

## 2019-09-01 NOTE — ED TRIAGE NOTES
Pt cc Flank Pain Pt reports left flank pain x several days. yesterday started w/ difficuly passing urine. states has to push hard to get anything out. + nausea and vomiting

## 2019-09-01 NOTE — ED PROVIDER NOTES
"Encounter Date: 9/1/2019    SCRIBE #1 NOTE: I, Jas Sullivan, am scribing for, and in the presence of,  Lopez Teixeira PA-C. I have scribed the following portions of the note - Other sections scribed: HPI, ROS.       History     Chief Complaint   Patient presents with    Flank Pain     left flank pain x several days.  yesterday started w/ difficuly passing urine.  states has to push hard to get anything out.  + nausea and vomiting.     Time seen by provider: 4:02 PM    This is a 39 y.o. female with PMHx of hypertension, recurrent UTIs, kidney stones who presents to the ED with complaint of left flank pain for several days. Patient describes the pain as sharp and shooting, "8/10" intensity, with radiation into her back and LLQ. Patient states that she is already taking ibuprofen at home. Patient reports difficulty urinating since yesterday, and straining to pass a small amount of urine despite normal fluid intake. She denies hematuria or other color change in urine. Patient also reports nausea and vomiting last night and today. She denies constipation, diarrhea, fever, injury or rash.        Review of patient's allergies indicates:   Allergen Reactions    Strawberry Anaphylaxis and Hives    Morphine Other (See Comments)     Burns stomach    Toradol [ketorolac]     Tramadol      Abdominal pain     Past Medical History:   Diagnosis Date    Acute encephalopathy 12/24/12    secondary to drug overdose    ARF (acute renal failure) 12/24/12    secondary to drug overdose    History of cardiac arrest 12/24/12    secondary to drug overdose    Hypertension     MRSA (methicillin resistant Staphylococcus aureus) 12/24/12    Nephritis     PMDD (premenstrual dysphoric disorder)     Polysubstance abuse 12/24/12    Systolic CHF, acute 12/24/12    secondary to drug overdose    UTI (urinary tract infection)      Past Surgical History:   Procedure Laterality Date    AUGMENTATION OF BREAST Bilateral 2001    CHOLECYSTECTOMY   "    CYSTOSCOPY, RETROGRADE, URETEROSCOPY, STENT PLACEMENT Left 1/8/2018    [facility]formed [facility]    PLACEMENT OF DORSEY N/A 1/8/2018    [facility]    REMOVAL-STENT Left 1/15/2018    [facility]    TUBAL LIGATION      urinary stents       Family History   Problem Relation Age of Onset    Kidney disease Mother     Ovarian cancer Mother     Cancer Maternal Grandmother         throat    Cancer Maternal Grandfather         prostate    Kidney disease Daughter     Breast cancer Neg Hx     Colon cancer Neg Hx      Social History     Tobacco Use    Smoking status: Current Every Day Smoker     Packs/day: 0.50     Years: 20.00     Pack years: 10.00     Types: Cigarettes    Smokeless tobacco: Never Used   Substance Use Topics    Alcohol use: No    Drug use: No     Types: Cocaine, Methamphetamines     Comment: PCP, patient states she had not done drugs since 2012     Review of Systems   Constitutional: Negative for chills and fever.   HENT: Negative for sore throat.    Eyes: Negative for visual disturbance.   Respiratory: Negative for shortness of breath.    Cardiovascular: Negative for chest pain.   Gastrointestinal: Positive for abdominal pain, nausea and vomiting.   Genitourinary: Positive for decreased urine volume, difficulty urinating and flank pain.   Musculoskeletal: Positive for back pain.   Skin: Negative for rash.   Neurological: Negative for headaches.   Psychiatric/Behavioral: Negative for confusion.       Physical Exam     Initial Vitals [09/01/19 1546]   BP Pulse Resp Temp SpO2   111/64 106 18 98.2 °F (36.8 °C) 98 %      MAP       --         Physical Exam    Vitals reviewed.  Constitutional: She appears well-developed and well-nourished. She is not diaphoretic. No distress.   HENT:   Head: Normocephalic and atraumatic.   Right Ear: External ear normal.   Left Ear: External ear normal.   Nose: Nose normal.    Eyes: Conjunctivae are normal. No scleral icterus.   Neck: Normal range of motion. Neck supple.   Cardiovascular: Normal rate, regular rhythm, normal heart sounds and intact distal pulses.   Pulmonary/Chest: Breath sounds normal. No respiratory distress. She has no wheezes. She has no rhonchi. She has no rales. She exhibits no tenderness.   Abdominal: Soft. She exhibits no distension and no mass. There is no tenderness. There is CVA tenderness (Left). There is no rebound and no guarding.   Musculoskeletal: Normal range of motion.   Neurological: She is alert and oriented to person, place, and time.   Skin: Skin is warm and dry.         ED Course   Procedures  Labs Reviewed   URINALYSIS, REFLEX TO URINE CULTURE - Abnormal; Notable for the following components:       Result Value    Occult Blood UA 1+ (*)     All other components within normal limits    Narrative:     Preferred Collection Type->Urine, Clean Catch   CBC W/ AUTO DIFFERENTIAL - Abnormal; Notable for the following components:    Mean Corpuscular Hemoglobin 31.4 (*)     Lymph% 17.0 (*)     All other components within normal limits   COMPREHENSIVE METABOLIC PANEL - Abnormal; Notable for the following components:    ALT 73 (*)     All other components within normal limits   CULTURE, URINE   URINALYSIS MICROSCOPIC    Narrative:     Preferred Collection Type->Urine, Clean Catch   POCT URINE PREGNANCY          Imaging Results          US Retroperitoneal Complete (Final result)  Result time 09/01/19 18:19:29   Procedure changed from US Retroperitoneal Limited     Final result by Mague Gardner MD (09/01/19 18:19:29)                 Impression:      No significant abnormalities identified.  No evidence of hydronephrosis.      Electronically signed by: Mague Gardner MD  Date:    09/01/2019  Time:    18:19             Narrative:    EXAMINATION:  US RETROPERITONEAL COMPLETE    CLINICAL HISTORY:  flank pain ; Unspecified abdominal pain    TECHNIQUE:  Ultrasound  of the kidneys and urinary bladder was performed including color flow and Doppler evaluation of the kidneys.    COMPARISON:  08/07/2019.    FINDINGS:  The kidneys measure 11.1 cm on the right and 12.3 cm on the left. There is preserved corticomedullary differentiation and normal cortical thickness.  No evidence of renal stones or hydronephrosis.  No solid renal mass seen.  Perfusion to the kidneys is normal. Resistive indices are normal and as follow: 0.62 on the right and 0.66 on the left. The urinary bladder appears normal.                              X-Rays:   Independently Interpreted Readings:   Other Readings:  Retroperitoneal ultrasound with no evidence of hydronephrosis    Medical Decision Making:   History:   Old Medical Records: I decided to obtain old medical records.  Initial Assessment:   39-year-old female with history of kidney stones and UTIs presents with dysuria and left flank pain radiating to left abdomen. She is well appearing and comfortable, afebrile with reassuring VS. She has mild left CVA tenderness, with otherwise normal exam. Will evaluate for infection vs obstructive uropathy with UA and US. Pt has had many CT abd/pelvis exams.  Will avoid radiation if possible.  Clinical Tests:   Lab Tests: Ordered and Reviewed  ED Management:  UA with 1+ occult blood, otherwise unremarkable. Urine sent for culture.  Kidney function within normal limits. Retroperitoneal ultrasound with no hydronephrosis or other evidence of obstructive uropathy.  Patient treated with ibuprofen initially, with no improvement.  She is allergic to Toradol and morphine.  Patient given 0.5 mg of Dilaudid with report of improvement temporarily.  On re-evaluation, patient continues to appear comfortable.  I do not doubt patient is in pain, but will attempt to avoid prescriptions for opioids.  Given patient's flank pain and dysuria, and hx of stones and infections, will encourage urology follow-up.  She was discharged with  return precautions.            Scribe Attestation:   Scribe #1: I performed the above scribed service and the documentation accurately describes the services I performed. I attest to the accuracy of the note.               Clinical Impression:       ICD-10-CM ICD-9-CM   1. Left flank pain R10.9 789.09   2. Dysuria R30.0 788.1                              I, Lopez Teixeira, personally performed the services described in this documentation. All medical record entries made by the scribe were at my direction and in my presence.  I have reviewed the chart and agree that the record reflects my personal performance and is accurate and complete.       Lopez Teixeira, CATHY  09/01/19 1910

## 2019-09-03 ENCOUNTER — OFFICE VISIT (OUTPATIENT)
Dept: FAMILY MEDICINE | Facility: CLINIC | Age: 39
End: 2019-09-03
Payer: COMMERCIAL

## 2019-09-03 VITALS
DIASTOLIC BLOOD PRESSURE: 78 MMHG | HEIGHT: 62 IN | BODY MASS INDEX: 30.59 KG/M2 | WEIGHT: 166.25 LBS | SYSTOLIC BLOOD PRESSURE: 116 MMHG | TEMPERATURE: 99 F | RESPIRATION RATE: 16 BRPM | OXYGEN SATURATION: 98 % | HEART RATE: 85 BPM

## 2019-09-03 DIAGNOSIS — J01.90 ACUTE BACTERIAL SINUSITIS: Primary | ICD-10-CM

## 2019-09-03 DIAGNOSIS — B96.89 ACUTE BACTERIAL SINUSITIS: Primary | ICD-10-CM

## 2019-09-03 DIAGNOSIS — H65.92 MIDDLE EAR EFFUSION, LEFT: ICD-10-CM

## 2019-09-03 LAB — BACTERIA UR CULT: NORMAL

## 2019-09-03 PROCEDURE — 3008F BODY MASS INDEX DOCD: CPT | Mod: CPTII,S$GLB,, | Performed by: INTERNAL MEDICINE

## 2019-09-03 PROCEDURE — 99999 PR PBB SHADOW E&M-EST. PATIENT-LVL III: CPT | Mod: PBBFAC,,, | Performed by: INTERNAL MEDICINE

## 2019-09-03 PROCEDURE — 3078F PR MOST RECENT DIASTOLIC BLOOD PRESSURE < 80 MM HG: ICD-10-PCS | Mod: CPTII,S$GLB,, | Performed by: INTERNAL MEDICINE

## 2019-09-03 PROCEDURE — 99214 PR OFFICE/OUTPT VISIT, EST, LEVL IV, 30-39 MIN: ICD-10-PCS | Mod: 25,S$GLB,, | Performed by: INTERNAL MEDICINE

## 2019-09-03 PROCEDURE — 3008F PR BODY MASS INDEX (BMI) DOCUMENTED: ICD-10-PCS | Mod: CPTII,S$GLB,, | Performed by: INTERNAL MEDICINE

## 2019-09-03 PROCEDURE — 3078F DIAST BP <80 MM HG: CPT | Mod: CPTII,S$GLB,, | Performed by: INTERNAL MEDICINE

## 2019-09-03 PROCEDURE — 96372 THER/PROPH/DIAG INJ SC/IM: CPT | Mod: S$GLB,,, | Performed by: INTERNAL MEDICINE

## 2019-09-03 PROCEDURE — 99214 OFFICE O/P EST MOD 30 MIN: CPT | Mod: 25,S$GLB,, | Performed by: INTERNAL MEDICINE

## 2019-09-03 PROCEDURE — 99999 PR PBB SHADOW E&M-EST. PATIENT-LVL III: ICD-10-PCS | Mod: PBBFAC,,, | Performed by: INTERNAL MEDICINE

## 2019-09-03 PROCEDURE — 96372 PR INJECTION,THERAP/PROPH/DIAG2ST, IM OR SUBCUT: ICD-10-PCS | Mod: S$GLB,,, | Performed by: INTERNAL MEDICINE

## 2019-09-03 PROCEDURE — 3074F SYST BP LT 130 MM HG: CPT | Mod: CPTII,S$GLB,, | Performed by: INTERNAL MEDICINE

## 2019-09-03 PROCEDURE — 3074F PR MOST RECENT SYSTOLIC BLOOD PRESSURE < 130 MM HG: ICD-10-PCS | Mod: CPTII,S$GLB,, | Performed by: INTERNAL MEDICINE

## 2019-09-03 RX ORDER — DOXYCYCLINE HYCLATE 100 MG
100 TABLET ORAL 2 TIMES DAILY
Qty: 14 TABLET | Refills: 0 | Status: SHIPPED | OUTPATIENT
Start: 2019-09-03 | End: 2019-09-10

## 2019-09-03 RX ORDER — PROMETHAZINE HYDROCHLORIDE AND DEXTROMETHORPHAN HYDROBROMIDE 6.25; 15 MG/5ML; MG/5ML
5 SYRUP ORAL EVERY 12 HOURS PRN
Qty: 180 ML | Refills: 0 | Status: SHIPPED | OUTPATIENT
Start: 2019-09-03 | End: 2019-09-13

## 2019-09-03 RX ORDER — METHYLPREDNISOLONE ACETATE 40 MG/ML
40 INJECTION, SUSPENSION INTRA-ARTICULAR; INTRALESIONAL; INTRAMUSCULAR; SOFT TISSUE
Status: COMPLETED | OUTPATIENT
Start: 2019-09-03 | End: 2019-09-03

## 2019-09-03 RX ADMIN — METHYLPREDNISOLONE ACETATE 40 MG: 40 INJECTION, SUSPENSION INTRA-ARTICULAR; INTRALESIONAL; INTRAMUSCULAR; SOFT TISSUE at 11:09

## 2019-09-03 NOTE — PROGRESS NOTES
After obtaining consent, and per orders of Dr. Montilla, injection of Methylprednisolone 40mg/mL given IM into the right dorso gluteal by Lauren Miranda. Patient instructed to remain in clinic for 20 minutes afterwards, and to report any adverse reaction to me immediately.

## 2019-09-03 NOTE — PROGRESS NOTES
SUBJECTIVE     Chief Complaint   Patient presents with    Sinus Problem    Fever    Cough    ears hurt       HPI  Roslyn Brewster is a 39 y.o. female with multiple medical diagnoses as listed in the medical history and problem list that presents for evaluation of URI x 2 days. Pt reports symptoms started with back pain, but then progressed to generalized body aches, productive cough, post-nasal drip, and B/L otalgia. +fever(101.4 at 2am), chills, and night sweats. Pt has been taking Tylenol with some improvement of fever only. Denies any recent travel. +sick contacts(grandchildren with RSV).    PAST MEDICAL HISTORY:  Past Medical History:   Diagnosis Date    Acute encephalopathy 12/24/12    secondary to drug overdose    ARF (acute renal failure) 12/24/12    secondary to drug overdose    History of cardiac arrest 12/24/12    secondary to drug overdose    Hypertension     MRSA (methicillin resistant Staphylococcus aureus) 12/24/12    Nephritis     PMDD (premenstrual dysphoric disorder)     Polysubstance abuse 12/24/12    Systolic CHF, acute 12/24/12    secondary to drug overdose    UTI (urinary tract infection)        PAST SURGICAL HISTORY:  Past Surgical History:   Procedure Laterality Date    AUGMENTATION OF BREAST Bilateral 2001    CHOLECYSTECTOMY      CYSTOSCOPY, RETROGRADE, URETEROSCOPY, STENT PLACEMENT Left 1/8/2018    [facility]    PLACEMENT OF DORSEY N/A 1/8/2018    [facility]    REMOVAL-STENT Left 1/15/2018    [facility]    TUBAL LIGATION      urinary stents         SOCIAL HISTORY:  Social History     Socioeconomic History    Marital status: Single     Spouse name: Not on file    Number of children: Not on file    Years of education: Not on file    Highest education level: Not on file   Occupational History    Not on file   Social Needs    Financial resource strain: Not on file     Food insecurity:     Worry: Not on file     Inability: Not on file    Transportation needs:     Medical: Not on file     Non-medical: Not on file   Tobacco Use    Smoking status: Current Every Day Smoker     Packs/day: 0.50     Years: 20.00     Pack years: 10.00     Types: Cigarettes    Smokeless tobacco: Never Used   Substance and Sexual Activity    Alcohol use: No    Drug use: No     Types: Cocaine, Methamphetamines     Comment: PCP, patient states she had not done drugs since 2012    Sexual activity: Yes     Partners: Male   Lifestyle    Physical activity:     Days per week: Not on file     Minutes per session: Not on file    Stress: Not on file   Relationships    Social connections:     Talks on phone: Not on file     Gets together: Not on file     Attends Mosque service: Not on file     Active member of club or organization: Not on file     Attends meetings of clubs or organizations: Not on file     Relationship status: Not on file   Other Topics Concern    Not on file   Social History Narrative    Not on file       FAMILY HISTORY:  Family History   Problem Relation Age of Onset    Kidney disease Mother     Ovarian cancer Mother     Cancer Maternal Grandmother         throat    Cancer Maternal Grandfather         prostate    Kidney disease Daughter     Breast cancer Neg Hx     Colon cancer Neg Hx        ALLERGIES AND MEDICATIONS: updated and reviewed.  Review of patient's allergies indicates:   Allergen Reactions    Strawberry Anaphylaxis and Hives    Morphine Other (See Comments)     Burns stomach    Toradol [ketorolac]     Tramadol      Abdominal pain     Current Outpatient Medications   Medication Sig Dispense Refill    ALPRAZolam (XANAX) 1 MG tablet Take 1 tablet (1 mg total) by mouth every evening. 20 tablet 4    FLUoxetine 40 MG capsule Take 1 capsule (40 mg total) by mouth once daily. 90 capsule 3    ibuprofen (ADVIL,MOTRIN) 600 MG tablet Take 1 tablet (600 mg total) by  mouth every 6 (six) hours as needed for Pain. 20 tablet 0    cyclobenzaprine (FLEXERIL) 10 MG tablet Take 1 tablet (10 mg total) by mouth 3 (three) times daily as needed for Muscle spasms. 15 tablet 0    doxycycline (VIBRA-TABS) 100 MG tablet Take 1 tablet (100 mg total) by mouth 2 (two) times daily. for 7 days 14 tablet 0    nitrofurantoin (MACRODANTIN) 100 MG capsule Take 100 mg by mouth every 12 (twelve) hours.      ondansetron (ZOFRAN-ODT) 4 MG TbDL Take 1 tablet (4 mg total) by mouth every 12 (twelve) hours as needed. 10 tablet 0    promethazine (PHENERGAN) 25 MG tablet Take 1 tablet (25 mg total) by mouth every 6 (six) hours as needed for Nausea. 15 tablet 0    promethazine-dextromethorphan (PROMETHAZINE-DM) 6.25-15 mg/5 mL Syrp Take 5 mLs by mouth every 12 (twelve) hours as needed. 180 mL 0    tamsulosin (FLOMAX) 0.4 mg Cap Take 1 capsule (0.4 mg total) by mouth once daily. for 10 days 10 capsule 0     No current facility-administered medications for this visit.        ROS  Review of Systems   Constitutional: Positive for fever. Negative for chills.   HENT: Positive for ear pain. Negative for hearing loss and sore throat.    Eyes: Negative for visual disturbance.   Respiratory: Positive for cough. Negative for shortness of breath.    Cardiovascular: Negative for chest pain, palpitations and leg swelling.   Gastrointestinal: Negative for abdominal pain, constipation, diarrhea, nausea and vomiting.   Genitourinary: Negative for dysuria, frequency and urgency.   Musculoskeletal: Negative for arthralgias, joint swelling and myalgias.   Skin: Negative for rash and wound.   Neurological: Negative for headaches.   Psychiatric/Behavioral: Negative for agitation and confusion. The patient is not nervous/anxious.          OBJECTIVE     Physical Exam  Vitals:    09/03/19 1031   BP: 116/78   Pulse: 85   Resp: 16   Temp: 98.6 °F (37 °C)    Body mass index is 30.4 kg/m².  Weight: 75.4 kg (166 lb 3.6 oz)   Height: 5'  "2" (157.5 cm)     Physical Exam   Constitutional: She is oriented to person, place, and time. She appears well-developed and well-nourished. No distress.   HENT:   Head: Normocephalic and atraumatic.   Right Ear: Hearing, tympanic membrane and external ear normal.   Left Ear: Hearing and external ear normal. A middle ear effusion is present.   Nose: No rhinorrhea. Right sinus exhibits maxillary sinus tenderness. Right sinus exhibits no frontal sinus tenderness. Left sinus exhibits maxillary sinus tenderness. Left sinus exhibits no frontal sinus tenderness.   Mouth/Throat: No uvula swelling. Posterior oropharyngeal erythema present. No posterior oropharyngeal edema.   Eyes: Conjunctivae and EOM are normal. Right eye exhibits no discharge. Left eye exhibits no discharge. No scleral icterus.   Neck: Normal range of motion. Neck supple. No JVD present. No tracheal deviation present.   Cardiovascular: Normal rate, regular rhythm and intact distal pulses. Exam reveals no gallop and no friction rub.   No murmur heard.  Pulmonary/Chest: Effort normal. No respiratory distress. She has wheezes in the left upper field.   Abdominal: Soft. Bowel sounds are normal. She exhibits no distension and no mass. There is no tenderness. There is no rebound and no guarding.   Musculoskeletal: Normal range of motion. She exhibits no edema, tenderness or deformity.   Neurological: She is alert and oriented to person, place, and time. She exhibits normal muscle tone. Coordination normal.   Skin: Skin is warm and dry. No rash noted. No erythema.   Psychiatric: She has a normal mood and affect. Her behavior is normal. Judgment and thought content normal.         Health Maintenance       Date Due Completion Date    TETANUS VACCINE 06/28/1997 ---    Influenza Vaccine (1) 09/01/2019 12/12/2017    Pneumococcal Vaccine (Medium Risk) (1 of 1 - PPSV23) 06/09/2020 (Originally 6/28/1998) ---    Mammogram 08/06/2020 8/6/2019    Pap Smear 04/18/2021 " 4/18/2018            ASSESSMENT     39 y.o. female with     1. Acute bacterial sinusitis    2. Middle ear effusion, left        PLAN:     1. Acute bacterial sinusitis  - Continue symptomatic treatment with rest, increase fluid intake, tylenol or ibuprofen PRN fever(temp >/= 100.4) or body aches. Okay to take OTC antihistamines, i.e. Bendaryl, Claritin, Allegra, etc. as needed.  - Okay to gargle with warm, salt water or use throat lozenges as needed  - Pt advised to take Abx to completion  - methylPREDNISolone acetate injection 40 mg  - doxycycline (VIBRA-TABS) 100 MG tablet; Take 1 tablet (100 mg total) by mouth 2 (two) times daily. for 7 days  Dispense: 14 tablet; Refill: 0  - promethazine-dextromethorphan (PROMETHAZINE-DM) 6.25-15 mg/5 mL Syrp; Take 5 mLs by mouth every 12 (twelve) hours as needed.  Dispense: 180 mL; Refill: 0    2. Middle ear effusion, left  - Pt to take OTC antihistamines as above        RTC in 1-2 weeks as needed for any acute worsening of current condition or failure to improve       Spring Montilla MD  09/03/2019 10:43 AM        No follow-ups on file.

## 2019-09-03 NOTE — PROGRESS NOTES
Advised patient of Flu injection either she can get it done here in our office or at her Pharmacy.

## 2019-09-05 ENCOUNTER — PATIENT MESSAGE (OUTPATIENT)
Dept: FAMILY MEDICINE | Facility: CLINIC | Age: 39
End: 2019-09-05

## 2019-09-12 RX ORDER — PROMETHAZINE HYDROCHLORIDE AND CODEINE PHOSPHATE 6.25; 1 MG/5ML; MG/5ML
5 SOLUTION ORAL EVERY 12 HOURS PRN
Qty: 120 ML | Refills: 0 | Status: SHIPPED | OUTPATIENT
Start: 2019-09-12 | End: 2019-09-18

## 2019-09-12 RX ORDER — METHYLPREDNISOLONE 4 MG/1
TABLET ORAL
Qty: 1 PACKAGE | Refills: 0 | Status: SHIPPED | OUTPATIENT
Start: 2019-09-12 | End: 2019-09-18 | Stop reason: ALTCHOICE

## 2019-09-18 ENCOUNTER — OFFICE VISIT (OUTPATIENT)
Dept: FAMILY MEDICINE | Facility: CLINIC | Age: 39
End: 2019-09-18
Payer: COMMERCIAL

## 2019-09-18 ENCOUNTER — HOSPITAL ENCOUNTER (OUTPATIENT)
Dept: RADIOLOGY | Facility: HOSPITAL | Age: 39
Discharge: HOME | End: 2019-09-18
Attending: NURSE PRACTITIONER
Payer: COMMERCIAL

## 2019-09-18 VITALS
HEART RATE: 95 BPM | BODY MASS INDEX: 32.33 KG/M2 | OXYGEN SATURATION: 97 % | HEIGHT: 62 IN | DIASTOLIC BLOOD PRESSURE: 64 MMHG | TEMPERATURE: 99 F | WEIGHT: 175.69 LBS | SYSTOLIC BLOOD PRESSURE: 90 MMHG

## 2019-09-18 DIAGNOSIS — R06.02 SOB (SHORTNESS OF BREATH): ICD-10-CM

## 2019-09-18 DIAGNOSIS — R06.2 WHEEZING: Primary | ICD-10-CM

## 2019-09-18 DIAGNOSIS — J18.9 PNEUMONIA OF RIGHT LOWER LOBE DUE TO INFECTIOUS ORGANISM: ICD-10-CM

## 2019-09-18 DIAGNOSIS — R06.2 WHEEZING: ICD-10-CM

## 2019-09-18 PROCEDURE — 3074F SYST BP LT 130 MM HG: CPT | Mod: CPTII,S$GLB,, | Performed by: NURSE PRACTITIONER

## 2019-09-18 PROCEDURE — 71046 X-RAY EXAM CHEST 2 VIEWS: CPT | Mod: TC,FY,PO

## 2019-09-18 PROCEDURE — 3078F PR MOST RECENT DIASTOLIC BLOOD PRESSURE < 80 MM HG: ICD-10-PCS | Mod: CPTII,S$GLB,, | Performed by: NURSE PRACTITIONER

## 2019-09-18 PROCEDURE — 3008F BODY MASS INDEX DOCD: CPT | Mod: CPTII,S$GLB,, | Performed by: NURSE PRACTITIONER

## 2019-09-18 PROCEDURE — 99215 OFFICE O/P EST HI 40 MIN: CPT | Mod: 25,S$GLB,, | Performed by: NURSE PRACTITIONER

## 2019-09-18 PROCEDURE — 3008F PR BODY MASS INDEX (BMI) DOCUMENTED: ICD-10-PCS | Mod: CPTII,S$GLB,, | Performed by: NURSE PRACTITIONER

## 2019-09-18 PROCEDURE — 71046 XR CHEST PA AND LATERAL: ICD-10-PCS | Mod: 26,,, | Performed by: RADIOLOGY

## 2019-09-18 PROCEDURE — 99215 PR OFFICE/OUTPT VISIT, EST, LEVL V, 40-54 MIN: ICD-10-PCS | Mod: 25,S$GLB,, | Performed by: NURSE PRACTITIONER

## 2019-09-18 PROCEDURE — 3074F PR MOST RECENT SYSTOLIC BLOOD PRESSURE < 130 MM HG: ICD-10-PCS | Mod: CPTII,S$GLB,, | Performed by: NURSE PRACTITIONER

## 2019-09-18 PROCEDURE — 94640 AIRWAY INHALATION TREATMENT: CPT | Mod: S$GLB,,, | Performed by: NURSE PRACTITIONER

## 2019-09-18 PROCEDURE — 94640 PR INHAL RX, AIRWAY OBST/DX SPUTUM INDUCT: ICD-10-PCS | Mod: S$GLB,,, | Performed by: NURSE PRACTITIONER

## 2019-09-18 PROCEDURE — 99999 PR PBB SHADOW E&M-EST. PATIENT-LVL IV: ICD-10-PCS | Mod: PBBFAC,,, | Performed by: NURSE PRACTITIONER

## 2019-09-18 PROCEDURE — 3078F DIAST BP <80 MM HG: CPT | Mod: CPTII,S$GLB,, | Performed by: NURSE PRACTITIONER

## 2019-09-18 PROCEDURE — 71046 X-RAY EXAM CHEST 2 VIEWS: CPT | Mod: 26,,, | Performed by: RADIOLOGY

## 2019-09-18 PROCEDURE — 99999 PR PBB SHADOW E&M-EST. PATIENT-LVL IV: CPT | Mod: PBBFAC,,, | Performed by: NURSE PRACTITIONER

## 2019-09-18 RX ORDER — LEVALBUTEROL INHALATION SOLUTION 1.25 MG/3ML
1.25 SOLUTION RESPIRATORY (INHALATION)
Status: COMPLETED | OUTPATIENT
Start: 2019-09-18 | End: 2019-09-18

## 2019-09-18 RX ORDER — HYDROCODONE POLISTIREX AND CHLORPHENIRAMINE POLISTIREX 10; 8 MG/5ML; MG/5ML
5 SUSPENSION, EXTENDED RELEASE ORAL EVERY 12 HOURS PRN
Qty: 70 ML | Refills: 0 | Status: SHIPPED | OUTPATIENT
Start: 2019-09-18 | End: 2019-09-26

## 2019-09-18 RX ADMIN — LEVALBUTEROL INHALATION SOLUTION 1.25 MG: 1.25 SOLUTION RESPIRATORY (INHALATION) at 06:09

## 2019-09-18 NOTE — PATIENT INSTRUCTIONS
Albuterol inhaler as directed  Cough medication 5 ml every 12 hours as needed for work   Drink lots of fluids

## 2019-09-18 NOTE — LETTER
September 18, 2019      [facility]  [address]  [place] [address]  Phone: [phone]  Fax: [phone]       Patient: Roslyn Brewster   YOB: 1980  Date of Visit: 09/18/2019    To Whom It May Concern:    Magdiel Brewster  was at [facility] on 09/18/2019. She may return to work/school on 9/20/2019 with no restrictions. If you have any questions or concerns, or if I can be of further assistance, please do not hesitate to contact me.    Sincerely,    Rodney Banda, MEHREEN-C

## 2019-09-19 RX ORDER — AMOXICILLIN AND CLAVULANATE POTASSIUM 875; 125 MG/1; MG/1
1 TABLET, FILM COATED ORAL 2 TIMES DAILY
Qty: 20 TABLET | Refills: 0 | Status: SHIPPED | OUTPATIENT
Start: 2019-09-19 | End: 2019-09-26

## 2019-09-26 ENCOUNTER — PATIENT MESSAGE (OUTPATIENT)
Dept: FAMILY MEDICINE | Facility: CLINIC | Age: 39
End: 2019-09-26

## 2019-09-26 ENCOUNTER — OFFICE VISIT (OUTPATIENT)
Dept: FAMILY MEDICINE | Facility: CLINIC | Age: 39
End: 2019-09-26
Payer: COMMERCIAL

## 2019-09-26 VITALS
RESPIRATION RATE: 16 BRPM | BODY MASS INDEX: 31.64 KG/M2 | HEART RATE: 86 BPM | TEMPERATURE: 98 F | HEIGHT: 62 IN | SYSTOLIC BLOOD PRESSURE: 92 MMHG | DIASTOLIC BLOOD PRESSURE: 70 MMHG | WEIGHT: 171.94 LBS | OXYGEN SATURATION: 97 %

## 2019-09-26 DIAGNOSIS — R09.82 POST-NASAL DRIP: ICD-10-CM

## 2019-09-26 DIAGNOSIS — M94.0 COSTOCHONDRITIS: ICD-10-CM

## 2019-09-26 DIAGNOSIS — J18.9 PNEUMONIA OF RIGHT LOWER LOBE DUE TO INFECTIOUS ORGANISM: Primary | ICD-10-CM

## 2019-09-26 PROBLEM — N20.0 KIDNEY STONES: Status: RESOLVED | Noted: 2018-01-08 | Resolved: 2019-09-26

## 2019-09-26 PROBLEM — N93.9 ABNORMAL UTERINE BLEEDING: Status: RESOLVED | Noted: 2018-04-18 | Resolved: 2019-09-26

## 2019-09-26 PROCEDURE — 99214 OFFICE O/P EST MOD 30 MIN: CPT | Mod: S$GLB,,, | Performed by: NURSE PRACTITIONER

## 2019-09-26 PROCEDURE — 99214 PR OFFICE/OUTPT VISIT, EST, LEVL IV, 30-39 MIN: ICD-10-PCS | Mod: S$GLB,,, | Performed by: NURSE PRACTITIONER

## 2019-09-26 PROCEDURE — 3008F BODY MASS INDEX DOCD: CPT | Mod: CPTII,S$GLB,, | Performed by: NURSE PRACTITIONER

## 2019-09-26 PROCEDURE — 99999 PR PBB SHADOW E&M-EST. PATIENT-LVL IV: CPT | Mod: PBBFAC,,, | Performed by: NURSE PRACTITIONER

## 2019-09-26 PROCEDURE — 99999 PR PBB SHADOW E&M-EST. PATIENT-LVL IV: ICD-10-PCS | Mod: PBBFAC,,, | Performed by: NURSE PRACTITIONER

## 2019-09-26 PROCEDURE — 3008F PR BODY MASS INDEX (BMI) DOCUMENTED: ICD-10-PCS | Mod: CPTII,S$GLB,, | Performed by: NURSE PRACTITIONER

## 2019-09-26 PROCEDURE — 3078F DIAST BP <80 MM HG: CPT | Mod: CPTII,S$GLB,, | Performed by: NURSE PRACTITIONER

## 2019-09-26 PROCEDURE — 3078F PR MOST RECENT DIASTOLIC BLOOD PRESSURE < 80 MM HG: ICD-10-PCS | Mod: CPTII,S$GLB,, | Performed by: NURSE PRACTITIONER

## 2019-09-26 PROCEDURE — 3074F SYST BP LT 130 MM HG: CPT | Mod: CPTII,S$GLB,, | Performed by: NURSE PRACTITIONER

## 2019-09-26 PROCEDURE — 3074F PR MOST RECENT SYSTOLIC BLOOD PRESSURE < 130 MM HG: ICD-10-PCS | Mod: CPTII,S$GLB,, | Performed by: NURSE PRACTITIONER

## 2019-09-26 RX ORDER — DESLORATADINE 5 MG/1
5 TABLET ORAL DAILY
Qty: 90 TABLET | Refills: 0 | Status: ON HOLD | OUTPATIENT
Start: 2019-09-26 | End: 2020-01-02 | Stop reason: HOSPADM

## 2019-09-26 RX ORDER — NAPROXEN 500 MG/1
500 TABLET ORAL 2 TIMES DAILY
Qty: 20 TABLET | Refills: 0 | Status: SHIPPED | OUTPATIENT
Start: 2019-09-26 | End: 2019-10-06

## 2019-09-26 RX ORDER — BENZONATATE 200 MG/1
200 CAPSULE ORAL 3 TIMES DAILY PRN
Qty: 30 CAPSULE | Refills: 0 | Status: SHIPPED | OUTPATIENT
Start: 2019-09-26 | End: 2019-10-06

## 2019-09-26 RX ORDER — HYDROCODONE POLISTIREX AND CHLORPHENIRAMINE POLISTIREX 10; 8 MG/5ML; MG/5ML
5 SUSPENSION, EXTENDED RELEASE ORAL EVERY 12 HOURS PRN
Qty: 70 ML | Refills: 0 | Status: CANCELLED | OUTPATIENT
Start: 2019-09-26

## 2019-09-26 NOTE — PATIENT INSTRUCTIONS
Here are some useful anti-cough medications that are available over-the-counter:   Chlor-Trimeton 1 or 2 pills before bedtime   Delsym 12 hr cough medicine twice a day as needed

## 2019-09-26 NOTE — PROGRESS NOTES
Subjective:        Chief Complaint  Chief Complaint   Patient presents with    Cough    Chest Pain       HPI  Roslyn Brewster is a 39 y.o. female with multiple medical diagnoses as listed in the medical history and problem list that presents for persistent dry cough and rib pain when coughing.  Patient is new to me. She reports that she completed the antibiotics and cough syrup. She is still using the inhaler PRN SOB/wheezing. She denies any productive cough, fever, chills, sore throat, ear pain. She denies currently using any OTC medications for symptom relief.     HPI from 9/18/2019 (Initial Pneumonia Diagnosis):  39-year-old female presents to the clinic today with complaint of fever of 102 about a week ago, sinus congestion, coughing, wheezing, shortness of breath, and ribcage pain on the left side secondary to excessive coughing for the past 1 and half weeks.  She denies any sore throat, ear pain, nausea, vomiting, or diarrhea.  She denies any cardiac chest pain, heart palpitations, or swelling to lower extremities.  She denies any headaches, dizziness, or blurred vision.  She was seen on 9/3/2019 and was treated with doxycycline, Phenergan DM, steroid injection, and steroid Dosepak.  She said initially she felt better for a couple of days but then the coughing came back.  She says her biggest problem now is the coughing, wheezing and left upper rib cage area pain. She has tried multiple cough medicines including Phenergan DM, Tessalon Perles, NyQuil, and Tussin with codeine.  Tx: Tussionex, augmentin, albuterol inh    PAST MEDICAL HISTORY:  Past Medical History:   Diagnosis Date    Abnormal uterine bleeding 4/18/2018    Acute encephalopathy 12/24/12    secondary to drug overdose    ARF (acute renal failure) 12/24/12    secondary to drug overdose    History of cardiac arrest 12/24/12    secondary to drug overdose    Hypertension     Kidney stones 1/8/2018    MRSA (methicillin resistant  Staphylococcus aureus) 12/24/12    Nephritis     PMDD (premenstrual dysphoric disorder)     Polysubstance abuse 12/24/12    Systolic CHF, acute 12/24/12    secondary to drug overdose    UTI (urinary tract infection)        PAST SURGICAL HISTORY:  Past Surgical History:   Procedure Laterality Date    AUGMENTATION OF BREAST Bilateral 2001    CHOLECYSTECTOMY      TUBAL LIGATION      urinary stents         SOCIAL HISTORY:  Social History     Socioeconomic History    Marital status: Single     Spouse name: Not on file    Number of children: Not on file    Years of education: Not on file    Highest education level: Not on file   Occupational History    Not on file   Social Needs    Financial resource strain: Not on file    Food insecurity:     Worry: Not on file     Inability: Not on file    Transportation needs:     Medical: Not on file     Non-medical: Not on file   Tobacco Use    Smoking status: Current Every Day Smoker     Packs/day: 0.50     Years: 20.00     Pack years: 10.00     Types: Cigarettes    Smokeless tobacco: Never Used   Substance and Sexual Activity    Alcohol use: No    Drug use: No     Types: Cocaine, Methamphetamines     Comment: PCP, patient states she had not done drugs since 2012    Sexual activity: Yes     Partners: Male   Lifestyle    Physical activity:     Days per week: Not on file     Minutes per session: Not on file    Stress: Not on file   Relationships    Social connections:     Talks on phone: Not on file     Gets together: Not on file     Attends Nondenominational service: Not on file     Active member of club or organization: Not on file     Attends meetings of clubs or organizations: Not on file     Relationship status: Not on file   Other Topics Concern    Not on file   Social History Narrative    Not on file       FAMILY HISTORY:  Family History   Problem Relation Age of Onset    Kidney disease Mother     Ovarian cancer Mother     Cancer Maternal Grandmother          throat    Cancer Maternal Grandfather         prostate    Kidney disease Daughter     Breast cancer Neg Hx     Colon cancer Neg Hx        ALLERGIES AND MEDICATIONS: updated and reviewed.  Review of patient's allergies indicates:   Allergen Reactions    Strawberry Anaphylaxis and Hives    Morphine Other (See Comments)     Burns stomach    Toradol [ketorolac]     Tramadol      Abdominal pain     Current Outpatient Medications   Medication Sig Dispense Refill    albuterol sulfate (PROAIR RESPICLICK) 90 mcg/actuation AePB Inhale 180 mcg into the lungs every 4 (four) hours. Rescue 1 each 0    ALPRAZolam (XANAX) 1 MG tablet Take 1 tablet (1 mg total) by mouth every evening. 20 tablet 4    FLUoxetine 40 MG capsule Take 1 capsule (40 mg total) by mouth once daily. 90 capsule 3    benzonatate (TESSALON) 200 MG capsule Take 1 capsule (200 mg total) by mouth 3 (three) times daily as needed for Cough. 30 capsule 0    desloratadine (CLARINEX) 5 mg tablet Take 1 tablet (5 mg total) by mouth once daily. 90 tablet 0    naproxen (NAPROSYN) 500 MG tablet Take 1 tablet (500 mg total) by mouth 2 (two) times daily. for 10 days 20 tablet 0     No current facility-administered medications for this visit.          ROS  Review of Systems   Constitutional: Negative for chills and fever.   HENT: Positive for postnasal drip, rhinorrhea and sore throat. Negative for congestion, ear pain, sinus pressure, sinus pain, sneezing and trouble swallowing.    Respiratory: Positive for cough (nonproductive), shortness of breath and wheezing. Negative for chest tightness.    Cardiovascular: Positive for chest pain.   Musculoskeletal: Positive for myalgias.   Skin: Negative for rash.   Allergic/Immunologic: Negative for environmental allergies.   Neurological: Negative for headaches.   Psychiatric/Behavioral: Negative for behavioral problems and confusion.         Objective:     Physical Exam  Vitals:    09/26/19 1642   BP: 92/70   BP  "Location: Right arm   Patient Position: Sitting   BP Method: Small (Manual)   Pulse: 86   Resp: 16   Temp: 98 °F (36.7 °C)   TempSrc: Oral   SpO2: 97%   Weight: 78 kg (171 lb 15.3 oz)   Height: 5' 2" (1.575 m)    Body mass index is 31.45 kg/m².  Weight: 78 kg (171 lb 15.3 oz)   Height: 5' 2" (157.5 cm)   Physical Exam   Constitutional: She appears well-developed and well-nourished. No distress.   HENT:   Head: Normocephalic and atraumatic.   Mouth/Throat: Oropharynx is clear and moist. No oropharyngeal exudate.   Eyes: EOM are normal. Right eye exhibits no discharge. Left eye exhibits no discharge.   Neck: Normal range of motion.   Cardiovascular: Normal rate. Exam reveals no gallop and no friction rub.   No murmur heard.  Pulmonary/Chest: Effort normal and breath sounds normal. No stridor. No respiratory distress. She has no decreased breath sounds. She has no wheezes. She exhibits tenderness (midsternal).   Lymphadenopathy:     She has no cervical adenopathy.   Neurological: She is alert. No cranial nerve deficit.   Skin: Skin is warm and dry.   Psychiatric: She has a normal mood and affect.       Assessment:     1. Pneumonia of right lower lobe due to infectious organism    2. Costochondritis    3. Post-nasal drip      Plan:     Roslyn was seen today for cough and chest pain.    Diagnoses and all orders for this visit:    Pneumonia of right lower lobe due to infectious organism  Costochondritis  -     naproxen (NAPROSYN) 500 MG tablet; Take 1 tablet (500 mg total) by mouth 2 (two) times daily. for 10 days  -     benzonatate (TESSALON) 200 MG capsule; Take 1 capsule (200 mg total) by mouth 3 (three) times daily as needed for Cough.  -     Encouraged patient to utilize delsym 12 hour cough syrup    Post-nasal drip  -     desloratadine (CLARINEX) 5 mg tablet; Take 1 tablet (5 mg total) by mouth once daily.  -     Discussed symptoms of allergic rhinitis and reviewed treatment modalities, including antihistamines, " nasal steroids and advantages/limitations of OTC products (i.e., OTC decongestants, sinus rinse kits). Therapy as noted/per orders. Consider ENT evaluation in the future if needed.      Health Maintenance       Date Due Completion Date    TETANUS VACCINE 06/28/1997 ---    Influenza Vaccine (1) 09/01/2019 12/12/2017    Pneumococcal Vaccine (Medium Risk) (1 of 1 - PPSV23) 06/09/2020 (Originally 6/28/1998) ---    Mammogram 08/06/2020 8/6/2019    Pap Smear 04/18/2021 4/18/2018            Health Maintenance reviewed, addressed as per orders    Follow-up: Follow up if symptoms worsen or fail to improve.    The patient expressed understanding and no barriers to adherence were identified.     1. The patient indicates understanding of these issues and agrees with the plan. Brief care plan is updated and reviewed with the patient as applicable.     2. The patient is given an After Visit Summary that lists all medications with directions, allergies, orders placed during this encounter and follow-up instructions.     3. I have reviewed the patient's medical information including past medical, family, and social history sections including the medications and allergies.     4. We discussed the patient's current medications. I reconciled the patient's medication list and prepared and supplied needed refills.       Rosalee Le, DNP, APRN, FNP-c  Family Medicine    My collaborating physicians are Dr. Hermes Colvin and Dr. Harry Drake

## 2019-10-08 ENCOUNTER — CLINICAL SUPPORT (OUTPATIENT)
Dept: SMOKING CESSATION | Facility: CLINIC | Age: 39
End: 2019-10-08
Payer: COMMERCIAL

## 2019-10-08 DIAGNOSIS — F17.200 NICOTINE DEPENDENCE: Primary | ICD-10-CM

## 2019-10-08 PROCEDURE — 99407 PR TOBACCO USE CESSATION INTENSIVE >10 MINUTES: ICD-10-PCS | Mod: S$GLB,,,

## 2019-10-08 PROCEDURE — 99407 BEHAV CHNG SMOKING > 10 MIN: CPT | Mod: S$GLB,,,

## 2019-10-08 NOTE — PROGRESS NOTES
Spoke with patient today in regards to smoking cessation progress for 3 & 6 month follow up, states not tobacco free at this time. Patient not ready to  scheduled an appointment for Quit #2, will call when ready. Informed patient of benefit period, future follow ups, and contact information. Will complete smart form 3 & 6 month  for Quit attempt #2.

## 2019-10-22 ENCOUNTER — TELEPHONE (OUTPATIENT)
Dept: FAMILY MEDICINE | Facility: CLINIC | Age: 39
End: 2019-10-22

## 2019-10-22 ENCOUNTER — PATIENT MESSAGE (OUTPATIENT)
Dept: FAMILY MEDICINE | Facility: CLINIC | Age: 39
End: 2019-10-22

## 2019-10-22 DIAGNOSIS — R11.0 NAUSEA: ICD-10-CM

## 2019-10-22 DIAGNOSIS — F32.81 PMDD (PREMENSTRUAL DYSPHORIC DISORDER): ICD-10-CM

## 2019-10-22 NOTE — TELEPHONE ENCOUNTER
Last Office Visit Info:   The patient's last visit with Hermes Colvin MD was on 6/6/2019.    The patient's last visit in current department was on 9/3/2019.  Last refill 6-6-19        Last CBC Results:   Lab Results   Component Value Date    WBC 7.90 09/01/2019    HGB 14.0 09/01/2019    HCT 40.1 09/01/2019     09/01/2019       Last CMP Results  Lab Results   Component Value Date     09/01/2019    K 4.0 09/01/2019     09/01/2019    CO2 25 09/01/2019    BUN 16 09/01/2019    CREATININE 1.1 09/01/2019    CALCIUM 9.2 09/01/2019    ALBUMIN 4.3 09/01/2019    AST 31 09/01/2019    ALT 73 (H) 09/01/2019       Last Lipids  Lab Results   Component Value Date    CHOL 191 12/19/2016    TRIG 170 (H) 12/19/2016    HDL 35 (L) 12/19/2016    LDLCALC 122.0 12/19/2016       Last A1C  No results found for: HGBA1C    Last TSH  Lab Results   Component Value Date    TSH 1.566 12/19/2016         Current Med Refills  Medication List with Changes/Refills   Current Medications    ALBUTEROL SULFATE (PROAIR RESPICLICK) 90 MCG/ACTUATION AEPB    Inhale 180 mcg into the lungs every 4 (four) hours. Rescue       Start Date: 9/18/2019 End Date: --    ALPRAZOLAM (XANAX) 1 MG TABLET    Take 1 tablet (1 mg total) by mouth every evening.       Start Date: 6/6/2019  End Date: --    DESLORATADINE (CLARINEX) 5 MG TABLET    Take 1 tablet (5 mg total) by mouth once daily.       Start Date: 9/26/2019 End Date: 9/25/2020    FLUOXETINE 40 MG CAPSULE    Take 1 capsule (40 mg total) by mouth once daily.       Start Date: 8/23/2019 End Date: 8/22/2020

## 2019-10-30 RX ORDER — PROPRANOLOL HYDROCHLORIDE 60 MG/1
60 CAPSULE, EXTENDED RELEASE ORAL DAILY
Qty: 90 CAPSULE | Refills: 0 | Status: ON HOLD | OUTPATIENT
Start: 2019-10-30 | End: 2020-01-02 | Stop reason: HOSPADM

## 2019-10-30 RX ORDER — ALPRAZOLAM 1 MG/1
1 TABLET ORAL NIGHTLY
Qty: 30 TABLET | Refills: 4 | Status: SHIPPED | OUTPATIENT
Start: 2019-10-30 | End: 2020-05-13 | Stop reason: SDUPTHER

## 2019-11-01 ENCOUNTER — PATIENT MESSAGE (OUTPATIENT)
Dept: FAMILY MEDICINE | Facility: CLINIC | Age: 39
End: 2019-11-01

## 2019-11-01 NOTE — TELEPHONE ENCOUNTER
Last Office Visit Info:   The patient's last visit with Hermes Colvin MD was on 6/6/2019.    The patient's last visit in current department was on 9/3/2019.        Last CBC Results:   Lab Results   Component Value Date    WBC 7.90 09/01/2019    HGB 14.0 09/01/2019    HCT 40.1 09/01/2019     09/01/2019       Last CMP Results  Lab Results   Component Value Date     09/01/2019    K 4.0 09/01/2019     09/01/2019    CO2 25 09/01/2019    BUN 16 09/01/2019    CREATININE 1.1 09/01/2019    CALCIUM 9.2 09/01/2019    ALBUMIN 4.3 09/01/2019    AST 31 09/01/2019    ALT 73 (H) 09/01/2019       Last Lipids  Lab Results   Component Value Date    CHOL 191 12/19/2016    TRIG 170 (H) 12/19/2016    HDL 35 (L) 12/19/2016    LDLCALC 122.0 12/19/2016       Last A1C  No results found for: HGBA1C    Last TSH  Lab Results   Component Value Date    TSH 1.566 12/19/2016         Current Med Refills  Medication List with Changes/Refills   Current Medications    ALBUTEROL SULFATE (PROAIR RESPICLICK) 90 MCG/ACTUATION AEPB    Inhale 180 mcg into the lungs every 4 (four) hours. Rescue       Start Date: 9/18/2019 End Date: --    ALPRAZOLAM (XANAX) 1 MG TABLET    Take 1 tablet (1 mg total) by mouth every evening.       Start Date: 10/30/2019End Date: --    DESLORATADINE (CLARINEX) 5 MG TABLET    Take 1 tablet (5 mg total) by mouth once daily.       Start Date: 9/26/2019 End Date: 9/25/2020    FLUOXETINE 40 MG CAPSULE    Take 1 capsule (40 mg total) by mouth once daily.       Start Date: 8/23/2019 End Date: 8/22/2020    PROPRANOLOL (INDERAL LA) 60 MG 24 HR CAPSULE    Take 1 capsule (60 mg total) by mouth once daily.       Start Date: 10/30/2019End Date: 10/29/2020

## 2019-11-04 ENCOUNTER — HOSPITAL ENCOUNTER (EMERGENCY)
Facility: HOSPITAL | Age: 39
Discharge: HOME | End: 2019-11-04
Attending: EMERGENCY MEDICINE
Payer: COMMERCIAL

## 2019-11-04 VITALS
SYSTOLIC BLOOD PRESSURE: 106 MMHG | OXYGEN SATURATION: 96 % | TEMPERATURE: 99 F | WEIGHT: 170 LBS | RESPIRATION RATE: 18 BRPM | BODY MASS INDEX: 31.28 KG/M2 | HEART RATE: 94 BPM | HEIGHT: 62 IN | DIASTOLIC BLOOD PRESSURE: 66 MMHG

## 2019-11-04 DIAGNOSIS — R10.9 FLANK PAIN: Primary | ICD-10-CM

## 2019-11-04 DIAGNOSIS — R52 GENERALIZED BODY ACHES: ICD-10-CM

## 2019-11-04 DIAGNOSIS — R11.2 NON-INTRACTABLE VOMITING WITH NAUSEA, UNSPECIFIED VOMITING TYPE: ICD-10-CM

## 2019-11-04 DIAGNOSIS — R10.9 LEFT FLANK PAIN: ICD-10-CM

## 2019-11-04 LAB
ALBUMIN SERPL BCP-MCNC: 4.5 G/DL (ref 3.5–5.2)
ALP SERPL-CCNC: 63 U/L (ref 55–135)
ALT SERPL W/O P-5'-P-CCNC: 67 U/L (ref 10–44)
ANION GAP SERPL CALC-SCNC: 9 MMOL/L (ref 8–16)
AST SERPL-CCNC: 29 U/L (ref 10–40)
B-HCG UR QL: NEGATIVE
BACTERIA #/AREA URNS HPF: ABNORMAL /HPF
BASOPHILS # BLD AUTO: 0.01 K/UL (ref 0–0.2)
BASOPHILS NFR BLD: 0.1 % (ref 0–1.9)
BILIRUB SERPL-MCNC: 0.7 MG/DL (ref 0.1–1)
BILIRUB UR QL STRIP: NEGATIVE
BUN SERPL-MCNC: 16 MG/DL (ref 6–20)
CALCIUM SERPL-MCNC: 8.8 MG/DL (ref 8.7–10.5)
CHLORIDE SERPL-SCNC: 104 MMOL/L (ref 95–110)
CLARITY UR: ABNORMAL
CO2 SERPL-SCNC: 25 MMOL/L (ref 23–29)
COLOR UR: YELLOW
CREAT SERPL-MCNC: 0.8 MG/DL (ref 0.5–1.4)
CTP QC/QA: YES
CTP QC/QA: YES
DIFFERENTIAL METHOD: ABNORMAL
EOSINOPHIL # BLD AUTO: 0.1 K/UL (ref 0–0.5)
EOSINOPHIL NFR BLD: 0.6 % (ref 0–8)
ERYTHROCYTE [DISTWIDTH] IN BLOOD BY AUTOMATED COUNT: 12.4 % (ref 11.5–14.5)
EST. GFR  (AFRICAN AMERICAN): >60 ML/MIN/1.73 M^2
EST. GFR  (NON AFRICAN AMERICAN): >60 ML/MIN/1.73 M^2
FLUAV AG NPH QL: NEGATIVE
FLUBV AG NPH QL: NEGATIVE
GLUCOSE SERPL-MCNC: 128 MG/DL (ref 70–110)
GLUCOSE UR QL STRIP: NEGATIVE
HCT VFR BLD AUTO: 45.4 % (ref 37–48.5)
HGB BLD-MCNC: 15.4 G/DL (ref 12–16)
HGB UR QL STRIP: ABNORMAL
HYALINE CASTS #/AREA URNS LPF: 0 /LPF
IMM GRANULOCYTES # BLD AUTO: 0.07 K/UL (ref 0–0.04)
IMM GRANULOCYTES NFR BLD AUTO: 0.7 % (ref 0–0.5)
KETONES UR QL STRIP: NEGATIVE
LACTATE SERPL-SCNC: 1.9 MMOL/L (ref 0.5–2.2)
LACTATE SERPL-SCNC: 2.9 MMOL/L (ref 0.5–2.2)
LEUKOCYTE ESTERASE UR QL STRIP: NEGATIVE
LIPASE SERPL-CCNC: 17 U/L (ref 4–60)
LYMPHOCYTES # BLD AUTO: 0.8 K/UL (ref 1–4.8)
LYMPHOCYTES NFR BLD: 7.4 % (ref 18–48)
MCH RBC QN AUTO: 30.9 PG (ref 27–31)
MCHC RBC AUTO-ENTMCNC: 33.9 G/DL (ref 32–36)
MCV RBC AUTO: 91 FL (ref 82–98)
MICROSCOPIC COMMENT: ABNORMAL
MONOCYTES # BLD AUTO: 0.6 K/UL (ref 0.3–1)
MONOCYTES NFR BLD: 5.8 % (ref 4–15)
NEUTROPHILS # BLD AUTO: 8.8 K/UL (ref 1.8–7.7)
NEUTROPHILS NFR BLD: 85.4 % (ref 38–73)
NITRITE UR QL STRIP: NEGATIVE
NRBC BLD-RTO: 0 /100 WBC
PH UR STRIP: 5 [PH] (ref 5–8)
PLATELET # BLD AUTO: 201 K/UL (ref 150–350)
PMV BLD AUTO: 10.3 FL (ref 9.2–12.9)
POTASSIUM SERPL-SCNC: 4 MMOL/L (ref 3.5–5.1)
PROT SERPL-MCNC: 7.4 G/DL (ref 6–8.4)
PROT UR QL STRIP: ABNORMAL
RBC # BLD AUTO: 4.99 M/UL (ref 4–5.4)
RBC #/AREA URNS HPF: 3 /HPF (ref 0–4)
SODIUM SERPL-SCNC: 138 MMOL/L (ref 136–145)
SP GR UR STRIP: 1.03 (ref 1–1.03)
SQUAMOUS #/AREA URNS HPF: 10 /HPF
URN SPEC COLLECT METH UR: ABNORMAL
UROBILINOGEN UR STRIP-ACNC: NEGATIVE EU/DL
WBC # BLD AUTO: 10.27 K/UL (ref 3.9–12.7)
WBC #/AREA URNS HPF: 5 /HPF (ref 0–5)

## 2019-11-04 PROCEDURE — 63600175 PHARM REV CODE 636 W HCPCS: Performed by: PHYSICIAN ASSISTANT

## 2019-11-04 PROCEDURE — 93010 ELECTROCARDIOGRAM REPORT: CPT | Mod: ,,, | Performed by: INTERNAL MEDICINE

## 2019-11-04 PROCEDURE — 93005 ELECTROCARDIOGRAM TRACING: CPT

## 2019-11-04 PROCEDURE — 25000003 PHARM REV CODE 250: Performed by: PHYSICIAN ASSISTANT

## 2019-11-04 PROCEDURE — 83605 ASSAY OF LACTIC ACID: CPT | Mod: 91

## 2019-11-04 PROCEDURE — 87040 BLOOD CULTURE FOR BACTERIA: CPT | Mod: 59

## 2019-11-04 PROCEDURE — 87502 INFLUENZA DNA AMP PROBE: CPT

## 2019-11-04 PROCEDURE — 96376 TX/PRO/DX INJ SAME DRUG ADON: CPT

## 2019-11-04 PROCEDURE — 83605 ASSAY OF LACTIC ACID: CPT

## 2019-11-04 PROCEDURE — 96365 THER/PROPH/DIAG IV INF INIT: CPT

## 2019-11-04 PROCEDURE — 81025 URINE PREGNANCY TEST: CPT | Performed by: PHYSICIAN ASSISTANT

## 2019-11-04 PROCEDURE — 96361 HYDRATE IV INFUSION ADD-ON: CPT

## 2019-11-04 PROCEDURE — 93010 EKG 12-LEAD: ICD-10-PCS | Mod: ,,, | Performed by: INTERNAL MEDICINE

## 2019-11-04 PROCEDURE — 99285 EMERGENCY DEPT VISIT HI MDM: CPT | Mod: 25

## 2019-11-04 PROCEDURE — 96375 TX/PRO/DX INJ NEW DRUG ADDON: CPT

## 2019-11-04 PROCEDURE — 80053 COMPREHEN METABOLIC PANEL: CPT

## 2019-11-04 PROCEDURE — 81000 URINALYSIS NONAUTO W/SCOPE: CPT

## 2019-11-04 PROCEDURE — 85025 COMPLETE CBC W/AUTO DIFF WBC: CPT

## 2019-11-04 PROCEDURE — 83690 ASSAY OF LIPASE: CPT

## 2019-11-04 RX ORDER — DICYCLOMINE HYDROCHLORIDE 20 MG/1
20 TABLET ORAL 4 TIMES DAILY
Qty: 28 TABLET | Refills: 0 | Status: SHIPPED | OUTPATIENT
Start: 2019-11-04 | End: 2019-11-11

## 2019-11-04 RX ORDER — PROMETHAZINE HYDROCHLORIDE 25 MG/1
25 SUPPOSITORY RECTAL EVERY 6 HOURS PRN
Qty: 15 SUPPOSITORY | Refills: 0 | Status: SHIPPED | OUTPATIENT
Start: 2019-11-04 | End: 2019-11-09

## 2019-11-04 RX ORDER — ONDANSETRON 2 MG/ML
4 INJECTION INTRAMUSCULAR; INTRAVENOUS
Status: COMPLETED | OUTPATIENT
Start: 2019-11-04 | End: 2019-11-04

## 2019-11-04 RX ORDER — ACETAMINOPHEN 500 MG
1000 TABLET ORAL
Status: COMPLETED | OUTPATIENT
Start: 2019-11-04 | End: 2019-11-04

## 2019-11-04 RX ORDER — ONDANSETRON 2 MG/ML
8 INJECTION INTRAMUSCULAR; INTRAVENOUS
Status: COMPLETED | OUTPATIENT
Start: 2019-11-04 | End: 2019-11-04

## 2019-11-04 RX ORDER — OSELTAMIVIR PHOSPHATE 75 MG/1
75 CAPSULE ORAL 2 TIMES DAILY
Qty: 10 CAPSULE | Refills: 0 | Status: SHIPPED | OUTPATIENT
Start: 2019-11-04 | End: 2019-11-09

## 2019-11-04 RX ORDER — ONDANSETRON 4 MG/1
4 TABLET, ORALLY DISINTEGRATING ORAL EVERY 6 HOURS PRN
Qty: 15 TABLET | Refills: 0 | Status: SHIPPED | OUTPATIENT
Start: 2019-11-04 | End: 2019-11-09

## 2019-11-04 RX ORDER — HYDROMORPHONE HYDROCHLORIDE 2 MG/ML
0.5 INJECTION, SOLUTION INTRAMUSCULAR; INTRAVENOUS; SUBCUTANEOUS
Status: COMPLETED | OUTPATIENT
Start: 2019-11-04 | End: 2019-11-04

## 2019-11-04 RX ORDER — ACETAMINOPHEN 500 MG
500 TABLET ORAL EVERY 4 HOURS PRN
Qty: 20 TABLET | Refills: 0 | Status: SHIPPED | OUTPATIENT
Start: 2019-11-04 | End: 2019-11-09

## 2019-11-04 RX ORDER — DIPHENHYDRAMINE HCL 25 MG
25 CAPSULE ORAL
Status: COMPLETED | OUTPATIENT
Start: 2019-11-04 | End: 2019-11-04

## 2019-11-04 RX ADMIN — HYDROMORPHONE HYDROCHLORIDE 0.5 MG: 2 INJECTION, SOLUTION INTRAMUSCULAR; INTRAVENOUS; SUBCUTANEOUS at 06:11

## 2019-11-04 RX ADMIN — ACETAMINOPHEN 1000 MG: 500 TABLET ORAL at 05:11

## 2019-11-04 RX ADMIN — PROMETHAZINE HYDROCHLORIDE 6.25 MG: 25 INJECTION INTRAMUSCULAR; INTRAVENOUS at 07:11

## 2019-11-04 RX ADMIN — DIPHENHYDRAMINE HYDROCHLORIDE 25 MG: 25 CAPSULE ORAL at 08:11

## 2019-11-04 RX ADMIN — SODIUM CHLORIDE 2313 ML: 0.9 INJECTION, SOLUTION INTRAVENOUS at 05:11

## 2019-11-04 RX ADMIN — ONDANSETRON HYDROCHLORIDE 8 MG: 2 SOLUTION INTRAMUSCULAR; INTRAVENOUS at 05:11

## 2019-11-04 RX ADMIN — HYDROMORPHONE HYDROCHLORIDE 0.5 MG: 2 INJECTION, SOLUTION INTRAMUSCULAR; INTRAVENOUS; SUBCUTANEOUS at 07:11

## 2019-11-04 RX ADMIN — ONDANSETRON HYDROCHLORIDE 4 MG: 2 SOLUTION INTRAMUSCULAR; INTRAVENOUS at 06:11

## 2019-11-04 NOTE — ED TRIAGE NOTES
Patient with reports of back pain and n/v that started earlier today. Denies dysuria, hematuria, fever, chills.

## 2019-11-05 ENCOUNTER — PATIENT MESSAGE (OUTPATIENT)
Dept: FAMILY MEDICINE | Facility: CLINIC | Age: 39
End: 2019-11-05

## 2019-11-05 RX ORDER — PERMETHRIN 50 MG/G
CREAM TOPICAL ONCE
COMMUNITY
End: 2019-11-05 | Stop reason: SDUPTHER

## 2019-11-05 RX ORDER — PERMETHRIN 50 MG/G
CREAM TOPICAL ONCE
Qty: 60 G | Refills: 1 | Status: SHIPPED | OUTPATIENT
Start: 2019-11-05 | End: 2019-11-05

## 2019-11-05 NOTE — ED PROVIDER NOTES
Encounter Date: 11/4/2019       History     Chief Complaint   Patient presents with    Back Pain     Arrives to ER complaining of lower back pain, vomiting, reports hx of pyleonephritis. No fever, chills.     Emesis     CC: Back Pain; Emesis    HPI:   38 y/o female with history of HTN, nephrolithiasis presenting for evaluation of L flank pain that began this morning awakening her from sleep. 10/10 in severity worse with movement. Feels similar to episodes of pyelonephritis. Per pt, had stent placed previously Urology placed Stent and Dr. Epps- last one placed 2 years. She has had 4 episodes of diarrhea in 2 hours this morning and nausea and vomiting x 2 today. Reports subjective fever 100 F, nasal congestion, rhinorrhea. Reports she has only had one episode of urination today. Additionally reports rash few days ago to generalized worst to upper extremities and chest. nontender and nonpruritic.   Denies dysuria, hematuria, urinary urgency of frequency, pelvic pain, vaginal discharge, STI concern. Denies sick contacts, CP, SOB, difficulty breathing or swallowing, new soaps, lotions foods, emdications detergents, hematemesis, melena, hematochezia.         Review of patient's allergies indicates:   Allergen Reactions    Strawberry Anaphylaxis and Hives    Morphine Other (See Comments)     Burns stomach    Toradol [ketorolac]     Tramadol      Abdominal pain     Past Medical History:   Diagnosis Date    Abnormal uterine bleeding 4/18/2018    Acute encephalopathy 12/24/12    secondary to drug overdose    ARF (acute renal failure) 12/24/12    secondary to drug overdose    History of cardiac arrest 12/24/12    secondary to drug overdose    Hypertension     Kidney stones 1/8/2018    MRSA (methicillin resistant Staphylococcus aureus) 12/24/12    Nephritis     PMDD (premenstrual dysphoric disorder)     Polysubstance abuse 12/24/12    Systolic CHF, acute 12/24/12    secondary to drug overdose     UTI (urinary tract infection)      Past Surgical History:   Procedure Laterality Date    AUGMENTATION OF BREAST Bilateral 2001    CHOLECYSTECTOMY      TUBAL LIGATION      urinary stents       Family History   Problem Relation Age of Onset    Kidney disease Mother     Ovarian cancer Mother     Cancer Maternal Grandmother         throat    Cancer Maternal Grandfather         prostate    Kidney disease Daughter     Breast cancer Neg Hx     Colon cancer Neg Hx      Social History     Tobacco Use    Smoking status: Current Every Day Smoker     Packs/day: 0.50     Years: 20.00     Pack years: 10.00     Types: Cigarettes    Smokeless tobacco: Never Used   Substance Use Topics    Alcohol use: No    Drug use: No     Types: Cocaine, Methamphetamines     Comment: PCP, patient states she had not done drugs since 2012     Review of Systems   Constitutional: Positive for chills and fever.   HENT: Negative for congestion, ear pain, rhinorrhea and sore throat.    Eyes: Negative for redness.   Respiratory: Positive for cough.    Gastrointestinal: Positive for abdominal pain, nausea and vomiting.   Genitourinary: Positive for flank pain. Negative for dysuria, frequency, hematuria, pelvic pain, urgency and vaginal discharge.   Musculoskeletal: Negative for back pain and neck pain.   Skin: Positive for rash.   Neurological: Negative for speech difficulty, weakness and numbness.   Psychiatric/Behavioral: Negative for confusion.       Physical Exam     Initial Vitals [11/04/19 1613]   BP Pulse Resp Temp SpO2   125/60 (!) 118 20 99.8 °F (37.7 °C) 97 %      MAP       --         Physical Exam    Nursing note and vitals reviewed.  Constitutional: She appears well-developed and well-nourished.   HENT:   Head: Normocephalic.   Right Ear: External ear normal.   Left Ear: External ear normal.   Nose: Nose normal.   Mouth/Throat: Oropharynx is clear and moist.   Eyes: Conjunctivae are normal.   Cardiovascular: Normal rate and  regular rhythm. Exam reveals no gallop and no friction rub.    No murmur heard.  Pulmonary/Chest: Breath sounds normal. She has no wheezes. She has no rhonchi. She has no rales.   Abdominal: Soft. Bowel sounds are normal. She exhibits no distension. There is no tenderness. There is no rigidity, no rebound, no guarding, no CVA tenderness, no tenderness at McBurney's point and negative Larry's sign.   Significant left flank and LLQ ttp    Musculoskeletal: Normal range of motion.   TTP of lumbar paraspinal musculature     Lymphadenopathy:     She has no cervical adenopathy.   Neurological: She is alert. She has normal strength. No cranial nerve deficit or sensory deficit.   Skin: Skin is warm and dry.   Psychiatric: She has a normal mood and affect.         ED Course   Procedures  Labs Reviewed   URINALYSIS, REFLEX TO URINE CULTURE - Abnormal; Notable for the following components:       Result Value    Appearance, UA Cloudy (*)     Protein, UA 1+ (*)     Occult Blood UA 2+ (*)     All other components within normal limits    Narrative:     Preferred Collection Type->Urine, Clean Catch   CBC W/ AUTO DIFFERENTIAL - Abnormal; Notable for the following components:    Immature Granulocytes 0.7 (*)     Gran # (ANC) 8.8 (*)     Immature Grans (Abs) 0.07 (*)     Lymph # 0.8 (*)     Gran% 85.4 (*)     Lymph% 7.4 (*)     All other components within normal limits   COMPREHENSIVE METABOLIC PANEL - Abnormal; Notable for the following components:    Glucose 128 (*)     ALT 67 (*)     All other components within normal limits   LACTIC ACID, PLASMA - Abnormal; Notable for the following components:    Lactate (Lactic Acid) 2.9 (*)     All other components within normal limits   URINALYSIS MICROSCOPIC - Abnormal; Notable for the following components:    Bacteria Few (*)     All other components within normal limits    Narrative:     Preferred Collection Type->Urine, Clean Catch   CULTURE, BLOOD    Narrative:     Aerobic and anaerobic    CULTURE, BLOOD    Narrative:     Aerobic and anaerobic   LIPASE   LACTIC ACID, PLASMA   POCT URINE PREGNANCY   POCT INFLUENZA A/B        ECG Results          EKG 12-lead (Final result)  Result time 11/05/19 20:45:02    Final result by Interface, Lab In [facility] (11/05/19 20:45:02)                 Narrative:    Test Reason : R10.9,    Vent. Rate : 121 BPM     Atrial Rate : 121 BPM     P-R Int : 144 ms          QRS Dur : 076 ms      QT Int : 422 ms       P-R-T Axes : 057 060 043 degrees     QTc Int : 599 ms    Sinus tachycardia  ST and T wave abnormality, consider inferior ischemia  Abnormal ECG  When compared with ECG of 02-DEC-2018 21:13,  Significant changes have occurred  Confirmed by Michael Lema MD ([id number]) on 11/5/2019 8:44:52 PM    Referred By: SYLVESTER   SELF           Confirmed By:Michael Lema MD                            Imaging Results          CT Renal Stone Study ABD Pelvis WO (Final result)  Result time 11/04/19 19:02:04    Final result by Sara Horner MD (11/04/19 19:02:04)                 Impression:      Nonobstructing left lower pole renal calculus.  No hydronephrosis.    Cholecystectomy.    Hepatosplenomegaly.  Hepatic steatosis.      Electronically signed by: Sara Horner  Date:    11/04/2019  Time:    19:02             Narrative:    EXAMINATION:  CT RENAL STONE STUDY ABDOMEN PELVIS WITHOUT    CLINICAL HISTORY:  Flank pain    TECHNIQUE:  5 mm unenhanced axial images from the lung bases through the greater trochanters were performed.  Coronal and sagittal reformatted images were provided.    COMPARISON:  12/02/2018    FINDINGS:  Within the limits of a noncontrast examination, the pancreas, and adrenal glands are unremarkable.  The gallbladder is surgically absent..    The liver and spleen are enlarged.  There is fatty infiltration of the liver.    A 3 mm nonobstructing calculus seen at the lower pole of the left kidney.  There is no hydronephrosis.    There is no gross abdominal  adenopathy or ascites. A tiny fat containing umbilical hernia is present.    There are no pelvic masses or adenopathy.  There is no free pelvic fluid.    At the lung bases, there is mild bibasilar atelectatic change.  Bilateral breast prostheses are noted.                               X-Ray Chest AP Portable (Final result)  Result time 11/04/19 17:49:36    Final result by Dinesh Hung MD (11/04/19 17:49:36)                 Impression:      Normal.      Electronically signed by: Dinesh Hung  Date:    11/04/2019  Time:    17:49             Narrative:    EXAMINATION:  XR CHEST AP PORTABLE    CLINICAL HISTORY:  Sepsis;    TECHNIQUE:  Single frontal view of the chest was performed.    COMPARISON:  09/18/2019 chest    FINDINGS:  Single AP portable view at 17:05.    The heart is normal size.  Hilar shadows and lungs are normal.  Trachea appears normal.  No pneumothorax or pleural effusion or abdominal free air or fracture.                                 Medical Decision Making:   Initial Assessment:   39-year-old female with history of nephrolithiasis and hypertension presenting for evaluation of left flank pain  Patient is febrile with temperature upper limits of normal, tachycardic.  UPT negative.  UA negative for infection.  Remarkable for few bacteria and 2 + occult blood.  CC the leukocytosis.  CMP without renal insufficiency.  Dilaudid and Zofran ordered.  1920 pt still having pain and nausea. Additional Dilaudid and phenergan ordered.  Influenza swab is negative. CT with nonobstructing calculus in the lower pole of the left kidney with no hydronephrosis.  Doubt that this is etiology of patient's pain at this time.  No evidence of bowel obstruction, diverticulitis, acute surgical abdomen.  Initial lactate elevated.  Repeat lactate normal.  Considered but doubt sepsis at this time.  Improved after Dilaudid.  Patient tolerating p.o..  The patient follow up with primary care and GI, Urology for further  evaluation management. tx with tamiflu for flu like symptoms. Pain control and nausea control. Return ER for worsening symptoms or as needed    Discussed with Dr. Spivey who agrees with assessment and plan.                       Clinical Impression:       ICD-10-CM ICD-9-CM   1. Flank pain R10.9 789.09   2. Left flank pain R10.9 789.09   3. Non-intractable vomiting with nausea, unspecified vomiting type R11.2 787.01   4. Generalized body aches R52 780.96                                Callie Godinez PA-C  11/07/19 155

## 2019-11-05 NOTE — DISCHARGE INSTRUCTIONS
Take Bentyl for abdominal pain, Tylenol for pain, Zofran and Phenergan for nausea as prescribed.   Take Tamiflu for possible flu as prescribed. Follow up with primary care in 2 days. Return to ER for worsening symptoms, inability to tolerate by mouth or as needed.

## 2019-11-05 NOTE — TELEPHONE ENCOUNTER
Last Office Visit Info:   The patient's last visit with  was on 9/3/2019.    The patient's last visit in current department was on 9/3/2019.        Last CBC Results:   Lab Results   Component Value Date    WBC 10.27 11/04/2019    HGB 15.4 11/04/2019    HCT 45.4 11/04/2019     11/04/2019       Last CMP Results  Lab Results   Component Value Date     11/04/2019    K 4.0 11/04/2019     11/04/2019    CO2 25 11/04/2019    BUN 16 11/04/2019    CREATININE 0.8 11/04/2019    CALCIUM 8.8 11/04/2019    ALBUMIN 4.5 11/04/2019    AST 29 11/04/2019    ALT 67 (H) 11/04/2019       Last Lipids  Lab Results   Component Value Date    CHOL 191 12/19/2016    TRIG 170 (H) 12/19/2016    HDL 35 (L) 12/19/2016    LDLCALC 122.0 12/19/2016       Last A1C  No results found for: HGBA1C    Last TSH  Lab Results   Component Value Date    TSH 1.566 12/19/2016         Current Med Refills  Medication List with Changes/Refills   Current Medications    ACETAMINOPHEN (TYLENOL) 500 MG TABLET    Take 1 tablet (500 mg total) by mouth every 4 (four) hours as needed.       Start Date: 11/4/2019 End Date: 11/9/2019    ALBUTEROL SULFATE (PROAIR RESPICLICK) 90 MCG/ACTUATION AEPB    Inhale 180 mcg into the lungs every 4 (four) hours. Rescue       Start Date: 9/18/2019 End Date: --    ALPRAZOLAM (XANAX) 1 MG TABLET    Take 1 tablet (1 mg total) by mouth every evening.       Start Date: 10/30/2019End Date: --    DESLORATADINE (CLARINEX) 5 MG TABLET    Take 1 tablet (5 mg total) by mouth once daily.       Start Date: 9/26/2019 End Date: 9/25/2020    DICYCLOMINE (BENTYL) 20 MG TABLET    Take 1 tablet (20 mg total) by mouth 4 (four) times daily. for 7 days       Start Date: 11/4/2019 End Date: 11/11/2019    FLUOXETINE 40 MG CAPSULE    Take 1 capsule (40 mg total) by mouth once daily.       Start Date: 8/23/2019 End Date: 8/22/2020    ONDANSETRON (ZOFRAN-ODT) 4 MG TBDL    Take 1 tablet (4 mg total) by mouth every 6 (six) hours as needed.        Start Date: 11/4/2019 End Date: 11/9/2019    OSELTAMIVIR (TAMIFLU) 75 MG CAPSULE    Take 1 capsule (75 mg total) by mouth 2 (two) times daily. for 5 days       Start Date: 11/4/2019 End Date: 11/9/2019    PROMETHAZINE (PHENERGAN) 25 MG SUPPOSITORY    Place 1 suppository (25 mg total) rectally every 6 (six) hours as needed for Nausea.       Start Date: 11/4/2019 End Date: 11/9/2019    PROPRANOLOL (INDERAL LA) 60 MG 24 HR CAPSULE    Take 1 capsule (60 mg total) by mouth once daily.       Start Date: 10/30/2019End Date: 10/29/2020

## 2019-11-06 ENCOUNTER — PATIENT MESSAGE (OUTPATIENT)
Dept: FAMILY MEDICINE | Facility: CLINIC | Age: 39
End: 2019-11-06

## 2019-11-07 DIAGNOSIS — R11.0 NAUSEA: ICD-10-CM

## 2019-11-07 DIAGNOSIS — F32.81 PMDD (PREMENSTRUAL DYSPHORIC DISORDER): ICD-10-CM

## 2019-11-09 LAB
BACTERIA BLD CULT: NORMAL
BACTERIA BLD CULT: NORMAL

## 2019-11-11 RX ORDER — ALPRAZOLAM 1 MG/1
TABLET ORAL
Qty: 20 TABLET | OUTPATIENT
Start: 2019-11-11

## 2019-11-18 ENCOUNTER — PATIENT MESSAGE (OUTPATIENT)
Dept: FAMILY MEDICINE | Facility: CLINIC | Age: 39
End: 2019-11-18

## 2019-11-18 DIAGNOSIS — R16.2 HEPATOSPLENOMEGALY: ICD-10-CM

## 2019-11-20 ENCOUNTER — OFFICE VISIT (OUTPATIENT)
Dept: FAMILY MEDICINE | Facility: CLINIC | Age: 39
End: 2019-11-20
Payer: COMMERCIAL

## 2019-11-20 VITALS
DIASTOLIC BLOOD PRESSURE: 76 MMHG | HEIGHT: 62 IN | WEIGHT: 174.19 LBS | OXYGEN SATURATION: 97 % | HEART RATE: 104 BPM | TEMPERATURE: 100 F | SYSTOLIC BLOOD PRESSURE: 110 MMHG | BODY MASS INDEX: 32.05 KG/M2

## 2019-11-20 DIAGNOSIS — J20.9 ACUTE BRONCHITIS, UNSPECIFIED ORGANISM: Primary | ICD-10-CM

## 2019-11-20 DIAGNOSIS — F17.210 CIGARETTE SMOKER: ICD-10-CM

## 2019-11-20 PROCEDURE — 3078F DIAST BP <80 MM HG: CPT | Mod: CPTII,S$GLB,, | Performed by: NURSE PRACTITIONER

## 2019-11-20 PROCEDURE — 3074F PR MOST RECENT SYSTOLIC BLOOD PRESSURE < 130 MM HG: ICD-10-PCS | Mod: CPTII,S$GLB,, | Performed by: NURSE PRACTITIONER

## 2019-11-20 PROCEDURE — 3074F SYST BP LT 130 MM HG: CPT | Mod: CPTII,S$GLB,, | Performed by: NURSE PRACTITIONER

## 2019-11-20 PROCEDURE — 99999 PR PBB SHADOW E&M-EST. PATIENT-LVL III: ICD-10-PCS | Mod: PBBFAC,,, | Performed by: NURSE PRACTITIONER

## 2019-11-20 PROCEDURE — 3008F PR BODY MASS INDEX (BMI) DOCUMENTED: ICD-10-PCS | Mod: CPTII,S$GLB,, | Performed by: NURSE PRACTITIONER

## 2019-11-20 PROCEDURE — 99214 PR OFFICE/OUTPT VISIT, EST, LEVL IV, 30-39 MIN: ICD-10-PCS | Mod: S$GLB,,, | Performed by: NURSE PRACTITIONER

## 2019-11-20 PROCEDURE — 3078F PR MOST RECENT DIASTOLIC BLOOD PRESSURE < 80 MM HG: ICD-10-PCS | Mod: CPTII,S$GLB,, | Performed by: NURSE PRACTITIONER

## 2019-11-20 PROCEDURE — 99999 PR PBB SHADOW E&M-EST. PATIENT-LVL III: CPT | Mod: PBBFAC,,, | Performed by: NURSE PRACTITIONER

## 2019-11-20 PROCEDURE — 99214 OFFICE O/P EST MOD 30 MIN: CPT | Mod: S$GLB,,, | Performed by: NURSE PRACTITIONER

## 2019-11-20 PROCEDURE — 3008F BODY MASS INDEX DOCD: CPT | Mod: CPTII,S$GLB,, | Performed by: NURSE PRACTITIONER

## 2019-11-20 RX ORDER — PREDNISONE 20 MG/1
60 TABLET ORAL DAILY
Qty: 15 TABLET | Refills: 0 | Status: ON HOLD | OUTPATIENT
Start: 2019-11-20 | End: 2020-01-02 | Stop reason: HOSPADM

## 2019-11-20 RX ORDER — FLUTICASONE PROPIONATE 50 MCG
1 SPRAY, SUSPENSION (ML) NASAL DAILY
Qty: 15.8 ML | Refills: 0 | Status: ON HOLD | OUTPATIENT
Start: 2019-11-20 | End: 2020-01-02 | Stop reason: HOSPADM

## 2019-11-20 RX ORDER — AMOXICILLIN AND CLAVULANATE POTASSIUM 875; 125 MG/1; MG/1
1 TABLET, FILM COATED ORAL EVERY 12 HOURS
Qty: 14 TABLET | Refills: 0 | Status: ON HOLD | OUTPATIENT
Start: 2019-11-20 | End: 2020-01-02 | Stop reason: HOSPADM

## 2019-11-20 RX ORDER — PROMETHAZINE HYDROCHLORIDE AND DEXTROMETHORPHAN HYDROBROMIDE 6.25; 15 MG/5ML; MG/5ML
5 SYRUP ORAL NIGHTLY PRN
Qty: 118 ML | Refills: 0 | Status: SHIPPED | OUTPATIENT
Start: 2019-11-20 | End: 2019-11-30

## 2019-11-20 RX ORDER — DESLORATADINE 5 MG/1
5 TABLET ORAL DAILY
Qty: 30 TABLET | Refills: 1 | Status: ON HOLD | OUTPATIENT
Start: 2019-11-20 | End: 2020-01-02 | Stop reason: HOSPADM

## 2019-11-20 NOTE — PROGRESS NOTES
Routine Office Visit    Patient Name: Roslyn Brewster    : 1980  MRN: [id number]    Chief Complaint:  Cough    Subjective:  Roslyn is a 39 y.o. female who presents today for:    1.  Cough - patient reports today for number of complaints.  States that 5 days ago she developed nasal congestion, runny nose, as well as green nasal discharge, and now she feels like her symptoms have moved to my chest.  Endorses a productive cough as well as shortness of breath in the last 3-4 days.  States that she had a fever of 100.8 yesterday.  She took her temperature this morning and got 100° so she took a Tylenol today.  She denies any wheezing, chest pain, or palpitations.  She has been recently treated for pneumonia with antibiotics in the last 2 months.  She is a smoker and states that she smokes less than a half pack per day, and she has recently significantly cut down the amount of cigarettes she is smoking.  She has a ProAir inhaler which she used once yesterday for the symptoms which helped.  She has not gotten the flu shot this year.  She states that multiple people at her office have been sick with similar symptoms.  Patient is new to me and this is the extent of her concerns at this time.    Past Medical History  Past Medical History:   Diagnosis Date    Abnormal uterine bleeding 2018    Acute encephalopathy 12    secondary to drug overdose    ARF (acute renal failure) 12    secondary to drug overdose    History of cardiac arrest 12    secondary to drug overdose    Hypertension     Kidney stones 2018    MRSA (methicillin resistant Staphylococcus aureus) 12    Nephritis     PMDD (premenstrual dysphoric disorder)     Polysubstance abuse 12    Systolic CHF, acute 12    secondary to drug overdose    UTI (urinary tract infection)        Past Surgical History  Past Surgical History:   Procedure Laterality Date    AUGMENTATION OF BREAST Bilateral      CHOLECYSTECTOMY      TUBAL LIGATION      urinary stents         Family History  Family History   Problem Relation Age of Onset    Kidney disease Mother     Ovarian cancer Mother     Cancer Maternal Grandmother         throat    Cancer Maternal Grandfather         prostate    Kidney disease Daughter     Breast cancer Neg Hx     Colon cancer Neg Hx        Social History  Social History     Socioeconomic History    Marital status: Single     Spouse name: Not on file    Number of children: Not on file    Years of education: Not on file    Highest education level: Not on file   Occupational History    Not on file   Social Needs    Financial resource strain: Not on file    Food insecurity:     Worry: Not on file     Inability: Not on file    Transportation needs:     Medical: Not on file     Non-medical: Not on file   Tobacco Use    Smoking status: Current Every Day Smoker     Packs/day: 0.50     Years: 20.00     Pack years: 10.00     Types: Cigarettes    Smokeless tobacco: Never Used   Substance and Sexual Activity    Alcohol use: No    Drug use: No     Types: Cocaine, Methamphetamines     Comment: PCP, patient states she had not done drugs since 2012    Sexual activity: Yes     Partners: Male   Lifestyle    Physical activity:     Days per week: Not on file     Minutes per session: Not on file    Stress: Not on file   Relationships    Social connections:     Talks on phone: Not on file     Gets together: Not on file     Attends [employer] service: Not on file     Active member of club or organization: Not on file     Attends meetings of clubs or organizations: Not on file     Relationship status: Not on file   Other Topics Concern    Not on file   Social History Narrative    Not on file       Current Medications  Current Outpatient Medications on File Prior to Visit   Medication Sig Dispense Refill    albuterol sulfate (PROAIR RESPICLICK) 90 mcg/actuation AePB Inhale 180 mcg into the lungs  "every 4 (four) hours. Rescue 1 each 0    ALPRAZolam (XANAX) 1 MG tablet Take 1 tablet (1 mg total) by mouth every evening. 30 tablet 4    desloratadine (CLARINEX) 5 mg tablet Take 1 tablet (5 mg total) by mouth once daily. 90 tablet 0    FLUoxetine 40 MG capsule Take 1 capsule (40 mg total) by mouth once daily. 90 capsule 3    propranolol (INDERAL LA) 60 MG 24 hr capsule Take 1 capsule (60 mg total) by mouth once daily. 90 capsule 0     No current facility-administered medications on file prior to visit.        Allergies   Review of patient's allergies indicates:   Allergen Reactions    Strawberry Anaphylaxis and Hives    Morphine Other (See Comments)     Burns stomach    Toradol [ketorolac]     Tramadol      Abdominal pain       Review of Systems (Pertinent positives)  Review of Systems   Constitutional: Positive for chills and fever. Negative for diaphoresis, malaise/fatigue and weight loss.   HENT: Positive for congestion. Negative for ear discharge, ear pain, hearing loss, nosebleeds, sinus pain, sore throat and tinnitus.         +rhinorrhea, +nasal congestion   Eyes: Negative.    Respiratory: Positive for cough, sputum production and shortness of breath. Negative for hemoptysis, wheezing and stridor.    Cardiovascular: Negative for chest pain, palpitations, orthopnea, claudication, leg swelling and PND.   Gastrointestinal: Negative.    Genitourinary: Negative.    Musculoskeletal: Negative.    Neurological: Negative.    Endo/Heme/Allergies: Negative.    Psychiatric/Behavioral: Negative.        /76 (BP Location: Right arm)   Pulse 104   Temp 99.6 °F (37.6 °C) (Oral)   Ht 5' 2" (1.575 m)   Wt 79 kg (174 lb 2.6 oz)   SpO2 97%   BMI 31.85 kg/m²     Physical Exam   Constitutional: She is oriented to person, place, and time. She appears well-developed and well-nourished.  Non-toxic appearance. She does not have a sickly appearance. She does not appear ill. No distress.   Patient resting comfortably " on examination table, in no acute distress, conversing appropriately   HENT:   Head: Normocephalic and atraumatic.   Right Ear: Tympanic membrane, external ear and ear canal normal.   Left Ear: Tympanic membrane, external ear and ear canal normal.   Nose: Mucosal edema and rhinorrhea present. Right sinus exhibits no maxillary sinus tenderness and no frontal sinus tenderness. Left sinus exhibits no maxillary sinus tenderness and no frontal sinus tenderness.   Mouth/Throat: Uvula is midline and mucous membranes are normal. No oropharyngeal exudate, posterior oropharyngeal edema, posterior oropharyngeal erythema or tonsillar abscesses. Tonsils are 0 on the right. Tonsils are 0 on the left. No tonsillar exudate.       Eyes: Pupils are equal, round, and reactive to light. Conjunctivae and EOM are normal.   Neck: Normal range of motion. Neck supple. No JVD present.   Cardiovascular: Normal rate, regular rhythm, normal heart sounds and intact distal pulses. Exam reveals no gallop and no friction rub.   No murmur heard.  Clinically euvolemic no JVD no lower extremity swelling   Pulmonary/Chest: Effort normal. No accessory muscle usage or stridor. No tachypnea. No respiratory distress. She has decreased breath sounds in the right lower field and the left lower field. She has wheezes in the right lower field and the left lower field. She has no rhonchi. She has no rales.   Abdominal: Soft. Bowel sounds are normal. She exhibits no distension and no mass. There is no tenderness. There is no rebound and no guarding. No hernia.   Musculoskeletal: Normal range of motion.   Lymphadenopathy:     She has no cervical adenopathy.   Neurological: She is alert and oriented to person, place, and time.   Skin: Skin is warm and dry. Capillary refill takes less than 2 seconds. She is not diaphoretic.   Vitals reviewed.       Assessment/Plan:  Roslyn Brewster is a 39 y.o. female who presents today for :    Roslyn was seen today for cough  and chest congestion.    Diagnoses and all orders for this visit:    Acute bronchitis, unspecified organism  -     X-Ray Chest PA And Lateral; Future  -     predniSONE (DELTASONE) 20 MG tablet; Take 3 tablets (60 mg total) by mouth once daily.  -     fluticasone propionate (FLONASE) 50 mcg/actuation nasal spray; 1 spray (50 mcg total) by Each Nostril route once daily.  -     desloratadine (CLARINEX) 5 mg tablet; Take 1 tablet (5 mg total) by mouth once daily.  -     promethazine-dextromethorphan (PROMETHAZINE-DM) 6.25-15 mg/5 mL Syrp; Take 5 mLs by mouth nightly as needed.  -     amoxicillin-clavulanate 875-125mg (AUGMENTIN) 875-125 mg per tablet; Take 1 tablet by mouth every 12 (twelve) hours.    Cigarette smoker     Patient has recently been treated for pneumonia with antibiotics in the past 2 months.  Will recheck chest x-ray given that she does have fevers.  Chest x-ray negative for any acute process.  Will send steroids for wheezing and antibiotics since patient is a smoker and is more than likely a colonizer.  She has slight wheezes on exam.  I offered to give the patient a breathing treatment today, however she refused this at this time.  I offered to send the patient to smoking cessation counseling, however she states that she already saw them.  She states that she has significantly cut down the amount of cigarettes she is smoking, so she will just continue with the recommendations from this previous visit.  She denies any referral smoking cessation counseling at this time.  Will add Flonase and Clarinex daily to assist with upper respiratory symptoms.  Patient states that she has Tessalon at her house and I encouraged her to take these during the day.  She is requesting a refill of her Phenergan DM which I will provide.  Encouraged the patient to only take this medication at night and to not drive or operate machinery while on this medication as this medication can make her sleepy.  Encouraged patient to  rest and stay hydrated.  Use warm salt water gargles or tea with honey as needed for any sore throat/postnasal drip.  She is to follow-up if symptoms worsen or do not improve.  Go to the emergency room for any shortness of breath or wheezing unrelieved by albuterol.  Patient verbalized understanding of instructions.        This office note has been dictated.  This dictation has been generated using M-Modal Fluency Direct dictation; some phonetic errors may occur.   My collaborating physician is Dr. Hermes Colvin.

## 2019-11-22 ENCOUNTER — TELEPHONE (OUTPATIENT)
Dept: SURGERY | Facility: CLINIC | Age: 39
End: 2019-11-22

## 2019-11-22 NOTE — TELEPHONE ENCOUNTER
Pt notified of Dr. Gil's next availabel appt.  She is going to call [facility] to get a sooner appt.        ----- Message from Nicole Metcalf sent at 11/22/2019  2:57 PM CST -----  Contact: ANNAMARIA GALARZA [[id number]]  [phone]    Patient is being referred by Dr. Spring Montilla to [facility] for a Consult.

## 2019-11-25 ENCOUNTER — TELEPHONE (OUTPATIENT)
Dept: FAMILY MEDICINE | Facility: CLINIC | Age: 39
End: 2019-11-25

## 2019-11-25 NOTE — TELEPHONE ENCOUNTER
No answer, unable to leave message. Mailing letter for patient to schedule her Gastro appointment. Thanks.

## 2019-12-03 ENCOUNTER — PATIENT OUTREACH (OUTPATIENT)
Dept: ADMINISTRATIVE | Facility: OTHER | Age: 39
End: 2019-12-03

## 2019-12-05 ENCOUNTER — OFFICE VISIT (OUTPATIENT)
Dept: GASTROENTEROLOGY | Facility: CLINIC | Age: 39
End: 2019-12-05
Payer: COMMERCIAL

## 2019-12-05 VITALS
BODY MASS INDEX: 31.57 KG/M2 | WEIGHT: 172.63 LBS | HEART RATE: 96 BPM | DIASTOLIC BLOOD PRESSURE: 80 MMHG | SYSTOLIC BLOOD PRESSURE: 111 MMHG

## 2019-12-05 DIAGNOSIS — R10.9 ABDOMINAL PAIN, UNSPECIFIED ABDOMINAL LOCATION: Primary | ICD-10-CM

## 2019-12-05 PROCEDURE — 3074F PR MOST RECENT SYSTOLIC BLOOD PRESSURE < 130 MM HG: ICD-10-PCS | Mod: CPTII,S$GLB,, | Performed by: INTERNAL MEDICINE

## 2019-12-05 PROCEDURE — 99999 PR PBB SHADOW E&M-EST. PATIENT-LVL III: CPT | Mod: PBBFAC,,, | Performed by: INTERNAL MEDICINE

## 2019-12-05 PROCEDURE — 3008F PR BODY MASS INDEX (BMI) DOCUMENTED: ICD-10-PCS | Mod: CPTII,S$GLB,, | Performed by: INTERNAL MEDICINE

## 2019-12-05 PROCEDURE — 99204 OFFICE O/P NEW MOD 45 MIN: CPT | Mod: S$GLB,,, | Performed by: INTERNAL MEDICINE

## 2019-12-05 PROCEDURE — 3079F DIAST BP 80-89 MM HG: CPT | Mod: CPTII,S$GLB,, | Performed by: INTERNAL MEDICINE

## 2019-12-05 PROCEDURE — 3008F BODY MASS INDEX DOCD: CPT | Mod: CPTII,S$GLB,, | Performed by: INTERNAL MEDICINE

## 2019-12-05 PROCEDURE — 99999 PR PBB SHADOW E&M-EST. PATIENT-LVL III: ICD-10-PCS | Mod: PBBFAC,,, | Performed by: INTERNAL MEDICINE

## 2019-12-05 PROCEDURE — 3074F SYST BP LT 130 MM HG: CPT | Mod: CPTII,S$GLB,, | Performed by: INTERNAL MEDICINE

## 2019-12-05 PROCEDURE — 3079F PR MOST RECENT DIASTOLIC BLOOD PRESSURE 80-89 MM HG: ICD-10-PCS | Mod: CPTII,S$GLB,, | Performed by: INTERNAL MEDICINE

## 2019-12-05 PROCEDURE — 99204 PR OFFICE/OUTPT VISIT, NEW, LEVL IV, 45-59 MIN: ICD-10-PCS | Mod: S$GLB,,, | Performed by: INTERNAL MEDICINE

## 2019-12-05 RX ORDER — OMEPRAZOLE 40 MG/1
40 CAPSULE, DELAYED RELEASE ORAL DAILY
Qty: 30 CAPSULE | Refills: 11 | Status: SHIPPED | OUTPATIENT
Start: 2019-12-05 | End: 2020-11-17 | Stop reason: ALTCHOICE

## 2019-12-05 NOTE — PATIENT INSTRUCTIONS
EGD Prep Instructions    [facility] [address] Clinic Office [phone]  Endoscopy Lab [phone]    You are scheduled for an EGD with Dr. Tripathi on 1/2/2020at [facility].  You will check in at Admit on the first floor of the hospital. Please contact the office two days before procedure date to reschedule if needed.    You may not have anything to eat after 7pm and nothing to drink after midnight.  You can drink clear liquids between 7pm and midnight.    You MAY take your blood pressure, heart, and seizure medication on the morning of the procedure, with a SIP of water.  Hold ALL other medications until after the procedure.    If you are on blood thinners THAT YOU HAVE BEEN INSTRUCTED TO HOLD BY YOUR DOCTOR FOR THIS PROCEDURE, then do NOT take this the morning of your EGD.  Do NOT stop these medications on your own, they must be approved to be held by your doctor.  Your EGD can NOT be done if you are on these medications.  Examples of blood thinners include: Coumadin, Aggrenox, Plavix, Pradaxa, Reapro, Pletal, Xarelto, Ticagrelor, Brilinta, Eliquis, and high dose aspirin (325 mg).  You do not have to stop baby aspirin 81 mg.

## 2019-12-05 NOTE — H&P (VIEW-ONLY)
"Subjective:       Patient ID: Roslyn Brewster is a 39 y.o. female.    Chief Complaint: Gastroesophageal Reflux (months); Heartburn; and Abdominal Pain    Patient here today to establish care with aforementioned complaints.  Patient reports several month history of abdominal pain. Patient characterizes pain as intermittent epigastric burning that is worse with eating a can occur without eating.  It does cause her to avoid foods.  She has taken multiple over-the-counter antacids, including Prevacid, Tums, and Nexium with only moderate relief.  She does take it and response to symptoms.  During a recent bout of abdominal pain she presented to the emergency department out of concern for pyelonephritis.  After being excluded patient was referred for GI evaluation.    Patient does have history of cholecystectomy many years ago.  She recalls having a valvular issue".    Patient denies family history of GI malignancy or colon cancer.    CT stone study 11/2019  Impression    Nonobstructing left lower pole renal calculus.  No hydronephrosis.    Cholecystectomy.    Hepatosplenomegaly.  Hepatic steatosis.        Past Medical History:   Diagnosis Date    Abnormal uterine bleeding 4/18/2018    Acute encephalopathy 12/24/12    secondary to drug overdose    ARF (acute renal failure) 12/24/12    secondary to drug overdose    History of cardiac arrest 12/24/12    secondary to drug overdose    Hypertension     Kidney stones 1/8/2018    MRSA (methicillin resistant Staphylococcus aureus) 12/24/12    Nephritis     PMDD (premenstrual dysphoric disorder)     Polysubstance abuse 12/24/12    Systolic CHF, acute 12/24/12    secondary to drug overdose    UTI (urinary tract infection)        Past Surgical History:   Procedure Laterality Date    AUGMENTATION OF BREAST Bilateral 2001    CHOLECYSTECTOMY      TUBAL LIGATION      urinary stents         Social History  Social History     Tobacco Use    Smoking status: Current " Every Day Smoker     Packs/day: 0.50     Years: 20.00     Pack years: 10.00     Types: Cigarettes    Smokeless tobacco: Never Used   Substance Use Topics    Alcohol use: No    Drug use: No     Types: Cocaine, Methamphetamines     Comment: PCP, patient states she had not done drugs since 2012       Family History   Problem Relation Age of Onset    Kidney disease Mother     Ovarian cancer Mother     Cancer Maternal Grandmother         throat    Cancer Maternal Grandfather         prostate    Kidney disease Daughter     Breast cancer Neg Hx     Colon cancer Neg Hx        Review of Systems   Constitutional: Negative for chills, fever and unexpected weight change.   HENT: Negative for congestion and trouble swallowing.    Eyes: Negative for photophobia and visual disturbance.   Respiratory: Negative for cough and shortness of breath.    Cardiovascular: Negative for chest pain and leg swelling.   Gastrointestinal: Positive for abdominal pain and nausea. Negative for abdominal distention and vomiting.   Genitourinary: Negative for dysuria and hematuria.   Musculoskeletal: Negative for arthralgias and myalgias.   Skin: Negative for color change and rash.   Neurological: Negative for dizziness, light-headedness and numbness.   Psychiatric/Behavioral: Negative for agitation and confusion.           Objective:      Physical Exam   Constitutional: She is oriented to person, place, and time. She appears well-developed and well-nourished.   HENT:   Head: Normocephalic and atraumatic.   Eyes: Pupils are equal, round, and reactive to light. EOM are normal. No scleral icterus.   Neck: Normal range of motion. Neck supple.   Cardiovascular: Normal rate, regular rhythm, normal heart sounds and intact distal pulses.   Pulmonary/Chest: Effort normal and breath sounds normal. No respiratory distress.   Abdominal: Soft. Bowel sounds are normal. She exhibits no distension. There is no tenderness.   Musculoskeletal: Normal range of  motion. She exhibits no edema.   Neurological: She is alert and oriented to person, place, and time.   No asterixis   Skin: Skin is warm and dry. No rash noted. No erythema.   Psychiatric: She has a normal mood and affect. Her behavior is normal.   Nursing note and vitals reviewed.        Pertinent labs and imaging studies reviewed    Assessment:       1. Abdominal pain, unspecified abdominal location        Plan:       EGD to exclude intraluminal causes  Schedule PPI    (Portions of this note were dictated using voice recognition software and may contain dictation related errors in spelling/grammar/syntax not found on text review)

## 2019-12-30 ENCOUNTER — TELEPHONE (OUTPATIENT)
Dept: ENDOSCOPY | Facility: HOSPITAL | Age: 39
End: 2019-12-30

## 2019-12-30 NOTE — TELEPHONE ENCOUNTER
Spoke with patient about arrival time @ *. 0830    NPO status reviewed: Patient must have nothing to eat after midnight.  Pt may have CLEAR liquids ONLY until completely NPO 3 hrs @ *0530    Medications: Do not take Insulin or oral diabetic medications the day of the procedure.  Take as prescribed: heart, seizure and blood pressure medication in the morning with a sip of water (less than an ounce).  Take any breathing medications and bring inhalers to hospital with you Leave all valuables and jewelry at home.     Wear comfortable clothes to procedure to change into hospital gown You cannot drive for 24 hours after your procedure because you will receive sedation for your procedure to make you comfortable.  A ride must be provided at discharge.

## 2020-01-02 ENCOUNTER — ANESTHESIA EVENT (OUTPATIENT)
Dept: ENDOSCOPY | Facility: HOSPITAL | Age: 40
End: 2020-01-02
Payer: COMMERCIAL

## 2020-01-02 ENCOUNTER — ANESTHESIA (OUTPATIENT)
Dept: ENDOSCOPY | Facility: HOSPITAL | Age: 40
End: 2020-01-02
Payer: COMMERCIAL

## 2020-01-02 ENCOUNTER — HOSPITAL ENCOUNTER (OUTPATIENT)
Facility: HOSPITAL | Age: 40
Discharge: HOME | End: 2020-01-02
Attending: INTERNAL MEDICINE | Admitting: INTERNAL MEDICINE
Payer: COMMERCIAL

## 2020-01-02 VITALS
HEIGHT: 62 IN | TEMPERATURE: 99 F | RESPIRATION RATE: 16 BRPM | SYSTOLIC BLOOD PRESSURE: 108 MMHG | DIASTOLIC BLOOD PRESSURE: 61 MMHG | HEART RATE: 84 BPM | BODY MASS INDEX: 31.28 KG/M2 | OXYGEN SATURATION: 98 % | WEIGHT: 170 LBS

## 2020-01-02 DIAGNOSIS — K21.9 GASTROESOPHAGEAL REFLUX DISEASE, ESOPHAGITIS PRESENCE NOT SPECIFIED: Primary | ICD-10-CM

## 2020-01-02 DIAGNOSIS — K21.9 GERD (GASTROESOPHAGEAL REFLUX DISEASE): ICD-10-CM

## 2020-01-02 LAB
B-HCG UR QL: NEGATIVE
CTP QC/QA: YES

## 2020-01-02 PROCEDURE — 25000003 PHARM REV CODE 250: Performed by: NURSE ANESTHETIST, CERTIFIED REGISTERED

## 2020-01-02 PROCEDURE — 43239 EGD BIOPSY SINGLE/MULTIPLE: CPT | Performed by: INTERNAL MEDICINE

## 2020-01-02 PROCEDURE — 88342 IMHCHEM/IMCYTCHM 1ST ANTB: CPT | Mod: 26,,, | Performed by: PATHOLOGY

## 2020-01-02 PROCEDURE — 88342 CHG IMMUNOCYTOCHEMISTRY: ICD-10-PCS | Mod: 26,,, | Performed by: PATHOLOGY

## 2020-01-02 PROCEDURE — 63600175 PHARM REV CODE 636 W HCPCS: Performed by: NURSE ANESTHETIST, CERTIFIED REGISTERED

## 2020-01-02 PROCEDURE — 43239 EGD BIOPSY SINGLE/MULTIPLE: CPT | Mod: ,,, | Performed by: INTERNAL MEDICINE

## 2020-01-02 PROCEDURE — 43239 PR EGD, FLEX, W/BIOPSY, SGL/MULTI: ICD-10-PCS | Mod: ,,, | Performed by: INTERNAL MEDICINE

## 2020-01-02 PROCEDURE — 88305 TISSUE EXAM BY PATHOLOGIST: ICD-10-PCS | Mod: 26,,, | Performed by: PATHOLOGY

## 2020-01-02 PROCEDURE — 88305 TISSUE EXAM BY PATHOLOGIST: CPT | Performed by: PATHOLOGY

## 2020-01-02 PROCEDURE — 37000008 HC ANESTHESIA 1ST 15 MINUTES: Performed by: INTERNAL MEDICINE

## 2020-01-02 PROCEDURE — 81025 URINE PREGNANCY TEST: CPT | Performed by: INTERNAL MEDICINE

## 2020-01-02 PROCEDURE — 88342 IMHCHEM/IMCYTCHM 1ST ANTB: CPT | Performed by: PATHOLOGY

## 2020-01-02 PROCEDURE — 88305 TISSUE EXAM BY PATHOLOGIST: CPT | Mod: 26,,, | Performed by: PATHOLOGY

## 2020-01-02 PROCEDURE — 27201012 HC FORCEPS, HOT/COLD, DISP: Performed by: INTERNAL MEDICINE

## 2020-01-02 PROCEDURE — 37000009 HC ANESTHESIA EA ADD 15 MINS: Performed by: INTERNAL MEDICINE

## 2020-01-02 PROCEDURE — 63600175 PHARM REV CODE 636 W HCPCS: Performed by: INTERNAL MEDICINE

## 2020-01-02 RX ORDER — SODIUM CHLORIDE 0.9 % (FLUSH) 0.9 %
10 SYRINGE (ML) INJECTION
Status: DISCONTINUED | OUTPATIENT
Start: 2020-01-02 | End: 2020-01-02 | Stop reason: HOSPADM

## 2020-01-02 RX ORDER — PROPOFOL 10 MG/ML
VIAL (ML) INTRAVENOUS
Status: DISCONTINUED | OUTPATIENT
Start: 2020-01-02 | End: 2020-01-02

## 2020-01-02 RX ORDER — GLYCOPYRROLATE 0.2 MG/ML
INJECTION INTRAMUSCULAR; INTRAVENOUS
Status: DISCONTINUED | OUTPATIENT
Start: 2020-01-02 | End: 2020-01-02

## 2020-01-02 RX ORDER — SODIUM CHLORIDE 9 MG/ML
INJECTION, SOLUTION INTRAVENOUS CONTINUOUS
Status: DISCONTINUED | OUTPATIENT
Start: 2020-01-02 | End: 2020-01-02 | Stop reason: HOSPADM

## 2020-01-02 RX ORDER — LIDOCAINE HCL/PF 100 MG/5ML
SYRINGE (ML) INTRAVENOUS
Status: DISCONTINUED | OUTPATIENT
Start: 2020-01-02 | End: 2020-01-02

## 2020-01-02 RX ADMIN — LIDOCAINE HYDROCHLORIDE 75 MG: 20 INJECTION, SOLUTION INTRAVENOUS at 09:01

## 2020-01-02 RX ADMIN — SODIUM CHLORIDE: 0.9 INJECTION, SOLUTION INTRAVENOUS at 08:01

## 2020-01-02 RX ADMIN — PROPOFOL 80 MG: 10 INJECTION, EMULSION INTRAVENOUS at 09:01

## 2020-01-02 RX ADMIN — PROPOFOL 50 MG: 10 INJECTION, EMULSION INTRAVENOUS at 09:01

## 2020-01-02 RX ADMIN — TOPICAL ANESTHETIC 1 EACH: 200 SPRAY DENTAL; PERIODONTAL at 09:01

## 2020-01-02 RX ADMIN — PROPOFOL 40 MG: 10 INJECTION, EMULSION INTRAVENOUS at 09:01

## 2020-01-02 RX ADMIN — GLYCOPYRROLATE 0.1 MCG: 0.2 INJECTION, SOLUTION INTRAMUSCULAR; INTRAVENOUS at 09:01

## 2020-01-02 NOTE — [employer] PATIENT INSTRUCTIONS
Discharge Summary/Instructions after an Endoscopic Procedure  Patient Name: Roslyn Brewster  Patient MRN: [id number]  Patient YOB: 1980 Thursday, January 02, 2020  Moe Tripathi MD  RESTRICTIONS:  During your procedure today, you received medications for sedation.  These   medications may affect your judgment, balance and coordination.  Therefore,   for 24 hours, you have the following restrictions:   - DO NOT drive a car, operate machinery, make legal/financial decisions,   sign important papers or drink alcohol.    ACTIVITY:  Today: no heavy lifting, straining or running due to procedural   sedation/anesthesia.  The following day: return to full activity including work.  DIET:  Eat and drink normally unless instructed otherwise.     TREATMENT FOR COMMON SIDE EFFECTS:  - Mild abdominal pain, nausea, belching, bloating or excessive gas:  rest,   eat lightly and use a heating pad.  - Sore Throat: treat with throat lozenges and/or gargle with warm salt   water.  - Because air was used during the procedure, expelling large amounts of air   from your rectum or belching is normal.  - If a bowel prep was taken, you may not have a bowel movement for 1-3 days.    This is normal.  SYMPTOMS TO WATCH FOR AND REPORT TO YOUR PHYSICIAN:  1. Abdominal pain or bloating, other than gas cramps.  2. Chest pain.  3. Back pain.  4. Signs of infection such as: chills or fever occurring within 24 hours   after the procedure.  5. Rectal bleeding, which would show as bright red, maroon, or black stools.   (A tablespoon of blood from the rectum is not serious, especially if   hemorrhoids are present.)  6. Vomiting.  7. Weakness or dizziness.  GO DIRECTLY TO THE NEAREST EMERGENCY ROOM IF YOU HAVE ANY OF THE FOLLOWING:      Difficulty breathing              Chills and/or fever over 101 F   Persistent vomiting and/or vomiting blood   Severe abdominal pain   Severe chest pain   Black, tarry stools   Bleeding- more than one  tablespoon   Any other symptom or condition that you feel may need urgent attention  Your doctor recommends these additional instructions:  If any biopsies were taken, your doctors clinic will contact you in 1 to 2   weeks with any results.  - Discharge patient to home.   - Resume previous diet.   - Continue present medications.   - Await pathology results.   - Patient has a contact number available for emergencies.  The signs and   symptoms of potential delayed complications were discussed with the   patient.  Return to normal activities tomorrow.  Written discharge   instructions were provided to the patient.  For questions, problems or results please call your physician - Moe Tripathi MD at Work:  [phone].  EMERGENCY PHONE NUMBER: [phone],  LAB RESULTS: [phone]  IF A COMPLICATION OR EMERGENCY SITUATION ARISES AND YOU ARE UNABLE TO REACH   YOUR PHYSICIAN - GO DIRECTLY TO THE EMERGENCY ROOM.  Moe Tripathi MD  1/2/2020 9:29:28 AM  This report has been verified and signed electronically.  [employer]

## 2020-01-02 NOTE — TRANSFER OF CARE
"Anesthesia Transfer of Care Note    Patient: Roslyn Brewster    Procedure(s) Performed: Procedure(s) (LRB):  EGD (ESOPHAGOGASTRODUODENOSCOPY) (N/A)    Patient location: GI    Anesthesia Type: MAC    Transport from OR: Transported from OR on room air with adequate spontaneous ventilation    Post pain: adequate analgesia    Post assessment: no apparent anesthetic complications and tolerated procedure well    Post vital signs: stable    Level of consciousness: responds to stimulation and sedated    Nausea/Vomiting: no nausea/vomiting    Complications: none    Transfer of care protocol was followed      Last vitals:   Visit Vitals  /77 (BP Location: Left arm, Patient Position: Lying)   Pulse 93   Temp 36.9 °C (98.5 °F) (Skin)   Resp 16   Ht 5' 2" (1.575 m)   Wt 77.1 kg (170 lb)   LMP 12/15/2019 (Approximate)   SpO2 100%   Breastfeeding? No   BMI 31.09 kg/m²     "

## 2020-01-02 NOTE — ANESTHESIA PREPROCEDURE EVALUATION
01/02/2020  Roslyn Brewster is a 39 y.o., female.  Patient Active Problem List   Diagnosis    Cigarette nicotine dependence without complication    PMDD (premenstrual dysphoric disorder)    Anxiety    Mood disorder    Chronic pyelonephritis without lesion of renal medullary necrosis    Enlarged uterus    Hepatosplenomegaly       Anesthesia Evaluation         Review of Systems  Social:  Smoker, Social Alcohol Use   Renal/:   Chronic Renal Disease    Hepatic/GI:   GERD Liver Disease,    Psych:   Psychiatric History          Physical Exam  General:  Well nourished    Airway/Jaw/Neck:  Airway Findings: Mouth Opening: Normal Tongue: Normal  General Airway Assessment: Adult  Mallampati: II      Dental:  Dental Findings: In tact   Chest/Lungs:  Chest/Lungs Findings: Clear to auscultation, Normal Respiratory Rate     Heart/Vascular:  Heart Findings: Rate: Normal  Rhythm: Regular Rhythm        Mental Status:  Mental Status Findings:  Cooperative, Alert and Oriented         Anesthesia Plan  Type of Anesthesia, risks & benefits discussed:  Anesthesia Type:  MAC  Patient's Preference: MAC  Intra-op Monitoring Plan:   Intra-op Monitoring Plan Comments:   Post Op Pain Control Plan:   Post Op Pain Control Plan Comments:   Induction:   IV  Beta Blocker:  Patient is not currently on a Beta-Blocker (No further documentation required).       Informed Consent: Patient understands risks and agrees with Anesthesia plan.  Questions answered. Anesthesia consent signed with patient.  ASA Score: 2     Day of Surgery Review of History & Physical:            Ready For Surgery From Anesthesia Perspective.

## 2020-01-02 NOTE — ANESTHESIA POSTPROCEDURE EVALUATION
Anesthesia Post Evaluation    Patient: Roslyn Brewster    Procedure(s) Performed: Procedure(s) (LRB):  EGD (ESOPHAGOGASTRODUODENOSCOPY) (N/A)    Final Anesthesia Type: MAC    Patient location during evaluation: GI PACU  Patient participation: Yes- Able to Participate  Level of consciousness: awake and alert  Post-procedure vital signs: reviewed and stable  Pain management: adequate  Airway patency: patent    PONV status at discharge: No PONV  Anesthetic complications: no      Cardiovascular status: hemodynamically stable  Respiratory status: unassisted, spontaneous ventilation and room air  Hydration status: euvolemic  Follow-up not needed.          Vitals Value Taken Time   /77 1/2/2020  9:45 AM   Temp 36.9 °C (98.5 °F) 1/2/2020  9:30 AM   Pulse 93 1/2/2020  9:45 AM   Resp 16 1/2/2020  9:45 AM   SpO2 100 % 1/2/2020  9:45 AM         No case tracking events are documented in the log.      Pain/Luci Score: Luci Score: 10 (1/2/2020  9:45 AM)

## 2020-01-17 LAB
FINAL PATHOLOGIC DIAGNOSIS: NORMAL
GROSS: NORMAL

## 2020-01-21 NOTE — PROGRESS NOTES
Pathology reviewed. No source of abdominal pain noted. If symptoms persist please have patient follow up with me.

## 2020-02-16 ENCOUNTER — HOSPITAL ENCOUNTER (EMERGENCY)
Facility: HOSPITAL | Age: 40
Discharge: HOME | End: 2020-02-16
Attending: EMERGENCY MEDICINE
Payer: COMMERCIAL

## 2020-02-16 VITALS
RESPIRATION RATE: 18 BRPM | SYSTOLIC BLOOD PRESSURE: 122 MMHG | OXYGEN SATURATION: 98 % | TEMPERATURE: 98 F | BODY MASS INDEX: 25.61 KG/M2 | DIASTOLIC BLOOD PRESSURE: 76 MMHG | WEIGHT: 140 LBS | HEART RATE: 99 BPM

## 2020-02-16 DIAGNOSIS — S91.209A AVULSION OF TOENAIL, INITIAL ENCOUNTER: Primary | ICD-10-CM

## 2020-02-16 LAB
B-HCG UR QL: NEGATIVE
CTP QC/QA: YES

## 2020-02-16 PROCEDURE — 25000003 PHARM REV CODE 250: Performed by: PHYSICIAN ASSISTANT

## 2020-02-16 PROCEDURE — 99283 EMERGENCY DEPT VISIT LOW MDM: CPT | Mod: 25

## 2020-02-16 PROCEDURE — 81025 URINE PREGNANCY TEST: CPT | Performed by: PHYSICIAN ASSISTANT

## 2020-02-16 RX ORDER — HYDROCODONE BITARTRATE AND ACETAMINOPHEN 5; 325 MG/1; MG/1
1 TABLET ORAL
Status: COMPLETED | OUTPATIENT
Start: 2020-02-16 | End: 2020-02-16

## 2020-02-16 RX ORDER — HYDROCODONE BITARTRATE AND ACETAMINOPHEN 5; 325 MG/1; MG/1
1 TABLET ORAL EVERY 6 HOURS PRN
Qty: 3 TABLET | Refills: 0 | Status: SHIPPED | OUTPATIENT
Start: 2020-02-16 | End: 2020-02-17 | Stop reason: SDUPTHER

## 2020-02-16 RX ADMIN — HYDROCODONE BITARTRATE AND ACETAMINOPHEN 1 TABLET: 5; 325 TABLET ORAL at 01:02

## 2020-02-16 NOTE — ED TRIAGE NOTES
Pt presents to ED with c/o left great toe pain 10/10 since 20 mins ago. Pt states that she hit toe on dresser and toe nail lifted. Bleeding controlled. Bandage in place. Pt denies taking meds PTA. NAD noted.

## 2020-02-16 NOTE — ED PROVIDER NOTES
Encounter Date: 2/16/2020    SCRIBE #1 NOTE: I, Juan Pablo Bunn, am scribing for, and in the presence of,  Manjinder Coronel PA-C. I have scribed the following portions of the note - Other sections scribed: HPI and ROS.       History     Chief Complaint   Patient presents with    Toe Injury     c/o hitting toe on dresser causing L GREAT toe nail to lift 15 mins PTA. pt tearful in triage, bleeding controlled.     CC: Toe Injury    HPI:  This is a 39 y.o. female with no pertinent PMHx. She presents to the Emergency Department with a cc of right great toe injury which occurred approximately 30 minutes PTS. Per patient, she was attempting to move a dresser when it got caught on the carpet. When she managed to pull the dresser free, it collided with her toe lifting the nail from the nail bed. The patient reports associated toe pain. There were no alleviating or worsening factors reported. Patient reports no prior history of similar symptoms. She is allergic to Morphine and Toradol.         The history is provided by the patient.     Review of patient's allergies indicates:   Allergen Reactions    Strawberry Anaphylaxis and Hives    Morphine Other (See Comments)     Burns stomach    Toradol [ketorolac]     Tramadol      Abdominal pain     Past Medical History:   Diagnosis Date    Abnormal uterine bleeding 4/18/2018    Acute encephalopathy 12/24/12    secondary to drug overdose    ARF (acute renal failure) 12/24/12    secondary to drug overdose    History of cardiac arrest 12/24/12    secondary to drug overdose    Hypertension     Kidney stones 1/8/2018    MRSA (methicillin resistant Staphylococcus aureus) 12/24/12    Nephritis     PMDD (premenstrual dysphoric disorder)     Polysubstance abuse 12/24/12    Systolic CHF, acute 12/24/12    secondary to drug overdose    UTI (urinary tract infection)      Past Surgical History:   Procedure Laterality Date    AUGMENTATION OF BREAST Bilateral 2001    CHOLECYSTECTOMY       ESOPHAGOGASTRODUODENOSCOPY N/A 1/2/2020    Procedure: EGD (ESOPHAGOGASTRODUODENOSCOPY);  Surgeon: Moe Tripathi MD;  Location: [facility];  Service: Endoscopy;  Laterality: N/A;    TUBAL LIGATION      urinary stents       Family History   Problem Relation Age of Onset    Kidney disease Mother     Ovarian cancer Mother     Cancer Maternal Grandmother         throat    Cancer Maternal Grandfather         prostate    Kidney disease Daughter     Breast cancer Neg Hx     Colon cancer Neg Hx      Social History     Tobacco Use    Smoking status: Current Every Day Smoker     Packs/day: 0.50     Years: 20.00     Pack years: 10.00     Types: Cigarettes    Smokeless tobacco: Never Used   Substance Use Topics    Alcohol use: No    Drug use: No     Types: Cocaine, Methamphetamines     Comment: PCP, patient states she had not done drugs since 2012     Review of Systems   Constitutional: Negative for chills and fever.   HENT: Negative for ear pain and sore throat.    Eyes: Negative for visual disturbance.   Respiratory: Negative for cough and shortness of breath.    Cardiovascular: Negative for chest pain.   Gastrointestinal: Negative for abdominal pain, diarrhea, nausea and vomiting.   Genitourinary: Negative for dysuria.   Musculoskeletal: Positive for arthralgias.   Skin: Positive for wound.   Neurological: Negative for headaches.   Psychiatric/Behavioral: Negative for confusion.       Physical Exam     Initial Vitals [02/16/20 1251]   BP Pulse Resp Temp SpO2   126/76 99 19 98.1 °F (36.7 °C) 98 %      MAP       --         Physical Exam    Nursing note and vitals reviewed.  Constitutional: She appears well-developed and well-nourished. She is not diaphoretic. No distress.   HENT:   Head: Normocephalic and atraumatic.   Nose: Nose normal.   Eyes: Conjunctivae and EOM are normal. Right eye exhibits no discharge. Left eye exhibits no discharge.   Neck: Normal range of motion. No tracheal deviation  present. No JVD present.   Cardiovascular: Normal rate, regular rhythm and normal heart sounds. Exam reveals no friction rub.    No murmur heard.  Pulmonary/Chest: Breath sounds normal. No stridor. No respiratory distress. She has no wheezes. She has no rhonchi. She has no rales. She exhibits no tenderness.   Musculoskeletal: Normal range of motion.   Left great toe has partially avulsed toenail but is still maintained within the nail bed.  No subungual hematoma.  Full ROM of digit.  Capillary refill less 2 sec.  Sensation intact and equal. Fully bears weight on the left lower extremity and is ambulatory.   Neurological: She is alert and oriented to person, place, and time.   Skin: Skin is warm and dry. No rash and no abscess noted. No erythema. No pallor.         ED Course   Splint Application  Date/Time: 2/16/2020 2:30 PM  Performed by: Manjinder Coronel PA-C  Authorized by: Jose Sidhu Jr., MD   Consent Done: Yes  Consent: Verbal consent obtained.  Consent given by: patient  Location: L great toe.  Splint type: post op shoe.  Post-procedure: The splinted body part was neurovascularly unchanged following the procedure.  Patient tolerance: Patient tolerated the procedure well with no immediate complications        Labs Reviewed   POCT URINE PREGNANCY          Imaging Results          X-Ray Toe 2 or More Views Left (Final result)  Result time 02/16/20 13:56:13    Final result by Tommy Grier MD (02/16/20 13:56:13)                 Impression:      1. No acute displaced fracture or dislocation of the great toe.      Electronically signed by: Tommy Grier MD  Date:    02/16/2020  Time:    13:56             Narrative:    EXAMINATION:  XR TOE 2 OR MORE VIEWS LEFT    CLINICAL HISTORY:  L great toe pain s/p trauma;    TECHNIQUE:  Three views of the left toes were performed    COMPARISON:  None.    FINDINGS:  Three views left toes.    Allowing for positioning, no convincing acute displaced fracture or  dislocation of the visualized toes.  No radiopaque foreign body.                                 Medical Decision Making:   History:   Old Medical Records: I decided to obtain old medical records.  Independently Interpreted Test(s):   I have ordered and independently interpreted X-rays - see prior notes.  Clinical Tests:   Lab Tests: Ordered and Reviewed  Radiological Study: Ordered and Reviewed  ED Management:  Patient has partially avulsed toenail. Maintained within nailbed and aligned approprietly. No subungual hematoma or acute fracture.  No vascular compromise.  No active infection.  Ambulatory.  Placed in postop shoe for comfort.  Offer crutches but she declines.  Sent home with supportive care and advised follow-up with PCP and podiatry.  Strict return precautions discussed with patient who is agreeable to the plan.            Scribe Attestation:   Scribe #1: I performed the above scribed service and the documentation accurately describes the services I performed. I attest to the accuracy of the note.                          Clinical Impression:     1. Avulsion of toenail, initial encounter          Disposition:   Disposition: Discharged  Condition: Stable    Scribe attestation: I, Manjinder Coronel PA-C, personally performed the services described in this documentation. All medical record entries made by the scribe were at my direction and in my presence.  I have reviewed the chart and agree that the record reflects my personal performance and is accurate and complete.                   Manjinder Coronel PA-C  02/16/20 [id number]

## 2020-02-17 ENCOUNTER — OFFICE VISIT (OUTPATIENT)
Dept: FAMILY MEDICINE | Facility: CLINIC | Age: 40
End: 2020-02-17
Payer: COMMERCIAL

## 2020-02-17 VITALS
HEIGHT: 62 IN | TEMPERATURE: 99 F | WEIGHT: 138.88 LBS | SYSTOLIC BLOOD PRESSURE: 130 MMHG | DIASTOLIC BLOOD PRESSURE: 80 MMHG | HEART RATE: 103 BPM | OXYGEN SATURATION: 99 % | BODY MASS INDEX: 25.55 KG/M2

## 2020-02-17 DIAGNOSIS — F39 MOOD DISORDER: ICD-10-CM

## 2020-02-17 DIAGNOSIS — S91.209A AVULSION OF TOENAIL OF LEFT FOOT: Primary | ICD-10-CM

## 2020-02-17 PROCEDURE — 3075F SYST BP GE 130 - 139MM HG: CPT | Mod: CPTII,S$GLB,, | Performed by: FAMILY MEDICINE

## 2020-02-17 PROCEDURE — 3075F PR MOST RECENT SYSTOLIC BLOOD PRESS GE 130-139MM HG: ICD-10-PCS | Mod: CPTII,S$GLB,, | Performed by: FAMILY MEDICINE

## 2020-02-17 PROCEDURE — 3079F PR MOST RECENT DIASTOLIC BLOOD PRESSURE 80-89 MM HG: ICD-10-PCS | Mod: CPTII,S$GLB,, | Performed by: FAMILY MEDICINE

## 2020-02-17 PROCEDURE — 99999 PR PBB SHADOW E&M-EST. PATIENT-LVL III: CPT | Mod: PBBFAC,,, | Performed by: FAMILY MEDICINE

## 2020-02-17 PROCEDURE — 3008F PR BODY MASS INDEX (BMI) DOCUMENTED: ICD-10-PCS | Mod: CPTII,S$GLB,, | Performed by: FAMILY MEDICINE

## 2020-02-17 PROCEDURE — 3008F BODY MASS INDEX DOCD: CPT | Mod: CPTII,S$GLB,, | Performed by: FAMILY MEDICINE

## 2020-02-17 PROCEDURE — 99214 PR OFFICE/OUTPT VISIT, EST, LEVL IV, 30-39 MIN: ICD-10-PCS | Mod: S$GLB,,, | Performed by: FAMILY MEDICINE

## 2020-02-17 PROCEDURE — 99999 PR PBB SHADOW E&M-EST. PATIENT-LVL III: ICD-10-PCS | Mod: PBBFAC,,, | Performed by: FAMILY MEDICINE

## 2020-02-17 PROCEDURE — 3079F DIAST BP 80-89 MM HG: CPT | Mod: CPTII,S$GLB,, | Performed by: FAMILY MEDICINE

## 2020-02-17 PROCEDURE — 99214 OFFICE O/P EST MOD 30 MIN: CPT | Mod: S$GLB,,, | Performed by: FAMILY MEDICINE

## 2020-02-17 RX ORDER — HYDROCODONE BITARTRATE AND ACETAMINOPHEN 5; 325 MG/1; MG/1
1 TABLET ORAL EVERY 6 HOURS PRN
Qty: 28 TABLET | Refills: 0 | Status: SHIPPED | OUTPATIENT
Start: 2020-02-17 | End: 2020-02-20

## 2020-02-17 NOTE — PROGRESS NOTES
Chief Complaint   Patient presents with    Follow-up     from ER        SUBJECTIVE:  Roslyn Brewster is a 39 y.o. female here for new problem of left great toe avulsion of the nail.  With pain and some swelling and she is walking with hard sole shoe.  Currently has co-morbidities including per problem list.      Past Medical History:   Diagnosis Date    Abnormal uterine bleeding 4/18/2018    Acute encephalopathy 12/24/12    secondary to drug overdose    ARF (acute renal failure) 12/24/12    secondary to drug overdose    History of cardiac arrest 12/24/12    secondary to drug overdose    Hypertension     Kidney stones 1/8/2018    MRSA (methicillin resistant Staphylococcus aureus) 12/24/12    Nephritis     PMDD (premenstrual dysphoric disorder)     Polysubstance abuse 12/24/12    Systolic CHF, acute 12/24/12    secondary to drug overdose    UTI (urinary tract infection)      Past Surgical History:   Procedure Laterality Date    AUGMENTATION OF BREAST Bilateral 2001    CHOLECYSTECTOMY      ESOPHAGOGASTRODUODENOSCOPY N/A 1/2/2020    Procedure: EGD (ESOPHAGOGASTRODUODENOSCOPY);  Surgeon: Moe Tripathi MD;  Location: [facility];  Service: Endoscopy;  Laterality: N/A;    TUBAL LIGATION      urinary stents       Social History     Socioeconomic History    Marital status: Single     Spouse name: Not on file    Number of children: Not on file    Years of education: Not on file    Highest education level: Not on file   Occupational History    Not on file   Social Needs    Financial resource strain: Hard    Food insecurity:     Worry: Never true     Inability: Never true    Transportation needs:     Medical: No     Non-medical: No   Tobacco Use    Smoking status: Current Every Day Smoker     Packs/day: 0.50     Years: 20.00     Pack years: 10.00     Types: Cigarettes    Smokeless tobacco: Never Used   Substance and Sexual Activity    Alcohol use: No     Frequency: Monthly or less      Drinks per session: 3 or 4     Binge frequency: Never    Drug use: No     Types: Cocaine, Methamphetamines     Comment: PCP, patient states she had not done drugs since 2012    Sexual activity: Yes     Partners: Male   Lifestyle    Physical activity:     Days per week: 1 day     Minutes per session: 10 min    Stress: To some extent   Relationships    Social connections:     Talks on phone: More than three times a week     Gets together: Three times a week     Attends Jehovah's witness service: Not on file     Active member of club or organization: No     Attends meetings of clubs or organizations: Never     Relationship status: Living with partner   Other Topics Concern    Not on file   Social History Narrative    Not on file     Family History   Problem Relation Age of Onset    Kidney disease Mother     Ovarian cancer Mother     Cancer Maternal Grandmother         throat    Cancer Maternal Grandfather         prostate    Kidney disease Daughter     Breast cancer Neg Hx     Colon cancer Neg Hx      Current Outpatient Medications on File Prior to Visit   Medication Sig Dispense Refill    ALPRAZolam (XANAX) 1 MG tablet Take 1 tablet (1 mg total) by mouth every evening. 30 tablet 4    FLUoxetine 40 MG capsule Take 1 capsule (40 mg total) by mouth once daily. 90 capsule 3    omeprazole (PRILOSEC) 40 MG capsule Take 1 capsule (40 mg total) by mouth once daily. 30 capsule 11    [DISCONTINUED] HYDROcodone-acetaminophen (NORCO) 5-325 mg per tablet Take 1 tablet by mouth every 6 (six) hours as needed for Pain. 3 tablet 0     No current facility-administered medications on file prior to visit.      Review of patient's allergies indicates:   Allergen Reactions    Strawberry Anaphylaxis and Hives    Morphine Other (See Comments)     Burns stomach    Toradol [ketorolac]     Tramadol      Abdominal pain         Review of Systems   HENT: Negative for hearing loss.    Eyes: Negative for discharge.   Respiratory:  "Negative for wheezing.    Cardiovascular: Negative for chest pain and palpitations.   Gastrointestinal: Negative for blood in stool, constipation, diarrhea and vomiting.   Genitourinary: Negative for dysuria and hematuria.   Musculoskeletal: Negative for neck pain.   Neurological: Negative for weakness and headaches.   Endo/Heme/Allergies: Negative for polydipsia.       OBJECTIVE:  /80   Pulse 103   Temp 99.3 °F (37.4 °C) (Oral)   Ht 5' 2" (1.575 m)   Wt 63 kg (138 lb 14.2 oz)   LMP 02/03/2020   SpO2 99%   BMI 25.40 kg/m²     Wt Readings from Last 3 Encounters:   02/17/20 63 kg (138 lb 14.2 oz)   02/16/20 63.5 kg (140 lb)   01/02/20 77.1 kg (170 lb)     BP Readings from Last 3 Encounters:   02/17/20 130/80   02/16/20 122/76   01/02/20 108/61       Left toe with avulsed nail and pain and swelling is better.  Has mild swelling.    Review of old Records:  Reviewed ER notes    Review of old labs:      Review of old imaging:  Reviewed imaging of the foot/toe    ASSESSMENT:  Problem List Items Addressed This Visit     Mood disorder      Other Visit Diagnoses     Avulsion of toenail of left foot    -  Primary    Relevant Medications    HYDROcodone-acetaminophen (NORCO) 5-325 mg per tablet          ICD-10-CM ICD-9-CM   1. Avulsion of toenail of left foot S91.209A 893.0   2. Mood disorder F39 296.90         PLAN:  Problem List Items Addressed This Visit     Mood disorder      Other Visit Diagnoses     Avulsion of toenail of left foot    -  Primary    Relevant Medications    HYDROcodone-acetaminophen (NORCO) 5-325 mg per tablet      Apply a compressive ACE bandage. Rest and elevate the affected painful area.  Apply cold compresses intermittently as needed.  As pain recedes, begin normal activities slowly as tolerated.  Call if symptoms persist.      Medication List with Changes/Refills   Current Medications    ALPRAZOLAM (XANAX) 1 MG TABLET    Take 1 tablet (1 mg total) by mouth every evening.    FLUOXETINE 40 MG " CAPSULE    Take 1 capsule (40 mg total) by mouth once daily.    OMEPRAZOLE (PRILOSEC) 40 MG CAPSULE    Take 1 capsule (40 mg total) by mouth once daily.   Changed and/or Refilled Medications    Modified Medication Previous Medication    HYDROCODONE-ACETAMINOPHEN (NORCO) 5-325 MG PER TABLET HYDROcodone-acetaminophen (NORCO) 5-325 mg per tablet       Take 1 tablet by mouth every 6 (six) hours as needed for Pain.    Take 1 tablet by mouth every 6 (six) hours as needed for Pain.         No follow-ups on file.

## 2020-02-27 ENCOUNTER — PATIENT MESSAGE (OUTPATIENT)
Dept: FAMILY MEDICINE | Facility: CLINIC | Age: 40
End: 2020-02-27

## 2020-02-27 ENCOUNTER — OFFICE VISIT (OUTPATIENT)
Dept: FAMILY MEDICINE | Facility: CLINIC | Age: 40
End: 2020-02-27
Payer: COMMERCIAL

## 2020-02-27 VITALS
SYSTOLIC BLOOD PRESSURE: 108 MMHG | HEART RATE: 102 BPM | DIASTOLIC BLOOD PRESSURE: 70 MMHG | BODY MASS INDEX: 33.15 KG/M2 | OXYGEN SATURATION: 97 % | TEMPERATURE: 101 F | WEIGHT: 180.13 LBS | HEIGHT: 62 IN

## 2020-02-27 DIAGNOSIS — N30.01 ACUTE CYSTITIS WITH HEMATURIA: ICD-10-CM

## 2020-02-27 DIAGNOSIS — R68.89 FLU-LIKE SYMPTOMS: Primary | ICD-10-CM

## 2020-02-27 LAB
BACTERIA #/AREA URNS HPF: ABNORMAL /HPF
BILIRUB SERPL-MCNC: NEGATIVE MG/DL
BILIRUB UR QL STRIP: NEGATIVE
BLOOD URINE, POC: NORMAL
CLARITY UR: CLEAR
COLOR UR: YELLOW
COLOR, POC UA: NORMAL
CTP QC/QA: YES
GLUCOSE UR QL STRIP: NEGATIVE
GLUCOSE UR QL STRIP: NORMAL
HGB UR QL STRIP: ABNORMAL
KETONES UR QL STRIP: NEGATIVE
KETONES UR QL STRIP: NEGATIVE
LEUKOCYTE ESTERASE UR QL STRIP: NEGATIVE
LEUKOCYTE ESTERASE URINE, POC: NEGATIVE
MICROSCOPIC COMMENT: ABNORMAL
NITRITE UR QL STRIP: NEGATIVE
NITRITE, POC UA: NEGATIVE
PH UR STRIP: 6 [PH] (ref 5–8)
PH, POC UA: 6
POC MOLECULAR INFLUENZA A AGN: NEGATIVE
POC MOLECULAR INFLUENZA B AGN: NEGATIVE
PROT UR QL STRIP: NEGATIVE
PROTEIN, POC: NEGATIVE
RBC #/AREA URNS HPF: 10 /HPF (ref 0–4)
SP GR UR STRIP: 1.01 (ref 1–1.03)
SPECIFIC GRAVITY, POC UA: 1.01
SQUAMOUS #/AREA URNS HPF: 3 /HPF
URN SPEC COLLECT METH UR: ABNORMAL
UROBILINOGEN UR STRIP-ACNC: NEGATIVE EU/DL
UROBILINOGEN, POC UA: NORMAL

## 2020-02-27 PROCEDURE — 3078F DIAST BP <80 MM HG: CPT | Mod: CPTII,S$GLB,, | Performed by: INTERNAL MEDICINE

## 2020-02-27 PROCEDURE — 87086 URINE CULTURE/COLONY COUNT: CPT

## 2020-02-27 PROCEDURE — 87502 INFLUENZA DNA AMP PROBE: CPT | Mod: QW,S$GLB,, | Performed by: INTERNAL MEDICINE

## 2020-02-27 PROCEDURE — 99999 PR PBB SHADOW E&M-EST. PATIENT-LVL III: CPT | Mod: PBBFAC,,, | Performed by: INTERNAL MEDICINE

## 2020-02-27 PROCEDURE — 87502 POCT INFLUENZA A/B MOLECULAR: ICD-10-PCS | Mod: QW,S$GLB,, | Performed by: INTERNAL MEDICINE

## 2020-02-27 PROCEDURE — 81001 POCT URINALYSIS, DIPSTICK OR TABLET REAGENT, AUTOMATED, WITH MICROSCOP: ICD-10-PCS | Mod: S$GLB,,, | Performed by: INTERNAL MEDICINE

## 2020-02-27 PROCEDURE — 3008F BODY MASS INDEX DOCD: CPT | Mod: CPTII,S$GLB,, | Performed by: INTERNAL MEDICINE

## 2020-02-27 PROCEDURE — 3074F PR MOST RECENT SYSTOLIC BLOOD PRESSURE < 130 MM HG: ICD-10-PCS | Mod: CPTII,S$GLB,, | Performed by: INTERNAL MEDICINE

## 2020-02-27 PROCEDURE — 99214 OFFICE O/P EST MOD 30 MIN: CPT | Mod: 25,S$GLB,, | Performed by: INTERNAL MEDICINE

## 2020-02-27 PROCEDURE — 99999 PR PBB SHADOW E&M-EST. PATIENT-LVL III: ICD-10-PCS | Mod: PBBFAC,,, | Performed by: INTERNAL MEDICINE

## 2020-02-27 PROCEDURE — 3074F SYST BP LT 130 MM HG: CPT | Mod: CPTII,S$GLB,, | Performed by: INTERNAL MEDICINE

## 2020-02-27 PROCEDURE — 99214 PR OFFICE/OUTPT VISIT, EST, LEVL IV, 30-39 MIN: ICD-10-PCS | Mod: 25,S$GLB,, | Performed by: INTERNAL MEDICINE

## 2020-02-27 PROCEDURE — 3078F PR MOST RECENT DIASTOLIC BLOOD PRESSURE < 80 MM HG: ICD-10-PCS | Mod: CPTII,S$GLB,, | Performed by: INTERNAL MEDICINE

## 2020-02-27 PROCEDURE — 81001 URINALYSIS AUTO W/SCOPE: CPT | Mod: S$GLB,,, | Performed by: INTERNAL MEDICINE

## 2020-02-27 PROCEDURE — 81000 URINALYSIS NONAUTO W/SCOPE: CPT

## 2020-02-27 PROCEDURE — 3008F PR BODY MASS INDEX (BMI) DOCUMENTED: ICD-10-PCS | Mod: CPTII,S$GLB,, | Performed by: INTERNAL MEDICINE

## 2020-02-27 RX ORDER — OSELTAMIVIR PHOSPHATE 75 MG/1
75 CAPSULE ORAL 2 TIMES DAILY
Qty: 10 CAPSULE | Refills: 0 | Status: SHIPPED | OUTPATIENT
Start: 2020-02-27 | End: 2020-03-03

## 2020-02-27 RX ORDER — PROMETHAZINE HYDROCHLORIDE AND DEXTROMETHORPHAN HYDROBROMIDE 6.25; 15 MG/5ML; MG/5ML
5 SYRUP ORAL 2 TIMES DAILY PRN
Qty: 120 ML | Refills: 0 | Status: SHIPPED | OUTPATIENT
Start: 2020-02-27 | End: 2020-03-08

## 2020-02-27 RX ORDER — LEVOFLOXACIN 750 MG/1
750 TABLET ORAL DAILY
Qty: 7 TABLET | Refills: 0 | Status: SHIPPED | OUTPATIENT
Start: 2020-02-27 | End: 2020-03-13 | Stop reason: ALTCHOICE

## 2020-02-27 NOTE — PROGRESS NOTES
SUBJECTIVE     No chief complaint on file.      HPI  Roslyn Brewster is a 39 y.o. female with multiple medical diagnoses as listed in the medical history and problem list that presents for evaluation of URI x 2 days. Pt reports a productive cough, runny nose, body aches, fever, chills, and night sweats. Pt has been taking Dayquil, Ibuprofen, and Nyquil without improvement of symptoms. Denies any recent travel. +sick contacts(granddaughter with viral meningitis).     PAST MEDICAL HISTORY:  Past Medical History:   Diagnosis Date    Abnormal uterine bleeding 4/18/2018    Acute encephalopathy 12/24/12    secondary to drug overdose    ARF (acute renal failure) 12/24/12    secondary to drug overdose    History of cardiac arrest 12/24/12    secondary to drug overdose    Hypertension     Kidney stones 1/8/2018    MRSA (methicillin resistant Staphylococcus aureus) 12/24/12    Nephritis     PMDD (premenstrual dysphoric disorder)     Polysubstance abuse 12/24/12    Systolic CHF, acute 12/24/12    secondary to drug overdose    UTI (urinary tract infection)        PAST SURGICAL HISTORY:  Past Surgical History:   Procedure Laterality Date    AUGMENTATION OF BREAST Bilateral 2001    CHOLECYSTECTOMY      ESOPHAGOGASTRODUODENOSCOPY N/A 1/2/2020    Procedure: EGD (ESOPHAGOGASTRODUODENOSCOPY);  Surgeon: Moe Tripathi MD;  Location: [facility];  Service: Endoscopy;  Laterality: N/A;    TUBAL LIGATION      urinary stents         SOCIAL HISTORY:  Social History     Socioeconomic History    Marital status: Single     Spouse name: Not on file    Number of children: Not on file    Years of education: Not on file    Highest education level: Not on file   Occupational History    Not on file   Social Needs    Financial resource strain: Hard    Food insecurity:     Worry: Never true     Inability: Never true    Transportation needs:     Medical: No     Non-medical: No   Tobacco Use    Smoking status:  Current Every Day Smoker     Packs/day: 0.50     Years: 20.00     Pack years: 10.00     Types: Cigarettes    Smokeless tobacco: Never Used   Substance and Sexual Activity    Alcohol use: No     Frequency: Monthly or less     Drinks per session: 3 or 4     Binge frequency: Never    Drug use: No     Types: Cocaine, Methamphetamines     Comment: PCP, patient states she had not done drugs since 2012    Sexual activity: Yes     Partners: Male   Lifestyle    Physical activity:     Days per week: 1 day     Minutes per session: 10 min    Stress: To some extent   Relationships    Social connections:     Talks on phone: More than three times a week     Gets together: Three times a week     Attends Latter day service: Not on file     Active member of club or organization: No     Attends meetings of clubs or organizations: Never     Relationship status: Living with partner   Other Topics Concern    Not on file   Social History Narrative    Not on file       FAMILY HISTORY:  Family History   Problem Relation Age of Onset    Kidney disease Mother     Ovarian cancer Mother     Cancer Maternal Grandmother         throat    Cancer Maternal Grandfather         prostate    Kidney disease Daughter     Breast cancer Neg Hx     Colon cancer Neg Hx        ALLERGIES AND MEDICATIONS: updated and reviewed.  Review of patient's allergies indicates:   Allergen Reactions    Strawberry Anaphylaxis and Hives    Morphine Other (See Comments)     Burns stomach    Toradol [ketorolac]     Tramadol      Abdominal pain     Current Outpatient Medications   Medication Sig Dispense Refill    ALPRAZolam (XANAX) 1 MG tablet Take 1 tablet (1 mg total) by mouth every evening. 30 tablet 4    FLUoxetine 40 MG capsule Take 1 capsule (40 mg total) by mouth once daily. 90 capsule 3    omeprazole (PRILOSEC) 40 MG capsule Take 1 capsule (40 mg total) by mouth once daily. 30 capsule 11    levoFLOXacin (LEVAQUIN) 750 MG tablet Take 1 tablet (750  "mg total) by mouth once daily. 7 tablet 0    oseltamivir (TAMIFLU) 75 MG capsule Take 1 capsule (75 mg total) by mouth 2 (two) times daily. for 5 days 10 capsule 0    promethazine-dextromethorphan (PROMETHAZINE-DM) 6.25-15 mg/5 mL Syrp Take 5 mLs by mouth 2 (two) times daily as needed. 120 mL 0     No current facility-administered medications for this visit.        ROS  Review of Systems   Constitutional: Positive for chills and fever.   HENT: Negative for hearing loss and sore throat.    Eyes: Negative for visual disturbance.   Respiratory: Positive for cough. Negative for shortness of breath.    Cardiovascular: Negative for chest pain, palpitations and leg swelling.   Gastrointestinal: Negative for abdominal pain, constipation, diarrhea, nausea and vomiting.   Genitourinary: Positive for hematuria. Negative for dysuria, frequency and urgency.   Musculoskeletal: Positive for myalgias. Negative for arthralgias and joint swelling.   Skin: Negative for rash and wound.   Neurological: Negative for headaches.   Psychiatric/Behavioral: Negative for agitation and confusion. The patient is not nervous/anxious.          OBJECTIVE     Physical Exam  Vitals:    02/27/20 1538   BP: 108/70   Pulse: 102   Temp: (!) 101.2 °F (38.4 °C)    Body mass index is 32.94 kg/m².  Weight: 81.7 kg (180 lb 1.9 oz)   Height: 5' 2" (157.5 cm)     Physical Exam   Constitutional: She is oriented to person, place, and time. She appears well-developed and well-nourished. No distress.   HENT:   Head: Normocephalic and atraumatic.   Right Ear: Hearing, tympanic membrane and external ear normal.   Left Ear: Hearing, tympanic membrane and external ear normal.   Nose: No rhinorrhea. Right sinus exhibits maxillary sinus tenderness and frontal sinus tenderness. Left sinus exhibits maxillary sinus tenderness and frontal sinus tenderness.   Mouth/Throat: No uvula swelling. Posterior oropharyngeal erythema present. No posterior oropharyngeal edema.   Eyes: " Conjunctivae and EOM are normal. Right eye exhibits no discharge. Left eye exhibits no discharge. No scleral icterus.   Neck: Normal range of motion. Neck supple. No JVD present. No tracheal deviation present.   Cardiovascular: Normal rate, regular rhythm and intact distal pulses. Exam reveals no gallop and no friction rub.   No murmur heard.  Pulmonary/Chest: Effort normal and breath sounds normal. No respiratory distress. She has no wheezes.   Abdominal: Soft. Bowel sounds are normal. She exhibits no distension and no mass. There is no tenderness. There is CVA tenderness (left). There is no rebound and no guarding.   Musculoskeletal: Normal range of motion. She exhibits no edema, tenderness or deformity.   Neurological: She is alert and oriented to person, place, and time. She exhibits normal muscle tone. Coordination normal.   Skin: Skin is warm and dry. No rash noted. No erythema.   Psychiatric: She has a normal mood and affect. Her behavior is normal. Judgment and thought content normal.         Health Maintenance       Date Due Completion Date    HIV Screening 06/28/1994 ---    TETANUS VACCINE 06/28/1997 ---    Pneumococcal Vaccine (Medium Risk) (1 of 1 - PPSV23) 06/09/2020 (Originally 6/28/1998) ---    Mammogram 08/06/2020 8/6/2019            ASSESSMENT     39 y.o. female with     1. Flu-like symptoms    2. Acute cystitis with hematuria        PLAN:     1. Flu-like symptoms  - POCT Influenza A/B Molecular; neg  - Continue symptomatic treatment with rest, increase fluid intake, tylenol or ibuprofen PRN fever(temp >/= 100.4) or body aches. Okay to take OTC antihistamines, i.e. Bendaryl, Claritin, Allegra, etc. as needed.  - Okay to gargle with warm, salt water or use throat lozenges as needed]  - oseltamivir (TAMIFLU) 75 MG capsule; Take 1 capsule (75 mg total) by mouth 2 (two) times daily. for 5 days  Dispense: 10 capsule; Refill: 0  - promethazine-dextromethorphan (PROMETHAZINE-DM) 6.25-15 mg/5 mL Syrp; Take  5 mLs by mouth 2 (two) times daily as needed.  Dispense: 120 mL; Refill: 0    2. Acute cystitis with hematuria  - Pt to take Abx to completion  - Urinalysis  - Urine culture  - POCT urinalysis, dipstick or tablet reag  - levoFLOXacin (LEVAQUIN) 750 MG tablet; Take 1 tablet (750 mg total) by mouth once daily.  Dispense: 7 tablet; Refill: 0      RTC in 1-2 weeks as needed for any acute worsening of current condition or failure to improve        Spring Montilla MD  02/27/2020 3:56 PM        No follow-ups on file.

## 2020-02-29 LAB — BACTERIA UR CULT: NORMAL

## 2020-03-13 ENCOUNTER — OFFICE VISIT (OUTPATIENT)
Dept: FAMILY MEDICINE | Facility: CLINIC | Age: 40
End: 2020-03-13
Payer: COMMERCIAL

## 2020-03-13 VITALS
BODY MASS INDEX: 32.46 KG/M2 | SYSTOLIC BLOOD PRESSURE: 120 MMHG | OXYGEN SATURATION: 97 % | WEIGHT: 176.38 LBS | RESPIRATION RATE: 16 BRPM | HEIGHT: 62 IN | HEART RATE: 100 BPM | TEMPERATURE: 98 F | DIASTOLIC BLOOD PRESSURE: 76 MMHG

## 2020-03-13 DIAGNOSIS — Z72.0 TOBACCO ABUSE: ICD-10-CM

## 2020-03-13 DIAGNOSIS — J32.9 BACTERIAL SINUSITIS: Primary | ICD-10-CM

## 2020-03-13 DIAGNOSIS — B96.89 BACTERIAL SINUSITIS: Primary | ICD-10-CM

## 2020-03-13 PROCEDURE — 99214 OFFICE O/P EST MOD 30 MIN: CPT | Mod: S$GLB,,, | Performed by: PHYSICIAN ASSISTANT

## 2020-03-13 PROCEDURE — 99214 PR OFFICE/OUTPT VISIT, EST, LEVL IV, 30-39 MIN: ICD-10-PCS | Mod: S$GLB,,, | Performed by: PHYSICIAN ASSISTANT

## 2020-03-13 PROCEDURE — 3074F PR MOST RECENT SYSTOLIC BLOOD PRESSURE < 130 MM HG: ICD-10-PCS | Mod: CPTII,S$GLB,, | Performed by: PHYSICIAN ASSISTANT

## 2020-03-13 PROCEDURE — 3074F SYST BP LT 130 MM HG: CPT | Mod: CPTII,S$GLB,, | Performed by: PHYSICIAN ASSISTANT

## 2020-03-13 PROCEDURE — 3078F DIAST BP <80 MM HG: CPT | Mod: CPTII,S$GLB,, | Performed by: PHYSICIAN ASSISTANT

## 2020-03-13 PROCEDURE — 3008F PR BODY MASS INDEX (BMI) DOCUMENTED: ICD-10-PCS | Mod: CPTII,S$GLB,, | Performed by: PHYSICIAN ASSISTANT

## 2020-03-13 PROCEDURE — 99999 PR PBB SHADOW E&M-EST. PATIENT-LVL III: CPT | Mod: PBBFAC,,, | Performed by: PHYSICIAN ASSISTANT

## 2020-03-13 PROCEDURE — 99999 PR PBB SHADOW E&M-EST. PATIENT-LVL III: ICD-10-PCS | Mod: PBBFAC,,, | Performed by: PHYSICIAN ASSISTANT

## 2020-03-13 PROCEDURE — 3078F PR MOST RECENT DIASTOLIC BLOOD PRESSURE < 80 MM HG: ICD-10-PCS | Mod: CPTII,S$GLB,, | Performed by: PHYSICIAN ASSISTANT

## 2020-03-13 PROCEDURE — 3008F BODY MASS INDEX DOCD: CPT | Mod: CPTII,S$GLB,, | Performed by: PHYSICIAN ASSISTANT

## 2020-03-13 RX ORDER — DESLORATADINE 5 MG/1
5 TABLET ORAL DAILY
Qty: 30 TABLET | Refills: 11 | Status: SHIPPED | OUTPATIENT
Start: 2020-03-13 | End: 2020-11-17 | Stop reason: ALTCHOICE

## 2020-03-13 RX ORDER — ALBUTEROL SULFATE 90 UG/1
2 AEROSOL, METERED RESPIRATORY (INHALATION) EVERY 6 HOURS PRN
Qty: 18 G | Refills: 0 | Status: SHIPPED | OUTPATIENT
Start: 2020-03-13 | End: 2020-07-17 | Stop reason: SDUPTHER

## 2020-03-13 RX ORDER — DOXYCYCLINE HYCLATE 100 MG
100 TABLET ORAL 2 TIMES DAILY
Qty: 14 TABLET | Refills: 0 | Status: SHIPPED | OUTPATIENT
Start: 2020-03-13 | End: 2020-03-20

## 2020-03-13 RX ORDER — FLUTICASONE PROPIONATE 50 MCG
1 SPRAY, SUSPENSION (ML) NASAL 2 TIMES DAILY
Qty: 16 G | Refills: 12 | Status: SHIPPED | OUTPATIENT
Start: 2020-03-13 | End: 2020-04-12

## 2020-03-13 NOTE — PROGRESS NOTES
Subjective:       Patient ID: Roslyn Brewster is a 39 y.o. female with multiple medical diagnoses as listed in the medical history and problem list that presents for URI (2 weeks, moved to chest)  .    Chief Complaint: URI (2 weeks, moved to chest)      Cough   This is a new problem. The current episode started 1 to 4 weeks ago. The problem has been unchanged. Cough characteristics: am productive  Associated symptoms include myalgias, rhinorrhea, shortness of breath and wheezing. Pertinent negatives include no chills, ear pain, eye redness, fever, headaches, nasal congestion, postnasal drip or sore throat. Treatments tried: nyquil 2 nights ago, tamiflu and levaquin 2/27         Review of Systems   Constitutional: Negative for chills, fatigue and fever.   HENT: Positive for rhinorrhea, sinus pressure and sneezing. Negative for congestion, ear pain, postnasal drip, sinus pain, sore throat and trouble swallowing.    Eyes: Negative for photophobia, pain, discharge, redness and itching.   Respiratory: Positive for cough, chest tightness, shortness of breath and wheezing.         1-2 cigs yesterday   Gastrointestinal: Negative for abdominal pain, constipation, diarrhea, nausea and vomiting.   Musculoskeletal: Positive for myalgias.   Neurological: Negative for headaches.         PAST MEDICAL HISTORY:  Past Medical History:   Diagnosis Date    Abnormal uterine bleeding 4/18/2018    Acute encephalopathy 12/24/12    secondary to drug overdose    ARF (acute renal failure) 12/24/12    secondary to drug overdose    History of cardiac arrest 12/24/12    secondary to drug overdose    Hypertension     Kidney stones 1/8/2018    MRSA (methicillin resistant Staphylococcus aureus) 12/24/12    Nephritis     PMDD (premenstrual dysphoric disorder)     Polysubstance abuse 12/24/12    Systolic CHF, acute 12/24/12    secondary to drug overdose    UTI (urinary tract infection)        SOCIAL HISTORY:  Social History      Socioeconomic History    Marital status: Single     Spouse name: Not on file    Number of children: Not on file    Years of education: Not on file    Highest education level: Not on file   Occupational History    Not on file   Social Needs    Financial resource strain: Hard    Food insecurity:     Worry: Never true     Inability: Never true    Transportation needs:     Medical: No     Non-medical: No   Tobacco Use    Smoking status: Current Every Day Smoker     Packs/day: 0.50     Years: 20.00     Pack years: 10.00     Types: Cigarettes    Smokeless tobacco: Never Used   Substance and Sexual Activity    Alcohol use: No     Frequency: Monthly or less     Drinks per session: 3 or 4     Binge frequency: Never    Drug use: No     Types: Cocaine, Methamphetamines     Comment: PCP, patient states she had not done drugs since 2012    Sexual activity: Yes     Partners: Male   Lifestyle    Physical activity:     Days per week: 1 day     Minutes per session: 10 min    Stress: To some extent   Relationships    Social connections:     Talks on phone: More than three times a week     Gets together: Three times a week     Attends [employer]an service: Not on file     Active member of club or organization: No     Attends meetings of clubs or organizations: Never     Relationship status: Living with partner   Other Topics Concern    Not on file   Social History Narrative    Not on file       ALLERGIES AND MEDICATIONS: updated and reviewed.  Review of patient's allergies indicates:   Allergen Reactions    Strawberry Anaphylaxis and Hives    Morphine Other (See Comments)     Burns stomach    Toradol [ketorolac]     Tramadol      Abdominal pain     Current Outpatient Medications   Medication Sig Dispense Refill    ALPRAZolam (XANAX) 1 MG tablet Take 1 tablet (1 mg total) by mouth every evening. 30 tablet 4    FLUoxetine 40 MG capsule Take 1 capsule (40 mg total) by mouth once daily. 90 capsule 3    albuterol  "(PROVENTIL/VENTOLIN HFA) 90 mcg/actuation inhaler Inhale 2 puffs into the lungs every 6 (six) hours as needed for Wheezing. 18 g 0    desloratadine (CLARINEX) 5 mg tablet Take 1 tablet (5 mg total) by mouth once daily. 30 tablet 11    doxycycline (VIBRA-TABS) 100 MG tablet Take 1 tablet (100 mg total) by mouth 2 (two) times daily. With food for 7 days 14 tablet 0    fluticasone propionate (FLONASE) 50 mcg/actuation nasal spray 1 spray (50 mcg total) by Each Nostril route 2 (two) times daily. 16 g 12    omeprazole (PRILOSEC) 40 MG capsule Take 1 capsule (40 mg total) by mouth once daily. (Patient not taking: Reported on 3/13/2020) 30 capsule 11     No current facility-administered medications for this visit.          Objective:   /76   Pulse 100   Temp 98.4 °F (36.9 °C) (Oral)   Resp 16   Ht 5' 2" (1.575 m)   Wt 80 kg (176 lb 5.9 oz)   LMP 03/04/2020   SpO2 97%   BMI 32.26 kg/m²      Physical Exam   Constitutional: No distress.   HENT:   Head: Normocephalic and atraumatic.   Right Ear: External ear and ear canal normal. No middle ear effusion.   Left Ear: External ear and ear canal normal.  No middle ear effusion.   Nose: Mucosal edema and rhinorrhea present. Right sinus exhibits no maxillary sinus tenderness and no frontal sinus tenderness. Left sinus exhibits no maxillary sinus tenderness and no frontal sinus tenderness.   Mouth/Throat: Uvula is midline and mucous membranes are normal. No oropharyngeal exudate or posterior oropharyngeal erythema.   Nasal congestion  Left purulent discharge  PND   Eyes: Conjunctivae and EOM are normal.   Cardiovascular: Normal rate and regular rhythm.   Pulmonary/Chest: Effort normal and breath sounds normal. She has no wheezes.   Lymphadenopathy:     She has no cervical adenopathy.   Skin: Skin is warm.           Assessment:       1. Bacterial sinusitis    2. Tobacco abuse        Plan:       Bacterial sinusitis  -     desloratadine (CLARINEX) 5 mg tablet; Take 1 " tablet (5 mg total) by mouth once daily.  Dispense: 30 tablet; Refill: 11  -     fluticasone propionate (FLONASE) 50 mcg/actuation nasal spray; 1 spray (50 mcg total) by Each Nostril route 2 (two) times daily.  Dispense: 16 g; Refill: 12  -     doxycycline (VIBRA-TABS) 100 MG tablet; Take 1 tablet (100 mg total) by mouth 2 (two) times daily. With food for 7 days  Dispense: 14 tablet; Refill: 0    Tobacco abuse  -     albuterol (PROVENTIL/VENTOLIN HFA) 90 mcg/actuation inhaler; Inhale 2 puffs into the lungs every 6 (six) hours as needed for Wheezing.  Dispense: 18 g; Refill: 0            No follow-ups on file.

## 2020-03-19 ENCOUNTER — PATIENT MESSAGE (OUTPATIENT)
Dept: FAMILY MEDICINE | Facility: CLINIC | Age: 40
End: 2020-03-19

## 2020-03-19 DIAGNOSIS — R05.9 COUGH: Primary | ICD-10-CM

## 2020-03-20 RX ORDER — PROMETHAZINE HYDROCHLORIDE AND DEXTROMETHORPHAN HYDROBROMIDE 6.25; 15 MG/5ML; MG/5ML
5 SYRUP ORAL EVERY 4 HOURS PRN
Qty: 240 ML | Refills: 1 | Status: SHIPPED | OUTPATIENT
Start: 2020-03-20 | End: 2020-03-27

## 2020-03-21 ENCOUNTER — PATIENT MESSAGE (OUTPATIENT)
Dept: FAMILY MEDICINE | Facility: CLINIC | Age: 40
End: 2020-03-21

## 2020-03-26 ENCOUNTER — PATIENT MESSAGE (OUTPATIENT)
Dept: FAMILY MEDICINE | Facility: CLINIC | Age: 40
End: 2020-03-26

## 2020-03-26 ENCOUNTER — APPOINTMENT (OUTPATIENT)
Dept: RADIOLOGY | Facility: HOSPITAL | Age: 40
End: 2020-03-26
Attending: INTERNAL MEDICINE
Payer: COMMERCIAL

## 2020-03-26 DIAGNOSIS — J06.9 UPPER RESPIRATORY INFECTION WITH COUGH AND CONGESTION: ICD-10-CM

## 2020-03-26 DIAGNOSIS — J06.9 UPPER RESPIRATORY INFECTION WITH COUGH AND CONGESTION: Primary | ICD-10-CM

## 2020-03-26 PROCEDURE — 71046 XR CHEST PA AND LATERAL: ICD-10-PCS | Mod: 26,,, | Performed by: RADIOLOGY

## 2020-03-26 PROCEDURE — 71046 X-RAY EXAM CHEST 2 VIEWS: CPT | Mod: TC,FY,PN

## 2020-03-26 PROCEDURE — 71046 X-RAY EXAM CHEST 2 VIEWS: CPT | Mod: 26,,, | Performed by: RADIOLOGY

## 2020-03-27 ENCOUNTER — OFFICE VISIT (OUTPATIENT)
Dept: FAMILY MEDICINE | Facility: CLINIC | Age: 40
End: 2020-03-27
Payer: COMMERCIAL

## 2020-03-27 DIAGNOSIS — Z71.2 ENCOUNTER TO DISCUSS X-RAY RESULTS: ICD-10-CM

## 2020-03-27 DIAGNOSIS — J45.41 MODERATE PERSISTENT REACTIVE AIRWAY DISEASE WITH ACUTE EXACERBATION: Primary | ICD-10-CM

## 2020-03-27 PROCEDURE — 99214 OFFICE O/P EST MOD 30 MIN: CPT | Mod: 95,,, | Performed by: INTERNAL MEDICINE

## 2020-03-27 PROCEDURE — 99214 PR OFFICE/OUTPT VISIT, EST, LEVL IV, 30-39 MIN: ICD-10-PCS | Mod: 95,,, | Performed by: INTERNAL MEDICINE

## 2020-03-27 RX ORDER — CODEINE PHOSPHATE AND GUAIFENESIN 10; 100 MG/5ML; MG/5ML
5 SOLUTION ORAL 3 TIMES DAILY PRN
Qty: 120 ML | Refills: 0 | Status: SHIPPED | OUTPATIENT
Start: 2020-03-27 | End: 2020-03-30

## 2020-03-27 RX ORDER — MONTELUKAST SODIUM 10 MG/1
10 TABLET ORAL NIGHTLY
Qty: 30 TABLET | Refills: 0 | Status: SHIPPED | OUTPATIENT
Start: 2020-03-27 | End: 2020-04-26

## 2020-03-27 NOTE — PROGRESS NOTES
SUBJECTIVE     No chief complaint on file.      HPI  Roslyn Brewster is a 39 y.o. female with multiple medical diagnoses as listed in the medical history and problem list that presents for evaluation of URI x 1 month. Pt reports her cough has gotten deeper over the past month. She has chest congestion, but cough remains dry. +headache, runny nose, and SOB. Denies any fever, chills, or night sweats. Pt has been taking 2 courses of Abx, Flonase, Clarinex, and an inhaler without any improvement. Denies any sick contacts/recent travel.    PAST MEDICAL HISTORY:  Past Medical History:   Diagnosis Date    Abnormal uterine bleeding 4/18/2018    Acute encephalopathy 12/24/12    secondary to drug overdose    ARF (acute renal failure) 12/24/12    secondary to drug overdose    History of cardiac arrest 12/24/12    secondary to drug overdose    Hypertension     Kidney stones 1/8/2018    MRSA (methicillin resistant Staphylococcus aureus) 12/24/12    Nephritis     PMDD (premenstrual dysphoric disorder)     Polysubstance abuse 12/24/12    Systolic CHF, acute 12/24/12    secondary to drug overdose    UTI (urinary tract infection)        PAST SURGICAL HISTORY:  Past Surgical History:   Procedure Laterality Date    AUGMENTATION OF BREAST Bilateral 2001    CHOLECYSTECTOMY      ESOPHAGOGASTRODUODENOSCOPY N/A 1/2/2020    Procedure: EGD (ESOPHAGOGASTRODUODENOSCOPY);  Surgeon: Moe Tripathi MD;  Location: [facility];  Service: Endoscopy;  Laterality: N/A;    TUBAL LIGATION      urinary stents         SOCIAL HISTORY:  Social History     Socioeconomic History    Marital status: Single     Spouse name: Not on file    Number of children: Not on file    Years of education: Not on file    Highest education level: Not on file   Occupational History    Not on file   Social Needs    Financial resource strain: Hard    Food insecurity:     Worry: Never true     Inability: Never true    Transportation needs:      Medical: No     Non-medical: No   Tobacco Use    Smoking status: Current Every Day Smoker     Packs/day: 0.50     Years: 20.00     Pack years: 10.00     Types: Cigarettes    Smokeless tobacco: Never Used   Substance and Sexual Activity    Alcohol use: No     Frequency: Monthly or less     Drinks per session: 3 or 4     Binge frequency: Never    Drug use: No     Types: Cocaine, Methamphetamines     Comment: PCP, patient states she had not done drugs since 2012    Sexual activity: Yes     Partners: Male   Lifestyle    Physical activity:     Days per week: 1 day     Minutes per session: 10 min    Stress: To some extent   Relationships    Social connections:     Talks on phone: More than three times a week     Gets together: Three times a week     Attends Evangelical service: Not on file     Active member of club or organization: No     Attends meetings of clubs or organizations: Never     Relationship status: Living with partner   Other Topics Concern    Not on file   Social History Narrative    Not on file       FAMILY HISTORY:  Family History   Problem Relation Age of Onset    Kidney disease Mother     Ovarian cancer Mother     Cancer Maternal Grandmother         throat    Cancer Maternal Grandfather         prostate    Kidney disease Daughter     Breast cancer Neg Hx     Colon cancer Neg Hx        ALLERGIES AND MEDICATIONS: updated and reviewed.  Review of patient's allergies indicates:   Allergen Reactions    Strawberry Anaphylaxis and Hives    Morphine Other (See Comments)     Burns stomach    Toradol [ketorolac]     Tramadol      Abdominal pain     Current Outpatient Medications   Medication Sig Dispense Refill    albuterol (PROVENTIL/VENTOLIN HFA) 90 mcg/actuation inhaler Inhale 2 puffs into the lungs every 6 (six) hours as needed for Wheezing. 18 g 0    ALPRAZolam (XANAX) 1 MG tablet Take 1 tablet (1 mg total) by mouth every evening. 30 tablet 4    desloratadine (CLARINEX) 5 mg tablet Take  1 tablet (5 mg total) by mouth once daily. 30 tablet 11    FLUoxetine 40 MG capsule Take 1 capsule (40 mg total) by mouth once daily. 90 capsule 3    fluticasone propionate (FLONASE) 50 mcg/actuation nasal spray 1 spray (50 mcg total) by Each Nostril route 2 (two) times daily. 16 g 12    guaifenesin-codeine 100-10 mg/5 ml (TUSSI-ORGANIDIN NR)  mg/5 mL syrup Take 5 mLs by mouth 3 (three) times daily as needed for Cough or Congestion. 120 mL 0    montelukast (SINGULAIR) 10 mg tablet Take 1 tablet (10 mg total) by mouth every evening. 30 tablet 0    omeprazole (PRILOSEC) 40 MG capsule Take 1 capsule (40 mg total) by mouth once daily. (Patient not taking: Reported on 3/13/2020) 30 capsule 11     No current facility-administered medications for this visit.        ROS  Review of Systems   Constitutional: Negative for chills and fever.   HENT: Positive for congestion and rhinorrhea. Negative for hearing loss and sore throat.    Eyes: Negative for visual disturbance.   Respiratory: Positive for cough and shortness of breath.    Cardiovascular: Positive for chest pain (rib pain). Negative for palpitations and leg swelling.   Gastrointestinal: Negative for abdominal pain, constipation, diarrhea, nausea and vomiting.   Genitourinary: Negative for dysuria, frequency and urgency.   Musculoskeletal: Negative for arthralgias, joint swelling and myalgias.   Skin: Negative for rash and wound.   Neurological: Positive for headaches.   Psychiatric/Behavioral: Negative for agitation and confusion. The patient is not nervous/anxious.          OBJECTIVE     Physical Exam  There were no vitals filed for this visit. There is no height or weight on file to calculate BMI.            Physical Exam   Constitutional: She is oriented to person, place, and time. She appears well-developed and well-nourished. No distress.   HENT:   Head: Normocephalic and atraumatic.   Right Ear: External ear normal.   Left Ear: External ear normal.    Nose: Nose normal.   Mouth/Throat: Oropharynx is clear and moist.   Eyes: Conjunctivae and EOM are normal. Right eye exhibits no discharge. Left eye exhibits no discharge. No scleral icterus.   Neck: Normal range of motion. Neck supple. No JVD present. No tracheal deviation present.   Pulmonary/Chest: Effort normal. No respiratory distress.   Musculoskeletal: Normal range of motion. She exhibits no deformity.   Neurological: She is alert and oriented to person, place, and time. She exhibits normal muscle tone. Coordination normal.   Skin: Skin is warm and dry. No rash noted. No erythema.   Psychiatric: She has a normal mood and affect. Her behavior is normal. Judgment and thought content normal.         Health Maintenance       Date Due Completion Date    HIV Screening 06/28/1994 ---    TETANUS VACCINE 06/28/1997 ---    Pneumococcal Vaccine (Medium Risk) (1 of 1 - PPSV23) 06/09/2020 (Originally 6/28/1998) ---    Mammogram 08/06/2020 8/6/2019            ASSESSMENT     39 y.o. female with     1. Moderate persistent reactive airway disease with acute exacerbation    2. Encounter to discuss x-ray results        PLAN:     1. Moderate persistent reactive airway disease with acute exacerbation  - Pt has failed 2 courses of Abx; start Singulair as below and okay to continue all meds as previously prescribed  - Pt advised to quit smoking; she voiced understanding  - montelukast (SINGULAIR) 10 mg tablet; Take 1 tablet (10 mg total) by mouth every evening.  Dispense: 30 tablet; Refill: 0  - guaifenesin-codeine 100-10 mg/5 ml (TUSSI-ORGANIDIN NR)  mg/5 mL syrup; Take 5 mLs by mouth 3 (three) times daily as needed for Cough or Congestion.  Dispense: 120 mL; Refill: 0    2. Encounter to discuss x-ray results  - Discussed recent xray results  - All questions/concerns addressed  - Pt voiced understanding        RTC in 1-2 weeks as needed for any acute worsening of current condition or failure to improve     Consult Start  Time: 03/27/2020 08:15  Consult End Time: 03/27/2020 08:26            Spring Montilla MD  03/27/2020 8:16 AM        No follow-ups on file.

## 2020-03-30 DIAGNOSIS — R05.9 COUGH: Primary | ICD-10-CM

## 2020-03-30 RX ORDER — PROMETHAZINE HYDROCHLORIDE AND DEXTROMETHORPHAN HYDROBROMIDE 6.25; 15 MG/5ML; MG/5ML
5 SYRUP ORAL 2 TIMES DAILY PRN
Qty: 120 ML | Refills: 0 | Status: SHIPPED | OUTPATIENT
Start: 2020-03-30 | End: 2020-04-09

## 2020-04-01 DIAGNOSIS — R05.9 COUGH: Primary | ICD-10-CM

## 2020-04-01 RX ORDER — BENZONATATE 200 MG/1
200 CAPSULE ORAL 3 TIMES DAILY PRN
Qty: 30 CAPSULE | Refills: 0 | Status: SHIPPED | OUTPATIENT
Start: 2020-04-01 | End: 2020-04-11

## 2020-04-06 ENCOUNTER — PATIENT MESSAGE (OUTPATIENT)
Dept: FAMILY MEDICINE | Facility: CLINIC | Age: 40
End: 2020-04-06

## 2020-04-07 DIAGNOSIS — J41.8 MIXED SIMPLE AND MUCOPURULENT CHRONIC BRONCHITIS: Primary | ICD-10-CM

## 2020-04-07 RX ORDER — PREDNISONE 50 MG/1
50 TABLET ORAL DAILY
Qty: 5 TABLET | Refills: 0 | Status: SHIPPED | OUTPATIENT
Start: 2020-04-07 | End: 2020-04-12

## 2020-05-13 DIAGNOSIS — F32.81 PMDD (PREMENSTRUAL DYSPHORIC DISORDER): ICD-10-CM

## 2020-05-13 DIAGNOSIS — R11.0 NAUSEA: ICD-10-CM

## 2020-05-13 RX ORDER — ALPRAZOLAM 1 MG/1
TABLET ORAL
Qty: 30 TABLET | Refills: 4 | OUTPATIENT
Start: 2020-05-13

## 2020-05-13 RX ORDER — ALPRAZOLAM 1 MG/1
1 TABLET ORAL NIGHTLY
Qty: 30 TABLET | Refills: 4 | Status: SHIPPED | OUTPATIENT
Start: 2020-05-13 | End: 2020-12-11

## 2020-05-13 NOTE — TELEPHONE ENCOUNTER
----- Message from Yamileth Marie sent at 5/13/2020 11:51 AM CDT -----  Type: RX Refill Request    Who Called: Self     Have you contacted your pharmacy: yes    Refill or New Rx: refill     RX Name and Strength: ALPRAZolam (XANAX) 1 MG tablet      Preferred Pharmacy with phone number:[facility] - [place], [place] - [address] [phone] (Phone)  [phone] (Fax)        Local or Mail Order: Local     Ordering Provider: Dr. Colvin     Would the patient rather a call back or a response [facility]?  Call     Best Call Back Number: [phone]

## 2020-05-14 ENCOUNTER — TELEPHONE (OUTPATIENT)
Dept: SMOKING CESSATION | Facility: CLINIC | Age: 40
End: 2020-05-14

## 2020-05-14 NOTE — TELEPHONE ENCOUNTER
1st attempt unable to leave a message regarding smoking cessation quit 2 episode. No voicemail setup.

## 2020-06-05 ENCOUNTER — PATIENT MESSAGE (OUTPATIENT)
Dept: FAMILY MEDICINE | Facility: CLINIC | Age: 40
End: 2020-06-05

## 2020-06-05 DIAGNOSIS — Z00.00 ANNUAL PHYSICAL EXAM: Primary | ICD-10-CM

## 2020-06-05 NOTE — TELEPHONE ENCOUNTER
Patient requesting labs and lab visit prior to appointment.  Please advise.    Office visit scheduled for 7/11/2020.

## 2020-06-10 ENCOUNTER — LAB VISIT (OUTPATIENT)
Dept: LAB | Facility: HOSPITAL | Age: 40
End: 2020-06-10
Attending: FAMILY MEDICINE
Payer: COMMERCIAL

## 2020-06-10 DIAGNOSIS — Z00.00 ANNUAL PHYSICAL EXAM: ICD-10-CM

## 2020-06-10 LAB
ALBUMIN SERPL BCP-MCNC: 4.2 G/DL (ref 3.5–5.2)
ALP SERPL-CCNC: 69 U/L (ref 55–135)
ALT SERPL W/O P-5'-P-CCNC: 129 U/L (ref 10–44)
ANION GAP SERPL CALC-SCNC: 9 MMOL/L (ref 8–16)
AST SERPL-CCNC: 58 U/L (ref 10–40)
BACTERIA #/AREA URNS HPF: ABNORMAL /HPF
BASOPHILS # BLD AUTO: 0.04 K/UL (ref 0–0.2)
BASOPHILS NFR BLD: 0.4 % (ref 0–1.9)
BILIRUB SERPL-MCNC: 0.4 MG/DL (ref 0.1–1)
BILIRUB UR QL STRIP: NEGATIVE
BUN SERPL-MCNC: 9 MG/DL (ref 6–20)
CALCIUM SERPL-MCNC: 9.2 MG/DL (ref 8.7–10.5)
CHLORIDE SERPL-SCNC: 104 MMOL/L (ref 95–110)
CHOLEST SERPL-MCNC: 210 MG/DL (ref 120–199)
CHOLEST/HDLC SERPL: 6.8 {RATIO} (ref 2–5)
CLARITY UR: ABNORMAL
CO2 SERPL-SCNC: 26 MMOL/L (ref 23–29)
COLOR UR: YELLOW
CREAT SERPL-MCNC: 0.8 MG/DL (ref 0.5–1.4)
DIFFERENTIAL METHOD: ABNORMAL
EOSINOPHIL # BLD AUTO: 0.2 K/UL (ref 0–0.5)
EOSINOPHIL NFR BLD: 2.2 % (ref 0–8)
ERYTHROCYTE [DISTWIDTH] IN BLOOD BY AUTOMATED COUNT: 12 % (ref 11.5–14.5)
EST. GFR  (AFRICAN AMERICAN): >60 ML/MIN/1.73 M^2
EST. GFR  (NON AFRICAN AMERICAN): >60 ML/MIN/1.73 M^2
FSH SERPL-ACNC: 6.9 MIU/ML
GLUCOSE SERPL-MCNC: 130 MG/DL (ref 70–110)
GLUCOSE UR QL STRIP: NEGATIVE
HCT VFR BLD AUTO: 45.1 % (ref 37–48.5)
HDLC SERPL-MCNC: 31 MG/DL (ref 40–75)
HDLC SERPL: 14.8 % (ref 20–50)
HGB BLD-MCNC: 15.4 G/DL (ref 12–16)
HGB UR QL STRIP: ABNORMAL
IMM GRANULOCYTES # BLD AUTO: 0.07 K/UL (ref 0–0.04)
IMM GRANULOCYTES NFR BLD AUTO: 0.7 % (ref 0–0.5)
KETONES UR QL STRIP: NEGATIVE
LDLC SERPL CALC-MCNC: ABNORMAL MG/DL (ref 63–159)
LEUKOCYTE ESTERASE UR QL STRIP: ABNORMAL
LYMPHOCYTES # BLD AUTO: 2.5 K/UL (ref 1–4.8)
LYMPHOCYTES NFR BLD: 26.2 % (ref 18–48)
MCH RBC QN AUTO: 31.2 PG (ref 27–31)
MCHC RBC AUTO-ENTMCNC: 34.1 G/DL (ref 32–36)
MCV RBC AUTO: 91 FL (ref 82–98)
MICROSCOPIC COMMENT: ABNORMAL
MONOCYTES # BLD AUTO: 0.8 K/UL (ref 0.3–1)
MONOCYTES NFR BLD: 8.4 % (ref 4–15)
NEUTROPHILS # BLD AUTO: 5.8 K/UL (ref 1.8–7.7)
NEUTROPHILS NFR BLD: 62.1 % (ref 38–73)
NITRITE UR QL STRIP: NEGATIVE
NONHDLC SERPL-MCNC: 179 MG/DL
NRBC BLD-RTO: 0 /100 WBC
PH UR STRIP: 5 [PH] (ref 5–8)
PLATELET # BLD AUTO: 240 K/UL (ref 150–350)
PMV BLD AUTO: 10.9 FL (ref 9.2–12.9)
POTASSIUM SERPL-SCNC: 3.6 MMOL/L (ref 3.5–5.1)
PROT SERPL-MCNC: 7.2 G/DL (ref 6–8.4)
PROT UR QL STRIP: NEGATIVE
RBC # BLD AUTO: 4.94 M/UL (ref 4–5.4)
RBC #/AREA URNS HPF: 0 /HPF (ref 0–4)
SODIUM SERPL-SCNC: 139 MMOL/L (ref 136–145)
SP GR UR STRIP: 1.02 (ref 1–1.03)
SQUAMOUS #/AREA URNS HPF: 8 /HPF
T4 FREE SERPL-MCNC: 0.93 NG/DL (ref 0.71–1.51)
TRIGL SERPL-MCNC: 537 MG/DL (ref 30–150)
TSH SERPL DL<=0.005 MIU/L-ACNC: 3.62 UIU/ML (ref 0.4–4)
URN SPEC COLLECT METH UR: ABNORMAL
UROBILINOGEN UR STRIP-ACNC: NEGATIVE EU/DL
WBC # BLD AUTO: 9.38 K/UL (ref 3.9–12.7)
WBC #/AREA URNS HPF: 2 /HPF (ref 0–5)

## 2020-06-10 PROCEDURE — 82306 VITAMIN D 25 HYDROXY: CPT

## 2020-06-10 PROCEDURE — 84443 ASSAY THYROID STIM HORMONE: CPT

## 2020-06-10 PROCEDURE — 83001 ASSAY OF GONADOTROPIN (FSH): CPT

## 2020-06-10 PROCEDURE — 84439 ASSAY OF FREE THYROXINE: CPT

## 2020-06-10 PROCEDURE — 85025 COMPLETE CBC W/AUTO DIFF WBC: CPT

## 2020-06-10 PROCEDURE — 80061 LIPID PANEL: CPT

## 2020-06-10 PROCEDURE — 81000 URINALYSIS NONAUTO W/SCOPE: CPT

## 2020-06-10 PROCEDURE — 80053 COMPREHEN METABOLIC PANEL: CPT

## 2020-06-11 LAB — 25(OH)D3+25(OH)D2 SERPL-MCNC: 31 NG/ML (ref 30–96)

## 2020-06-19 ENCOUNTER — CLINICAL SUPPORT (OUTPATIENT)
Dept: SMOKING CESSATION | Facility: CLINIC | Age: 40
End: 2020-06-19
Payer: COMMERCIAL

## 2020-06-19 DIAGNOSIS — F17.200 NICOTINE DEPENDENCE: Primary | ICD-10-CM

## 2020-06-19 PROCEDURE — 99407 PR TOBACCO USE CESSATION INTENSIVE >10 MINUTES: ICD-10-PCS | Mod: S$GLB,,, | Performed by: INTERNAL MEDICINE

## 2020-06-19 PROCEDURE — 99407 BEHAV CHNG SMOKING > 10 MIN: CPT | Mod: S$GLB,,, | Performed by: INTERNAL MEDICINE

## 2020-06-19 NOTE — PROGRESS NOTES
Spoke with patient today in regard to smoking cessation progress for 12 month telephone follow up, she states tobacco free. Patient states using the prescribed tobacco cessation medication of nicotine gum and other method on her own to help aid in her quit. Commended patient on the accomplishment. Informed patient of benefit period and contact information if any further help or support is needed. Will resolve episode and complete smart form for Quit attempt #2.

## 2020-07-17 ENCOUNTER — TELEPHONE (OUTPATIENT)
Dept: FAMILY MEDICINE | Facility: CLINIC | Age: 40
End: 2020-07-17

## 2020-07-17 ENCOUNTER — LAB VISIT (OUTPATIENT)
Dept: FAMILY MEDICINE | Facility: CLINIC | Age: 40
End: 2020-07-17
Payer: COMMERCIAL

## 2020-07-17 ENCOUNTER — OFFICE VISIT (OUTPATIENT)
Dept: FAMILY MEDICINE | Facility: CLINIC | Age: 40
End: 2020-07-17
Payer: COMMERCIAL

## 2020-07-17 DIAGNOSIS — Z20.822 SUSPECTED COVID-19 VIRUS INFECTION: Primary | ICD-10-CM

## 2020-07-17 DIAGNOSIS — Z72.0 TOBACCO ABUSE: ICD-10-CM

## 2020-07-17 PROCEDURE — 99214 PR OFFICE/OUTPT VISIT, EST, LEVL IV, 30-39 MIN: ICD-10-PCS | Mod: 95,,, | Performed by: PHYSICIAN ASSISTANT

## 2020-07-17 PROCEDURE — U0003 INFECTIOUS AGENT DETECTION BY NUCLEIC ACID (DNA OR RNA); SEVERE ACUTE RESPIRATORY SYNDROME CORONAVIRUS 2 (SARS-COV-2) (CORONAVIRUS DISEASE [COVID-19]), AMPLIFIED PROBE TECHNIQUE, MAKING USE OF HIGH THROUGHPUT TECHNOLOGIES AS DESCRIBED BY CMS-2020-01-R: HCPCS

## 2020-07-17 PROCEDURE — 99214 OFFICE O/P EST MOD 30 MIN: CPT | Mod: 95,,, | Performed by: PHYSICIAN ASSISTANT

## 2020-07-17 RX ORDER — ALBUTEROL SULFATE 90 UG/1
2 AEROSOL, METERED RESPIRATORY (INHALATION) EVERY 6 HOURS PRN
Qty: 18 G | Refills: 0 | Status: SHIPPED | OUTPATIENT
Start: 2020-07-17 | End: 2020-11-17 | Stop reason: ALTCHOICE

## 2020-07-17 NOTE — TELEPHONE ENCOUNTER
----- Message from No Mon sent at 7/17/2020 11:08 AM CDT -----  Name of Who is Calling: ANNAMARIA GALARZA [[id number]]      What is the request in detail: Pt states she needs an order for COVID19 placed in the system. Please contact to further discuss and advise.        Can the clinic reply by [facility]: N      What Number [facility]:  [phone]

## 2020-07-17 NOTE — LETTER
[address] [address], [facility] [[email or web address]] A ? [place], [address] ? Phone [phone] ? Fax [phone]           Return to Work/School    Patient: Roslyn Brewster  YOB: 1980   Date: 07/17/2020      To Whom It May Concern:     Roslyn Brewster was in contact with/seen in my office on 07/17/2020. COVID-19 is present in our communities across the state. Not all patients are eligible or appropriate to be tested. In this situation, your employee meets the following criteria:     Roslyn Brewster has met the criteria for COVID-19 testing based upon symptoms, travel, and/or potential exposure. The test has been completed and is pending results at this time. During this time the employee is not able to work and should be quarantined per the Centers for Disease Control timelines.      If you have any questions or concerns, or if I can be of further assistance, please do not hesitate to contact me.     Sincerely,      URIEL Mckeon

## 2020-07-17 NOTE — PROGRESS NOTES
Answers for HPI/ROS submitted by the patient on 7/17/2020   Cough  Chronicity: new  Onset: yesterday  Progression since onset: unchanged  Frequency: every few hours  Cough characteristics: non-productive  chest pain: Yes  chills: No  ear congestion: Yes  ear pain: No  fever: No  headaches: Yes  heartburn: No  hemoptysis: No  myalgias: Yes  nasal congestion: Yes  postnasal drip: Yes  rash: No  rhinorrhea: No  shortness of breath: No  sore throat: Yes  sweats: Yes  weight loss: No  wheezing: No  Aggravated by: nothing  asthma: No  bronchiectasis: No  bronchitis: Yes  COPD: No  emphysema: No  environmental allergies: Yes  pneumonia: Yes  Treatments tried: OTC cough suppressant  Improvement on treatment: mild

## 2020-07-17 NOTE — PROGRESS NOTES
Subjective:       Patient ID: Roslyn Brewster is a 40 y.o. female with multiple medical diagnoses as listed in the medical history and problem list that presents for COVID-19 Concerns  .    Chief Complaint: COVID-19 Concerns    The patient location is: at home  The chief complaint leading to consultation is: covid concern  Visit type: Virtual visit with synchronous audio and video  Quality of audio: good  Quality of sound: good  Each patient to whom he or she provides medical services by telemedicine is:  (1) informed of the relationship between the physician and patient and the respective role of any other health care provider with respect to management of the patient; and (2) notified that he or she may decline to receive medical services by telemedicine and may withdraw from such care at any time.    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 Symptoms x 2 days.   Cough  This is a new problem. The current episode started yesterday. The problem has been unchanged. The problem occurs every few hours. The cough is non-productive. Associated symptoms include ear congestion, headaches, myalgias, nasal congestion, postnasal drip, a sore throat and sweats. Pertinent negatives include no chest pain, chills, ear pain, eye redness, fever, heartburn, hemoptysis, rash, rhinorrhea, shortness of breath, weight loss or wheezing. Nothing aggravates the symptoms. She has tried OTC cough suppressant for the symptoms. The treatment provided mild relief. Her past medical history is significant for bronchitis, environmental allergies and pneumonia. There is no history of asthma, bronchiectasis, COPD or emphysema.        Review of Systems   Constitutional: Positive for diaphoresis and fatigue. Negative for chills, fever and weight loss.   HENT: Positive for postnasal drip and sore throat. Negative for ear pain and rhinorrhea.    Eyes: Negative for photophobia, pain, discharge, redness and itching.   Respiratory: Positive for cough and  chest tightness. Negative for hemoptysis, shortness of breath and wheezing.    Cardiovascular: Negative for chest pain and palpitations.   Gastrointestinal: Positive for nausea. Negative for abdominal pain, diarrhea, heartburn and vomiting.   Musculoskeletal: Positive for myalgias.   Skin: Negative for rash.   Allergic/Immunologic: Positive for environmental allergies.   Neurological: Positive for headaches.         PAST MEDICAL HISTORY:  Past Medical History:   Diagnosis Date    Abnormal uterine bleeding 4/18/2018    Acute encephalopathy 12/24/12    secondary to drug overdose    ARF (acute renal failure) 12/24/12    secondary to drug overdose    History of cardiac arrest 12/24/12    secondary to drug overdose    Hypertension     Kidney stones 1/8/2018    MRSA (methicillin resistant Staphylococcus aureus) 12/24/12    Nephritis     PMDD (premenstrual dysphoric disorder)     Polysubstance abuse 12/24/12    Systolic CHF, acute 12/24/12    secondary to drug overdose    UTI (urinary tract infection)        SOCIAL HISTORY:  Social History     Socioeconomic History    Marital status: Single     Spouse name: Not on file    Number of children: Not on file    Years of education: Not on file    Highest education level: Not on file   Occupational History    Not on file   Social Needs    Financial resource strain: Hard    Food insecurity     Worry: Never true     Inability: Never true    Transportation needs     Medical: No     Non-medical: No   Tobacco Use    Smoking status: Current Every Day Smoker     Packs/day: 0.50     Years: 20.00     Pack years: 10.00     Types: Cigarettes    Smokeless tobacco: Never Used   Substance and Sexual Activity    Alcohol use: No     Frequency: Monthly or less     Drinks per session: 3 or 4     Binge frequency: Never    Drug use: No     Types: Cocaine, Methamphetamines     Comment: PCP, patient states she had not done drugs since 2012    Sexual activity: Yes     Partners:  Male   Lifestyle    Physical activity     Days per week: 1 day     Minutes per session: 10 min    Stress: To some extent   Relationships    Social connections     Talks on phone: More than three times a week     Gets together: Three times a week     Attends Anabaptism service: Not on file     Active member of club or organization: No     Attends meetings of clubs or organizations: Never     Relationship status: Living with partner   Other Topics Concern    Not on file   Social History Narrative    Not on file       ALLERGIES AND MEDICATIONS: updated and reviewed.  Review of patient's allergies indicates:   Allergen Reactions    Strawberry Anaphylaxis and Hives    Morphine Other (See Comments)     Burns stomach    Toradol [ketorolac]     Tramadol      Abdominal pain     Current Outpatient Medications   Medication Sig Dispense Refill    albuterol (PROVENTIL/VENTOLIN HFA) 90 mcg/actuation inhaler Inhale 2 puffs into the lungs every 6 (six) hours as needed for Wheezing. 18 g 0    ALPRAZolam (XANAX) 1 MG tablet Take 1 tablet (1 mg total) by mouth every evening. 30 tablet 4    desloratadine (CLARINEX) 5 mg tablet Take 1 tablet (5 mg total) by mouth once daily. 30 tablet 11    FLUoxetine 40 MG capsule Take 1 capsule (40 mg total) by mouth once daily. 90 capsule 3    omeprazole (PRILOSEC) 40 MG capsule Take 1 capsule (40 mg total) by mouth once daily. (Patient not taking: Reported on 3/13/2020) 30 capsule 11     No current facility-administered medications for this visit.          Objective:   There were no vitals taken for this visit.     Physical Exam  Constitutional:       General: She is not in acute distress.     Appearance: She is not ill-appearing.   Pulmonary:      Comments: No cough  Neurological:      Mental Status: She is alert and oriented to person, place, and time.             Assessment:       1. Suspected Covid-19 Virus Infection    2. Tobacco abuse        Plan:       Suspected Covid-19 Virus  Infection  -     COVID-19 Routine Screening  -     albuterol (PROVENTIL/VENTOLIN HFA) 90 mcg/actuation inhaler; Inhale 2 puffs into the lungs every 6 (six) hours as needed for Wheezing.  Dispense: 18 g; Refill: 0    Tobacco abuse      Letter given for work  Take OTC allergy meds for PND  Rest and fluid  ER precaution discussed   Consult Start Time: 07/17/2020 15:19  Consult End Time: 07/17/2020 15:29          Instructions for Patients with Confirmed or Suspected COVID-19    If you are awaiting your test result, you will either be called or it will be released to the patient portal.  If you have any questions about your test, please visit [email or web address] or call our COVID-19 information line at [phone].      Preventing the Spread of Coronavirus Disease 2019 (COVID-19) in Homes and Residential Communities -- Patients     Prevention steps for people with confirmed or suspected COVID-19 (including persons under investigation) who do not need to be hospitalized and people with confirmed COVID-19 who were hospitalized and determined to be medically stable to go home.      Stay home except to get medical care.    Separate yourself from other people and animals in your home.    Call ahead before visiting your doctor.    Wear a face mask.    Cover your coughs and sneezes.    Clean your hands often.    Avoid sharing personal household items.    Clean all high-touch surfaces every day.    Monitor your symptoms. Seek prompt medical attention if your illness is worsening (e.g., difficulty breathing). Before seeking care, call your healthcare provider.    If you have a medical emergency and must call 911, notify the dispatcher that you have or are being evaluated for COVID-19. If possible, put on a face mask before emergency medical services arrive.    Use the following symptom-based strategy to return to normal activity following a suspected or confirmed case of COVID-19. Continue isolation until:    o At least 3 days (72 hours) have passed since recovery defined as resolution of fever without the use of fever-reducing medications and improvement in respiratory symptoms (e.g. cough, shortness of breath), and   o At least 10 days have passed since symptoms first appeared.     Precautions for household members, intimate partners and caregivers in a non-healthcare setting of a patient with symptomatic laboratory-confirmed COVID-19 or a patient under investigation.     Household members, intimate partners and caregivers in a non-healthcare setting may have close contact with a person with symptomatic, laboratory-confirmed COVID-19 or a person under investigation. Close contacts should monitor their health; they should call their healthcare provider right away if they develop symptoms suggestive of COVID-19 (e.g., fever, cough, shortness of breath). Close contacts should also follow these recommendations:     · Stay home for the duration of the time recommended by healthcare provider, except to get medical care. Separate yourself from other people and animals in the home.  · Monitor the patients symptoms. If the patient is getting sicker, call his or her healthcare provider. If the patient has a medical emergency and you need to call 911, notify the dispatch personnel that the patient has or is being evaluated for COVID-19.   · Wear a facemask when around other people such as sharing a room or vehicle and before entering a healthcare provider's office.  · Cover coughs and sneezes with a tissue. Throw used tissues in a lined trash can immediately and wash hands.  · Clean hands often with soap and water for at least 20 seconds or with an alcohol-based hand , rubbing hands together until they feel dry. Avoid touching your eyes, nose, and mouth with unwashed hands.  · Clean all high-touch; surfaces every day, including counters, tabletops, doorknobs, bathroom fixtures, toilets, phones, keyboards, tablets,  bedside tables, etc. Use a household cleaning spray or wipe according to label instructions.  · Avoid sharing personal household items such as dishes, drinking glasses, cups, towels, bedding, etc. After these items are used, they should be washed thoroughly with soap and water.  · Use the following symptom-based strategy to return to normal activity following a suspected or confirmed case of COVID-19. Continue isolation until:   · At least 3 days (72 hours) have passed since recovery defined as resolution of fever without the use of fever-reducing medications and improvement in respiratory symptoms (e.g. cough, shortness of breath), and   · At least 10 days have passed since symptoms first appeared.          No follow-ups on file.

## 2020-07-19 LAB — SARS-COV-2 RNA RESP QL NAA+PROBE: NOT DETECTED

## 2020-07-28 ENCOUNTER — HOSPITAL ENCOUNTER (EMERGENCY)
Facility: HOSPITAL | Age: 40
Discharge: HOME | End: 2020-07-28
Attending: EMERGENCY MEDICINE
Payer: COMMERCIAL

## 2020-07-28 VITALS
TEMPERATURE: 98 F | SYSTOLIC BLOOD PRESSURE: 96 MMHG | DIASTOLIC BLOOD PRESSURE: 52 MMHG | BODY MASS INDEX: 28.16 KG/M2 | HEIGHT: 62 IN | WEIGHT: 153 LBS | HEART RATE: 85 BPM | OXYGEN SATURATION: 97 % | RESPIRATION RATE: 18 BRPM

## 2020-07-28 DIAGNOSIS — R10.9 FLANK PAIN: Primary | ICD-10-CM

## 2020-07-28 LAB
ALBUMIN SERPL BCP-MCNC: 4.3 G/DL (ref 3.5–5.2)
ALP SERPL-CCNC: 72 U/L (ref 55–135)
ALT SERPL W/O P-5'-P-CCNC: 110 U/L (ref 10–44)
ANION GAP SERPL CALC-SCNC: 10 MMOL/L (ref 8–16)
AST SERPL-CCNC: 44 U/L (ref 10–40)
B-HCG UR QL: NEGATIVE
BASOPHILS # BLD AUTO: 0.04 K/UL (ref 0–0.2)
BASOPHILS NFR BLD: 0.5 % (ref 0–1.9)
BILIRUB SERPL-MCNC: 0.4 MG/DL (ref 0.1–1)
BILIRUB UR QL STRIP: NEGATIVE
BUN SERPL-MCNC: 13 MG/DL (ref 6–20)
CALCIUM SERPL-MCNC: 9.6 MG/DL (ref 8.7–10.5)
CHLORIDE SERPL-SCNC: 102 MMOL/L (ref 95–110)
CLARITY UR: ABNORMAL
CO2 SERPL-SCNC: 28 MMOL/L (ref 23–29)
COLOR UR: YELLOW
CREAT SERPL-MCNC: 0.9 MG/DL (ref 0.5–1.4)
CTP QC/QA: YES
DIFFERENTIAL METHOD: ABNORMAL
EOSINOPHIL # BLD AUTO: 0.2 K/UL (ref 0–0.5)
EOSINOPHIL NFR BLD: 2.3 % (ref 0–8)
ERYTHROCYTE [DISTWIDTH] IN BLOOD BY AUTOMATED COUNT: 12.2 % (ref 11.5–14.5)
EST. GFR  (AFRICAN AMERICAN): >60 ML/MIN/1.73 M^2
EST. GFR  (NON AFRICAN AMERICAN): >60 ML/MIN/1.73 M^2
GLUCOSE SERPL-MCNC: 122 MG/DL (ref 70–110)
GLUCOSE UR QL STRIP: NEGATIVE
HCT VFR BLD AUTO: 43.2 % (ref 37–48.5)
HGB BLD-MCNC: 15.4 G/DL (ref 12–16)
HGB UR QL STRIP: ABNORMAL
IMM GRANULOCYTES # BLD AUTO: 0.07 K/UL (ref 0–0.04)
IMM GRANULOCYTES NFR BLD AUTO: 0.8 % (ref 0–0.5)
KETONES UR QL STRIP: NEGATIVE
LEUKOCYTE ESTERASE UR QL STRIP: ABNORMAL
LYMPHOCYTES # BLD AUTO: 2.6 K/UL (ref 1–4.8)
LYMPHOCYTES NFR BLD: 29.8 % (ref 18–48)
MCH RBC QN AUTO: 31.8 PG (ref 27–31)
MCHC RBC AUTO-ENTMCNC: 35.6 G/DL (ref 32–36)
MCV RBC AUTO: 89 FL (ref 82–98)
MICROSCOPIC COMMENT: ABNORMAL
MONOCYTES # BLD AUTO: 0.7 K/UL (ref 0.3–1)
MONOCYTES NFR BLD: 8.2 % (ref 4–15)
NEUTROPHILS # BLD AUTO: 5 K/UL (ref 1.8–7.7)
NEUTROPHILS NFR BLD: 58.4 % (ref 38–73)
NITRITE UR QL STRIP: NEGATIVE
NRBC BLD-RTO: 0 /100 WBC
PH UR STRIP: 6 [PH] (ref 5–8)
PLATELET # BLD AUTO: 243 K/UL (ref 150–350)
PMV BLD AUTO: 9.9 FL (ref 9.2–12.9)
POTASSIUM SERPL-SCNC: 3.6 MMOL/L (ref 3.5–5.1)
PROT SERPL-MCNC: 7.3 G/DL (ref 6–8.4)
PROT UR QL STRIP: NEGATIVE
RBC # BLD AUTO: 4.84 M/UL (ref 4–5.4)
RBC #/AREA URNS HPF: 4 /HPF (ref 0–4)
SODIUM SERPL-SCNC: 140 MMOL/L (ref 136–145)
SP GR UR STRIP: 1.02 (ref 1–1.03)
SQUAMOUS #/AREA URNS HPF: 9 /HPF
URN SPEC COLLECT METH UR: ABNORMAL
UROBILINOGEN UR STRIP-ACNC: NEGATIVE EU/DL
WBC # BLD AUTO: 8.63 K/UL (ref 3.9–12.7)
WBC #/AREA URNS HPF: 6 /HPF (ref 0–5)

## 2020-07-28 PROCEDURE — 63600175 PHARM REV CODE 636 W HCPCS: Performed by: EMERGENCY MEDICINE

## 2020-07-28 PROCEDURE — 87086 URINE CULTURE/COLONY COUNT: CPT

## 2020-07-28 PROCEDURE — 81000 URINALYSIS NONAUTO W/SCOPE: CPT

## 2020-07-28 PROCEDURE — 85025 COMPLETE CBC W/AUTO DIFF WBC: CPT

## 2020-07-28 PROCEDURE — 99284 EMERGENCY DEPT VISIT MOD MDM: CPT | Mod: 25

## 2020-07-28 PROCEDURE — 80053 COMPREHEN METABOLIC PANEL: CPT

## 2020-07-28 PROCEDURE — 96375 TX/PRO/DX INJ NEW DRUG ADDON: CPT

## 2020-07-28 PROCEDURE — 81025 URINE PREGNANCY TEST: CPT | Performed by: NURSE PRACTITIONER

## 2020-07-28 PROCEDURE — 25000003 PHARM REV CODE 250: Performed by: NURSE PRACTITIONER

## 2020-07-28 PROCEDURE — 25000003 PHARM REV CODE 250: Performed by: EMERGENCY MEDICINE

## 2020-07-28 PROCEDURE — 96365 THER/PROPH/DIAG IV INF INIT: CPT

## 2020-07-28 RX ORDER — FENTANYL CITRATE 50 UG/ML
50 INJECTION, SOLUTION INTRAMUSCULAR; INTRAVENOUS
Status: COMPLETED | OUTPATIENT
Start: 2020-07-28 | End: 2020-07-28

## 2020-07-28 RX ORDER — BACLOFEN 10 MG/1
10 TABLET ORAL 3 TIMES DAILY
Qty: 30 TABLET | Refills: 0 | Status: SHIPPED | OUTPATIENT
Start: 2020-07-28 | End: 2020-10-07

## 2020-07-28 RX ORDER — ONDANSETRON 2 MG/ML
8 INJECTION INTRAMUSCULAR; INTRAVENOUS
Status: COMPLETED | OUTPATIENT
Start: 2020-07-28 | End: 2020-07-28

## 2020-07-28 RX ORDER — TAMSULOSIN HYDROCHLORIDE 0.4 MG/1
0.4 CAPSULE ORAL
Status: COMPLETED | OUTPATIENT
Start: 2020-07-28 | End: 2020-07-28

## 2020-07-28 RX ORDER — NITROFURANTOIN 25; 75 MG/1; MG/1
100 CAPSULE ORAL 2 TIMES DAILY
Qty: 14 CAPSULE | Refills: 0 | Status: SHIPPED | OUTPATIENT
Start: 2020-07-28 | End: 2020-08-04

## 2020-07-28 RX ADMIN — FENTANYL CITRATE 50 MCG: 50 INJECTION, SOLUTION INTRAMUSCULAR; INTRAVENOUS at 07:07

## 2020-07-28 RX ADMIN — CEFTRIAXONE 1 G: 1 INJECTION, SOLUTION INTRAVENOUS at 07:07

## 2020-07-28 RX ADMIN — TAMSULOSIN HYDROCHLORIDE 0.4 MG: 0.4 CAPSULE ORAL at 07:07

## 2020-07-28 RX ADMIN — ONDANSETRON 8 MG: 2 INJECTION INTRAMUSCULAR; INTRAVENOUS at 07:07

## 2020-07-28 RX ADMIN — SODIUM CHLORIDE 1000 ML: 0.9 INJECTION, SOLUTION INTRAVENOUS at 07:07

## 2020-07-28 NOTE — PROVIDER PROGRESS NOTES - EMERGENCY DEPT.
Emergency Department TeleTRIAGE Encounter Note      CHIEF COMPLAINT    Chief Complaint   Patient presents with    Flank Pain     left side pain hx of kidney infections       VITAL SIGNS   Initial Vitals [07/28/20 1637]   BP Pulse Resp Temp SpO2   (!) 130/95 102 18 98.2 °F (36.8 °C) 98 %      MAP       --            ALLERGIES    Review of patient's allergies indicates:   Allergen Reactions    Strawberry Anaphylaxis and Hives    Morphine Other (See Comments)     Burns stomach    Toradol [ketorolac]     Tramadol      Abdominal pain       PROVIDER TRIAGE NOTE  41 y/o female which presents with left sided flank pain with associated hematuria and fever. The patient has a history of frequent kidney infections.       ORDERS  Labs Reviewed - No data to display    ED Orders (720h ago, onward)    Start Ordered     Status Ordering Provider    07/28/20 1645 07/28/20 1644  Saline lock IV  Once      Ordered ANNAMARIA INCOLAS    07/28/20 1645 07/28/20 1644  CBC auto differential  STAT      Ordered MARIA ISABELANNAMARIA DIANE    07/28/20 1645 07/28/20 1644  Comprehensive metabolic panel  STAT      Ordered MARIA ISABELANNAMARIA DIANE    07/28/20 1645 07/28/20 1644  Urinalysis, Reflex to Urine Culture Urine, Clean Catch  STAT      Ordered MARIA ISABELANNAMARIA DIANE    07/28/20 1645 07/28/20 1644  POCT urine pregnancy  Once      Ordered MARIA ISABELANNAMARIA DIANE    07/28/20 1645 07/28/20 1644  sodium chloride 0.9% bolus 1,000 mL  ED 1 Time      Ordered ANNAMARIA NICOLAS            Virtual Visit Note: The provider triage portion of this emergency department evaluation and documentation was performed via [employer] a HIPAA-compliant telemedicine application, in concert with a tele-presenter in the room. A face to face patient evaluation with one of my colleagues will occur once the patient is placed in an emergency department room.      DISCLAIMER: This note was prepared with [employer] voice recognition transcription software. Garbled syntax, mangled pronouns,  and other bizarre constructions may be attributed to that software system.

## 2020-07-29 NOTE — ED PROVIDER NOTES
Encounter Date: 7/28/2020       History     Chief Complaint   Patient presents with    Flank Pain     left side pain hx of kidney infections     40 y.o. female Past Medical History:  4/18/2018: Abnormal uterine bleeding  12/24/12: Acute encephalopathy      Comment:  secondary to drug overdose  12/24/12: ARF (acute renal failure)      Comment:  secondary to drug overdose  12/24/12: History of cardiac arrest      Comment:  secondary to drug overdose  No date: Hypertension  1/8/2018: Kidney stones  12/24/12: MRSA (methicillin resistant Staphylococcus aureus)  No date: Nephritis  No date: PMDD (premenstrual dysphoric disorder)  12/24/12: Polysubstance abuse  12/24/12: Systolic CHF, acute      Comment:  secondary to drug overdose  No date: UTI (urinary tract infection)     pmh renal stones and pyelo, here for evaluation of 2 days of L flank pain, notes some hematuria, no dysuria, no f/c, n/v, diarrhea/dysuria or other complaints.        Review of patient's allergies indicates:   Allergen Reactions    Strawberry Anaphylaxis and Hives    Morphine Other (See Comments)     Burns stomach    Toradol [ketorolac]     Tramadol      Abdominal pain     Past Medical History:   Diagnosis Date    Abnormal uterine bleeding 4/18/2018    Acute encephalopathy 12/24/12    secondary to drug overdose    ARF (acute renal failure) 12/24/12    secondary to drug overdose    History of cardiac arrest 12/24/12    secondary to drug overdose    Hypertension     Kidney stones 1/8/2018    MRSA (methicillin resistant Staphylococcus aureus) 12/24/12    Nephritis     PMDD (premenstrual dysphoric disorder)     Polysubstance abuse 12/24/12    Systolic CHF, acute 12/24/12    secondary to drug overdose    UTI (urinary tract infection)      Past Surgical History:   Procedure Laterality Date    AUGMENTATION OF BREAST Bilateral 2001    CHOLECYSTECTOMY      ESOPHAGOGASTRODUODENOSCOPY N/A 1/2/2020    Procedure: EGD  (ESOPHAGOGASTRODUODENOSCOPY);  Surgeon: Moe Tripathi MD;  Location: [facility];  Service: Endoscopy;  Laterality: N/A;    TUBAL LIGATION      urinary stents       Family History   Problem Relation Age of Onset    Kidney disease Mother     Ovarian cancer Mother     Cancer Maternal Grandmother         throat    Cancer Maternal Grandfather         prostate    Kidney disease Daughter     Breast cancer Neg Hx     Colon cancer Neg Hx      Social History     Tobacco Use    Smoking status: Current Every Day Smoker     Packs/day: 0.50     Years: 20.00     Pack years: 10.00     Types: Cigarettes    Smokeless tobacco: Never Used   Substance Use Topics    Alcohol use: No     Frequency: Monthly or less     Drinks per session: 3 or 4     Binge frequency: Never    Drug use: No     Types: Cocaine, Methamphetamines     Comment: PCP, patient states she had not done drugs since 2012     Review of Systems   Constitutional: Negative for fever.   HENT: Negative for sore throat.    Respiratory: Negative for shortness of breath.    Cardiovascular: Negative for chest pain.   Gastrointestinal: Negative for nausea.   Genitourinary: Negative for dysuria.   Musculoskeletal: Negative for back pain.   Skin: Negative for rash.   Neurological: Negative for weakness.   Hematological: Does not bruise/bleed easily.   All other systems reviewed and are negative.      Physical Exam     Initial Vitals [07/28/20 1637]   BP Pulse Resp Temp SpO2   (!) 130/95 102 18 98.2 °F (36.8 °C) 98 %      MAP       --         Physical Exam    Nursing note and vitals reviewed.  Constitutional: She appears well-developed and well-nourished.   HENT:   Head: Normocephalic and atraumatic.   Eyes: Conjunctivae and EOM are normal. Pupils are equal, round, and reactive to light.   Neck: Normal range of motion.   Cardiovascular: Normal rate and regular rhythm.   Pulmonary/Chest: Breath sounds normal. No respiratory distress.   Abdominal: She exhibits no  distension.   Musculoskeletal: Normal range of motion.   Neurological: She is alert. No cranial nerve deficit. GCS score is 15. GCS eye subscore is 4. GCS verbal subscore is 5. GCS motor subscore is 6.   Skin: Skin is warm and dry.   Psychiatric: She has a normal mood and affect. Thought content normal.       Mild L cva ttp  ED Course   Procedures  Labs Reviewed   URINALYSIS, REFLEX TO URINE CULTURE - Abnormal; Notable for the following components:       Result Value    Appearance, UA Hazy (*)     Occult Blood UA 2+ (*)     Leukocytes, UA 1+ (*)     All other components within normal limits    Narrative:     Specimen Source->Urine   URINALYSIS MICROSCOPIC - Abnormal; Notable for the following components:    WBC, UA 6 (*)     All other components within normal limits    Narrative:     Specimen Source->Urine   CBC W/ AUTO DIFFERENTIAL   COMPREHENSIVE METABOLIC PANEL   POCT URINE PREGNANCY          Imaging Results    None                                Labs Reviewed   CBC W/ AUTO DIFFERENTIAL - Abnormal; Notable for the following components:       Result Value    Mean Corpuscular Hemoglobin 31.8 (*)     Immature Granulocytes 0.8 (*)     Immature Grans (Abs) 0.07 (*)     All other components within normal limits   COMPREHENSIVE METABOLIC PANEL - Abnormal; Notable for the following components:    Glucose 122 (*)     AST 44 (*)      (*)     All other components within normal limits   URINALYSIS, REFLEX TO URINE CULTURE - Abnormal; Notable for the following components:    Appearance, UA Hazy (*)     Occult Blood UA 2+ (*)     Leukocytes, UA 1+ (*)     All other components within normal limits    Narrative:     Specimen Source->Urine   URINALYSIS MICROSCOPIC - Abnormal; Notable for the following components:    WBC, UA 6 (*)     All other components within normal limits    Narrative:     Specimen Source->Urine   CULTURE, URINE   POCT URINE PREGNANCY       CT Renal Stone Study ABD Pelvis WO   Final Result       Nonobstructing left lower pole renal calculus, unchanged.  No hydronephrosis.      Cholecystectomy.      Hepatosplenomegaly.  Hepatic steatosis.         Electronically signed by: Shawn Guerrero MD   Date:    07/28/2020   Time:    20:27          Urine is equivocal, will treat with cipro given hx multiple pyelo  Will also place on baclofen           Clinical Impression:       ICD-10-CM ICD-9-CM   1. Flank pain  R10.9 789.09                                Adilson Goss MD  07/28/20 2035

## 2020-07-29 NOTE — ED TRIAGE NOTES
"Pt presents to ED via personal transportation with c/o left sided flank pain. Pt states "my pain started on yesterday and has been getting worst since." no acute distress noted.   "

## 2020-07-29 NOTE — DISCHARGE INSTRUCTIONS
Thank you for coming to our Emergency Department today. It is important to remember that some problems are difficult to diagnose and may not be found during your first visit. Be sure to follow up with your primary care doctor and review any labs/imaging that was performed with them. If you do not have a primary care doctor, you may contact the one listed on your discharge paperwork or you may also call the [facility] Appointment Desk at [phone] to schedule an appointment with one.     All medications may potentially have side effects and it is impossible to predict which medications may give you side effects. If you feel that you are having a negative effect of any medication you should immediately stop taking them and seek medical attention.    Return to the ER with any questions/concerns, new/concerning symptoms, worsening or failure to improve. Do not drive or make any important decisions for 24 hours if you have received any pain medications, sedatives or mood altering drugs during your ER visit.

## 2020-07-30 LAB — BACTERIA UR CULT: NORMAL

## 2020-09-03 ENCOUNTER — TELEPHONE (OUTPATIENT)
Dept: FAMILY MEDICINE | Facility: CLINIC | Age: 40
End: 2020-09-03

## 2020-09-04 DIAGNOSIS — Z12.39 BREAST CANCER SCREENING: ICD-10-CM

## 2020-10-01 ENCOUNTER — PATIENT MESSAGE (OUTPATIENT)
Dept: FAMILY MEDICINE | Facility: CLINIC | Age: 40
End: 2020-10-01

## 2020-10-01 DIAGNOSIS — U07.1 COVID-19 VIRUS INFECTION: Primary | ICD-10-CM

## 2020-10-02 ENCOUNTER — PATIENT MESSAGE (OUTPATIENT)
Dept: FAMILY MEDICINE | Facility: CLINIC | Age: 40
End: 2020-10-02

## 2020-10-07 ENCOUNTER — HOSPITAL ENCOUNTER (EMERGENCY)
Facility: HOSPITAL | Age: 40
Discharge: HOME | End: 2020-10-08
Attending: EMERGENCY MEDICINE
Payer: COMMERCIAL

## 2020-10-07 DIAGNOSIS — U07.1 LAB TEST POSITIVE FOR DETECTION OF COVID-19 VIRUS: Primary | ICD-10-CM

## 2020-10-07 DIAGNOSIS — N12 PYELONEPHRITIS: Primary | ICD-10-CM

## 2020-10-07 LAB
ALBUMIN SERPL BCP-MCNC: 4.4 G/DL (ref 3.5–5.2)
ALP SERPL-CCNC: 72 U/L (ref 55–135)
ALT SERPL W/O P-5'-P-CCNC: 98 U/L (ref 10–44)
ANION GAP SERPL CALC-SCNC: 9 MMOL/L (ref 8–16)
AST SERPL-CCNC: 43 U/L (ref 10–40)
B-HCG UR QL: NEGATIVE
BACTERIA #/AREA URNS HPF: ABNORMAL /HPF
BASOPHILS # BLD AUTO: 0.05 K/UL (ref 0–0.2)
BASOPHILS NFR BLD: 0.5 % (ref 0–1.9)
BILIRUB SERPL-MCNC: 0.3 MG/DL (ref 0.1–1)
BILIRUB UR QL STRIP: NEGATIVE
BUN SERPL-MCNC: 11 MG/DL (ref 6–20)
CALCIUM SERPL-MCNC: 10 MG/DL (ref 8.7–10.5)
CAOX CRY URNS QL MICRO: ABNORMAL
CHLORIDE SERPL-SCNC: 104 MMOL/L (ref 95–110)
CLARITY UR: CLEAR
CO2 SERPL-SCNC: 27 MMOL/L (ref 23–29)
COLOR UR: YELLOW
CREAT SERPL-MCNC: 0.7 MG/DL (ref 0.5–1.4)
CTP QC/QA: YES
DIFFERENTIAL METHOD: ABNORMAL
EOSINOPHIL # BLD AUTO: 0.2 K/UL (ref 0–0.5)
EOSINOPHIL NFR BLD: 1.9 % (ref 0–8)
ERYTHROCYTE [DISTWIDTH] IN BLOOD BY AUTOMATED COUNT: 11.9 % (ref 11.5–14.5)
EST. GFR  (AFRICAN AMERICAN): >60 ML/MIN/1.73 M^2
EST. GFR  (NON AFRICAN AMERICAN): >60 ML/MIN/1.73 M^2
GLUCOSE SERPL-MCNC: 116 MG/DL (ref 70–110)
GLUCOSE UR QL STRIP: NEGATIVE
HCT VFR BLD AUTO: 43.3 % (ref 37–48.5)
HGB BLD-MCNC: 15.6 G/DL (ref 12–16)
HGB UR QL STRIP: ABNORMAL
HYALINE CASTS #/AREA URNS LPF: 0 /LPF
IMM GRANULOCYTES # BLD AUTO: 0.1 K/UL (ref 0–0.04)
IMM GRANULOCYTES NFR BLD AUTO: 0.9 % (ref 0–0.5)
KETONES UR QL STRIP: NEGATIVE
LEUKOCYTE ESTERASE UR QL STRIP: NEGATIVE
LYMPHOCYTES # BLD AUTO: 2.7 K/UL (ref 1–4.8)
LYMPHOCYTES NFR BLD: 24.8 % (ref 18–48)
MCH RBC QN AUTO: 32.8 PG (ref 27–31)
MCHC RBC AUTO-ENTMCNC: 36 G/DL (ref 32–36)
MCV RBC AUTO: 91 FL (ref 82–98)
MICROSCOPIC COMMENT: ABNORMAL
MONOCYTES # BLD AUTO: 0.8 K/UL (ref 0.3–1)
MONOCYTES NFR BLD: 7.2 % (ref 4–15)
NEUTROPHILS # BLD AUTO: 7 K/UL (ref 1.8–7.7)
NEUTROPHILS NFR BLD: 64.7 % (ref 38–73)
NITRITE UR QL STRIP: NEGATIVE
NRBC BLD-RTO: 0 /100 WBC
PH UR STRIP: 5 [PH] (ref 5–8)
PLATELET # BLD AUTO: 268 K/UL (ref 150–350)
PMV BLD AUTO: 10.1 FL (ref 9.2–12.9)
POTASSIUM SERPL-SCNC: 3.8 MMOL/L (ref 3.5–5.1)
PROT SERPL-MCNC: 7.4 G/DL (ref 6–8.4)
PROT UR QL STRIP: ABNORMAL
RBC # BLD AUTO: 4.75 M/UL (ref 4–5.4)
RBC #/AREA URNS HPF: 5 /HPF (ref 0–4)
SODIUM SERPL-SCNC: 140 MMOL/L (ref 136–145)
SP GR UR STRIP: 1.03 (ref 1–1.03)
SQUAMOUS #/AREA URNS HPF: 8 /HPF
URN SPEC COLLECT METH UR: ABNORMAL
UROBILINOGEN UR STRIP-ACNC: ABNORMAL EU/DL
WBC # BLD AUTO: 10.81 K/UL (ref 3.9–12.7)
WBC #/AREA URNS HPF: 5 /HPF (ref 0–5)

## 2020-10-07 PROCEDURE — 81000 URINALYSIS NONAUTO W/SCOPE: CPT

## 2020-10-07 PROCEDURE — 81025 URINE PREGNANCY TEST: CPT | Performed by: EMERGENCY MEDICINE

## 2020-10-07 PROCEDURE — 63600175 PHARM REV CODE 636 W HCPCS: Performed by: PHYSICIAN ASSISTANT

## 2020-10-07 PROCEDURE — 80053 COMPREHEN METABOLIC PANEL: CPT

## 2020-10-07 PROCEDURE — 99284 EMERGENCY DEPT VISIT MOD MDM: CPT | Mod: 25

## 2020-10-07 PROCEDURE — 25000003 PHARM REV CODE 250: Performed by: PHYSICIAN ASSISTANT

## 2020-10-07 PROCEDURE — 96365 THER/PROPH/DIAG IV INF INIT: CPT

## 2020-10-07 PROCEDURE — 96375 TX/PRO/DX INJ NEW DRUG ADDON: CPT

## 2020-10-07 PROCEDURE — 85025 COMPLETE CBC W/AUTO DIFF WBC: CPT

## 2020-10-07 PROCEDURE — 96367 TX/PROPH/DG ADDL SEQ IV INF: CPT

## 2020-10-07 RX ORDER — ONDANSETRON 2 MG/ML
4 INJECTION INTRAMUSCULAR; INTRAVENOUS
Status: COMPLETED | OUTPATIENT
Start: 2020-10-07 | End: 2020-10-07

## 2020-10-07 RX ORDER — HYDROMORPHONE HYDROCHLORIDE 2 MG/ML
0.5 INJECTION, SOLUTION INTRAMUSCULAR; INTRAVENOUS; SUBCUTANEOUS
Status: COMPLETED | OUTPATIENT
Start: 2020-10-07 | End: 2020-10-07

## 2020-10-07 RX ORDER — ONDANSETRON 4 MG/1
4 TABLET, ORALLY DISINTEGRATING ORAL EVERY 12 HOURS PRN
Qty: 10 TABLET | Refills: 0 | Status: SHIPPED | OUTPATIENT
Start: 2020-10-07 | End: 2020-11-17 | Stop reason: ALTCHOICE

## 2020-10-07 RX ORDER — CEPHALEXIN 500 MG/1
500 CAPSULE ORAL EVERY 6 HOURS
Qty: 56 CAPSULE | Refills: 0 | Status: SHIPPED | OUTPATIENT
Start: 2020-10-07 | End: 2020-10-21

## 2020-10-07 RX ADMIN — PROMETHAZINE HYDROCHLORIDE 12.5 MG: 25 INJECTION INTRAMUSCULAR; INTRAVENOUS at 11:10

## 2020-10-07 RX ADMIN — CEFTRIAXONE 1 G: 1 INJECTION, SOLUTION INTRAVENOUS at 09:10

## 2020-10-07 RX ADMIN — ONDANSETRON 4 MG: 2 INJECTION INTRAMUSCULAR; INTRAVENOUS at 09:10

## 2020-10-07 RX ADMIN — HYDROMORPHONE HYDROCHLORIDE 0.5 MG: 2 INJECTION INTRAMUSCULAR; INTRAVENOUS; SUBCUTANEOUS at 09:10

## 2020-10-07 NOTE — FIRST PROVIDER EVALUATION
Emergency Department TeleTriage Encounter Note      CHIEF COMPLAINT    Chief Complaint   Patient presents with    Flank Pain     left side pain sharpstarted today radiating to right side COVID POSITIVE 10-1-20       VITAL SIGNS   Initial Vitals [10/07/20 1839]   BP Pulse Resp Temp SpO2   -- -- -- -- 98 %      MAP       --            ALLERGIES    Review of patient's allergies indicates:   Allergen Reactions    Strawberry Anaphylaxis and Hives    Morphine Other (See Comments)     Burns stomach    Toradol [ketorolac]     Tramadol      Abdominal pain       PROVIDER TRIAGE NOTE  This is a teletriage evaluation of a 40 y.o. female presenting to the ED with c/o left flank pain this afternoon.  Pt took Ibuprofen for pain with no relief.  Pt tested covid positive last week.  Pt has history of kidney stones.     Initial orders will be placed and care will be transferred to an alternate provider when patient is roomed for a full evaluation. Any additional orders and the final disposition will be determined by that provider.         ORDERS  Labs Reviewed   CBC W/ AUTO DIFFERENTIAL   COMPREHENSIVE METABOLIC PANEL   URINALYSIS, REFLEX TO URINE CULTURE   POCT URINE PREGNANCY       ED Orders (720h ago, onward)    Start Ordered     Status Ordering Provider    10/07/20 1845 10/07/20 1844  Saline lock IV  Once      Ordered GUANACO EVANS    10/07/20 1845 10/07/20 1844  CBC auto differential  STAT  Collect    Ordered GUANACO EVANS    10/07/20 1845 10/07/20 1844  Comprehensive metabolic panel  STAT  Collect    Ordered GUANACO EVANS    10/07/20 1845 10/07/20 1844  Urinalysis, Reflex to Urine Culture Urine, Clean Catch  STAT      Ordered GUANACO EVANS    10/07/20 1842 10/07/20 1842  POCT urine pregnancy  Once      Ordered ALLYSON HERNANDEZ            Virtual Visit Note: The provider triage portion of this emergency department evaluation and documentation was performed via [employer], a HIPAA-compliant telemedicine  application, in concert with a tele-presenter in the room. A face to face patient evaluation with one of my colleagues will occur once the patient is placed in an emergency department room.      DISCLAIMER: This note was prepared with [employer] voice recognition transcription software. Garbled syntax, mangled pronouns, and other bizarre constructions may be attributed to that software system.

## 2020-10-08 ENCOUNTER — TELEPHONE (OUTPATIENT)
Dept: FAMILY MEDICINE | Facility: CLINIC | Age: 40
End: 2020-10-08

## 2020-10-08 VITALS
WEIGHT: 170 LBS | HEART RATE: 88 BPM | HEIGHT: 62 IN | TEMPERATURE: 98 F | OXYGEN SATURATION: 99 % | DIASTOLIC BLOOD PRESSURE: 72 MMHG | BODY MASS INDEX: 31.28 KG/M2 | SYSTOLIC BLOOD PRESSURE: 131 MMHG | RESPIRATION RATE: 18 BRPM

## 2020-10-08 NOTE — TELEPHONE ENCOUNTER
----- Message from Hermes Colvin MD sent at 10/7/2020  1:08 PM CDT -----  Regarding: RE: Covid Concerns 19  Get her a virtual  ----- Message -----  From: Phong Reddy  Sent: 10/7/2020   9:15 AM CDT  To: Hermes Colvin MD  Subject: Covid Concerns 19                                Patient is calling for retesting, covid 19 concerns, patient would like to be retested and would like a call back on the best route to go.

## 2020-10-08 NOTE — ED PROVIDER NOTES
.Encounter Date: 10/7/2020       History     Chief Complaint   Patient presents with    Flank Pain     left side pain sharpstarted today radiating to right side COVID POSITIVE 10-1-20     Chief Complaint:  Flank pain  History of  Present Illness: History obtained from patient. This 40 y.o. female who has past medical history of hypertension, kidney stones presents to the ED complaining of left-sided flank pain that radiates around to her abdomen that has been gradually worsening over the last few days.  She reports associated nausea without vomiting.  Denies diarrhea, constipation, dysuria, hematuria, urinary frequency, vaginal discharge, vaginal bleeding, fever, chest pain, shortness of breath.  Patient states she recently tested positive for COVID-19.  No prior treatment for symptoms.        Review of patient's allergies indicates:   Allergen Reactions    Strawberry Anaphylaxis and Hives    Morphine Other (See Comments)     Burns stomach    Toradol [ketorolac]     Tramadol      Abdominal pain     Past Medical History:   Diagnosis Date    Abnormal uterine bleeding 4/18/2018    Acute encephalopathy 12/24/12    secondary to drug overdose    ARF (acute renal failure) 12/24/12    secondary to drug overdose    History of cardiac arrest 12/24/12    secondary to drug overdose    Hypertension     Kidney stones 1/8/2018    MRSA (methicillin resistant Staphylococcus aureus) 12/24/12    Nephritis     PMDD (premenstrual dysphoric disorder)     Polysubstance abuse 12/24/12    Systolic CHF, acute 12/24/12    secondary to drug overdose    UTI (urinary tract infection)      Past Surgical History:   Procedure Laterality Date    AUGMENTATION OF BREAST Bilateral 2001    CHOLECYSTECTOMY      ESOPHAGOGASTRODUODENOSCOPY N/A 1/2/2020    Procedure: EGD (ESOPHAGOGASTRODUODENOSCOPY);  Surgeon: Moe Tripathi MD;  Location: [facility];  Service: Endoscopy;  Laterality: N/A;    TUBAL LIGATION      urinary stents        Family History   Problem Relation Age of Onset    Kidney disease Mother     Ovarian cancer Mother     Cancer Maternal Grandmother         throat    Cancer Maternal Grandfather         prostate    Kidney disease Daughter     Breast cancer Neg Hx     Colon cancer Neg Hx      Social History     Tobacco Use    Smoking status: Current Every Day Smoker     Packs/day: 0.50     Years: 20.00     Pack years: 10.00     Types: Cigarettes    Smokeless tobacco: Current User   Substance Use Topics    Alcohol use: No     Frequency: Monthly or less     Drinks per session: 3 or 4     Binge frequency: Never    Drug use: No     Types: Cocaine, Methamphetamines     Comment: PCP, patient states she had not done drugs since 2012     Review of Systems   Constitutional: Negative for chills and fever.   HENT: Negative for congestion, rhinorrhea and sore throat.    Eyes: Negative for visual disturbance.   Respiratory: Negative for cough and shortness of breath.    Cardiovascular: Negative for chest pain.   Gastrointestinal: Positive for nausea. Negative for abdominal pain, diarrhea and vomiting.   Genitourinary: Positive for flank pain. Negative for dysuria, frequency and hematuria.   Musculoskeletal: Negative for back pain.   Skin: Negative for rash.   Neurological: Negative for dizziness, weakness and headaches.       Physical Exam     Initial Vitals   BP Pulse Resp Temp SpO2   10/07/20 2101 10/07/20 2101 10/07/20 2101 10/07/20 2101 10/07/20 1839   138/82 92 20 98.2 °F (36.8 °C) 98 %      MAP       --                Physical Exam    Nursing note and vitals reviewed.  Constitutional: She appears well-developed and well-nourished. No distress.   HENT:   Head: Normocephalic and atraumatic.   Right Ear: Tympanic membrane normal.   Left Ear: Tympanic membrane normal.   Nose: Nose normal.   Mouth/Throat: Uvula is midline, oropharynx is clear and moist and mucous membranes are normal.   Eyes: EOM are normal. Pupils are equal,  round, and reactive to light.   Neck: Trachea normal, normal range of motion, full passive range of motion without pain and phonation normal. Neck supple. No stridor present. No spinous process tenderness and no muscular tenderness present. Normal range of motion present. No neck rigidity.   Cardiovascular: Normal rate, regular rhythm and normal heart sounds. Exam reveals no gallop and no friction rub.    No murmur heard.  Pulmonary/Chest: Effort normal and breath sounds normal. No respiratory distress. She has no wheezes. She has no rhonchi. She has no rales.   Abdominal: Soft. Bowel sounds are normal. There is no abdominal tenderness. There is CVA tenderness (Left). There is no rebound, no guarding, no tenderness at McBurney's point and negative Larry's sign.   Musculoskeletal: Normal range of motion.   Neurological: She is alert and oriented to person, place, and time. She has normal strength. No cranial nerve deficit or sensory deficit.   Skin: Skin is warm and dry. Capillary refill takes less than 2 seconds.   Psychiatric: She has a normal mood and affect.         ED Course   Procedures  Labs Reviewed   CBC W/ AUTO DIFFERENTIAL - Abnormal; Notable for the following components:       Result Value    Mean Corpuscular Hemoglobin 32.8 (*)     Immature Granulocytes 0.9 (*)     Immature Grans (Abs) 0.10 (*)     All other components within normal limits   COMPREHENSIVE METABOLIC PANEL - Abnormal; Notable for the following components:    Glucose 116 (*)     AST 43 (*)     ALT 98 (*)     All other components within normal limits   URINALYSIS, REFLEX TO URINE CULTURE - Abnormal; Notable for the following components:    Protein, UA 1+ (*)     Occult Blood UA 1+ (*)     Urobilinogen, UA 2.0-3.0 (*)     All other components within normal limits    Narrative:     Specimen Source->Urine   URINALYSIS MICROSCOPIC - Abnormal; Notable for the following components:    RBC, UA 5 (*)     Bacteria Moderate (*)     All other  components within normal limits    Narrative:     Specimen Source->Urine   POCT URINE PREGNANCY          Imaging Results          CT Renal Stone Study ABD Pelvis WO (Final result)  Result time 10/07/20 21:46:33    Final result by Bryan Franklin MD (10/07/20 21:46:33)                 Impression:      There is no evidence for ureteral calculus or obstructive uropathy bilaterally.    Nonobstructing calculus at the lower pole of the left kidney again noted.    Mild prominent appearance of the region of the lower uterine segment/cervix for which clinical and historical correlation and evaluation is recommended.      Electronically signed by: Bryan Franklin  Date:    10/07/2020  Time:    21:46             Narrative:    EXAMINATION:  CT RENAL STONE STUDY ABD PELVIS WO    CLINICAL HISTORY:  Flank pain, kidney stone suspected;    TECHNIQUE:  Low dose axial images, sagittal and coronal reformations were obtained from the lung bases to the pubic symphysis.  Contrast was not administered.    COMPARISON:  July 28, 2020    FINDINGS:  There is no evidence for hydronephrosis or perinephric inflammatory change bilaterally.  Nonobstructing calculus at the lower pole of the left kidney again noted.  The ureters appear normal in caliber along their visualized course to the urinary bladder, there is no evidence for hydroureter, ureteral calculus or obstructive uropathy bilaterally.  The urinary bladder appears unremarkable for degree of distention.    The lung bases demonstrate mild motion artifact and atelectatic change, incompletely imaged bilateral breast implants are noted, there is diminished attenuation of the liver consistent with diffuse fatty infiltrate.  The gallbladder is surgically absent.  When accounting for limitations of the exam there is no evidence for acute process of the stomach, liver, pancreas, spleen or adrenal glands.  The abdominal aorta appears normal in caliber otherwise not well evaluated on this exam.   There is prominent appearance of the region of the lower uterine segment/cervix for which clinical and historical correlation and evaluation is recommended.  There is no evidence for dominant adnexal mass or cystic collection.  There is no evidence for small bowel obstructive process.  The appendix is identified, it does not appear inflamed.  There is no evidence for inflammatory or obstructive process of the colon.  There is no evidence for free intraperitoneal air.  The visualized osseous structures appear intact.  Mild chronic change noted.                                 Medical Decision Making:   Differential Diagnosis:   Differential diagnosis includes but is not limited to:  UTI, pyelonephritis, nephrolithiasis, obstructive uropathy, diverticulitis  Clinical Tests:   Lab Tests: Ordered and Reviewed  Radiological Study: Ordered and Reviewed  ED Management:  This is an evaluation of a 40 y.o. female who presents to the ED for left flank pain.  Vital signs are stable.   Afebrile.  Patient is nontoxic appearing and in no acute distress.     HPI and physical exam as above.    Labs are reassuring.    UA shows moderate bacteriuria.  CT shows nonobstructing calculus in the lower pole the left kidney which is unchanged from previous evaluation.  No other acute intra-abdominal pathology.  Given CVA tenderness, will treat for pyelonephritis..  Given Rocephin in the ED.  Will discharge home with Keflex.    Patient given return precautions and instructed to return to the emergency department for any new or worsening symptoms. Patient verbalized understanding and agreed with plan.                                Clinical Impression:       ICD-10-CM ICD-9-CM   1. Pyelonephritis  N12 590.80                          ED Disposition Condition    Discharge Stable        ED Prescriptions     Medication Sig Dispense Start Date End Date Auth. Provider    cephALEXin (KEFLEX) 500 MG capsule Take 1 capsule (500 mg total) by mouth every  6 (six) hours. for 14 days 56 capsule 10/7/2020 10/21/2020 Antione Rizo PA-C    ondansetron (ZOFRAN-ODT) 4 MG TbDL Take 1 tablet (4 mg total) by mouth every 12 (twelve) hours as needed. 10 tablet 10/7/2020  Antione Rizo PA-C        Follow-up Information     Follow up With Specialties Details Why Contact Info    Hermes Colvin MD Family Medicine   [address] [address]  [phone]      [facility] Emergency Medicine Go in 1 day If symptoms worsen [address]  [facility] [id number] [phone]                                       Antione Rizo PA-C  10/08/20 0046

## 2020-10-12 ENCOUNTER — TELEPHONE (OUTPATIENT)
Dept: FAMILY MEDICINE | Facility: CLINIC | Age: 40
End: 2020-10-12

## 2020-10-12 NOTE — TELEPHONE ENCOUNTER
Call placed to schedule appointment. Pt states that she was already tested for COVID-19 at [facility] in [place]. That her test was negative.

## 2020-10-12 NOTE — TELEPHONE ENCOUNTER
----- Message from Hermse Colvin MD sent at 10/12/2020  7:19 AM CDT -----  Regarding: FW: Covid Concerns 19  Needs visit  ----- Message -----  From: Phong Reddy  Sent: 10/7/2020   9:15 AM CDT  To: Hermes Colvin MD  Subject: Covid Concerns 19                                Patient is calling for retesting, covid 19 concerns, patient would like to be retested and would like a call back on the best route to go.

## 2020-10-26 ENCOUNTER — LAB VISIT (OUTPATIENT)
Dept: LAB | Facility: HOSPITAL | Age: 40
End: 2020-10-26
Attending: FAMILY MEDICINE
Payer: COMMERCIAL

## 2020-10-26 ENCOUNTER — OFFICE VISIT (OUTPATIENT)
Dept: FAMILY MEDICINE | Facility: CLINIC | Age: 40
End: 2020-10-26
Payer: COMMERCIAL

## 2020-10-26 VITALS
OXYGEN SATURATION: 96 % | RESPIRATION RATE: 16 BRPM | WEIGHT: 168.44 LBS | DIASTOLIC BLOOD PRESSURE: 80 MMHG | SYSTOLIC BLOOD PRESSURE: 118 MMHG | TEMPERATURE: 99 F | BODY MASS INDEX: 31 KG/M2 | HEART RATE: 99 BPM | HEIGHT: 62 IN

## 2020-10-26 DIAGNOSIS — R30.0 DYSURIA: Primary | ICD-10-CM

## 2020-10-26 DIAGNOSIS — R10.9 ABDOMINAL PAIN, UNSPECIFIED ABDOMINAL LOCATION: ICD-10-CM

## 2020-10-26 LAB
ANION GAP SERPL CALC-SCNC: 9 MMOL/L (ref 8–16)
BASOPHILS # BLD AUTO: 0.04 K/UL (ref 0–0.2)
BASOPHILS NFR BLD: 0.3 % (ref 0–1.9)
BUN SERPL-MCNC: 7 MG/DL (ref 6–20)
CALCIUM SERPL-MCNC: 9.3 MG/DL (ref 8.7–10.5)
CHLORIDE SERPL-SCNC: 101 MMOL/L (ref 95–110)
CO2 SERPL-SCNC: 29 MMOL/L (ref 23–29)
CREAT SERPL-MCNC: 0.7 MG/DL (ref 0.5–1.4)
DIFFERENTIAL METHOD: ABNORMAL
EOSINOPHIL # BLD AUTO: 0.1 K/UL (ref 0–0.5)
EOSINOPHIL NFR BLD: 0.9 % (ref 0–8)
ERYTHROCYTE [DISTWIDTH] IN BLOOD BY AUTOMATED COUNT: 12.3 % (ref 11.5–14.5)
EST. GFR  (AFRICAN AMERICAN): >60 ML/MIN/1.73 M^2
EST. GFR  (NON AFRICAN AMERICAN): >60 ML/MIN/1.73 M^2
GLUCOSE SERPL-MCNC: 135 MG/DL (ref 70–110)
HCT VFR BLD AUTO: 44.3 % (ref 37–48.5)
HGB BLD-MCNC: 15.3 G/DL (ref 12–16)
IMM GRANULOCYTES # BLD AUTO: 0.12 K/UL (ref 0–0.04)
IMM GRANULOCYTES NFR BLD AUTO: 0.9 % (ref 0–0.5)
LYMPHOCYTES # BLD AUTO: 2.3 K/UL (ref 1–4.8)
LYMPHOCYTES NFR BLD: 16.9 % (ref 18–48)
MCH RBC QN AUTO: 31.7 PG (ref 27–31)
MCHC RBC AUTO-ENTMCNC: 34.5 G/DL (ref 32–36)
MCV RBC AUTO: 92 FL (ref 82–98)
MONOCYTES # BLD AUTO: 1.1 K/UL (ref 0.3–1)
MONOCYTES NFR BLD: 8.3 % (ref 4–15)
NEUTROPHILS # BLD AUTO: 9.8 K/UL (ref 1.8–7.7)
NEUTROPHILS NFR BLD: 72.7 % (ref 38–73)
NRBC BLD-RTO: 0 /100 WBC
PLATELET # BLD AUTO: 265 K/UL (ref 150–350)
PMV BLD AUTO: 10 FL (ref 9.2–12.9)
POTASSIUM SERPL-SCNC: 3.3 MMOL/L (ref 3.5–5.1)
RBC # BLD AUTO: 4.83 M/UL (ref 4–5.4)
SODIUM SERPL-SCNC: 139 MMOL/L (ref 136–145)
WBC # BLD AUTO: 13.46 K/UL (ref 3.9–12.7)

## 2020-10-26 PROCEDURE — 99215 PR OFFICE/OUTPT VISIT, EST, LEVL V, 40-54 MIN: ICD-10-PCS | Mod: 25,S$GLB,, | Performed by: FAMILY MEDICINE

## 2020-10-26 PROCEDURE — 96372 THER/PROPH/DIAG INJ SC/IM: CPT | Mod: S$GLB,,, | Performed by: FAMILY MEDICINE

## 2020-10-26 PROCEDURE — 99999 PR PBB SHADOW E&M-EST. PATIENT-LVL III: CPT | Mod: PBBFAC,,, | Performed by: FAMILY MEDICINE

## 2020-10-26 PROCEDURE — 99999 PR PBB SHADOW E&M-EST. PATIENT-LVL III: ICD-10-PCS | Mod: PBBFAC,,, | Performed by: FAMILY MEDICINE

## 2020-10-26 PROCEDURE — 85025 COMPLETE CBC W/AUTO DIFF WBC: CPT

## 2020-10-26 PROCEDURE — 80048 BASIC METABOLIC PNL TOTAL CA: CPT

## 2020-10-26 PROCEDURE — 99215 OFFICE O/P EST HI 40 MIN: CPT | Mod: 25,S$GLB,, | Performed by: FAMILY MEDICINE

## 2020-10-26 PROCEDURE — 3008F PR BODY MASS INDEX (BMI) DOCUMENTED: ICD-10-PCS | Mod: CPTII,S$GLB,, | Performed by: FAMILY MEDICINE

## 2020-10-26 PROCEDURE — 3079F DIAST BP 80-89 MM HG: CPT | Mod: CPTII,S$GLB,, | Performed by: FAMILY MEDICINE

## 2020-10-26 PROCEDURE — 96372 PR INJECTION,THERAP/PROPH/DIAG2ST, IM OR SUBCUT: ICD-10-PCS | Mod: S$GLB,,, | Performed by: FAMILY MEDICINE

## 2020-10-26 PROCEDURE — 3008F BODY MASS INDEX DOCD: CPT | Mod: CPTII,S$GLB,, | Performed by: FAMILY MEDICINE

## 2020-10-26 PROCEDURE — 3074F SYST BP LT 130 MM HG: CPT | Mod: CPTII,S$GLB,, | Performed by: FAMILY MEDICINE

## 2020-10-26 PROCEDURE — 3079F PR MOST RECENT DIASTOLIC BLOOD PRESSURE 80-89 MM HG: ICD-10-PCS | Mod: CPTII,S$GLB,, | Performed by: FAMILY MEDICINE

## 2020-10-26 PROCEDURE — 3074F PR MOST RECENT SYSTOLIC BLOOD PRESSURE < 130 MM HG: ICD-10-PCS | Mod: CPTII,S$GLB,, | Performed by: FAMILY MEDICINE

## 2020-10-26 RX ORDER — METHYLPREDNISOLONE ACETATE 40 MG/ML
40 INJECTION, SUSPENSION INTRA-ARTICULAR; INTRALESIONAL; INTRAMUSCULAR; SOFT TISSUE
Status: COMPLETED | OUTPATIENT
Start: 2020-10-26 | End: 2020-10-26

## 2020-10-26 RX ORDER — CEFTRIAXONE 1 G/1
1 INJECTION, POWDER, FOR SOLUTION INTRAMUSCULAR; INTRAVENOUS
Status: COMPLETED | OUTPATIENT
Start: 2020-10-26 | End: 2020-10-26

## 2020-10-26 RX ORDER — OXYCODONE AND ACETAMINOPHEN 5; 325 MG/1; MG/1
1 TABLET ORAL EVERY 4 HOURS PRN
Qty: 5 TABLET | Refills: 0 | Status: SHIPPED | OUTPATIENT
Start: 2020-10-26 | End: 2020-10-28

## 2020-10-26 RX ORDER — AMOXICILLIN AND CLAVULANATE POTASSIUM 875; 125 MG/1; MG/1
1 TABLET, FILM COATED ORAL EVERY 12 HOURS
Qty: 14 TABLET | Refills: 0 | Status: SHIPPED | OUTPATIENT
Start: 2020-10-26 | End: 2020-11-02

## 2020-10-26 RX ADMIN — METHYLPREDNISOLONE ACETATE 40 MG: 40 INJECTION, SUSPENSION INTRA-ARTICULAR; INTRALESIONAL; INTRAMUSCULAR; SOFT TISSUE at 02:10

## 2020-10-26 RX ADMIN — CEFTRIAXONE 1 G: 1 INJECTION, POWDER, FOR SOLUTION INTRAMUSCULAR; INTRAVENOUS at 02:10

## 2020-10-26 NOTE — PROGRESS NOTES
Chief Complaint   Patient presents with    lower abdomen pain     began yesterday; stabbing pain, lower back pain, chills       SUBJECTIVE:  Roslyn Brewster is a 40 y.o. female here for new problem of abdominal pain, was improved since 10/7 but yesterday with lwoer abdoiminal pain, couldn't sleep and radiates into back and her ovaries, no fever or chills..  Currently has co-morbidities including per problem list.    Answers for HPI/ROS submitted by the patient on 10/25/2020   Dysuria  Chronicity: chronic  Onset: yesterday  Frequency: every urination  Progression since onset: rapidly worsening  Pain quality: stabbing  Pain - numeric: 9/10  Fever: no fever  Sexually active?: Yes  History of pyelonephritis?: Yes  discharge: No  hesitancy: No  possible pregnancy: No  sweats: Yes  withholding: Yes  Treatments tried: acetaminophen, antibiotics, home medications, sitz baths  Improvement on treatment: no relief  Pain severity: moderate  catheterization: No  diabetes insipidus: No  diabetes mellitus: No  genitourinary reflux: No  hypertension: No  recurrent UTIs: No  single kidney: No  STD: No  urinary stasis: No  urological procedure: Yes  kidney stones: Yes    Past Medical History:   Diagnosis Date    Abnormal uterine bleeding 4/18/2018    Acute encephalopathy 12/24/12    secondary to drug overdose    ARF (acute renal failure) 12/24/12    secondary to drug overdose    History of cardiac arrest 12/24/12    secondary to drug overdose    Hypertension     Kidney stones 1/8/2018    MRSA (methicillin resistant Staphylococcus aureus) 12/24/12    Nephritis     PMDD (premenstrual dysphoric disorder)     Polysubstance abuse 12/24/12    Systolic CHF, acute 12/24/12    secondary to drug overdose    UTI (urinary tract infection)      Past Surgical History:   Procedure Laterality Date    AUGMENTATION OF BREAST Bilateral 2001    CHOLECYSTECTOMY      ESOPHAGOGASTRODUODENOSCOPY N/A 1/2/2020    Procedure: EGD  (ESOPHAGOGASTRODUODENOSCOPY);  Surgeon: Moe Tripathi MD;  Location: [facility];  Service: Endoscopy;  Laterality: N/A;    TUBAL LIGATION      urinary stents       Social History     Socioeconomic History    Marital status: Single     Spouse name: Not on file    Number of children: Not on file    Years of education: Not on file    Highest education level: Not on file   Occupational History    Not on file   Social Needs    Financial resource strain: Hard    Food insecurity     Worry: Never true     Inability: Never true    Transportation needs     Medical: No     Non-medical: No   Tobacco Use    Smoking status: Current Every Day Smoker     Packs/day: 0.50     Years: 20.00     Pack years: 10.00     Types: Cigarettes    Smokeless tobacco: Current User   Substance and Sexual Activity    Alcohol use: Not Currently     Frequency: Monthly or less     Drinks per session: 3 or 4     Binge frequency: Never    Drug use: No     Types: Cocaine, Methamphetamines     Comment: PCP, patient states she had not done drugs since 2012    Sexual activity: Yes     Partners: Male   Lifestyle    Physical activity     Days per week: 1 day     Minutes per session: 10 min    Stress: To some extent   Relationships    Social connections     Talks on phone: More than three times a week     Gets together: Three times a week     Attends Judaism service: Not on file     Active member of club or organization: No     Attends meetings of clubs or organizations: Never     Relationship status: Living with partner   Other Topics Concern    Not on file   Social History Narrative    Not on file     Family History   Problem Relation Age of Onset    Kidney disease Mother     Ovarian cancer Mother     Cancer Maternal Grandmother         throat    Cancer Maternal Grandfather         prostate    Kidney disease Daughter     Breast cancer Neg Hx     Colon cancer Neg Hx      Current Outpatient Medications on File Prior to  "Visit   Medication Sig Dispense Refill    ALPRAZolam (XANAX) 1 MG tablet Take 1 tablet (1 mg total) by mouth every evening. 30 tablet 4    FLUoxetine 40 MG capsule Take 1 capsule (40 mg total) by mouth once daily. 90 capsule 3    albuterol (PROVENTIL/VENTOLIN HFA) 90 mcg/actuation inhaler Inhale 2 puffs into the lungs every 6 (six) hours as needed for Wheezing. (Patient not taking: Reported on 10/26/2020) 18 g 0    desloratadine (CLARINEX) 5 mg tablet Take 1 tablet (5 mg total) by mouth once daily. (Patient not taking: Reported on 10/26/2020) 30 tablet 11    omeprazole (PRILOSEC) 40 MG capsule Take 1 capsule (40 mg total) by mouth once daily. (Patient not taking: Reported on 3/13/2020) 30 capsule 11    ondansetron (ZOFRAN-ODT) 4 MG TbDL Take 1 tablet (4 mg total) by mouth every 12 (twelve) hours as needed. (Patient not taking: Reported on 10/26/2020) 10 tablet 0     No current facility-administered medications on file prior to visit.      Review of patient's allergies indicates:   Allergen Reactions    Strawberry Anaphylaxis and Hives    Morphine Other (See Comments)     Burns stomach    Toradol [ketorolac]     Tramadol      Abdominal pain         ROS  No high risk sex  OBJECTIVE:  /80 (BP Location: Right arm, Patient Position: Sitting, BP Method: Medium (Manual))   Pulse 99   Temp 98.7 °F (37.1 °C) (Oral)   Resp 16   Ht 5' 2" (1.575 m)   Wt 76.4 kg (168 lb 6.9 oz)   LMP 10/19/2020   SpO2 96%   BMI 30.81 kg/m²     Wt Readings from Last 3 Encounters:   10/26/20 76.4 kg (168 lb 6.9 oz)   10/07/20 77.1 kg (170 lb)   07/28/20 69.4 kg (153 lb)     BP Readings from Last 3 Encounters:   10/26/20 118/80   10/07/20 131/72   07/28/20 (!) 96/52       She is in acute pain  Not comfortable  Fidgeting,  Not her normal self  Doesn't feel like her UTI or stone in the past.  Pin point pain.  She is guarding  Doesn't want exam secondary to the pain  Skin without rash or masses.  She has no pain with sex she " states.    Review of old Records:  reviweed prior record    Review of old labs:  Reviewed last labs    Review of old imaging:  Reviewed CT imaging.  Uterus/cervix with issue.  Will get to specialty after treating.  She will f/u with me in the morning.    ASSESSMENT:  Problem List Items Addressed This Visit     None      Visit Diagnoses     Dysuria    -  Primary    Relevant Medications    cefTRIAXone injection 1 g (Start on 10/26/2020  2:00 PM)    amoxicillin-clavulanate 875-125mg (AUGMENTIN) 875-125 mg per tablet    Other Relevant Orders    Urinalysis    Urine culture    Abdominal pain, unspecified abdominal location        Relevant Medications    cefTRIAXone injection 1 g (Start on 10/26/2020  2:00 PM)    amoxicillin-clavulanate 875-125mg (AUGMENTIN) 875-125 mg per tablet    methylPREDNISolone acetate injection 40 mg (Start on 10/26/2020  2:00 PM)    Other Relevant Orders    Urinalysis    Urine culture    CBC auto differential    Basic Metabolic Panel          ICD-10-CM ICD-9-CM   1. Dysuria  R30.0 788.1   2. Abdominal pain, unspecified abdominal location  R10.9 789.00     Suspected acute etiology considering all diagnosis and we will do adequate exam today we worried pelvic inflammatory disease versus is some kind ovarian cyst that has ruptured more primary process around the uterus cervix given CT findings that initially responded to ceftriaxone.  We will give her a shot of ceftriaxone today with inflammation shot and started on Augmentin and retest her urine.  Overall very concerned about her and gave ER precautions and will follow-up with her in the morning  If not improving will get a stat ultrasound and get her to gyn may need to do an exam under anesthesia  PLAN:  Problem List Items Addressed This Visit     None      Visit Diagnoses     Dysuria    -  Primary    Relevant Medications    cefTRIAXone injection 1 g (Start on 10/26/2020  2:00 PM)    amoxicillin-clavulanate 875-125mg (AUGMENTIN) 875-125 mg per  tablet    Other Relevant Orders    Urinalysis    Urine culture    Abdominal pain, unspecified abdominal location        Relevant Medications    cefTRIAXone injection 1 g (Start on 10/26/2020  2:00 PM)    amoxicillin-clavulanate 875-125mg (AUGMENTIN) 875-125 mg per tablet    methylPREDNISolone acetate injection 40 mg (Start on 10/26/2020  2:00 PM)    Other Relevant Orders    Urinalysis    Urine culture    CBC auto differential    Basic Metabolic Panel          Medication List with Changes/Refills   New Medications    AMOXICILLIN-CLAVULANATE 875-125MG (AUGMENTIN) 875-125 MG PER TABLET    Take 1 tablet by mouth every 12 (twelve) hours. for 7 days   Current Medications    ALBUTEROL (PROVENTIL/VENTOLIN HFA) 90 MCG/ACTUATION INHALER    Inhale 2 puffs into the lungs every 6 (six) hours as needed for Wheezing.    ALPRAZOLAM (XANAX) 1 MG TABLET    Take 1 tablet (1 mg total) by mouth every evening.    DESLORATADINE (CLARINEX) 5 MG TABLET    Take 1 tablet (5 mg total) by mouth once daily.    FLUOXETINE 40 MG CAPSULE    Take 1 capsule (40 mg total) by mouth once daily.    OMEPRAZOLE (PRILOSEC) 40 MG CAPSULE    Take 1 capsule (40 mg total) by mouth once daily.    ONDANSETRON (ZOFRAN-ODT) 4 MG TBDL    Take 1 tablet (4 mg total) by mouth every 12 (twelve) hours as needed.         Follow up in about 1 day (around 10/27/2020) for reassess acute condition.

## 2020-10-26 NOTE — PROGRESS NOTES
After obtaining consent, and per orders of Dr. Colvin, injection of Ceftriaxone 1g and Depo-Medrol 40mg/mL given by Lauren Miranda. Patient instructed to remain in clinic for 20 minutes afterwards, and to report any adverse reaction to me immediately.

## 2020-10-27 ENCOUNTER — OFFICE VISIT (OUTPATIENT)
Dept: FAMILY MEDICINE | Facility: CLINIC | Age: 40
End: 2020-10-27
Payer: COMMERCIAL

## 2020-10-27 ENCOUNTER — PATIENT MESSAGE (OUTPATIENT)
Dept: FAMILY MEDICINE | Facility: CLINIC | Age: 40
End: 2020-10-27

## 2020-10-27 DIAGNOSIS — R10.9 ABDOMINAL PAIN, UNSPECIFIED ABDOMINAL LOCATION: Primary | ICD-10-CM

## 2020-10-27 DIAGNOSIS — D72.829 LEUKOCYTOSIS, UNSPECIFIED TYPE: ICD-10-CM

## 2020-10-27 PROCEDURE — 99213 PR OFFICE/OUTPT VISIT, EST, LEVL III, 20-29 MIN: ICD-10-PCS | Mod: 95,,, | Performed by: FAMILY MEDICINE

## 2020-10-27 PROCEDURE — 99213 OFFICE O/P EST LOW 20 MIN: CPT | Mod: 95,,, | Performed by: FAMILY MEDICINE

## 2020-10-27 NOTE — PROGRESS NOTES
HISTORY OF PRESENT ILLNESS:  Roslyn Brewster is a 40 y.o. female who presents to the clinic today for Follow-up  .       She is much improved about 60%, was able to rest  No hematuria  No issues with the medication    The patient location is: [place]/work  The chief complaint leading to consultation is: dysuria      Visit type: audiovisual    Face to Face time with patient: 5  8 minutes of total time spent on the encounter, which includes face to face time and non-face to face time preparing to see the patient (eg, review of tests), Obtaining and/or reviewing separately obtained history, Documenting clinical information in the electronic or other health record, Independently interpreting results (not separately reported) and communicating results to the patient/family/caregiver, or Care coordination (not separately reported).         Each patient to whom he or she provides medical services by telemedicine is:  (1) informed of the relationship between the physician and patient and the respective role of any other health care provider with respect to management of the patient; and (2) notified that he or she may decline to receive medical services by telemedicine and may withdraw from such care at any time.    Notes:     PAST MEDICAL HISTORY:  Past Medical History:   Diagnosis Date    Abnormal uterine bleeding 4/18/2018    Acute encephalopathy 12/24/12    secondary to drug overdose    ARF (acute renal failure) 12/24/12    secondary to drug overdose    History of cardiac arrest 12/24/12    secondary to drug overdose    Hypertension     Kidney stones 1/8/2018    MRSA (methicillin resistant Staphylococcus aureus) 12/24/12    Nephritis     PMDD (premenstrual dysphoric disorder)     Polysubstance abuse 12/24/12    Systolic CHF, acute 12/24/12    secondary to drug overdose    UTI (urinary tract infection)        PAST SURGICAL HISTORY:  Past Surgical History:   Procedure Laterality Date    AUGMENTATION OF  BREAST Bilateral 2001    CHOLECYSTECTOMY      ESOPHAGOGASTRODUODENOSCOPY N/A 1/2/2020    Procedure: EGD (ESOPHAGOGASTRODUODENOSCOPY);  Surgeon: Moe Tripathi MD;  Location: [facility];  Service: Endoscopy;  Laterality: N/A;    TUBAL LIGATION      urinary stents         SOCIAL HISTORY:  Social History     Socioeconomic History    Marital status: Single     Spouse name: Not on file    Number of children: Not on file    Years of education: Not on file    Highest education level: Not on file   Occupational History    Not on file   Social Needs    Financial resource strain: Hard    Food insecurity     Worry: Never true     Inability: Never true    Transportation needs     Medical: No     Non-medical: No   Tobacco Use    Smoking status: Current Every Day Smoker     Packs/day: 0.50     Years: 20.00     Pack years: 10.00     Types: Cigarettes    Smokeless tobacco: Current User   Substance and Sexual Activity    Alcohol use: Not Currently     Frequency: Monthly or less     Drinks per session: 3 or 4     Binge frequency: Never    Drug use: No     Types: Cocaine, Methamphetamines     Comment: PCP, patient states she had not done drugs since 2012    Sexual activity: Yes     Partners: Male   Lifestyle    Physical activity     Days per week: 1 day     Minutes per session: 10 min    Stress: To some extent   Relationships    Social connections     Talks on phone: More than three times a week     Gets together: Three times a week     Attends [employer]ist service: Not on file     Active member of club or organization: No     Attends meetings of clubs or organizations: Never     Relationship status: Living with partner   Other Topics Concern    Not on file   Social History Narrative    Not on file       FAMILY HISTORY:  Family History   Problem Relation Age of Onset    Kidney disease Mother     Ovarian cancer Mother     Cancer Maternal Grandmother         throat    Cancer Maternal Grandfather          prostate    Kidney disease Daughter     Breast cancer Neg Hx     Colon cancer Neg Hx        ALLERGIES AND MEDICATIONS: updated and reviewed.  Review of patient's allergies indicates:   Allergen Reactions    Strawberry Anaphylaxis and Hives    Morphine Other (See Comments)     Burns stomach    Toradol [ketorolac]     Tramadol      Abdominal pain     Medication List with Changes/Refills   Current Medications    ALBUTEROL (PROVENTIL/VENTOLIN HFA) 90 MCG/ACTUATION INHALER    Inhale 2 puffs into the lungs every 6 (six) hours as needed for Wheezing.    ALPRAZOLAM (XANAX) 1 MG TABLET    Take 1 tablet (1 mg total) by mouth every evening.    AMOXICILLIN-CLAVULANATE 875-125MG (AUGMENTIN) 875-125 MG PER TABLET    Take 1 tablet by mouth every 12 (twelve) hours. for 7 days    DESLORATADINE (CLARINEX) 5 MG TABLET    Take 1 tablet (5 mg total) by mouth once daily.    FLUOXETINE 40 MG CAPSULE    Take 1 capsule (40 mg total) by mouth once daily.    OMEPRAZOLE (PRILOSEC) 40 MG CAPSULE    Take 1 capsule (40 mg total) by mouth once daily.    ONDANSETRON (ZOFRAN-ODT) 4 MG TBDL    Take 1 tablet (4 mg total) by mouth every 12 (twelve) hours as needed.    OXYCODONE-ACETAMINOPHEN (PERCOCET) 5-325 MG PER TABLET    Take 1 tablet by mouth every 4 (four) hours as needed for Pain.          CARE TEAM:  Patient Care Team:  Hermes Colvin MD as PCP - General (Family Medicine)  Robson Hale II, MD as Consulting Physician (Gastroenterology)  Cynthia Lee MA as Care Coordinator         REVIEW OF SYSTEMS:  Review of Systems   Constitutional: Negative for activity change and unexpected weight change.   HENT: Negative for hearing loss, rhinorrhea and trouble swallowing.    Eyes: Negative for discharge and visual disturbance.   Respiratory: Negative for chest tightness and wheezing.    Cardiovascular: Negative for chest pain and palpitations.   Gastrointestinal: Negative for blood in stool, constipation, diarrhea and vomiting.   Endocrine:  Negative for polydipsia and polyuria.   Genitourinary: Negative for difficulty urinating, dysuria, hematuria and menstrual problem.   Musculoskeletal: Negative for arthralgias, joint swelling and neck pain.   Neurological: Negative for weakness and headaches.   Psychiatric/Behavioral: Negative for confusion and dysphoric mood.         PHYSICAL EXAM:   There were no vitals filed for this visit.          There is no height or weight on file to calculate BMI.     General appearance - alert, well appearing, and in no distress  Mental status - much improved!  Looks like her normal self, she looks comfortable vs. Yesterday in severe discomforat and not herself.  Breathing normallyh, no tachypnea  No jose TTP on lower abdomen today on self exam     Medication List with Changes/Refills   Current Medications    ALBUTEROL (PROVENTIL/VENTOLIN HFA) 90 MCG/ACTUATION INHALER    Inhale 2 puffs into the lungs every 6 (six) hours as needed for Wheezing.    ALPRAZOLAM (XANAX) 1 MG TABLET    Take 1 tablet (1 mg total) by mouth every evening.    AMOXICILLIN-CLAVULANATE 875-125MG (AUGMENTIN) 875-125 MG PER TABLET    Take 1 tablet by mouth every 12 (twelve) hours. for 7 days    DESLORATADINE (CLARINEX) 5 MG TABLET    Take 1 tablet (5 mg total) by mouth once daily.    FLUOXETINE 40 MG CAPSULE    Take 1 capsule (40 mg total) by mouth once daily.    OMEPRAZOLE (PRILOSEC) 40 MG CAPSULE    Take 1 capsule (40 mg total) by mouth once daily.    ONDANSETRON (ZOFRAN-ODT) 4 MG TBDL    Take 1 tablet (4 mg total) by mouth every 12 (twelve) hours as needed.    OXYCODONE-ACETAMINOPHEN (PERCOCET) 5-325 MG PER TABLET    Take 1 tablet by mouth every 4 (four) hours as needed for Pain.         ASSESSMENT AND PLAN:    Problem List Items Addressed This Visit     None      Visit Diagnoses     Abdominal pain, unspecified abdominal location    -  Primary    Leukocytosis, unspecified type            Continue treatment, much improved.  She will message me Friday  with update.    No future appointments.    No follow-ups on file. or sooner as needed.

## 2020-11-05 ENCOUNTER — PATIENT MESSAGE (OUTPATIENT)
Dept: FAMILY MEDICINE | Facility: CLINIC | Age: 40
End: 2020-11-05

## 2020-11-16 ENCOUNTER — PATIENT MESSAGE (OUTPATIENT)
Dept: FAMILY MEDICINE | Facility: CLINIC | Age: 40
End: 2020-11-16

## 2020-11-17 ENCOUNTER — OFFICE VISIT (OUTPATIENT)
Dept: FAMILY MEDICINE | Facility: CLINIC | Age: 40
End: 2020-11-17
Payer: COMMERCIAL

## 2020-11-17 ENCOUNTER — PATIENT MESSAGE (OUTPATIENT)
Dept: FAMILY MEDICINE | Facility: CLINIC | Age: 40
End: 2020-11-17

## 2020-11-17 VITALS
OXYGEN SATURATION: 100 % | TEMPERATURE: 99 F | HEIGHT: 62 IN | HEART RATE: 90 BPM | SYSTOLIC BLOOD PRESSURE: 116 MMHG | WEIGHT: 167.56 LBS | DIASTOLIC BLOOD PRESSURE: 80 MMHG | BODY MASS INDEX: 30.83 KG/M2

## 2020-11-17 DIAGNOSIS — Z23 NEED FOR VIRAL IMMUNIZATION: ICD-10-CM

## 2020-11-17 DIAGNOSIS — R30.0 DYSURIA: Primary | ICD-10-CM

## 2020-11-17 DIAGNOSIS — F39 MOOD DISORDER: ICD-10-CM

## 2020-11-17 LAB
BACTERIA #/AREA URNS HPF: NORMAL /HPF
BILIRUB UR QL STRIP: NEGATIVE
CLARITY UR: CLEAR
COLOR UR: ABNORMAL
GLUCOSE UR QL STRIP: NEGATIVE
HGB UR QL STRIP: ABNORMAL
KETONES UR QL STRIP: NEGATIVE
LEUKOCYTE ESTERASE UR QL STRIP: NEGATIVE
MICROSCOPIC COMMENT: NORMAL
NITRITE UR QL STRIP: NEGATIVE
PH UR STRIP: 6 [PH] (ref 5–8)
PROT UR QL STRIP: NEGATIVE
RBC #/AREA URNS HPF: 1 /HPF (ref 0–4)
SP GR UR STRIP: 1 (ref 1–1.03)
SQUAMOUS #/AREA URNS HPF: 10 /HPF
URN SPEC COLLECT METH UR: ABNORMAL
UROBILINOGEN UR STRIP-ACNC: NEGATIVE EU/DL

## 2020-11-17 PROCEDURE — 3074F PR MOST RECENT SYSTOLIC BLOOD PRESSURE < 130 MM HG: ICD-10-PCS | Mod: CPTII,S$GLB,, | Performed by: FAMILY MEDICINE

## 2020-11-17 PROCEDURE — 99999 PR PBB SHADOW E&M-EST. PATIENT-LVL III: ICD-10-PCS | Mod: PBBFAC,,, | Performed by: FAMILY MEDICINE

## 2020-11-17 PROCEDURE — 3074F SYST BP LT 130 MM HG: CPT | Mod: CPTII,S$GLB,, | Performed by: FAMILY MEDICINE

## 2020-11-17 PROCEDURE — 99999 PR PBB SHADOW E&M-EST. PATIENT-LVL III: CPT | Mod: PBBFAC,,, | Performed by: FAMILY MEDICINE

## 2020-11-17 PROCEDURE — 3008F PR BODY MASS INDEX (BMI) DOCUMENTED: ICD-10-PCS | Mod: CPTII,S$GLB,, | Performed by: FAMILY MEDICINE

## 2020-11-17 PROCEDURE — 99214 OFFICE O/P EST MOD 30 MIN: CPT | Mod: S$GLB,,, | Performed by: FAMILY MEDICINE

## 2020-11-17 PROCEDURE — 81000 URINALYSIS NONAUTO W/SCOPE: CPT

## 2020-11-17 PROCEDURE — 3079F PR MOST RECENT DIASTOLIC BLOOD PRESSURE 80-89 MM HG: ICD-10-PCS | Mod: CPTII,S$GLB,, | Performed by: FAMILY MEDICINE

## 2020-11-17 PROCEDURE — 99214 PR OFFICE/OUTPT VISIT, EST, LEVL IV, 30-39 MIN: ICD-10-PCS | Mod: S$GLB,,, | Performed by: FAMILY MEDICINE

## 2020-11-17 PROCEDURE — 1125F PR PAIN SEVERITY QUANTIFIED, PAIN PRESENT: ICD-10-PCS | Mod: S$GLB,,, | Performed by: FAMILY MEDICINE

## 2020-11-17 PROCEDURE — 3008F BODY MASS INDEX DOCD: CPT | Mod: CPTII,S$GLB,, | Performed by: FAMILY MEDICINE

## 2020-11-17 PROCEDURE — 3079F DIAST BP 80-89 MM HG: CPT | Mod: CPTII,S$GLB,, | Performed by: FAMILY MEDICINE

## 2020-11-17 PROCEDURE — 1125F AMNT PAIN NOTED PAIN PRSNT: CPT | Mod: S$GLB,,, | Performed by: FAMILY MEDICINE

## 2020-11-17 RX ORDER — DIVALPROEX SODIUM 125 MG/1
125 TABLET, DELAYED RELEASE ORAL EVERY 12 HOURS
Qty: 60 TABLET | Refills: 0 | Status: SHIPPED | OUTPATIENT
Start: 2020-11-17 | End: 2021-02-03 | Stop reason: CLARIF

## 2020-11-17 NOTE — PROGRESS NOTES
"Chief Complaint   Patient presents with    Back Pain     SUBJECTIVE:  Roslyn Brewster is a 40 y.o. female  Recurrent pain in the lower abdomen/pelvis, radiating to the back.  Moderate to severe, with increased urination but otherwise ok.  No unusual vaginal symptoms by history.  Improved with prior treatment and now back.    OBJECTIVE:  /80   Pulse 90   Temp 99.2 °F (37.3 °C) (Oral)   Ht 5' 2" (1.575 m)   Wt 76 kg (167 lb 8.8 oz)   LMP 10/19/2020   SpO2 100%   BMI 30.65 kg/m²     Some distress, otherwise normal  Pain in the lower abdomen without skin rash or wounds.    ASSESSMENT:  1. Dysuria    2. Need for viral immunization    3. Mood disorder      PLAN:  Roslyn was seen today for back pain.    Diagnoses and all orders for this visit:    Dysuria  -     Urinalysis, Reflex to Urine Culture Urine, Clean Catch    Need for viral immunization  -     Cancel: Pneumococcal Polysaccharide Vaccine (23 Valent) (SQ/IM)  -     Cancel: (In Office Administered) Tdap Vaccine  -     Cancel: Influenza - Quadrivalent *Preferred* (6 months+) (PF)    Mood disorder  -     divalproex (DEPAKOTE) 125 MG EC tablet; Take 1 tablet (125 mg total) by mouth every 12 (twelve) hours.    Other orders  -     Cancel: Tdap Vaccine  -     Cancel: Influenza - Quadrivalent (PF)  -     Urinalysis Microscopic      Still not doing well.  Trial of mood stabilizer  Blood work shows urine is not the issue.  She needs to see GYN and make sure we are not missing anything.  Mood stabilizer trial.  F/u after GYN  I spent 25 minutes with patient with half in face to face counseling about the above.      "

## 2020-12-03 ENCOUNTER — PATIENT OUTREACH (OUTPATIENT)
Dept: ADMINISTRATIVE | Facility: OTHER | Age: 40
End: 2020-12-03

## 2020-12-03 ENCOUNTER — OFFICE VISIT (OUTPATIENT)
Dept: OBSTETRICS AND GYNECOLOGY | Facility: CLINIC | Age: 40
End: 2020-12-03
Payer: COMMERCIAL

## 2020-12-03 VITALS
SYSTOLIC BLOOD PRESSURE: 112 MMHG | WEIGHT: 172.81 LBS | BODY MASS INDEX: 31.8 KG/M2 | HEIGHT: 62 IN | DIASTOLIC BLOOD PRESSURE: 66 MMHG

## 2020-12-03 DIAGNOSIS — Z01.419 WELL WOMAN EXAM WITH ROUTINE GYNECOLOGICAL EXAM: Primary | ICD-10-CM

## 2020-12-03 DIAGNOSIS — N92.0 MENORRHAGIA WITH REGULAR CYCLE: ICD-10-CM

## 2020-12-03 PROCEDURE — 87624 HPV HI-RISK TYP POOLED RSLT: CPT

## 2020-12-03 PROCEDURE — 3074F PR MOST RECENT SYSTOLIC BLOOD PRESSURE < 130 MM HG: ICD-10-PCS | Mod: CPTII,S$GLB,, | Performed by: OBSTETRICS & GYNECOLOGY

## 2020-12-03 PROCEDURE — 88175 CYTOPATH C/V AUTO FLUID REDO: CPT

## 2020-12-03 PROCEDURE — 3008F PR BODY MASS INDEX (BMI) DOCUMENTED: ICD-10-PCS | Mod: CPTII,S$GLB,, | Performed by: OBSTETRICS & GYNECOLOGY

## 2020-12-03 PROCEDURE — 99396 PREV VISIT EST AGE 40-64: CPT | Mod: S$GLB,,, | Performed by: OBSTETRICS & GYNECOLOGY

## 2020-12-03 PROCEDURE — 3008F BODY MASS INDEX DOCD: CPT | Mod: CPTII,S$GLB,, | Performed by: OBSTETRICS & GYNECOLOGY

## 2020-12-03 PROCEDURE — 3078F DIAST BP <80 MM HG: CPT | Mod: CPTII,S$GLB,, | Performed by: OBSTETRICS & GYNECOLOGY

## 2020-12-03 PROCEDURE — 3078F PR MOST RECENT DIASTOLIC BLOOD PRESSURE < 80 MM HG: ICD-10-PCS | Mod: CPTII,S$GLB,, | Performed by: OBSTETRICS & GYNECOLOGY

## 2020-12-03 PROCEDURE — 99999 PR PBB SHADOW E&M-EST. PATIENT-LVL III: CPT | Mod: PBBFAC,,, | Performed by: OBSTETRICS & GYNECOLOGY

## 2020-12-03 PROCEDURE — 99999 PR PBB SHADOW E&M-EST. PATIENT-LVL III: ICD-10-PCS | Mod: PBBFAC,,, | Performed by: OBSTETRICS & GYNECOLOGY

## 2020-12-03 PROCEDURE — 1126F AMNT PAIN NOTED NONE PRSNT: CPT | Mod: S$GLB,,, | Performed by: OBSTETRICS & GYNECOLOGY

## 2020-12-03 PROCEDURE — 99396 PR PREVENTIVE VISIT,EST,40-64: ICD-10-PCS | Mod: S$GLB,,, | Performed by: OBSTETRICS & GYNECOLOGY

## 2020-12-03 PROCEDURE — 1126F PR PAIN SEVERITY QUANTIFIED, NO PAIN PRESENT: ICD-10-PCS | Mod: S$GLB,,, | Performed by: OBSTETRICS & GYNECOLOGY

## 2020-12-03 PROCEDURE — 3074F SYST BP LT 130 MM HG: CPT | Mod: CPTII,S$GLB,, | Performed by: OBSTETRICS & GYNECOLOGY

## 2020-12-03 NOTE — PROGRESS NOTES
Health Maintenance Due   Topic Date Due    TETANUS VACCINE  06/28/1997    Pneumococcal Vaccine (Medium Risk) (1 of 1 - PPSV23) 06/28/1998    Influenza Vaccine (1) 08/01/2020     Updates were requested from care everywhere.  Chart was reviewed for overdue Proactive [facility] Encounters ([facility]AND[facility]) topics (CRS, Breast Cancer Screening, Eye exam)  Health Maintenance has been updated.  [facility] immunization registry triggered.  Immunizations were reconciled.

## 2020-12-04 ENCOUNTER — PATIENT MESSAGE (OUTPATIENT)
Dept: OBSTETRICS AND GYNECOLOGY | Facility: CLINIC | Age: 40
End: 2020-12-04

## 2020-12-04 ENCOUNTER — HOSPITAL ENCOUNTER (OUTPATIENT)
Dept: RADIOLOGY | Facility: HOSPITAL | Age: 40
Discharge: HOME | End: 2020-12-04
Attending: OBSTETRICS & GYNECOLOGY
Payer: COMMERCIAL

## 2020-12-04 ENCOUNTER — HOSPITAL ENCOUNTER (OUTPATIENT)
Dept: RADIOLOGY | Facility: HOSPITAL | Age: 40
Discharge: HOME | End: 2020-12-04
Attending: FAMILY MEDICINE
Payer: COMMERCIAL

## 2020-12-04 DIAGNOSIS — Z12.39 BREAST CANCER SCREENING: ICD-10-CM

## 2020-12-04 DIAGNOSIS — N92.0 MENORRHAGIA WITH REGULAR CYCLE: ICD-10-CM

## 2020-12-04 PROCEDURE — 77067 MAMMO DIGITAL SCREENING BILAT WITH TOMOSYNTHESIS_CAD: ICD-10-PCS | Mod: 26,,, | Performed by: RADIOLOGY

## 2020-12-04 PROCEDURE — 77067 SCR MAMMO BI INCL CAD: CPT | Mod: TC

## 2020-12-04 PROCEDURE — 77063 MAMMO DIGITAL SCREENING BILAT WITH TOMOSYNTHESIS_CAD: ICD-10-PCS | Mod: 26,,, | Performed by: RADIOLOGY

## 2020-12-04 PROCEDURE — 77067 SCR MAMMO BI INCL CAD: CPT | Mod: 26,,, | Performed by: RADIOLOGY

## 2020-12-04 PROCEDURE — 76856 US PELVIS COMP WITH TRANSVAG NON-OB (XPD): ICD-10-PCS | Mod: 26,,, | Performed by: RADIOLOGY

## 2020-12-04 PROCEDURE — 77063 BREAST TOMOSYNTHESIS BI: CPT | Mod: 26,,, | Performed by: RADIOLOGY

## 2020-12-04 PROCEDURE — 76830 TRANSVAGINAL US NON-OB: CPT | Mod: TC

## 2020-12-04 PROCEDURE — 76830 TRANSVAGINAL US NON-OB: CPT | Mod: 26,,, | Performed by: RADIOLOGY

## 2020-12-04 PROCEDURE — 76830 US PELVIS COMP WITH TRANSVAG NON-OB (XPD): ICD-10-PCS | Mod: 26,,, | Performed by: RADIOLOGY

## 2020-12-04 PROCEDURE — 76856 US EXAM PELVIC COMPLETE: CPT | Mod: 26,,, | Performed by: RADIOLOGY

## 2020-12-04 NOTE — PROGRESS NOTES
Subjective:       Patient ID: Roslyn Brewster is a 40 y.o. female.    Chief Complaint:  Gynecologic Exam (NP here for annual.  Last pap was 18. )      History of Present Illness  HPI  Annual Exam-Premenopausal  Patient presents for annual exam. The patient is sexually active. GYN screening history: last pap: approximate date 2019 and was normal and last mammogram: approximate date 2019 and was normal. The patient wears seatbelts: yes. The patient participates in regular exercise: yes. Has the patient ever been transfused or tattooed?: yes. The patient reports that there is not domestic violence in her life.    History of heavy and painful menses.    Status post tubal ligation  Was scheduled for endometrial ablation in 2017.  But did not go through with it.    Still smoking.  But down to 3 cigarettes a day.  Trying to quit.    GYN & OB History  Patient's last menstrual period was 2020 (exact date).   Date of Last Pap: 12/3/2020    OB History    Para Term  AB Living   3 2 2     2   SAB TAB Ectopic Multiple Live Births                  # Outcome Date GA Lbr Estiven/2nd Weight Sex Delivery Anes PTL Lv   3             2 Term            1 Term               Obstetric Comments    x 2.  Largest 10# boy.  Smaller girl 7#12oz     Past Medical History:   Diagnosis Date    Abnormal uterine bleeding 2018    Acute encephalopathy 12    secondary to drug overdose    ARF (acute renal failure) 12    secondary to drug overdose    History of cardiac arrest 12    secondary to drug overdose    Hypertension     Kidney stones 2018    MRSA (methicillin resistant Staphylococcus aureus) 12    Nephritis     PMDD (premenstrual dysphoric disorder)     Polysubstance abuse 12    Systolic CHF, acute 12    secondary to drug overdose    UTI (urinary tract infection)        Past Surgical History:   Procedure Laterality Date    AUGMENTATION OF BREAST  Bilateral 2001    CHOLECYSTECTOMY      ESOPHAGOGASTRODUODENOSCOPY N/A 1/2/2020    Procedure: EGD (ESOPHAGOGASTRODUODENOSCOPY);  Surgeon: Moe Tripathi MD;  Location: [facility];  Service: Endoscopy;  Laterality: N/A;    TUBAL LIGATION      urinary stents         Family History   Problem Relation Age of Onset    Kidney disease Mother     Ovarian cancer Mother 28    Cancer Maternal Grandmother         throat    Cancer Maternal Grandfather         prostate    Kidney disease Daughter     Breast cancer Neg Hx     Colon cancer Neg Hx        Social History     Socioeconomic History    Marital status: Single     Spouse name: Not on file    Number of children: Not on file    Years of education: Not on file    Highest education level: Not on file   Occupational History    Not on file   Social Needs    Financial resource strain: Hard    Food insecurity     Worry: Never true     Inability: Never true    Transportation needs     Medical: No     Non-medical: No   Tobacco Use    Smoking status: Current Every Day Smoker     Packs/day: 0.50     Years: 20.00     Pack years: 10.00     Types: Cigarettes    Smokeless tobacco: Current User   Substance and Sexual Activity    Alcohol use: Not Currently     Frequency: Monthly or less     Drinks per session: 3 or 4     Binge frequency: Never     Comment: occasional    Drug use: No     Types: Cocaine, Methamphetamines     Comment: PCP, patient states she had not done drugs since 2012    Sexual activity: Yes     Partners: Male     Birth control/protection: See Surgical Hx   Lifestyle    Physical activity     Days per week: 1 day     Minutes per session: 10 min    Stress: To some extent   Relationships    Social connections     Talks on phone: More than three times a week     Gets together: Three times a week     Attends Jain service: Not on file     Active member of club or organization: No     Attends meetings of clubs or organizations: Never      Relationship status: Living with partner   Other Topics Concern    Not on file   Social History Narrative    Together since 2012    Now engaged    He is in the     She is an        Current Outpatient Medications   Medication Sig Dispense Refill    ALPRAZolam (XANAX) 1 MG tablet Take 1 tablet (1 mg total) by mouth every evening. 30 tablet 4    divalproex (DEPAKOTE) 125 MG EC tablet Take 1 tablet (125 mg total) by mouth every 12 (twelve) hours. 60 tablet 0     No current facility-administered medications for this visit.        Review of patient's allergies indicates:   Allergen Reactions    Strawberry Anaphylaxis and Hives    Morphine Other (See Comments)     Burns stomach    Toradol [ketorolac]     Tramadol      Abdominal pain         Review of Systems  Review of Systems   Constitutional: Negative for activity change, appetite change, chills, fatigue, fever and unexpected weight change.   HENT: Negative for mouth sores.    Respiratory: Negative for cough, shortness of breath and wheezing.    Cardiovascular: Negative for chest pain and palpitations.   Gastrointestinal: Negative for abdominal pain, bloating, blood in stool, constipation, nausea and vomiting.   Endocrine: Negative for diabetes and hot flashes.   Genitourinary: Positive for dysmenorrhea, menorrhagia and menstrual problem. Negative for dyspareunia, dysuria, frequency, hematuria, pelvic pain, urgency, vaginal bleeding, vaginal discharge, vaginal pain, urinary incontinence, postcoital bleeding and vaginal odor.   Musculoskeletal: Negative for back pain and myalgias.   Integumentary:  Negative for rash, breast mass and nipple discharge.   Neurological: Negative for seizures and headaches.   Psychiatric/Behavioral: Negative for depression and sleep disturbance. The patient is not nervous/anxious.    Breast: Negative for mass, mastodynia and nipple discharge          Objective:    Physical Exam:   Constitutional: She appears  well-developed and well-nourished. No distress.   BMI of 31    HENT:   Head: Normocephalic and atraumatic.    Eyes: EOM are normal.    Neck: Normal range of motion.     Pulmonary/Chest: Effort normal. No respiratory distress.   Breasts: Non-tender, no engorgement, no masses, no retraction, no discharge. Negative for lymphadenopathy.         Abdominal: Soft. She exhibits no distension. There is no abdominal tenderness. There is no rebound and no guarding.   Tender over lateral aspect of right rectus muscle     Genitourinary:    Vagina and uterus normal.      Genitourinary Comments: Vulva without any obvious lesions.  Urethral meatus normal size and location without any lesion.  Urethra is non-tender without stricture or discharge.  Bladder is non-tender.  Vaginal vault with good support.  Minimal white discharge noted.  No obvious lesion.  Normal rugation.  Cervix is without any cervical motion tenderness.  No obvious lesion.  Uterus is small, non-tender, normal contour.  Adnexa is without any masses or tenderness.  Perineum without obvious lesion.    Difficult exam secondary to patient's inability to relax.   negative for vaginal discharge          Musculoskeletal: Normal range of motion.       Neurological: She is alert.    Skin: Skin is warm and dry.    Psychiatric: She has a normal mood and affect.   Somewhat anxious          Assessment:        1. Well woman exam with routine gynecological exam    2. Menorrhagia with regular cycle    3.  Status post tubal ligation         Plan:          I have discussed with the patient her condition.  Monthly breast examination was instructed, discussed, and encouraged.  Patient was encouraged to consume a low-calorie, low fat diet, and to increase of physical activity.  Healthy habits encouraged.  A Pap smear was performed with HR-HPV according to the USPSTF recommendations.  Mammogram was ordered because of the combination of her age and risk factors, according to ACOG  guidelines.  Gonorrhea and Chlamydia testing not performed;  HIV test not offered, again according to guidelines.  Colonoscopy discussed according to ACS guideline.     She will come back to see me in one year for her annual visit.  She can come back to see me sooner as necessary.  All of her questions were answered appropriately to her satisfaction.       We discussed her menstrual cycles.    Pelvic ultrasound to rule out uterine fibroids.    Back for further discussion after ultrasound.

## 2020-12-14 LAB
HPV HR 12 DNA SPEC QL NAA+PROBE: POSITIVE
HPV16 AG SPEC QL: NEGATIVE
HPV18 DNA SPEC QL NAA+PROBE: NEGATIVE

## 2020-12-15 ENCOUNTER — PATIENT MESSAGE (OUTPATIENT)
Dept: OBSTETRICS AND GYNECOLOGY | Facility: CLINIC | Age: 40
End: 2020-12-15

## 2020-12-15 DIAGNOSIS — R87.810 CERVICAL HIGH RISK HUMAN PAPILLOMAVIRUS (HPV) DNA TEST POSITIVE: Primary | ICD-10-CM

## 2020-12-23 ENCOUNTER — OFFICE VISIT (OUTPATIENT)
Dept: OBSTETRICS AND GYNECOLOGY | Facility: CLINIC | Age: 40
End: 2020-12-23
Payer: COMMERCIAL

## 2020-12-23 VITALS
HEIGHT: 62 IN | DIASTOLIC BLOOD PRESSURE: 64 MMHG | WEIGHT: 172.38 LBS | BODY MASS INDEX: 31.72 KG/M2 | SYSTOLIC BLOOD PRESSURE: 120 MMHG

## 2020-12-23 DIAGNOSIS — N94.6 DYSMENORRHEA: ICD-10-CM

## 2020-12-23 DIAGNOSIS — N92.0 MENORRHAGIA WITH REGULAR CYCLE: Primary | ICD-10-CM

## 2020-12-23 DIAGNOSIS — R87.810 CERVICAL HIGH RISK HUMAN PAPILLOMAVIRUS (HPV) DNA TEST POSITIVE: ICD-10-CM

## 2020-12-23 PROCEDURE — 99999 PR PBB SHADOW E&M-EST. PATIENT-LVL III: ICD-10-PCS | Mod: PBBFAC,,, | Performed by: OBSTETRICS & GYNECOLOGY

## 2020-12-23 PROCEDURE — 99213 OFFICE O/P EST LOW 20 MIN: CPT | Mod: S$GLB,,, | Performed by: OBSTETRICS & GYNECOLOGY

## 2020-12-23 PROCEDURE — 3078F PR MOST RECENT DIASTOLIC BLOOD PRESSURE < 80 MM HG: ICD-10-PCS | Mod: CPTII,S$GLB,, | Performed by: OBSTETRICS & GYNECOLOGY

## 2020-12-23 PROCEDURE — 99999 PR PBB SHADOW E&M-EST. PATIENT-LVL III: CPT | Mod: PBBFAC,,, | Performed by: OBSTETRICS & GYNECOLOGY

## 2020-12-23 PROCEDURE — 3078F DIAST BP <80 MM HG: CPT | Mod: CPTII,S$GLB,, | Performed by: OBSTETRICS & GYNECOLOGY

## 2020-12-23 PROCEDURE — 3074F PR MOST RECENT SYSTOLIC BLOOD PRESSURE < 130 MM HG: ICD-10-PCS | Mod: CPTII,S$GLB,, | Performed by: OBSTETRICS & GYNECOLOGY

## 2020-12-23 PROCEDURE — 1126F AMNT PAIN NOTED NONE PRSNT: CPT | Mod: S$GLB,,, | Performed by: OBSTETRICS & GYNECOLOGY

## 2020-12-23 PROCEDURE — 99213 PR OFFICE/OUTPT VISIT, EST, LEVL III, 20-29 MIN: ICD-10-PCS | Mod: S$GLB,,, | Performed by: OBSTETRICS & GYNECOLOGY

## 2020-12-23 PROCEDURE — 3008F BODY MASS INDEX DOCD: CPT | Mod: CPTII,S$GLB,, | Performed by: OBSTETRICS & GYNECOLOGY

## 2020-12-23 PROCEDURE — 3074F SYST BP LT 130 MM HG: CPT | Mod: CPTII,S$GLB,, | Performed by: OBSTETRICS & GYNECOLOGY

## 2020-12-23 PROCEDURE — 3008F PR BODY MASS INDEX (BMI) DOCUMENTED: ICD-10-PCS | Mod: CPTII,S$GLB,, | Performed by: OBSTETRICS & GYNECOLOGY

## 2020-12-23 PROCEDURE — 1126F PR PAIN SEVERITY QUANTIFIED, NO PAIN PRESENT: ICD-10-PCS | Mod: S$GLB,,, | Performed by: OBSTETRICS & GYNECOLOGY

## 2020-12-23 NOTE — PATIENT INSTRUCTIONS
You are scheduled for total laparoscopic hysterectomy with bilateral salpingectomy if Pap is normal

## 2020-12-23 NOTE — PROGRESS NOTES
Subjective:       Patient ID: Roslyn Brewster is a 40 y.o. female.    Chief Complaint:  Follow-up (follow up U/S done on 2020)      History of Present Illness  HPI  Patient comes in today for follow-up  Seen on 12/3/2020 for her well woman visit, complaining of having painful and heavy menses  History of heavy and painful menses.    Status post tubal ligation  Was scheduled for endometrial ablation in 2017.  But did not go through with it.  Previously tried on oral contraceptive. The medication did not work for her pain.    Pelvic ultrasound on the same day with uterus at 8.2 x 4.8 x 5.2 cm.  Endometrium at 7 mm.  No uterine fibroids.     Still smoking.  But down to 3 cigarettes a day.  Trying to quit..    Again, requesting hysterectomy today.        GYN & OB History  Patient's last menstrual period was 2020 (exact date).   Date of Last Pap: 2020    OB History    Para Term  AB Living   3 2 2     2   SAB TAB Ectopic Multiple Live Births                  # Outcome Date GA Lbr Estiven/2nd Weight Sex Delivery Anes PTL Lv   3             2 Term            1 Term               Obstetric Comments    x 2.  Largest 10# boy.  Smaller girl 7#12oz     Past Medical History:   Diagnosis Date    Abnormal uterine bleeding 2018    Acute encephalopathy 12    secondary to drug overdose    ARF (acute renal failure) 12    secondary to drug overdose    History of cardiac arrest 12    secondary to drug overdose    Hypertension     Kidney stones 2018    MRSA (methicillin resistant Staphylococcus aureus) 12    Nephritis     PMDD (premenstrual dysphoric disorder)     Polysubstance abuse 12    Systolic CHF, acute 12    secondary to drug overdose    UTI (urinary tract infection)        Past Surgical History:   Procedure Laterality Date    AUGMENTATION OF BREAST Bilateral     CHOLECYSTECTOMY      ESOPHAGOGASTRODUODENOSCOPY N/A 2020     Procedure: EGD (ESOPHAGOGASTRODUODENOSCOPY);  Surgeon: Moe Tripathi MD;  Location: [facility];  Service: Endoscopy;  Laterality: N/A;    TUBAL LIGATION      urinary stents         Family History   Problem Relation Age of Onset    Kidney disease Mother     Ovarian cancer Mother 28    Cancer Maternal Grandmother         throat    Cancer Maternal Grandfather         prostate    Kidney disease Daughter     Breast cancer Neg Hx     Colon cancer Neg Hx        Social History     Socioeconomic History    Marital status: Single     Spouse name: Not on file    Number of children: Not on file    Years of education: Not on file    Highest education level: Not on file   Occupational History    Not on file   Social Needs    Financial resource strain: Hard    Food insecurity     Worry: Never true     Inability: Never true    Transportation needs     Medical: No     Non-medical: No   Tobacco Use    Smoking status: Current Every Day Smoker     Packs/day: 0.50     Years: 20.00     Pack years: 10.00     Types: Cigarettes    Smokeless tobacco: Current User   Substance and Sexual Activity    Alcohol use: Not Currently     Frequency: Monthly or less     Drinks per session: 3 or 4     Binge frequency: Never     Comment: occasional    Drug use: No     Types: Cocaine, Methamphetamines     Comment: PCP, patient states she had not done drugs since 2012    Sexual activity: Yes     Partners: Male     Birth control/protection: See Surgical Hx   Lifestyle    Physical activity     Days per week: 1 day     Minutes per session: 10 min    Stress: To some extent   Relationships    Social connections     Talks on phone: More than three times a week     Gets together: Three times a week     Attends Orthodox service: Not on file     Active member of club or organization: No     Attends meetings of clubs or organizations: Never     Relationship status: Living with partner   Other Topics Concern    Not on file   Social  History Narrative    Together since 2012    Now engaged    He is in the     She is an        Current Outpatient Medications   Medication Sig Dispense Refill    ALPRAZolam (XANAX) 1 MG tablet TAKE ONE TABLET BY MOUTH EVERY EVENING 30 tablet 4    divalproex (DEPAKOTE) 125 MG EC tablet Take 1 tablet (125 mg total) by mouth every 12 (twelve) hours. 60 tablet 0     No current facility-administered medications for this visit.        Review of patient's allergies indicates:   Allergen Reactions    Strawberry Anaphylaxis and Hives    Morphine Other (See Comments)     Burns stomach    Toradol [ketorolac]     Tramadol      Abdominal pain         Review of Systems  Review of Systems   Constitutional: Negative for activity change, appetite change, chills, fatigue, fever and unexpected weight change.   HENT: Negative for mouth sores.    Respiratory: Negative for cough, shortness of breath and wheezing.    Cardiovascular: Negative for chest pain and palpitations.   Gastrointestinal: Negative for abdominal pain, bloating, blood in stool, constipation, nausea and vomiting.   Endocrine: Negative for diabetes and hot flashes.   Genitourinary: Positive for dysmenorrhea, menorrhagia and menstrual problem. Negative for dyspareunia, dysuria, frequency, hematuria, pelvic pain, urgency, vaginal bleeding, vaginal discharge, vaginal pain, urinary incontinence, postcoital bleeding and vaginal odor.   Musculoskeletal: Negative for back pain and myalgias.   Integumentary:  Negative for rash, breast mass and nipple discharge.   Neurological: Negative for seizures and headaches.   Psychiatric/Behavioral: Negative for depression and sleep disturbance. The patient is not nervous/anxious.    Breast: Negative for mass, mastodynia and nipple discharge          Objective:    Physical Exam:   Constitutional: She appears well-developed and well-nourished. No distress.    HENT:   Head: Normocephalic and atraumatic.    Eyes:  EOM are normal.    Neck: Normal range of motion.    Cardiovascular: Normal rate.     Pulmonary/Chest: Effort normal. No respiratory distress.                  Musculoskeletal: Normal range of motion.       Neurological: She is alert.     Psychiatric: She has a normal mood and affect.          Assessment:        1. Menorrhagia with regular cycle    2. Cervical high risk human papillomavirus (HPV) DNA test positive    3. Dysmenorrhea              Plan:      I have again extensively discussed with the patient her condition.  The proposed procedures of total laparoscopic hysterectomy with bilateral salpingectomy explained to and again extensively discussed with the patient.  Questions answered.   Her HR-HPV is positive but negative for -16/18.   We would wait for the result of the Pap.   If it is negative, I think we should be able to safely proceed with hysterectomy.  Otherwise, we would need colposcopy with biopsy, possible LEEP prior to hysterectomy  We will let her know the result to schedule surgery

## 2020-12-24 ENCOUNTER — OFFICE VISIT (OUTPATIENT)
Dept: FAMILY MEDICINE | Facility: CLINIC | Age: 40
End: 2020-12-24
Payer: COMMERCIAL

## 2020-12-24 DIAGNOSIS — Z20.822 ENCOUNTER BY TELEHEALTH FOR SUSPECTED COVID-19: Primary | ICD-10-CM

## 2020-12-24 PROCEDURE — 99213 PR OFFICE/OUTPT VISIT, EST, LEVL III, 20-29 MIN: ICD-10-PCS | Mod: 95,,, | Performed by: PHYSICIAN ASSISTANT

## 2020-12-24 PROCEDURE — 99213 OFFICE O/P EST LOW 20 MIN: CPT | Mod: 95,,, | Performed by: PHYSICIAN ASSISTANT

## 2020-12-24 RX ORDER — ALBUTEROL SULFATE 90 UG/1
1-2 AEROSOL, METERED RESPIRATORY (INHALATION) EVERY 6 HOURS PRN
Qty: 18 G | Refills: 0 | Status: SHIPPED | OUTPATIENT
Start: 2020-12-24 | End: 2021-02-03 | Stop reason: CLARIF

## 2020-12-24 RX ORDER — PROMETHAZINE HYDROCHLORIDE AND DEXTROMETHORPHAN HYDROBROMIDE 6.25; 15 MG/5ML; MG/5ML
5 SYRUP ORAL 4 TIMES DAILY PRN
Qty: 120 ML | Refills: 0 | Status: SHIPPED | OUTPATIENT
Start: 2020-12-24 | End: 2020-12-31

## 2020-12-24 RX ORDER — BENZONATATE 100 MG/1
100 CAPSULE ORAL 3 TIMES DAILY PRN
Qty: 30 CAPSULE | Refills: 0 | Status: SHIPPED | OUTPATIENT
Start: 2020-12-24 | End: 2021-01-23

## 2020-12-24 NOTE — PROGRESS NOTES
Answers for HPI/ROS submitted by the patient on 12/24/2020   Cough  Chronicity: new  Onset: yesterday  Progression since onset: rapidly worsening  Frequency: every few minutes  Cough characteristics: productive of sputum  chest pain: Yes  chills: No  ear congestion: Yes  ear pain: Yes  fever: Yes  headaches: Yes  heartburn: No  hemoptysis: No  myalgias: Yes  nasal congestion: Yes  postnasal drip: Yes  rash: No  rhinorrhea: Yes  shortness of breath: No  sore throat: Yes  sweats: No  weight loss: No  wheezing: Yes  asthma: No  bronchiectasis: No  bronchitis: Yes  COPD: No  emphysema: No  environmental allergies: No  pneumonia: Yes  Treatments tried: OTC cough suppressant  Improvement on treatment: no relief

## 2020-12-24 NOTE — PROGRESS NOTES
Subjective:       Patient ID: Roslyn Brewster is a 40 y.o. female with multiple medical diagnoses as listed in the medical history and problem list that presents for No chief complaint on file.  .    Chief Complaint: No chief complaint on file.    The patient location is: at home  The chief complaint leading to consultation is: cough  Visit type: Virtual visit with synchronous audio and video  Quality of audio: good  Quality of sound: good  Each patient to whom he or she provides medical services by telemedicine is:  (1) informed of the relationship between the physician and patient and the respective role of any other health care provider with respect to management of the patient; and (2) notified that he or she may decline to receive medical services by telemedicine and may withdraw from such care at any time.    Cough  This is a new problem. The current episode started yesterday. The problem has been rapidly worsening. The problem occurs every few minutes. The cough is productive of sputum. Associated symptoms include chest pain, chills, ear congestion, ear pain, a fever (100 last ), headaches (yesterday frontal ), nasal congestion, postnasal drip, rhinorrhea, a sore throat, shortness of breath and wheezing. Pertinent negatives include no eye redness, heartburn, hemoptysis, myalgias, rash, sweats or weight loss. She has tried OTC cough suppressant (nyquil ) for the symptoms. The treatment provided no relief. Her past medical history is significant for bronchitis and pneumonia. There is no history of asthma, bronchiectasis, COPD, emphysema or environmental allergies.     Review of Systems   Constitutional: Positive for chills, fatigue and fever (100 last ). Negative for weight loss.   HENT: Positive for ear pain, postnasal drip, rhinorrhea, sneezing and sore throat. Negative for congestion, sinus pressure, sinus pain, trouble swallowing and voice change.         Denies lost of taste or smell    Eyes: Negative  for pain, discharge, redness and itching.   Respiratory: Positive for cough, chest tightness, shortness of breath and wheezing. Negative for hemoptysis.         Chest congestion    Cardiovascular: Positive for chest pain.   Gastrointestinal: Negative for abdominal pain, diarrhea, heartburn, nausea and vomiting.   Musculoskeletal: Negative for myalgias.   Skin: Negative for rash.   Allergic/Immunologic: Negative for environmental allergies.   Neurological: Positive for headaches (yesterday frontal ).         PAST MEDICAL HISTORY:  Past Medical History:   Diagnosis Date    Abnormal uterine bleeding 4/18/2018    Acute encephalopathy 12/24/12    secondary to drug overdose    ARF (acute renal failure) 12/24/12    secondary to drug overdose    History of cardiac arrest 12/24/12    secondary to drug overdose    Hypertension     Kidney stones 1/8/2018    MRSA (methicillin resistant Staphylococcus aureus) 12/24/12    Nephritis     PMDD (premenstrual dysphoric disorder)     Polysubstance abuse 12/24/12    Systolic CHF, acute 12/24/12    secondary to drug overdose    UTI (urinary tract infection)        SOCIAL HISTORY:  Social History     Socioeconomic History    Marital status: Single     Spouse name: Not on file    Number of children: Not on file    Years of education: Not on file    Highest education level: Not on file   Occupational History    Not on file   Social Needs    Financial resource strain: Hard    Food insecurity     Worry: Never true     Inability: Never true    Transportation needs     Medical: No     Non-medical: No   Tobacco Use    Smoking status: Current Every Day Smoker     Packs/day: 0.25     Years: 20.00     Pack years: 5.00     Types: Cigarettes    Smokeless tobacco: Current User    Tobacco comment: 1-2 cigarettes    Substance and Sexual Activity    Alcohol use: Not Currently     Frequency: Monthly or less     Drinks per session: 3 or 4     Binge frequency: Never     Comment:  occasional    Drug use: No     Types: Cocaine, Methamphetamines     Comment: PCP, patient states she had not done drugs since 2012    Sexual activity: Yes     Partners: Male     Birth control/protection: See Surgical Hx   Lifestyle    Physical activity     Days per week: 1 day     Minutes per session: 10 min    Stress: To some extent   Relationships    Social connections     Talks on phone: More than three times a week     Gets together: Three times a week     Attends Cheondoism service: Not on file     Active member of club or organization: No     Attends meetings of clubs or organizations: Never     Relationship status: Living with partner   Other Topics Concern    Not on file   Social History Narrative    Together since 2012    Now engaged    He is in the     She is an        ALLERGIES AND MEDICATIONS: updated and reviewed.  Review of patient's allergies indicates:   Allergen Reactions    Strawberry Anaphylaxis and Hives    Morphine Other (See Comments)     Burns stomach    Toradol [ketorolac]     Tramadol      Abdominal pain     Current Outpatient Medications   Medication Sig Dispense Refill    albuterol (PROVENTIL/VENTOLIN HFA) 90 mcg/actuation inhaler Inhale 1-2 puffs into the lungs every 6 (six) hours as needed for Wheezing or Shortness of Breath. 18 g 0    ALPRAZolam (XANAX) 1 MG tablet TAKE ONE TABLET BY MOUTH EVERY EVENING 30 tablet 4    benzonatate (TESSALON) 100 MG capsule Take 1 capsule (100 mg total) by mouth 3 (three) times daily as needed for Cough. 30 capsule 0    divalproex (DEPAKOTE) 125 MG EC tablet Take 1 tablet (125 mg total) by mouth every 12 (twelve) hours. 60 tablet 0    promethazine-dextromethorphan (PROMETHAZINE-DM) 6.25-15 mg/5 mL Syrp Take 5 mLs by mouth 4 (four) times daily as needed. 120 mL 0     No current facility-administered medications for this visit.          Objective:   LMP 12/16/2020 (Exact Date)      Physical Exam  Constitutional:        General: She is not in acute distress.     Appearance: She is not ill-appearing or toxic-appearing.   HENT:      Head: Normocephalic and atraumatic.      Right Ear: External ear normal.      Left Ear: External ear normal.   Eyes:      Extraocular Movements: Extraocular movements intact.   Pulmonary:      Comments: Cough during visit  Neurological:      Mental Status: She is alert and oriented to person, place, and time.             Assessment:       1. Encounter by telehealth for suspected COVID-19        Plan:       Encounter by telehealth for suspected COVID-19  -     albuterol (PROVENTIL/VENTOLIN HFA) 90 mcg/actuation inhaler; Inhale 1-2 puffs into the lungs every 6 (six) hours as needed for Wheezing or Shortness of Breath.  Dispense: 18 g; Refill: 0  -     promethazine-dextromethorphan (PROMETHAZINE-DM) 6.25-15 mg/5 mL Syrp; Take 5 mLs by mouth 4 (four) times daily as needed.  Dispense: 120 mL; Refill: 0  -     benzonatate (TESSALON) 100 MG capsule; Take 1 capsule (100 mg total) by mouth 3 (three) times daily as needed for Cough.  Dispense: 30 capsule; Refill: 0    She will get rapid tested at [facility] today.   mucinex to regiment  If negative and still symptoms greater than 7-10 days reach out.         Consult Start Time: 12/24/2020 10:08  Consult End Time: 12/24/2020 10:22          No follow-ups on file.

## 2021-01-06 ENCOUNTER — PATIENT MESSAGE (OUTPATIENT)
Dept: OBSTETRICS AND GYNECOLOGY | Facility: CLINIC | Age: 41
End: 2021-01-06

## 2021-01-06 LAB
FINAL PATHOLOGIC DIAGNOSIS: NORMAL
Lab: NORMAL

## 2021-01-07 DIAGNOSIS — N94.6 DYSMENORRHEA: ICD-10-CM

## 2021-01-07 DIAGNOSIS — N92.0 MENORRHAGIA WITH REGULAR CYCLE: Primary | ICD-10-CM

## 2021-01-07 DIAGNOSIS — R87.810 CERVICAL HIGH RISK HUMAN PAPILLOMAVIRUS (HPV) DNA TEST POSITIVE: ICD-10-CM

## 2021-02-03 ENCOUNTER — OFFICE VISIT (OUTPATIENT)
Dept: OBSTETRICS AND GYNECOLOGY | Facility: CLINIC | Age: 41
End: 2021-02-03
Payer: COMMERCIAL

## 2021-02-03 ENCOUNTER — HOSPITAL ENCOUNTER (OUTPATIENT)
Dept: RADIOLOGY | Facility: HOSPITAL | Age: 41
Discharge: HOME | End: 2021-02-03
Attending: OBSTETRICS & GYNECOLOGY
Payer: COMMERCIAL

## 2021-02-03 ENCOUNTER — HOSPITAL ENCOUNTER (OUTPATIENT)
Dept: PREADMISSION TESTING | Facility: HOSPITAL | Age: 41
Discharge: HOME | End: 2021-02-03
Attending: OBSTETRICS & GYNECOLOGY
Payer: COMMERCIAL

## 2021-02-03 ENCOUNTER — ANESTHESIA EVENT (OUTPATIENT)
Dept: SURGERY | Facility: HOSPITAL | Age: 41
End: 2021-02-03
Payer: COMMERCIAL

## 2021-02-03 VITALS
BODY MASS INDEX: 31.24 KG/M2 | HEIGHT: 62 IN | WEIGHT: 169.75 LBS | SYSTOLIC BLOOD PRESSURE: 110 MMHG | DIASTOLIC BLOOD PRESSURE: 62 MMHG

## 2021-02-03 VITALS
TEMPERATURE: 99 F | SYSTOLIC BLOOD PRESSURE: 121 MMHG | DIASTOLIC BLOOD PRESSURE: 78 MMHG | HEART RATE: 91 BPM | RESPIRATION RATE: 18 BRPM | WEIGHT: 171.94 LBS | BODY MASS INDEX: 31.64 KG/M2 | HEIGHT: 62 IN | OXYGEN SATURATION: 100 %

## 2021-02-03 DIAGNOSIS — N94.6 DYSMENORRHEA: ICD-10-CM

## 2021-02-03 DIAGNOSIS — R87.810 CERVICAL HIGH RISK HUMAN PAPILLOMAVIRUS (HPV) DNA TEST POSITIVE: ICD-10-CM

## 2021-02-03 DIAGNOSIS — N92.0 MENORRHAGIA WITH REGULAR CYCLE: ICD-10-CM

## 2021-02-03 DIAGNOSIS — Z01.818 PREOP TESTING: Primary | ICD-10-CM

## 2021-02-03 DIAGNOSIS — N92.0 MENORRHAGIA WITH REGULAR CYCLE: Primary | ICD-10-CM

## 2021-02-03 LAB
ALBUMIN SERPL BCP-MCNC: 4.4 G/DL (ref 3.5–5.2)
ALP SERPL-CCNC: 62 U/L (ref 55–135)
ALT SERPL W/O P-5'-P-CCNC: 46 U/L (ref 10–44)
ANION GAP SERPL CALC-SCNC: 11 MMOL/L (ref 8–16)
AST SERPL-CCNC: 22 U/L (ref 10–40)
BASOPHILS # BLD AUTO: 0.04 K/UL (ref 0–0.2)
BASOPHILS NFR BLD: 0.3 % (ref 0–1.9)
BILIRUB SERPL-MCNC: 0.3 MG/DL (ref 0.1–1)
BUN SERPL-MCNC: 11 MG/DL (ref 6–20)
CALCIUM SERPL-MCNC: 9.7 MG/DL (ref 8.7–10.5)
CHLORIDE SERPL-SCNC: 104 MMOL/L (ref 95–110)
CO2 SERPL-SCNC: 25 MMOL/L (ref 23–29)
CREAT SERPL-MCNC: 0.8 MG/DL (ref 0.5–1.4)
DIFFERENTIAL METHOD: ABNORMAL
EOSINOPHIL # BLD AUTO: 0.2 K/UL (ref 0–0.5)
EOSINOPHIL NFR BLD: 1.9 % (ref 0–8)
ERYTHROCYTE [DISTWIDTH] IN BLOOD BY AUTOMATED COUNT: 12.3 % (ref 11.5–14.5)
EST. GFR  (AFRICAN AMERICAN): >60 ML/MIN/1.73 M^2
EST. GFR  (NON AFRICAN AMERICAN): >60 ML/MIN/1.73 M^2
GLUCOSE SERPL-MCNC: 115 MG/DL (ref 70–110)
HCT VFR BLD AUTO: 43.5 % (ref 37–48.5)
HGB BLD-MCNC: 15.5 G/DL (ref 12–16)
IMM GRANULOCYTES # BLD AUTO: 0.07 K/UL (ref 0–0.04)
IMM GRANULOCYTES NFR BLD AUTO: 0.6 % (ref 0–0.5)
LYMPHOCYTES # BLD AUTO: 3.4 K/UL (ref 1–4.8)
LYMPHOCYTES NFR BLD: 29.2 % (ref 18–48)
MCH RBC QN AUTO: 31.3 PG (ref 27–31)
MCHC RBC AUTO-ENTMCNC: 35.6 G/DL (ref 32–36)
MCV RBC AUTO: 88 FL (ref 82–98)
MONOCYTES # BLD AUTO: 0.9 K/UL (ref 0.3–1)
MONOCYTES NFR BLD: 7.3 % (ref 4–15)
NEUTROPHILS # BLD AUTO: 7.1 K/UL (ref 1.8–7.7)
NEUTROPHILS NFR BLD: 60.7 % (ref 38–73)
NRBC BLD-RTO: 0 /100 WBC
PLATELET # BLD AUTO: 257 K/UL (ref 150–350)
PMV BLD AUTO: 9.7 FL (ref 9.2–12.9)
POTASSIUM SERPL-SCNC: 4.4 MMOL/L (ref 3.5–5.1)
PROT SERPL-MCNC: 7.5 G/DL (ref 6–8.4)
RBC # BLD AUTO: 4.96 M/UL (ref 4–5.4)
SODIUM SERPL-SCNC: 140 MMOL/L (ref 136–145)
WBC # BLD AUTO: 11.7 K/UL (ref 3.9–12.7)

## 2021-02-03 PROCEDURE — 99999 PR PBB SHADOW E&M-EST. PATIENT-LVL III: ICD-10-PCS | Mod: PBBFAC,,, | Performed by: OBSTETRICS & GYNECOLOGY

## 2021-02-03 PROCEDURE — 93010 ELECTROCARDIOGRAM REPORT: CPT | Mod: ,,, | Performed by: INTERNAL MEDICINE

## 2021-02-03 PROCEDURE — 93010 EKG 12-LEAD: ICD-10-PCS | Mod: ,,, | Performed by: INTERNAL MEDICINE

## 2021-02-03 PROCEDURE — 1126F PR PAIN SEVERITY QUANTIFIED, NO PAIN PRESENT: ICD-10-PCS | Mod: S$GLB,,, | Performed by: OBSTETRICS & GYNECOLOGY

## 2021-02-03 PROCEDURE — 71046 X-RAY EXAM CHEST 2 VIEWS: CPT | Mod: 26,,, | Performed by: RADIOLOGY

## 2021-02-03 PROCEDURE — 1126F AMNT PAIN NOTED NONE PRSNT: CPT | Mod: S$GLB,,, | Performed by: OBSTETRICS & GYNECOLOGY

## 2021-02-03 PROCEDURE — 3008F PR BODY MASS INDEX (BMI) DOCUMENTED: ICD-10-PCS | Mod: CPTII,S$GLB,, | Performed by: OBSTETRICS & GYNECOLOGY

## 2021-02-03 PROCEDURE — 99499 NO LOS: ICD-10-PCS | Mod: S$GLB,,, | Performed by: OBSTETRICS & GYNECOLOGY

## 2021-02-03 PROCEDURE — 36415 COLL VENOUS BLD VENIPUNCTURE: CPT

## 2021-02-03 PROCEDURE — 85025 COMPLETE CBC W/AUTO DIFF WBC: CPT

## 2021-02-03 PROCEDURE — 99499 UNLISTED E&M SERVICE: CPT | Mod: S$GLB,,, | Performed by: OBSTETRICS & GYNECOLOGY

## 2021-02-03 PROCEDURE — 71046 X-RAY EXAM CHEST 2 VIEWS: CPT | Mod: TC,FY

## 2021-02-03 PROCEDURE — 71046 XR CHEST PA AND LATERAL PRE-OP: ICD-10-PCS | Mod: 26,,, | Performed by: RADIOLOGY

## 2021-02-03 PROCEDURE — 3008F BODY MASS INDEX DOCD: CPT | Mod: CPTII,S$GLB,, | Performed by: OBSTETRICS & GYNECOLOGY

## 2021-02-03 PROCEDURE — 80053 COMPREHEN METABOLIC PANEL: CPT

## 2021-02-03 PROCEDURE — 93005 ELECTROCARDIOGRAM TRACING: CPT

## 2021-02-03 PROCEDURE — 99999 PR PBB SHADOW E&M-EST. PATIENT-LVL III: CPT | Mod: PBBFAC,,, | Performed by: OBSTETRICS & GYNECOLOGY

## 2021-02-03 RX ORDER — MUPIROCIN 20 MG/G
OINTMENT TOPICAL
Status: CANCELLED | OUTPATIENT
Start: 2021-02-03

## 2021-02-03 RX ORDER — SODIUM CHLORIDE 9 MG/ML
INJECTION, SOLUTION INTRAVENOUS CONTINUOUS
Status: CANCELLED | OUTPATIENT
Start: 2021-02-03

## 2021-02-05 ENCOUNTER — PATIENT MESSAGE (OUTPATIENT)
Dept: SURGERY | Facility: HOSPITAL | Age: 41
End: 2021-02-05

## 2021-02-05 DIAGNOSIS — R94.31 ABNORMAL EKG: Primary | ICD-10-CM

## 2021-02-09 ENCOUNTER — HOSPITAL ENCOUNTER (OUTPATIENT)
Dept: PREADMISSION TESTING | Facility: HOSPITAL | Age: 41
Discharge: HOME | End: 2021-02-09
Attending: OBSTETRICS & GYNECOLOGY
Payer: COMMERCIAL

## 2021-02-09 ENCOUNTER — OFFICE VISIT (OUTPATIENT)
Dept: CARDIOLOGY | Facility: CLINIC | Age: 41
End: 2021-02-09
Payer: COMMERCIAL

## 2021-02-09 VITALS
WEIGHT: 172.63 LBS | BODY MASS INDEX: 31.77 KG/M2 | SYSTOLIC BLOOD PRESSURE: 130 MMHG | HEIGHT: 62 IN | HEART RATE: 90 BPM | DIASTOLIC BLOOD PRESSURE: 68 MMHG | OXYGEN SATURATION: 98 %

## 2021-02-09 DIAGNOSIS — Z11.52 ENCOUNTER FOR PREOPERATIVE SCREENING LABORATORY TESTING FOR COVID-19 VIRUS: ICD-10-CM

## 2021-02-09 DIAGNOSIS — N92.0 MENORRHAGIA WITH REGULAR CYCLE: ICD-10-CM

## 2021-02-09 DIAGNOSIS — Z01.818 PREOP TESTING: ICD-10-CM

## 2021-02-09 DIAGNOSIS — Z01.812 ENCOUNTER FOR PREOPERATIVE SCREENING LABORATORY TESTING FOR COVID-19 VIRUS: ICD-10-CM

## 2021-02-09 DIAGNOSIS — N94.6 DYSMENORRHEA: ICD-10-CM

## 2021-02-09 DIAGNOSIS — R87.810 CERVICAL HIGH RISK HUMAN PAPILLOMAVIRUS (HPV) DNA TEST POSITIVE: ICD-10-CM

## 2021-02-09 DIAGNOSIS — F41.9 ANXIETY: ICD-10-CM

## 2021-02-09 DIAGNOSIS — F17.210 CIGARETTE NICOTINE DEPENDENCE WITHOUT COMPLICATION: Primary | ICD-10-CM

## 2021-02-09 LAB
ABO + RH BLD: NORMAL
B-HCG UR QL: NEGATIVE
BLD GP AB SCN CELLS X3 SERPL QL: NORMAL
SARS-COV-2 RDRP RESP QL NAA+PROBE: NEGATIVE

## 2021-02-09 PROCEDURE — 3078F DIAST BP <80 MM HG: CPT | Mod: CPTII,S$GLB,, | Performed by: INTERNAL MEDICINE

## 2021-02-09 PROCEDURE — 1126F PR PAIN SEVERITY QUANTIFIED, NO PAIN PRESENT: ICD-10-PCS | Mod: S$GLB,,, | Performed by: INTERNAL MEDICINE

## 2021-02-09 PROCEDURE — 86900 BLOOD TYPING SEROLOGIC ABO: CPT

## 2021-02-09 PROCEDURE — 3008F BODY MASS INDEX DOCD: CPT | Mod: CPTII,S$GLB,, | Performed by: INTERNAL MEDICINE

## 2021-02-09 PROCEDURE — 99999 PR PBB SHADOW E&M-EST. PATIENT-LVL III: ICD-10-PCS | Mod: PBBFAC,,, | Performed by: INTERNAL MEDICINE

## 2021-02-09 PROCEDURE — 3078F PR MOST RECENT DIASTOLIC BLOOD PRESSURE < 80 MM HG: ICD-10-PCS | Mod: CPTII,S$GLB,, | Performed by: INTERNAL MEDICINE

## 2021-02-09 PROCEDURE — 99204 OFFICE O/P NEW MOD 45 MIN: CPT | Mod: S$GLB,,, | Performed by: INTERNAL MEDICINE

## 2021-02-09 PROCEDURE — 3008F PR BODY MASS INDEX (BMI) DOCUMENTED: ICD-10-PCS | Mod: CPTII,S$GLB,, | Performed by: INTERNAL MEDICINE

## 2021-02-09 PROCEDURE — 1126F AMNT PAIN NOTED NONE PRSNT: CPT | Mod: S$GLB,,, | Performed by: INTERNAL MEDICINE

## 2021-02-09 PROCEDURE — 99999 PR PBB SHADOW E&M-EST. PATIENT-LVL III: CPT | Mod: PBBFAC,,, | Performed by: INTERNAL MEDICINE

## 2021-02-09 PROCEDURE — 81025 URINE PREGNANCY TEST: CPT

## 2021-02-09 PROCEDURE — 3075F PR MOST RECENT SYSTOLIC BLOOD PRESS GE 130-139MM HG: ICD-10-PCS | Mod: CPTII,S$GLB,, | Performed by: INTERNAL MEDICINE

## 2021-02-09 PROCEDURE — U0002 COVID-19 LAB TEST NON-CDC: HCPCS

## 2021-02-09 PROCEDURE — 99204 PR OFFICE/OUTPT VISIT, NEW, LEVL IV, 45-59 MIN: ICD-10-PCS | Mod: S$GLB,,, | Performed by: INTERNAL MEDICINE

## 2021-02-09 PROCEDURE — 3075F SYST BP GE 130 - 139MM HG: CPT | Mod: CPTII,S$GLB,, | Performed by: INTERNAL MEDICINE

## 2021-02-10 ENCOUNTER — ANESTHESIA (OUTPATIENT)
Dept: SURGERY | Facility: HOSPITAL | Age: 41
End: 2021-02-10
Payer: COMMERCIAL

## 2021-02-10 ENCOUNTER — HOSPITAL ENCOUNTER (OUTPATIENT)
Facility: HOSPITAL | Age: 41
Discharge: HOME | End: 2021-02-10
Attending: OBSTETRICS & GYNECOLOGY | Admitting: OBSTETRICS & GYNECOLOGY
Payer: COMMERCIAL

## 2021-02-10 ENCOUNTER — PATIENT MESSAGE (OUTPATIENT)
Dept: SURGERY | Facility: HOSPITAL | Age: 41
End: 2021-02-10

## 2021-02-10 VITALS
WEIGHT: 171.94 LBS | DIASTOLIC BLOOD PRESSURE: 69 MMHG | OXYGEN SATURATION: 100 % | BODY MASS INDEX: 31.45 KG/M2 | RESPIRATION RATE: 17 BRPM | TEMPERATURE: 98 F | SYSTOLIC BLOOD PRESSURE: 121 MMHG | HEART RATE: 94 BPM

## 2021-02-10 DIAGNOSIS — Z11.52 ENCOUNTER FOR PREOPERATIVE SCREENING LABORATORY TESTING FOR COVID-19 VIRUS: ICD-10-CM

## 2021-02-10 DIAGNOSIS — Z01.812 ENCOUNTER FOR PREOPERATIVE SCREENING LABORATORY TESTING FOR COVID-19 VIRUS: ICD-10-CM

## 2021-02-10 DIAGNOSIS — R87.810 CERVICAL HIGH RISK HUMAN PAPILLOMAVIRUS (HPV) DNA TEST POSITIVE: ICD-10-CM

## 2021-02-10 DIAGNOSIS — Z90.710 STATUS POST LAPAROSCOPIC HYSTERECTOMY: Primary | ICD-10-CM

## 2021-02-10 DIAGNOSIS — N92.0 MENORRHAGIA WITH REGULAR CYCLE: ICD-10-CM

## 2021-02-10 DIAGNOSIS — N94.6 DYSMENORRHEA: ICD-10-CM

## 2021-02-10 LAB — POCT GLUCOSE: 130 MG/DL (ref 70–110)

## 2021-02-10 PROCEDURE — 63600175 PHARM REV CODE 636 W HCPCS: Performed by: NURSE ANESTHETIST, CERTIFIED REGISTERED

## 2021-02-10 PROCEDURE — 25000003 PHARM REV CODE 250: Performed by: OBSTETRICS & GYNECOLOGY

## 2021-02-10 PROCEDURE — 71000033 HC RECOVERY, INTIAL HOUR: Performed by: OBSTETRICS & GYNECOLOGY

## 2021-02-10 PROCEDURE — C2628 CATHETER, OCCLUSION: HCPCS | Performed by: OBSTETRICS & GYNECOLOGY

## 2021-02-10 PROCEDURE — 25000003 PHARM REV CODE 250: Performed by: ANESTHESIOLOGY

## 2021-02-10 PROCEDURE — 71000016 HC POSTOP RECOV ADDL HR: Performed by: OBSTETRICS & GYNECOLOGY

## 2021-02-10 PROCEDURE — 58571 PR LAPAROSCOPY W TOT HYSTERECTUTERUS <=250 GRAM  W TUBE/OVARY: ICD-10-PCS | Mod: ,,, | Performed by: OBSTETRICS & GYNECOLOGY

## 2021-02-10 PROCEDURE — 71000015 HC POSTOP RECOV 1ST HR: Performed by: OBSTETRICS & GYNECOLOGY

## 2021-02-10 PROCEDURE — 63600175 PHARM REV CODE 636 W HCPCS: Performed by: ANESTHESIOLOGY

## 2021-02-10 PROCEDURE — 71000039 HC RECOVERY, EACH ADD'L HOUR: Performed by: OBSTETRICS & GYNECOLOGY

## 2021-02-10 PROCEDURE — D9220A PRA ANESTHESIA: Mod: ANES,,, | Performed by: ANESTHESIOLOGY

## 2021-02-10 PROCEDURE — D9220A PRA ANESTHESIA: Mod: CRNA,,, | Performed by: NURSE ANESTHETIST, CERTIFIED REGISTERED

## 2021-02-10 PROCEDURE — 88307 PR  SURG PATH,LEVEL V: ICD-10-PCS | Mod: 26,,, | Performed by: PATHOLOGY

## 2021-02-10 PROCEDURE — D9220A PRA ANESTHESIA: ICD-10-PCS | Mod: CRNA,,, | Performed by: NURSE ANESTHETIST, CERTIFIED REGISTERED

## 2021-02-10 PROCEDURE — 58571 PR LAPAROSCOPY W TOT HYSTERECTUTERUS <=250 GRAM  W TUBE/OVARY: ICD-10-PCS | Mod: 80,,, | Performed by: OBSTETRICS & GYNECOLOGY

## 2021-02-10 PROCEDURE — D9220A PRA ANESTHESIA: ICD-10-PCS | Mod: ANES,,, | Performed by: ANESTHESIOLOGY

## 2021-02-10 PROCEDURE — 88307 TISSUE EXAM BY PATHOLOGIST: CPT | Performed by: PATHOLOGY

## 2021-02-10 PROCEDURE — 82962 GLUCOSE BLOOD TEST: CPT | Performed by: OBSTETRICS & GYNECOLOGY

## 2021-02-10 PROCEDURE — 27201423 OPTIME MED/SURG SUP & DEVICES STERILE SUPPLY: Performed by: OBSTETRICS & GYNECOLOGY

## 2021-02-10 PROCEDURE — 58571 TLH W/T/O 250 G OR LESS: CPT | Mod: ,,, | Performed by: OBSTETRICS & GYNECOLOGY

## 2021-02-10 PROCEDURE — 37000009 HC ANESTHESIA EA ADD 15 MINS: Performed by: OBSTETRICS & GYNECOLOGY

## 2021-02-10 PROCEDURE — 63600175 PHARM REV CODE 636 W HCPCS: Performed by: OBSTETRICS & GYNECOLOGY

## 2021-02-10 PROCEDURE — 88307 TISSUE EXAM BY PATHOLOGIST: CPT | Mod: 26,,, | Performed by: PATHOLOGY

## 2021-02-10 PROCEDURE — 36000711: Performed by: OBSTETRICS & GYNECOLOGY

## 2021-02-10 PROCEDURE — 25000003 PHARM REV CODE 250: Performed by: NURSE ANESTHETIST, CERTIFIED REGISTERED

## 2021-02-10 PROCEDURE — 37000008 HC ANESTHESIA 1ST 15 MINUTES: Performed by: OBSTETRICS & GYNECOLOGY

## 2021-02-10 PROCEDURE — 58571 TLH W/T/O 250 G OR LESS: CPT | Mod: 80,,, | Performed by: OBSTETRICS & GYNECOLOGY

## 2021-02-10 PROCEDURE — 36000710: Performed by: OBSTETRICS & GYNECOLOGY

## 2021-02-10 RX ORDER — SODIUM CHLORIDE 0.9 G/100ML
IRRIGANT IRRIGATION
Status: DISCONTINUED | OUTPATIENT
Start: 2021-02-10 | End: 2021-02-10 | Stop reason: HOSPADM

## 2021-02-10 RX ORDER — HYDROMORPHONE HYDROCHLORIDE 2 MG/1
2 TABLET ORAL EVERY 12 HOURS PRN
Qty: 15 TABLET | Refills: 0 | Status: SHIPPED | OUTPATIENT
Start: 2021-02-10 | End: 2021-04-09 | Stop reason: ALTCHOICE

## 2021-02-10 RX ORDER — IBUPROFEN 600 MG/1
600 TABLET ORAL EVERY 6 HOURS PRN
Qty: 40 TABLET | Refills: 1 | Status: SHIPPED | OUTPATIENT
Start: 2021-02-10 | End: 2021-04-09 | Stop reason: ALTCHOICE

## 2021-02-10 RX ORDER — FENTANYL CITRATE 50 UG/ML
INJECTION, SOLUTION INTRAMUSCULAR; INTRAVENOUS
Status: DISCONTINUED | OUTPATIENT
Start: 2021-02-10 | End: 2021-02-10

## 2021-02-10 RX ORDER — SODIUM CHLORIDE 0.9 % (FLUSH) 0.9 %
10 SYRINGE (ML) INJECTION
Status: DISCONTINUED | OUTPATIENT
Start: 2021-02-10 | End: 2021-02-10 | Stop reason: HOSPADM

## 2021-02-10 RX ORDER — ROCURONIUM BROMIDE 10 MG/ML
INJECTION, SOLUTION INTRAVENOUS
Status: DISCONTINUED | OUTPATIENT
Start: 2021-02-10 | End: 2021-02-10

## 2021-02-10 RX ORDER — HYDROMORPHONE HYDROCHLORIDE 2 MG/ML
0.2 INJECTION, SOLUTION INTRAMUSCULAR; INTRAVENOUS; SUBCUTANEOUS EVERY 5 MIN PRN
Status: DISCONTINUED | OUTPATIENT
Start: 2021-02-10 | End: 2021-02-10 | Stop reason: HOSPADM

## 2021-02-10 RX ORDER — IBUPROFEN 400 MG/1
400 TABLET ORAL EVERY 6 HOURS PRN
Status: DISCONTINUED | OUTPATIENT
Start: 2021-02-10 | End: 2021-02-10 | Stop reason: HOSPADM

## 2021-02-10 RX ORDER — CEFAZOLIN SODIUM 2 G/50ML
2 SOLUTION INTRAVENOUS
Status: COMPLETED | OUTPATIENT
Start: 2021-02-10 | End: 2021-02-10

## 2021-02-10 RX ORDER — NEOSTIGMINE METHYLSULFATE 1 MG/ML
INJECTION, SOLUTION INTRAVENOUS
Status: DISCONTINUED | OUTPATIENT
Start: 2021-02-10 | End: 2021-02-10

## 2021-02-10 RX ORDER — OXYCODONE AND ACETAMINOPHEN 5; 325 MG/1; MG/1
1 TABLET ORAL ONCE
Status: COMPLETED | OUTPATIENT
Start: 2021-02-10 | End: 2021-02-10

## 2021-02-10 RX ORDER — PROMETHAZINE HYDROCHLORIDE 25 MG/ML
6.25 INJECTION, SOLUTION INTRAMUSCULAR; INTRAVENOUS EVERY 6 HOURS PRN
Status: DISCONTINUED | OUTPATIENT
Start: 2021-02-10 | End: 2021-02-10

## 2021-02-10 RX ORDER — SUCCINYLCHOLINE CHLORIDE 20 MG/ML
INJECTION INTRAMUSCULAR; INTRAVENOUS
Status: DISCONTINUED | OUTPATIENT
Start: 2021-02-10 | End: 2021-02-10

## 2021-02-10 RX ORDER — SODIUM CHLORIDE 9 MG/ML
INJECTION, SOLUTION INTRAVENOUS CONTINUOUS
Status: DISCONTINUED | OUTPATIENT
Start: 2021-02-10 | End: 2021-02-10 | Stop reason: HOSPADM

## 2021-02-10 RX ORDER — OXYCODONE AND ACETAMINOPHEN 5; 325 MG/1; MG/1
1 TABLET ORAL EVERY 4 HOURS PRN
Qty: 12 TABLET | Refills: 0 | Status: SHIPPED | OUTPATIENT
Start: 2021-02-10 | End: 2021-04-09 | Stop reason: ALTCHOICE

## 2021-02-10 RX ORDER — MUPIROCIN 20 MG/G
OINTMENT TOPICAL
Status: DISCONTINUED | OUTPATIENT
Start: 2021-02-10 | End: 2021-02-10 | Stop reason: HOSPADM

## 2021-02-10 RX ORDER — ONDANSETRON 2 MG/ML
4 INJECTION INTRAMUSCULAR; INTRAVENOUS ONCE
Status: COMPLETED | OUTPATIENT
Start: 2021-02-10 | End: 2021-02-10

## 2021-02-10 RX ORDER — DEXAMETHASONE SODIUM PHOSPHATE 4 MG/ML
INJECTION, SOLUTION INTRA-ARTICULAR; INTRALESIONAL; INTRAMUSCULAR; INTRAVENOUS; SOFT TISSUE
Status: DISCONTINUED | OUTPATIENT
Start: 2021-02-10 | End: 2021-02-10

## 2021-02-10 RX ORDER — PROPOFOL 10 MG/ML
VIAL (ML) INTRAVENOUS
Status: DISCONTINUED | OUTPATIENT
Start: 2021-02-10 | End: 2021-02-10

## 2021-02-10 RX ORDER — LIDOCAINE HYDROCHLORIDE 20 MG/ML
INJECTION INTRAVENOUS
Status: DISCONTINUED | OUTPATIENT
Start: 2021-02-10 | End: 2021-02-10

## 2021-02-10 RX ORDER — ONDANSETRON 2 MG/ML
INJECTION INTRAMUSCULAR; INTRAVENOUS
Status: DISCONTINUED | OUTPATIENT
Start: 2021-02-10 | End: 2021-02-10

## 2021-02-10 RX ORDER — MIDAZOLAM HYDROCHLORIDE 1 MG/ML
INJECTION, SOLUTION INTRAMUSCULAR; INTRAVENOUS
Status: DISCONTINUED | OUTPATIENT
Start: 2021-02-10 | End: 2021-02-10

## 2021-02-10 RX ADMIN — FENTANYL CITRATE 50 MCG: 50 INJECTION, SOLUTION INTRAMUSCULAR; INTRAVENOUS at 08:02

## 2021-02-10 RX ADMIN — ROCURONIUM BROMIDE 20 MG: 10 INJECTION, SOLUTION INTRAVENOUS at 07:02

## 2021-02-10 RX ADMIN — ONDANSETRON 4 MG: 2 INJECTION, SOLUTION INTRAMUSCULAR; INTRAVENOUS at 07:02

## 2021-02-10 RX ADMIN — DEXAMETHASONE SODIUM PHOSPHATE 4 MG: 4 INJECTION, SOLUTION INTRAMUSCULAR; INTRAVENOUS at 07:02

## 2021-02-10 RX ADMIN — HYDROMORPHONE HYDROCHLORIDE 0.2 MG: 2 INJECTION INTRAMUSCULAR; INTRAVENOUS; SUBCUTANEOUS at 09:02

## 2021-02-10 RX ADMIN — GLYCOPYRROLATE 0.8 MG: 0.2 INJECTION, SOLUTION INTRAMUSCULAR; INTRAVITREAL at 08:02

## 2021-02-10 RX ADMIN — FENTANYL CITRATE 50 MCG: 50 INJECTION, SOLUTION INTRAMUSCULAR; INTRAVENOUS at 07:02

## 2021-02-10 RX ADMIN — CEFAZOLIN SODIUM 2 G: 2 SOLUTION INTRAVENOUS at 07:02

## 2021-02-10 RX ADMIN — PROMETHAZINE HYDROCHLORIDE 6.25 MG: 25 INJECTION INTRAMUSCULAR; INTRAVENOUS at 09:02

## 2021-02-10 RX ADMIN — PROPOFOL 150 MG: 10 INJECTION, EMULSION INTRAVENOUS at 07:02

## 2021-02-10 RX ADMIN — OXYCODONE HYDROCHLORIDE AND ACETAMINOPHEN 1 TABLET: 5; 325 TABLET ORAL at 11:02

## 2021-02-10 RX ADMIN — FENTANYL CITRATE 150 MCG: 50 INJECTION, SOLUTION INTRAMUSCULAR; INTRAVENOUS at 07:02

## 2021-02-10 RX ADMIN — MIDAZOLAM HYDROCHLORIDE 2 MG: 1 INJECTION, SOLUTION INTRAMUSCULAR; INTRAVENOUS at 07:02

## 2021-02-10 RX ADMIN — ONDANSETRON 4 MG: 2 INJECTION INTRAMUSCULAR; INTRAVENOUS at 09:02

## 2021-02-10 RX ADMIN — ROCURONIUM BROMIDE 10 MG: 10 INJECTION, SOLUTION INTRAVENOUS at 07:02

## 2021-02-10 RX ADMIN — MUPIROCIN: 20 OINTMENT TOPICAL at 06:02

## 2021-02-10 RX ADMIN — NEOSTIGMINE METHYLSULFATE 5 MG: 1 INJECTION INTRAVENOUS at 08:02

## 2021-02-10 RX ADMIN — Medication 100 MG: at 07:02

## 2021-02-10 RX ADMIN — SUCCINYLCHOLINE CHLORIDE 120 MG: 20 INJECTION, SOLUTION INTRAMUSCULAR; INTRAVENOUS at 07:02

## 2021-02-11 ENCOUNTER — TELEPHONE (OUTPATIENT)
Dept: OBSTETRICS AND GYNECOLOGY | Facility: CLINIC | Age: 41
End: 2021-02-11

## 2021-02-15 ENCOUNTER — HOSPITAL ENCOUNTER (EMERGENCY)
Facility: HOSPITAL | Age: 41
Discharge: HOME | End: 2021-02-15
Attending: EMERGENCY MEDICINE
Payer: COMMERCIAL

## 2021-02-15 ENCOUNTER — PATIENT MESSAGE (OUTPATIENT)
Dept: OBSTETRICS AND GYNECOLOGY | Facility: CLINIC | Age: 41
End: 2021-02-15

## 2021-02-15 VITALS
DIASTOLIC BLOOD PRESSURE: 73 MMHG | SYSTOLIC BLOOD PRESSURE: 118 MMHG | HEART RATE: 95 BPM | BODY MASS INDEX: 31.47 KG/M2 | HEIGHT: 62 IN | TEMPERATURE: 99 F | WEIGHT: 171 LBS | RESPIRATION RATE: 24 BRPM | OXYGEN SATURATION: 98 %

## 2021-02-15 DIAGNOSIS — G89.18 POST-OP PAIN: ICD-10-CM

## 2021-02-15 DIAGNOSIS — R00.0 TACHYCARDIA: ICD-10-CM

## 2021-02-15 DIAGNOSIS — N39.0 URINARY TRACT INFECTION WITHOUT HEMATURIA, SITE UNSPECIFIED: Primary | ICD-10-CM

## 2021-02-15 LAB
ALBUMIN SERPL BCP-MCNC: 4 G/DL (ref 3.5–5.2)
ALP SERPL-CCNC: 80 U/L (ref 55–135)
ALT SERPL W/O P-5'-P-CCNC: 61 U/L (ref 10–44)
ANION GAP SERPL CALC-SCNC: 12 MMOL/L (ref 8–16)
AST SERPL-CCNC: 21 U/L (ref 10–40)
BACTERIA #/AREA URNS HPF: ABNORMAL /HPF
BASOPHILS # BLD AUTO: 0.04 K/UL (ref 0–0.2)
BASOPHILS NFR BLD: 0.4 % (ref 0–1.9)
BILIRUB SERPL-MCNC: 0.4 MG/DL (ref 0.1–1)
BILIRUB UR QL STRIP: NEGATIVE
BUN SERPL-MCNC: 11 MG/DL (ref 6–20)
CALCIUM SERPL-MCNC: 9.5 MG/DL (ref 8.7–10.5)
CHLORIDE SERPL-SCNC: 102 MMOL/L (ref 95–110)
CLARITY UR: ABNORMAL
CO2 SERPL-SCNC: 26 MMOL/L (ref 23–29)
COLOR UR: YELLOW
CREAT SERPL-MCNC: 0.7 MG/DL (ref 0.5–1.4)
CRP SERPL-MCNC: 43.8 MG/L (ref 0–8.2)
DIFFERENTIAL METHOD: ABNORMAL
EOSINOPHIL # BLD AUTO: 0.4 K/UL (ref 0–0.5)
EOSINOPHIL NFR BLD: 4 % (ref 0–8)
ERYTHROCYTE [DISTWIDTH] IN BLOOD BY AUTOMATED COUNT: 11.6 % (ref 11.5–14.5)
ERYTHROCYTE [SEDIMENTATION RATE] IN BLOOD BY WESTERGREN METHOD: 42 MM/HR (ref 0–20)
EST. GFR  (AFRICAN AMERICAN): >60 ML/MIN/1.73 M^2
EST. GFR  (NON AFRICAN AMERICAN): >60 ML/MIN/1.73 M^2
GLUCOSE SERPL-MCNC: 125 MG/DL (ref 70–110)
GLUCOSE UR QL STRIP: NEGATIVE
HCT VFR BLD AUTO: 41.8 % (ref 37–48.5)
HGB BLD-MCNC: 14.6 G/DL (ref 12–16)
HGB UR QL STRIP: ABNORMAL
IMM GRANULOCYTES # BLD AUTO: 0.09 K/UL (ref 0–0.04)
IMM GRANULOCYTES NFR BLD AUTO: 0.8 % (ref 0–0.5)
KETONES UR QL STRIP: NEGATIVE
LEUKOCYTE ESTERASE UR QL STRIP: ABNORMAL
LIPASE SERPL-CCNC: 14 U/L (ref 4–60)
LYMPHOCYTES # BLD AUTO: 1.6 K/UL (ref 1–4.8)
LYMPHOCYTES NFR BLD: 15.1 % (ref 18–48)
MCH RBC QN AUTO: 31 PG (ref 27–31)
MCHC RBC AUTO-ENTMCNC: 34.9 G/DL (ref 32–36)
MCV RBC AUTO: 89 FL (ref 82–98)
MICROSCOPIC COMMENT: ABNORMAL
MONOCYTES # BLD AUTO: 0.9 K/UL (ref 0.3–1)
MONOCYTES NFR BLD: 8.3 % (ref 4–15)
NEUTROPHILS # BLD AUTO: 7.6 K/UL (ref 1.8–7.7)
NEUTROPHILS NFR BLD: 71.4 % (ref 38–73)
NITRITE UR QL STRIP: NEGATIVE
NRBC BLD-RTO: 0 /100 WBC
PH UR STRIP: 6 [PH] (ref 5–8)
PLATELET # BLD AUTO: 282 K/UL (ref 150–350)
PMV BLD AUTO: 9.9 FL (ref 9.2–12.9)
POTASSIUM SERPL-SCNC: 3.7 MMOL/L (ref 3.5–5.1)
PROT SERPL-MCNC: 7.4 G/DL (ref 6–8.4)
PROT UR QL STRIP: NEGATIVE
RBC # BLD AUTO: 4.71 M/UL (ref 4–5.4)
RBC #/AREA URNS HPF: 4 /HPF (ref 0–4)
SODIUM SERPL-SCNC: 140 MMOL/L (ref 136–145)
SP GR UR STRIP: 1.01 (ref 1–1.03)
SQUAMOUS #/AREA URNS HPF: 10 /HPF
URN SPEC COLLECT METH UR: ABNORMAL
UROBILINOGEN UR STRIP-ACNC: NEGATIVE EU/DL
WBC # BLD AUTO: 10.68 K/UL (ref 3.9–12.7)
WBC #/AREA URNS HPF: 15 /HPF (ref 0–5)
WBC CLUMPS URNS QL MICRO: ABNORMAL

## 2021-02-15 PROCEDURE — 80053 COMPREHEN METABOLIC PANEL: CPT

## 2021-02-15 PROCEDURE — 85652 RBC SED RATE AUTOMATED: CPT

## 2021-02-15 PROCEDURE — 96365 THER/PROPH/DIAG IV INF INIT: CPT | Mod: 59

## 2021-02-15 PROCEDURE — 25000003 PHARM REV CODE 250: Performed by: EMERGENCY MEDICINE

## 2021-02-15 PROCEDURE — 99285 EMERGENCY DEPT VISIT HI MDM: CPT | Mod: 25

## 2021-02-15 PROCEDURE — 93005 ELECTROCARDIOGRAM TRACING: CPT

## 2021-02-15 PROCEDURE — 93010 EKG 12-LEAD: ICD-10-PCS | Mod: ,,, | Performed by: INTERNAL MEDICINE

## 2021-02-15 PROCEDURE — 63600175 PHARM REV CODE 636 W HCPCS: Performed by: EMERGENCY MEDICINE

## 2021-02-15 PROCEDURE — 86140 C-REACTIVE PROTEIN: CPT

## 2021-02-15 PROCEDURE — 87086 URINE CULTURE/COLONY COUNT: CPT

## 2021-02-15 PROCEDURE — 87040 BLOOD CULTURE FOR BACTERIA: CPT | Mod: 59

## 2021-02-15 PROCEDURE — 81000 URINALYSIS NONAUTO W/SCOPE: CPT

## 2021-02-15 PROCEDURE — 96375 TX/PRO/DX INJ NEW DRUG ADDON: CPT

## 2021-02-15 PROCEDURE — 96376 TX/PRO/DX INJ SAME DRUG ADON: CPT

## 2021-02-15 PROCEDURE — 25500020 PHARM REV CODE 255: Performed by: EMERGENCY MEDICINE

## 2021-02-15 PROCEDURE — 93010 ELECTROCARDIOGRAM REPORT: CPT | Mod: ,,, | Performed by: INTERNAL MEDICINE

## 2021-02-15 PROCEDURE — 96361 HYDRATE IV INFUSION ADD-ON: CPT

## 2021-02-15 PROCEDURE — 85025 COMPLETE CBC W/AUTO DIFF WBC: CPT

## 2021-02-15 PROCEDURE — 83690 ASSAY OF LIPASE: CPT

## 2021-02-15 RX ORDER — HYDROMORPHONE HYDROCHLORIDE 2 MG/ML
1 INJECTION, SOLUTION INTRAMUSCULAR; INTRAVENOUS; SUBCUTANEOUS
Status: COMPLETED | OUTPATIENT
Start: 2021-02-15 | End: 2021-02-15

## 2021-02-15 RX ORDER — ONDANSETRON 4 MG/1
4 TABLET, ORALLY DISINTEGRATING ORAL EVERY 6 HOURS PRN
Qty: 14 TABLET | Refills: 0 | Status: SHIPPED | OUTPATIENT
Start: 2021-02-15 | End: 2021-04-09 | Stop reason: ALTCHOICE

## 2021-02-15 RX ORDER — CEPHALEXIN 500 MG/1
500 CAPSULE ORAL EVERY 6 HOURS
Qty: 40 CAPSULE | Refills: 0 | Status: SHIPPED | OUTPATIENT
Start: 2021-02-15 | End: 2021-02-25

## 2021-02-15 RX ADMIN — SODIUM CHLORIDE 1000 ML: 0.9 INJECTION, SOLUTION INTRAVENOUS at 02:02

## 2021-02-15 RX ADMIN — IOHEXOL 75 ML: 350 INJECTION, SOLUTION INTRAVENOUS at 03:02

## 2021-02-15 RX ADMIN — CEFTRIAXONE SODIUM 1 G: 1 INJECTION, POWDER, FOR SOLUTION INTRAMUSCULAR; INTRAVENOUS at 04:02

## 2021-02-15 RX ADMIN — HYDROMORPHONE HYDROCHLORIDE 1 MG: 2 INJECTION INTRAMUSCULAR; INTRAVENOUS; SUBCUTANEOUS at 03:02

## 2021-02-15 RX ADMIN — HYDROMORPHONE HYDROCHLORIDE 1 MG: 2 INJECTION INTRAMUSCULAR; INTRAVENOUS; SUBCUTANEOUS at 02:02

## 2021-02-16 ENCOUNTER — NURSE TRIAGE (OUTPATIENT)
Dept: ADMINISTRATIVE | Facility: CLINIC | Age: 41
End: 2021-02-16

## 2021-02-17 ENCOUNTER — TELEPHONE (OUTPATIENT)
Dept: OBSTETRICS AND GYNECOLOGY | Facility: CLINIC | Age: 41
End: 2021-02-17

## 2021-02-17 LAB — BACTERIA UR CULT: NORMAL

## 2021-02-20 LAB
BACTERIA BLD CULT: NORMAL
BACTERIA BLD CULT: NORMAL

## 2021-02-22 LAB
FINAL PATHOLOGIC DIAGNOSIS: NORMAL
GROSS: NORMAL
Lab: NORMAL

## 2021-02-24 ENCOUNTER — OFFICE VISIT (OUTPATIENT)
Dept: OBSTETRICS AND GYNECOLOGY | Facility: CLINIC | Age: 41
End: 2021-02-24
Payer: COMMERCIAL

## 2021-02-24 VITALS
WEIGHT: 165.38 LBS | BODY MASS INDEX: 30.44 KG/M2 | SYSTOLIC BLOOD PRESSURE: 98 MMHG | HEIGHT: 62 IN | DIASTOLIC BLOOD PRESSURE: 60 MMHG

## 2021-02-24 DIAGNOSIS — Z09 POSTOP CHECK: Primary | ICD-10-CM

## 2021-02-24 PROCEDURE — 99024 PR POST-OP FOLLOW-UP VISIT: ICD-10-PCS | Mod: S$GLB,,, | Performed by: OBSTETRICS & GYNECOLOGY

## 2021-02-24 PROCEDURE — 1125F PR PAIN SEVERITY QUANTIFIED, PAIN PRESENT: ICD-10-PCS | Mod: S$GLB,,, | Performed by: OBSTETRICS & GYNECOLOGY

## 2021-02-24 PROCEDURE — 99999 PR PBB SHADOW E&M-EST. PATIENT-LVL III: ICD-10-PCS | Mod: PBBFAC,,, | Performed by: OBSTETRICS & GYNECOLOGY

## 2021-02-24 PROCEDURE — 1125F AMNT PAIN NOTED PAIN PRSNT: CPT | Mod: S$GLB,,, | Performed by: OBSTETRICS & GYNECOLOGY

## 2021-02-24 PROCEDURE — 3008F BODY MASS INDEX DOCD: CPT | Mod: CPTII,S$GLB,, | Performed by: OBSTETRICS & GYNECOLOGY

## 2021-02-24 PROCEDURE — 99024 POSTOP FOLLOW-UP VISIT: CPT | Mod: S$GLB,,, | Performed by: OBSTETRICS & GYNECOLOGY

## 2021-02-24 PROCEDURE — 99999 PR PBB SHADOW E&M-EST. PATIENT-LVL III: CPT | Mod: PBBFAC,,, | Performed by: OBSTETRICS & GYNECOLOGY

## 2021-02-24 PROCEDURE — 3008F PR BODY MASS INDEX (BMI) DOCUMENTED: ICD-10-PCS | Mod: CPTII,S$GLB,, | Performed by: OBSTETRICS & GYNECOLOGY

## 2021-02-25 ENCOUNTER — PATIENT MESSAGE (OUTPATIENT)
Dept: CARDIOLOGY | Facility: CLINIC | Age: 41
End: 2021-02-25

## 2021-04-07 ENCOUNTER — PATIENT MESSAGE (OUTPATIENT)
Dept: FAMILY MEDICINE | Facility: CLINIC | Age: 41
End: 2021-04-07

## 2021-04-09 ENCOUNTER — OFFICE VISIT (OUTPATIENT)
Dept: FAMILY MEDICINE | Facility: CLINIC | Age: 41
End: 2021-04-09
Payer: COMMERCIAL

## 2021-04-09 VITALS
DIASTOLIC BLOOD PRESSURE: 62 MMHG | HEART RATE: 102 BPM | TEMPERATURE: 98 F | SYSTOLIC BLOOD PRESSURE: 120 MMHG | HEIGHT: 62 IN | BODY MASS INDEX: 29.38 KG/M2 | WEIGHT: 159.63 LBS | OXYGEN SATURATION: 99 %

## 2021-04-09 DIAGNOSIS — F32.81 PMDD (PREMENSTRUAL DYSPHORIC DISORDER): ICD-10-CM

## 2021-04-09 DIAGNOSIS — R11.0 NAUSEA: ICD-10-CM

## 2021-04-09 DIAGNOSIS — F39 MOOD DISORDER: ICD-10-CM

## 2021-04-09 DIAGNOSIS — Z00.00 ANNUAL PHYSICAL EXAM: Primary | ICD-10-CM

## 2021-04-09 PROCEDURE — 3008F BODY MASS INDEX DOCD: CPT | Mod: CPTII,S$GLB,, | Performed by: FAMILY MEDICINE

## 2021-04-09 PROCEDURE — 99396 PR PREVENTIVE VISIT,EST,40-64: ICD-10-PCS | Mod: S$GLB,,, | Performed by: FAMILY MEDICINE

## 2021-04-09 PROCEDURE — 1126F PR PAIN SEVERITY QUANTIFIED, NO PAIN PRESENT: ICD-10-PCS | Mod: S$GLB,,, | Performed by: FAMILY MEDICINE

## 2021-04-09 PROCEDURE — 99396 PREV VISIT EST AGE 40-64: CPT | Mod: S$GLB,,, | Performed by: FAMILY MEDICINE

## 2021-04-09 PROCEDURE — 3008F PR BODY MASS INDEX (BMI) DOCUMENTED: ICD-10-PCS | Mod: CPTII,S$GLB,, | Performed by: FAMILY MEDICINE

## 2021-04-09 PROCEDURE — 99999 PR PBB SHADOW E&M-EST. PATIENT-LVL III: CPT | Mod: PBBFAC,,, | Performed by: FAMILY MEDICINE

## 2021-04-09 PROCEDURE — 1126F AMNT PAIN NOTED NONE PRSNT: CPT | Mod: S$GLB,,, | Performed by: FAMILY MEDICINE

## 2021-04-09 PROCEDURE — 99999 PR PBB SHADOW E&M-EST. PATIENT-LVL III: ICD-10-PCS | Mod: PBBFAC,,, | Performed by: FAMILY MEDICINE

## 2021-04-09 RX ORDER — ALPRAZOLAM 1 MG/1
1 TABLET ORAL 2 TIMES DAILY PRN
Qty: 60 TABLET | Refills: 0 | Status: SHIPPED | OUTPATIENT
Start: 2021-04-09 | End: 2021-05-24 | Stop reason: SDUPTHER

## 2021-04-09 RX ORDER — BUPROPION HYDROCHLORIDE 150 MG/1
150 TABLET, EXTENDED RELEASE ORAL 2 TIMES DAILY
Qty: 60 TABLET | Refills: 11 | Status: SHIPPED | OUTPATIENT
Start: 2021-04-09 | End: 2021-06-15

## 2021-04-28 ENCOUNTER — PATIENT MESSAGE (OUTPATIENT)
Dept: FAMILY MEDICINE | Facility: CLINIC | Age: 41
End: 2021-04-28

## 2021-04-28 DIAGNOSIS — N76.0 BV (BACTERIAL VAGINOSIS): Primary | ICD-10-CM

## 2021-04-28 DIAGNOSIS — B96.89 BV (BACTERIAL VAGINOSIS): Primary | ICD-10-CM

## 2021-04-29 RX ORDER — METRONIDAZOLE 500 MG/1
500 TABLET ORAL EVERY 12 HOURS
Qty: 14 TABLET | Refills: 0 | Status: SHIPPED | OUTPATIENT
Start: 2021-04-29 | End: 2021-05-06

## 2021-04-30 ENCOUNTER — PATIENT MESSAGE (OUTPATIENT)
Dept: FAMILY MEDICINE | Facility: CLINIC | Age: 41
End: 2021-04-30

## 2021-05-05 ENCOUNTER — PATIENT MESSAGE (OUTPATIENT)
Dept: FAMILY MEDICINE | Facility: CLINIC | Age: 41
End: 2021-05-05

## 2021-05-05 DIAGNOSIS — F41.9 ANXIETY: Primary | ICD-10-CM

## 2021-05-06 ENCOUNTER — PATIENT MESSAGE (OUTPATIENT)
Dept: FAMILY MEDICINE | Facility: CLINIC | Age: 41
End: 2021-05-06

## 2021-05-06 RX ORDER — DESVENLAFAXINE SUCCINATE 50 MG/1
50 TABLET, EXTENDED RELEASE ORAL DAILY
Qty: 30 TABLET | Refills: 0 | Status: SHIPPED | OUTPATIENT
Start: 2021-05-06 | End: 2021-06-15

## 2021-05-24 DIAGNOSIS — F32.81 PMDD (PREMENSTRUAL DYSPHORIC DISORDER): ICD-10-CM

## 2021-05-24 DIAGNOSIS — R11.0 NAUSEA: ICD-10-CM

## 2021-05-25 DIAGNOSIS — R11.0 NAUSEA: ICD-10-CM

## 2021-05-25 DIAGNOSIS — F32.81 PMDD (PREMENSTRUAL DYSPHORIC DISORDER): ICD-10-CM

## 2021-05-25 RX ORDER — ALPRAZOLAM 1 MG/1
1 TABLET ORAL 2 TIMES DAILY PRN
Qty: 60 TABLET | Refills: 0 | Status: SHIPPED | OUTPATIENT
Start: 2021-05-25 | End: 2021-06-15

## 2021-05-25 RX ORDER — ALPRAZOLAM 1 MG/1
1 TABLET ORAL 2 TIMES DAILY PRN
Qty: 60 TABLET | Refills: 0 | Status: SHIPPED | OUTPATIENT
Start: 2021-05-25 | End: 2021-06-30 | Stop reason: SDUPTHER

## 2021-05-30 NOTE — ED TRIAGE NOTES
Back Pain: Patient presents for presents evaluation of low back problems.  Symptoms have been present for 5 days and include pain in flank (aching, sharp and stabbing in character; 10/10 in severity). Initial inciting event: surgery. Symptoms are worst: morning, nighttime. Alleviating factors identifiable by patient are bending backwards, bending forwards, bending sideways, sitting, standing and walking.      Intact

## 2021-06-15 ENCOUNTER — OFFICE VISIT (OUTPATIENT)
Dept: OBSTETRICS AND GYNECOLOGY | Facility: CLINIC | Age: 41
End: 2021-06-15
Payer: COMMERCIAL

## 2021-06-15 VITALS
HEIGHT: 62 IN | DIASTOLIC BLOOD PRESSURE: 66 MMHG | SYSTOLIC BLOOD PRESSURE: 110 MMHG | BODY MASS INDEX: 29.62 KG/M2 | WEIGHT: 160.94 LBS

## 2021-06-15 DIAGNOSIS — Z09 POSTOP CHECK: Primary | ICD-10-CM

## 2021-06-15 PROBLEM — N85.2 ENLARGED UTERUS: Status: RESOLVED | Noted: 2018-04-18 | Resolved: 2021-06-15

## 2021-06-15 PROCEDURE — 99024 POSTOP FOLLOW-UP VISIT: CPT | Mod: S$GLB,,, | Performed by: OBSTETRICS & GYNECOLOGY

## 2021-06-15 PROCEDURE — 99999 PR PBB SHADOW E&M-EST. PATIENT-LVL III: ICD-10-PCS | Mod: PBBFAC,,, | Performed by: OBSTETRICS & GYNECOLOGY

## 2021-06-15 PROCEDURE — 3008F PR BODY MASS INDEX (BMI) DOCUMENTED: ICD-10-PCS | Mod: CPTII,S$GLB,, | Performed by: OBSTETRICS & GYNECOLOGY

## 2021-06-15 PROCEDURE — 1126F PR PAIN SEVERITY QUANTIFIED, NO PAIN PRESENT: ICD-10-PCS | Mod: S$GLB,,, | Performed by: OBSTETRICS & GYNECOLOGY

## 2021-06-15 PROCEDURE — 99024 PR POST-OP FOLLOW-UP VISIT: ICD-10-PCS | Mod: S$GLB,,, | Performed by: OBSTETRICS & GYNECOLOGY

## 2021-06-15 PROCEDURE — 3008F BODY MASS INDEX DOCD: CPT | Mod: CPTII,S$GLB,, | Performed by: OBSTETRICS & GYNECOLOGY

## 2021-06-15 PROCEDURE — 99999 PR PBB SHADOW E&M-EST. PATIENT-LVL III: CPT | Mod: PBBFAC,,, | Performed by: OBSTETRICS & GYNECOLOGY

## 2021-06-15 PROCEDURE — 1126F AMNT PAIN NOTED NONE PRSNT: CPT | Mod: S$GLB,,, | Performed by: OBSTETRICS & GYNECOLOGY

## 2021-08-03 ENCOUNTER — TELEPHONE (OUTPATIENT)
Dept: FAMILY MEDICINE | Facility: CLINIC | Age: 41
End: 2021-08-03

## 2021-08-04 ENCOUNTER — LAB VISIT (OUTPATIENT)
Dept: PRIMARY CARE CLINIC | Facility: CLINIC | Age: 41
End: 2021-08-04
Payer: COMMERCIAL

## 2021-08-04 ENCOUNTER — TELEPHONE (OUTPATIENT)
Dept: FAMILY MEDICINE | Facility: CLINIC | Age: 41
End: 2021-08-04

## 2021-08-04 DIAGNOSIS — Z20.822 ENCOUNTER FOR LABORATORY TESTING FOR COVID-19 VIRUS: ICD-10-CM

## 2021-08-04 DIAGNOSIS — R05.9 COUGH: Primary | ICD-10-CM

## 2021-08-04 PROCEDURE — U0003 INFECTIOUS AGENT DETECTION BY NUCLEIC ACID (DNA OR RNA); SEVERE ACUTE RESPIRATORY SYNDROME CORONAVIRUS 2 (SARS-COV-2) (CORONAVIRUS DISEASE [COVID-19]), AMPLIFIED PROBE TECHNIQUE, MAKING USE OF HIGH THROUGHPUT TECHNOLOGIES AS DESCRIBED BY CMS-2020-01-R: HCPCS | Performed by: INTERNAL MEDICINE

## 2021-08-04 PROCEDURE — U0005 INFEC AGEN DETEC AMPLI PROBE: HCPCS | Performed by: INTERNAL MEDICINE

## 2021-08-05 LAB
SARS-COV-2 RNA RESP QL NAA+PROBE: NOT DETECTED
SARS-COV-2- CYCLE NUMBER: -1

## 2021-08-25 ENCOUNTER — OFFICE VISIT (OUTPATIENT)
Dept: PSYCHIATRY | Facility: CLINIC | Age: 41
End: 2021-08-25
Payer: COMMERCIAL

## 2021-08-25 DIAGNOSIS — F33.1 MODERATE EPISODE OF RECURRENT MAJOR DEPRESSIVE DISORDER: Primary | ICD-10-CM

## 2021-08-25 DIAGNOSIS — Z86.59 HISTORY OF POSTTRAUMATIC STRESS DISORDER (PTSD): ICD-10-CM

## 2021-08-25 DIAGNOSIS — F41.1 GENERALIZED ANXIETY DISORDER: ICD-10-CM

## 2021-08-25 PROCEDURE — 90791 PR PSYCHIATRIC DIAGNOSTIC EVALUATION: ICD-10-PCS | Mod: 95,,, | Performed by: SOCIAL WORKER

## 2021-08-25 PROCEDURE — 90791 PSYCH DIAGNOSTIC EVALUATION: CPT | Mod: 95,,, | Performed by: SOCIAL WORKER

## 2021-09-08 ENCOUNTER — OFFICE VISIT (OUTPATIENT)
Dept: PSYCHIATRY | Facility: CLINIC | Age: 41
End: 2021-09-08
Payer: COMMERCIAL

## 2021-09-08 VITALS
HEIGHT: 62 IN | HEART RATE: 99 BPM | SYSTOLIC BLOOD PRESSURE: 117 MMHG | DIASTOLIC BLOOD PRESSURE: 79 MMHG | BODY MASS INDEX: 29.33 KG/M2 | OXYGEN SATURATION: 98 % | WEIGHT: 159.38 LBS

## 2021-09-08 DIAGNOSIS — F41.1 GENERALIZED ANXIETY DISORDER: ICD-10-CM

## 2021-09-08 DIAGNOSIS — F43.10 PTSD (POST-TRAUMATIC STRESS DISORDER): ICD-10-CM

## 2021-09-08 DIAGNOSIS — F31.81 BIPOLAR II DISORDER: Primary | ICD-10-CM

## 2021-09-08 PROCEDURE — 3078F PR MOST RECENT DIASTOLIC BLOOD PRESSURE < 80 MM HG: ICD-10-PCS | Mod: CPTII,S$GLB,, | Performed by: PSYCHIATRY & NEUROLOGY

## 2021-09-08 PROCEDURE — 1160F RVW MEDS BY RX/DR IN RCRD: CPT | Mod: CPTII,S$GLB,, | Performed by: PSYCHIATRY & NEUROLOGY

## 2021-09-08 PROCEDURE — 3008F BODY MASS INDEX DOCD: CPT | Mod: CPTII,S$GLB,, | Performed by: PSYCHIATRY & NEUROLOGY

## 2021-09-08 PROCEDURE — 3008F PR BODY MASS INDEX (BMI) DOCUMENTED: ICD-10-PCS | Mod: CPTII,S$GLB,, | Performed by: PSYCHIATRY & NEUROLOGY

## 2021-09-08 PROCEDURE — 1159F PR MEDICATION LIST DOCUMENTED IN MEDICAL RECORD: ICD-10-PCS | Mod: CPTII,S$GLB,, | Performed by: PSYCHIATRY & NEUROLOGY

## 2021-09-08 PROCEDURE — 99999 PR PBB SHADOW E&M-EST. PATIENT-LVL III: ICD-10-PCS | Mod: PBBFAC,,, | Performed by: PSYCHIATRY & NEUROLOGY

## 2021-09-08 PROCEDURE — 99999 PR PBB SHADOW E&M-EST. PATIENT-LVL III: CPT | Mod: PBBFAC,,, | Performed by: PSYCHIATRY & NEUROLOGY

## 2021-09-08 PROCEDURE — 3078F DIAST BP <80 MM HG: CPT | Mod: CPTII,S$GLB,, | Performed by: PSYCHIATRY & NEUROLOGY

## 2021-09-08 PROCEDURE — 99205 PR OFFICE/OUTPT VISIT, NEW, LEVL V, 60-74 MIN: ICD-10-PCS | Mod: S$GLB,,, | Performed by: PSYCHIATRY & NEUROLOGY

## 2021-09-08 PROCEDURE — 3074F SYST BP LT 130 MM HG: CPT | Mod: CPTII,S$GLB,, | Performed by: PSYCHIATRY & NEUROLOGY

## 2021-09-08 PROCEDURE — 3074F PR MOST RECENT SYSTOLIC BLOOD PRESSURE < 130 MM HG: ICD-10-PCS | Mod: CPTII,S$GLB,, | Performed by: PSYCHIATRY & NEUROLOGY

## 2021-09-08 PROCEDURE — 1160F PR REVIEW ALL MEDS BY PRESCRIBER/CLIN PHARMACIST DOCUMENTED: ICD-10-PCS | Mod: CPTII,S$GLB,, | Performed by: PSYCHIATRY & NEUROLOGY

## 2021-09-08 PROCEDURE — 1159F MED LIST DOCD IN RCRD: CPT | Mod: CPTII,S$GLB,, | Performed by: PSYCHIATRY & NEUROLOGY

## 2021-09-08 PROCEDURE — 99205 OFFICE O/P NEW HI 60 MIN: CPT | Mod: S$GLB,,, | Performed by: PSYCHIATRY & NEUROLOGY

## 2021-09-08 RX ORDER — TOPIRAMATE 50 MG/1
50 TABLET, FILM COATED ORAL 2 TIMES DAILY
Qty: 60 TABLET | Refills: 1 | Status: SHIPPED | OUTPATIENT
Start: 2021-09-08 | End: 2021-09-21

## 2021-09-21 ENCOUNTER — PATIENT MESSAGE (OUTPATIENT)
Dept: PSYCHIATRY | Facility: CLINIC | Age: 41
End: 2021-09-21

## 2021-09-21 RX ORDER — OXCARBAZEPINE 150 MG/1
150 TABLET, FILM COATED ORAL 2 TIMES DAILY
Qty: 60 TABLET | Refills: 0 | Status: SHIPPED | OUTPATIENT
Start: 2021-09-21 | End: 2021-10-06

## 2021-09-22 ENCOUNTER — PATIENT MESSAGE (OUTPATIENT)
Dept: PSYCHIATRY | Facility: CLINIC | Age: 41
End: 2021-09-22

## 2021-09-22 RX ORDER — LURASIDONE HYDROCHLORIDE 20 MG/1
20 TABLET, FILM COATED ORAL DAILY
Qty: 30 TABLET | Refills: 0 | Status: SHIPPED | OUTPATIENT
Start: 2021-09-22 | End: 2021-10-06

## 2021-09-24 ENCOUNTER — HOSPITAL ENCOUNTER (EMERGENCY)
Facility: HOSPITAL | Age: 41
Discharge: HOME | End: 2021-09-24
Attending: EMERGENCY MEDICINE
Payer: COMMERCIAL

## 2021-09-24 VITALS
RESPIRATION RATE: 18 BRPM | OXYGEN SATURATION: 100 % | HEART RATE: 78 BPM | DIASTOLIC BLOOD PRESSURE: 70 MMHG | WEIGHT: 157 LBS | SYSTOLIC BLOOD PRESSURE: 109 MMHG | TEMPERATURE: 98 F | BODY MASS INDEX: 28.72 KG/M2

## 2021-09-24 DIAGNOSIS — R11.0 NAUSEA: ICD-10-CM

## 2021-09-24 DIAGNOSIS — R10.11 RUQ ABDOMINAL PAIN: Primary | ICD-10-CM

## 2021-09-24 LAB
ALBUMIN SERPL BCP-MCNC: 4.3 G/DL (ref 3.5–5.2)
ALP SERPL-CCNC: 62 U/L (ref 55–135)
ALT SERPL W/O P-5'-P-CCNC: 20 U/L (ref 10–44)
ANION GAP SERPL CALC-SCNC: 9 MMOL/L (ref 8–16)
AST SERPL-CCNC: 14 U/L (ref 10–40)
BASOPHILS # BLD AUTO: 0.05 K/UL (ref 0–0.2)
BASOPHILS NFR BLD: 0.5 % (ref 0–1.9)
BILIRUB SERPL-MCNC: 0.3 MG/DL (ref 0.1–1)
BILIRUB UR QL STRIP: NEGATIVE
BUN SERPL-MCNC: 12 MG/DL (ref 6–20)
CALCIUM SERPL-MCNC: 10 MG/DL (ref 8.7–10.5)
CHLORIDE SERPL-SCNC: 103 MMOL/L (ref 95–110)
CLARITY UR: CLEAR
CO2 SERPL-SCNC: 26 MMOL/L (ref 23–29)
COLOR UR: YELLOW
CREAT SERPL-MCNC: 0.7 MG/DL (ref 0.5–1.4)
CTP QC/QA: YES
DIFFERENTIAL METHOD: ABNORMAL
EOSINOPHIL # BLD AUTO: 0.2 K/UL (ref 0–0.5)
EOSINOPHIL NFR BLD: 1.6 % (ref 0–8)
ERYTHROCYTE [DISTWIDTH] IN BLOOD BY AUTOMATED COUNT: 11.9 % (ref 11.5–14.5)
EST. GFR  (AFRICAN AMERICAN): >60 ML/MIN/1.73 M^2
EST. GFR  (NON AFRICAN AMERICAN): >60 ML/MIN/1.73 M^2
GLUCOSE SERPL-MCNC: 106 MG/DL (ref 70–110)
GLUCOSE UR QL STRIP: NEGATIVE
HCT VFR BLD AUTO: 41.2 % (ref 37–48.5)
HGB BLD-MCNC: 15 G/DL (ref 12–16)
HGB UR QL STRIP: ABNORMAL
IMM GRANULOCYTES # BLD AUTO: 0.04 K/UL (ref 0–0.04)
IMM GRANULOCYTES NFR BLD AUTO: 0.4 % (ref 0–0.5)
KETONES UR QL STRIP: NEGATIVE
LEUKOCYTE ESTERASE UR QL STRIP: NEGATIVE
LIPASE SERPL-CCNC: 48 U/L (ref 4–60)
LYMPHOCYTES # BLD AUTO: 3.3 K/UL (ref 1–4.8)
LYMPHOCYTES NFR BLD: 33.3 % (ref 18–48)
MCH RBC QN AUTO: 31.7 PG (ref 27–31)
MCHC RBC AUTO-ENTMCNC: 36.4 G/DL (ref 32–36)
MCV RBC AUTO: 87 FL (ref 82–98)
MONOCYTES # BLD AUTO: 0.7 K/UL (ref 0.3–1)
MONOCYTES NFR BLD: 6.8 % (ref 4–15)
NEUTROPHILS # BLD AUTO: 5.6 K/UL (ref 1.8–7.7)
NEUTROPHILS NFR BLD: 57.4 % (ref 38–73)
NITRITE UR QL STRIP: NEGATIVE
NRBC BLD-RTO: 0 /100 WBC
PH UR STRIP: 7 [PH] (ref 5–8)
PLATELET # BLD AUTO: 265 K/UL (ref 150–450)
PMV BLD AUTO: 10.1 FL (ref 9.2–12.9)
POTASSIUM SERPL-SCNC: 3.8 MMOL/L (ref 3.5–5.1)
PROT SERPL-MCNC: 7.2 G/DL (ref 6–8.4)
PROT UR QL STRIP: NEGATIVE
RBC # BLD AUTO: 4.73 M/UL (ref 4–5.4)
SARS-COV-2 RDRP RESP QL NAA+PROBE: NEGATIVE
SODIUM SERPL-SCNC: 138 MMOL/L (ref 136–145)
SP GR UR STRIP: 1.01 (ref 1–1.03)
TROPONIN I SERPL DL<=0.01 NG/ML-MCNC: 0.01 NG/ML (ref 0–0.03)
URN SPEC COLLECT METH UR: ABNORMAL
UROBILINOGEN UR STRIP-ACNC: NEGATIVE EU/DL
WBC # BLD AUTO: 9.82 K/UL (ref 3.9–12.7)

## 2021-09-24 PROCEDURE — 85025 COMPLETE CBC W/AUTO DIFF WBC: CPT | Performed by: PHYSICIAN ASSISTANT

## 2021-09-24 PROCEDURE — 96375 TX/PRO/DX INJ NEW DRUG ADDON: CPT

## 2021-09-24 PROCEDURE — 63600175 PHARM REV CODE 636 W HCPCS: Performed by: PHYSICIAN ASSISTANT

## 2021-09-24 PROCEDURE — U0002 COVID-19 LAB TEST NON-CDC: HCPCS | Performed by: PHYSICIAN ASSISTANT

## 2021-09-24 PROCEDURE — 83690 ASSAY OF LIPASE: CPT | Performed by: PHYSICIAN ASSISTANT

## 2021-09-24 PROCEDURE — 99284 EMERGENCY DEPT VISIT MOD MDM: CPT | Mod: 25

## 2021-09-24 PROCEDURE — 25000003 PHARM REV CODE 250: Performed by: PHYSICIAN ASSISTANT

## 2021-09-24 PROCEDURE — 96365 THER/PROPH/DIAG IV INF INIT: CPT

## 2021-09-24 PROCEDURE — 93005 ELECTROCARDIOGRAM TRACING: CPT

## 2021-09-24 PROCEDURE — 93010 ELECTROCARDIOGRAM REPORT: CPT | Mod: ,,, | Performed by: INTERNAL MEDICINE

## 2021-09-24 PROCEDURE — 93010 EKG 12-LEAD: ICD-10-PCS | Mod: ,,, | Performed by: INTERNAL MEDICINE

## 2021-09-24 PROCEDURE — 84484 ASSAY OF TROPONIN QUANT: CPT | Performed by: PHYSICIAN ASSISTANT

## 2021-09-24 PROCEDURE — 80053 COMPREHEN METABOLIC PANEL: CPT | Performed by: PHYSICIAN ASSISTANT

## 2021-09-24 PROCEDURE — 81003 URINALYSIS AUTO W/O SCOPE: CPT | Performed by: PHYSICIAN ASSISTANT

## 2021-09-24 RX ORDER — ORPHENADRINE CITRATE 100 MG/1
100 TABLET, EXTENDED RELEASE ORAL ONCE
Status: COMPLETED | OUTPATIENT
Start: 2021-09-24 | End: 2021-09-24

## 2021-09-24 RX ORDER — ACETAMINOPHEN 325 MG/1
650 TABLET ORAL
Status: COMPLETED | OUTPATIENT
Start: 2021-09-24 | End: 2021-09-24

## 2021-09-24 RX ORDER — MORPHINE SULFATE 4 MG/ML
3 INJECTION, SOLUTION INTRAMUSCULAR; INTRAVENOUS
Status: COMPLETED | OUTPATIENT
Start: 2021-09-24 | End: 2021-09-24

## 2021-09-24 RX ADMIN — MORPHINE SULFATE 3 MG: 4 INJECTION, SOLUTION INTRAMUSCULAR; INTRAVENOUS at 09:09

## 2021-09-24 RX ADMIN — PROMETHAZINE HYDROCHLORIDE 25 MG: 25 INJECTION INTRAMUSCULAR; INTRAVENOUS at 07:09

## 2021-09-24 RX ADMIN — ORPHENADRINE CITRATE 100 MG: 100 TABLET, EXTENDED RELEASE ORAL at 07:09

## 2021-09-24 RX ADMIN — ACETAMINOPHEN 650 MG: 325 TABLET ORAL at 07:09

## 2021-09-27 ENCOUNTER — HOSPITAL ENCOUNTER (EMERGENCY)
Facility: HOSPITAL | Age: 41
Discharge: HOME | End: 2021-09-27
Attending: INTERNAL MEDICINE
Payer: COMMERCIAL

## 2021-09-27 VITALS
OXYGEN SATURATION: 98 % | RESPIRATION RATE: 20 BRPM | WEIGHT: 157 LBS | HEART RATE: 98 BPM | BODY MASS INDEX: 28.89 KG/M2 | SYSTOLIC BLOOD PRESSURE: 114 MMHG | HEIGHT: 62 IN | DIASTOLIC BLOOD PRESSURE: 82 MMHG | TEMPERATURE: 99 F

## 2021-09-27 DIAGNOSIS — J06.9 VIRAL URI WITH COUGH: Primary | ICD-10-CM

## 2021-09-27 DIAGNOSIS — H66.001 NON-RECURRENT ACUTE SUPPURATIVE OTITIS MEDIA OF RIGHT EAR WITHOUT SPONTANEOUS RUPTURE OF TYMPANIC MEMBRANE: ICD-10-CM

## 2021-09-27 DIAGNOSIS — R05.9 COUGH: ICD-10-CM

## 2021-09-27 LAB
CTP QC/QA: YES
CTP QC/QA: YES
INFLUENZA A ANTIGEN, POC: NEGATIVE
INFLUENZA B ANTIGEN, POC: NEGATIVE
POC MOLECULAR INFLUENZA A AGN: NEGATIVE
POC MOLECULAR INFLUENZA B AGN: NEGATIVE
SARS-COV-2 RDRP RESP QL NAA+PROBE: NEGATIVE

## 2021-09-27 PROCEDURE — 87502 INFLUENZA DNA AMP PROBE: CPT | Mod: ER

## 2021-09-27 PROCEDURE — 99284 EMERGENCY DEPT VISIT MOD MDM: CPT | Mod: 25,ER

## 2021-09-27 PROCEDURE — U0002 COVID-19 LAB TEST NON-CDC: HCPCS | Mod: ER | Performed by: NURSE PRACTITIONER

## 2021-09-27 RX ORDER — BENZONATATE 100 MG/1
100 CAPSULE ORAL 3 TIMES DAILY PRN
Qty: 20 CAPSULE | Refills: 0 | Status: SHIPPED | OUTPATIENT
Start: 2021-09-27 | End: 2021-10-06

## 2021-09-27 RX ORDER — AMOXICILLIN AND CLAVULANATE POTASSIUM 875; 125 MG/1; MG/1
1 TABLET, FILM COATED ORAL 2 TIMES DAILY
Qty: 14 TABLET | Refills: 0 | Status: SHIPPED | OUTPATIENT
Start: 2021-09-27 | End: 2021-10-06

## 2021-10-06 ENCOUNTER — OFFICE VISIT (OUTPATIENT)
Dept: PSYCHIATRY | Facility: CLINIC | Age: 41
End: 2021-10-06
Payer: COMMERCIAL

## 2021-10-06 VITALS
HEART RATE: 96 BPM | SYSTOLIC BLOOD PRESSURE: 118 MMHG | WEIGHT: 159.31 LBS | DIASTOLIC BLOOD PRESSURE: 82 MMHG | BODY MASS INDEX: 29.32 KG/M2 | OXYGEN SATURATION: 94 % | HEIGHT: 62 IN

## 2021-10-06 DIAGNOSIS — F31.81 BIPOLAR II DISORDER: Primary | ICD-10-CM

## 2021-10-06 DIAGNOSIS — F41.1 GENERALIZED ANXIETY DISORDER: ICD-10-CM

## 2021-10-06 PROCEDURE — 99999 PR PBB SHADOW E&M-EST. PATIENT-LVL III: ICD-10-PCS | Mod: PBBFAC,,, | Performed by: PSYCHIATRY & NEUROLOGY

## 2021-10-06 PROCEDURE — 99214 PR OFFICE/OUTPT VISIT, EST, LEVL IV, 30-39 MIN: ICD-10-PCS | Mod: S$GLB,,, | Performed by: PSYCHIATRY & NEUROLOGY

## 2021-10-06 PROCEDURE — 1159F MED LIST DOCD IN RCRD: CPT | Mod: CPTII,S$GLB,, | Performed by: PSYCHIATRY & NEUROLOGY

## 2021-10-06 PROCEDURE — 3079F DIAST BP 80-89 MM HG: CPT | Mod: CPTII,S$GLB,, | Performed by: PSYCHIATRY & NEUROLOGY

## 2021-10-06 PROCEDURE — 1160F RVW MEDS BY RX/DR IN RCRD: CPT | Mod: CPTII,S$GLB,, | Performed by: PSYCHIATRY & NEUROLOGY

## 2021-10-06 PROCEDURE — 3008F PR BODY MASS INDEX (BMI) DOCUMENTED: ICD-10-PCS | Mod: CPTII,S$GLB,, | Performed by: PSYCHIATRY & NEUROLOGY

## 2021-10-06 PROCEDURE — 99999 PR PBB SHADOW E&M-EST. PATIENT-LVL III: CPT | Mod: PBBFAC,,, | Performed by: PSYCHIATRY & NEUROLOGY

## 2021-10-06 PROCEDURE — 1159F PR MEDICATION LIST DOCUMENTED IN MEDICAL RECORD: ICD-10-PCS | Mod: CPTII,S$GLB,, | Performed by: PSYCHIATRY & NEUROLOGY

## 2021-10-06 PROCEDURE — 3074F PR MOST RECENT SYSTOLIC BLOOD PRESSURE < 130 MM HG: ICD-10-PCS | Mod: CPTII,S$GLB,, | Performed by: PSYCHIATRY & NEUROLOGY

## 2021-10-06 PROCEDURE — 1160F PR REVIEW ALL MEDS BY PRESCRIBER/CLIN PHARMACIST DOCUMENTED: ICD-10-PCS | Mod: CPTII,S$GLB,, | Performed by: PSYCHIATRY & NEUROLOGY

## 2021-10-06 PROCEDURE — 3079F PR MOST RECENT DIASTOLIC BLOOD PRESSURE 80-89 MM HG: ICD-10-PCS | Mod: CPTII,S$GLB,, | Performed by: PSYCHIATRY & NEUROLOGY

## 2021-10-06 PROCEDURE — 3008F BODY MASS INDEX DOCD: CPT | Mod: CPTII,S$GLB,, | Performed by: PSYCHIATRY & NEUROLOGY

## 2021-10-06 PROCEDURE — 99214 OFFICE O/P EST MOD 30 MIN: CPT | Mod: S$GLB,,, | Performed by: PSYCHIATRY & NEUROLOGY

## 2021-10-06 PROCEDURE — 3074F SYST BP LT 130 MM HG: CPT | Mod: CPTII,S$GLB,, | Performed by: PSYCHIATRY & NEUROLOGY

## 2021-10-06 RX ORDER — LURASIDONE HYDROCHLORIDE 40 MG/1
40 TABLET, FILM COATED ORAL DAILY
Qty: 30 TABLET | Refills: 1 | Status: SHIPPED | OUTPATIENT
Start: 2021-10-06 | End: 2021-11-17 | Stop reason: SDUPTHER

## 2021-10-06 RX ORDER — ALPRAZOLAM 1 MG/1
1 TABLET ORAL 2 TIMES DAILY PRN
Qty: 60 TABLET | Refills: 1 | Status: SHIPPED | OUTPATIENT
Start: 2021-10-06 | End: 2021-11-17 | Stop reason: SDUPTHER

## 2021-10-11 ENCOUNTER — HOSPITAL ENCOUNTER (EMERGENCY)
Facility: HOSPITAL | Age: 41
Discharge: HOME | End: 2021-10-11
Attending: EMERGENCY MEDICINE
Payer: COMMERCIAL

## 2021-10-11 VITALS
TEMPERATURE: 98 F | HEIGHT: 62 IN | OXYGEN SATURATION: 100 % | WEIGHT: 165 LBS | DIASTOLIC BLOOD PRESSURE: 62 MMHG | SYSTOLIC BLOOD PRESSURE: 113 MMHG | RESPIRATION RATE: 18 BRPM | HEART RATE: 75 BPM | BODY MASS INDEX: 30.36 KG/M2

## 2021-10-11 DIAGNOSIS — K59.00 CONSTIPATION, UNSPECIFIED CONSTIPATION TYPE: ICD-10-CM

## 2021-10-11 DIAGNOSIS — R10.9 LEFT FLANK PAIN: ICD-10-CM

## 2021-10-11 DIAGNOSIS — R10.32 LLQ ABDOMINAL PAIN: Primary | ICD-10-CM

## 2021-10-11 LAB
ALBUMIN SERPL BCP-MCNC: 4.4 G/DL (ref 3.5–5.2)
ALP SERPL-CCNC: 61 U/L (ref 55–135)
ALT SERPL W/O P-5'-P-CCNC: 16 U/L (ref 10–44)
ANION GAP SERPL CALC-SCNC: 9 MMOL/L (ref 8–16)
AST SERPL-CCNC: 14 U/L (ref 10–40)
BASOPHILS # BLD AUTO: 0.04 K/UL (ref 0–0.2)
BASOPHILS NFR BLD: 0.4 % (ref 0–1.9)
BILIRUB SERPL-MCNC: 0.5 MG/DL (ref 0.1–1)
BILIRUB UR QL STRIP: NEGATIVE
BUN SERPL-MCNC: 9 MG/DL (ref 6–20)
CALCIUM SERPL-MCNC: 9.8 MG/DL (ref 8.7–10.5)
CHLORIDE SERPL-SCNC: 105 MMOL/L (ref 95–110)
CLARITY UR: CLEAR
CO2 SERPL-SCNC: 26 MMOL/L (ref 23–29)
COLOR UR: COLORLESS
CREAT SERPL-MCNC: 0.7 MG/DL (ref 0.5–1.4)
DIFFERENTIAL METHOD: ABNORMAL
EOSINOPHIL # BLD AUTO: 0.1 K/UL (ref 0–0.5)
EOSINOPHIL NFR BLD: 1.3 % (ref 0–8)
ERYTHROCYTE [DISTWIDTH] IN BLOOD BY AUTOMATED COUNT: 12 % (ref 11.5–14.5)
EST. GFR  (AFRICAN AMERICAN): >60 ML/MIN/1.73 M^2
EST. GFR  (NON AFRICAN AMERICAN): >60 ML/MIN/1.73 M^2
GLUCOSE SERPL-MCNC: 96 MG/DL (ref 70–110)
GLUCOSE UR QL STRIP: NEGATIVE
HCT VFR BLD AUTO: 43.6 % (ref 37–48.5)
HGB BLD-MCNC: 15.1 G/DL (ref 12–16)
HGB UR QL STRIP: NEGATIVE
IMM GRANULOCYTES # BLD AUTO: 0.05 K/UL (ref 0–0.04)
IMM GRANULOCYTES NFR BLD AUTO: 0.5 % (ref 0–0.5)
KETONES UR QL STRIP: NEGATIVE
LEUKOCYTE ESTERASE UR QL STRIP: NEGATIVE
LIPASE SERPL-CCNC: 33 U/L (ref 4–60)
LYMPHOCYTES # BLD AUTO: 2.7 K/UL (ref 1–4.8)
LYMPHOCYTES NFR BLD: 27.7 % (ref 18–48)
MCH RBC QN AUTO: 31.5 PG (ref 27–31)
MCHC RBC AUTO-ENTMCNC: 34.6 G/DL (ref 32–36)
MCV RBC AUTO: 91 FL (ref 82–98)
MONOCYTES # BLD AUTO: 0.6 K/UL (ref 0.3–1)
MONOCYTES NFR BLD: 6.4 % (ref 4–15)
NEUTROPHILS # BLD AUTO: 6.1 K/UL (ref 1.8–7.7)
NEUTROPHILS NFR BLD: 63.7 % (ref 38–73)
NITRITE UR QL STRIP: NEGATIVE
NRBC BLD-RTO: 0 /100 WBC
PH UR STRIP: 6 [PH] (ref 5–8)
PLATELET # BLD AUTO: 253 K/UL (ref 150–450)
PMV BLD AUTO: 10.4 FL (ref 9.2–12.9)
POTASSIUM SERPL-SCNC: 3.7 MMOL/L (ref 3.5–5.1)
PROT SERPL-MCNC: 7.7 G/DL (ref 6–8.4)
PROT UR QL STRIP: NEGATIVE
RBC # BLD AUTO: 4.8 M/UL (ref 4–5.4)
SODIUM SERPL-SCNC: 140 MMOL/L (ref 136–145)
SP GR UR STRIP: 1.01 (ref 1–1.03)
URN SPEC COLLECT METH UR: ABNORMAL
UROBILINOGEN UR STRIP-ACNC: NEGATIVE EU/DL
WBC # BLD AUTO: 9.63 K/UL (ref 3.9–12.7)

## 2021-10-11 PROCEDURE — 25500020 PHARM REV CODE 255: Performed by: NURSE PRACTITIONER

## 2021-10-11 PROCEDURE — 25000003 PHARM REV CODE 250: Performed by: NURSE PRACTITIONER

## 2021-10-11 PROCEDURE — 83690 ASSAY OF LIPASE: CPT | Performed by: NURSE PRACTITIONER

## 2021-10-11 PROCEDURE — 96361 HYDRATE IV INFUSION ADD-ON: CPT

## 2021-10-11 PROCEDURE — 63600175 PHARM REV CODE 636 W HCPCS: Performed by: NURSE PRACTITIONER

## 2021-10-11 PROCEDURE — 99285 EMERGENCY DEPT VISIT HI MDM: CPT | Mod: 25

## 2021-10-11 PROCEDURE — 81003 URINALYSIS AUTO W/O SCOPE: CPT | Performed by: PHYSICIAN ASSISTANT

## 2021-10-11 PROCEDURE — 85025 COMPLETE CBC W/AUTO DIFF WBC: CPT | Performed by: PHYSICIAN ASSISTANT

## 2021-10-11 PROCEDURE — 96375 TX/PRO/DX INJ NEW DRUG ADDON: CPT

## 2021-10-11 PROCEDURE — 96365 THER/PROPH/DIAG IV INF INIT: CPT | Mod: 59

## 2021-10-11 PROCEDURE — 80053 COMPREHEN METABOLIC PANEL: CPT | Performed by: PHYSICIAN ASSISTANT

## 2021-10-11 RX ORDER — GLYCERIN 1 G/1
1 SUPPOSITORY RECTAL
Qty: 12 SUPPOSITORY | Refills: 0 | Status: SHIPPED | OUTPATIENT
Start: 2021-10-11 | End: 2021-11-22

## 2021-10-11 RX ORDER — PROMETHAZINE HYDROCHLORIDE 25 MG/1
25 TABLET ORAL EVERY 6 HOURS PRN
Qty: 15 TABLET | Refills: 0 | Status: SHIPPED | OUTPATIENT
Start: 2021-10-11 | End: 2021-11-22

## 2021-10-11 RX ORDER — HYDROMORPHONE HYDROCHLORIDE 2 MG/ML
1 INJECTION, SOLUTION INTRAMUSCULAR; INTRAVENOUS; SUBCUTANEOUS
Status: COMPLETED | OUTPATIENT
Start: 2021-10-11 | End: 2021-10-11

## 2021-10-11 RX ADMIN — HYDROMORPHONE HYDROCHLORIDE 1 MG: 2 INJECTION INTRAMUSCULAR; INTRAVENOUS; SUBCUTANEOUS at 03:10

## 2021-10-11 RX ADMIN — SODIUM CHLORIDE 1000 ML: 0.9 INJECTION, SOLUTION INTRAVENOUS at 02:10

## 2021-10-11 RX ADMIN — IOHEXOL 75 ML: 350 INJECTION, SOLUTION INTRAVENOUS at 02:10

## 2021-10-11 RX ADMIN — PROMETHAZINE HYDROCHLORIDE 25 MG: 25 INJECTION INTRAMUSCULAR; INTRAVENOUS at 03:10

## 2021-10-13 ENCOUNTER — OFFICE VISIT (OUTPATIENT)
Dept: PSYCHIATRY | Facility: CLINIC | Age: 41
End: 2021-10-13
Payer: COMMERCIAL

## 2021-10-13 ENCOUNTER — PATIENT MESSAGE (OUTPATIENT)
Dept: PSYCHIATRY | Facility: CLINIC | Age: 41
End: 2021-10-13

## 2021-10-13 DIAGNOSIS — F43.10 PTSD (POST-TRAUMATIC STRESS DISORDER): ICD-10-CM

## 2021-10-13 DIAGNOSIS — F41.1 GENERALIZED ANXIETY DISORDER: ICD-10-CM

## 2021-10-13 DIAGNOSIS — F31.81 BIPOLAR II DISORDER: Primary | ICD-10-CM

## 2021-10-13 PROCEDURE — 90834 PR PSYCHOTHERAPY W/PATIENT, 45 MIN: ICD-10-PCS | Mod: 95,,, | Performed by: SOCIAL WORKER

## 2021-10-13 PROCEDURE — 90834 PSYTX W PT 45 MINUTES: CPT | Mod: 95,,, | Performed by: SOCIAL WORKER

## 2021-11-15 ENCOUNTER — OFFICE VISIT (OUTPATIENT)
Dept: PSYCHIATRY | Facility: CLINIC | Age: 41
End: 2021-11-15
Payer: COMMERCIAL

## 2021-11-15 DIAGNOSIS — F43.10 PTSD (POST-TRAUMATIC STRESS DISORDER): ICD-10-CM

## 2021-11-15 DIAGNOSIS — F31.81 BIPOLAR II DISORDER: Primary | ICD-10-CM

## 2021-11-15 DIAGNOSIS — F41.1 GENERALIZED ANXIETY DISORDER: ICD-10-CM

## 2021-11-15 PROCEDURE — 90834 PSYTX W PT 45 MINUTES: CPT | Mod: S$GLB,,, | Performed by: SOCIAL WORKER

## 2021-11-15 PROCEDURE — 90834 PR PSYCHOTHERAPY W/PATIENT, 45 MIN: ICD-10-PCS | Mod: S$GLB,,, | Performed by: SOCIAL WORKER

## 2021-11-17 ENCOUNTER — OFFICE VISIT (OUTPATIENT)
Dept: PSYCHIATRY | Facility: CLINIC | Age: 41
End: 2021-11-17
Payer: COMMERCIAL

## 2021-11-17 VITALS
SYSTOLIC BLOOD PRESSURE: 110 MMHG | OXYGEN SATURATION: 97 % | DIASTOLIC BLOOD PRESSURE: 75 MMHG | HEIGHT: 62 IN | HEART RATE: 95 BPM | BODY MASS INDEX: 28.71 KG/M2 | WEIGHT: 156 LBS

## 2021-11-17 DIAGNOSIS — F41.1 GENERALIZED ANXIETY DISORDER: ICD-10-CM

## 2021-11-17 DIAGNOSIS — F31.81 BIPOLAR II DISORDER: Primary | ICD-10-CM

## 2021-11-17 PROCEDURE — 3074F SYST BP LT 130 MM HG: CPT | Mod: CPTII,S$GLB,, | Performed by: PSYCHIATRY & NEUROLOGY

## 2021-11-17 PROCEDURE — 1159F MED LIST DOCD IN RCRD: CPT | Mod: CPTII,S$GLB,, | Performed by: PSYCHIATRY & NEUROLOGY

## 2021-11-17 PROCEDURE — 3078F DIAST BP <80 MM HG: CPT | Mod: CPTII,S$GLB,, | Performed by: PSYCHIATRY & NEUROLOGY

## 2021-11-17 PROCEDURE — 1160F RVW MEDS BY RX/DR IN RCRD: CPT | Mod: CPTII,S$GLB,, | Performed by: PSYCHIATRY & NEUROLOGY

## 2021-11-17 PROCEDURE — 99999 PR PBB SHADOW E&M-EST. PATIENT-LVL III: ICD-10-PCS | Mod: PBBFAC,,, | Performed by: PSYCHIATRY & NEUROLOGY

## 2021-11-17 PROCEDURE — 3008F PR BODY MASS INDEX (BMI) DOCUMENTED: ICD-10-PCS | Mod: CPTII,S$GLB,, | Performed by: PSYCHIATRY & NEUROLOGY

## 2021-11-17 PROCEDURE — 99214 OFFICE O/P EST MOD 30 MIN: CPT | Mod: S$GLB,,, | Performed by: PSYCHIATRY & NEUROLOGY

## 2021-11-17 PROCEDURE — 3074F PR MOST RECENT SYSTOLIC BLOOD PRESSURE < 130 MM HG: ICD-10-PCS | Mod: CPTII,S$GLB,, | Performed by: PSYCHIATRY & NEUROLOGY

## 2021-11-17 PROCEDURE — 1159F PR MEDICATION LIST DOCUMENTED IN MEDICAL RECORD: ICD-10-PCS | Mod: CPTII,S$GLB,, | Performed by: PSYCHIATRY & NEUROLOGY

## 2021-11-17 PROCEDURE — 99214 PR OFFICE/OUTPT VISIT, EST, LEVL IV, 30-39 MIN: ICD-10-PCS | Mod: S$GLB,,, | Performed by: PSYCHIATRY & NEUROLOGY

## 2021-11-17 PROCEDURE — 3078F PR MOST RECENT DIASTOLIC BLOOD PRESSURE < 80 MM HG: ICD-10-PCS | Mod: CPTII,S$GLB,, | Performed by: PSYCHIATRY & NEUROLOGY

## 2021-11-17 PROCEDURE — 99999 PR PBB SHADOW E&M-EST. PATIENT-LVL III: CPT | Mod: PBBFAC,,, | Performed by: PSYCHIATRY & NEUROLOGY

## 2021-11-17 PROCEDURE — 1160F PR REVIEW ALL MEDS BY PRESCRIBER/CLIN PHARMACIST DOCUMENTED: ICD-10-PCS | Mod: CPTII,S$GLB,, | Performed by: PSYCHIATRY & NEUROLOGY

## 2021-11-17 PROCEDURE — 3008F BODY MASS INDEX DOCD: CPT | Mod: CPTII,S$GLB,, | Performed by: PSYCHIATRY & NEUROLOGY

## 2021-11-17 RX ORDER — LURASIDONE HYDROCHLORIDE 40 MG/1
40 TABLET, FILM COATED ORAL DAILY
Qty: 90 TABLET | Refills: 1 | Status: SHIPPED | OUTPATIENT
Start: 2021-11-17 | End: 2022-03-24 | Stop reason: ALTCHOICE

## 2021-11-17 RX ORDER — ALPRAZOLAM 1 MG/1
1 TABLET ORAL 2 TIMES DAILY PRN
Qty: 60 TABLET | Refills: 2 | Status: SHIPPED | OUTPATIENT
Start: 2021-11-17 | End: 2022-03-08

## 2021-11-17 RX ORDER — BUSPIRONE HYDROCHLORIDE 5 MG/1
5 TABLET ORAL 2 TIMES DAILY
Qty: 60 TABLET | Refills: 2 | Status: SHIPPED | OUTPATIENT
Start: 2021-11-17 | End: 2022-03-24

## 2021-11-22 ENCOUNTER — OFFICE VISIT (OUTPATIENT)
Dept: FAMILY MEDICINE | Facility: CLINIC | Age: 41
End: 2021-11-22
Payer: COMMERCIAL

## 2021-11-22 VITALS
HEIGHT: 62 IN | TEMPERATURE: 98 F | WEIGHT: 160.25 LBS | OXYGEN SATURATION: 99 % | DIASTOLIC BLOOD PRESSURE: 80 MMHG | SYSTOLIC BLOOD PRESSURE: 110 MMHG | BODY MASS INDEX: 29.49 KG/M2 | HEART RATE: 94 BPM | RESPIRATION RATE: 16 BRPM

## 2021-11-22 DIAGNOSIS — S90.562A INSECT BITE OF LEFT ANKLE, INITIAL ENCOUNTER: Primary | ICD-10-CM
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
## 2021-11-22 PROCEDURE — 99999 PR PBB SHADOW E&M-EST. PATIENT-LVL III: CPT | Mod: PBBFAC,,, | Performed by: NURSE PRACTITIONER

## 2021-11-22 PROCEDURE — 99213 OFFICE O/P EST LOW 20 MIN: CPT | Mod: S$GLB,,, | Performed by: NURSE PRACTITIONER

## 2021-11-22 PROCEDURE — 99213 PR OFFICE/OUTPT VISIT, EST, LEVL III, 20-29 MIN: ICD-10-PCS | Mod: S$GLB,,, | Performed by: NURSE PRACTITIONER

## 2021-11-22 PROCEDURE — 99999 PR PBB SHADOW E&M-EST. PATIENT-LVL III: ICD-10-PCS | Mod: PBBFAC,,, | Performed by: NURSE PRACTITIONER

## 2021-11-22 RX ORDER — MUPIROCIN CALCIUM 20 MG/G
CREAM TOPICAL 3 TIMES DAILY
Qty: 30 G | Refills: 2 | Status: SHIPPED | OUTPATIENT
Start: 2021-11-22 | End: 2022-03-24

## 2021-11-22 RX ORDER — TRIAMCINOLONE ACETONIDE 5 MG/G
CREAM TOPICAL 4 TIMES DAILY
Qty: 15 G | Refills: 2 | Status: SHIPPED | OUTPATIENT
Start: 2021-11-22 | End: 2022-03-24

## 2021-11-30 ENCOUNTER — OFFICE VISIT (OUTPATIENT)
Dept: FAMILY MEDICINE | Facility: CLINIC | Age: 41
End: 2021-11-30
Payer: COMMERCIAL

## 2021-11-30 VITALS — BODY MASS INDEX: 27.73 KG/M2 | HEIGHT: 63 IN | TEMPERATURE: 98 F | WEIGHT: 156.5 LBS

## 2021-11-30 DIAGNOSIS — J40 BRONCHITIS: Primary | ICD-10-CM

## 2021-11-30 PROCEDURE — 99999 PR PBB SHADOW E&M-EST. PATIENT-LVL II: CPT | Mod: PBBFAC,,, | Performed by: FAMILY MEDICINE

## 2021-11-30 PROCEDURE — 99214 OFFICE O/P EST MOD 30 MIN: CPT | Mod: S$GLB,,, | Performed by: FAMILY MEDICINE

## 2021-11-30 PROCEDURE — 99214 PR OFFICE/OUTPT VISIT, EST, LEVL IV, 30-39 MIN: ICD-10-PCS | Mod: S$GLB,,, | Performed by: FAMILY MEDICINE

## 2021-11-30 PROCEDURE — 99999 PR PBB SHADOW E&M-EST. PATIENT-LVL II: ICD-10-PCS | Mod: PBBFAC,,, | Performed by: FAMILY MEDICINE

## 2021-11-30 RX ORDER — PREDNISONE 50 MG/1
50 TABLET ORAL DAILY
Qty: 3 TABLET | Refills: 0 | Status: SHIPPED | OUTPATIENT
Start: 2021-11-30 | End: 2022-03-24 | Stop reason: ALTCHOICE

## 2021-11-30 RX ORDER — AZITHROMYCIN 500 MG/1
500 TABLET, FILM COATED ORAL DAILY
Qty: 3 TABLET | Refills: 0 | Status: SHIPPED | OUTPATIENT
Start: 2021-11-30 | End: 2022-03-24

## 2021-12-05 ENCOUNTER — PATIENT MESSAGE (OUTPATIENT)
Dept: FAMILY MEDICINE | Facility: CLINIC | Age: 41
End: 2021-12-05
Payer: COMMERCIAL

## 2021-12-06 ENCOUNTER — TELEPHONE (OUTPATIENT)
Dept: FAMILY MEDICINE | Facility: CLINIC | Age: 41
End: 2021-12-06
Payer: COMMERCIAL

## 2021-12-07 ENCOUNTER — TELEPHONE (OUTPATIENT)
Dept: FAMILY MEDICINE | Facility: CLINIC | Age: 41
End: 2021-12-07
Payer: COMMERCIAL

## 2021-12-08 ENCOUNTER — CLINICAL SUPPORT (OUTPATIENT)
Dept: FAMILY MEDICINE | Facility: CLINIC | Age: 41
End: 2021-12-08
Payer: COMMERCIAL

## 2021-12-08 ENCOUNTER — PATIENT MESSAGE (OUTPATIENT)
Dept: ADMINISTRATIVE | Facility: HOSPITAL | Age: 41
End: 2021-12-08
Payer: COMMERCIAL

## 2021-12-08 DIAGNOSIS — J02.9 PHARYNGITIS, UNSPECIFIED ETIOLOGY: ICD-10-CM

## 2021-12-08 DIAGNOSIS — R05.9 COUGH: ICD-10-CM

## 2021-12-08 DIAGNOSIS — Z12.31 OTHER SCREENING MAMMOGRAM: ICD-10-CM

## 2021-12-08 DIAGNOSIS — N11.9 CHRONIC PYELONEPHRITIS WITHOUT LESION OF RENAL MEDULLARY NECROSIS: Primary | ICD-10-CM

## 2021-12-08 PROCEDURE — 96372 THER/PROPH/DIAG INJ SC/IM: CPT | Mod: S$GLB,,, | Performed by: FAMILY MEDICINE

## 2021-12-08 PROCEDURE — 99499 NO LOS: ICD-10-PCS | Mod: S$GLB,,, | Performed by: FAMILY MEDICINE

## 2021-12-08 PROCEDURE — 99499 UNLISTED E&M SERVICE: CPT | Mod: S$GLB,,, | Performed by: FAMILY MEDICINE

## 2021-12-08 PROCEDURE — 96372 PR INJECTION,THERAP/PROPH/DIAG2ST, IM OR SUBCUT: ICD-10-PCS | Mod: S$GLB,,, | Performed by: FAMILY MEDICINE

## 2021-12-08 RX ORDER — METHYLPREDNISOLONE ACETATE 40 MG/ML
40 INJECTION, SUSPENSION INTRA-ARTICULAR; INTRALESIONAL; INTRAMUSCULAR; SOFT TISSUE
Status: COMPLETED | OUTPATIENT
Start: 2021-12-08 | End: 2021-12-08

## 2021-12-08 RX ORDER — CYANOCOBALAMIN 1000 UG/ML
1000 INJECTION, SOLUTION INTRAMUSCULAR; SUBCUTANEOUS
Status: COMPLETED | OUTPATIENT
Start: 2021-12-08 | End: 2021-12-08

## 2021-12-08 RX ADMIN — METHYLPREDNISOLONE ACETATE 40 MG: 40 INJECTION, SUSPENSION INTRA-ARTICULAR; INTRALESIONAL; INTRAMUSCULAR; SOFT TISSUE at 11:12

## 2021-12-08 RX ADMIN — CYANOCOBALAMIN 1000 MCG: 1000 INJECTION, SOLUTION INTRAMUSCULAR; SUBCUTANEOUS at 11:12

## 2022-01-01 ENCOUNTER — PATIENT MESSAGE (OUTPATIENT)
Dept: FAMILY MEDICINE | Facility: CLINIC | Age: 42
End: 2022-01-01
Payer: COMMERCIAL

## 2022-01-02 ENCOUNTER — PATIENT MESSAGE (OUTPATIENT)
Dept: ADMINISTRATIVE | Facility: OTHER | Age: 42
End: 2022-01-02
Payer: COMMERCIAL

## 2022-01-02 ENCOUNTER — LAB VISIT (OUTPATIENT)
Dept: PRIMARY CARE CLINIC | Facility: OTHER | Age: 42
End: 2022-01-02
Attending: INTERNAL MEDICINE
Payer: COMMERCIAL

## 2022-01-02 DIAGNOSIS — R05.9 COUGH: ICD-10-CM

## 2022-01-02 DIAGNOSIS — Z20.822 ENCOUNTER FOR LABORATORY TESTING FOR COVID-19 VIRUS: ICD-10-CM

## 2022-01-02 PROCEDURE — U0003 INFECTIOUS AGENT DETECTION BY NUCLEIC ACID (DNA OR RNA); SEVERE ACUTE RESPIRATORY SYNDROME CORONAVIRUS 2 (SARS-COV-2) (CORONAVIRUS DISEASE [COVID-19]), AMPLIFIED PROBE TECHNIQUE, MAKING USE OF HIGH THROUGHPUT TECHNOLOGIES AS DESCRIBED BY CMS-2020-01-R: HCPCS | Mod: ST72 | Performed by: INTERNAL MEDICINE

## 2022-01-05 ENCOUNTER — TELEPHONE (OUTPATIENT)
Dept: FAMILY MEDICINE | Facility: CLINIC | Age: 42
End: 2022-01-05
Payer: COMMERCIAL

## 2022-01-07 LAB
SARS-COV-2 RNA RESP QL NAA+PROBE: DETECTED
SARS-COV-2- CYCLE NUMBER: 17

## 2022-03-04 ENCOUNTER — PATIENT MESSAGE (OUTPATIENT)
Dept: FAMILY MEDICINE | Facility: CLINIC | Age: 42
End: 2022-03-04
Payer: COMMERCIAL

## 2022-03-20 ENCOUNTER — HOSPITAL ENCOUNTER (EMERGENCY)
Facility: HOSPITAL | Age: 42
Discharge: HOME | End: 2022-03-20
Attending: EMERGENCY MEDICINE
Payer: COMMERCIAL

## 2022-03-20 VITALS
SYSTOLIC BLOOD PRESSURE: 112 MMHG | BODY MASS INDEX: 28.35 KG/M2 | WEIGHT: 160 LBS | HEART RATE: 96 BPM | DIASTOLIC BLOOD PRESSURE: 72 MMHG | OXYGEN SATURATION: 100 % | RESPIRATION RATE: 18 BRPM | TEMPERATURE: 99 F | HEIGHT: 63 IN

## 2022-03-20 DIAGNOSIS — R10.9 FLANK PAIN: Primary | ICD-10-CM

## 2022-03-20 DIAGNOSIS — R39.89 BLADDER PAIN: ICD-10-CM

## 2022-03-20 LAB
ALBUMIN SERPL BCP-MCNC: 4 G/DL (ref 3.5–5.2)
ALP SERPL-CCNC: 57 U/L (ref 55–135)
ALT SERPL W/O P-5'-P-CCNC: 17 U/L (ref 10–44)
ANION GAP SERPL CALC-SCNC: 10 MMOL/L (ref 8–16)
AST SERPL-CCNC: 14 U/L (ref 10–40)
BASOPHILS # BLD AUTO: 0.04 K/UL (ref 0–0.2)
BASOPHILS NFR BLD: 0.4 % (ref 0–1.9)
BILIRUB SERPL-MCNC: 0.3 MG/DL (ref 0.1–1)
BILIRUB UR QL STRIP: NEGATIVE
BUN SERPL-MCNC: 11 MG/DL (ref 6–20)
CALCIUM SERPL-MCNC: 9.1 MG/DL (ref 8.7–10.5)
CHLORIDE SERPL-SCNC: 104 MMOL/L (ref 95–110)
CLARITY UR: CLEAR
CO2 SERPL-SCNC: 25 MMOL/L (ref 23–29)
COLOR UR: YELLOW
CREAT SERPL-MCNC: 0.7 MG/DL (ref 0.5–1.4)
DIFFERENTIAL METHOD: ABNORMAL
EOSINOPHIL # BLD AUTO: 0.2 K/UL (ref 0–0.5)
EOSINOPHIL NFR BLD: 1.5 % (ref 0–8)
ERYTHROCYTE [DISTWIDTH] IN BLOOD BY AUTOMATED COUNT: 12.4 % (ref 11.5–14.5)
EST. GFR  (AFRICAN AMERICAN): >60 ML/MIN/1.73 M^2
EST. GFR  (NON AFRICAN AMERICAN): >60 ML/MIN/1.73 M^2
GLUCOSE SERPL-MCNC: 100 MG/DL (ref 70–110)
GLUCOSE UR QL STRIP: NEGATIVE
HCT VFR BLD AUTO: 44.4 % (ref 37–48.5)
HGB BLD-MCNC: 15.5 G/DL (ref 12–16)
HGB UR QL STRIP: ABNORMAL
IMM GRANULOCYTES # BLD AUTO: 0.06 K/UL (ref 0–0.04)
IMM GRANULOCYTES NFR BLD AUTO: 0.6 % (ref 0–0.5)
KETONES UR QL STRIP: NEGATIVE
LEUKOCYTE ESTERASE UR QL STRIP: NEGATIVE
LYMPHOCYTES # BLD AUTO: 3 K/UL (ref 1–4.8)
LYMPHOCYTES NFR BLD: 28.9 % (ref 18–48)
MCH RBC QN AUTO: 31.1 PG (ref 27–31)
MCHC RBC AUTO-ENTMCNC: 34.9 G/DL (ref 32–36)
MCV RBC AUTO: 89 FL (ref 82–98)
MONOCYTES # BLD AUTO: 0.8 K/UL (ref 0.3–1)
MONOCYTES NFR BLD: 7.8 % (ref 4–15)
NEUTROPHILS # BLD AUTO: 6.3 K/UL (ref 1.8–7.7)
NEUTROPHILS NFR BLD: 60.8 % (ref 38–73)
NITRITE UR QL STRIP: NEGATIVE
NRBC BLD-RTO: 0 /100 WBC
PH UR STRIP: 7 [PH] (ref 5–8)
PLATELET # BLD AUTO: ABNORMAL K/UL (ref 150–450)
PLATELET BLD QL SMEAR: ABNORMAL
PMV BLD AUTO: ABNORMAL FL (ref 9.2–12.9)
POTASSIUM SERPL-SCNC: 3.6 MMOL/L (ref 3.5–5.1)
PROT SERPL-MCNC: 7.1 G/DL (ref 6–8.4)
PROT UR QL STRIP: NEGATIVE
RBC # BLD AUTO: 4.98 M/UL (ref 4–5.4)
SODIUM SERPL-SCNC: 139 MMOL/L (ref 136–145)
SP GR UR STRIP: 1.01 (ref 1–1.03)
URN SPEC COLLECT METH UR: ABNORMAL
UROBILINOGEN UR STRIP-ACNC: NEGATIVE EU/DL
WBC # BLD AUTO: 10.28 K/UL (ref 3.9–12.7)

## 2022-03-20 PROCEDURE — 63600175 PHARM REV CODE 636 W HCPCS: Performed by: PHYSICIAN ASSISTANT

## 2022-03-20 PROCEDURE — 80053 COMPREHEN METABOLIC PANEL: CPT | Performed by: EMERGENCY MEDICINE

## 2022-03-20 PROCEDURE — 99285 EMERGENCY DEPT VISIT HI MDM: CPT | Mod: 25

## 2022-03-20 PROCEDURE — 85025 COMPLETE CBC W/AUTO DIFF WBC: CPT | Performed by: EMERGENCY MEDICINE

## 2022-03-20 PROCEDURE — 96361 HYDRATE IV INFUSION ADD-ON: CPT

## 2022-03-20 PROCEDURE — 25000003 PHARM REV CODE 250: Performed by: PHYSICIAN ASSISTANT

## 2022-03-20 PROCEDURE — 96375 TX/PRO/DX INJ NEW DRUG ADDON: CPT

## 2022-03-20 PROCEDURE — 81003 URINALYSIS AUTO W/O SCOPE: CPT | Performed by: EMERGENCY MEDICINE

## 2022-03-20 PROCEDURE — 96374 THER/PROPH/DIAG INJ IV PUSH: CPT

## 2022-03-20 RX ORDER — FAMOTIDINE 10 MG/ML
20 INJECTION INTRAVENOUS
Status: COMPLETED | OUTPATIENT
Start: 2022-03-20 | End: 2022-03-20

## 2022-03-20 RX ORDER — POLYETHYLENE GLYCOL 3350 17 G/17G
17 POWDER, FOR SOLUTION ORAL DAILY
Qty: 14 EACH | Refills: 0 | Status: SHIPPED | OUTPATIENT
Start: 2022-03-20 | End: 2022-04-03

## 2022-03-20 RX ORDER — PHENAZOPYRIDINE HYDROCHLORIDE 200 MG/1
200 TABLET, FILM COATED ORAL
Qty: 6 TABLET | Refills: 0 | Status: SHIPPED | OUTPATIENT
Start: 2022-03-20 | End: 2022-03-22

## 2022-03-20 RX ORDER — HYDROCODONE BITARTRATE AND ACETAMINOPHEN 5; 325 MG/1; MG/1
1 TABLET ORAL
Status: DISCONTINUED | OUTPATIENT
Start: 2022-03-20 | End: 2022-03-21 | Stop reason: HOSPADM

## 2022-03-20 RX ORDER — ONDANSETRON 2 MG/ML
4 INJECTION INTRAMUSCULAR; INTRAVENOUS
Status: COMPLETED | OUTPATIENT
Start: 2022-03-20 | End: 2022-03-20

## 2022-03-20 RX ORDER — ONDANSETRON 4 MG/1
4 TABLET, ORALLY DISINTEGRATING ORAL EVERY 6 HOURS PRN
Qty: 20 TABLET | Refills: 0 | Status: SHIPPED | OUTPATIENT
Start: 2022-03-20 | End: 2022-04-23 | Stop reason: ALTCHOICE

## 2022-03-20 RX ORDER — MORPHINE SULFATE 4 MG/ML
4 INJECTION, SOLUTION INTRAMUSCULAR; INTRAVENOUS
Status: DISCONTINUED | OUTPATIENT
Start: 2022-03-20 | End: 2022-03-20

## 2022-03-20 RX ADMIN — ONDANSETRON 4 MG: 2 INJECTION INTRAMUSCULAR; INTRAVENOUS at 09:03

## 2022-03-20 RX ADMIN — SODIUM CHLORIDE, SODIUM LACTATE, POTASSIUM CHLORIDE, AND CALCIUM CHLORIDE 1000 ML: .6; .31; .03; .02 INJECTION, SOLUTION INTRAVENOUS at 09:03

## 2022-03-20 RX ADMIN — FAMOTIDINE 20 MG: 10 INJECTION INTRAVENOUS at 09:03

## 2022-03-21 NOTE — ED PROVIDER NOTES
Encounter Date: 3/20/2022       History     Chief Complaint   Patient presents with    Flank Pain     Pt here with c/o left side flank pain since Friday. Pt reports pain worsens with urination.     41-year-old female smoker presents to ED complaining of 3 day history of left flank pain.    Longstanding history of similar flank pain.  She admits to associated nausea without emesis.  Pain is mostly constant, waxing waning in severity.  Yesterday pain was mostly to the left-sided flank and left lateral abdomen, now mostly to the left lower quadrant and suprapubic region.  She admits to associated dysuria, difficulty urinating today.  No hematuria.  No fever, chills, myalgias.  No trauma.  No recent illness.  Normal BMs.    PMH:  Anxiety  Mood disorder  Chronic pyelonephritis  Hepatosplenomegaly  GERD  Hx polysubstance abuse  Hx nephrolithiasis        Review of patient's allergies indicates:   Allergen Reactions    Strawberry Anaphylaxis and Hives    Morphine Other (See Comments)     Burns stomach    Toradol [ketorolac]      cramping    Tramadol      Abdominal pain     Past Medical History:   Diagnosis Date    Abnormal uterine bleeding 4/18/2018    Acute encephalopathy 12/24/12    secondary to drug overdose    ARF (acute renal failure) 12/24/12    secondary to drug overdose    History of cardiac arrest 12/24/12    secondary to drug overdose    Hx of psychiatric care     Hypertension     Kidney stones 1/8/2018    MRSA (methicillin resistant Staphylococcus aureus) 12/24/12    Nephritis     Polysubstance abuse 12/24/12    Psychiatric problem     Systolic CHF, acute 12/24/12    secondary to drug overdose    Therapy     when younger; recently set up with Cassie Rockweiler, LCSW    UTI (urinary tract infection)      Past Surgical History:   Procedure Laterality Date    AUGMENTATION OF BREAST Bilateral 2001    CHOLECYSTECTOMY      ESOPHAGOGASTRODUODENOSCOPY N/A 1/2/2020    Procedure: EGD  (ESOPHAGOGASTRODUODENOSCOPY);  Surgeon: Moe Tripathi MD;  Location: [facility] ENDO;  Service: Endoscopy;  Laterality: N/A;    LAPAROSCOPIC SALPINGECTOMY Left 2/10/2021    Procedure: SALPINGECTOMY, LAPAROSCOPIC;  Surgeon: Doc Pena MD;  Location: [facility] OR;  Service: OB/GYN;  Laterality: Left;    LAPAROSCOPIC TOTAL HYSTERECTOMY N/A 2/10/2021    Procedure: HYSTERECTOMY, TOTAL, LAPAROSCOPIC;  Surgeon: Doc Pena MD;  Location: [facility] OR;  Service: OB/GYN;  Laterality: N/A;  RN PREOP 2/3/, --COVID NEGATIVE ON 2/9 and T/S done    TUBAL LIGATION      urinary stents       Family History   Problem Relation Age of Onset    Kidney disease Mother     Ovarian cancer Mother 28    Cancer Maternal Grandmother         throat    Cancer Maternal Grandfather         prostate    Kidney disease Daughter     Breast cancer Neg Hx     Colon cancer Neg Hx     Anxiety disorder Neg Hx     Depression Neg Hx     Dementia Neg Hx     Suicide Neg Hx      Social History     Tobacco Use    Smoking status: Former Smoker     Packs/day: 0.50     Years: 20.00     Pack years: 10.00     Types: Cigarettes    Smokeless tobacco: Current User   Substance Use Topics    Alcohol use: Not Currently     Comment: occasional; social    Drug use: No     Types: Cocaine, Methamphetamines     Comment: PCP, patient states she had not done drugs since 2012     Review of Systems   Constitutional: Negative for chills and fever.   Gastrointestinal: Positive for abdominal pain and nausea. Negative for constipation, diarrhea and vomiting.   Genitourinary: Positive for difficulty urinating, dysuria and flank pain. Negative for hematuria.   Musculoskeletal: Positive for back pain. Negative for myalgias.       Physical Exam     Initial Vitals [03/20/22 2045]   BP Pulse Resp Temp SpO2   118/73 99 18 99.5 °F (37.5 °C) 98 %      MAP       --         Physical Exam    Nursing note and vitals reviewed.  Constitutional: She appears well-developed and  well-nourished. She is not diaphoretic. No distress.   HENT:   Head: Normocephalic and atraumatic.   Pulmonary/Chest: No respiratory distress.   Abdominal: Abdomen is soft. Bowel sounds are normal.   Abdomen overall soft, normal bowel sounds ×4.  ttp LLQ, suprapubic region with associated guarding. No palpable mass or distention. L flank ttp.     Neurological: She is alert and oriented to person, place, and time. GCS score is 15. GCS eye subscore is 4. GCS verbal subscore is 5. GCS motor subscore is 6.   Skin: Skin is warm. Capillary refill takes less than 2 seconds.   Psychiatric: Thought content normal.   anxious         ED Course   Procedures  Labs Reviewed   CBC W/ AUTO DIFFERENTIAL - Abnormal; Notable for the following components:       Result Value    MCH 31.1 (*)     Immature Granulocytes 0.6 (*)     Immature Grans (Abs) 0.06 (*)     Platelet Estimate Clumped (*)     All other components within normal limits   URINALYSIS, REFLEX TO URINE CULTURE - Abnormal; Notable for the following components:    Occult Blood UA Trace (*)     All other components within normal limits    Narrative:     Specimen Source->Urine   COMPREHENSIVE METABOLIC PANEL          Imaging Results          CT Renal Stone Study ABD Pelvis WO (Final result)  Result time 03/20/22 22:34:13    Final result by Sara Horner MD (03/20/22 22:34:13)                 Impression:      No nephrolithiasis or hydronephrosis.      Electronically signed by: Sara Horner  Date:    03/20/2022  Time:    22:34             Narrative:    EXAMINATION:  CT RENAL STONE STUDY ABDOMEN PELVIS WITHOUT    CLINICAL HISTORY:  Flank pain    TECHNIQUE:  5 mm unenhanced axial images from the lung bases through the greater trochanters were performed.  Coronal and sagittal reformatted images were provided.    COMPARISON:  None.    FINDINGS:  Within the limits of a noncontrast examination, the liver, pancreas, and adrenal glands are unremarkable.  The gallbladder is  surgically absent mass is present.    There is no nephrolithiasis or hydronephrosis.    There is no gross abdominal adenopathy or ascites.    There are no pelvic masses or adenopathy.  There is no free pelvic fluid.    At the lung bases, there is mild bibasilar atelectatic change.                                 Medications   ondansetron injection 4 mg (4 mg Intravenous Given 3/20/22 2156)   lactated ringers bolus 1,000 mL (0 mLs Intravenous Stopped 3/20/22 2253)   famotidine (PF) injection 20 mg (20 mg Intravenous Given 3/20/22 2156)     Medical Decision Making:   Differential Diagnosis:   Nephrolithiasis, pyelonephritis, chronic pain, malingering, cauda equina, contusion  Clinical Tests:   Lab Tests: Ordered and Reviewed  Radiological Study: Ordered and Reviewed  ED Management:  No evidence of acute process. May be pelvic in origin. Given pyridium for dysuria and bladder pain. Advised urology f/u. Return precautions given.                       Clinical Impression:   Final diagnoses:  [R10.9] Flank pain (Primary)  [R39.89] Bladder pain          ED Disposition Condition    Discharge Stable        ED Prescriptions     Medication Sig Dispense Start Date End Date Auth. Provider    phenazopyridine (PYRIDIUM) 200 MG tablet Take 1 tablet (200 mg total) by mouth 3 (three) times daily with meals. for 2 days 6 tablet 3/20/2022 3/22/2022 Juan R Callejas PA-C    ondansetron (ZOFRAN-ODT) 4 MG TbDL Take 1 tablet (4 mg total) by mouth every 6 (six) hours as needed (nausea). 20 tablet 3/20/2022  Juan R Callejas PA-C    polyethylene glycol (GLYCOLAX) 17 gram PwPk Take 17 g by mouth once daily. for 14 days 14 each 3/20/2022 4/3/2022 Juan R Callejas PA-C        Follow-up Information     Follow up With Specialties Details Why Contact Info    Hermes Colvin MD Family Medicine Schedule an appointment as soon as possible for a visit  For reevaluation [address] [place] [address]  [phone]      Ozzie WHITE  MD [facility] Schedule an appointment as soon as possible for a visit  For reevaluation [address]  [facility] [address]  [phone]             Juan R Callejas PA-C  03/21/22 0615

## 2022-03-21 NOTE — DISCHARGE INSTRUCTIONS
Please follow-up with Urology for re-evaluation.  Peridium to help with bladder discomfort.  Continue with Tylenol or Ibuprofen as needed for pain.  Zofran as needed for nausea.    Return to this ED if you begin with fever, uncontrolled vomiting, if unable to urinate, if any other problems occur.

## 2022-03-24 ENCOUNTER — OFFICE VISIT (OUTPATIENT)
Dept: FAMILY MEDICINE | Facility: CLINIC | Age: 42
End: 2022-03-24
Payer: COMMERCIAL

## 2022-03-24 VITALS
WEIGHT: 156.5 LBS | DIASTOLIC BLOOD PRESSURE: 80 MMHG | OXYGEN SATURATION: 98 % | TEMPERATURE: 98 F | HEART RATE: 96 BPM | SYSTOLIC BLOOD PRESSURE: 126 MMHG | BODY MASS INDEX: 28.8 KG/M2 | HEIGHT: 62 IN

## 2022-03-24 DIAGNOSIS — F31.81 BIPOLAR II DISORDER: ICD-10-CM

## 2022-03-24 PROCEDURE — 3079F PR MOST RECENT DIASTOLIC BLOOD PRESSURE 80-89 MM HG: ICD-10-PCS | Mod: CPTII,S$GLB,, | Performed by: FAMILY MEDICINE

## 2022-03-24 PROCEDURE — 99999 PR PBB SHADOW E&M-EST. PATIENT-LVL III: ICD-10-PCS | Mod: PBBFAC,,, | Performed by: FAMILY MEDICINE

## 2022-03-24 PROCEDURE — 99214 OFFICE O/P EST MOD 30 MIN: CPT | Mod: S$GLB,,, | Performed by: FAMILY MEDICINE

## 2022-03-24 PROCEDURE — 3008F BODY MASS INDEX DOCD: CPT | Mod: CPTII,S$GLB,, | Performed by: FAMILY MEDICINE

## 2022-03-24 PROCEDURE — 1160F RVW MEDS BY RX/DR IN RCRD: CPT | Mod: CPTII,S$GLB,, | Performed by: FAMILY MEDICINE

## 2022-03-24 PROCEDURE — 3008F PR BODY MASS INDEX (BMI) DOCUMENTED: ICD-10-PCS | Mod: CPTII,S$GLB,, | Performed by: FAMILY MEDICINE

## 2022-03-24 PROCEDURE — 1159F MED LIST DOCD IN RCRD: CPT | Mod: CPTII,S$GLB,, | Performed by: FAMILY MEDICINE

## 2022-03-24 PROCEDURE — 3079F DIAST BP 80-89 MM HG: CPT | Mod: CPTII,S$GLB,, | Performed by: FAMILY MEDICINE

## 2022-03-24 PROCEDURE — 1159F PR MEDICATION LIST DOCUMENTED IN MEDICAL RECORD: ICD-10-PCS | Mod: CPTII,S$GLB,, | Performed by: FAMILY MEDICINE

## 2022-03-24 PROCEDURE — 3074F SYST BP LT 130 MM HG: CPT | Mod: CPTII,S$GLB,, | Performed by: FAMILY MEDICINE

## 2022-03-24 PROCEDURE — 3074F PR MOST RECENT SYSTOLIC BLOOD PRESSURE < 130 MM HG: ICD-10-PCS | Mod: CPTII,S$GLB,, | Performed by: FAMILY MEDICINE

## 2022-03-24 PROCEDURE — 99999 PR PBB SHADOW E&M-EST. PATIENT-LVL III: CPT | Mod: PBBFAC,,, | Performed by: FAMILY MEDICINE

## 2022-03-24 PROCEDURE — 1160F PR REVIEW ALL MEDS BY PRESCRIBER/CLIN PHARMACIST DOCUMENTED: ICD-10-PCS | Mod: CPTII,S$GLB,, | Performed by: FAMILY MEDICINE

## 2022-03-24 PROCEDURE — 99214 PR OFFICE/OUTPT VISIT, EST, LEVL IV, 30-39 MIN: ICD-10-PCS | Mod: S$GLB,,, | Performed by: FAMILY MEDICINE

## 2022-03-24 RX ORDER — CARIPRAZINE 1.5 MG/1
1.5 CAPSULE, GELATIN COATED ORAL DAILY
Qty: 30 CAPSULE | Refills: 11 | Status: SHIPPED | OUTPATIENT
Start: 2022-03-24 | End: 2022-04-23 | Stop reason: SDUPTHER

## 2022-03-24 NOTE — PROGRESS NOTES
"HISTORY OF PRESENT ILLNESS:  Roslyn Vera is a 41 y.o. female who presents to the clinic today for Medication Refill  .       She didn't want to continue her medication  Counseled on need  She understands.    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 Negative for weight loss.   Gastrointestinal: Positive for constipation and nausea. Negative for diarrhea, melena and vomiting.   Genitourinary: Negative for dysuria, frequency and hematuria.   Musculoskeletal: Negative for myalgias.   Neurological: Positive for headaches.     Answers for HPI/ROS submitted by the patient on 3/23/2022  Chronicity: new  Onset: in the past 7 days  Onset quality: sudden  Frequency: intermittently  Progression since onset: waxing and waning  Pain location: LLQ, suprapubic region, left flank  Pain - numeric: 5/10  Pain quality: aching, sharp  anorexia: Yes  arthralgias: No  belching: No  flatus: No  hematochezia: No  Diagnostic workup: CT scan  Pain severity: moderate  Treatments tried: acetaminophen  Improvement on treatment: no relief  abdominal surgery: Yes  colon cancer: No  Crohn's disease: No  gallstones: No  GERD: No  irritable bowel syndrome: No  kidney stones: Yes  pancreatitis: No  PUD: No  ulcerative colitis: No  UTI: Yes          PHYSICAL EXAM:   /80   Pulse 96   Temp 98.2 °F (36.8 °C) (Oral)   Ht 5' 2" (1.575 m)   Wt 71 kg (156 lb 8.4 oz)  "  LMP 01/17/2021 (Exact Date) Comment: [facility] 2/10/21  SpO2 98%   BMI 28.63 kg/m²   BP Readings from Last 5 Encounters:   03/24/22 126/80   03/20/22 112/72   11/22/21 110/80   11/17/21 110/75   10/11/21 113/62     Wt Readings from Last 5 Encounters:   03/24/22 71 kg (156 lb 8.4 oz)   03/20/22 72.6 kg (160 lb)   11/30/21 71 kg (156 lb 8.4 oz)   11/22/21 72.7 kg (160 lb 4.4 oz)   11/17/21 70.8 kg (155 lb 15.6 oz)             She appears well, in no apparent distress.  Alert and oriented times three, pleasant and cooperative. Vital signs are as documented in vital signs section.  S1 and S2 normal, no murmurs, clicks, gallops or rubs. Regular rate and rhythm. Chest is clear; no wheezes or rales. No edema or JVD.       Medication List with Changes/Refills   New Medications    CARIPRAZINE (VRAYLAR) 1.5 MG CAP    Take 1 capsule (1.5 mg total) by mouth once daily at 6am.   Current Medications    ALPRAZOLAM (XANAX) 1 MG TABLET    TAKE ONE TABLET BY MOUTH TWICE DAILY AS NEEDED FOR ANXIETY    ONDANSETRON (ZOFRAN-ODT) 4 MG TBDL    Take 1 tablet (4 mg total) by mouth every 6 (six) hours as needed (nausea).    POLYETHYLENE GLYCOL (GLYCOLAX) 17 GRAM PWPK    Take 17 g by mouth once daily. for 14 days   Discontinued Medications    AZITHROMYCIN (ZITHROMAX) 500 MG TABLET    Take 1 tablet (500 mg total) by mouth once daily.    BUSPIRONE (BUSPAR) 5 MG TAB    Take 1 tablet (5 mg total) by mouth 2 (two) times daily.    LURASIDONE (LATUDA) 40 MG TAB TABLET    Take 1 tablet (40 mg total) by mouth once daily.    MUPIROCIN CALCIUM 2% (BACTROBAN) 2 % CREAM    Apply topically 3 (three) times daily.    PREDNISONE (DELTASONE) 50 MG TAB    Take 1 tablet (50 mg total) by mouth once daily.    TRIAMCINOLONE ACETONIDE 0.5% (KENALOG) 0.5 % CREA    Apply topically 4 (four) times daily.         ASSESSMENT AND PLAN:    Problem List Items Addressed This Visit    None     Visit Diagnoses     Bipolar II disorder        Relevant Medications    cariprazine  (VRAYLAR) 1.5 mg Cap      Potential medication side effects were discussed with the patient; let me know if any occur.        Future Appointments   Date Time Provider Department Center   3/31/2022  7:30 AM Turner Villalpando MD [facility]       No follow-ups on file. or sooner as needed.

## 2022-03-28 ENCOUNTER — PATIENT MESSAGE (OUTPATIENT)
Dept: FAMILY MEDICINE | Facility: CLINIC | Age: 42
End: 2022-03-28
Payer: COMMERCIAL

## 2022-03-28 DIAGNOSIS — K59.04 CHRONIC IDIOPATHIC CONSTIPATION: Primary | ICD-10-CM

## 2022-03-29 ENCOUNTER — PATIENT MESSAGE (OUTPATIENT)
Dept: FAMILY MEDICINE | Facility: CLINIC | Age: 42
End: 2022-03-29
Payer: COMMERCIAL

## 2022-03-29 RX ORDER — LINACLOTIDE 145 UG/1
145 CAPSULE, GELATIN COATED ORAL DAILY
Qty: 90 CAPSULE | Refills: 3 | Status: SHIPPED | OUTPATIENT
Start: 2022-03-29 | End: 2022-07-24

## 2022-04-13 ENCOUNTER — PATIENT MESSAGE (OUTPATIENT)
Dept: FAMILY MEDICINE | Facility: CLINIC | Age: 42
End: 2022-04-13
Payer: COMMERCIAL

## 2022-04-14 RX ORDER — METHYLPREDNISOLONE 4 MG/1
TABLET ORAL
Qty: 1 EACH | Refills: 0 | Status: SHIPPED | OUTPATIENT
Start: 2022-04-14 | End: 2022-05-05

## 2022-04-21 ENCOUNTER — PATIENT MESSAGE (OUTPATIENT)
Dept: FAMILY MEDICINE | Facility: CLINIC | Age: 42
End: 2022-04-21
Payer: COMMERCIAL

## 2022-04-21 DIAGNOSIS — F31.81 BIPOLAR II DISORDER: ICD-10-CM

## 2022-04-23 ENCOUNTER — PATIENT MESSAGE (OUTPATIENT)
Dept: FAMILY MEDICINE | Facility: CLINIC | Age: 42
End: 2022-04-23
Payer: COMMERCIAL

## 2022-04-23 RX ORDER — CARIPRAZINE 3 MG/1
3 CAPSULE, GELATIN COATED ORAL DAILY
Qty: 90 CAPSULE | Refills: 3 | Status: SHIPPED | OUTPATIENT
Start: 2022-04-23 | End: 2022-06-16 | Stop reason: ALTCHOICE

## 2022-05-30 ENCOUNTER — PATIENT MESSAGE (OUTPATIENT)
Dept: ADMINISTRATIVE | Facility: HOSPITAL | Age: 42
End: 2022-05-30
Payer: COMMERCIAL

## 2022-06-16 ENCOUNTER — OFFICE VISIT (OUTPATIENT)
Dept: FAMILY MEDICINE | Facility: CLINIC | Age: 42
End: 2022-06-16
Payer: COMMERCIAL

## 2022-06-16 VITALS
WEIGHT: 163.13 LBS | TEMPERATURE: 99 F | OXYGEN SATURATION: 96 % | HEIGHT: 62 IN | SYSTOLIC BLOOD PRESSURE: 112 MMHG | BODY MASS INDEX: 30.02 KG/M2 | DIASTOLIC BLOOD PRESSURE: 60 MMHG | HEART RATE: 99 BPM

## 2022-06-16 DIAGNOSIS — F41.9 ANXIETY: Primary | ICD-10-CM

## 2022-06-16 DIAGNOSIS — F31.81 BIPOLAR II DISORDER: ICD-10-CM

## 2022-06-16 DIAGNOSIS — Z00.00 ANNUAL PHYSICAL EXAM: ICD-10-CM

## 2022-06-16 PROCEDURE — 3074F PR MOST RECENT SYSTOLIC BLOOD PRESSURE < 130 MM HG: ICD-10-PCS | Mod: CPTII,S$GLB,, | Performed by: FAMILY MEDICINE

## 2022-06-16 PROCEDURE — 99999 PR PBB SHADOW E&M-EST. PATIENT-LVL III: ICD-10-PCS | Mod: PBBFAC,,, | Performed by: FAMILY MEDICINE

## 2022-06-16 PROCEDURE — 99396 PREV VISIT EST AGE 40-64: CPT | Mod: S$GLB,,, | Performed by: FAMILY MEDICINE

## 2022-06-16 PROCEDURE — 3008F PR BODY MASS INDEX (BMI) DOCUMENTED: ICD-10-PCS | Mod: CPTII,S$GLB,, | Performed by: FAMILY MEDICINE

## 2022-06-16 PROCEDURE — 99396 PR PREVENTIVE VISIT,EST,40-64: ICD-10-PCS | Mod: S$GLB,,, | Performed by: FAMILY MEDICINE

## 2022-06-16 PROCEDURE — 3074F SYST BP LT 130 MM HG: CPT | Mod: CPTII,S$GLB,, | Performed by: FAMILY MEDICINE

## 2022-06-16 PROCEDURE — 3008F BODY MASS INDEX DOCD: CPT | Mod: CPTII,S$GLB,, | Performed by: FAMILY MEDICINE

## 2022-06-16 PROCEDURE — 3078F DIAST BP <80 MM HG: CPT | Mod: CPTII,S$GLB,, | Performed by: FAMILY MEDICINE

## 2022-06-16 PROCEDURE — 3078F PR MOST RECENT DIASTOLIC BLOOD PRESSURE < 80 MM HG: ICD-10-PCS | Mod: CPTII,S$GLB,, | Performed by: FAMILY MEDICINE

## 2022-06-16 PROCEDURE — 99999 PR PBB SHADOW E&M-EST. PATIENT-LVL III: CPT | Mod: PBBFAC,,, | Performed by: FAMILY MEDICINE

## 2022-06-16 RX ORDER — ALPRAZOLAM 1 MG/1
1 TABLET ORAL 2 TIMES DAILY
Qty: 60 TABLET | Refills: 2 | Status: SHIPPED | OUTPATIENT
Start: 2022-06-16 | End: 2022-12-03

## 2022-06-16 RX ORDER — ARIPIPRAZOLE 2 MG/1
2 TABLET ORAL NIGHTLY
Qty: 90 TABLET | Refills: 3 | Status: SHIPPED | OUTPATIENT
Start: 2022-06-16 | End: 2022-11-30 | Stop reason: ALTCHOICE

## 2022-06-16 NOTE — PROGRESS NOTES
Answers for HPI/ROS submitted by the patient on 6/16/2022  activity change: No  unexpected weight change: No  neck pain: No  hearing loss: No  rhinorrhea: No  trouble swallowing: No  eye discharge: No  visual disturbance: No  chest tightness: No  wheezing: No  chest pain: No  palpitations: No  blood in stool: No  constipation: No  vomiting: No  diarrhea: No  polydipsia: No  polyuria: No  difficulty urinating: No  hematuria: No  menstrual problem: No  dysuria: No  joint swelling: No  arthralgias: No  headaches: No  weakness: No  confusion: No  dysphoric mood: No

## 2022-06-20 ENCOUNTER — HOSPITAL ENCOUNTER (EMERGENCY)
Facility: HOSPITAL | Age: 42
Discharge: HOME | End: 2022-06-20
Attending: EMERGENCY MEDICINE
Payer: COMMERCIAL

## 2022-06-20 ENCOUNTER — PATIENT MESSAGE (OUTPATIENT)
Dept: FAMILY MEDICINE | Facility: CLINIC | Age: 42
End: 2022-06-20
Payer: COMMERCIAL

## 2022-06-20 VITALS
WEIGHT: 160 LBS | HEART RATE: 79 BPM | SYSTOLIC BLOOD PRESSURE: 109 MMHG | DIASTOLIC BLOOD PRESSURE: 63 MMHG | BODY MASS INDEX: 29.44 KG/M2 | RESPIRATION RATE: 15 BRPM | HEIGHT: 62 IN | TEMPERATURE: 98 F | OXYGEN SATURATION: 99 %

## 2022-06-20 DIAGNOSIS — R10.9 LEFT FLANK PAIN: ICD-10-CM

## 2022-06-20 DIAGNOSIS — N20.0 LEFT RENAL STONE: ICD-10-CM

## 2022-06-20 DIAGNOSIS — K57.90 DIVERTICULOSIS: ICD-10-CM

## 2022-06-20 DIAGNOSIS — N12 PYELONEPHRITIS: Primary | ICD-10-CM

## 2022-06-20 DIAGNOSIS — R16.2 HEPATOSPLENOMEGALY: ICD-10-CM

## 2022-06-20 DIAGNOSIS — R30.0 DYSURIA: Primary | ICD-10-CM

## 2022-06-20 LAB
ALBUMIN SERPL BCP-MCNC: 4.3 G/DL (ref 3.5–5.2)
ALP SERPL-CCNC: 55 U/L (ref 55–135)
ALT SERPL W/O P-5'-P-CCNC: 16 U/L (ref 10–44)
ANION GAP SERPL CALC-SCNC: 8 MMOL/L (ref 8–16)
AST SERPL-CCNC: 15 U/L (ref 10–40)
BACTERIA #/AREA URNS HPF: ABNORMAL /HPF
BASOPHILS # BLD AUTO: 0.04 K/UL (ref 0–0.2)
BASOPHILS NFR BLD: 0.4 % (ref 0–1.9)
BILIRUB SERPL-MCNC: 0.6 MG/DL (ref 0.1–1)
BILIRUB UR QL STRIP: NEGATIVE
BUN SERPL-MCNC: 9 MG/DL (ref 6–20)
CALCIUM SERPL-MCNC: 9 MG/DL (ref 8.7–10.5)
CHLORIDE SERPL-SCNC: 104 MMOL/L (ref 95–110)
CLARITY UR: ABNORMAL
CO2 SERPL-SCNC: 27 MMOL/L (ref 23–29)
COLOR UR: YELLOW
CREAT SERPL-MCNC: 0.7 MG/DL (ref 0.5–1.4)
DIFFERENTIAL METHOD: ABNORMAL
EOSINOPHIL # BLD AUTO: 0.1 K/UL (ref 0–0.5)
EOSINOPHIL NFR BLD: 1.3 % (ref 0–8)
ERYTHROCYTE [DISTWIDTH] IN BLOOD BY AUTOMATED COUNT: 12 % (ref 11.5–14.5)
EST. GFR  (AFRICAN AMERICAN): >60 ML/MIN/1.73 M^2
EST. GFR  (NON AFRICAN AMERICAN): >60 ML/MIN/1.73 M^2
GLUCOSE SERPL-MCNC: 111 MG/DL (ref 70–110)
GLUCOSE UR QL STRIP: NEGATIVE
HCT VFR BLD AUTO: 45.1 % (ref 37–48.5)
HGB BLD-MCNC: 15.6 G/DL (ref 12–16)
HGB UR QL STRIP: ABNORMAL
IMM GRANULOCYTES # BLD AUTO: 0.05 K/UL (ref 0–0.04)
IMM GRANULOCYTES NFR BLD AUTO: 0.5 % (ref 0–0.5)
KETONES UR QL STRIP: NEGATIVE
LEUKOCYTE ESTERASE UR QL STRIP: ABNORMAL
LYMPHOCYTES # BLD AUTO: 2.3 K/UL (ref 1–4.8)
LYMPHOCYTES NFR BLD: 22.9 % (ref 18–48)
MCH RBC QN AUTO: 31.2 PG (ref 27–31)
MCHC RBC AUTO-ENTMCNC: 34.6 G/DL (ref 32–36)
MCV RBC AUTO: 90 FL (ref 82–98)
MICROSCOPIC COMMENT: ABNORMAL
MONOCYTES # BLD AUTO: 0.6 K/UL (ref 0.3–1)
MONOCYTES NFR BLD: 6.2 % (ref 4–15)
NEUTROPHILS # BLD AUTO: 6.9 K/UL (ref 1.8–7.7)
NEUTROPHILS NFR BLD: 68.7 % (ref 38–73)
NITRITE UR QL STRIP: NEGATIVE
NRBC BLD-RTO: 0 /100 WBC
PH UR STRIP: 5 [PH] (ref 5–8)
PLATELET # BLD AUTO: 260 K/UL (ref 150–450)
PMV BLD AUTO: 9.7 FL (ref 9.2–12.9)
POTASSIUM SERPL-SCNC: 4 MMOL/L (ref 3.5–5.1)
PROT SERPL-MCNC: 7.4 G/DL (ref 6–8.4)
PROT UR QL STRIP: NEGATIVE
RBC # BLD AUTO: 5 M/UL (ref 4–5.4)
RBC #/AREA URNS HPF: 4 /HPF (ref 0–4)
SODIUM SERPL-SCNC: 139 MMOL/L (ref 136–145)
SP GR UR STRIP: 1.01 (ref 1–1.03)
SQUAMOUS #/AREA URNS HPF: 7 /HPF
URN SPEC COLLECT METH UR: ABNORMAL
UROBILINOGEN UR STRIP-ACNC: NEGATIVE EU/DL
WBC # BLD AUTO: 10.06 K/UL (ref 3.9–12.7)
WBC #/AREA URNS HPF: 44 /HPF (ref 0–5)

## 2022-06-20 PROCEDURE — 25000003 PHARM REV CODE 250: Performed by: NURSE PRACTITIONER

## 2022-06-20 PROCEDURE — 96365 THER/PROPH/DIAG IV INF INIT: CPT

## 2022-06-20 PROCEDURE — 96376 TX/PRO/DX INJ SAME DRUG ADON: CPT | Mod: 59

## 2022-06-20 PROCEDURE — 85025 COMPLETE CBC W/AUTO DIFF WBC: CPT | Performed by: EMERGENCY MEDICINE

## 2022-06-20 PROCEDURE — 96375 TX/PRO/DX INJ NEW DRUG ADDON: CPT | Mod: 59

## 2022-06-20 PROCEDURE — 87077 CULTURE AEROBIC IDENTIFY: CPT | Performed by: EMERGENCY MEDICINE

## 2022-06-20 PROCEDURE — 87088 URINE BACTERIA CULTURE: CPT | Performed by: EMERGENCY MEDICINE

## 2022-06-20 PROCEDURE — 87086 URINE CULTURE/COLONY COUNT: CPT | Performed by: EMERGENCY MEDICINE

## 2022-06-20 PROCEDURE — 80053 COMPREHEN METABOLIC PANEL: CPT | Performed by: EMERGENCY MEDICINE

## 2022-06-20 PROCEDURE — 63600175 PHARM REV CODE 636 W HCPCS: Performed by: NURSE PRACTITIONER

## 2022-06-20 PROCEDURE — 99285 EMERGENCY DEPT VISIT HI MDM: CPT | Mod: 25

## 2022-06-20 PROCEDURE — 87186 SC STD MICRODIL/AGAR DIL: CPT | Performed by: EMERGENCY MEDICINE

## 2022-06-20 PROCEDURE — 81000 URINALYSIS NONAUTO W/SCOPE: CPT | Performed by: EMERGENCY MEDICINE

## 2022-06-20 PROCEDURE — 25500020 PHARM REV CODE 255: Performed by: EMERGENCY MEDICINE

## 2022-06-20 RX ORDER — NITROFURANTOIN 25; 75 MG/1; MG/1
100 CAPSULE ORAL 2 TIMES DAILY
Qty: 10 CAPSULE | Refills: 0 | Status: SHIPPED | OUTPATIENT
Start: 2022-06-20 | End: 2022-06-25

## 2022-06-20 RX ORDER — PHENAZOPYRIDINE HYDROCHLORIDE 200 MG/1
200 TABLET, FILM COATED ORAL 3 TIMES DAILY
Qty: 6 TABLET | Refills: 0 | Status: SHIPPED | OUTPATIENT
Start: 2022-06-20 | End: 2022-06-30

## 2022-06-20 RX ORDER — ONDANSETRON 2 MG/ML
4 INJECTION INTRAMUSCULAR; INTRAVENOUS
Status: COMPLETED | OUTPATIENT
Start: 2022-06-20 | End: 2022-06-20

## 2022-06-20 RX ORDER — HYDROMORPHONE HYDROCHLORIDE 2 MG/ML
1 INJECTION, SOLUTION INTRAMUSCULAR; INTRAVENOUS; SUBCUTANEOUS
Status: COMPLETED | OUTPATIENT
Start: 2022-06-20 | End: 2022-06-20

## 2022-06-20 RX ORDER — CEPHALEXIN 500 MG/1
500 CAPSULE ORAL EVERY 6 HOURS
Qty: 40 CAPSULE | Refills: 0 | Status: SHIPPED | OUTPATIENT
Start: 2022-06-20 | End: 2022-06-30

## 2022-06-20 RX ORDER — PHENAZOPYRIDINE HYDROCHLORIDE 100 MG/1
100 TABLET, FILM COATED ORAL
Status: COMPLETED | OUTPATIENT
Start: 2022-06-20 | End: 2022-06-20

## 2022-06-20 RX ADMIN — PHENAZOPYRIDINE HYDROCHLORIDE 100 MG: 100 TABLET ORAL at 12:06

## 2022-06-20 RX ADMIN — HYDROMORPHONE HYDROCHLORIDE 1 MG: 2 INJECTION INTRAMUSCULAR; INTRAVENOUS; SUBCUTANEOUS at 12:06

## 2022-06-20 RX ADMIN — ONDANSETRON 4 MG: 2 INJECTION INTRAMUSCULAR; INTRAVENOUS at 11:06

## 2022-06-20 RX ADMIN — IOHEXOL 75 ML: 350 INJECTION, SOLUTION INTRAVENOUS at 11:06

## 2022-06-20 RX ADMIN — ONDANSETRON 4 MG: 2 INJECTION INTRAMUSCULAR; INTRAVENOUS at 01:06

## 2022-06-20 RX ADMIN — SODIUM CHLORIDE 1000 ML: 0.9 INJECTION, SOLUTION INTRAVENOUS at 11:06

## 2022-06-20 RX ADMIN — CEFTRIAXONE 1 G: 1 INJECTION, SOLUTION INTRAVENOUS at 11:06

## 2022-06-20 RX ADMIN — HYDROMORPHONE HYDROCHLORIDE 1 MG: 2 INJECTION INTRAMUSCULAR; INTRAVENOUS; SUBCUTANEOUS at 11:06

## 2022-06-20 NOTE — DISCHARGE INSTRUCTIONS

## 2022-06-20 NOTE — ED PROVIDER NOTES
Encounter Date: 6/20/2022       History     Chief Complaint   Patient presents with    Flank Pain     Pt c/c L flank pain x 2 days with strong smelling urine and cloudy color.     CC:  Flank Pain    HPI: Roslyn Vera, a 41 y.o. female presents to the ED with an acute onset of left-sided flank pain and lower abdominal pain that began approximately 4 days ago.  She describes pain is constant.  She reports associated urinary odor a dysuria.  She reports associated nausea without vomiting or diarrhea.  She reports a history of pyelo, this feels similar.  Last episode was approximately 6 months ago.  She reports a history of kidney stones as well.  She reports no vaginal bleeding or vaginal discharge.  No concern for STIs.  Last dose of medication was Tylenol yesterday.    Patient Active Problem List:     Cigarette nicotine dependence without complication     PMDD (premenstrual dysphoric disorder)     Anxiety     Mood disorder     Chronic pyelonephritis without lesion of renal medullary necrosis     Hepatosplenomegaly     GERD (gastroesophageal reflux disease)     Status post laparoscopic hysterectomy      The history is provided by the patient. No  was used.     Review of patient's allergies indicates:   Allergen Reactions    Strawberry Anaphylaxis and Hives    Morphine Other (See Comments)     Burns stomach    Toradol [ketorolac]      cramping    Tramadol      Abdominal pain     Past Medical History:   Diagnosis Date    Abnormal uterine bleeding 4/18/2018    Acute encephalopathy 12/24/12    secondary to drug overdose    ARF (acute renal failure) 12/24/12    secondary to drug overdose    History of cardiac arrest 12/24/12    secondary to drug overdose    Hx of psychiatric care     Hypertension     Kidney stones 1/8/2018    MRSA (methicillin resistant Staphylococcus aureus) 12/24/12    Nephritis     Polysubstance abuse 12/24/12    Psychiatric problem     Systolic CHF, acute  12/24/12    secondary to drug overdose    Therapy     when younger; recently set up with Cassie Rockweiler, LCSW    UTI (urinary tract infection)      Past Surgical History:   Procedure Laterality Date    AUGMENTATION OF BREAST Bilateral 2001    CHOLECYSTECTOMY      ESOPHAGOGASTRODUODENOSCOPY N/A 1/2/2020    Procedure: EGD (ESOPHAGOGASTRODUODENOSCOPY);  Surgeon: Moe Tripathi MD;  Location: [facility];  Service: Endoscopy;  Laterality: N/A;    LAPAROSCOPIC SALPINGECTOMY Left 2/10/2021    Procedure: SALPINGECTOMY, LAPAROSCOPIC;  Surgeon: Doc Pena MD;  Location: [facility] OR;  Service: OB/GYN;  Laterality: Left;    LAPAROSCOPIC TOTAL HYSTERECTOMY N/A 2/10/2021    Procedure: HYSTERECTOMY, TOTAL, LAPAROSCOPIC;  Surgeon: Doc Pena MD;  Location: [facility] OR;  Service: OB/GYN;  Laterality: N/A;  RN PREOP 2/3/, --COVID NEGATIVE ON 2/9 and T/S done    TUBAL LIGATION      urinary stents       Family History   Problem Relation Age of Onset    Kidney disease Mother     Ovarian cancer Mother 28    Cancer Maternal Grandmother         throat    Cancer Maternal Grandfather         prostate    Kidney disease Daughter     Breast cancer Neg Hx     Colon cancer Neg Hx     Anxiety disorder Neg Hx     Depression Neg Hx     Dementia Neg Hx     Suicide Neg Hx      Social History     Tobacco Use    Smoking status: Former Smoker     Packs/day: 0.50     Years: 20.00     Pack years: 10.00     Types: Cigarettes    Smokeless tobacco: Current User   Substance Use Topics    Alcohol use: Yes     Comment: occasional; social    Drug use: No     Types: Cocaine, Methamphetamines     Comment: PCP, patient states she had not done drugs since 2012     Review of Systems   Constitutional: Negative for chills and fever.   HENT: Negative for sore throat and trouble swallowing.    Respiratory: Negative for cough and shortness of breath.    Cardiovascular: Negative for chest pain and leg swelling.   Gastrointestinal: Negative  for vomiting.   Genitourinary: Positive for dysuria and flank pain. Negative for vaginal bleeding and vaginal discharge.        (+) Urinary Odor   Musculoskeletal: Negative for neck pain and neck stiffness.   Skin: Negative for rash and wound.   Neurological: Negative for syncope and headaches.   Psychiatric/Behavioral: Negative for confusion.       Physical Exam     Initial Vitals [06/20/22 0956]   BP Pulse Resp Temp SpO2   134/75 95 16 98.4 °F (36.9 °C) 97 %      MAP       --         Physical Exam    Nursing note and vitals reviewed.  Constitutional: She appears well-developed and well-nourished. She is not diaphoretic. She is cooperative.  Non-toxic appearance. She does not have a sickly appearance. She does not appear ill. No distress.   HENT:   Head: Normocephalic and atraumatic.   Right Ear: External ear normal.   Left Ear: External ear normal.   Nose: Nose normal.   Mouth/Throat: Mucous membranes are normal. No trismus in the jaw.   Eyes: Conjunctivae and EOM are normal.   Neck: Phonation normal. No tracheal deviation present.   Normal range of motion.  Cardiovascular: Normal rate, regular rhythm and intact distal pulses.   Pulses:       Radial pulses are 2+ on the right side and 2+ on the left side.   Pulmonary/Chest: Effort normal and breath sounds normal. No accessory muscle usage or stridor. No tachypnea and no bradypnea. No respiratory distress. She has no wheezes. She has no rhonchi. She has no rales. She exhibits no tenderness.   Abdominal: Abdomen is soft and flat. She exhibits no distension. There is abdominal tenderness in the left lower quadrant.   No right CVA tenderness.  There is left CVA tenderness. There is no rebound and no guarding.   Musculoskeletal:         General: Normal range of motion.      Cervical back: Normal range of motion.     Neurological: She is alert and oriented to person, place, and time. She has normal strength. No sensory deficit. Coordination and gait normal. GCS score is  15. GCS eye subscore is 4. GCS verbal subscore is 5. GCS motor subscore is 6.   Skin: Skin is warm, dry and intact. Capillary refill takes less than 2 seconds. No bruising and no rash noted. No cyanosis or erythema. Nails show no clubbing.   Psychiatric: She has a normal mood and affect. Her behavior is normal. Judgment and thought content normal.         ED Course   Procedures  Labs Reviewed   CBC W/ AUTO DIFFERENTIAL - Abnormal; Notable for the following components:       Result Value    MCH 31.2 (*)     Immature Grans (Abs) 0.05 (*)     All other components within normal limits   COMPREHENSIVE METABOLIC PANEL - Abnormal; Notable for the following components:    Glucose 111 (*)     All other components within normal limits   URINALYSIS, REFLEX TO URINE CULTURE - Abnormal; Notable for the following components:    Appearance, UA Hazy (*)     Occult Blood UA 1+ (*)     Leukocytes, UA 1+ (*)     All other components within normal limits    Narrative:     Specimen Source->Urine   URINALYSIS MICROSCOPIC - Abnormal; Notable for the following components:    WBC, UA 44 (*)     Bacteria Few (*)     All other components within normal limits    Narrative:     Specimen Source->Urine   CULTURE, URINE          Imaging Results          CT Abdomen Pelvis With Contrast (Final result)  Result time 06/20/22 12:42:44    Final result by Shawn Guerrero MD (06/20/22 12:42:44)                 Impression:      1. Mild diverticulosis coli without acute diverticulitis.  2. Left renal lower pole solitary nonobstructing nephrolith, unchanged.  3. Hepatosplenomegaly.  4. Remote cholecystectomy and hysterectomy.  5. Few additional findings as above.      Electronically signed by: Shawn Guerrero MD  Date:    06/20/2022  Time:    12:42             Narrative:    EXAMINATION:  CT ABDOMEN PELVIS WITH CONTRAST    CLINICAL HISTORY:  LLQ abdominal pain;    TECHNIQUE:  Low dose axial images, sagittal and coronal reformations were obtained from the lung  bases to the pubic symphysis following the IV administration of 75 mL of Omnipaque 350 .  Oral contrast was not given.    COMPARISON:  CT renal stone study 03/20/2022 and pelvic ultrasound 12/04/2020; renal ultrasound 09/01/2019    FINDINGS:  Partially imaged bilateral breast implants.  Imaged lung bases show mild dependent atelectasis and few scattered areas of platelike scarring versus atelectasis.  Base of the heart is normal in size without significant pericardial fluid.    Remote cholecystectomy.  No biliary ductal dilatation.    Main portal vein is patent.  Liver and spleen are both enlarged similar to prior without discrete lesion seen.  Pancreas, stomach, duodenum and bilateral adrenal glands are within normal limits.    Bilateral kidneys are normal in size, shape and location with relatively symmetric normal enhancement.  No hydronephrosis or significant perinephric stranding.  4 x 6 mm calculus at the left renal lower pole, unchanged.  Ureters are nondilated.  Urinary bladder is within normal limits.  Hysterectomy.  No adnexal mass.    No ascites, free air or lymphadenopathy.  No significant atherosclerosis.  No aortic aneurysm or dissection.    Tiny fat containing umbilical hernia.    Appendix and terminal ileum are within normal limits.  Mild amount of scattered fecal material throughout the colon.  No evidence of bowel obstruction or acute inflammation.  Several scattered diverticula of the descending and sigmoid colon without acute diverticulitis.  No pneumatosis or portal venous gas.    Osseous structures appear stable without acute process seen.                                 Medications   HYDROmorphone (PF) injection 1 mg (1 mg Intravenous Given 6/20/22 1104)   sodium chloride 0.9% bolus 1,000 mL (0 mLs Intravenous Stopped 6/20/22 1203)   ondansetron injection 4 mg (4 mg Intravenous Given 6/20/22 1104)   cefTRIAXone (ROCEPHIN) 1 g/50 mL D5W IVPB (0 g Intravenous Stopped 6/20/22 1152)   iohexoL  (OMNIPAQUE 350) injection 75 mL (75 mLs Intravenous Given 6/20/22 1133)   HYDROmorphone (PF) injection 1 mg (1 mg Intravenous Given 6/20/22 1213)   phenazopyridine tablet 100 mg (100 mg Oral Given 6/20/22 1257)   ondansetron injection 4 mg (4 mg Intravenous Given 6/20/22 1354)           APC / Resident Notes:   This is an evaluation of a 41 y.o. female that presents to the Emergency Department for left-sided flank pain and left lower quadrant abdominal pain. Physical Exam shows a non-toxic, afebrile, and well appearing female.  There is CVA tenderness in the left.  Abdomen is soft with tenderness in left lower quadrant.  No CVA tenderness in the right.  No abdominal pain on the right to palpation.  No peritoneal signs or guarding.  Vital signs are reassuring. If available, previous records reviewed. RESULTS:  CBC without leukocytosis or anemia.  Normal platelet count.    My overall impression is flank pain, suspect pyelonephritis. I considered, but at this time, do not suspect bowel obstruction, bowel perforation, appendicitis, diverticulitis, infected stone, obstructive uropathy.  Discussed incidental findings of hepatosplenomegaly, diverticulosis.  Patient was previously aware of the stone left kidney.    Discharge Meds/Instructions:  Keflex, Pyridium. The diagnosis, treatment plan, instructions for follow-up as well as ED return precautions were discussed. All questions have been answered.  ANNA Craft, MEHREEN-C                 Clinical Impression:   Final diagnoses:  [R10.9] Left flank pain  [N12] Pyelonephritis (Primary)  [K57.90] Diverticulosis  [R16.2] Hepatosplenomegaly  [N20.0] Left renal stone          ED Disposition Condition    Discharge Stable        ED Prescriptions     Medication Sig Dispense Start Date End Date Auth. Provider    cephALEXin (KEFLEX) 500 MG capsule Take 1 capsule (500 mg total) by mouth every 6 (six) hours. for 10 days 40 capsule 6/20/2022 6/30/2022 MEHREEN Patrick     phenazopyridine (PYRIDIUM) 200 MG tablet Take 1 tablet (200 mg total) by mouth 3 (three) times daily. for 10 days 6 tablet 6/20/2022 6/30/2022 MEHREEN Patrick        Follow-up Information     Follow up With Specialties Details Why Contact Info    Your Primary Care Doctor  Schedule an appointment as soon as possible for a visit  Please call and schedule an appointment for follow up this week.     [facility] Emergency Dept Emergency Medicine Go to  If symptoms worsen [address] [place], [place] [address] [address] [phone]           MEHREEN Patrick  06/20/22 0222

## 2022-06-20 NOTE — ED TRIAGE NOTES
Pt to the ED with complaints of left sided flank pain that radiates to her left abdomen x2 days. Pt also reports cloudy, foul smelling urine x2 days.

## 2022-06-22 LAB — BACTERIA UR CULT: ABNORMAL

## 2022-07-24 ENCOUNTER — HOSPITAL ENCOUNTER (EMERGENCY)
Facility: HOSPITAL | Age: 42
Discharge: HOME | End: 2022-07-24
Attending: EMERGENCY MEDICINE
Payer: COMMERCIAL

## 2022-07-24 VITALS
DIASTOLIC BLOOD PRESSURE: 62 MMHG | SYSTOLIC BLOOD PRESSURE: 120 MMHG | TEMPERATURE: 98 F | OXYGEN SATURATION: 100 % | RESPIRATION RATE: 18 BRPM | WEIGHT: 160 LBS | HEART RATE: 82 BPM | BODY MASS INDEX: 29.26 KG/M2

## 2022-07-24 DIAGNOSIS — N12 PYELONEPHRITIS: Primary | ICD-10-CM

## 2022-07-24 LAB
ALBUMIN SERPL BCP-MCNC: 4.4 G/DL (ref 3.5–5.2)
ALP SERPL-CCNC: 60 U/L (ref 55–135)
ALT SERPL W/O P-5'-P-CCNC: 31 U/L (ref 10–44)
ANION GAP SERPL CALC-SCNC: 9 MMOL/L (ref 8–16)
AST SERPL-CCNC: 19 U/L (ref 10–40)
BACTERIA #/AREA URNS HPF: ABNORMAL /HPF
BASOPHILS # BLD AUTO: 0.04 K/UL (ref 0–0.2)
BASOPHILS NFR BLD: 0.4 % (ref 0–1.9)
BILIRUB SERPL-MCNC: 0.4 MG/DL (ref 0.1–1)
BILIRUB UR QL STRIP: NEGATIVE
BUN SERPL-MCNC: 13 MG/DL (ref 6–20)
CALCIUM SERPL-MCNC: 9.6 MG/DL (ref 8.7–10.5)
CHLORIDE SERPL-SCNC: 104 MMOL/L (ref 95–110)
CLARITY UR: ABNORMAL
CO2 SERPL-SCNC: 28 MMOL/L (ref 23–29)
COLOR UR: YELLOW
CREAT SERPL-MCNC: 0.7 MG/DL (ref 0.5–1.4)
DIFFERENTIAL METHOD: ABNORMAL
EOSINOPHIL # BLD AUTO: 0.2 K/UL (ref 0–0.5)
EOSINOPHIL NFR BLD: 2.1 % (ref 0–8)
ERYTHROCYTE [DISTWIDTH] IN BLOOD BY AUTOMATED COUNT: 11.9 % (ref 11.5–14.5)
EST. GFR  (AFRICAN AMERICAN): >60 ML/MIN/1.73 M^2
EST. GFR  (NON AFRICAN AMERICAN): >60 ML/MIN/1.73 M^2
GLUCOSE SERPL-MCNC: 100 MG/DL (ref 70–110)
GLUCOSE UR QL STRIP: NEGATIVE
HCT VFR BLD AUTO: 41.9 % (ref 37–48.5)
HGB BLD-MCNC: 15.2 G/DL (ref 12–16)
HGB UR QL STRIP: ABNORMAL
IMM GRANULOCYTES # BLD AUTO: 0.04 K/UL (ref 0–0.04)
IMM GRANULOCYTES NFR BLD AUTO: 0.4 % (ref 0–0.5)
KETONES UR QL STRIP: NEGATIVE
LEUKOCYTE ESTERASE UR QL STRIP: ABNORMAL
LIPASE SERPL-CCNC: 40 U/L (ref 4–60)
LYMPHOCYTES # BLD AUTO: 3.1 K/UL (ref 1–4.8)
LYMPHOCYTES NFR BLD: 31.3 % (ref 18–48)
MCH RBC QN AUTO: 31.8 PG (ref 27–31)
MCHC RBC AUTO-ENTMCNC: 36.3 G/DL (ref 32–36)
MCV RBC AUTO: 88 FL (ref 82–98)
MICROSCOPIC COMMENT: ABNORMAL
MONOCYTES # BLD AUTO: 0.6 K/UL (ref 0.3–1)
MONOCYTES NFR BLD: 6.4 % (ref 4–15)
NEUTROPHILS # BLD AUTO: 5.9 K/UL (ref 1.8–7.7)
NEUTROPHILS NFR BLD: 59.4 % (ref 38–73)
NITRITE UR QL STRIP: NEGATIVE
NRBC BLD-RTO: 0 /100 WBC
PH UR STRIP: 6 [PH] (ref 5–8)
PLATELET # BLD AUTO: 257 K/UL (ref 150–450)
PMV BLD AUTO: 9.7 FL (ref 9.2–12.9)
POTASSIUM SERPL-SCNC: 3.7 MMOL/L (ref 3.5–5.1)
PROT SERPL-MCNC: 7.5 G/DL (ref 6–8.4)
PROT UR QL STRIP: ABNORMAL
RBC # BLD AUTO: 4.78 M/UL (ref 4–5.4)
RBC #/AREA URNS HPF: 19 /HPF (ref 0–4)
SODIUM SERPL-SCNC: 141 MMOL/L (ref 136–145)
SP GR UR STRIP: 1.02 (ref 1–1.03)
SQUAMOUS #/AREA URNS HPF: 6 /HPF
URN SPEC COLLECT METH UR: ABNORMAL
UROBILINOGEN UR STRIP-ACNC: NEGATIVE EU/DL
WBC # BLD AUTO: 9.94 K/UL (ref 3.9–12.7)
WBC #/AREA URNS HPF: >100 /HPF (ref 0–5)

## 2022-07-24 PROCEDURE — 99284 EMERGENCY DEPT VISIT MOD MDM: CPT | Mod: 25

## 2022-07-24 PROCEDURE — 96375 TX/PRO/DX INJ NEW DRUG ADDON: CPT

## 2022-07-24 PROCEDURE — 80053 COMPREHEN METABOLIC PANEL: CPT | Performed by: PHYSICIAN ASSISTANT

## 2022-07-24 PROCEDURE — 96361 HYDRATE IV INFUSION ADD-ON: CPT

## 2022-07-24 PROCEDURE — 83690 ASSAY OF LIPASE: CPT | Performed by: PHYSICIAN ASSISTANT

## 2022-07-24 PROCEDURE — 87186 SC STD MICRODIL/AGAR DIL: CPT | Performed by: PHYSICIAN ASSISTANT

## 2022-07-24 PROCEDURE — 63600175 PHARM REV CODE 636 W HCPCS: Performed by: PHYSICIAN ASSISTANT

## 2022-07-24 PROCEDURE — 87088 URINE BACTERIA CULTURE: CPT | Performed by: PHYSICIAN ASSISTANT

## 2022-07-24 PROCEDURE — 87086 URINE CULTURE/COLONY COUNT: CPT | Performed by: PHYSICIAN ASSISTANT

## 2022-07-24 PROCEDURE — 81000 URINALYSIS NONAUTO W/SCOPE: CPT | Performed by: PHYSICIAN ASSISTANT

## 2022-07-24 PROCEDURE — 87077 CULTURE AEROBIC IDENTIFY: CPT | Performed by: PHYSICIAN ASSISTANT

## 2022-07-24 PROCEDURE — 96365 THER/PROPH/DIAG IV INF INIT: CPT

## 2022-07-24 PROCEDURE — 85025 COMPLETE CBC W/AUTO DIFF WBC: CPT | Performed by: PHYSICIAN ASSISTANT

## 2022-07-24 RX ORDER — ONDANSETRON 2 MG/ML
4 INJECTION INTRAMUSCULAR; INTRAVENOUS
Status: COMPLETED | OUTPATIENT
Start: 2022-07-24 | End: 2022-07-24

## 2022-07-24 RX ORDER — PHENAZOPYRIDINE HYDROCHLORIDE 200 MG/1
200 TABLET, FILM COATED ORAL 3 TIMES DAILY
Qty: 6 TABLET | Refills: 0 | Status: SHIPPED | OUTPATIENT
Start: 2022-07-24 | End: 2022-08-03

## 2022-07-24 RX ORDER — HYDROMORPHONE HYDROCHLORIDE 2 MG/ML
1 INJECTION, SOLUTION INTRAMUSCULAR; INTRAVENOUS; SUBCUTANEOUS
Status: COMPLETED | OUTPATIENT
Start: 2022-07-24 | End: 2022-07-24

## 2022-07-24 RX ORDER — PROMETHAZINE HYDROCHLORIDE 25 MG/1
25 TABLET ORAL EVERY 6 HOURS PRN
Qty: 15 TABLET | Refills: 0 | Status: SHIPPED | OUTPATIENT
Start: 2022-07-24 | End: 2022-10-12 | Stop reason: ALTCHOICE

## 2022-07-24 RX ORDER — AMOXICILLIN AND CLAVULANATE POTASSIUM 875; 125 MG/1; MG/1
1 TABLET, FILM COATED ORAL 2 TIMES DAILY
Qty: 20 TABLET | Refills: 0 | Status: SHIPPED | OUTPATIENT
Start: 2022-07-24 | End: 2022-08-03

## 2022-07-24 RX ORDER — METOCLOPRAMIDE HYDROCHLORIDE 5 MG/ML
10 INJECTION INTRAMUSCULAR; INTRAVENOUS
Status: COMPLETED | OUTPATIENT
Start: 2022-07-24 | End: 2022-07-24

## 2022-07-24 RX ORDER — FLUCONAZOLE 200 MG/1
200 TABLET ORAL DAILY
Qty: 1 TABLET | Refills: 0 | Status: SHIPPED | OUTPATIENT
Start: 2022-07-24 | End: 2022-07-26

## 2022-07-24 RX ADMIN — CEFTRIAXONE 2 G: 2 INJECTION, SOLUTION INTRAVENOUS at 10:07

## 2022-07-24 RX ADMIN — METOCLOPRAMIDE 10 MG: 5 INJECTION, SOLUTION INTRAMUSCULAR; INTRAVENOUS at 10:07

## 2022-07-24 RX ADMIN — HYDROMORPHONE HYDROCHLORIDE 1 MG: 2 INJECTION, SOLUTION INTRAMUSCULAR; INTRAVENOUS; SUBCUTANEOUS at 09:07

## 2022-07-24 RX ADMIN — ONDANSETRON 4 MG: 2 INJECTION INTRAMUSCULAR; INTRAVENOUS at 09:07

## 2022-07-24 RX ADMIN — SODIUM CHLORIDE, SODIUM LACTATE, POTASSIUM CHLORIDE, AND CALCIUM CHLORIDE 1000 ML: .6; .31; .03; .02 INJECTION, SOLUTION INTRAVENOUS at 09:07

## 2022-07-25 NOTE — ED TRIAGE NOTES
42 y.ol female to ED c/o L flank pain since Tuesday 7/19/22.   Hx of kidney infections.  Last kidney infection 6/2022.  Reports dysuria, increased urinary frequency, and strong odor to urine.  Surg hx gallbladder, hysterectomy, tubal ligation. Has a stent to L kidney present.

## 2022-07-25 NOTE — DISCHARGE INSTRUCTIONS

## 2022-07-25 NOTE — ED PROVIDER NOTES
Encounter Date: 7/24/2022       History     Chief Complaint   Patient presents with    Flank Pain     Pt reporting left sided flank pain x 5-6 days ago. Pt has a hx of kidney infection. Pt also c/o nausea, burning upon urination. Pt denies hematuria.      42-year-old female to the emergency department for evaluation of left-sided flank pain which began few days ago.  For the mass is nausea no vomiting.  Reports dysuria.  No hematuria.  Has had similar pain in the past associated with kidney stones and recently had pyelonephritis.  Completed a course of antibiotics.  Denies any fevers or chills.  Has no chest pain or shortness of breath.  Vital signs normal, appears moderately uncomfortable on exam.        Review of patient's allergies indicates:   Allergen Reactions    Strawberry Anaphylaxis and Hives    Morphine Other (See Comments)     Burns stomach    Toradol [ketorolac]      cramping    Tramadol      Abdominal pain     Past Medical History:   Diagnosis Date    Abnormal uterine bleeding 4/18/2018    Acute encephalopathy 12/24/12    secondary to drug overdose    ARF (acute renal failure) 12/24/12    secondary to drug overdose    History of cardiac arrest 12/24/12    secondary to drug overdose    Hx of psychiatric care     Hypertension     Kidney stones 1/8/2018    MRSA (methicillin resistant Staphylococcus aureus) 12/24/12    Nephritis     Polysubstance abuse 12/24/12    Psychiatric problem     Systolic CHF, acute 12/24/12    secondary to drug overdose    Therapy     when younger; recently set up with Cassie Rockweiler, LCSW    UTI (urinary tract infection)      Past Surgical History:   Procedure Laterality Date    AUGMENTATION OF BREAST Bilateral 2001    CHOLECYSTECTOMY      ESOPHAGOGASTRODUODENOSCOPY N/A 1/2/2020    Procedure: EGD (ESOPHAGOGASTRODUODENOSCOPY);  Surgeon: Moe Tripathi MD;  Location: [facility];  Service: Endoscopy;  Laterality: N/A;    LAPAROSCOPIC SALPINGECTOMY  Left 2/10/2021    Procedure: SALPINGECTOMY, LAPAROSCOPIC;  Surgeon: Doc Pena MD;  Location: [facility] OR;  Service: OB/GYN;  Laterality: Left;    LAPAROSCOPIC TOTAL HYSTERECTOMY N/A 2/10/2021    Procedure: HYSTERECTOMY, TOTAL, LAPAROSCOPIC;  Surgeon: Doc Pena MD;  Location: [facility] OR;  Service: OB/GYN;  Laterality: N/A;  RN PREOP 2/3/, --COVID NEGATIVE ON 2/9 and T/S done    TUBAL LIGATION      urinary stents       Family History   Problem Relation Age of Onset    Kidney disease Mother     Ovarian cancer Mother 28    Cancer Maternal Grandmother         throat    Cancer Maternal Grandfather         prostate    Kidney disease Daughter     Breast cancer Neg Hx     Colon cancer Neg Hx     Anxiety disorder Neg Hx     Depression Neg Hx     Dementia Neg Hx     Suicide Neg Hx      Social History     Tobacco Use    Smoking status: Former Smoker     Packs/day: 0.50     Years: 20.00     Pack years: 10.00     Types: Cigarettes    Smokeless tobacco: Current User   Substance Use Topics    Alcohol use: Yes     Comment: occasional; social    Drug use: No     Types: Cocaine, Methamphetamines     Comment: PCP, patient states she had not done drugs since 2012     Review of Systems   Constitutional: Negative for fever.   HENT: Negative for sore throat.    Respiratory: Negative for shortness of breath.    Cardiovascular: Negative for chest pain.   Gastrointestinal: Positive for nausea.   Genitourinary: Positive for flank pain. Negative for dysuria.   Musculoskeletal: Negative for back pain.   Skin: Negative for rash.   Neurological: Negative for weakness.   Hematological: Does not bruise/bleed easily.       Physical Exam     Initial Vitals [07/24/22 1839]   BP Pulse Resp Temp SpO2   127/67 87 18 98.3 °F (36.8 °C) 100 %      MAP       --         Physical Exam    Constitutional: Vital signs are normal. She appears well-developed and well-nourished.   HENT:   Head: Normocephalic and atraumatic.   Right Ear: Hearing  normal.   Left Ear: Hearing normal.   Eyes: Conjunctivae are normal.   Cardiovascular: Normal rate and regular rhythm.   Abdominal: Abdomen is soft. Bowel sounds are normal.   Mildly tender abdomen mainly on the left side  Positive CVA pain   Musculoskeletal:         General: Normal range of motion.     Neurological: She is alert and oriented to person, place, and time.   Skin: Skin is warm and intact.   Psychiatric: She has a normal mood and affect. Her speech is normal and behavior is normal. Cognition and memory are normal.         ED Course   Procedures  Labs Reviewed   CBC W/ AUTO DIFFERENTIAL - Abnormal; Notable for the following components:       Result Value    MCH 31.8 (*)     MCHC 36.3 (*)     All other components within normal limits   URINALYSIS, REFLEX TO URINE CULTURE - Abnormal; Notable for the following components:    Appearance, UA Hazy (*)     Protein, UA Trace (*)     Occult Blood UA 2+ (*)     Leukocytes, UA 3+ (*)     All other components within normal limits    Narrative:     Specimen Source->Urine   URINALYSIS MICROSCOPIC - Abnormal; Notable for the following components:    RBC, UA 19 (*)     WBC, UA >100 (*)     Bacteria Many (*)     All other components within normal limits    Narrative:     Specimen Source->Urine   CULTURE, URINE   COMPREHENSIVE METABOLIC PANEL   LIPASE          Imaging Results    None          Medications   lactated ringers bolus 1,000 mL (1,000 mLs Intravenous New Bag 7/24/22 2123)   cefTRIAXone (ROCEPHIN) 2 g/50 mL D5W IVPB (2 g Intravenous New Bag 7/24/22 2209)   ondansetron injection 4 mg (4 mg Intravenous Given 7/24/22 2120)   HYDROmorphone (PF) injection 1 mg (1 mg Intravenous Given 7/24/22 2122)   metoclopramide HCl injection 10 mg (10 mg Intravenous Given 7/24/22 2228)     Medical Decision Making:   History:   Old Medical Records: I decided to obtain old medical records.  Old Records Summarized: records from clinic visits.  Initial Assessment:   42-year-old female  with flank pain  Differential Diagnosis:   UTI, pyelonephritis, renal colic, pancreatitis  Clinical Tests:   Lab Tests: Ordered and Reviewed  ED Management:  Plan:  Labs, urinalysis symptomatic support and reassessment    11:00 p.m. assessment  Patient feeling better after medications while in the ED.  Tolerating p.o..  Suspect pyelonephritis and will treat with Augmentin based on previous urine culture.  She has been treated with Keflex and Macrobid over the past month.  Small amount of blood noted within the urine, patient advised to follow-up with her PCP and return to the ED if her symptoms worsen or do not improve as she may have infected stone.  Recent CT scan reviewed showed a kidney stone within the kidney that has been there for some time.  Will attempt treatment for pyelonephritis, but given low threshold to return if no improvement over the next 24 hours for possible infected stone.                      Clinical Impression:   Final diagnoses:  [N12] Pyelonephritis (Primary)          ED Disposition Condition    Discharge Stable        ED Prescriptions     Medication Sig Dispense Start Date End Date Auth. Provider    amoxicillin-clavulanate 875-125mg (AUGMENTIN) 875-125 mg per tablet Take 1 tablet by mouth 2 (two) times daily. for 10 days 20 tablet 7/24/2022 8/3/2022 Tommy Baptiste PA-C    promethazine (PHENERGAN) 25 MG tablet Take 1 tablet (25 mg total) by mouth every 6 (six) hours as needed for Nausea. 15 tablet 7/24/2022  Tommy Baptiste PA-C    phenazopyridine (PYRIDIUM) 200 MG tablet Take 1 tablet (200 mg total) by mouth 3 (three) times daily. for 10 days 6 tablet 7/24/2022 8/3/2022 Tommy Baptiste PA-C        Follow-up Information     Follow up With Specialties Details Why Contact Info    Hermes Colvin MD Family Medicine   [address] [facility]  [place] [id number] [phone]             Tommy Baptiste PA-C  07/24/22 8705

## 2022-07-26 ENCOUNTER — OFFICE VISIT (OUTPATIENT)
Dept: FAMILY MEDICINE | Facility: CLINIC | Age: 42
End: 2022-07-26
Payer: COMMERCIAL

## 2022-07-26 VITALS
HEIGHT: 62 IN | OXYGEN SATURATION: 97 % | HEART RATE: 92 BPM | SYSTOLIC BLOOD PRESSURE: 112 MMHG | WEIGHT: 169.75 LBS | DIASTOLIC BLOOD PRESSURE: 86 MMHG | TEMPERATURE: 99 F | BODY MASS INDEX: 31.24 KG/M2 | RESPIRATION RATE: 16 BRPM

## 2022-07-26 DIAGNOSIS — N95.2 VAGINAL ATROPHY: Primary | ICD-10-CM

## 2022-07-26 DIAGNOSIS — R51.9 NONINTRACTABLE HEADACHE, UNSPECIFIED CHRONICITY PATTERN, UNSPECIFIED HEADACHE TYPE: ICD-10-CM

## 2022-07-26 DIAGNOSIS — R30.0 DYSURIA: ICD-10-CM

## 2022-07-26 DIAGNOSIS — R51.9 HEAD ACHE: ICD-10-CM

## 2022-07-26 LAB
BILIRUB SERPL-MCNC: NEGATIVE MG/DL
BLOOD, POC UA: 250
GLUCOSE UR QL STRIP: NORMAL
KETONES UR QL STRIP: NEGATIVE
LEUKOCYTE ESTERASE URINE, POC: NEGATIVE
NITRITE, POC UA: NEGATIVE
PH, POC UA: 6
PROTEIN, POC: NEGATIVE
SPECIFIC GRAVITY, POC UA: 1.01
UROBILINOGEN, POC UA: NORMAL

## 2022-07-26 PROCEDURE — 99214 PR OFFICE/OUTPT VISIT, EST, LEVL IV, 30-39 MIN: ICD-10-PCS | Mod: S$GLB,,, | Performed by: FAMILY MEDICINE

## 2022-07-26 PROCEDURE — 1159F MED LIST DOCD IN RCRD: CPT | Mod: CPTII,S$GLB,, | Performed by: FAMILY MEDICINE

## 2022-07-26 PROCEDURE — 3079F PR MOST RECENT DIASTOLIC BLOOD PRESSURE 80-89 MM HG: ICD-10-PCS | Mod: CPTII,S$GLB,, | Performed by: FAMILY MEDICINE

## 2022-07-26 PROCEDURE — 3008F BODY MASS INDEX DOCD: CPT | Mod: CPTII,S$GLB,, | Performed by: FAMILY MEDICINE

## 2022-07-26 PROCEDURE — 99999 PR PBB SHADOW E&M-EST. PATIENT-LVL III: ICD-10-PCS | Mod: PBBFAC,,, | Performed by: FAMILY MEDICINE

## 2022-07-26 PROCEDURE — 3008F PR BODY MASS INDEX (BMI) DOCUMENTED: ICD-10-PCS | Mod: CPTII,S$GLB,, | Performed by: FAMILY MEDICINE

## 2022-07-26 PROCEDURE — 3074F PR MOST RECENT SYSTOLIC BLOOD PRESSURE < 130 MM HG: ICD-10-PCS | Mod: CPTII,S$GLB,, | Performed by: FAMILY MEDICINE

## 2022-07-26 PROCEDURE — 1159F PR MEDICATION LIST DOCUMENTED IN MEDICAL RECORD: ICD-10-PCS | Mod: CPTII,S$GLB,, | Performed by: FAMILY MEDICINE

## 2022-07-26 PROCEDURE — 99214 OFFICE O/P EST MOD 30 MIN: CPT | Mod: S$GLB,,, | Performed by: FAMILY MEDICINE

## 2022-07-26 PROCEDURE — 3074F SYST BP LT 130 MM HG: CPT | Mod: CPTII,S$GLB,, | Performed by: FAMILY MEDICINE

## 2022-07-26 PROCEDURE — 3079F DIAST BP 80-89 MM HG: CPT | Mod: CPTII,S$GLB,, | Performed by: FAMILY MEDICINE

## 2022-07-26 PROCEDURE — U0005 INFEC AGEN DETEC AMPLI PROBE: HCPCS | Performed by: FAMILY MEDICINE

## 2022-07-26 PROCEDURE — U0003 INFECTIOUS AGENT DETECTION BY NUCLEIC ACID (DNA OR RNA); SEVERE ACUTE RESPIRATORY SYNDROME CORONAVIRUS 2 (SARS-COV-2) (CORONAVIRUS DISEASE [COVID-19]), AMPLIFIED PROBE TECHNIQUE, MAKING USE OF HIGH THROUGHPUT TECHNOLOGIES AS DESCRIBED BY CMS-2020-01-R: HCPCS | Performed by: FAMILY MEDICINE

## 2022-07-26 PROCEDURE — 81003 POCT URINALYSIS: ICD-10-PCS | Mod: QW,S$GLB,, | Performed by: FAMILY MEDICINE

## 2022-07-26 PROCEDURE — 81003 URINALYSIS AUTO W/O SCOPE: CPT | Mod: QW,S$GLB,, | Performed by: FAMILY MEDICINE

## 2022-07-26 PROCEDURE — 99999 PR PBB SHADOW E&M-EST. PATIENT-LVL III: CPT | Mod: PBBFAC,,, | Performed by: FAMILY MEDICINE

## 2022-07-26 NOTE — PROGRESS NOTES
"HISTORY OF PRESENT ILLNESS:  Roslyn Vera is a 42 y.o. female who presents to the clinic today for Urinary Tract Infection (Follow up from ED visit ) and Consult (If needed labs again from last visit )  .       F/u from ED and doing better  S/p hysterectomy, some vaginal dryness  Recurrent UTI symptoms.    Patient Active Problem List   Diagnosis    Cigarette nicotine dependence without complication    PMDD (premenstrual dysphoric disorder)    Anxiety    Mood disorder    Chronic pyelonephritis without lesion of renal medullary necrosis    Hepatosplenomegaly    GERD (gastroesophageal reflux disease)    Status post laparoscopic hysterectomy           CARE TEAM:  Patient Care Team:  Hermes Colvin MD as PCP - General (Family Medicine)  Robson Hale II, MD as Consulting Physician (Gastroenterology)  Cynthia Lee MA (Inactive) as Care Coordinator         Review of Systems   Constitutional: Negative for chills and weight loss.   Gastrointestinal: Positive for nausea. Negative for constipation and vomiting.   Genitourinary: Positive for dysuria, flank pain, frequency and urgency. Negative for hematuria.   Skin: Negative for rash.           PHYSICAL EXAM:   /86 (BP Location: Left arm, Patient Position: Sitting, BP Method: Small (Manual))   Pulse 92   Temp 98.5 °F (36.9 °C) (Oral)   Resp 16   Ht 5' 2" (1.575 m)   Wt 77 kg (169 lb 12.1 oz)   LMP 01/17/2021 (Exact Date) Comment: [facility] 2/10/21  SpO2 97%   BMI 31.05 kg/m²   BP Readings from Last 5 Encounters:   07/26/22 112/86   07/24/22 120/62   06/20/22 109/63   06/16/22 112/60   03/24/22 126/80     Wt Readings from Last 5 Encounters:   07/26/22 77 kg (169 lb 12.1 oz)   07/24/22 72.6 kg (160 lb)   06/20/22 72.6 kg (160 lb)   06/16/22 74 kg (163 lb 2.3 oz)   03/24/22 71 kg (156 lb 8.4 oz)             She appears well, in no apparent distress.  Alert and oriented times three, pleasant and cooperative. Vital signs are as documented in vital " signs section.  The abdomen is soft without tenderness, guarding, mass, rebound or organomegaly. Bowel sounds are normal. No CVA tenderness or inguinal adenopathy noted.       Medication List with Changes/Refills   New Medications    CONJUGATED ESTROGENS (PREMARIN) VAGINAL CREAM    Place 1 g vaginally twice a week.   Current Medications    ALPRAZOLAM (XANAX) 1 MG TABLET    Take 1 tablet (1 mg total) by mouth 2 (two) times daily.    AMOXICILLIN-CLAVULANATE 875-125MG (AUGMENTIN) 875-125 MG PER TABLET    Take 1 tablet by mouth 2 (two) times daily. for 10 days    ARIPIPRAZOLE (ABILIFY) 2 MG TAB    Take 1 tablet (2 mg total) by mouth every evening.    FLUCONAZOLE (DIFLUCAN) 200 MG TAB    Take 1 tablet (200 mg total) by mouth once daily. for 1 day    PHENAZOPYRIDINE (PYRIDIUM) 200 MG TABLET    Take 1 tablet (200 mg total) by mouth 3 (three) times daily. for 10 days    PROMETHAZINE (PHENERGAN) 25 MG TABLET    Take 1 tablet (25 mg total) by mouth every 6 (six) hours as needed for Nausea.         ASSESSMENT AND PLAN:    Problem List Items Addressed This Visit    None     Visit Diagnoses     Vaginal atrophy    -  Primary    Relevant Medications    conjugated estrogens (PREMARIN) vaginal cream (Start on 7/28/2022)    Nonintractable headache, unspecified chronicity pattern, unspecified headache type        Relevant Orders    COVID-19 Routine Screening    Dysuria        Relevant Orders    POCT URINALYSIS    Head ache        Relevant Orders    COVID-19 Routine Screening        Potential medication side effects were discussed with the patient; let me know if any occur.      No future appointments.    Follow up in about 6 weeks (around 9/6/2022), or if symptoms worsen or fail to improve, for reassess acute condition. or sooner as needed.  Answers for HPI/ROS submitted by the patient on 7/21/2022  Chronicity: recurrent  Onset: in the past 7 days  Frequency: every urination  Progression since onset: waxing and waning  Pain quality:  burning, stabbing  Pain - numeric: 5/10  Fever: no fever  Sexually active?: Yes  History of pyelonephritis?: Yes  discharge: No  hesitancy: No  possible pregnancy: No  sweats: No  withholding: Yes  behavior changes: No  Treatments tried: acetaminophen, antibiotics  Improvement on treatment: no relief  Pain severity: moderate  catheterization: No  diabetes insipidus: No  diabetes mellitus: No  genitourinary reflux: No  hypertension: No  recurrent UTIs: Yes  single kidney: No  STD: No  urinary stasis: No  urological procedure: Yes  kidney stones: Yes

## 2022-07-26 NOTE — PROGRESS NOTES
Health Maintenance Due   Topic     Pneumococcal Vaccines (Age 0-64) (1 - PCV) PATIENT DECLINE     TETANUS VACCINE      COVID-19 Vaccine (3 - Booster for Pfizer series) PATIENT DECLINE     Mammogram  Pt will call to schedule when ready

## 2022-07-27 LAB
BACTERIA UR CULT: ABNORMAL
SARS-COV-2 RNA RESP QL NAA+PROBE: NOT DETECTED
SARS-COV-2- CYCLE NUMBER: NORMAL

## 2022-08-17 ENCOUNTER — PATIENT MESSAGE (OUTPATIENT)
Dept: FAMILY MEDICINE | Facility: CLINIC | Age: 42
End: 2022-08-17
Payer: COMMERCIAL

## 2022-08-22 ENCOUNTER — TELEPHONE (OUTPATIENT)
Dept: FAMILY MEDICINE | Facility: CLINIC | Age: 42
End: 2022-08-22
Payer: COMMERCIAL

## 2022-09-01 ENCOUNTER — LAB VISIT (OUTPATIENT)
Dept: LAB | Facility: HOSPITAL | Age: 42
End: 2022-09-01
Attending: FAMILY MEDICINE
Payer: COMMERCIAL

## 2022-09-01 DIAGNOSIS — Z00.00 ANNUAL PHYSICAL EXAM: ICD-10-CM

## 2022-09-01 DIAGNOSIS — R05.9 COUGH: ICD-10-CM

## 2022-09-01 LAB
ALBUMIN SERPL BCP-MCNC: 4.2 G/DL (ref 3.5–5.2)
ALP SERPL-CCNC: 47 U/L (ref 55–135)
ALT SERPL W/O P-5'-P-CCNC: 38 U/L (ref 10–44)
AMORPH CRY URNS QL MICRO: ABNORMAL
ANION GAP SERPL CALC-SCNC: 9 MMOL/L (ref 8–16)
AST SERPL-CCNC: 20 U/L (ref 10–40)
BACTERIA #/AREA URNS HPF: ABNORMAL /HPF
BASOPHILS # BLD AUTO: 0.03 K/UL (ref 0–0.2)
BASOPHILS NFR BLD: 0.3 % (ref 0–1.9)
BILIRUB SERPL-MCNC: 0.5 MG/DL (ref 0.1–1)
BILIRUB UR QL STRIP: NEGATIVE
BUN SERPL-MCNC: 11 MG/DL (ref 6–20)
CALCIUM SERPL-MCNC: 9.3 MG/DL (ref 8.7–10.5)
CAOX CRY URNS QL MICRO: ABNORMAL
CHLORIDE SERPL-SCNC: 104 MMOL/L (ref 95–110)
CHOLEST SERPL-MCNC: 191 MG/DL (ref 120–199)
CHOLEST/HDLC SERPL: 5.3 {RATIO} (ref 2–5)
CLARITY UR: ABNORMAL
CO2 SERPL-SCNC: 25 MMOL/L (ref 23–29)
COLOR UR: YELLOW
CREAT SERPL-MCNC: 0.7 MG/DL (ref 0.5–1.4)
DIFFERENTIAL METHOD: ABNORMAL
EOSINOPHIL # BLD AUTO: 0.2 K/UL (ref 0–0.5)
EOSINOPHIL NFR BLD: 2 % (ref 0–8)
ERYTHROCYTE [DISTWIDTH] IN BLOOD BY AUTOMATED COUNT: 12.6 % (ref 11.5–14.5)
EST. GFR  (NO RACE VARIABLE): >60 ML/MIN/1.73 M^2
ESTIMATED AVG GLUCOSE: 97 MG/DL (ref 68–131)
GLUCOSE SERPL-MCNC: 107 MG/DL (ref 70–110)
GLUCOSE UR QL STRIP: NEGATIVE
HBA1C MFR BLD: 5 % (ref 4–5.6)
HCT VFR BLD AUTO: 43.7 % (ref 37–48.5)
HCV AB SERPL QL IA: NORMAL
HDLC SERPL-MCNC: 36 MG/DL (ref 40–75)
HDLC SERPL: 18.8 % (ref 20–50)
HGB BLD-MCNC: 15.5 G/DL (ref 12–16)
HGB UR QL STRIP: ABNORMAL
HIV 1+2 AB+HIV1 P24 AG SERPL QL IA: NORMAL
HYALINE CASTS #/AREA URNS LPF: 0 /LPF
IMM GRANULOCYTES # BLD AUTO: 0.06 K/UL (ref 0–0.04)
IMM GRANULOCYTES NFR BLD AUTO: 0.7 % (ref 0–0.5)
KETONES UR QL STRIP: NEGATIVE
LDLC SERPL CALC-MCNC: ABNORMAL MG/DL (ref 63–159)
LEUKOCYTE ESTERASE UR QL STRIP: NEGATIVE
LYMPHOCYTES # BLD AUTO: 2.1 K/UL (ref 1–4.8)
LYMPHOCYTES NFR BLD: 23.3 % (ref 18–48)
MCH RBC QN AUTO: 31.8 PG (ref 27–31)
MCHC RBC AUTO-ENTMCNC: 35.5 G/DL (ref 32–36)
MCV RBC AUTO: 90 FL (ref 82–98)
MICROSCOPIC COMMENT: ABNORMAL
MONOCYTES # BLD AUTO: 0.7 K/UL (ref 0.3–1)
MONOCYTES NFR BLD: 7.7 % (ref 4–15)
NEUTROPHILS # BLD AUTO: 5.9 K/UL (ref 1.8–7.7)
NEUTROPHILS NFR BLD: 66 % (ref 38–73)
NITRITE UR QL STRIP: NEGATIVE
NONHDLC SERPL-MCNC: 155 MG/DL
NRBC BLD-RTO: 0 /100 WBC
PH UR STRIP: 7 [PH] (ref 5–8)
PLATELET # BLD AUTO: 260 K/UL (ref 150–450)
PMV BLD AUTO: 10.2 FL (ref 9.2–12.9)
POTASSIUM SERPL-SCNC: 3.5 MMOL/L (ref 3.5–5.1)
PROT SERPL-MCNC: 7.3 G/DL (ref 6–8.4)
PROT UR QL STRIP: ABNORMAL
RBC # BLD AUTO: 4.88 M/UL (ref 4–5.4)
RBC #/AREA URNS HPF: 25 /HPF (ref 0–4)
SODIUM SERPL-SCNC: 138 MMOL/L (ref 136–145)
SP GR UR STRIP: 1.03 (ref 1–1.03)
SQUAMOUS #/AREA URNS HPF: 11 /HPF
TRIGL SERPL-MCNC: 497 MG/DL (ref 30–150)
TSH SERPL DL<=0.005 MIU/L-ACNC: 2.25 UIU/ML (ref 0.4–4)
URATE SERPL-MCNC: 4.5 MG/DL (ref 2.4–5.7)
URN SPEC COLLECT METH UR: ABNORMAL
UROBILINOGEN UR STRIP-ACNC: NEGATIVE EU/DL
WBC # BLD AUTO: 8.96 K/UL (ref 3.9–12.7)
WBC #/AREA URNS HPF: 30 /HPF (ref 0–5)

## 2022-09-01 PROCEDURE — 84443 ASSAY THYROID STIM HORMONE: CPT | Performed by: FAMILY MEDICINE

## 2022-09-01 PROCEDURE — 86592 SYPHILIS TEST NON-TREP QUAL: CPT | Performed by: FAMILY MEDICINE

## 2022-09-01 PROCEDURE — 85025 COMPLETE CBC W/AUTO DIFF WBC: CPT | Performed by: FAMILY MEDICINE

## 2022-09-01 PROCEDURE — 81000 URINALYSIS NONAUTO W/SCOPE: CPT | Performed by: FAMILY MEDICINE

## 2022-09-01 PROCEDURE — 83036 HEMOGLOBIN GLYCOSYLATED A1C: CPT | Performed by: FAMILY MEDICINE

## 2022-09-01 PROCEDURE — 86803 HEPATITIS C AB TEST: CPT | Performed by: FAMILY MEDICINE

## 2022-09-01 PROCEDURE — 80053 COMPREHEN METABOLIC PANEL: CPT | Performed by: FAMILY MEDICINE

## 2022-09-01 PROCEDURE — 84550 ASSAY OF BLOOD/URIC ACID: CPT | Performed by: FAMILY MEDICINE

## 2022-09-01 PROCEDURE — 36415 COLL VENOUS BLD VENIPUNCTURE: CPT | Mod: PO | Performed by: FAMILY MEDICINE

## 2022-09-01 PROCEDURE — 87389 HIV-1 AG W/HIV-1&-2 AB AG IA: CPT | Performed by: FAMILY MEDICINE

## 2022-09-01 PROCEDURE — 80061 LIPID PANEL: CPT | Performed by: FAMILY MEDICINE

## 2022-09-02 LAB — RPR SER QL: NORMAL

## 2022-09-22 ENCOUNTER — PATIENT MESSAGE (OUTPATIENT)
Dept: FAMILY MEDICINE | Facility: CLINIC | Age: 42
End: 2022-09-22
Payer: COMMERCIAL

## 2022-10-12 ENCOUNTER — PATIENT MESSAGE (OUTPATIENT)
Dept: FAMILY MEDICINE | Facility: CLINIC | Age: 42
End: 2022-10-12
Payer: COMMERCIAL

## 2022-10-12 DIAGNOSIS — L70.9 ACNE, UNSPECIFIED ACNE TYPE: ICD-10-CM

## 2022-10-12 RX ORDER — CLINDAMYCIN PHOSPHATE 10 MG/G
GEL TOPICAL 2 TIMES DAILY
Qty: 60 G | Refills: 2 | Status: SHIPPED | OUTPATIENT
Start: 2022-10-12 | End: 2023-04-11

## 2022-10-17 ENCOUNTER — PATIENT MESSAGE (OUTPATIENT)
Dept: FAMILY MEDICINE | Facility: CLINIC | Age: 42
End: 2022-10-17
Payer: COMMERCIAL

## 2022-10-24 ENCOUNTER — HOSPITAL ENCOUNTER (EMERGENCY)
Facility: HOSPITAL | Age: 42
Discharge: HOME | End: 2022-10-24
Attending: EMERGENCY MEDICINE
Payer: COMMERCIAL

## 2022-10-24 ENCOUNTER — NURSE TRIAGE (OUTPATIENT)
Dept: ADMINISTRATIVE | Facility: CLINIC | Age: 42
End: 2022-10-24
Payer: COMMERCIAL

## 2022-10-24 VITALS
RESPIRATION RATE: 18 BRPM | OXYGEN SATURATION: 100 % | DIASTOLIC BLOOD PRESSURE: 76 MMHG | WEIGHT: 165 LBS | BODY MASS INDEX: 30.18 KG/M2 | HEART RATE: 120 BPM | TEMPERATURE: 99 F | SYSTOLIC BLOOD PRESSURE: 140 MMHG
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
## 2022-10-24 DIAGNOSIS — R10.9 FLANK PAIN: Primary | ICD-10-CM

## 2022-10-24 PROCEDURE — 81003 URINALYSIS AUTO W/O SCOPE: CPT | Performed by: EMERGENCY MEDICINE

## 2022-10-24 PROCEDURE — 99284 EMERGENCY DEPT VISIT MOD MDM: CPT | Mod: 25

## 2022-10-24 RX ORDER — LIDOCAINE 50 MG/G
1 PATCH TOPICAL ONCE
Qty: 15 PATCH | Refills: 0 | Status: SHIPPED | OUTPATIENT
Start: 2022-10-24 | End: 2022-10-24

## 2022-10-24 RX ORDER — NAPROXEN 500 MG/1
500 TABLET ORAL 2 TIMES DAILY
Qty: 20 TABLET | Refills: 0 | Status: SHIPPED | OUTPATIENT
Start: 2022-10-24 | End: 2022-11-30 | Stop reason: ALTCHOICE

## 2022-10-24 RX ORDER — ORPHENADRINE CITRATE 100 MG/1
100 TABLET, EXTENDED RELEASE ORAL 2 TIMES DAILY
Qty: 20 TABLET | Refills: 0 | Status: SHIPPED | OUTPATIENT
Start: 2022-10-24 | End: 2022-11-03

## 2022-10-24 RX ORDER — TRETINOIN 1 MG/G
CREAM TOPICAL NIGHTLY
Qty: 45 G | Refills: 1 | Status: SHIPPED | OUTPATIENT
Start: 2022-10-24

## 2022-10-24 NOTE — ED PROVIDER NOTES
Encounter Date: 10/24/2022       History     Chief Complaint   Patient presents with    Tailbone Pain     Fell 1 week ago onto ceramic floor on her tailbone, pain very intense, also having difficulty emptying bladder     42-year-old female with no past medical history presents to ED complaining of x1 wk hx of left flank pain and 2 wk hx of tail bone pain.  Patient states 2 weeks ago she accidentally fell off of her bed landing on her tailbone.  She is been having pain since then.  Patient reports history of kidney stones.  She states her pain does not feel similar to her history of kidney stones in the past.  Patient denies any head trauma, LOC, dysuria, hematuria, abdominal pain, nausea, vomiting, diarrhea, chest pain, bladder/bowel incontinence, saddle anesthesia, shortness of breath, fever.  No attempted treatment reported.  No other symptoms reported.    The history is provided by the patient. No  was used.   Review of patient's allergies indicates:   Allergen Reactions    Strawberry Anaphylaxis and Hives    Morphine Other (See Comments)     Burns stomach    Toradol [ketorolac]      cramping    Tramadol      Abdominal pain     Past Medical History:   Diagnosis Date    Abnormal uterine bleeding 4/18/2018    Acute encephalopathy 12/24/12    secondary to drug overdose    ARF (acute renal failure) 12/24/12    secondary to drug overdose    History of cardiac arrest 12/24/12    secondary to drug overdose    Hx of psychiatric care     Hypertension     Kidney stones 1/8/2018    MRSA (methicillin resistant Staphylococcus aureus) 12/24/12    Nephritis     Polysubstance abuse 12/24/12    Psychiatric problem     Systolic CHF, acute 12/24/12    secondary to drug overdose    Therapy     when younger; recently set up with Cassie Rockweiler, LCSW    UTI (urinary tract infection)      Past Surgical History:   Procedure Laterality Date    AUGMENTATION OF BREAST Bilateral 2001    CHOLECYSTECTOMY       ESOPHAGOGASTRODUODENOSCOPY N/A 1/2/2020    Procedure: EGD (ESOPHAGOGASTRODUODENOSCOPY);  Surgeon: Moe Tripathi MD;  Location: [facility];  Service: Endoscopy;  Laterality: N/A;    LAPAROSCOPIC SALPINGECTOMY Left 2/10/2021    Procedure: SALPINGECTOMY, LAPAROSCOPIC;  Surgeon: Doc Pena MD;  Location: [facility] OR;  Service: OB/GYN;  Laterality: Left;    LAPAROSCOPIC TOTAL HYSTERECTOMY N/A 2/10/2021    Procedure: HYSTERECTOMY, TOTAL, LAPAROSCOPIC;  Surgeon: Doc Pena MD;  Location: [facility] OR;  Service: OB/GYN;  Laterality: N/A;  RN PREOP 2/3/, --COVID NEGATIVE ON 2/9 and T/S done    TUBAL LIGATION      urinary stents       Family History   Problem Relation Age of Onset    Kidney disease Mother     Ovarian cancer Mother 28    Cancer Maternal Grandmother         throat    Cancer Maternal Grandfather         prostate    Kidney disease Daughter     Breast cancer Neg Hx     Colon cancer Neg Hx     Anxiety disorder Neg Hx     Depression Neg Hx     Dementia Neg Hx     Suicide Neg Hx      Social History     Tobacco Use    Smoking status: Former     Packs/day: 0.50     Years: 20.00     Pack years: 10.00     Types: Vaping w/o nicotine, Cigarettes    Smokeless tobacco: Current   Substance Use Topics    Alcohol use: Yes     Comment: occasional; social    Drug use: No     Types: Cocaine, Methamphetamines     Comment: PCP, patient states she had not done drugs since 2012     Review of Systems   Constitutional:  Negative for chills and fever.   HENT:  Negative for congestion, ear pain, rhinorrhea and sore throat.    Eyes:  Negative for redness.   Respiratory:  Negative for cough and shortness of breath.    Cardiovascular:  Negative for chest pain.   Gastrointestinal:  Negative for abdominal pain, diarrhea, nausea and vomiting.   Genitourinary:  Positive for flank pain. Negative for decreased urine volume, difficulty urinating, dysuria, frequency, hematuria and urgency.        (-) bladder/bowel incontinence  (-) saddle  anesthesia   Musculoskeletal:  Negative for back pain and neck pain.        (+) tailbone pain   Skin:  Negative for rash.   Neurological:  Negative for headaches.        (-) head trauma  (-) LOC   Psychiatric/Behavioral:  Negative for confusion.      Physical Exam     Initial Vitals [10/24/22 1455]   BP Pulse Resp Temp SpO2   (!) 140/76 (!) 120 18 98.9 °F (37.2 °C) 100 %      MAP       --         Physical Exam    Nursing note and vitals reviewed.  Constitutional: She appears well-developed and well-nourished. She is not diaphoretic.  Non-toxic appearance. She does not appear ill. No distress.   HENT:   Head: Normocephalic and atraumatic.   Right Ear: External ear normal.   Left Ear: External ear normal.   Nose: Nose normal.   Mouth/Throat: Uvula is midline, oropharynx is clear and moist and mucous membranes are normal.   Eyes: Conjunctivae and EOM are normal.   Neck: Neck supple.   Normal range of motion.   Full passive range of motion without pain.     Cardiovascular:  Normal rate and regular rhythm.           Pulses:       Radial pulses are 2+ on the right side and 2+ on the left side.   Pulmonary/Chest: Effort normal and breath sounds normal. No respiratory distress.   Abdominal: Abdomen is soft. Bowel sounds are normal. She exhibits no distension. There is no abdominal tenderness.   No right CVA tenderness.  No left CVA tenderness. There is no rebound and no guarding.   Musculoskeletal:         General: Normal range of motion.      Cervical back: Full passive range of motion without pain, normal range of motion and neck supple. No rigidity.      Comments: No midline tenderness to cervical, thoracic, lumbar spine.  No bony step-offs.  No evidence of any erythema, edema, bruising, rash, or cellulitis patient's back.     Neurological: She is alert.   Skin: Skin is warm and dry. No rash noted.   Psychiatric: She has a normal mood and affect.       ED Course   Procedures  Labs Reviewed   URINALYSIS, REFLEX TO URINE  CULTURE - Abnormal; Notable for the following components:       Result Value    Appearance, UA Hazy (*)     All other components within normal limits    Narrative:     Specimen Source->Urine          Imaging Results              X-Ray Sacrum And Coccyx (Final result)  Result time 10/24/22 15:25:04      Final result by Sanya Monteiro III, MD (10/24/22 15:25:04)                   Impression:      No acute process seen.      Electronically signed by: Sanya Monteiro MD  Date:    10/24/2022  Time:    15:25               Narrative:    EXAMINATION:  XR SACRUM AND COCCYX    CLINICAL HISTORY:  Unspecified fall, initial encounter    FINDINGS:  Sacrum and coccyx.    No fracture, dislocation, or bone destruction seen.  No acute trauma seen.  SI joints are unremarkable.                                       X-Ray Lumbar Spine Ap And Lateral (Final result)  Result time 10/24/22 15:25:30      Final result by Sanya Monteiro III, MD (10/24/22 15:25:30)                   Impression:      No acute process seen.      Electronically signed by: Sanya Monteiro MD  Date:    10/24/2022  Time:    15:25               Narrative:    EXAMINATION:  XR LUMBAR SPINE AP AND LATERAL    CLINICAL HISTORY:  fall;    FINDINGS:  Alignment is normal.  No fracture dislocation bone destruction seen.  No trauma seen.  No acute process seen.                                       Medications - No data to display  Medical Decision Making:   Clinical Tests:   Lab Tests: Ordered and Reviewed  Radiological Study: Ordered and Reviewed  ED Management:  This is a 42-year-old female with no past medical history presents to ED complaining of x1 wk hx of left flank pain and 2wk hx of tail bone pain.  On physical exam, patient is well-appearing and in no acute distress.  Nontoxic appearing.  Lungs are clear to auscultation bilaterally.  Abdomen is soft and nontender.  No guarding, rigidity, rebound.  2+ radial pulses bilaterally.  No midline tenderness to cervical,  thoracic, lumbar spine.  No bony step-offs.  No evidence of any erythema, edema, bruising, rash, or cellulitis patient's back.  No CVA tenderness bilaterally.  Patient able to ambulate into the room.  Full range of motion of neck.  No neck rigidity.  UA unremarkable.  X-rays revealed no acute fractures or dislocations.  I believe patient's symptoms are musculoskeletal in nature.  Will discharge patient on naproxen, Norflex, and Lidoderm patches.  Urged prompt follow-up with PCP for further evaluation.    Strict return precautions given. I discussed with the patient/family the diagnosis, treatment plan, indications for return to the emergency department, and for expected follow-up. The patient/family verbalized an understanding. The patient/family is asked if there are any questions or concerns. We discuss the case, until all issues are addressed to the patient/family's satisfaction. Patient/family understands and is agreeable to the plan. Patient is stable and ready for discharge.                          Clinical Impression:   Final diagnoses:  [W19.XXXA] Fall  [R10.9] Flank pain (Primary)        ED Disposition Condition    Discharge Stable          ED Prescriptions       Medication Sig Dispense Start Date End Date Auth. Provider    naproxen (NAPROSYN) 500 MG tablet Take 1 tablet (500 mg total) by mouth 2 (two) times daily. 20 tablet 10/24/2022 -- Gosia Abarca PA-C    orphenadrine (NORFLEX) 100 mg tablet Take 1 tablet (100 mg total) by mouth 2 (two) times daily. for 10 days 20 tablet 10/24/2022 11/3/2022 Gosia Abarca PA-C    LIDOcaine (LIDODERM) 5 % (Expires today) Place 1 patch onto the skin once. Remove & Discard patch within 12 hours or as directed by MD for 1 dose 15 patch 10/24/2022 10/24/2022 Gosia Abarca PA-C          Follow-up Information       Follow up With Specialties Details Why Contact Info    Hermes Colvin MD Family Medicine In 2 days for further evaluation [id number]  [id number]  [phone]      [facility] Emergency Medicine In 2 days If symptoms worsen [address]  [facility] [id number] [phone]             Gosia Abarca PA-C  10/24/22 2205       Gosia Abarca PA-C  10/24/22 2206

## 2022-10-24 NOTE — TELEPHONE ENCOUNTER
Patient fell off her bed onto tile floor 1 week ago. States that she has tailbone and back pain. Advised per protocol to be seen within the next 4 hours. Patient VU. Advised the patient to call back with any further questions or if symptoms worsen.      Reason for Disposition   Pain radiates into the thigh or further down the leg now    Additional Information   Negative: Dangerous mechanism of injury (e.g., fall > 10 feet or 3 meters, trampoline)(Exception: pain began > 1 hour after injury)   Negative: Sounds like a life-threatening emergency to the triager   Negative: Can't walk or very difficult to walk   Negative: [1] Unable to urinate (or only a few drops) > 4 hours AND [2] bladder feels very full (e.g., palpable bladder or strong urge to urinate)   Negative: [1] Loss of bladder or bowel control (urine or bowel incontinence; wetting self, leaking stool) AND [2] new-onset   Negative: Numbness (loss of sensation) in groin or rectal area   Negative: Blood in stool   Negative: Blood in urine (red, pink, or tea-colored)   Negative: Weakness of a leg or foot (e.g., unable to bear weight, dragging foot)   Negative: Numbness in a leg or foot (i.e., loss of sensation)   Negative: [1] SEVERE pain AND [2] not improved 2 hours after pain medicine/ice packs    Protocols used: Tailbone Injury-A-

## 2022-10-24 NOTE — DISCHARGE INSTRUCTIONS
Please return to the Emergency Department for any new or worsening symptoms including: numbness to groin, bladder/bowel incontinence, fever, chest pain, shortness of breath, loss of consciousness, dizziness, weakness, or any other concerns.     Please follow up with your Primary Care Provider within in the week. If you do not have one, you may contact the one listed on your discharge paperwork or you may also call the [facility] Appointment Desk at [phone] to schedule an appointment with one.     Please take all medication as prescribed.

## 2022-11-02 ENCOUNTER — PATIENT MESSAGE (OUTPATIENT)
Dept: FAMILY MEDICINE | Facility: CLINIC | Age: 42
End: 2022-11-02
Payer: COMMERCIAL

## 2022-11-30 ENCOUNTER — OFFICE VISIT (OUTPATIENT)
Dept: FAMILY MEDICINE | Facility: CLINIC | Age: 42
End: 2022-11-30
Payer: COMMERCIAL

## 2022-11-30 VITALS
WEIGHT: 171.94 LBS | SYSTOLIC BLOOD PRESSURE: 112 MMHG | HEIGHT: 62 IN | BODY MASS INDEX: 31.64 KG/M2 | HEART RATE: 93 BPM | TEMPERATURE: 98 F | OXYGEN SATURATION: 98 % | DIASTOLIC BLOOD PRESSURE: 62 MMHG

## 2022-11-30 DIAGNOSIS — Z00.00 ANNUAL PHYSICAL EXAM: Primary | ICD-10-CM

## 2022-11-30 DIAGNOSIS — F41.9 ANXIETY: ICD-10-CM

## 2022-11-30 DIAGNOSIS — F17.201 TOBACCO ABUSE, IN REMISSION: ICD-10-CM

## 2022-11-30 DIAGNOSIS — R73.9 HYPERGLYCEMIA: ICD-10-CM

## 2022-11-30 PROCEDURE — 3044F PR MOST RECENT HEMOGLOBIN A1C LEVEL <7.0%: ICD-10-PCS | Mod: CPTII,S$GLB,, | Performed by: FAMILY MEDICINE

## 2022-11-30 PROCEDURE — 3078F PR MOST RECENT DIASTOLIC BLOOD PRESSURE < 80 MM HG: ICD-10-PCS | Mod: CPTII,S$GLB,, | Performed by: FAMILY MEDICINE

## 2022-11-30 PROCEDURE — 99396 PREV VISIT EST AGE 40-64: CPT | Mod: S$GLB,,, | Performed by: FAMILY MEDICINE

## 2022-11-30 PROCEDURE — 99999 PR PBB SHADOW E&M-EST. PATIENT-LVL III: CPT | Mod: PBBFAC,,, | Performed by: FAMILY MEDICINE

## 2022-11-30 PROCEDURE — 1159F MED LIST DOCD IN RCRD: CPT | Mod: CPTII,S$GLB,, | Performed by: FAMILY MEDICINE

## 2022-11-30 PROCEDURE — 3008F PR BODY MASS INDEX (BMI) DOCUMENTED: ICD-10-PCS | Mod: CPTII,S$GLB,, | Performed by: FAMILY MEDICINE

## 2022-11-30 PROCEDURE — 1159F PR MEDICATION LIST DOCUMENTED IN MEDICAL RECORD: ICD-10-PCS | Mod: CPTII,S$GLB,, | Performed by: FAMILY MEDICINE

## 2022-11-30 PROCEDURE — 3074F SYST BP LT 130 MM HG: CPT | Mod: CPTII,S$GLB,, | Performed by: FAMILY MEDICINE

## 2022-11-30 PROCEDURE — 3078F DIAST BP <80 MM HG: CPT | Mod: CPTII,S$GLB,, | Performed by: FAMILY MEDICINE

## 2022-11-30 PROCEDURE — 99999 PR PBB SHADOW E&M-EST. PATIENT-LVL III: ICD-10-PCS | Mod: PBBFAC,,, | Performed by: FAMILY MEDICINE

## 2022-11-30 PROCEDURE — 99396 PR PREVENTIVE VISIT,EST,40-64: ICD-10-PCS | Mod: S$GLB,,, | Performed by: FAMILY MEDICINE

## 2022-11-30 PROCEDURE — 3044F HG A1C LEVEL LT 7.0%: CPT | Mod: CPTII,S$GLB,, | Performed by: FAMILY MEDICINE

## 2022-11-30 PROCEDURE — 3074F PR MOST RECENT SYSTOLIC BLOOD PRESSURE < 130 MM HG: ICD-10-PCS | Mod: CPTII,S$GLB,, | Performed by: FAMILY MEDICINE

## 2022-11-30 PROCEDURE — 3008F BODY MASS INDEX DOCD: CPT | Mod: CPTII,S$GLB,, | Performed by: FAMILY MEDICINE

## 2022-11-30 RX ORDER — METFORMIN HYDROCHLORIDE 500 MG/1
500 TABLET, EXTENDED RELEASE ORAL
Qty: 90 TABLET | Refills: 0 | Status: SHIPPED | OUTPATIENT
Start: 2022-11-30 | End: 2023-01-19

## 2022-11-30 RX ORDER — ATOMOXETINE 25 MG/1
25 CAPSULE ORAL DAILY
Qty: 30 CAPSULE | Refills: 0 | Status: SHIPPED | OUTPATIENT
Start: 2022-11-30 | End: 2023-01-19

## 2022-11-30 NOTE — PROGRESS NOTES
"Chief Complaint   Patient presents with    Medication Problem       SUBJECTIVE:   42 y.o. female for annual routine checkup.    Current Outpatient Medications   Medication Sig Dispense Refill    ALPRAZolam (XANAX) 1 MG tablet Take 1 tablet (1 mg total) by mouth 2 (two) times daily. 60 tablet 2    clindamycin phosphate 1% (CLINDAGEL) 1 % gel Apply topically 2 (two) times daily. 60 g 2    conjugated estrogens (PREMARIN) vaginal cream Place 1 g vaginally twice a week. 30 g 1    tretinoin (RETIN-A) 0.1 % cream Apply topically every evening. 45 g 1     No current facility-administered medications for this visit.     Allergies: Strawberry, Morphine, Toradol [ketorolac], and Tramadol   Patient's last menstrual period was 01/17/2021 (exact date).    ROS:  Feeling well. No dyspnea or chest pain on exertion.  No abdominal pain, change in bowel habits, black or bloody stools.  No urinary tract symptoms. GYN ROS: anxiety. No neurological complaints.    OBJECTIVE:   The patient appears well, alert, oriented x 3, in no distress.  /62   Pulse 93   Temp 98.3 °F (36.8 °C) (Oral)   Ht 5' 2" (1.575 m)   Wt 78 kg (171 lb 15.3 oz)   LMP 01/17/2021 (Exact Date) Comment: [facility] 2/10/21  SpO2 98%   BMI 31.45 kg/m²   Wt Readings from Last 5 Encounters:   11/30/22 78 kg (171 lb 15.3 oz)   10/24/22 74.8 kg (165 lb)   07/26/22 77 kg (169 lb 12.1 oz)   07/24/22 72.6 kg (160 lb)   06/20/22 72.6 kg (160 lb)     Wt Readings from Last 25 Encounters:   11/30/22 78 kg (171 lb 15.3 oz)   10/24/22 74.8 kg (165 lb)   07/26/22 77 kg (169 lb 12.1 oz)   07/24/22 72.6 kg (160 lb)   06/20/22 72.6 kg (160 lb)   06/16/22 74 kg (163 lb 2.3 oz)   03/24/22 71 kg (156 lb 8.4 oz)   03/20/22 72.6 kg (160 lb)   11/30/21 71 kg (156 lb 8.4 oz)   11/22/21 72.7 kg (160 lb 4.4 oz)   11/17/21 70.8 kg (155 lb 15.6 oz)   10/11/21 74.8 kg (165 lb)   10/06/21 72.3 kg (159 lb 4.5 oz)   09/27/21 71.2 kg (157 lb)   09/24/21 71.2 kg (157 lb)   09/08/21 72.3 kg (159 lb " 6.3 oz)   06/15/21 73 kg (160 lb 15 oz)   04/09/21 72.4 kg (159 lb 9.8 oz)   02/24/21 75 kg (165 lb 5.5 oz)   02/15/21 77.6 kg (171 lb)   02/09/21 78 kg (171 lb 15.3 oz)   02/09/21 78.3 kg (172 lb 9.9 oz)   02/03/21 78 kg (171 lb 15.3 oz)   02/03/21 77 kg (169 lb 12.1 oz)   12/23/20 78.2 kg (172 lb 6.4 oz)         ENT normal.  Neck supple. No adenopathy or thyromegaly. SOPHIE. Lungs are clear, good air entry, no wheezes, rhonchi or rales. S1 and S2 normal, no murmurs, regular rate and rhythm. Abdomen soft without tenderness, guarding, mass or organomegaly. Extremities show no edema, normal peripheral pulses. Neurological is normal, no focal findings.      BREAST EXAM: deferred    PELVIC EXAM: deferred    ASSESSMENT:   1. Annual physical exam    2. Tobacco abuse, in remission          PLAN:   Counseled on age appropriate medical preventative services, including age appropriate cancer screenings, over all nutritional health, need for a consistent exercise regimen and an over all push towards maintaining a vigorous and active lifestyle.  Counseled on age appropriate vaccines and discussed upcoming health care needs based on age/gender.  Spent time with patient counseling on need for a good patient/doctor relationship moving forward.  Discussed use of common OTC medications and supplements.  Discussed common dietary aids and use of caffeine and the need for good sleep hygiene and stress management.    Problem List Items Addressed This Visit       Tobacco abuse, in remission    Current Assessment & Plan     Stable this is great          Other Visit Diagnoses       Annual physical exam    -  Primary            F/u in 1 year for wellness

## 2023-01-19 ENCOUNTER — OFFICE VISIT (OUTPATIENT)
Dept: OBSTETRICS AND GYNECOLOGY | Facility: CLINIC | Age: 43
End: 2023-01-19
Payer: COMMERCIAL

## 2023-01-19 VITALS
BODY MASS INDEX: 31.64 KG/M2 | HEIGHT: 62 IN | WEIGHT: 171.94 LBS | SYSTOLIC BLOOD PRESSURE: 124 MMHG | DIASTOLIC BLOOD PRESSURE: 70 MMHG

## 2023-01-19 DIAGNOSIS — Z87.442 HISTORY OF RENAL STONE: ICD-10-CM

## 2023-01-19 DIAGNOSIS — Z01.419 WELL WOMAN EXAM WITH ROUTINE GYNECOLOGICAL EXAM: Primary | ICD-10-CM

## 2023-01-19 DIAGNOSIS — N39.3 STRESS INCONTINENCE OF URINE: ICD-10-CM

## 2023-01-19 PROCEDURE — 3078F PR MOST RECENT DIASTOLIC BLOOD PRESSURE < 80 MM HG: ICD-10-PCS | Mod: CPTII,S$GLB,, | Performed by: OBSTETRICS & GYNECOLOGY

## 2023-01-19 PROCEDURE — 1159F MED LIST DOCD IN RCRD: CPT | Mod: CPTII,S$GLB,, | Performed by: OBSTETRICS & GYNECOLOGY

## 2023-01-19 PROCEDURE — 99396 PREV VISIT EST AGE 40-64: CPT | Mod: S$GLB,,, | Performed by: OBSTETRICS & GYNECOLOGY

## 2023-01-19 PROCEDURE — 3074F SYST BP LT 130 MM HG: CPT | Mod: CPTII,S$GLB,, | Performed by: OBSTETRICS & GYNECOLOGY

## 2023-01-19 PROCEDURE — 3008F PR BODY MASS INDEX (BMI) DOCUMENTED: ICD-10-PCS | Mod: CPTII,S$GLB,, | Performed by: OBSTETRICS & GYNECOLOGY

## 2023-01-19 PROCEDURE — 3078F DIAST BP <80 MM HG: CPT | Mod: CPTII,S$GLB,, | Performed by: OBSTETRICS & GYNECOLOGY

## 2023-01-19 PROCEDURE — 3008F BODY MASS INDEX DOCD: CPT | Mod: CPTII,S$GLB,, | Performed by: OBSTETRICS & GYNECOLOGY

## 2023-01-19 PROCEDURE — 1160F PR REVIEW ALL MEDS BY PRESCRIBER/CLIN PHARMACIST DOCUMENTED: ICD-10-PCS | Mod: CPTII,S$GLB,, | Performed by: OBSTETRICS & GYNECOLOGY

## 2023-01-19 PROCEDURE — 1160F RVW MEDS BY RX/DR IN RCRD: CPT | Mod: CPTII,S$GLB,, | Performed by: OBSTETRICS & GYNECOLOGY

## 2023-01-19 PROCEDURE — 3074F PR MOST RECENT SYSTOLIC BLOOD PRESSURE < 130 MM HG: ICD-10-PCS | Mod: CPTII,S$GLB,, | Performed by: OBSTETRICS & GYNECOLOGY

## 2023-01-19 PROCEDURE — 99999 PR PBB SHADOW E&M-EST. PATIENT-LVL III: ICD-10-PCS | Mod: PBBFAC,,, | Performed by: OBSTETRICS & GYNECOLOGY

## 2023-01-19 PROCEDURE — 1159F PR MEDICATION LIST DOCUMENTED IN MEDICAL RECORD: ICD-10-PCS | Mod: CPTII,S$GLB,, | Performed by: OBSTETRICS & GYNECOLOGY

## 2023-01-19 PROCEDURE — 99999 PR PBB SHADOW E&M-EST. PATIENT-LVL III: CPT | Mod: PBBFAC,,, | Performed by: OBSTETRICS & GYNECOLOGY

## 2023-01-19 PROCEDURE — 81003 URINALYSIS AUTO W/O SCOPE: CPT | Performed by: OBSTETRICS & GYNECOLOGY

## 2023-01-19 PROCEDURE — 99396 PR PREVENTIVE VISIT,EST,40-64: ICD-10-PCS | Mod: S$GLB,,, | Performed by: OBSTETRICS & GYNECOLOGY

## 2023-01-20 ENCOUNTER — PATIENT MESSAGE (OUTPATIENT)
Dept: ADMINISTRATIVE | Facility: HOSPITAL | Age: 43
End: 2023-01-20
Payer: COMMERCIAL

## 2023-01-20 LAB
BILIRUB UR QL STRIP: NEGATIVE
CLARITY UR: CLEAR
COLOR UR: COLORLESS
GLUCOSE UR QL STRIP: NEGATIVE
HGB UR QL STRIP: NEGATIVE
KETONES UR QL STRIP: NEGATIVE
LEUKOCYTE ESTERASE UR QL STRIP: NEGATIVE
NITRITE UR QL STRIP: NEGATIVE
PH UR STRIP: 7 [PH] (ref 5–8)
PROT UR QL STRIP: NEGATIVE
SP GR UR STRIP: 1 (ref 1–1.03)
URN SPEC COLLECT METH UR: ABNORMAL
UROBILINOGEN UR STRIP-ACNC: NEGATIVE EU/DL

## 2023-01-20 NOTE — PROGRESS NOTES
Subjective:       Patient ID: Roslyn Vera is a 42 y.o. female.    Chief Complaint:  Well Woman (Last normal pap and last normal mammogram was 2020.)        History of Present Illness  HPI  Annual Exam-Premenopausal  Patient presents for annual exam. The patient is sexually active. GYN screening history: last pap: approximate date 12/3/2020and was normal and last mammogram: approximate date 12/3/2020 and was normal. The patient wears seatbelts: yes. The patient participates in regular exercise: yes. Has the patient ever been transfused or tattooed?: no/yes. The patient reports that there is not domestic violence in her life.    Status post Total laparoscopic hysterectomy with bilateral salpingectomy    Has been with bilateral lower abdominal pain. ?Stabbing.  Comes and goes.  Heating pads and OTC Aleve do not help much.  Denies GI symptoms.  Has been leaking urine with coughing/laughing/jumping.  No urinary frequency, urgency or pain      GYN & OB History  Patient's last menstrual period was 2021 (exact date).   Date of Last Pap: 2021    OB History    Para Term  AB Living   3 2 2     2   SAB IAB Ectopic Multiple Live Births                  # Outcome Date GA Lbr Estiven/2nd Weight Sex Delivery Anes PTL Lv   3             2 Term            1 Term               Obstetric Comments    x 2.  Largest 10# boy.  Smaller girl 7#12oz       Past Medical History:   Diagnosis Date    Abnormal uterine bleeding 2018    Acute encephalopathy 12    secondary to drug overdose    ARF (acute renal failure) 12    secondary to drug overdose    History of cardiac arrest 12    secondary to drug overdose    Hx of psychiatric care     Hypertension     Kidney stones 2018    MRSA (methicillin resistant Staphylococcus aureus) 12    Nephritis     Polysubstance abuse 12    Psychiatric problem     Systolic CHF, acute 12    secondary to drug overdose    Therapy      when younger; recently set up with Cassie Rockweiler, LCSW    UTI (urinary tract infection)        Past Surgical History:   Procedure Laterality Date    AUGMENTATION OF BREAST Bilateral 2001    CHOLECYSTECTOMY      ESOPHAGOGASTRODUODENOSCOPY N/A 1/2/2020    Procedure: EGD (ESOPHAGOGASTRODUODENOSCOPY);  Surgeon: Moe Tripathi MD;  [facility];  Service: Endoscopy;  Laterality: N/A;    LAPAROSCOPIC SALPINGECTOMY Left 2/10/2021    Procedure: SALPINGECTOMY, LAPAROSCOPIC;  Surgeon: Doc Pena MD;  Location: [facility] OR;  Service: OB/GYN;  Laterality: Left;    LAPAROSCOPIC TOTAL HYSTERECTOMY N/A 2/10/2021    Procedure: HYSTERECTOMY, TOTAL, LAPAROSCOPIC;  Surgeon: Doc Pena MD;  Location: [facility] OR;  Service: OB/GYN;  Laterality: N/A;  RN PREOP 2/3/, --COVID NEGATIVE ON 2/9 and T/S done    TUBAL LIGATION      urinary stents         Family History   Problem Relation Age of Onset    Kidney disease Mother     Ovarian cancer Mother 28    Cancer Maternal Grandmother         throat    Cancer Maternal Grandfather         prostate    Kidney disease Daughter     Breast cancer Neg Hx     Colon cancer Neg Hx     Anxiety disorder Neg Hx     Depression Neg Hx     Dementia Neg Hx     Suicide Neg Hx        Social History     Socioeconomic History    Marital status: Single    Number of children: 2   Tobacco Use    Smoking status: Former     Packs/day: 0.50     Years: 20.00     Pack years: 10.00     Types: Vaping w/o nicotine, Cigarettes    Smokeless tobacco: Current   Substance and Sexual Activity    Alcohol use: Yes     Comment: occasional; social    Drug use: No     Types: Cocaine, Methamphetamines     Comment: PCP, patient states she had not done drugs since 2012    Sexual activity: Yes     Partners: Male     Birth control/protection: See Surgical Hx   Social History Narrative    Together since 2014     2021    He is a retired .  Machine work    She is an      Social Determinants of  Health     Financial Resource Strain: Low Risk     Difficulty of Paying Living Expenses: Not hard at all   Food Insecurity: No Food Insecurity    Worried About Running Out of Food in the Last Year: Never true    Ran Out of Food in the Last Year: Never true   Transportation Needs: No Transportation Needs    Lack of Transportation (Medical): No    Lack of Transportation (Non-Medical): No   Physical Activity: Sufficiently Active    Days of Exercise per Week: 4 days    Minutes of Exercise per Session: 40 min   Stress: Stress Concern Present    Feeling of Stress : Very much   Social Connections: Unknown    Frequency of Communication with Friends and Family: More than three times a week    Frequency of Social Gatherings with Friends and Family: Twice a week    Active Member of Clubs or Organizations: No    Attends Club or Organization Meetings: Never    Marital Status:    Housing Stability: Low Risk     Unable to Pay for Housing in the Last Year: No    Number of Places Lived in the Last Year: 1    Unstable Housing in the Last Year: No       Current Outpatient Medications   Medication Sig Dispense Refill    ALPRAZolam (XANAX) 1 MG tablet TAKE ONE TABLET BY MOUTH TWICE DAILY 60 tablet 2    clindamycin phosphate 1% (CLINDAGEL) 1 % gel Apply topically 2 (two) times daily. 60 g 2    conjugated estrogens (PREMARIN) vaginal cream Place 1 g vaginally twice a week. 30 g 1    tretinoin (RETIN-A) 0.1 % cream Apply topically every evening. 45 g 1     No current facility-administered medications for this visit.       Review of patient's allergies indicates:   Allergen Reactions    Strawberry Anaphylaxis and Hives    Morphine Other (See Comments)     Burns stomach    Toradol [ketorolac]      cramping    Tramadol      Abdominal pain        Review of Systems  Review of Systems   Constitutional:  Negative for activity change, appetite change, chills, fatigue, fever and unexpected weight change.   HENT:  Negative for mouth sores.     Respiratory:  Negative for cough, shortness of breath and wheezing.    Cardiovascular:  Negative for chest pain and palpitations.   Gastrointestinal:  Positive for abdominal pain. Negative for bloating, blood in stool, constipation, nausea and vomiting.   Endocrine: Negative for diabetes and hot flashes.   Genitourinary:  Positive for bladder incontinence and pelvic pain. Negative for dysmenorrhea, dyspareunia, dysuria, frequency, hematuria, menorrhagia, menstrual problem, urgency, vaginal bleeding, vaginal discharge, vaginal pain, urinary incontinence, postcoital bleeding and vaginal odor.   Musculoskeletal:  Negative for back pain and myalgias.   Integumentary:  Negative for rash, breast mass and nipple discharge.   Neurological:  Negative for seizures and headaches.   Psychiatric/Behavioral:  Negative for depression and sleep disturbance. The patient is not nervous/anxious.    Breast: Negative for mass, mastodynia and nipple discharge        Objective:    Physical Exam:   Constitutional: She appears well-developed and well-nourished. No distress.   BMI of 31.45    HENT:   Head: Normocephalic and atraumatic.    Eyes: EOM are normal.      Pulmonary/Chest: Effort normal. No respiratory distress.   Breasts: Non-tender, no engorgement, no masses, no retraction, no discharge. Negative for lymphadenopathy.         Abdominal: Soft. She exhibits no distension. There is no abdominal tenderness. There is no rebound and no guarding.   Trochar sites healed  Tender over left lateral aspect of rectus muscle     Genitourinary:    Vagina normal.   No  no vaginal discharge in the vagina.    Genitourinary Comments: No atrophy.  Vulva without any obvious lesions.  Urethral meatus normal size and location without any lesion.  Urethra is non-tender without stricture or discharge.  Bladder is non-tender.  Vaginal vault with good support.  Minimal white discharge noted.  No obvious lesion.  Normal rugation.  Vaginal cuff is intact and  well-supported.  Cervix and Uterus are surgically absent.  Adnexa is without any masses or tenderness.  Perineum without obvious lesion.  No leakage of urine noted.             Musculoskeletal: Normal range of motion.       Neurological: She is alert.    Skin: Skin is warm and dry.    Psychiatric: She has a normal mood and affect.        Assessment:        1. Well woman exam with routine gynecological exam    2. Stress incontinence of urine    3. History of renal stone    4.  Muscle spasm         Plan:          I have discussed with the patient her condition.  Monthly breast examination was instructed, discussed, and encouraged.  Patient was encouraged to consume a low-calorie, low fat diet, and to increase of physical activity.  Healthy habits encouraged.  A Pap smear was not performed; she no longer would need Pap according to the USPSTF recommendations.  Mammogram was ordered because of the combination of her age and risk factors, according to ACOG guidelines.  Gonorrhea and Chlamydia testing not performed;  HIV test not offered, again according to guidelines.  Colonoscopy discussed according to ACS guideline.  We also discussed her obstetric history and CV risks.   Care Gaps addressed.  Patient is to continue her medications as prescribed.    We discussed her urinary symptoms.  She did not want to see Urology at this time.  Will send urine for urinalysis with reflex culture if nec  She will come back to see me in one year for her annual visit.  She can come back to see me sooner as necessary.  All of her questions were answered appropriately to her satisfaction.

## 2023-02-11 ENCOUNTER — OFFICE VISIT (OUTPATIENT)
Dept: FAMILY MEDICINE | Facility: CLINIC | Age: 43
End: 2023-02-11
Payer: COMMERCIAL

## 2023-02-11 VITALS
BODY MASS INDEX: 30.83 KG/M2 | SYSTOLIC BLOOD PRESSURE: 114 MMHG | OXYGEN SATURATION: 98 % | HEIGHT: 62 IN | HEART RATE: 96 BPM | WEIGHT: 167.56 LBS | DIASTOLIC BLOOD PRESSURE: 74 MMHG | TEMPERATURE: 98 F

## 2023-02-11 DIAGNOSIS — F31.81 BIPOLAR II DISORDER: ICD-10-CM

## 2023-02-11 DIAGNOSIS — Z12.31 OTHER SCREENING MAMMOGRAM: Primary | ICD-10-CM

## 2023-02-11 DIAGNOSIS — E66.9 CLASS 1 OBESITY WITH SERIOUS COMORBIDITY AND BODY MASS INDEX (BMI) OF 30.0 TO 30.9 IN ADULT, UNSPECIFIED OBESITY TYPE: ICD-10-CM

## 2023-02-11 DIAGNOSIS — F41.9 ANXIETY: ICD-10-CM

## 2023-02-11 PROBLEM — N11.9 CHRONIC PYELONEPHRITIS WITHOUT LESION OF RENAL MEDULLARY NECROSIS: Status: RESOLVED | Noted: 2018-01-08 | Resolved: 2023-02-11

## 2023-02-11 PROBLEM — E66.811 CLASS 1 OBESITY WITH SERIOUS COMORBIDITY AND BODY MASS INDEX (BMI) OF 30.0 TO 30.9 IN ADULT: Status: ACTIVE | Noted: 2023-02-11

## 2023-02-11 PROCEDURE — 3078F DIAST BP <80 MM HG: CPT | Mod: CPTII,S$GLB,, | Performed by: FAMILY MEDICINE

## 2023-02-11 PROCEDURE — 3078F PR MOST RECENT DIASTOLIC BLOOD PRESSURE < 80 MM HG: ICD-10-PCS | Mod: CPTII,S$GLB,, | Performed by: FAMILY MEDICINE

## 2023-02-11 PROCEDURE — 1159F PR MEDICATION LIST DOCUMENTED IN MEDICAL RECORD: ICD-10-PCS | Mod: CPTII,S$GLB,, | Performed by: FAMILY MEDICINE

## 2023-02-11 PROCEDURE — 99214 OFFICE O/P EST MOD 30 MIN: CPT | Mod: S$GLB,,, | Performed by: FAMILY MEDICINE

## 2023-02-11 PROCEDURE — 3008F PR BODY MASS INDEX (BMI) DOCUMENTED: ICD-10-PCS | Mod: CPTII,S$GLB,, | Performed by: FAMILY MEDICINE

## 2023-02-11 PROCEDURE — 1159F MED LIST DOCD IN RCRD: CPT | Mod: CPTII,S$GLB,, | Performed by: FAMILY MEDICINE

## 2023-02-11 PROCEDURE — 1160F PR REVIEW ALL MEDS BY PRESCRIBER/CLIN PHARMACIST DOCUMENTED: ICD-10-PCS | Mod: CPTII,S$GLB,, | Performed by: FAMILY MEDICINE

## 2023-02-11 PROCEDURE — 3008F BODY MASS INDEX DOCD: CPT | Mod: CPTII,S$GLB,, | Performed by: FAMILY MEDICINE

## 2023-02-11 PROCEDURE — 99999 PR PBB SHADOW E&M-EST. PATIENT-LVL IV: CPT | Mod: PBBFAC,,, | Performed by: FAMILY MEDICINE

## 2023-02-11 PROCEDURE — 3074F PR MOST RECENT SYSTOLIC BLOOD PRESSURE < 130 MM HG: ICD-10-PCS | Mod: CPTII,S$GLB,, | Performed by: FAMILY MEDICINE

## 2023-02-11 PROCEDURE — 3074F SYST BP LT 130 MM HG: CPT | Mod: CPTII,S$GLB,, | Performed by: FAMILY MEDICINE

## 2023-02-11 PROCEDURE — 99214 PR OFFICE/OUTPT VISIT, EST, LEVL IV, 30-39 MIN: ICD-10-PCS | Mod: S$GLB,,, | Performed by: FAMILY MEDICINE

## 2023-02-11 PROCEDURE — 1160F RVW MEDS BY RX/DR IN RCRD: CPT | Mod: CPTII,S$GLB,, | Performed by: FAMILY MEDICINE

## 2023-02-11 PROCEDURE — 99999 PR PBB SHADOW E&M-EST. PATIENT-LVL IV: ICD-10-PCS | Mod: PBBFAC,,, | Performed by: FAMILY MEDICINE

## 2023-02-11 RX ORDER — GUANFACINE 2 MG/1
1 TABLET, EXTENDED RELEASE ORAL NIGHTLY
Qty: 30 TABLET | Refills: 0 | Status: SHIPPED | OUTPATIENT
Start: 2023-02-11 | End: 2023-08-08

## 2023-02-11 RX ORDER — ALPRAZOLAM 1 MG/1
1 TABLET ORAL 2 TIMES DAILY
Qty: 60 TABLET | Refills: 2 | Status: SHIPPED | OUTPATIENT
Start: 2023-02-11 | End: 2023-06-02

## 2023-02-11 NOTE — PATIENT INSTRUCTIONS
[facility] pharmacy, 280 dollars for 3-6 months depending on dose you need.  I will fax prescription.  Call them before going to  to be sure it is ready.  It is in [place].  [phone]  Start with 0.12 ml weekly for 2 weeks then check in with me on here on how you are doing.  Thyroid cancer and pancreatitis are black box warnings but the risk was in rats for cancer and diabetics for pancreatitis.  Make sure to get a good bowel regimen.  I recommend stool softener daily and stimulant laxative every 2-3 days if no good bowel movement.

## 2023-02-11 NOTE — ASSESSMENT & PLAN NOTE
GLP1RA  Potential medication side effects were discussed with the patient; let me know if any occur.

## 2023-02-11 NOTE — PROGRESS NOTES
"HISTORY OF PRESENT ILLNESS:  Roslyn Vera is a 42 y.o. female who presents to the clinic today for Medication Refill  .       She couldn't take strattera  Side effects/flu like feeling.  It was helping  Xanax daily in AM  Still wants to find something else to help  Michael worried about her weight gain    Patient Active Problem List   Diagnosis    Cigarette nicotine dependence without complication    PMDD (premenstrual dysphoric disorder)    Anxiety    Mood disorder    Hepatosplenomegaly    GERD (gastroesophageal reflux disease)    Status post laparoscopic hysterectomy    Tobacco abuse, in remission    Class 1 obesity with serious comorbidity and body mass index (BMI) of 30.0 to 30.9 in adult           CARE TEAM:  Patient Care Team:  Hermes Colvin MD as PCP - General (Family Medicine)  Robson Hale II, MD (Inactive) as Consulting Physician (Gastroenterology)  Cynthia Lee MA (Inactive) as Care Coordinator         Review of Systems   HENT:  Negative for hearing loss.    Eyes:  Negative for discharge.   Respiratory:  Negative for wheezing.    Cardiovascular:  Positive for palpitations. Negative for chest pain.   Gastrointestinal:  Positive for constipation. Negative for blood in stool, diarrhea and vomiting.   Genitourinary:  Negative for dysuria and hematuria.   Musculoskeletal:  Negative for neck pain.   Neurological:  Positive for headaches. Negative for weakness.   Endo/Heme/Allergies:  Negative for polydipsia.         PHYSICAL EXAM:   /74   Pulse 96   Temp 98.1 °F (36.7 °C) (Oral)   Ht 5' 2" (1.575 m)   Wt 76 kg (167 lb 8.8 oz)   LMP 01/17/2021 (Exact Date) Comment: [facility] 2/10/21  SpO2 98%   BMI 30.65 kg/m²   BP Readings from Last 5 Encounters:   02/11/23 114/74   01/19/23 124/70   11/30/22 112/62   10/24/22 (!) 140/76   07/26/22 112/86     Wt Readings from Last 5 Encounters:   02/11/23 76 kg (167 lb 8.8 oz)   01/19/23 78 kg (171 lb 15.3 oz)   11/30/22 78 kg (171 lb 15.3 oz)   10/24/22 " 74.8 kg (165 lb)   07/26/22 77 kg (169 lb 12.1 oz)             She appears well, in no apparent distress.  Alert and oriented times three, pleasant and cooperative. Vital signs are as documented in vital signs section.  S1 and S2 normal, no murmurs, clicks, gallops or rubs. Regular rate and rhythm. Chest is clear; no wheezes or rales. No edema or JVD.       Medication List with Changes/Refills   New Medications    GUANFACINE (INTUNIV ER) 2 MG TB24    Take 1 tablet by mouth every evening.   Current Medications    CLINDAMYCIN PHOSPHATE 1% (CLINDAGEL) 1 % GEL    Apply topically 2 (two) times daily.    CONJUGATED ESTROGENS (PREMARIN) VAGINAL CREAM    Place 1 g vaginally twice a week.    TRETINOIN (RETIN-A) 0.1 % CREAM    Apply topically every evening.   Changed and/or Refilled Medications    Modified Medication Previous Medication    ALPRAZOLAM (XANAX) 1 MG TABLET ALPRAZolam (XANAX) 1 MG tablet       Take 1 tablet (1 mg total) by mouth 2 (two) times daily.    TAKE ONE TABLET BY MOUTH TWICE DAILY         ASSESSMENT AND PLAN:    Problem List Items Addressed This Visit       Anxiety    Relevant Medications    ALPRAZolam (XANAX) 1 MG tablet    guanFACINE (INTUNIV ER) 2 mg Tb24    Class 1 obesity with serious comorbidity and body mass index (BMI) of 30.0 to 30.9 in adult    [facility] pharmacy, 280 dollars for 3-6 months depending on dose you need.  I will fax prescription.  Call them before going to  to be sure it is ready.  It is in [place].  [phone]  Start with 0.12 ml weekly for 2 weeks then check in with me on here on how you are doing.  Thyroid cancer and pancreatitis are black box warnings but the risk was in rats for cancer and diabetics for pancreatitis.  Make sure to get a good bowel regimen.  I recommend stool softener daily and stimulant laxative every 2-3 days if no good bowel movement.           Current Assessment & Plan     GLP1RA  Potential medication side effects were  discussed with the patient; let me know if any occur.            Other Visit Diagnoses       Other screening mammogram    -  Primary    Relevant Orders    Mammo Digital Screening Bilat    Bipolar II disorder        Relevant Medications    guanFACINE (INTUNIV ER) 2 mg Tb24          Trial guanfacine  Still feel she has ADD component.  GLPRA for weight loss.      Future Appointments   Date Time Provider Department Center   5/12/2023  3:00 PM Hermes Colvin MD [facility] [place]       Follow up in about 3 months (around 5/11/2023) for assess treatment plan. or sooner as needed.

## 2023-02-22 ENCOUNTER — HOSPITAL ENCOUNTER (EMERGENCY)
Facility: HOSPITAL | Age: 43
Discharge: HOME | End: 2023-02-22
Attending: EMERGENCY MEDICINE
Payer: COMMERCIAL

## 2023-02-22 VITALS
RESPIRATION RATE: 18 BRPM | BODY MASS INDEX: 31.28 KG/M2 | TEMPERATURE: 98 F | WEIGHT: 170 LBS | HEIGHT: 62 IN | HEART RATE: 86 BPM | OXYGEN SATURATION: 100 % | DIASTOLIC BLOOD PRESSURE: 75 MMHG | SYSTOLIC BLOOD PRESSURE: 114 MMHG

## 2023-02-22 DIAGNOSIS — R05.9 COUGH: ICD-10-CM

## 2023-02-22 DIAGNOSIS — R06.02 SOB (SHORTNESS OF BREATH): ICD-10-CM

## 2023-02-22 LAB
ALBUMIN SERPL BCP-MCNC: 4.5 G/DL (ref 3.5–5.2)
ALP SERPL-CCNC: 60 U/L (ref 55–135)
ALT SERPL W/O P-5'-P-CCNC: 56 U/L (ref 10–44)
ANION GAP SERPL CALC-SCNC: 8 MMOL/L (ref 8–16)
AST SERPL-CCNC: 29 U/L (ref 10–40)
BASOPHILS # BLD AUTO: 0.02 K/UL (ref 0–0.2)
BASOPHILS NFR BLD: 0.2 % (ref 0–1.9)
BILIRUB SERPL-MCNC: 0.9 MG/DL (ref 0.1–1)
BILIRUB UR QL STRIP: NEGATIVE
BUN SERPL-MCNC: 7 MG/DL (ref 6–20)
CALCIUM SERPL-MCNC: 9.5 MG/DL (ref 8.7–10.5)
CHLORIDE SERPL-SCNC: 102 MMOL/L (ref 95–110)
CLARITY UR: CLEAR
CO2 SERPL-SCNC: 26 MMOL/L (ref 23–29)
COLOR UR: COLORLESS
CREAT SERPL-MCNC: 0.7 MG/DL (ref 0.5–1.4)
CTP QC/QA: YES
D DIMER PPP IA.FEU-MCNC: <0.19 MG/L FEU
DIFFERENTIAL METHOD: ABNORMAL
EOSINOPHIL # BLD AUTO: 0.1 K/UL (ref 0–0.5)
EOSINOPHIL NFR BLD: 1.1 % (ref 0–8)
ERYTHROCYTE [DISTWIDTH] IN BLOOD BY AUTOMATED COUNT: 11.9 % (ref 11.5–14.5)
EST. GFR  (NO RACE VARIABLE): >60 ML/MIN/1.73 M^2
GLUCOSE SERPL-MCNC: 94 MG/DL (ref 70–110)
GLUCOSE UR QL STRIP: NEGATIVE
HCT VFR BLD AUTO: 40.9 % (ref 37–48.5)
HGB BLD-MCNC: 14.7 G/DL (ref 12–16)
HGB UR QL STRIP: ABNORMAL
IMM GRANULOCYTES # BLD AUTO: 0.05 K/UL (ref 0–0.04)
IMM GRANULOCYTES NFR BLD AUTO: 0.5 % (ref 0–0.5)
KETONES UR QL STRIP: NEGATIVE
LEUKOCYTE ESTERASE UR QL STRIP: NEGATIVE
LYMPHOCYTES # BLD AUTO: 1.8 K/UL (ref 1–4.8)
LYMPHOCYTES NFR BLD: 19.3 % (ref 18–48)
MCH RBC QN AUTO: 31.2 PG (ref 27–31)
MCHC RBC AUTO-ENTMCNC: 35.9 G/DL (ref 32–36)
MCV RBC AUTO: 87 FL (ref 82–98)
MOLECULAR STREP A: NEGATIVE
MONOCYTES # BLD AUTO: 0.7 K/UL (ref 0.3–1)
MONOCYTES NFR BLD: 7.5 % (ref 4–15)
NEUTROPHILS # BLD AUTO: 6.6 K/UL (ref 1.8–7.7)
NEUTROPHILS NFR BLD: 71.4 % (ref 38–73)
NITRITE UR QL STRIP: NEGATIVE
NRBC BLD-RTO: 0 /100 WBC
PH UR STRIP: 6 [PH] (ref 5–8)
PLATELET # BLD AUTO: 255 K/UL (ref 150–450)
PMV BLD AUTO: 9.8 FL (ref 9.2–12.9)
POC MOLECULAR INFLUENZA A AGN: NEGATIVE
POC MOLECULAR INFLUENZA B AGN: NEGATIVE
POTASSIUM SERPL-SCNC: 3.8 MMOL/L (ref 3.5–5.1)
PROT SERPL-MCNC: 7.6 G/DL (ref 6–8.4)
PROT UR QL STRIP: NEGATIVE
RBC # BLD AUTO: 4.71 M/UL (ref 4–5.4)
SARS-COV-2 RDRP RESP QL NAA+PROBE: NEGATIVE
SODIUM SERPL-SCNC: 136 MMOL/L (ref 136–145)
SP GR UR STRIP: 1 (ref 1–1.03)
URN SPEC COLLECT METH UR: ABNORMAL
UROBILINOGEN UR STRIP-ACNC: NEGATIVE EU/DL
WBC # BLD AUTO: 9.22 K/UL (ref 3.9–12.7)

## 2023-02-22 PROCEDURE — 93010 EKG 12-LEAD: ICD-10-PCS | Mod: ,,, | Performed by: INTERNAL MEDICINE

## 2023-02-22 PROCEDURE — 80053 COMPREHEN METABOLIC PANEL: CPT | Performed by: PHYSICIAN ASSISTANT

## 2023-02-22 PROCEDURE — 85025 COMPLETE CBC W/AUTO DIFF WBC: CPT | Performed by: PHYSICIAN ASSISTANT

## 2023-02-22 PROCEDURE — 96365 THER/PROPH/DIAG IV INF INIT: CPT

## 2023-02-22 PROCEDURE — 25000003 PHARM REV CODE 250: Performed by: PHYSICIAN ASSISTANT

## 2023-02-22 PROCEDURE — 81003 URINALYSIS AUTO W/O SCOPE: CPT | Performed by: PHYSICIAN ASSISTANT

## 2023-02-22 PROCEDURE — 85379 FIBRIN DEGRADATION QUANT: CPT | Performed by: PHYSICIAN ASSISTANT

## 2023-02-22 PROCEDURE — 87651 STREP A DNA AMP PROBE: CPT

## 2023-02-22 PROCEDURE — 93010 ELECTROCARDIOGRAM REPORT: CPT | Mod: ,,, | Performed by: INTERNAL MEDICINE

## 2023-02-22 PROCEDURE — 87502 INFLUENZA DNA AMP PROBE: CPT

## 2023-02-22 PROCEDURE — 93005 ELECTROCARDIOGRAM TRACING: CPT

## 2023-02-22 PROCEDURE — 96375 TX/PRO/DX INJ NEW DRUG ADDON: CPT

## 2023-02-22 PROCEDURE — 99285 EMERGENCY DEPT VISIT HI MDM: CPT | Mod: 25

## 2023-02-22 PROCEDURE — 63600175 PHARM REV CODE 636 W HCPCS: Performed by: PHYSICIAN ASSISTANT

## 2023-02-22 RX ORDER — HYDROMORPHONE HYDROCHLORIDE 1 MG/ML
1 INJECTION, SOLUTION INTRAMUSCULAR; INTRAVENOUS; SUBCUTANEOUS
Status: COMPLETED | OUTPATIENT
Start: 2023-02-22 | End: 2023-02-22

## 2023-02-22 RX ORDER — BENZONATATE 100 MG/1
100 CAPSULE ORAL 3 TIMES DAILY PRN
Qty: 20 CAPSULE | Refills: 0 | Status: SHIPPED | OUTPATIENT
Start: 2023-02-22 | End: 2023-03-04

## 2023-02-22 RX ORDER — ONDANSETRON 2 MG/ML
4 INJECTION INTRAMUSCULAR; INTRAVENOUS
Status: COMPLETED | OUTPATIENT
Start: 2023-02-22 | End: 2023-02-22

## 2023-02-22 RX ORDER — IBUPROFEN 800 MG/1
800 TABLET ORAL EVERY 6 HOURS PRN
Qty: 20 TABLET | Refills: 0 | Status: SHIPPED | OUTPATIENT
Start: 2023-02-22

## 2023-02-22 RX ADMIN — HYDROMORPHONE HYDROCHLORIDE 1 MG: 1 INJECTION, SOLUTION INTRAMUSCULAR; INTRAVENOUS; SUBCUTANEOUS at 01:02

## 2023-02-22 RX ADMIN — PROMETHAZINE HYDROCHLORIDE 12.5 MG: 25 INJECTION INTRAMUSCULAR; INTRAVENOUS at 03:02

## 2023-02-22 RX ADMIN — ONDANSETRON HYDROCHLORIDE 4 MG: 2 SOLUTION INTRAMUSCULAR; INTRAVENOUS at 01:02

## 2023-02-22 NOTE — Clinical Note
"Roslyn Amayazabrina Vera was seen and treated in our emergency department on 2/22/2023.  She may return to work on 02/24/2023.       If you have any questions or concerns, please don't hesitate to call.      evan covarrubias RN    "

## 2023-02-23 NOTE — ED PROVIDER NOTES
Encounter Date: 2/22/2023       History     Chief Complaint   Patient presents with    Cough     X3 days with fever, SOB, sore throat, and fatigue. Went to  and was sent here for possible pneumonia. Took tylenol 1.5 hours PTA. Was swabbed at  for strep and COVID but did not get results.      42-year-old female presents complaining of fever for 3 days.  Associated shortness of breath, sore throat, and fatigue.  Patient was seen at an urgent care today and was told she has ground-glass opacity seen on chest x-ray and was told to come to the emergency room for further investigation.      Review of patient's allergies indicates:   Allergen Reactions    Strawberry Anaphylaxis and Hives    Morphine Other (See Comments)     Burns stomach    Toradol [ketorolac]      cramping    Tramadol      Abdominal pain     Past Medical History:   Diagnosis Date    Abnormal uterine bleeding 4/18/2018    Acute encephalopathy 12/24/12    secondary to drug overdose    ARF (acute renal failure) 12/24/12    secondary to drug overdose    History of cardiac arrest 12/24/12    secondary to drug overdose    Hx of psychiatric care     Hypertension     Kidney stones 1/8/2018    MRSA (methicillin resistant Staphylococcus aureus) 12/24/12    Nephritis     Polysubstance abuse 12/24/12    Psychiatric problem     Systolic CHF, acute 12/24/12    secondary to drug overdose    Therapy     when younger; recently set up with Cassie Rockweiler, LCSW    UTI (urinary tract infection)      Past Surgical History:   Procedure Laterality Date    AUGMENTATION OF BREAST Bilateral 2001    CHOLECYSTECTOMY      ESOPHAGOGASTRODUODENOSCOPY N/A 1/2/2020    Procedure: EGD (ESOPHAGOGASTRODUODENOSCOPY);  Surgeon: Moe Tripathi MD;  Location: [facility];  Service: Endoscopy;  Laterality: N/A;    LAPAROSCOPIC SALPINGECTOMY Left 2/10/2021    Procedure: SALPINGECTOMY, LAPAROSCOPIC;  Surgeon: Doc Pena MD;  Location: [facility] OR;  Service: OB/GYN;  Laterality: Left;     LAPAROSCOPIC TOTAL HYSTERECTOMY N/A 2/10/2021    Procedure: HYSTERECTOMY, TOTAL, LAPAROSCOPIC;  Surgeon: Doc Pena MD;  Location: [facility] OR;  Service: OB/GYN;  Laterality: N/A;  RN PREOP 2/3/, --COVID NEGATIVE ON 2/9 and T/S done    TUBAL LIGATION      urinary stents       Family History   Problem Relation Age of Onset    Kidney disease Mother     Ovarian cancer Mother 28    Cancer Maternal Grandmother         throat    Cancer Maternal Grandfather         prostate    Kidney disease Daughter     Breast cancer Neg Hx     Colon cancer Neg Hx     Anxiety disorder Neg Hx     Depression Neg Hx     Dementia Neg Hx     Suicide Neg Hx      Social History     Tobacco Use    Smoking status: Former     Packs/day: 0.50     Years: 20.00     Pack years: 10.00     Types: Vaping w/o nicotine, Cigarettes    Smokeless tobacco: Current   Substance Use Topics    Alcohol use: Yes     Comment: occasional; social    Drug use: No     Types: Cocaine, Methamphetamines     Comment: PCP, patient states she had not done drugs since 2012     Review of Systems   Constitutional:  Positive for appetite change, chills and fever.   HENT:  Positive for sore throat.    Respiratory:  Positive for cough and shortness of breath.    Cardiovascular:  Positive for chest pain.   Genitourinary:  Negative for dysuria.   Musculoskeletal:  Negative for back pain.   Hematological:  Negative for adenopathy.   Psychiatric/Behavioral:  Negative for confusion.      Physical Exam     Initial Vitals [02/22/23 1110]   BP Pulse Resp Temp SpO2   129/82 105 20 99.1 °F (37.3 °C) 98 %      MAP       --         Physical Exam    Constitutional: She appears well-developed and well-nourished.   HENT:   Head: Normocephalic.   Right Ear: External ear normal.   Left Ear: External ear normal.   Mouth/Throat: Oropharynx is clear and moist.   Eyes: Conjunctivae and EOM are normal. Pupils are equal, round, and reactive to light.   Neck: Neck supple.   Normal range of  motion.  Cardiovascular:  Normal rate, regular rhythm, normal heart sounds and intact distal pulses.           Pulmonary/Chest: Breath sounds normal. No respiratory distress.   Abdominal: Abdomen is soft.   Musculoskeletal:      Cervical back: Normal range of motion and neck supple.     Neurological: She is alert and oriented to person, place, and time. She has normal strength.   Skin: Skin is warm.   Psychiatric: She has a normal mood and affect.       ED Course   Procedures  Labs Reviewed   CBC W/ AUTO DIFFERENTIAL - Abnormal; Notable for the following components:       Result Value    MCH 31.2 (*)     Immature Grans (Abs) 0.05 (*)     All other components within normal limits   COMPREHENSIVE METABOLIC PANEL - Abnormal; Notable for the following components:    ALT 56 (*)     All other components within normal limits   URINALYSIS, REFLEX TO URINE CULTURE - Abnormal; Notable for the following components:    Color, UA Colorless (*)     Occult Blood UA Trace (*)     All other components within normal limits    Narrative:     Specimen Source->Urine   D DIMER, QUANTITATIVE   POCT INFLUENZA A/B MOLECULAR   SARS-COV-2 RDRP GENE   POCT STREP A MOLECULAR          Imaging Results              CT Chest Without Contrast (Final result)  Result time 02/22/23 15:08:25      Final result by Jose Cruz Gordon MD (02/22/23 15:08:25)                   Impression:      1. No acute abnormality.  2. Hepatosplenomegaly with hepatic steatosis.      Electronically signed by: Jose Cruz Gordon  Date:    02/22/2023  Time:    15:08               Narrative:    EXAMINATION:  CT CHEST WITHOUT CONTRAST    CLINICAL HISTORY:  Dyspnea, chronic, unclear etiology;    TECHNIQUE:  Low dose axial images, sagittal and coronal reformations were obtained from the thoracic inlet to the lung bases without the use of IV contrast.    COMPARISON:  X-ray 02/22/2023    FINDINGS:  Base of Neck: No significant abnormality.    Thoracic soft tissues: Bilateral breast  augmentation/implants.    Aorta: Left-sided aortic arch.  No aneurysm or dissection    Heart: Normal size. No effusion.    Sole/Mediastinum: No pathologic michael enlargement.    Airways: Patent.    Lungs/Pleura: Clear lungs. No pleural effusion or thickening.    Esophagus: Unremarkable.    Upper Abdomen: Hepatosplenomegaly with hepatic steatosis.    Bones: No acute fracture. No suspicious lytic or sclerotic lesions.                                       X-Ray Chest PA And Lateral (Final result)  Result time 02/22/23 12:23:54      Final result by Shawn Guerrero MD (02/22/23 12:23:54)                   Impression:      No radiographic evidence of pneumonia or other source of cough, noting that early/mild viral pneumonia may be radiographically occult.      Electronically signed by: Shawn Guerrero MD  Date:    02/22/2023  Time:    12:23               Narrative:    EXAMINATION:  XR CHEST PA AND LATERAL    CLINICAL HISTORY:  Cough, unspecified    TECHNIQUE:  PA and lateral views of the chest were performed.    COMPARISON:  Chest radiograph 09/27/2021    FINDINGS:  No detrimental change.The lungs are symmetrically well expanded and clear, with normal appearance of pulmonary vasculature and no pleural effusion or pneumothorax.    The cardiac silhouette is normal in size. The hilar and mediastinal contours are unremarkable.    Bones are intact.                                       Medications   HYDROmorphone injection 1 mg (1 mg Intravenous Given 2/22/23 1326)   ondansetron injection 4 mg (4 mg Intravenous Given 2/22/23 1326)   promethazine (PHENERGAN) 12.5 mg in dextrose 5 % (D5W) 50 mL IVPB (0 mg Intravenous Stopped 2/22/23 1543)     Medical Decision Making:   Initial Assessment:   Patient has chest x-ray results from urgent Care with her which states she has ground-glass opacities.  Given the above I did a full workup on patient.  Patient's labs are all unremarkable.  Chest x-ray is normal.  CT chest is normal.  I suspect  patient has a viral URI.  I will discharge patient home with Tessalon Perles and ibuprofen.  Patient given return precautions.  Patient is stable for discharge.                        Clinical Impression:   Final diagnoses:  [R05.9] Cough  [R06.02] SOB (shortness of breath)        ED Disposition Condition    Discharge Stable          ED Prescriptions       Medication Sig Dispense Start Date End Date Auth. Provider    benzonatate (TESSALON) 100 MG capsule Take 1 capsule (100 mg total) by mouth 3 (three) times daily as needed. 20 capsule 2/22/2023 3/4/2023 URIEL Lafleur    ibuprofen (ADVIL,MOTRIN) 800 MG tablet Take 1 tablet (800 mg total) by mouth every 6 (six) hours as needed for Pain. 20 tablet 2/22/2023 -- URIEL Lafleur          Follow-up Information       Follow up With Specialties Details Why Contact Info    Hermes Colvin MD Family Medicine   [address]  [place] [address]  [phone]               URIEL Lafleur  02/22/23 [id number]

## 2023-02-24 ENCOUNTER — OFFICE VISIT (OUTPATIENT)
Dept: FAMILY MEDICINE | Facility: CLINIC | Age: 43
End: 2023-02-24
Payer: COMMERCIAL

## 2023-02-24 VITALS
BODY MASS INDEX: 31.24 KG/M2 | HEIGHT: 62 IN | DIASTOLIC BLOOD PRESSURE: 70 MMHG | WEIGHT: 169.75 LBS | HEART RATE: 102 BPM | SYSTOLIC BLOOD PRESSURE: 110 MMHG | TEMPERATURE: 98 F | OXYGEN SATURATION: 99 %

## 2023-02-24 DIAGNOSIS — J32.9 SINUSITIS, UNSPECIFIED CHRONICITY, UNSPECIFIED LOCATION: ICD-10-CM

## 2023-02-24 DIAGNOSIS — H92.09 OTALGIA, UNSPECIFIED LATERALITY: Primary | ICD-10-CM

## 2023-02-24 PROCEDURE — 3008F PR BODY MASS INDEX (BMI) DOCUMENTED: ICD-10-PCS | Mod: CPTII,S$GLB,, | Performed by: FAMILY MEDICINE

## 2023-02-24 PROCEDURE — 99999 PR PBB SHADOW E&M-EST. PATIENT-LVL III: ICD-10-PCS | Mod: PBBFAC,,, | Performed by: FAMILY MEDICINE

## 2023-02-24 PROCEDURE — 99999 PR PBB SHADOW E&M-EST. PATIENT-LVL III: CPT | Mod: PBBFAC,,, | Performed by: FAMILY MEDICINE

## 2023-02-24 PROCEDURE — 3078F DIAST BP <80 MM HG: CPT | Mod: CPTII,S$GLB,, | Performed by: FAMILY MEDICINE

## 2023-02-24 PROCEDURE — 96372 THER/PROPH/DIAG INJ SC/IM: CPT | Mod: S$GLB,,, | Performed by: FAMILY MEDICINE

## 2023-02-24 PROCEDURE — 99214 OFFICE O/P EST MOD 30 MIN: CPT | Mod: 25,S$GLB,, | Performed by: FAMILY MEDICINE

## 2023-02-24 PROCEDURE — 96372 PR INJECTION,THERAP/PROPH/DIAG2ST, IM OR SUBCUT: ICD-10-PCS | Mod: S$GLB,,, | Performed by: FAMILY MEDICINE

## 2023-02-24 PROCEDURE — 3074F PR MOST RECENT SYSTOLIC BLOOD PRESSURE < 130 MM HG: ICD-10-PCS | Mod: CPTII,S$GLB,, | Performed by: FAMILY MEDICINE

## 2023-02-24 PROCEDURE — 3074F SYST BP LT 130 MM HG: CPT | Mod: CPTII,S$GLB,, | Performed by: FAMILY MEDICINE

## 2023-02-24 PROCEDURE — 3008F BODY MASS INDEX DOCD: CPT | Mod: CPTII,S$GLB,, | Performed by: FAMILY MEDICINE

## 2023-02-24 PROCEDURE — 3078F PR MOST RECENT DIASTOLIC BLOOD PRESSURE < 80 MM HG: ICD-10-PCS | Mod: CPTII,S$GLB,, | Performed by: FAMILY MEDICINE

## 2023-02-24 PROCEDURE — 99214 PR OFFICE/OUTPT VISIT, EST, LEVL IV, 30-39 MIN: ICD-10-PCS | Mod: 25,S$GLB,, | Performed by: FAMILY MEDICINE

## 2023-02-24 RX ORDER — AMOXICILLIN AND CLAVULANATE POTASSIUM 875; 125 MG/1; MG/1
1 TABLET, FILM COATED ORAL EVERY 12 HOURS
Qty: 14 TABLET | Refills: 0 | Status: SHIPPED | OUTPATIENT
Start: 2023-02-24 | End: 2023-03-03

## 2023-02-24 RX ORDER — TRIAMCINOLONE ACETONIDE 40 MG/ML
40 INJECTION, SUSPENSION INTRA-ARTICULAR; INTRAMUSCULAR
Status: COMPLETED | OUTPATIENT
Start: 2023-02-24 | End: 2023-02-24

## 2023-02-24 RX ADMIN — TRIAMCINOLONE ACETONIDE 40 MG: 40 INJECTION, SUSPENSION INTRA-ARTICULAR; INTRAMUSCULAR at 08:02

## 2023-02-24 NOTE — PROGRESS NOTES
HISTORY OF PRESENT ILLNESS:  Roslyn Vera is a 42 y.o. female who presents to the clinic today for Sore Throat and Otalgia  .     She is still having throat and ear pain  The lungs are fine  Minimal cough  No Mena  Reviewed the ER and uc record        Patient Active Problem List   Diagnosis    Cigarette nicotine dependence without complication    PMDD (premenstrual dysphoric disorder)    Anxiety    Mood disorder    Hepatosplenomegaly    GERD (gastroesophageal reflux disease)    Status post laparoscopic hysterectomy    Tobacco abuse, in remission    Class 1 obesity with serious comorbidity and body mass index (BMI) of 30.0 to 30.9 in adult           CARE TEAM:  Patient Care Team:  Hermes Colvin MD as PCP - General (Family Medicine)  Robson Hale II, MD (Inactive) as Consulting Physician (Gastroenterology)  Cynthia Lee MA (Inactive) as Care Coordinator         Review of Systems   HENT:  Positive for ear pain and sore throat. Negative for congestion and ear discharge.    Respiratory:  Positive for cough. Negative for shortness of breath and stridor.    Gastrointestinal:  Negative for abdominal pain, diarrhea and vomiting.   Musculoskeletal:  Negative for neck pain.   Neurological:  Positive for headaches.         PHYSICAL EXAM:   [facility] 01/17/2021 (Exact Date) Comment: [facility] 2/10/21  BP Readings from Last 5 Encounters:   02/22/23 114/75   02/11/23 114/74   01/19/23 124/70   11/30/22 112/62   10/24/22 (!) 140/76     Wt Readings from Last 5 Encounters:   02/22/23 77.1 kg (170 lb)   02/11/23 76 kg (167 lb 8.8 oz)   01/19/23 78 kg (171 lb 15.3 oz)   11/30/22 78 kg (171 lb 15.3 oz)   10/24/22 74.8 kg (165 lb)             She appears well, in no apparent distress.  Alert and oriented times three, pleasant and cooperative. Vital signs are as documented in vital signs section.  Nose with coryza  Ear with swelling  S1 and S2 normal, no murmurs, clicks, gallops or rubs. Regular rate and rhythm. Chest is clear; no  wheezes or rales. No edema or JVD.       Reviewed labs and CT scan, xray  And viral panel    Medication List with Changes/Refills   New Medications    AMOXICILLIN-CLAVULANATE 875-125MG (AUGMENTIN) 875-125 MG PER TABLET    Take 1 tablet by mouth every 12 (twelve) hours. for 7 days   Current Medications    ALPRAZOLAM (XANAX) 1 MG TABLET    Take 1 tablet (1 mg total) by mouth 2 (two) times daily.    BENZONATATE (TESSALON) 100 MG CAPSULE    Take 1 capsule (100 mg total) by mouth 3 (three) times daily as needed.    CLINDAMYCIN PHOSPHATE 1% (CLINDAGEL) 1 % GEL    Apply topically 2 (two) times daily.    CONJUGATED ESTROGENS (PREMARIN) VAGINAL CREAM    Place 1 g vaginally twice a week.    GUANFACINE (INTUNIV ER) 2 MG TB24    Take 1 tablet by mouth every evening.    IBUPROFEN (ADVIL,MOTRIN) 800 MG TABLET    Take 1 tablet (800 mg total) by mouth every 6 (six) hours as needed for Pain.    TRETINOIN (RETIN-A) 0.1 % CREAM    Apply topically every evening.         ASSESSMENT AND PLAN:    Problem List Items Addressed This Visit    None  Visit Diagnoses       Otalgia, unspecified laterality    -  Primary    Relevant Medications    triamcinolone acetonide injection 40 mg (Start on 2/24/2023  8:15 AM)    Sinusitis, unspecified chronicity, unspecified location        Relevant Medications    triamcinolone acetonide injection 40 mg (Start on 2/24/2023  8:15 AM)          Potential medication side effects were discussed with the patient; let me know if any occur.  Treat for bacterial sinusitis.    Future Appointments   Date Time Provider Department Center   3/3/2023  4:00 PM [address]   5/12/2023  3:00 PM Hermes Colvin MD [facility] [place]       Follow up in about 2 weeks (around 3/10/2023), or if symptoms worsen or fail to improve, for reassess acute condition. or sooner as needed.

## 2023-03-08 ENCOUNTER — PATIENT MESSAGE (OUTPATIENT)
Dept: FAMILY MEDICINE | Facility: CLINIC | Age: 43
End: 2023-03-08
Payer: COMMERCIAL

## 2023-04-12 ENCOUNTER — HOSPITAL ENCOUNTER (EMERGENCY)
Facility: HOSPITAL | Age: 43
Discharge: HOME | End: 2023-04-12
Attending: EMERGENCY MEDICINE
Payer: COMMERCIAL

## 2023-04-12 ENCOUNTER — TELEPHONE (OUTPATIENT)
Dept: FAMILY MEDICINE | Facility: CLINIC | Age: 43
End: 2023-04-12
Payer: COMMERCIAL

## 2023-04-12 VITALS
WEIGHT: 160 LBS | TEMPERATURE: 98 F | OXYGEN SATURATION: 100 % | HEART RATE: 87 BPM | BODY MASS INDEX: 29.44 KG/M2 | SYSTOLIC BLOOD PRESSURE: 130 MMHG | DIASTOLIC BLOOD PRESSURE: 78 MMHG | RESPIRATION RATE: 16 BRPM | HEIGHT: 62 IN

## 2023-04-12 DIAGNOSIS — N30.00 ACUTE CYSTITIS WITHOUT HEMATURIA: Primary | ICD-10-CM

## 2023-04-12 DIAGNOSIS — R00.0 TACHYCARDIA: ICD-10-CM

## 2023-04-12 DIAGNOSIS — N83.201 CYST OF RIGHT OVARY: ICD-10-CM

## 2023-04-12 LAB
ALBUMIN SERPL BCP-MCNC: 4.5 G/DL (ref 3.5–5.2)
ALLENS TEST: NORMAL
ALP SERPL-CCNC: 56 U/L (ref 55–135)
ALT SERPL W/O P-5'-P-CCNC: 37 U/L (ref 10–44)
ANION GAP SERPL CALC-SCNC: 16 MMOL/L (ref 8–16)
ANION GAP SERPL CALC-SCNC: 9 MMOL/L (ref 8–16)
AST SERPL-CCNC: 27 U/L (ref 10–40)
BACTERIA #/AREA URNS HPF: ABNORMAL /HPF
BASOPHILS # BLD AUTO: 0.02 K/UL (ref 0–0.2)
BASOPHILS NFR BLD: 0.2 % (ref 0–1.9)
BILIRUB SERPL-MCNC: 0.9 MG/DL (ref 0.1–1)
BILIRUB UR QL STRIP: NEGATIVE
BUN SERPL-MCNC: 9 MG/DL (ref 6–20)
BUN SERPL-MCNC: 9 MG/DL (ref 6–30)
CALCIUM SERPL-MCNC: 10.3 MG/DL (ref 8.7–10.5)
CHLORIDE SERPL-SCNC: 102 MMOL/L (ref 95–110)
CHLORIDE SERPL-SCNC: 106 MMOL/L (ref 95–110)
CLARITY UR: CLEAR
CO2 SERPL-SCNC: 24 MMOL/L (ref 23–29)
COLOR UR: YELLOW
CREAT SERPL-MCNC: 0.5 MG/DL (ref 0.5–1.4)
CREAT SERPL-MCNC: 0.7 MG/DL (ref 0.5–1.4)
CTP QC/QA: YES
DELSYS: NORMAL
DIFFERENTIAL METHOD: ABNORMAL
EOSINOPHIL # BLD AUTO: 0.1 K/UL (ref 0–0.5)
EOSINOPHIL NFR BLD: 1.5 % (ref 0–8)
ERYTHROCYTE [DISTWIDTH] IN BLOOD BY AUTOMATED COUNT: 11.9 % (ref 11.5–14.5)
EST. GFR  (NO RACE VARIABLE): >60 ML/MIN/1.73 M^2
GLUCOSE SERPL-MCNC: 119 MG/DL (ref 70–110)
GLUCOSE SERPL-MCNC: 132 MG/DL (ref 70–110)
GLUCOSE UR QL STRIP: NEGATIVE
HCT VFR BLD AUTO: 41.9 % (ref 37–48.5)
HCT VFR BLD CALC: 40 %PCV (ref 36–54)
HGB BLD-MCNC: 15.2 G/DL (ref 12–16)
HGB UR QL STRIP: ABNORMAL
IMM GRANULOCYTES # BLD AUTO: 0.03 K/UL (ref 0–0.04)
IMM GRANULOCYTES NFR BLD AUTO: 0.3 % (ref 0–0.5)
KETONES UR QL STRIP: NEGATIVE
LACTATE SERPL-SCNC: 0.8 MMOL/L (ref 0.5–2.2)
LDH SERPL L TO P-CCNC: 0.52 MMOL/L (ref 0.5–2.2)
LEUKOCYTE ESTERASE UR QL STRIP: ABNORMAL
LYMPHOCYTES # BLD AUTO: 2 K/UL (ref 1–4.8)
LYMPHOCYTES NFR BLD: 21.9 % (ref 18–48)
MAGNESIUM SERPL-MCNC: 1.8 MG/DL (ref 1.6–2.6)
MCH RBC QN AUTO: 31.3 PG (ref 27–31)
MCHC RBC AUTO-ENTMCNC: 36.3 G/DL (ref 32–36)
MCV RBC AUTO: 86 FL (ref 82–98)
MICROSCOPIC COMMENT: ABNORMAL
MODE: NORMAL
MONOCYTES # BLD AUTO: 0.6 K/UL (ref 0.3–1)
MONOCYTES NFR BLD: 7.2 % (ref 4–15)
NEUTROPHILS # BLD AUTO: 6.2 K/UL (ref 1.8–7.7)
NEUTROPHILS NFR BLD: 68.9 % (ref 38–73)
NITRITE UR QL STRIP: NEGATIVE
NRBC BLD-RTO: 0 /100 WBC
PH UR STRIP: 7 [PH] (ref 5–8)
PLATELET # BLD AUTO: 279 K/UL (ref 150–450)
PMV BLD AUTO: 9.8 FL (ref 9.2–12.9)
POC IONIZED CALCIUM: 1.2 MMOL/L (ref 1.06–1.42)
POC MOLECULAR INFLUENZA A AGN: NEGATIVE
POC MOLECULAR INFLUENZA B AGN: NEGATIVE
POC TCO2 (MEASURED): 25 MMOL/L (ref 23–29)
POTASSIUM BLD-SCNC: 3.4 MMOL/L (ref 3.5–5.1)
POTASSIUM SERPL-SCNC: 3.4 MMOL/L (ref 3.5–5.1)
PROT SERPL-MCNC: 7.8 G/DL (ref 6–8.4)
PROT UR QL STRIP: NEGATIVE
RBC # BLD AUTO: 4.86 M/UL (ref 4–5.4)
RBC #/AREA URNS HPF: 4 /HPF (ref 0–4)
SAMPLE: ABNORMAL
SAMPLE: NORMAL
SITE: NORMAL
SODIUM BLD-SCNC: 138 MMOL/L (ref 136–145)
SODIUM SERPL-SCNC: 139 MMOL/L (ref 136–145)
SP GR UR STRIP: 1.01 (ref 1–1.03)
SQUAMOUS #/AREA URNS HPF: 1 /HPF
URN SPEC COLLECT METH UR: ABNORMAL
UROBILINOGEN UR STRIP-ACNC: NEGATIVE EU/DL
WBC # BLD AUTO: 8.95 K/UL (ref 3.9–12.7)
WBC #/AREA URNS HPF: 10 /HPF (ref 0–5)

## 2023-04-12 PROCEDURE — 63600175 PHARM REV CODE 636 W HCPCS: Performed by: EMERGENCY MEDICINE

## 2023-04-12 PROCEDURE — 96365 THER/PROPH/DIAG IV INF INIT: CPT | Mod: 59

## 2023-04-12 PROCEDURE — 93010 EKG 12-LEAD: ICD-10-PCS | Mod: ,,, | Performed by: INTERNAL MEDICINE

## 2023-04-12 PROCEDURE — 96367 TX/PROPH/DG ADDL SEQ IV INF: CPT

## 2023-04-12 PROCEDURE — 96375 TX/PRO/DX INJ NEW DRUG ADDON: CPT

## 2023-04-12 PROCEDURE — 99900035 HC TECH TIME PER 15 MIN (STAT)

## 2023-04-12 PROCEDURE — 83735 ASSAY OF MAGNESIUM: CPT | Performed by: EMERGENCY MEDICINE

## 2023-04-12 PROCEDURE — 25000003 PHARM REV CODE 250: Performed by: EMERGENCY MEDICINE

## 2023-04-12 PROCEDURE — 93010 ELECTROCARDIOGRAM REPORT: CPT | Mod: ,,, | Performed by: INTERNAL MEDICINE

## 2023-04-12 PROCEDURE — 85025 COMPLETE CBC W/AUTO DIFF WBC: CPT | Performed by: EMERGENCY MEDICINE

## 2023-04-12 PROCEDURE — 87040 BLOOD CULTURE FOR BACTERIA: CPT | Mod: 59 | Performed by: EMERGENCY MEDICINE

## 2023-04-12 PROCEDURE — 99285 EMERGENCY DEPT VISIT HI MDM: CPT | Mod: 25

## 2023-04-12 PROCEDURE — 93005 ELECTROCARDIOGRAM TRACING: CPT

## 2023-04-12 PROCEDURE — 83605 ASSAY OF LACTIC ACID: CPT | Performed by: EMERGENCY MEDICINE

## 2023-04-12 PROCEDURE — 96361 HYDRATE IV INFUSION ADD-ON: CPT

## 2023-04-12 PROCEDURE — 81000 URINALYSIS NONAUTO W/SCOPE: CPT | Performed by: EMERGENCY MEDICINE

## 2023-04-12 PROCEDURE — 80053 COMPREHEN METABOLIC PANEL: CPT | Performed by: EMERGENCY MEDICINE

## 2023-04-12 PROCEDURE — 25500020 PHARM REV CODE 255: Performed by: EMERGENCY MEDICINE

## 2023-04-12 PROCEDURE — 87502 INFLUENZA DNA AMP PROBE: CPT

## 2023-04-12 PROCEDURE — 83605 ASSAY OF LACTIC ACID: CPT

## 2023-04-12 PROCEDURE — 96376 TX/PRO/DX INJ SAME DRUG ADON: CPT

## 2023-04-12 RX ORDER — CEFTRIAXONE 2 G/50ML
2 INJECTION, SOLUTION INTRAVENOUS
Status: COMPLETED | OUTPATIENT
Start: 2023-04-12 | End: 2023-04-12

## 2023-04-12 RX ORDER — ONDANSETRON 2 MG/ML
4 INJECTION INTRAMUSCULAR; INTRAVENOUS
Status: COMPLETED | OUTPATIENT
Start: 2023-04-12 | End: 2023-04-12

## 2023-04-12 RX ORDER — HYDROMORPHONE HYDROCHLORIDE 1 MG/ML
0.5 INJECTION, SOLUTION INTRAMUSCULAR; INTRAVENOUS; SUBCUTANEOUS
Status: COMPLETED | OUTPATIENT
Start: 2023-04-12 | End: 2023-04-12

## 2023-04-12 RX ORDER — CEPHALEXIN 500 MG/1
500 CAPSULE ORAL 4 TIMES DAILY
Qty: 20 CAPSULE | Refills: 0 | Status: SHIPPED | OUTPATIENT
Start: 2023-04-12 | End: 2023-04-17

## 2023-04-12 RX ORDER — PROMETHAZINE HYDROCHLORIDE 25 MG/1
25 TABLET ORAL EVERY 6 HOURS PRN
Qty: 15 TABLET | Refills: 0 | Status: SHIPPED | OUTPATIENT
Start: 2023-04-12 | End: 2023-08-08

## 2023-04-12 RX ORDER — ONDANSETRON 4 MG/1
4 TABLET, ORALLY DISINTEGRATING ORAL EVERY 8 HOURS PRN
Qty: 20 TABLET | Refills: 0 | Status: SHIPPED | OUTPATIENT
Start: 2023-04-12 | End: 2023-08-08

## 2023-04-12 RX ORDER — HYDROCODONE BITARTRATE AND ACETAMINOPHEN 5; 325 MG/1; MG/1
1 TABLET ORAL EVERY 4 HOURS PRN
Qty: 14 TABLET | Refills: 0 | Status: SHIPPED | OUTPATIENT
Start: 2023-04-12 | End: 2023-08-08

## 2023-04-12 RX ORDER — LOPERAMIDE HYDROCHLORIDE 2 MG/1
2 CAPSULE ORAL 4 TIMES DAILY PRN
Qty: 12 CAPSULE | Refills: 0 | Status: SHIPPED | OUTPATIENT
Start: 2023-04-12 | End: 2023-04-22

## 2023-04-12 RX ORDER — FLUCONAZOLE 150 MG/1
150 TABLET ORAL DAILY
Qty: 1 TABLET | Refills: 0 | Status: SHIPPED | OUTPATIENT
Start: 2023-04-12 | End: 2023-04-13

## 2023-04-12 RX ADMIN — CEFTRIAXONE 2 G: 2 INJECTION, SOLUTION INTRAVENOUS at 05:04

## 2023-04-12 RX ADMIN — IOHEXOL 75 ML: 350 INJECTION, SOLUTION INTRAVENOUS at 05:04

## 2023-04-12 RX ADMIN — HYDROMORPHONE HYDROCHLORIDE 0.5 MG: 1 INJECTION, SOLUTION INTRAMUSCULAR; INTRAVENOUS; SUBCUTANEOUS at 04:04

## 2023-04-12 RX ADMIN — ONDANSETRON 4 MG: 2 INJECTION INTRAMUSCULAR; INTRAVENOUS at 04:04

## 2023-04-12 RX ADMIN — HYDROMORPHONE HYDROCHLORIDE 0.5 MG: 1 INJECTION, SOLUTION INTRAMUSCULAR; INTRAVENOUS; SUBCUTANEOUS at 06:04

## 2023-04-12 RX ADMIN — SODIUM CHLORIDE, POTASSIUM CHLORIDE, SODIUM LACTATE AND CALCIUM CHLORIDE 2178 ML: 600; 310; 30; 20 INJECTION, SOLUTION INTRAVENOUS at 04:04

## 2023-04-12 RX ADMIN — PROMETHAZINE HYDROCHLORIDE 12.5 MG: 25 INJECTION INTRAMUSCULAR; INTRAVENOUS at 06:04

## 2023-04-12 NOTE — FIRST PROVIDER EVALUATION
"Medical screening examination initiated.  I have conducted a focused provider triage encounter, findings are as follows:    Brief history of present illness:  41 yo w vomiting and diarrhea since Sunday, 3 d ago. Reports onset of dysuria a week ago followed by bilateral flank pain that has persisted and is constant. Temp 102.7 when checked at home yesterday, unable to keep anything down, reports urine is lele and cloudy. Has hx of 'kidney infections' in the past, last one was 6 mos ago, reports she feels worse than back then but similar.     Vitals:    04/12/23 1510 04/12/23 1515   BP: 123/82    BP Location: Right arm    Patient Position: Sitting    Pulse: (!) 112    Resp: 16    Temp: 99.9 °F (37.7 °C)    TempSrc: Oral    SpO2: 100%    Weight:  72.6 kg (160 lb)   Height: 5' 2" (1.575 m)        Pertinent physical exam:  calm. Polite. NAD. Normal gait. No resp distress. No overt rash. Lucid and linear.     Brief workup plan:   I have introduced myself and initiated work up but have not assumed care. Sepsis work up initiated given suspected source, tachycardia, fever.       Preliminary workup initiated; this workup will be continued and followed by the physician or advanced practice provider that is assigned to the patient when roomed.  "

## 2023-04-12 NOTE — TELEPHONE ENCOUNTER
Call returned. No appointment availability in clinic today. Patient offered visit for tomorrow, but advised that she should seek evaluation at urgent care clinic today. Patient says she will take advice and go to local urgent care clinic for evaluation today.

## 2023-04-12 NOTE — ED PROVIDER NOTES
"Encounter Date: 4/12/2023    SCRIBE #1 NOTE: I, Lj Sierra, am scribing for, and in the presence of,  Baldo Lombardo MD.     History     Chief Complaint   Patient presents with    Flank Pain     Pt reports to the ED with C/O flank pain x 3 days with fever and N/V. Pt reports "I have a history of kidney infections." Pt also reports diarrhea with the pain. Pt has a fever of 102 at home. Pt AAOx4, RESP easy and unlabored. Sepsis alert called at triage. NAD noted. Pt awaiting YEE bed.      43 y/o female, with a PMH of HTN, ARF, kidney stones, nephritis, MRSA, presents to the ED with nausea, vomiting, diarrhea, and abdominal pain for 3 days. She is unable to tolerate PO food secondary to vomiting. She is unsure of the frequency of emesis or diarrhea, but she says she feels like she has "stayed on the toilet". She also notes some back pain that feels similar to prior kidney stones. She has attempted treatment with loperamide, ondansetron, and Pepto Bismol with no relief. She denies cough, fever, chills, chest pain, shortness of breath, or any other associated symptoms. She has had a cholecystectomy and hysterectomy. She is compliant with prescribed alprazolam. She is allergic to morphine, ketorolac, and tramadol.    The history is provided by the patient. No  was used.   Review of patient's allergies indicates:   Allergen Reactions    Strawberry Anaphylaxis and Hives    Morphine Other (See Comments)     Burns stomach    Toradol [ketorolac]      cramping    Tramadol      Abdominal pain     Past Medical History:   Diagnosis Date    Abnormal uterine bleeding 4/18/2018    Acute encephalopathy 12/24/12    secondary to drug overdose    ARF (acute renal failure) 12/24/12    secondary to drug overdose    History of cardiac arrest 12/24/12    secondary to drug overdose    Hx of psychiatric care     Hypertension     Kidney stones 1/8/2018    MRSA (methicillin resistant Staphylococcus aureus) 12/24/12    " Nephritis     Polysubstance abuse 12/24/12    Psychiatric problem     Systolic CHF, acute 12/24/12    secondary to drug overdose    Therapy     when younger; recently set up with Cassie Rockweiler, LCSW    UTI (urinary tract infection)      Past Surgical History:   Procedure Laterality Date    AUGMENTATION OF BREAST Bilateral 2001    CHOLECYSTECTOMY      ESOPHAGOGASTRODUODENOSCOPY N/A 1/2/2020    Procedure: EGD (ESOPHAGOGASTRODUODENOSCOPY);  Surgeon: Moe Tripathi MD;  Location: [facility];  Service: Endoscopy;  Laterality: N/A;    LAPAROSCOPIC SALPINGECTOMY Left 2/10/2021    Procedure: SALPINGECTOMY, LAPAROSCOPIC;  Surgeon: Doc Pena MD;  Location: [facility] OR;  Service: OB/GYN;  Laterality: Left;    LAPAROSCOPIC TOTAL HYSTERECTOMY N/A 2/10/2021    Procedure: HYSTERECTOMY, TOTAL, LAPAROSCOPIC;  Surgeon: Doc Pena MD;  Location: [facility] OR;  Service: OB/GYN;  Laterality: N/A;  RN PREOP 2/3/, --COVID NEGATIVE ON 2/9 and T/S done    TUBAL LIGATION      urinary stents       Family History   Problem Relation Age of Onset    Kidney disease Mother     Ovarian cancer Mother 28    Cancer Maternal Grandmother         throat    Cancer Maternal Grandfather         prostate    Kidney disease Daughter     Breast cancer Neg Hx     Colon cancer Neg Hx     Anxiety disorder Neg Hx     Depression Neg Hx     Dementia Neg Hx     Suicide Neg Hx      Social History     Tobacco Use    Smoking status: Former     Packs/day: 0.50     Years: 20.00     Pack years: 10.00     Types: Vaping w/o nicotine, Cigarettes    Smokeless tobacco: Current   Substance Use Topics    Alcohol use: Yes     Comment: occasional; social    Drug use: No     Types: Cocaine, Methamphetamines     Comment: PCP, patient states she had not done drugs since 2012     Review of Systems   Constitutional: Negative.  Negative for chills and fever.   HENT: Negative.     Eyes: Negative.    Respiratory: Negative.  Negative for cough and shortness of breath.     Cardiovascular: Negative.  Negative for chest pain.   Gastrointestinal:  Positive for abdominal pain, diarrhea, nausea and vomiting.   Genitourinary: Negative.    Musculoskeletal:  Positive for back pain.   Skin: Negative.    Neurological: Negative.      Physical Exam     Initial Vitals [04/12/23 1510]   BP Pulse Resp Temp SpO2   123/82 (!) 112 16 99.9 °F (37.7 °C) 100 %      MAP       --         Physical Exam    Nursing note and vitals reviewed.  Constitutional: She appears well-developed and well-nourished. She is not diaphoretic. She appears distressed (mildly).   HENT:   Head: Normocephalic and atraumatic.   Nose: Nose normal.   Eyes: EOM are normal. Pupils are equal, round, and reactive to light.   Neck: Neck supple. No JVD present.   Normal range of motion.  Cardiovascular:  Regular rhythm, normal heart sounds and intact distal pulses.           Tachycardic   Pulmonary/Chest: Breath sounds normal. No stridor. No respiratory distress. She has no wheezes. She has no rales.   Abdominal: Abdomen is soft. Bowel sounds are normal. She exhibits no distension. There is abdominal tenderness (some right CVA tenderness, right lower quadrant tenderness.). There is no rebound and no guarding.   Musculoskeletal:         General: No tenderness or edema. Normal range of motion.      Cervical back: Normal range of motion and neck supple.     Neurological: She is alert and oriented to person, place, and time. She has normal strength.   Skin: Skin is warm and dry. Capillary refill takes less than 2 seconds. No rash noted. No erythema.       ED Course   Procedures  Labs Reviewed   CBC W/ AUTO DIFFERENTIAL - Abnormal; Notable for the following components:       Result Value    MCH 31.3 (*)     MCHC 36.3 (*)     All other components within normal limits   COMPREHENSIVE METABOLIC PANEL - Abnormal; Notable for the following components:    Potassium 3.4 (*)     Glucose 119 (*)     All other components within normal limits    URINALYSIS, REFLEX TO URINE CULTURE - Abnormal; Notable for the following components:    Occult Blood UA Trace (*)     Leukocytes, UA 2+ (*)     All other components within normal limits    Narrative:     Specimen Source->Urine   URINALYSIS MICROSCOPIC - Abnormal; Notable for the following components:    WBC, UA 10 (*)     Bacteria Moderate (*)     All other components within normal limits    Narrative:     Specimen Source->Urine   ISTAT PROCEDURE - Abnormal; Notable for the following components:    POC Glucose 132 (*)     POC Potassium 3.4 (*)     All other components within normal limits   CULTURE, BLOOD    Narrative:     Aerobic and anaerobic   CULTURE, BLOOD    Narrative:     Aerobic and anaerobic   LACTIC ACID, PLASMA   MAGNESIUM   POCT INFLUENZA A/B MOLECULAR   ISTAT LACTATE          Imaging Results              CT Abdomen Pelvis With Contrast (Final result)  Result time 04/12/23 17:36:05      Final result by Sara Horner MD (04/12/23 17:36:05)                   Impression:      Nonobstructing left lower pole renal calculus.    Hepatic steatosis.  Probable tiny right hepatic cyst.    Hepatosplenomegaly.    Crenated or involuting right ovarian cyst.      Electronically signed by: Sara Horner  Date:    04/12/2023  Time:    17:36               Narrative:    EXAMINATION:  CT OF ABDOMEN PELVIS WITH    CLINICAL HISTORY:  Complaining of flank pain x3 days with fever and nausea and vomiting.  History of kidney infections.  Diarrhea with the pain.    TECHNIQUE:  5 mm enhanced axial images were obtained from the lung bases through the greater trochanters.  Seventy-five mL of Omnipaque 350 was injected.    COMPARISON:  06/20/2022    FINDINGS:  There is fatty infiltration of the prominent liver.  A too small to characterize low attenuation lesion is seen in the right lobe liver, which may be a tiny cyst.    The spleen is enlarged.    There is nonobstructing left lower pole renal calculus.  There is no  hydronephrosis.    Spleen, pancreas, right kidney, and adrenal glands are unremarkable. The gallbladder is surgically absent.    There is no definite evidence for abdominal adenopathy or ascites.    A crenated or involuting right ovarian cyst is present.  There are no pelvic masses or adenopathy.  The uterus is surgically absent.  The appendix is not inflamed.    There is no free fluid in the pelvis.    There is mild bibasilar atelectasis.  There are bilateral breast prostheses.                                       X-Ray Chest AP Portable (Final result)  Result time 04/12/23 16:09:07      Final result by Esteban Knott MD (04/12/23 16:09:07)                   Impression:      No acute chest disease identified.      Electronically signed by: Esteban Knott MD  Date:    04/12/2023  Time:    16:09               Narrative:    EXAMINATION:  XR CHEST AP PORTABLE    CLINICAL HISTORY:  Sepsis;    TECHNIQUE:  Single frontal view of the chest was performed.    COMPARISON:  02/22/2023.    FINDINGS:  The heart is not enlarged.  Superior mediastinal structures are unremarkable.  Pulmonary vasculature is within normal limits.  The lungs are well aerated and free of focal consolidations.  There is no evidence for pneumothorax or large pleural effusions.  Bony structures are grossly intact.  Healed fracture deformity of the lateral left clavicle is noted.                                       Medications   lactated ringers bolus 2,178 mL (0 mLs Intravenous Stopped 4/12/23 1706)   HYDROmorphone injection 0.5 mg (0.5 mg Intravenous Given 4/12/23 1643)   ondansetron injection 4 mg (4 mg Intravenous Given 4/12/23 1642)   cefTRIAXone 2 gram/50 mL IVPB 2 g (0 g Intravenous Stopped 4/12/23 1809)   iohexoL (OMNIPAQUE 350) injection 75 mL (75 mLs Intravenous Given 4/12/23 1715)   HYDROmorphone injection 0.5 mg (0.5 mg Intravenous Given 4/12/23 1807)   promethazine (PHENERGAN) 12.5 mg in dextrose 5 % (D5W) 50 mL IVPB (0 mg Intravenous  Stopped 4/12/23 9777)          MDM:    42-year-old female with past medical history as noted above presenting with flank pain.  ED workup notable for flu negative, white blood cell count 8.95, hemoglobin 15.2, CMP with potassium 3.4, creatinine 0.7, lactate 0.8, magnesium 1.8, urinalysis with 2+ leukocytes, moderate bacteria, CT abdomen pelvis showing involuting right ovarian cyst, left lower pole renal calculus otherwise unremarkable findings with no free fluid in the pelvis chest x-ray is unremarkable.  Patient presentation consistent with suspected acute cystitis, will treat with Rocephin and outpatient course of Keflex given her flank pain.  No additional symptoms to suggest sepsis or infectious etiology, given the location of the right ovarian cyst in her right-sided abdominal pain suspect this may be related to her right ovarian cyst, will have gyn follow-up, no evidence of torsion, acute hemorrhage and no free fluid in the pelvis to warrant further investigation.  Patient well-appearing upon reassessment with stable vitals and significantly improved. At this time given patient's history, physical exam, and ED workup do not suspect pancreatitis, ovarian torsion, cholecystitis, appendicitis, ectopic pregnancy, SBO, acute hepatitis, pyelonephritis, ureterolithiasis, or any further malignant cause.  Discussed diagnosis and further treatment with patient including f/u.  Return precautions given and all questions answered.  Patient in understanding of plan.  Pt discharged to home improved and stable.            Scribe Attestation:   Scribe #1: I performed the above scribed service and the documentation accurately describes the services I performed. I attest to the accuracy of the note.            I, Baldo Lombardo M.D., personally performed the services described in this documentation.  All medical record entries made by the scribe were at my direction and in my presence.  I have reviewed the chart and agree  that the record reflects my personal performance and is accurate and complete.       Clinical Impression:   Final diagnoses:  [R00.0] Tachycardia  [N30.00] Acute cystitis without hematuria (Primary)  [N83.201] Cyst of right ovary        ED Disposition Condition    Discharge Stable          ED Prescriptions       Medication Sig Dispense Start Date End Date Auth. Provider    cephALEXin (KEFLEX) 500 MG capsule Take 1 capsule (500 mg total) by mouth 4 (four) times daily. for 5 days 20 capsule 4/12/2023 4/17/2023 Baldo Lombardo MD    ondansetron (ZOFRAN-ODT) 4 MG TbDL Take 1 tablet (4 mg total) by mouth every 8 (eight) hours as needed. 20 tablet 4/12/2023 -- Baldo Lombardo MD    promethazine (PHENERGAN) 25 MG tablet Take 1 tablet (25 mg total) by mouth every 6 (six) hours as needed for Nausea. 15 tablet 4/12/2023 -- Baldo Lombardo MD    loperamide (IMODIUM) 2 mg capsule Take 1 capsule (2 mg total) by mouth 4 (four) times daily as needed for Diarrhea. 12 capsule 4/12/2023 4/22/2023 Baldo Lombardo MD    HYDROcodone-acetaminophen (NORCO) 5-325 mg per tablet Take 1 tablet by mouth every 4 (four) hours as needed for Pain. 14 tablet 4/12/2023 -- Baldo Lombardo MD    fluconazole (DIFLUCAN) 150 MG Tab (Expires today) Take 1 tablet (150 mg total) by mouth once daily. for 1 day 1 tablet 4/12/2023 4/13/2023 Baldo Lombardo MD          Follow-up Information       Follow up With Specialties Details Why Contact Info    [facility] Emergency Dept Emergency Medicine Go to  If symptoms worsen [address] [place] [address] [phone]    Hermes Colvin MD Family Medicine Go in 1 week As needed [address] [id number] [phone]               Baldo Lombardo MD  04/13/23 6184

## 2023-04-12 NOTE — ED TRIAGE NOTES
"Roslyn Vera, a 42 y.o. female presents to the ED w/ complaint of flank pain x 3 days accompanied by fever and n/v/d. Pt afebrile in triage. Pt denies any other associated s/s. Pt is AAOX4, VSS, and NADN.     Triage note:  Chief Complaint   Patient presents with    Flank Pain     Pt reports to the ED with C/O flank pain x 3 days with fever and N/V. Pt reports "I have a history of kidney infections." Pt also reports diarrhea with the pain. Pt has a fever of 102 at home. Pt AAOx4, RESP easy and unlabored. Sepsis alert called at triage. NAD noted. Pt awaiting YEE bed.      Review of patient's allergies indicates:   Allergen Reactions    Strawberry Anaphylaxis and Hives    Morphine Other (See Comments)     Burns stomach    Toradol [ketorolac]      cramping    Tramadol      Abdominal pain     Past Medical History:   Diagnosis Date    Abnormal uterine bleeding 4/18/2018    Acute encephalopathy 12/24/12    secondary to drug overdose    ARF (acute renal failure) 12/24/12    secondary to drug overdose    History of cardiac arrest 12/24/12    secondary to drug overdose    Hx of psychiatric care     Hypertension     Kidney stones 1/8/2018    MRSA (methicillin resistant Staphylococcus aureus) 12/24/12    Nephritis     Polysubstance abuse 12/24/12    Psychiatric problem     Systolic CHF, acute 12/24/12    secondary to drug overdose    Therapy     when younger; recently set up with Cassie Rockweiler, LCSW    UTI (urinary tract infection)       "

## 2023-04-12 NOTE — TELEPHONE ENCOUNTER
----- Message from Abigail Gibbons sent at 4/12/2023  1:17 PM CDT -----  Regarding: self [phone]  Type:  Sooner Appointment Request    Patient is requesting a sooner appointment.  Patient declined first available appointment listed as well as another facility and provider .  Patient will not accept being placed on the waitlist and is requesting a message be sent to doctor.    Name of Caller:  self     When is the first available appointment? 4/20     Symptoms:  pt stated she has been having Diarrhea/ headache/ vomiting since Sunday. She would like to be seen today.     Would the patient rather a call back or a response via [employer]? Call back     Best Call Back Number: [phone]

## 2023-04-16 LAB
BACTERIA BLD CULT: NORMAL
BACTERIA BLD CULT: NORMAL

## 2023-06-02 DIAGNOSIS — F41.9 ANXIETY: ICD-10-CM

## 2023-06-02 RX ORDER — ALPRAZOLAM 1 MG/1
TABLET ORAL
Qty: 60 TABLET | Refills: 2 | Status: SHIPPED | OUTPATIENT
Start: 2023-06-02 | End: 2023-11-03

## 2023-06-02 NOTE — TELEPHONE ENCOUNTER
No care due was identified.  Health [facility] Embedded Care Due Messages. Reference number: [id number].   6/02/2023 8:02:14 AM CDT

## 2023-06-27 ENCOUNTER — PATIENT MESSAGE (OUTPATIENT)
Dept: FAMILY MEDICINE | Facility: CLINIC | Age: 43
End: 2023-06-27
Payer: COMMERCIAL

## 2023-08-08 ENCOUNTER — OFFICE VISIT (OUTPATIENT)
Dept: FAMILY MEDICINE | Facility: CLINIC | Age: 43
End: 2023-08-08
Payer: COMMERCIAL

## 2023-08-08 DIAGNOSIS — U07.1 COVID: Primary | ICD-10-CM

## 2023-08-08 DIAGNOSIS — F17.210 CIGARETTE NICOTINE DEPENDENCE WITHOUT COMPLICATION: ICD-10-CM

## 2023-08-08 DIAGNOSIS — F41.9 ANXIETY: ICD-10-CM

## 2023-08-08 PROCEDURE — 99213 OFFICE O/P EST LOW 20 MIN: CPT | Mod: 95,,,

## 2023-08-08 PROCEDURE — 99213 PR OFFICE/OUTPT VISIT, EST, LEVL III, 20-29 MIN: ICD-10-PCS | Mod: 95,,,

## 2023-08-08 RX ORDER — FLUTICASONE PROPIONATE 50 MCG
2 SPRAY, SUSPENSION (ML) NASAL DAILY
Qty: 11.1 ML | Refills: 0 | Status: SHIPPED | OUTPATIENT
Start: 2023-08-08 | End: 2023-09-07

## 2023-08-08 NOTE — PROGRESS NOTES
Assessment & Plan  Problem List Items Addressed This Visit          Other    Cigarette nicotine dependence without complication  Continue smoking cessation     Other Visit Diagnoses       COVID    -  Primary  Declines paxlovid at this time. Discussed risks/benefits.   Symptom mgmt. Return precautions for in-person eval or chest xray if fever persists >72 hours or acute/worsening symptoms. Alternate tylenol/ibuprofen for sore throat. Symptom mgmt for sore throat. Increase hydration. Sent education via portal.   Isolation precautionsAnswers submitted by the patient for this visit:  Fever Questionnaire (Submitted on 8/8/2023)  Chief Complaint: Fever  Chronicity: new  Onset: yesterday  Frequency: constantly  Progression since onset: waxing and waning  Max temp prior to arrival: 102 to 102.9 F  Temperature source: an oral thermometer  muscle aches: Yes  sleepiness: Yes  Treatments tried: NSAIDs, cool baths, fluids  Improvement on treatment: no relief   discussed.             Symptom mgmt and supportive care:     Treatment of symptoms includes using over-the-counter antipyretics, analgesics, or antitussives for fever, headache, myalgias, and cough. Patients should be advised to drink fluids regularly to avoid dehydration. Rest is recommended as needed during the acute phase of COVID-19, and ambulation and other forms of activity should be increased according to the patients tolerance. For cough, avoid lying on back as this makes coughing ineffective. Use simple measures first (e.g., a teaspoon of honey in patients ages 1 year and older) to help cough.     Ensure adequate nutrition and appropriate hydration. Drink fluids regularly to avoid dehydration. Fluid intake needs can be higher than usual because of fever. However, too much fluid can worsen oxygenation.    Improve air circulation by opening a window or door.    Manage any symptoms of insomnia, depression, or anxiety as  appropriate.    Antipyretic/analgesic:    Tylenol (acetaminophen) 650 mg every 4-6 hours as needed for fever >101 or pain. Max 3g in 24 hours period.     Ibuprofen 400mg every 6-8 hours as needed for fever >101 or pain. Max 2400mg in 24 hour period.     Ibuprofen should only be taken at the lowest effective dose for the shortest period needed to control symptoms. It is not recommended in pregnant women (especially in the third trimester) or children <6 months of age.    Steroids: Dexamethasone or other systemic steroids are not recommended for patients with COVID-19. Antivirals only for high-risk for severe COVID-19    Health Maintenance reviewed, yes.    The patient location is:  Patient Home   The chief complaint leading to consultation is: noted below  Visit type: Virtual visit with synchronous audio and video  Total time spent with patient: 30  Each patient to whom he or she provides medical services by telemedicine is:  (1) informed of the relationship between the physician and patient and the respective role of any other health care provider with respect to management of the patient; and (2) notified that he or she may decline to receive medical services by telemedicine and may withdraw from such care at any time.    Follow-up: No follow-ups on file.    Given limited exam of virtual exam. I recommend in-person visit, [facility] or ED for any worsening s/s    Discussed DDx, condition, and treatment.   Education sent to patient portal/included in after visit summary.  ED precautions given.   Notify provider if symptoms do not resolve or increase in severity.   Patient verbalizes understanding and agrees with plan of care.    ______________________________________________________________________    Chief Complaint  No chief complaint on file.      HPI  Roslyn Vera is a 43 y.o. female with multiple medical diagnoses as listed in the medical history and problem list that presents for COVID via virtual visit.  Pt  is unknown to me with their last appointment Visit date not found.      Went on cruise, returned Sunday, started feeling poorly Sunday evening with body aches, fever and HA. [place] [facility] [place] Monday developed nasal congestion. Alternating motrin/tylenol and nyquil. TMAX 102, approx 1 hour ago. Last dose of ibuprofen at 1130am, 400mg. Denies SOB but does admits wet cough but no phlegm. +sore throat with sinus congestion and ear pressure. Some chest pain with coughing. Unable to check oxygen saturation or VS. Prescribed tessalon, phenergan DM, albuterol inhaler and [place] [facility] [place] T[place] phenergan last night without relief.     PAST MEDICAL HISTORY:  Past Medical History:   Diagnosis Date    Abnormal uterine bleeding 4/18/2018    Acute encephalopathy 12/24/12    secondary to drug overdose    ARF (acute renal failure) 12/24/12    secondary to drug overdose    History of cardiac arrest 12/24/12    secondary to drug overdose    Hx of psychiatric care     Hypertension     Kidney stones 1/8/2018    MRSA (methicillin resistant Staphylococcus aureus) 12/24/12    Nephritis     Polysubstance abuse 12/24/12    Psychiatric problem     Systolic CHF, acute 12/24/12    secondary to drug overdose    Therapy     when younger; recently set up with Cassie Rockweiler, LCSW    UTI (urinary tract infection)        PAST SURGICAL HISTORY:  Past Surgical History:   Procedure Laterality Date    AUGMENTATION OF BREAST Bilateral 2001    CHOLECYSTECTOMY      ESOPHAGOGASTRODUODENOSCOPY N/A 1/2/2020    Procedure: EGD (ESOPHAGOGASTRODUODENOSCOPY);  Surgeon: Moe Tripathi MD;  Location: [facility];  Service: Endoscopy;  Laterality: N/A;    LAPAROSCOPIC SALPINGECTOMY Left 2/10/2021    Procedure: SALPINGECTOMY, LAPAROSCOPIC;  Surgeon: Doc Pena MD;  Location: [facility] OR;  Service: OB/GYN;  Laterality: Left;    LAPAROSCOPIC TOTAL HYSTERECTOMY N/A 2/10/2021    Procedure: HYSTERECTOMY, TOTAL, LAPAROSCOPIC;  Surgeon: Doc Pena  MD;  Location: [facility] OR;  Service: OB/GYN;  Laterality: N/A;  RN PREOP 2/3/, --COVID NEGATIVE ON 2/9 and T/S done    TUBAL LIGATION      urinary stents         SOCIAL HISTORY:  Social History     Socioeconomic History    Marital status: Single    Number of children: 2   Tobacco Use    Smoking status: Former     Current packs/day: 0.50     Average packs/day: 0.5 packs/day for 20.0 years (10.0 ttl pk-yrs)     Types: Vaping w/o nicotine, Cigarettes    Smokeless tobacco: Current   Substance and Sexual Activity    Alcohol use: Yes     Comment: occasional; social    Drug use: No     Types: Cocaine, Methamphetamines     Comment: PCP, patient states she had not done drugs since 2012    Sexual activity: Yes     Partners: Male     Birth control/protection: See Surgical Hx   Social History Narrative    Together since 2014     2021    He is a retired .  Machine work    She is an      Social Determinants of Health     Financial Resource Strain: Low Risk  (8/8/2023)    Overall Financial Resource Strain (CARDIA)     Difficulty of Paying Living Expenses: Not hard at all   Food Insecurity: No Food Insecurity (8/8/2023)    Hunger Vital Sign     Worried About Running Out of Food in the Last Year: Never true     Ran Out of Food in the Last Year: Never true   Transportation Needs: No Transportation Needs (8/8/2023)    PRAPARE - Transportation     Lack of Transportation (Medical): No     Lack of Transportation (Non-Medical): No   Physical Activity: Sufficiently Active (8/8/2023)    Exercise Vital Sign     Days of Exercise per Week: 5 days     Minutes of Exercise per Session: 30 min   Stress: Stress Concern Present (8/8/2023)    [place] [employer] - Occupational Stress Questionnaire     Feeling of Stress : To some extent   Social Connections: Unknown (8/8/2023)    Social Connection and Isolation Panel [NHANES]     Frequency of Communication with Friends and Family: Patient refused      Frequency of Social Gatherings with Friends and Family: Patient refused     Active Member of Clubs or Organizations: Patient refused     Attends Club or Organization Meetings: Patient refused     Marital Status:    Housing Stability: Low Risk  (8/8/2023)    Housing Stability Vital Sign     Unable to Pay for Housing in the Last Year: No     Number of Places Lived in the Last Year: 1     Unstable Housing in the Last Year: No       FAMILY HISTORY:  Family History   Problem Relation Age of Onset    Kidney disease Mother     Ovarian cancer Mother 28    Cancer Maternal Grandmother         throat    Cancer Maternal Grandfather         prostate    Kidney disease Daughter     Breast cancer Neg Hx     Colon cancer Neg Hx     Anxiety disorder Neg Hx     Depression Neg Hx     Dementia Neg Hx     Suicide Neg Hx        ALLERGIES AND MEDICATIONS: updated and reviewed.  Review of patient's allergies indicates:   Allergen Reactions    Strawberry Anaphylaxis and Hives    Morphine Other (See Comments)     Burns stomach    Toradol [ketorolac]      cramping    Tramadol      Abdominal pain     Current Outpatient Medications   Medication Sig Dispense Refill    ALPRAZolam (XANAX) 1 MG tablet TAKE ONE TABLET BY MOUTH TWICE DAILY 60 tablet 2    clindamycin (CLEOCIN T) 1 % external solution APPLY TO AFFECTED AREA TWICE DAILY 60 mL 2    conjugated estrogens (PREMARIN) vaginal cream Place 1 g vaginally twice a week. 30 g 1    fluticasone propionate (FLONASE) 50 mcg/actuation nasal spray 2 sprays (100 mcg total) by Each Nostril route once daily. 11.1 mL 0    ibuprofen (ADVIL,MOTRIN) 800 MG tablet Take 1 tablet (800 mg total) by mouth every 6 (six) hours as needed for Pain. 20 tablet 0    tretinoin (RETIN-A) 0.1 % cream Apply topically every evening. 45 g 1     No current facility-administered medications for this visit.         ROS  Review of Systems   Constitutional:  Positive for fever.   HENT:  Positive for congestion, ear pain and  sore throat.    Respiratory:  Positive for cough and wheezing.    Cardiovascular:  Positive for chest pain.   Gastrointestinal:  Negative for abdominal pain, diarrhea, nausea and vomiting.   Genitourinary:  Negative for dysuria.   Skin:  Negative for rash.   Neurological:  Positive for headaches.           Physical Exam  Physical Exam  Constitutional:       General: She is not in acute distress.     Appearance: Normal appearance. She is not toxic-appearing.   Neurological:      Mental Status: She is alert.   Psychiatric:         Mood and Affect: Mood normal.           Health Maintenance         Date Due Completion Date    Pneumococcal Vaccines (Age 0-64) (1 - PCV) Never done ---    TETANUS VACCINE Never done ---    COVID-19 Vaccine (3 - Pfizer series) 06/21/2021 4/26/2021    Mammogram 12/04/2021 12/4/2020    Hemoglobin A1c (Diabetic Prevention Screening) 09/01/2025 9/1/2022

## 2023-08-08 NOTE — PATIENT INSTRUCTIONS
Patient Instructions     ISOLATION  If you tested positive and do not have symptoms, you must isolate for 5 days starting on the day of the positive test.  If you tested positive and have symptoms, you must isolate for 5 days starting on the day of the first symptoms,  not the day of the positive test.   This is the most important part, both the CDC and the [facility] emphasize that you do not test out of isolation.   After 5 days, if your symptoms have improved and you have not had fever on day 5, you can return to the community on day 6- NO TESTING REQUIRED!    In fact, we do not retest if you were positive in the last 90 days.  After your 5 days of isolation are completed, the CDC recommends strict mask use for the first 5 days that you come out of isolation.     - Rest.    - Drink plenty of fluids.     - You can take over-the-counter claritin, zyrtec, allegra, or xyzal as directed. These are antihistamines that can help with runny nose, nasal congestion, sneezing, and helps to dry up post-nasal drip, which usually causes sore throat and cough.              - If you do NOT have high blood pressure, you may use a decongestant form (D)  of this medication (ie. Claritin- D, zyrtec-D, allegra-D) or if you do not take the D form, you can take sudafed (pseudoephedrine) over the counter, which is a decongestant. Do NOT take two decongestant (D) medications at the same time (such as mucinex-D and claritin-D or plain sudafed and claritin D)    - You can use Flonase (fluticasone) nasal spray as directed for sinus congestion and postnasal drip. This is a steroid nasal spray that works locally over time to decrease the inflammation in your nose/sinuses and help with allergic symptoms. This is not an quick- relief spray like afrin, but it works well if used daily.  Discontinue if you develop nose bleed  - use nasal saline prior to Flonase.  - Use Ocean Spray Nasal Saline 1-3 puffs each nostril every 2-3 hours then blow out onto  tissue. This is to irrigate the nasal passage way to clear the sinus openings. Use until sinus problem resolved.     - you can take plain Mucinex (guaifenesin) 1200 mg twice a day to help loosen mucous.      -warm salt water gargles can help with sore throat     - warm tea with honey can help with cough. Honey is a natural cough suppressant.     - Dextromethorphan (DM) is a cough suppressant over the counter (ie. mucinex DM, robitussin, delsym; dayquil/nyquil has DM as well.)     - Go to the ER if you develop new or worsening symptoms.     Symptom mgmt and supportive care:     Treatment of symptoms includes using over-the-counter antipyretics, analgesics, or antitussives for fever, headache, myalgias, and cough. Patients should be advised to drink fluids regularly to avoid dehydration. Rest is recommended as needed during the acute phase of COVID-19, and ambulation and other forms of activity should be increased according to the patients tolerance. For cough, avoid lying on back as this makes coughing ineffective. Use simple measures first (e.g., a teaspoon of honey in patients ages 1 year and older) to help cough.     Ensure adequate nutrition and appropriate hydration. Drink fluids regularly to avoid dehydration. Fluid intake needs can be higher than usual because of fever. However, too much fluid can worsen oxygenation.    Improve air circulation by opening a window or door.    Manage any symptoms of insomnia, depression, or anxiety as appropriate.    Antipyretic/analgesic:    Tylenol (acetaminophen) 650 mg every 4-6 hours as needed for fever >101 or pain. Max 3g in 24 hours period.     Ibuprofen 400mg every 6-8 hours as needed for fever >101 or pain. Max 2400mg in 24 hour period.     Ibuprofen should only be taken at the lowest effective dose for the shortest period needed to control symptoms. It is not recommended in pregnant women (especially in the third trimester) or children <6 months of age.    Steroids:  Dexamethasone or other systemic steroids are not recommended for patients with COVID-19. Antivirals only for high-risk for severe COVID-19

## 2023-08-11 ENCOUNTER — PATIENT MESSAGE (OUTPATIENT)
Dept: FAMILY MEDICINE | Facility: CLINIC | Age: 43
End: 2023-08-11
Payer: COMMERCIAL

## 2023-08-11 DIAGNOSIS — T78.40XS SEVERE ALLERGY, SEQUELA: Primary | ICD-10-CM

## 2023-08-11 RX ORDER — EPINEPHRINE 0.3 MG/.3ML
1 INJECTION SUBCUTANEOUS ONCE
Qty: 0.3 ML | Refills: 0 | Status: SHIPPED | OUTPATIENT
Start: 2023-08-11 | End: 2024-01-29

## 2023-08-29 ENCOUNTER — PATIENT MESSAGE (OUTPATIENT)
Dept: FAMILY MEDICINE | Facility: CLINIC | Age: 43
End: 2023-08-29
Payer: COMMERCIAL

## 2023-08-29 DIAGNOSIS — R53.83 FATIGUE, UNSPECIFIED TYPE: Primary | ICD-10-CM

## 2023-08-30 DIAGNOSIS — F41.9 ANXIETY: ICD-10-CM

## 2023-08-30 DIAGNOSIS — Z00.00 ANNUAL PHYSICAL EXAM: Primary | ICD-10-CM

## 2023-09-11 ENCOUNTER — PATIENT MESSAGE (OUTPATIENT)
Dept: FAMILY MEDICINE | Facility: CLINIC | Age: 43
End: 2023-09-11
Payer: COMMERCIAL

## 2023-09-11 ENCOUNTER — LAB VISIT (OUTPATIENT)
Dept: LAB | Facility: HOSPITAL | Age: 43
End: 2023-09-11
Attending: FAMILY MEDICINE
Payer: COMMERCIAL

## 2023-09-11 DIAGNOSIS — Z00.00 ANNUAL PHYSICAL EXAM: ICD-10-CM

## 2023-09-11 DIAGNOSIS — R53.83 FATIGUE, UNSPECIFIED TYPE: ICD-10-CM

## 2023-09-11 LAB
25(OH)D3+25(OH)D2 SERPL-MCNC: 38 NG/ML (ref 30–96)
ALBUMIN SERPL BCP-MCNC: 4.1 G/DL (ref 3.5–5.2)
ALP SERPL-CCNC: 46 U/L (ref 55–135)
ALT SERPL W/O P-5'-P-CCNC: 20 U/L (ref 10–44)
ANION GAP SERPL CALC-SCNC: 7 MMOL/L (ref 8–16)
AST SERPL-CCNC: 15 U/L (ref 10–40)
BASOPHILS # BLD AUTO: 0.03 K/UL (ref 0–0.2)
BASOPHILS NFR BLD: 0.4 % (ref 0–1.9)
BILIRUB SERPL-MCNC: 0.4 MG/DL (ref 0.1–1)
BILIRUB UR QL STRIP: NEGATIVE
BUN SERPL-MCNC: 12 MG/DL (ref 6–20)
CALCIUM SERPL-MCNC: 9 MG/DL (ref 8.7–10.5)
CHLORIDE SERPL-SCNC: 107 MMOL/L (ref 95–110)
CHOLEST SERPL-MCNC: 176 MG/DL (ref 120–199)
CHOLEST/HDLC SERPL: 5 {RATIO} (ref 2–5)
CLARITY UR: CLEAR
CO2 SERPL-SCNC: 23 MMOL/L (ref 23–29)
COLOR UR: YELLOW
CREAT SERPL-MCNC: 0.7 MG/DL (ref 0.5–1.4)
DIFFERENTIAL METHOD: ABNORMAL
EOSINOPHIL # BLD AUTO: 0.1 K/UL (ref 0–0.5)
EOSINOPHIL NFR BLD: 2 % (ref 0–8)
ERYTHROCYTE [DISTWIDTH] IN BLOOD BY AUTOMATED COUNT: 14 % (ref 11.5–14.5)
EST. GFR  (NO RACE VARIABLE): >60 ML/MIN/1.73 M^2
ESTIMATED AVG GLUCOSE: 100 MG/DL (ref 68–131)
FSH SERPL-ACNC: 5.63 MIU/ML
GLUCOSE SERPL-MCNC: 106 MG/DL (ref 70–110)
GLUCOSE UR QL STRIP: NEGATIVE
HBA1C MFR BLD: 5.1 % (ref 4–5.6)
HCT VFR BLD AUTO: 40.5 % (ref 37–48.5)
HCV AB SERPL QL IA: NORMAL
HDLC SERPL-MCNC: 35 MG/DL (ref 40–75)
HDLC SERPL: 19.9 % (ref 20–50)
HGB BLD-MCNC: 13.4 G/DL (ref 12–16)
HGB UR QL STRIP: NEGATIVE
HIV 1+2 AB+HIV1 P24 AG SERPL QL IA: NORMAL
IMM GRANULOCYTES # BLD AUTO: 0.04 K/UL (ref 0–0.04)
IMM GRANULOCYTES NFR BLD AUTO: 0.6 % (ref 0–0.5)
KETONES UR QL STRIP: NEGATIVE
LDLC SERPL CALC-MCNC: 103.6 MG/DL (ref 63–159)
LEUKOCYTE ESTERASE UR QL STRIP: NEGATIVE
LYMPHOCYTES # BLD AUTO: 1.7 K/UL (ref 1–4.8)
LYMPHOCYTES NFR BLD: 24.2 % (ref 18–48)
MCH RBC QN AUTO: 28.5 PG (ref 27–31)
MCHC RBC AUTO-ENTMCNC: 33.1 G/DL (ref 32–36)
MCV RBC AUTO: 86 FL (ref 82–98)
MONOCYTES # BLD AUTO: 0.5 K/UL (ref 0.3–1)
MONOCYTES NFR BLD: 7.9 % (ref 4–15)
NEUTROPHILS # BLD AUTO: 4.4 K/UL (ref 1.8–7.7)
NEUTROPHILS NFR BLD: 64.9 % (ref 38–73)
NITRITE UR QL STRIP: NEGATIVE
NONHDLC SERPL-MCNC: 141 MG/DL
NRBC BLD-RTO: 0 /100 WBC
PH UR STRIP: 6 [PH] (ref 5–8)
PLATELET # BLD AUTO: 240 K/UL (ref 150–450)
PMV BLD AUTO: 10.3 FL (ref 9.2–12.9)
POTASSIUM SERPL-SCNC: 3.8 MMOL/L (ref 3.5–5.1)
PROT SERPL-MCNC: 6.7 G/DL (ref 6–8.4)
PROT UR QL STRIP: NEGATIVE
RBC # BLD AUTO: 4.7 M/UL (ref 4–5.4)
SODIUM SERPL-SCNC: 137 MMOL/L (ref 136–145)
SP GR UR STRIP: 1.01 (ref 1–1.03)
TRIGL SERPL-MCNC: 187 MG/DL (ref 30–150)
TSH SERPL DL<=0.005 MIU/L-ACNC: 0.94 UIU/ML (ref 0.4–4)
URATE SERPL-MCNC: 4.4 MG/DL (ref 2.4–5.7)
URN SPEC COLLECT METH UR: NORMAL
UROBILINOGEN UR STRIP-ACNC: NEGATIVE EU/DL
VIT B12 SERPL-MCNC: 619 PG/ML (ref 210–950)
WBC # BLD AUTO: 6.83 K/UL (ref 3.9–12.7)

## 2023-09-11 PROCEDURE — 86803 HEPATITIS C AB TEST: CPT | Performed by: FAMILY MEDICINE

## 2023-09-11 PROCEDURE — 36415 COLL VENOUS BLD VENIPUNCTURE: CPT | Mod: PO | Performed by: NURSE PRACTITIONER

## 2023-09-11 PROCEDURE — 82607 VITAMIN B-12: CPT | Performed by: NURSE PRACTITIONER

## 2023-09-11 PROCEDURE — 86592 SYPHILIS TEST NON-TREP QUAL: CPT | Performed by: FAMILY MEDICINE

## 2023-09-11 PROCEDURE — 83001 ASSAY OF GONADOTROPIN (FSH): CPT | Performed by: FAMILY MEDICINE

## 2023-09-11 PROCEDURE — 85025 COMPLETE CBC W/AUTO DIFF WBC: CPT | Performed by: FAMILY MEDICINE

## 2023-09-11 PROCEDURE — 82306 VITAMIN D 25 HYDROXY: CPT | Performed by: NURSE PRACTITIONER

## 2023-09-11 PROCEDURE — 84550 ASSAY OF BLOOD/URIC ACID: CPT | Performed by: FAMILY MEDICINE

## 2023-09-11 PROCEDURE — 80053 COMPREHEN METABOLIC PANEL: CPT | Performed by: FAMILY MEDICINE

## 2023-09-11 PROCEDURE — 81003 URINALYSIS AUTO W/O SCOPE: CPT | Performed by: FAMILY MEDICINE

## 2023-09-11 PROCEDURE — 84443 ASSAY THYROID STIM HORMONE: CPT | Performed by: FAMILY MEDICINE

## 2023-09-11 PROCEDURE — 80061 LIPID PANEL: CPT | Performed by: FAMILY MEDICINE

## 2023-09-11 PROCEDURE — 83036 HEMOGLOBIN GLYCOSYLATED A1C: CPT | Performed by: FAMILY MEDICINE

## 2023-09-11 PROCEDURE — 87389 HIV-1 AG W/HIV-1&-2 AB AG IA: CPT | Performed by: FAMILY MEDICINE

## 2023-09-12 LAB — RPR SER QL: NORMAL

## 2023-09-21 ENCOUNTER — PATIENT MESSAGE (OUTPATIENT)
Dept: FAMILY MEDICINE | Facility: CLINIC | Age: 43
End: 2023-09-21
Payer: COMMERCIAL

## 2023-09-26 ENCOUNTER — HOSPITAL ENCOUNTER (EMERGENCY)
Facility: HOSPITAL | Age: 43
Discharge: HOME | End: 2023-09-26
Attending: EMERGENCY MEDICINE
Payer: COMMERCIAL

## 2023-09-26 VITALS
SYSTOLIC BLOOD PRESSURE: 123 MMHG | HEIGHT: 62 IN | RESPIRATION RATE: 16 BRPM | BODY MASS INDEX: 24.84 KG/M2 | HEART RATE: 68 BPM | WEIGHT: 135 LBS | DIASTOLIC BLOOD PRESSURE: 60 MMHG | OXYGEN SATURATION: 100 % | TEMPERATURE: 98 F

## 2023-09-26 DIAGNOSIS — G89.18 POST-OP PAIN: ICD-10-CM

## 2023-09-26 DIAGNOSIS — R10.9 LEFT FLANK PAIN: Primary | ICD-10-CM

## 2023-09-26 DIAGNOSIS — L76.34 POSTOPERATIVE SEROMA OF SKIN AFTER NON-DERMATOLOGIC PROCEDURE: ICD-10-CM

## 2023-09-26 LAB
ALBUMIN SERPL BCP-MCNC: 4.6 G/DL (ref 3.5–5.2)
ALP SERPL-CCNC: 54 U/L (ref 55–135)
ALT SERPL W/O P-5'-P-CCNC: 21 U/L (ref 10–44)
ANION GAP SERPL CALC-SCNC: 8 MMOL/L (ref 8–16)
AST SERPL-CCNC: 15 U/L (ref 10–40)
BASOPHILS # BLD AUTO: 0.03 K/UL (ref 0–0.2)
BASOPHILS NFR BLD: 0.5 % (ref 0–1.9)
BILIRUB SERPL-MCNC: 0.5 MG/DL (ref 0.1–1)
BILIRUB UR QL STRIP: NEGATIVE
BUN SERPL-MCNC: 7 MG/DL (ref 6–20)
CALCIUM SERPL-MCNC: 9.6 MG/DL (ref 8.7–10.5)
CHLORIDE SERPL-SCNC: 106 MMOL/L (ref 95–110)
CLARITY UR: CLEAR
CO2 SERPL-SCNC: 27 MMOL/L (ref 23–29)
COLOR UR: COLORLESS
CREAT SERPL-MCNC: 0.7 MG/DL (ref 0.5–1.4)
DIFFERENTIAL METHOD: NORMAL
EOSINOPHIL # BLD AUTO: 0.1 K/UL (ref 0–0.5)
EOSINOPHIL NFR BLD: 2.1 % (ref 0–8)
ERYTHROCYTE [DISTWIDTH] IN BLOOD BY AUTOMATED COUNT: 13.3 % (ref 11.5–14.5)
EST. GFR  (NO RACE VARIABLE): >60 ML/MIN/1.73 M^2
GLUCOSE SERPL-MCNC: 109 MG/DL (ref 70–110)
GLUCOSE UR QL STRIP: NEGATIVE
HCT VFR BLD AUTO: 39.5 % (ref 37–48.5)
HGB BLD-MCNC: 13.7 G/DL (ref 12–16)
HGB UR QL STRIP: NEGATIVE
IMM GRANULOCYTES # BLD AUTO: 0.03 K/UL (ref 0–0.04)
IMM GRANULOCYTES NFR BLD AUTO: 0.5 % (ref 0–0.5)
KETONES UR QL STRIP: NEGATIVE
LEUKOCYTE ESTERASE UR QL STRIP: NEGATIVE
LYMPHOCYTES # BLD AUTO: 2.5 K/UL (ref 1–4.8)
LYMPHOCYTES NFR BLD: 38.9 % (ref 18–48)
MCH RBC QN AUTO: 29.1 PG (ref 27–31)
MCHC RBC AUTO-ENTMCNC: 34.7 G/DL (ref 32–36)
MCV RBC AUTO: 84 FL (ref 82–98)
MONOCYTES # BLD AUTO: 0.5 K/UL (ref 0.3–1)
MONOCYTES NFR BLD: 7.2 % (ref 4–15)
NEUTROPHILS # BLD AUTO: 3.3 K/UL (ref 1.8–7.7)
NEUTROPHILS NFR BLD: 50.8 % (ref 38–73)
NITRITE UR QL STRIP: NEGATIVE
NRBC BLD-RTO: 0 /100 WBC
PH UR STRIP: 7 [PH] (ref 5–8)
PLATELET # BLD AUTO: 262 K/UL (ref 150–450)
PMV BLD AUTO: 9.7 FL (ref 9.2–12.9)
POTASSIUM SERPL-SCNC: 3.8 MMOL/L (ref 3.5–5.1)
PROT SERPL-MCNC: 7.5 G/DL (ref 6–8.4)
PROT UR QL STRIP: NEGATIVE
RBC # BLD AUTO: 4.7 M/UL (ref 4–5.4)
SODIUM SERPL-SCNC: 141 MMOL/L (ref 136–145)
SP GR UR STRIP: <1.005 (ref 1–1.03)
URN SPEC COLLECT METH UR: ABNORMAL
UROBILINOGEN UR STRIP-ACNC: NEGATIVE EU/DL
WBC # BLD AUTO: 6.53 K/UL (ref 3.9–12.7)

## 2023-09-26 PROCEDURE — 63600175 PHARM REV CODE 636 W HCPCS: Performed by: NURSE PRACTITIONER

## 2023-09-26 PROCEDURE — 85025 COMPLETE CBC W/AUTO DIFF WBC: CPT | Performed by: NURSE PRACTITIONER

## 2023-09-26 PROCEDURE — 96375 TX/PRO/DX INJ NEW DRUG ADDON: CPT

## 2023-09-26 PROCEDURE — 96361 HYDRATE IV INFUSION ADD-ON: CPT

## 2023-09-26 PROCEDURE — 81003 URINALYSIS AUTO W/O SCOPE: CPT | Performed by: NURSE PRACTITIONER

## 2023-09-26 PROCEDURE — 25000003 PHARM REV CODE 250: Performed by: NURSE PRACTITIONER

## 2023-09-26 PROCEDURE — 80053 COMPREHEN METABOLIC PANEL: CPT | Performed by: NURSE PRACTITIONER

## 2023-09-26 PROCEDURE — 96374 THER/PROPH/DIAG INJ IV PUSH: CPT

## 2023-09-26 PROCEDURE — 99285 EMERGENCY DEPT VISIT HI MDM: CPT | Mod: 25

## 2023-09-26 RX ORDER — ONDANSETRON 2 MG/ML
4 INJECTION INTRAMUSCULAR; INTRAVENOUS
Status: COMPLETED | OUTPATIENT
Start: 2023-09-26 | End: 2023-09-26

## 2023-09-26 RX ORDER — KETOROLAC TROMETHAMINE 30 MG/ML
15 INJECTION, SOLUTION INTRAMUSCULAR; INTRAVENOUS
Status: COMPLETED | OUTPATIENT
Start: 2023-09-26 | End: 2023-09-26

## 2023-09-26 RX ORDER — ACETAMINOPHEN 500 MG
1000 TABLET ORAL
Status: DISCONTINUED | OUTPATIENT
Start: 2023-09-26 | End: 2023-09-27 | Stop reason: HOSPADM

## 2023-09-26 RX ORDER — TIZANIDINE 2 MG/1
2 TABLET ORAL EVERY 8 HOURS PRN
Qty: 15 TABLET | Refills: 0 | Status: SHIPPED | OUTPATIENT
Start: 2023-09-26 | End: 2023-11-08

## 2023-09-26 RX ORDER — KETOROLAC TROMETHAMINE 30 MG/ML
15 INJECTION, SOLUTION INTRAMUSCULAR; INTRAVENOUS
Status: DISCONTINUED | OUTPATIENT
Start: 2023-09-26 | End: 2023-09-26

## 2023-09-26 RX ADMIN — ONDANSETRON 4 MG: 2 INJECTION INTRAMUSCULAR; INTRAVENOUS at 09:09

## 2023-09-26 RX ADMIN — KETOROLAC TROMETHAMINE 15 MG: 30 INJECTION, SOLUTION INTRAMUSCULAR; INTRAVENOUS at 09:09

## 2023-09-26 RX ADMIN — SODIUM CHLORIDE 1000 ML: 9 INJECTION, SOLUTION INTRAVENOUS at 08:09

## 2023-09-27 NOTE — ED PROVIDER NOTES
"Encounter Date: 9/26/2023    SCRIBE #1 NOTE: I, Freedom Rizo, am scribing for, and in the presence of,  Maurice Groves NP. I have scribed the following portions of the note - Other sections scribed: HPI, ROS.       History     Chief Complaint   Patient presents with    Post-op Problem     Pt to ED with complaints of needing her wound checked. States she had a tummy tuck approx 6 months ago and has had an infection once. The incision is now "blistering up." States "my doctor told me I need it lanced and have it looked at." Reports headache, diarrhea, and "feeling funky."     Roslyn Vera is a 43 y.o. female with a PMHx of HTN, kidney stones, ARF, systolic CHF, presents to the ED for multiple complaints. Patient reports of a post-op problem. She states that she had a tummy tuck 5 months ago and had an infection once. She is reporting of a fluid filled blister below the incision site. Notes of some clear drainage. Patient is also c/o L sided flank pain. States that it feels similar to a kidney stone. No medications taken PTA. No alleviating or exacerbating factors noted. Denies dysuria, hematuria or any associated symptoms. Patient denies any recent abx usage.       The history is provided by the patient. No  was used.     Review of patient's allergies indicates:   Allergen Reactions    Strawberry Anaphylaxis and Hives    Morphine Other (See Comments)     Burns stomach    Toradol [ketorolac]      cramping    Tramadol      Abdominal pain     Past Medical History:   Diagnosis Date    Abnormal uterine bleeding 4/18/2018    Acute encephalopathy 12/24/12    secondary to drug overdose    ARF (acute renal failure) 12/24/12    secondary to drug overdose    History of cardiac arrest 12/24/12    secondary to drug overdose    Hx of psychiatric care     Hypertension     Kidney stones 1/8/2018    MRSA (methicillin resistant Staphylococcus aureus) 12/24/12    Nephritis     Polysubstance abuse 12/24/12 "    Psychiatric problem     Systolic CHF, acute 12/24/12    secondary to drug overdose    Therapy     when younger; recently set up with Cassie Rockweiler, LCSW    UTI (urinary tract infection)      Past Surgical History:   Procedure Laterality Date    AUGMENTATION OF BREAST Bilateral 2001    CHOLECYSTECTOMY      ESOPHAGOGASTRODUODENOSCOPY N/A 1/2/2020    Procedure: EGD (ESOPHAGOGASTRODUODENOSCOPY);  Surgeon: Moe Tripathi MD;  Location: [facility] ENDO;  Service: Endoscopy;  Laterality: N/A;    LAPAROSCOPIC SALPINGECTOMY Left 2/10/2021    Procedure: SALPINGECTOMY, LAPAROSCOPIC;  Surgeon: Doc Pena MD;  Location: [facility] OR;  Service: OB/GYN;  Laterality: Left;    LAPAROSCOPIC TOTAL HYSTERECTOMY N/A 2/10/2021    Procedure: HYSTERECTOMY, TOTAL, LAPAROSCOPIC;  Surgeon: Doc Pean MD;  Location: [facility] OR;  Service: OB/GYN;  Laterality: N/A;  RN PREOP 2/3/, --COVID NEGATIVE ON 2/9 and T/S done    TUBAL LIGATION      urinary stents       Family History   Problem Relation Age of Onset    Kidney disease Mother     Ovarian cancer Mother 28    Cancer Maternal Grandmother         throat    Cancer Maternal Grandfather         prostate    Kidney disease Daughter     Breast cancer Neg Hx     Colon cancer Neg Hx     Anxiety disorder Neg Hx     Depression Neg Hx     Dementia Neg Hx     Suicide Neg Hx      Social History     Tobacco Use    Smoking status: Former     Current packs/day: 0.50     Average packs/day: 0.5 packs/day for 20.0 years (10.0 ttl pk-yrs)     Types: Vaping w/o nicotine, Cigarettes    Smokeless tobacco: Current   Substance Use Topics    Alcohol use: Yes     Comment: occasional; social    Drug use: No     Types: Cocaine, Methamphetamines     Comment: PCP, patient states she had not done drugs since 2012     Review of Systems   Constitutional:  Negative for fever.   HENT:  Negative for congestion, sore throat and trouble swallowing.    Respiratory:  Negative for cough and shortness of breath.     Cardiovascular:  Negative for chest pain.   Gastrointestinal:  Negative for abdominal pain, constipation, diarrhea, nausea and vomiting.   Genitourinary:  Positive for flank pain (L sided). Negative for dysuria, frequency, hematuria and urgency.   Musculoskeletal:  Negative for back pain.   Skin:  Negative for rash.        (+) Post op blister     Neurological:  Negative for headaches.       Physical Exam     Initial Vitals [09/26/23 1625]   BP Pulse Resp Temp SpO2   132/83 92 20 97.9 °F (36.6 °C) 100 %      MAP       --         Physical Exam    Nursing note and vitals reviewed.  Constitutional: She appears well-developed and well-nourished. She is not diaphoretic. No distress.   HENT:   Head: Normocephalic and atraumatic.   Right Ear: External ear normal.   Left Ear: External ear normal.   Nose: Nose normal.   Eyes: Conjunctivae and EOM are normal. Right eye exhibits no discharge. Left eye exhibits no discharge.   Neck: Neck supple. No tracheal deviation present.   Normal range of motion.  Cardiovascular:  Normal rate.           Pulmonary/Chest: No stridor. No respiratory distress.   Abdominal: Abdomen is soft. She exhibits no distension. There is no abdominal tenderness.   Musculoskeletal:         General: No tenderness. Normal range of motion.      Cervical back: Normal range of motion and neck supple.     Neurological: She is alert and oriented to person, place, and time. She has normal strength. No cranial nerve deficit or sensory deficit.   Skin: Skin is warm and dry.   Well-healing surgical scars of the lower abdomen.  There is a small fluid filled blister to the left side of the scar tissue.  It is very superficial.  It is about 0.5 cm in diameter.  There is no associated erythema, warmth, tenderness   Psychiatric: She has a normal mood and affect. Her behavior is normal. Judgment and thought content normal.         ED Course   Procedures  Labs Reviewed   COMPREHENSIVE METABOLIC PANEL - Abnormal; Notable  for the following components:       Result Value    Alkaline Phosphatase 54 (*)     All other components within normal limits   URINALYSIS, REFLEX TO URINE CULTURE - Abnormal; Notable for the following components:    Color, UA Colorless (*)     Specific Gravity, UA <1.005 (*)     All other components within normal limits    Narrative:     Specimen Source->Urine   CBC W/ AUTO DIFFERENTIAL          Imaging Results              CT Abdomen Pelvis  Without Contrast (Final result)  Result time 09/26/23 23:02:00      Final result by Nikita Christensen MD (09/26/23 23:02:00)                   Impression:      Postoperative changes in the anterior abdominal wall with induration within the subcutaneous tissues.  No definite drainable collection.  Targeted ultrasound may be attempted for further evaluation.    Left-sided nonobstructing nephrolithiasis.    Changes of prior cholecystectomy.    Hepatosplenomegaly.      Electronically signed by: Nikita Christensen MD  Date:    09/26/2023  Time:    23:02               Narrative:    EXAMINATION:  CT ABDOMEN PELVIS WITHOUT CONTRAST    CLINICAL HISTORY:  Flank pain, kidney stone suspected;    TECHNIQUE:  Axial CT scan of the abdomen and pelvis was obtained from the level of the hemidiaphragms to the pubic symphysis without intravenous contrast. Coronal and sagittal reformats were obtained.    COMPARISON:  CT scan of the abdomen pelvis dated 04/12/2023 and multiple prior examinations.    FINDINGS:  There are no pleural effusions.  There is no evidence of a pneumothorax.  No airspace opacity is present.  No pulmonary nodules identified.    The heart is unremarkable.  There is normal tapering of the abdominal aorta.  The aortic branch vessels are within normal limits.    There is no evidence of lymphadenopathy in the abdomen or pelvis.    The esophagus, stomach, and duodenum are within normal limits.  The small bowel loops are unremarkable.  The appendix is within normal limits.  There is colonic  diverticula without evidence of acute diverticulitis.    The liver is enlarged.  The patient is status post cholecystectomy.  The biliary tree is within normal limits.  The spleen is enlarged.  The pancreas is within normal limits.  The adrenal glands are unremarkable.    There is an unchanged 8 mm nonobstructing stone in the lower pole the left kidney.  This is within the calyx.  There is bilateral perinephric stranding.  The ureters and urinary bladder are unremarkable.  The patient is status post hysterectomy.  The adnexal structures are unremarkable.    There is no evidence of free fluid in the abdomen or pelvis.  There is no evidence of free air.  There is no evidence of pneumatosis.  No portal venous air is identified.    The psoas margins are unremarkable.  The changes of prior breast augmentation.  There are postoperative changes in the anterior abdominal wall.  There is induration within the subcutaneous tissues.  There are multiple Schmorl's nodes.  There is no evidence of a fracture.                                       Medications   acetaminophen tablet 1,000 mg (1,000 mg Oral Not Given 9/26/23 2100)   sodium chloride 0.9% bolus 1,000 mL 1,000 mL (0 mLs Intravenous Stopped 9/26/23 2141)   ondansetron injection 4 mg (4 mg Intravenous Given 9/26/23 2109)   ketorolac injection 15 mg (15 mg Intravenous Given 9/26/23 2139)     Medical Decision Making  Small fluid-filled blister to the right side of the patient's surgical scar as above.  No evidence of infection.  I used an 18 gauge needle and aspirated a very small amount of serous fluid from the blister.  No fluid remaining in the blister following this procedure.  CBC, CMP, urinalysis unremarkable.  CT without evidence of intra-abdominal abscess, ureterolithiasis, diverticulitis, or other emergent pathology.  Do not feel that the patient requires treatment with antibiotics at this time.  Advised her to follow up with her surgeon.  ED return precautions  given.  Shared decision-making with the patient.    Amount and/or Complexity of Data Reviewed  External Data Reviewed: notes.  Labs: ordered. Decision-making details documented in ED Course.  Radiology: ordered. Decision-making details documented in ED Course.    Risk  OTC drugs.  Prescription drug management.            Scribe Attestation:   Scribe #1: I performed the above scribed service and the documentation accurately describes the services I performed. I attest to the accuracy of the note.                    I, Maurice Groves NP, personally performed the services described in this documentation. All medical record entries made by the scribe were at my direction and in my presence. I have reviewed the chart and agree that the record reflects my personal performance and is accurate and complete.                  Clinical Impression:   Final diagnoses:  [G89.18] Post-op pain  [R10.9] Left flank pain (Primary)  [L76.34] Postoperative seroma of skin after non-dermatologic procedure        ED Disposition Condition    Discharge Stable          ED Prescriptions       Medication Sig Dispense Start Date End Date Auth. Provider    tiZANidine (ZANAFLEX) 2 MG tablet Take 1 tablet (2 mg total) by mouth every 8 (eight) hours as needed (Muscle Spasms). 15 tablet 9/26/2023 -- Maurice Groves NP          Follow-up Information       Follow up With Specialties Details Why Contact Info    Hermes Colvin MD Family Medicine Schedule an appointment as soon as possible for a visit in 1 week For further evaluation [id number] [id number]  [phone]      [facility] - Emergency Dept Emergency Medicine Go to  If symptoms worsen, As needed [id number]  [facility] [id number] [phone]             Groves, Maurice T., NP  09/27/23 9696

## 2023-09-27 NOTE — ED TRIAGE NOTES
"Pt. Reports she had a tummy tuck 5-6 months ago and believes she has a " sore" on the right side of the incision.   Pt. Reports she also believes she has a " kidney stone". Pt. Reports she has pain on the left side of her abd and flank area. Pt. Reports she has a hx of kidney stone.   "

## 2023-09-27 NOTE — DISCHARGE INSTRUCTIONS
Follow up with your surgeon as discussed.    Thank you for coming to our Emergency Department today. It is important to remember that some problems are difficult to diagnose and may not be found during your first visit. Be sure to follow up with your primary care doctor.  If you do not have one, you may contact the one listed on your discharge paperwork or you may also call the [facility] Appointment Desk at [phone] to schedule an appointment with one.     Return to the ER with any questions/concerns, new/concerning symptoms, worsening or failure to improve. Do not drive or make any important decisions for 24 hours if you have received any pain medications, sedatives or mood altering drugs during your ER visit.

## 2023-09-27 NOTE — ED NOTES
Pt. Report she does not want tylenol because she took some at 5pm. Explained to pt. That she can have another dose. Pt. States she does not want me and would prefer something else. Pt. States she can take the ketorolac and is requesting to speak to provider.

## 2023-11-03 DIAGNOSIS — F41.9 ANXIETY: ICD-10-CM

## 2023-11-03 RX ORDER — ALPRAZOLAM 1 MG/1
TABLET ORAL
Qty: 60 TABLET | Refills: 2 | Status: SHIPPED | OUTPATIENT
Start: 2023-11-03 | End: 2024-02-23

## 2023-11-03 NOTE — TELEPHONE ENCOUNTER
No care due was identified.  [facility] Embedded Care Due Messages. Reference number: [id number].   11/03/2023 10:03:24 AM CDT

## 2023-11-07 ENCOUNTER — PATIENT OUTREACH (OUTPATIENT)
Dept: ADMINISTRATIVE | Facility: HOSPITAL | Age: 43
End: 2023-11-07
Payer: COMMERCIAL

## 2023-11-07 NOTE — PROGRESS NOTES
Health Maintenance Due   Topic Date Due    Pneumococcal Vaccines (Age 0-64) (1 - PCV) Never done    TETANUS VACCINE  Never done    Mammogram  12/04/2021    COVID-19 Vaccine (3 - 2023-24 season) 09/01/2023     Chart review done.  updated. Immunizations reviewed & updated. Care Everywhere updated.

## 2023-11-08 ENCOUNTER — OFFICE VISIT (OUTPATIENT)
Dept: FAMILY MEDICINE | Facility: CLINIC | Age: 43
End: 2023-11-08
Payer: COMMERCIAL

## 2023-11-08 VITALS
TEMPERATURE: 98 F | SYSTOLIC BLOOD PRESSURE: 110 MMHG | HEART RATE: 100 BPM | DIASTOLIC BLOOD PRESSURE: 82 MMHG | OXYGEN SATURATION: 98 % | HEIGHT: 62 IN | WEIGHT: 142 LBS | BODY MASS INDEX: 26.13 KG/M2

## 2023-11-08 DIAGNOSIS — Z12.31 ENCOUNTER FOR SCREENING MAMMOGRAM FOR BREAST CANCER: ICD-10-CM

## 2023-11-08 DIAGNOSIS — Z00.00 ANNUAL PHYSICAL EXAM: Primary | ICD-10-CM

## 2023-11-08 PROCEDURE — 3044F HG A1C LEVEL LT 7.0%: CPT | Mod: CPTII,S$GLB,, | Performed by: FAMILY MEDICINE

## 2023-11-08 PROCEDURE — 99999 PR PBB SHADOW E&M-EST. PATIENT-LVL IV: ICD-10-PCS | Mod: PBBFAC,,, | Performed by: FAMILY MEDICINE

## 2023-11-08 PROCEDURE — 1159F PR MEDICATION LIST DOCUMENTED IN MEDICAL RECORD: ICD-10-PCS | Mod: CPTII,S$GLB,, | Performed by: FAMILY MEDICINE

## 2023-11-08 PROCEDURE — 3008F BODY MASS INDEX DOCD: CPT | Mod: CPTII,S$GLB,, | Performed by: FAMILY MEDICINE

## 2023-11-08 PROCEDURE — 3074F PR MOST RECENT SYSTOLIC BLOOD PRESSURE < 130 MM HG: ICD-10-PCS | Mod: CPTII,S$GLB,, | Performed by: FAMILY MEDICINE

## 2023-11-08 PROCEDURE — 1159F MED LIST DOCD IN RCRD: CPT | Mod: CPTII,S$GLB,, | Performed by: FAMILY MEDICINE

## 2023-11-08 PROCEDURE — 99999 PR PBB SHADOW E&M-EST. PATIENT-LVL IV: CPT | Mod: PBBFAC,,, | Performed by: FAMILY MEDICINE

## 2023-11-08 PROCEDURE — 3079F DIAST BP 80-89 MM HG: CPT | Mod: CPTII,S$GLB,, | Performed by: FAMILY MEDICINE

## 2023-11-08 PROCEDURE — 3079F PR MOST RECENT DIASTOLIC BLOOD PRESSURE 80-89 MM HG: ICD-10-PCS | Mod: CPTII,S$GLB,, | Performed by: FAMILY MEDICINE

## 2023-11-08 PROCEDURE — 3044F PR MOST RECENT HEMOGLOBIN A1C LEVEL <7.0%: ICD-10-PCS | Mod: CPTII,S$GLB,, | Performed by: FAMILY MEDICINE

## 2023-11-08 PROCEDURE — 99396 PREV VISIT EST AGE 40-64: CPT | Mod: S$GLB,,, | Performed by: FAMILY MEDICINE

## 2023-11-08 PROCEDURE — 99396 PR PREVENTIVE VISIT,EST,40-64: ICD-10-PCS | Mod: S$GLB,,, | Performed by: FAMILY MEDICINE

## 2023-11-08 PROCEDURE — 3008F PR BODY MASS INDEX (BMI) DOCUMENTED: ICD-10-PCS | Mod: CPTII,S$GLB,, | Performed by: FAMILY MEDICINE

## 2023-11-08 PROCEDURE — 3074F SYST BP LT 130 MM HG: CPT | Mod: CPTII,S$GLB,, | Performed by: FAMILY MEDICINE

## 2023-11-08 RX ORDER — TRAZODONE HYDROCHLORIDE 50 MG/1
50-150 TABLET ORAL NIGHTLY
Qty: 90 TABLET | Refills: 11 | Status: SHIPPED | OUTPATIENT
Start: 2023-11-08 | End: 2024-01-29 | Stop reason: ALTCHOICE

## 2023-11-08 NOTE — PROGRESS NOTES
"Chief Complaint   Patient presents with    Medication Problem       SUBJECTIVE:   43 y.o. female for annual routine checkup.    Current Outpatient Medications   Medication Sig Dispense Refill    ALPRAZolam (XANAX) 1 MG tablet TAKE ONE TABLET BY MOUTH TWICE DAILY 60 tablet 2    clindamycin (CLEOCIN T) 1 % external solution APPLY TO AFFECTED AREA TWICE DAILY 60 mL 2    ibuprofen (ADVIL,MOTRIN) 800 MG tablet Take 1 tablet (800 mg total) by mouth every 6 (six) hours as needed for Pain. 20 tablet 0    tretinoin (RETIN-A) 0.1 % cream Apply topically every evening. 45 g 1    conjugated estrogens (PREMARIN) vaginal cream Place 1 g vaginally twice a week. 30 g 1    EPINEPHrine (EPIPEN 2-LUCIANA) 0.3 mg/0.3 mL AtIn Inject 0.3 mLs (0.3 mg total) into the muscle once. for 1 dose 0.3 mL 0    traZODone (DESYREL) 50 MG tablet Take 1-3 tablets ( mg total) by mouth every evening. 90 tablet 11     No current facility-administered medications for this visit.     Allergies: Strawberry, Morphine, Toradol [ketorolac], and Tramadol   Patient's last menstrual period was 01/17/2021 (exact date).    ROS:  Feeling well. No dyspnea or chest pain on exertion.  No abdominal pain, change in bowel habits, black or bloody stools.  No urinary tract symptoms. GYN ROS: she is haivng more stomach issues/bloating and mood issues with lots of soical issues.. No neurological complaints.    OBJECTIVE:   The patient appears well, alert, oriented x 3, in no distress.  /82   Pulse 100   Temp 98 °F (36.7 °C) (Oral)   Ht 5' 2" (1.575 m)   Wt 64.4 kg (141 lb 15.6 oz)   LMP 01/17/2021 (Exact Date) Comment: TLH 2/10/21  SpO2 98%   BMI 25.97 kg/m²   Wt Readings from Last 5 Encounters:   11/08/23 64.4 kg (141 lb 15.6 oz)   09/26/23 61.2 kg (135 lb)   04/12/23 72.6 kg (160 lb)   02/24/23 77 kg (169 lb 12.1 oz)   02/22/23 77.1 kg (170 lb)       ENT normal.  Neck supple. No adenopathy or thyromegaly. SOPHIE. Lungs are clear, good air entry, no wheezes, " rhonchi or rales. S1 and S2 normal, no murmurs, regular rate and rhythm. Abdomen soft without tenderness, guarding, mass or organomegaly. Extremities show no edema, normal peripheral pulses. Neurological is normal, no focal findings.  No SI/HI    BREAST EXAM: deferred    PELVIC EXAM: deferred    ASSESSMENT:   1. Annual physical exam    2. Encounter for screening mammogram for breast cancer          PLAN:   Counseled on age appropriate medical preventative services, including age appropriate cancer screenings, over all nutritional health, need for a consistent exercise regimen and an over all push towards maintaining a vigorous and active lifestyle.  Counseled on age appropriate vaccines and discussed upcoming health care needs based on age/gender.  Spent time with patient counseling on need for a good patient/doctor relationship moving forward.  Discussed use of common OTC medications and supplements.  Discussed common dietary aids and use of caffeine and the need for good sleep hygiene and stress management.    Problem List Items Addressed This Visit    None  Visit Diagnoses       Annual physical exam    -  Primary    Encounter for screening mammogram for breast cancer        Relevant Orders    Mammo Digital Screening Bilat w/ Satya        Add in trazodone.  Digestive enzymes for the stomach    F/u in 1 year for wellness

## 2023-11-10 ENCOUNTER — HOSPITAL ENCOUNTER (OUTPATIENT)
Dept: RADIOLOGY | Facility: HOSPITAL | Age: 43
Discharge: HOME | End: 2023-11-10
Attending: FAMILY MEDICINE
Payer: COMMERCIAL

## 2023-11-10 DIAGNOSIS — Z12.31 ENCOUNTER FOR SCREENING MAMMOGRAM FOR BREAST CANCER: ICD-10-CM

## 2023-11-10 PROCEDURE — 77063 BREAST TOMOSYNTHESIS BI: CPT | Mod: 26,,, | Performed by: RADIOLOGY

## 2023-11-10 PROCEDURE — 77067 SCR MAMMO BI INCL CAD: CPT | Mod: TC

## 2023-11-10 PROCEDURE — 77067 SCR MAMMO BI INCL CAD: CPT | Mod: 26,,, | Performed by: RADIOLOGY

## 2023-11-10 PROCEDURE — 77067 MAMMO DIGITAL SCREENING BILAT WITH TOMO: ICD-10-PCS | Mod: 26,,, | Performed by: RADIOLOGY

## 2023-11-10 PROCEDURE — 77063 MAMMO DIGITAL SCREENING BILAT WITH TOMO: ICD-10-PCS | Mod: 26,,, | Performed by: RADIOLOGY

## 2023-12-13 ENCOUNTER — PATIENT MESSAGE (OUTPATIENT)
Dept: FAMILY MEDICINE | Facility: CLINIC | Age: 43
End: 2023-12-13
Payer: COMMERCIAL

## 2023-12-13 DIAGNOSIS — F98.8 ATTENTION DEFICIT DISORDER (ADD) IN ADULT: Primary | ICD-10-CM

## 2023-12-18 ENCOUNTER — E-VISIT (OUTPATIENT)
Dept: FAMILY MEDICINE | Facility: CLINIC | Age: 43
End: 2023-12-18
Payer: COMMERCIAL

## 2023-12-18 DIAGNOSIS — F98.8 ATTENTION DEFICIT DISORDER (ADD) IN ADULT: Primary | ICD-10-CM

## 2023-12-18 PROCEDURE — 99423 OL DIG E/M SVC 21+ MIN: CPT | Mod: ,,, | Performed by: FAMILY MEDICINE

## 2023-12-18 PROCEDURE — 99423 PR E&M, ONLINE DIGIT, EST, < 7 DAYS,  21+ MINS: ICD-10-PCS | Mod: ,,, | Performed by: FAMILY MEDICINE

## 2023-12-18 RX ORDER — LISDEXAMFETAMINE DIMESYLATE CAPSULES 20 MG/1
20 CAPSULE ORAL EVERY MORNING
Qty: 30 CAPSULE | Refills: 0 | Status: SHIPPED | OUTPATIENT
Start: 2023-12-18

## 2023-12-18 NOTE — PROGRESS NOTES
Patient ID: Roslyn Vera is a 43 y.o. female.    Chief Complaint: No chief complaint on file.    The patient initiated a request through [facility] on 12/18/2023 for evaluation and management with a chief complaint of No chief complaint on file.     I evaluated the questionnaire submission on 12/18/2023.    [facility]    12/18/2023  8:38 AM CST - Filed by Patient   Do you agree to participate in an E-Visit? Yes   If you have any of the following symptoms, please present to your local ER or call 911:  I acknowledge   Choose the state of your primary residence [place]   What is the main issue that you would like for your doctor to address today? Medication   Are you able to take your vital signs? No   Are you currently pregnant, could you be pregnant, or are you breast feeding? None of the above   Please describe your symptoms Adhd accessment   Where is your problem located? Head   How severe are your symptoms? Severe   Have you had these symptoms before? Yes   How long have you been having these symptoms? For more than a month   Please list any medications or treatments you have used for your condition and indicate if it was effective or not.    What makes this feel better? Nothing   What makes this feel worse? Stress being overwhelmed   Are these symptoms related to a condition that you currently have? No   Please describe any probable cause for these symptoms Dont know   Provide any information you feel is important to your history not asked above    Please attach any relevant images or files File not available.         Recent Labs Obtained:  No visits with results within 7 Day(s) from this visit.   Latest known visit with results is:   Admission on 09/26/2023, Discharged on 09/26/2023   Component Date Value Ref Range Status    WBC 09/26/2023 6.53  3.90 - 12.70 K/uL Final    RBC 09/26/2023 4.70  4.00 - 5.40 M/uL Final    Hemoglobin 09/26/2023 13.7  12.0 - 16.0 g/dL Final    Hematocrit 09/26/2023  39.5  37.0 - 48.5 % Final    MCV 09/26/2023 84  82 - 98 fL Final    MCH 09/26/2023 29.1  27.0 - 31.0 pg Final    MCHC 09/26/2023 34.7  32.0 - 36.0 g/dL Final    RDW 09/26/2023 13.3  11.5 - 14.5 % Final    Platelets 09/26/2023 262  150 - 450 K/uL Final    MPV 09/26/2023 9.7  9.2 - 12.9 fL Final    Immature Granulocytes 09/26/2023 0.5  0.0 - 0.5 % Final    Gran # (ANC) 09/26/2023 3.3  1.8 - 7.7 K/uL Final    Immature Grans (Abs) 09/26/2023 0.03  0.00 - 0.04 K/uL Final    Comment: Mild elevation in immature granulocytes is non specific and   can be seen in a variety of conditions including stress response,   acute inflammation, trauma and pregnancy. Correlation with other   laboratory and clinical findings is essential.      Lymph # 09/26/2023 2.5  1.0 - 4.8 K/uL Final    Mono # 09/26/2023 0.5  0.3 - 1.0 K/uL Final    Eos # 09/26/2023 0.1  0.0 - 0.5 K/uL Final    Baso # 09/26/2023 0.03  0.00 - 0.20 K/uL Final    nRBC 09/26/2023 0  0 /100 WBC Final    Gran % 09/26/2023 50.8  38.0 - 73.0 % Final    Lymph % 09/26/2023 38.9  18.0 - 48.0 % Final    Mono % 09/26/2023 7.2  4.0 - 15.0 % Final    Eosinophil % 09/26/2023 2.1  0.0 - 8.0 % Final    Basophil % 09/26/2023 0.5  0.0 - 1.9 % Final    Differential Method 09/26/2023 Automated   Final    Sodium 09/26/2023 141  136 - 145 mmol/L Final    Potassium 09/26/2023 3.8  3.5 - 5.1 mmol/L Final    Chloride 09/26/2023 106  95 - 110 mmol/L Final    CO2 09/26/2023 27  23 - 29 mmol/L Final    Glucose 09/26/2023 109  70 - 110 mg/dL Final    BUN 09/26/2023 7  6 - 20 mg/dL Final    Creatinine 09/26/2023 0.7  0.5 - 1.4 mg/dL Final    Calcium 09/26/2023 9.6  8.7 - 10.5 mg/dL Final    Total Protein 09/26/2023 7.5  6.0 - 8.4 g/dL Final    Albumin 09/26/2023 4.6  3.5 - 5.2 g/dL Final    Total Bilirubin 09/26/2023 0.5  0.1 - 1.0 mg/dL Final    Comment: For infants and newborns, interpretation of results should be based  on gestational age, weight and in agreement with  clinical  observations.    Premature Infant recommended reference ranges:  Up to 24 hours.............<8.0 mg/dL  Up to 48 hours............<12.0 mg/dL  3-5 days..................<15.0 mg/dL  6-29 days.................<15.0 mg/dL      Alkaline Phosphatase 09/26/2023 54 (L)  55 - 135 U/L Final    AST 09/26/2023 15  10 - 40 U/L Final    ALT 09/26/2023 21  10 - 44 U/L Final    eGFR 09/26/2023 >60  >60 mL/min/1.73 m^2 Final    Anion Gap 09/26/2023 8  8 - 16 mmol/L Final    Specimen UA 09/26/2023 Urine, Clean Catch   Final    Color, UA 09/26/2023 Colorless (A)  Yellow, Straw, Ayesha Final    Appearance, UA 09/26/2023 Clear  Clear Final    pH, UA 09/26/2023 7.0  5.0 - 8.0 Final    Specific Gravity, UA 09/26/2023 <1.005 (A)  1.005 - 1.030 Final    Protein, UA 09/26/2023 Negative  Negative Final    Comment: Recommend a 24 hour urine protein or a urine   protein/creatinine ratio if globulin induced proteinuria is  clinically suspected.      Glucose, UA 09/26/2023 Negative  Negative Final    Ketones, UA 09/26/2023 Negative  Negative Final    Bilirubin (UA) 09/26/2023 Negative  Negative Final    Occult Blood UA 09/26/2023 Negative  Negative Final    Nitrite, UA 09/26/2023 Negative  Negative Final    Urobilinogen, UA 09/26/2023 Negative  <2.0 EU/dL Final    Leukocytes, UA 09/26/2023 Negative  Negative Final       Encounter Diagnosis   Name Primary?    Attention deficit disorder (ADD) in adult Yes        No orders of the defined types were placed in this encounter.     Medications Ordered This Encounter   Medications    lisdexamfetamine (VYVANSE) 20 MG capsule     Sig: Take 1 capsule (20 mg total) by mouth every morning.     Dispense:  30 capsule     Refill:  0        Follow up in about 4 weeks (around 1/15/2024) for assess treatment plan.      E-Visit Time Tracking:    Day 1 Time (in minutes): 10  Day 2 Time (in minutes): 12  Day 3 Time (in minutes): 10     Total Time (in minutes): 32

## 2024-01-03 NOTE — PROGRESS NOTES
Subjective:       Patient ID: Roslyn Brewster is a 39 y.o. female.    Chief Complaint: Cough (1.5 week); Nasal Congestion (1.5 week); Chest Congestion (1.5 week); and Wheezing (1.5 week)    39-year-old female presents to the clinic today with complaint of fever of 102 about a week ago, sinus congestion, coughing, wheezing, shortness of breath, and ribcage pain on the left side secondary to excessive coughing for the past 1 and half weeks.  She denies any sore throat, ear pain, nausea, vomiting, or diarrhea.  She denies any cardiac chest pain, heart palpitations, or swelling to lower extremities.  She denies any headaches, dizziness, or blurred vision.  She was seen on 9/3/2019 and was treated with doxycycline, Phenergan DM, steroid injection, and steroid Dosepak.  She said initially she felt better for a couple of days but then the coughing came back.  She says her biggest problem now is the coughing, wheezing and left upper rib cage area pain. She has tried multiple cough medicines including Phenergan DM, Tessalon Perles, NyQuil, and Tussin with codeine.    Review of Systems   Constitutional: Positive for fever. Negative for chills.   HENT: Positive for congestion. Negative for ear discharge, ear pain, postnasal drip, rhinorrhea, sinus pressure, sneezing and sore throat.    Eyes: Negative for pain, discharge and itching.   Respiratory: Positive for cough, shortness of breath and wheezing.    Cardiovascular: Negative for chest pain, palpitations and leg swelling.   Gastrointestinal: Negative for abdominal pain, diarrhea, nausea and vomiting.   Musculoskeletal: Negative for back pain, neck pain and neck stiffness.        Left sided upper rib cage pain    Skin: Negative for rash.   Neurological: Negative for dizziness, light-headedness and headaches.   Hematological: Negative for adenopathy.       Objective:      Physical Exam   Constitutional: She is oriented to person, place, and time. She appears well-developed  and well-nourished. No distress.   HENT:   Head: Normocephalic and atraumatic.   Right Ear: External ear normal.   Left Ear: External ear normal.   Nose: Nose normal.   Mouth/Throat: Oropharynx is clear and moist. No oropharyngeal exudate.   Eyes: Pupils are equal, round, and reactive to light. Conjunctivae and EOM are normal. Right eye exhibits no discharge. Left eye exhibits no discharge. No scleral icterus.   Neck: Normal range of motion. Neck supple.   Cardiovascular: Normal rate and regular rhythm. Exam reveals no gallop and no friction rub.   No murmur heard.  Pulmonary/Chest: Effort normal. No respiratory distress. She has wheezes. She has no rales.   A lot of coughing noted    Abdominal: Soft. Bowel sounds are normal. There is no tenderness.   Musculoskeletal: Normal range of motion. She exhibits no edema.   Lymphadenopathy:     She has no cervical adenopathy.   Neurological: She is alert and oriented to person, place, and time.   Skin: Skin is warm and dry. No rash noted. She is not diaphoretic.   Psychiatric: She has a normal mood and affect.       Assessment:       1. Wheezing    2. Pneumonia of right lower lobe due to infectious organism    3. SOB (shortness of breath)        Plan:         Wheezing  -     levalbuterol nebulizer solution 1.25 mg  -     X-Ray Chest PA And Lateral; Future; Expected date: 09/18/2019  -     levalbuterol nebulizer solution 1.25 mg  -     albuterol sulfate (PROAIR RESPICLICK) 90 mcg/actuation AePB; Inhale 180 mcg into the lungs every 4 (four) hours. Rescue  Dispense: 1 each; Refill: 0  - after 2 treatments wheezing resolved but still some coughing noted     Pneumonia of right lower lobe due to infectious organism  -     amoxicillin-clavulanate 875-125mg (AUGMENTIN) 875-125 mg per tablet; Take 1 tablet by mouth 2 (two) times daily. for 10 days  Dispense: 20 tablet; Refill: 0    SOB (shortness of breath)  -     X-Ray Chest PA And Lateral; Future; Expected date: 09/18/2019  -      hydrocodone-chlorpheniramine (TUSSIONEX) 10-8 mg/5 mL suspension; Take 5 mLs by mouth every 12 (twelve) hours as needed for Cough.  Dispense: 70 mL; Refill: 0    I spoke with the patient and explained that the chest x-ray showed possible RLL pneumonia so I am going to send some antibiotic in and will need to repeat your x-ray in 6-8 weeks. Patient verbalized understanding of above.     Total time spent greater than 45 minutes with greater than 50% of the time spent counseling and coordination of care.    Negative

## 2024-01-29 ENCOUNTER — OFFICE VISIT (OUTPATIENT)
Dept: FAMILY MEDICINE | Facility: CLINIC | Age: 44
End: 2024-01-29
Payer: COMMERCIAL

## 2024-01-29 ENCOUNTER — LAB VISIT (OUTPATIENT)
Dept: LAB | Facility: HOSPITAL | Age: 44
End: 2024-01-29
Attending: FAMILY MEDICINE
Payer: COMMERCIAL

## 2024-01-29 VITALS
DIASTOLIC BLOOD PRESSURE: 68 MMHG | OXYGEN SATURATION: 98 % | SYSTOLIC BLOOD PRESSURE: 100 MMHG | HEART RATE: 100 BPM | BODY MASS INDEX: 28.39 KG/M2 | HEIGHT: 62 IN | WEIGHT: 154.31 LBS | TEMPERATURE: 99 F

## 2024-01-29 DIAGNOSIS — R10.9 FLANK PAIN: Primary | ICD-10-CM

## 2024-01-29 DIAGNOSIS — F98.8 ATTENTION DEFICIT DISORDER (ADD) IN ADULT: ICD-10-CM

## 2024-01-29 DIAGNOSIS — R10.9 FLANK PAIN: ICD-10-CM

## 2024-01-29 LAB
ANION GAP SERPL CALC-SCNC: 7 MMOL/L (ref 8–16)
BILIRUB UR QL STRIP: NEGATIVE
BUN SERPL-MCNC: 10 MG/DL (ref 6–20)
CALCIUM SERPL-MCNC: 9.2 MG/DL (ref 8.7–10.5)
CHLORIDE SERPL-SCNC: 102 MMOL/L (ref 95–110)
CLARITY UR: CLEAR
CO2 SERPL-SCNC: 29 MMOL/L (ref 23–29)
COLOR UR: COLORLESS
CREAT SERPL-MCNC: 0.8 MG/DL (ref 0.5–1.4)
EST. GFR  (NO RACE VARIABLE): >60 ML/MIN/1.73 M^2
GLUCOSE SERPL-MCNC: 83 MG/DL (ref 70–110)
GLUCOSE UR QL STRIP: NEGATIVE
HGB UR QL STRIP: NEGATIVE
KETONES UR QL STRIP: NEGATIVE
LEUKOCYTE ESTERASE UR QL STRIP: NEGATIVE
NITRITE UR QL STRIP: NEGATIVE
PH UR STRIP: 5 [PH] (ref 5–8)
POTASSIUM SERPL-SCNC: 3.7 MMOL/L (ref 3.5–5.1)
PROT UR QL STRIP: NEGATIVE
SODIUM SERPL-SCNC: 138 MMOL/L (ref 136–145)
SP GR UR STRIP: 1.01 (ref 1–1.03)
URN SPEC COLLECT METH UR: ABNORMAL
UROBILINOGEN UR STRIP-ACNC: NEGATIVE EU/DL

## 2024-01-29 PROCEDURE — 36415 COLL VENOUS BLD VENIPUNCTURE: CPT | Mod: PO | Performed by: FAMILY MEDICINE

## 2024-01-29 PROCEDURE — 99214 OFFICE O/P EST MOD 30 MIN: CPT | Mod: S$GLB,,, | Performed by: FAMILY MEDICINE

## 2024-01-29 PROCEDURE — 3008F BODY MASS INDEX DOCD: CPT | Mod: CPTII,S$GLB,, | Performed by: FAMILY MEDICINE

## 2024-01-29 PROCEDURE — 80048 BASIC METABOLIC PNL TOTAL CA: CPT | Performed by: FAMILY MEDICINE

## 2024-01-29 PROCEDURE — 81003 URINALYSIS AUTO W/O SCOPE: CPT | Performed by: FAMILY MEDICINE

## 2024-01-29 PROCEDURE — 99999 PR PBB SHADOW E&M-EST. PATIENT-LVL III: CPT | Mod: PBBFAC,,, | Performed by: FAMILY MEDICINE

## 2024-01-29 PROCEDURE — 3074F SYST BP LT 130 MM HG: CPT | Mod: CPTII,S$GLB,, | Performed by: FAMILY MEDICINE

## 2024-01-29 PROCEDURE — 1159F MED LIST DOCD IN RCRD: CPT | Mod: CPTII,S$GLB,, | Performed by: FAMILY MEDICINE

## 2024-01-29 PROCEDURE — 3078F DIAST BP <80 MM HG: CPT | Mod: CPTII,S$GLB,, | Performed by: FAMILY MEDICINE

## 2024-01-29 RX ORDER — LISDEXAMFETAMINE DIMESYLATE 30 MG/1
30 CAPSULE ORAL EVERY MORNING
Qty: 30 CAPSULE | Refills: 0 | Status: SHIPPED | OUTPATIENT
Start: 2024-01-29

## 2024-01-29 RX ORDER — TAMSULOSIN HYDROCHLORIDE 0.4 MG/1
0.4 CAPSULE ORAL DAILY
Qty: 30 CAPSULE | Refills: 0 | Status: SHIPPED | OUTPATIENT
Start: 2024-01-29 | End: 2025-01-28

## 2024-01-29 RX ORDER — LISDEXAMFETAMINE DIMESYLATE 30 MG/1
30 CAPSULE ORAL EVERY MORNING
Qty: 30 CAPSULE | Refills: 0 | Status: SHIPPED | OUTPATIENT
Start: 2024-02-28

## 2024-01-29 RX ORDER — LISDEXAMFETAMINE DIMESYLATE 30 MG/1
30 CAPSULE ORAL EVERY MORNING
Qty: 30 CAPSULE | Refills: 0 | Status: SHIPPED | OUTPATIENT
Start: 2024-03-28

## 2024-01-29 NOTE — PROGRESS NOTES
Chief Complaint   Patient presents with    Abdominal Pain       SUBJECTIVE:  Roslyn Vera is a 43 y.o. female here for follow up of ADHD.   Patient has ADHD and is well controleld without drug side effects and doing well overall without any unusual symptoms or history.      Having left flank pain and kidney issue  Told she had blood at   Has h/o stone       Currently has co-morbidities including below problem list.        Patient Active Problem List    Diagnosis Date Noted    Attention deficit disorder (ADD) in adult 12/18/2023     Used wellbutrin and strattera  Didn't help sufficient, strattera made her tired      Class 1 obesity with serious comorbidity and body mass index (BMI) of 30.0 to 30.9 in adult 02/11/2023     [facility] pharmacy, 280 dollars for 3-6 months depending on dose you need.  I will fax prescription.  Call them before going to  to be sure it is ready.  It is in [place].  [phone]  Start with 0.12 ml weekly for 2 weeks then check in with me on here on how you are doing.  Thyroid cancer and pancreatitis are black box warnings but the risk was in rats for cancer and diabetics for pancreatitis.  Make sure to get a good bowel regimen.  I recommend stool softener daily and stimulant laxative every 2-3 days if no good bowel movement.        Tobacco abuse, in remission 11/30/2022    Status post laparoscopic hysterectomy 02/10/2021    GERD (gastroesophageal reflux disease) 01/02/2020    Hepatosplenomegaly 11/18/2019    Mood disorder 05/10/2017    Anxiety 06/28/2016    PMDD (premenstrual dysphoric disorder) 07/23/2015       Review of Systems   HENT:  Negative for hearing loss.    Eyes:  Negative for discharge.   Respiratory:  Negative for wheezing.    Cardiovascular:  Negative for chest pain and palpitations.   Gastrointestinal:  Positive for constipation. Negative for blood in stool, diarrhea and vomiting.   Genitourinary:  Positive for hematuria. Negative for dysuria.  "  Musculoskeletal:  Negative for neck pain.   Neurological:  Positive for headaches. Negative for weakness.   Endo/Heme/Allergies:  Negative for polydipsia.       OBJECTIVE:  /68   Pulse 100   Temp 98.9 °F (37.2 °C) (Oral)   Ht 5' 2" (1.575 m)   Wt 70 kg (154 lb 5.2 oz)   LMP 01/17/2021 (Exact Date) Comment: TLH 2/10/21  SpO2 98%   BMI 28.23 kg/m²     Wt Readings from Last 3 Encounters:   01/29/24 70 kg (154 lb 5.2 oz)   11/08/23 64.4 kg (141 lb 15.6 oz)   09/26/23 61.2 kg (135 lb)     BP Readings from Last 3 Encounters:   01/29/24 100/68   11/08/23 110/82   09/26/23 123/60       Patient is in usual state of health, alert and oriented without signs of intoxication.  No evidence on exam of illegal substance use or concerning symptoms involving abuse or intoxication (specifically evidence of IVDU, unusual bruising, slurred speech, unusual explanations).    Lower abdominal pressure           Review of old Records:  Reviewed per epic and   NARx OD score/stimulant score: reviewed  Review of old labs:    Lab Results   Component Value Date    TSH 0.935 09/11/2023     Lab Results   Component Value Date    WBC 6.53 09/26/2023    HGB 13.7 09/26/2023    HCT 39.5 09/26/2023    MCV 84 09/26/2023     09/26/2023       Chemistry        Component Value Date/Time     09/26/2023 2043    K 3.8 09/26/2023 2043     09/26/2023 2043    CO2 27 09/26/2023 2043    BUN 7 09/26/2023 2043    CREATININE 0.7 09/26/2023 2043     09/26/2023 2043        Component Value Date/Time    CALCIUM 9.6 09/26/2023 2043    ALKPHOS 54 (L) 09/26/2023 2043    AST 15 09/26/2023 2043    ALT 21 09/26/2023 2043    BILITOT 0.5 09/26/2023 2043    ESTGFRAFRICA >60 07/24/2022 2120    EGFRNONAA >60 07/24/2022 2120        Lab Results   Component Value Date    CHOL 176 09/11/2023    CHOL 191 09/01/2022    CHOL 210 (H) 06/10/2020     Lab Results   Component Value Date    HDL 35 (L) 09/11/2023    HDL 36 (L) 09/01/2022    HDL 31 (L) " 06/10/2020     Lab Results   Component Value Date    LDLCALC 103.6 09/11/2023    LDLCALC Invalid, Trig>400.0 09/01/2022    LDLCALC Invalid, Trig>400.0 06/10/2020     Lab Results   Component Value Date    TRIG 187 (H) 09/11/2023    TRIG 497 (H) 09/01/2022    TRIG 537 (H) 06/10/2020     Lab Results   Component Value Date    CHOLHDL 19.9 (L) 09/11/2023    CHOLHDL 18.8 (L) 09/01/2022    CHOLHDL 14.8 (L) 06/10/2020         Review of old imaging:  Reviewed last EKG tracing if available.          ASSESSMENT:    ICD-10-CM ICD-9-CM   1. Attention deficit disorder (ADD) in adult  F98.8 314.00       No evidence of abuse/diversion/addiction noted through history and physical, or checking the .  Risks, benefits and alternate treatments are considered and plan of care reflects that shared decision with the patient.  Went over schedule II responsibilities, including abuse, side effects, refill restrictions, necessary visits, drug testing, protection of medication.  Patient voices understanding.      PLAN:    Problem List Items Addressed This Visit       Attention deficit disorder (ADD) in adult    Overview     Used wellbutrin and strattera  Didn't help sufficient, strattera made her tired         Relevant Medications    lisdexamfetamine (VYVANSE) 30 MG capsule    lisdexamfetamine (VYVANSE) 30 MG capsule (Start on 2/28/2024)    lisdexamfetamine (VYVANSE) 30 MG capsule (Start on 3/28/2024)         Medication List with Changes/Refills   New Medications    LISDEXAMFETAMINE (VYVANSE) 30 MG CAPSULE    Take 1 capsule (30 mg total) by mouth every morning.    LISDEXAMFETAMINE (VYVANSE) 30 MG CAPSULE    Take 1 capsule (30 mg total) by mouth every morning.    LISDEXAMFETAMINE (VYVANSE) 30 MG CAPSULE    Take 1 capsule (30 mg total) by mouth every morning.   Current Medications    ALPRAZOLAM (XANAX) 1 MG TABLET    TAKE ONE TABLET BY MOUTH TWICE DAILY    CLINDAMYCIN (CLEOCIN T) 1 % EXTERNAL SOLUTION    APPLY TO AFFECTED AREA TWICE DAILY     CONJUGATED ESTROGENS (PREMARIN) VAGINAL CREAM    Place 1 g vaginally twice a week.    EPINEPHRINE (EPIPEN 2-LUCIANA) 0.3 MG/0.3 ML ATIN    Inject 0.3 mLs (0.3 mg total) into the muscle once. for 1 dose    IBUPROFEN (ADVIL,MOTRIN) 800 MG TABLET    Take 1 tablet (800 mg total) by mouth every 6 (six) hours as needed for Pain.    LISDEXAMFETAMINE (VYVANSE) 20 MG CAPSULE    Take 1 capsule (20 mg total) by mouth every morning.    TRETINOIN (RETIN-A) 0.1 % CREAM    Apply topically every evening.   Discontinued Medications    TRAZODONE (DESYREL) 50 MG TABLET    Take 1-3 tablets ( mg total) by mouth every evening.         Continue with current care unless changes noted below.    High risk medication review completed per guidelines to insure safest use of medication.      We reviewed our goals of care:    Improvement in concentration and mood  Aid focus in completing tasks at work/school  Safety first priority      And discontinuation      Intolerable side effects  Evidence of abuse/misuse or diversion      No future appointments.    3 months or sooner as needed

## 2024-02-23 DIAGNOSIS — F41.9 ANXIETY: ICD-10-CM

## 2024-02-23 RX ORDER — ALPRAZOLAM 1 MG/1
TABLET ORAL
Qty: 60 TABLET | Refills: 2 | Status: SHIPPED | OUTPATIENT
Start: 2024-02-23

## 2024-02-23 NOTE — TELEPHONE ENCOUNTER
No care due was identified.  [facility] Embedded Care Due Messages. Reference number: [id number].   2/23/2024 8:02:03 AM CST

## 2024-02-23 NOTE — TELEPHONE ENCOUNTER
Last Office Visit Info:   The patient's last visit with Hermes Colvin MD was on 1/29/2024.    The patient's last visit in current department was on 1/29/2024.

## 2024-03-08 NOTE — ED PROVIDER NOTES
"Encounter Date: 9/2/2018       History     Chief Complaint   Patient presents with    Shoulder Pain     " Two days ago I was picking up some wood and I felt something pop in my left shoulder. It was broken years ago."      Left shoulder pain x 2 days picking up wodden planks no trauma reported. Hx broken clavicle two years ago. NO CP No SOB no other complaints      The history is provided by the patient.   Shoulder Pain   This is a new problem. The current episode started 2 days ago. The problem occurs constantly. The problem has not changed since onset.Pertinent negatives include no chest pain, no headaches and no shortness of breath. Exacerbated by: movement. Nothing relieves the symptoms.     Review of patient's allergies indicates:   Allergen Reactions    Strawberry Anaphylaxis and Hives    Morphine Other (See Comments)     Burns stomach    Toradol [ketorolac]     Tramadol      Abdominal pain     Past Medical History:   Diagnosis Date    Acute encephalopathy 12/24/12    secondary to drug overdose    ARF (acute renal failure) 12/24/12    secondary to drug overdose    History of cardiac arrest 12/24/12    secondary to drug overdose    Hypertension     MRSA (methicillin resistant Staphylococcus aureus) 12/24/12    Nephritis     PMDD (premenstrual dysphoric disorder)     Polysubstance abuse 12/24/12    Systolic CHF, acute 12/24/12    secondary to drug overdose    UTI (urinary tract infection)      Past Surgical History:   Procedure Laterality Date    CHOLECYSTECTOMY      TUBAL LIGATION      urinary stents       Family History   Problem Relation Age of Onset    Kidney disease Mother     Cancer Maternal Grandmother         throat    Cancer Maternal Grandfather         prostate    Kidney disease Daughter     Breast cancer Neg Hx     Colon cancer Neg Hx     Ovarian cancer Neg Hx      Social History     Tobacco Use    Smoking status: Current Every Day Smoker     Packs/day: 0.50     Years: 20.00 " The patient is a 31y Female complaining of      Pack years: 10.00     Types: Cigarettes    Smokeless tobacco: Never Used    Tobacco comment: 1/2 half pack/day.    Substance Use Topics    Alcohol use: No    Drug use: No     Comment: PCP, patient states she had not done drugs since 2012     Review of Systems   Constitutional: Negative.    HENT: Negative.    Respiratory: Negative.  Negative for shortness of breath.    Cardiovascular: Negative.  Negative for chest pain.   Gastrointestinal: Negative.    Skin: Negative.    Neurological: Negative.  Negative for headaches.   Psychiatric/Behavioral: Negative.    All other systems reviewed and are negative.      Physical Exam     Initial Vitals [09/02/18 2020]   BP Pulse Resp Temp SpO2   128/64 92 20 98.2 °F (36.8 °C) 100 %      MAP       --         Physical Exam    Nursing note and vitals reviewed.  Constitutional: She appears well-developed and well-nourished.   HENT:   Head: Normocephalic and atraumatic.   Eyes: Conjunctivae and EOM are normal. Pupils are equal, round, and reactive to light.   Neck: Normal range of motion. Neck supple.   Musculoskeletal:   Left clavicle deformity with tenderness to palpation, left shoulder WNL, no dislocation.   Neurological: She is alert and oriented to person, place, and time.         ED Course   Procedures  Labs Reviewed   POCT URINE PREGNANCY          Imaging Results          X-Ray Shoulder 2 or More Views Left (Final result)  Result time 09/02/18 21:00:41    Final result by Mague Gardner MD (09/02/18 21:00:41)                 Impression:      No acute osseous abnormality identified.  Remote left clavicular fracture deformity.      Electronically signed by: Mague Gardner MD  Date:    09/02/2018  Time:    21:00             Narrative:    EXAMINATION:  XR SHOULDER COMPLETE 2 OR MORE VIEWS LEFT    CLINICAL HISTORY:  L shoulder pain;    TECHNIQUE:  Two or three views of the left shoulder were performed.    COMPARISON:  None    FINDINGS:  Remote healed fracture deformity is  seen of the distal third of the left clavicle.  No evidence of acute fracture, dislocation, or osseous destructive process.  Visualized left lung is clear.                                 Medical Decision Making:   Initial Assessment:   Left clavicle pain, r/o fx  ED Management:  No acute fx seen, remote fx left clavicle, place in sling, motrin                   ED Course as of Sep 02 2144   Sun Sep 02, 2018   2140 D/c home sling LUE, motrin prn, f/u with ortho  [PP]      ED Course User Index  [PP] Edvin Macdonald MD     Clinical Impression:   The primary encounter diagnosis was Pain of left clavicle. A diagnosis of Clavicle pain was also pertinent to this visit.                             Edvin Macdonald MD  09/02/18 [id number]

## 2024-04-25 DIAGNOSIS — F98.8 ATTENTION DEFICIT DISORDER (ADD) IN ADULT: ICD-10-CM

## 2024-04-25 NOTE — TELEPHONE ENCOUNTER
No care due was identified.  [facility] Embedded Care Due Messages. Reference number: [id number].   4/25/2024 11:27:50 AM CDT

## 2024-04-26 RX ORDER — LISDEXAMFETAMINE DIMESYLATE 20 MG/1
20 CAPSULE ORAL EVERY MORNING
Qty: 30 CAPSULE | Refills: 0 | OUTPATIENT
Start: 2024-04-26

## 2024-04-29 ENCOUNTER — PATIENT MESSAGE (OUTPATIENT)
Dept: FAMILY MEDICINE | Facility: CLINIC | Age: 44
End: 2024-04-29
Payer: COMMERCIAL

## 2024-05-20 ENCOUNTER — PATIENT MESSAGE (OUTPATIENT)
Dept: FAMILY MEDICINE | Facility: CLINIC | Age: 44
End: 2024-05-20

## 2024-05-20 ENCOUNTER — E-VISIT (OUTPATIENT)
Dept: FAMILY MEDICINE | Facility: CLINIC | Age: 44
End: 2024-05-20
Payer: COMMERCIAL

## 2024-05-20 DIAGNOSIS — R09.81 NASAL CONGESTION: Primary | ICD-10-CM

## 2024-05-20 DIAGNOSIS — R04.0 EPISTAXIS: ICD-10-CM

## 2024-05-20 PROCEDURE — 99422 OL DIG E/M SVC 11-20 MIN: CPT | Mod: ,,, | Performed by: FAMILY MEDICINE

## 2024-05-20 RX ORDER — IPRATROPIUM BROMIDE 21 UG/1
2 SPRAY, METERED NASAL 2 TIMES DAILY
Qty: 30 ML | Refills: 0 | Status: SHIPPED | OUTPATIENT
Start: 2024-05-20

## 2024-05-20 NOTE — PROGRESS NOTES
Patient ID: Roslyn Vera is a 43 y.o. female.    Chief Complaint: Epistaxis    The patient initiated a request through [facility] on 2024 for evaluation and management with a chief complaint of Epistaxis     I evaluated the questionnaire submission on 2024.    [facility]    2024  9:23 AM CDT - Filed by Patient   Do you agree to participate in an E-Visit? Yes   If you have any of the following symptoms, please present to your local ER or call 911:  I acknowledge   What is the main issue you would like addressed today? Bad Nosebleeds a few a day lately   Are you able to take your vital signs? No   Are you pregnant, could you be pregnant, or are you breast feeding? None of the above   Please describe your symptoms I am having random bad nosebleeds   Where is your problem located? Nose   How severe are your symptoms? Moderate   Have you had these symptoms before? No   How long have you been having these symptoms? For more than a month   Please list any medications or treatments you have used for your condition and indicate if it was effective or not.    What makes this feel better? Nothing   What makes this feel worse? Nothing   Are these symptoms related to a condition that you currently have? No   Please describe any probable cause for these symptoms No idea   Provide any additional information you feel is important.    Please attach any relevant images or files          Encounter Diagnoses   Name Primary?    Nasal congestion Yes    Epistaxis         No orders of the defined types were placed in this encounter.     Medications Ordered This Encounter   Medications    ipratropium (ATROVENT) 21 mcg (0.03 %) nasal spray     Si sprays by Each Nostril route 2 (two) times daily.     Dispense:  30 mL     Refill:  0    Potential medication side effects were discussed with the patient; let me know if any occur.      Follow up if symptoms worsen or fail to improve, for reassess acute  condition.      E-Visit Time Tracking:    Day 1 Time (in minutes): 12    Total Time (in minutes): 12

## 2024-06-07 DIAGNOSIS — L70.9 ACNE, UNSPECIFIED ACNE TYPE: ICD-10-CM

## 2024-06-08 RX ORDER — CLINDAMYCIN PHOSPHATE 11.9 MG/ML
SOLUTION TOPICAL
Qty: 60 ML | Refills: 2 | Status: SHIPPED | OUTPATIENT
Start: 2024-06-08

## 2024-06-23 DIAGNOSIS — F41.9 ANXIETY: ICD-10-CM

## 2024-06-23 NOTE — TELEPHONE ENCOUNTER
No care due was identified.  [facility] Embedded Care Due Messages. Reference number: [id number].   6/23/2024 8:05:46 AM CDT

## 2024-06-24 RX ORDER — ALPRAZOLAM 1 MG/1
TABLET ORAL
Qty: 60 TABLET | Refills: 2 | Status: SHIPPED | OUTPATIENT
Start: 2024-06-24

## 2024-07-25 ENCOUNTER — PATIENT MESSAGE (OUTPATIENT)
Dept: ADMINISTRATIVE | Facility: HOSPITAL | Age: 44
End: 2024-07-25
Payer: COMMERCIAL

## 2024-07-27 ENCOUNTER — HOSPITAL ENCOUNTER (EMERGENCY)
Facility: HOSPITAL | Age: 44
Discharge: HOME | End: 2024-07-27
Attending: EMERGENCY MEDICINE
Payer: COMMERCIAL

## 2024-07-27 VITALS
DIASTOLIC BLOOD PRESSURE: 70 MMHG | HEART RATE: 98 BPM | TEMPERATURE: 99 F | RESPIRATION RATE: 18 BRPM | SYSTOLIC BLOOD PRESSURE: 113 MMHG | WEIGHT: 145 LBS | HEIGHT: 62 IN | OXYGEN SATURATION: 99 % | BODY MASS INDEX: 26.68 KG/M2

## 2024-07-27 DIAGNOSIS — N20.0 NEPHROLITHIASIS: Primary | ICD-10-CM

## 2024-07-27 DIAGNOSIS — N23 RENAL COLIC: ICD-10-CM

## 2024-07-27 LAB
ALBUMIN SERPL BCP-MCNC: 4.4 G/DL (ref 3.5–5.2)
ALP SERPL-CCNC: 56 U/L (ref 55–135)
ALT SERPL W/O P-5'-P-CCNC: 39 U/L (ref 10–44)
ANION GAP SERPL CALC-SCNC: 9 MMOL/L (ref 8–16)
AST SERPL-CCNC: 22 U/L (ref 10–40)
BASOPHILS # BLD AUTO: 0.04 K/UL (ref 0–0.2)
BASOPHILS NFR BLD: 0.6 % (ref 0–1.9)
BILIRUB SERPL-MCNC: 0.8 MG/DL (ref 0.1–1)
BILIRUB UR QL STRIP: NEGATIVE
BUN SERPL-MCNC: 10 MG/DL (ref 6–20)
CALCIUM SERPL-MCNC: 9.4 MG/DL (ref 8.7–10.5)
CHLORIDE SERPL-SCNC: 105 MMOL/L (ref 95–110)
CLARITY UR: CLEAR
CO2 SERPL-SCNC: 23 MMOL/L (ref 23–29)
COLOR UR: YELLOW
CREAT SERPL-MCNC: 0.8 MG/DL (ref 0.5–1.4)
DIFFERENTIAL METHOD BLD: ABNORMAL
EOSINOPHIL # BLD AUTO: 0.1 K/UL (ref 0–0.5)
EOSINOPHIL NFR BLD: 1.5 % (ref 0–8)
ERYTHROCYTE [DISTWIDTH] IN BLOOD BY AUTOMATED COUNT: 12 % (ref 11.5–14.5)
EST. GFR  (NO RACE VARIABLE): >60 ML/MIN/1.73 M^2
GLUCOSE SERPL-MCNC: 98 MG/DL (ref 70–110)
GLUCOSE UR QL STRIP: NEGATIVE
HCT VFR BLD AUTO: 40.6 % (ref 37–48.5)
HGB BLD-MCNC: 14.2 G/DL (ref 12–16)
HGB UR QL STRIP: NEGATIVE
IMM GRANULOCYTES # BLD AUTO: 0.04 K/UL (ref 0–0.04)
IMM GRANULOCYTES NFR BLD AUTO: 0.6 % (ref 0–0.5)
KETONES UR QL STRIP: NEGATIVE
LEUKOCYTE ESTERASE UR QL STRIP: NEGATIVE
LYMPHOCYTES # BLD AUTO: 2 K/UL (ref 1–4.8)
LYMPHOCYTES NFR BLD: 27.4 % (ref 18–48)
MCH RBC QN AUTO: 30.6 PG (ref 27–31)
MCHC RBC AUTO-ENTMCNC: 35 G/DL (ref 32–36)
MCV RBC AUTO: 88 FL (ref 82–98)
MONOCYTES # BLD AUTO: 0.6 K/UL (ref 0.3–1)
MONOCYTES NFR BLD: 8 % (ref 4–15)
NEUTROPHILS # BLD AUTO: 4.4 K/UL (ref 1.8–7.7)
NEUTROPHILS NFR BLD: 61.9 % (ref 38–73)
NITRITE UR QL STRIP: NEGATIVE
NRBC BLD-RTO: 0 /100 WBC
PH UR STRIP: 6 [PH] (ref 5–8)
PLATELET # BLD AUTO: 256 K/UL (ref 150–450)
PMV BLD AUTO: 9.8 FL (ref 9.2–12.9)
POTASSIUM SERPL-SCNC: 3.9 MMOL/L (ref 3.5–5.1)
PROT SERPL-MCNC: 7.3 G/DL (ref 6–8.4)
PROT UR QL STRIP: NEGATIVE
RBC # BLD AUTO: 4.64 M/UL (ref 4–5.4)
SODIUM SERPL-SCNC: 137 MMOL/L (ref 136–145)
SP GR UR STRIP: 1.01 (ref 1–1.03)
URN SPEC COLLECT METH UR: NORMAL
UROBILINOGEN UR STRIP-ACNC: NEGATIVE EU/DL
WBC # BLD AUTO: 7.15 K/UL (ref 3.9–12.7)

## 2024-07-27 PROCEDURE — 63600175 PHARM REV CODE 636 W HCPCS

## 2024-07-27 PROCEDURE — 96374 THER/PROPH/DIAG INJ IV PUSH: CPT

## 2024-07-27 PROCEDURE — 81003 URINALYSIS AUTO W/O SCOPE: CPT

## 2024-07-27 PROCEDURE — 80053 COMPREHEN METABOLIC PANEL: CPT

## 2024-07-27 PROCEDURE — 85025 COMPLETE CBC W/AUTO DIFF WBC: CPT

## 2024-07-27 PROCEDURE — 99285 EMERGENCY DEPT VISIT HI MDM: CPT | Mod: 25

## 2024-07-27 RX ORDER — HYDROCODONE BITARTRATE AND ACETAMINOPHEN 5; 325 MG/1; MG/1
1 TABLET ORAL EVERY 6 HOURS PRN
Qty: 12 TABLET | Refills: 0 | Status: SHIPPED | OUTPATIENT
Start: 2024-07-27

## 2024-07-27 RX ORDER — TAMSULOSIN HYDROCHLORIDE 0.4 MG/1
0.4 CAPSULE ORAL DAILY
Qty: 10 CAPSULE | Refills: 0 | Status: SHIPPED | OUTPATIENT
Start: 2024-07-27 | End: 2025-07-27

## 2024-07-27 RX ORDER — ACETAMINOPHEN 500 MG
1000 TABLET ORAL
Status: DISCONTINUED | OUTPATIENT
Start: 2024-07-27 | End: 2024-07-27

## 2024-07-27 RX ORDER — ACETAMINOPHEN 500 MG
500 TABLET ORAL EVERY 6 HOURS PRN
Qty: 30 TABLET | Refills: 0 | Status: SHIPPED | OUTPATIENT
Start: 2024-07-27

## 2024-07-27 RX ORDER — KETOROLAC TROMETHAMINE 30 MG/ML
15 INJECTION, SOLUTION INTRAMUSCULAR; INTRAVENOUS
Status: COMPLETED | OUTPATIENT
Start: 2024-07-27 | End: 2024-07-27

## 2024-07-27 RX ORDER — HYDROCODONE BITARTRATE AND ACETAMINOPHEN 5; 325 MG/1; MG/1
1 TABLET ORAL EVERY 6 HOURS PRN
Qty: 12 TABLET | Refills: 0 | Status: SHIPPED | OUTPATIENT
Start: 2024-07-27 | End: 2024-07-27

## 2024-07-27 RX ORDER — ACETAMINOPHEN 500 MG
500 TABLET ORAL EVERY 6 HOURS PRN
Qty: 30 TABLET | Refills: 0 | Status: SHIPPED | OUTPATIENT
Start: 2024-07-27 | End: 2024-07-27

## 2024-07-27 RX ORDER — TAMSULOSIN HYDROCHLORIDE 0.4 MG/1
0.4 CAPSULE ORAL DAILY
Qty: 10 CAPSULE | Refills: 0 | Status: SHIPPED | OUTPATIENT
Start: 2024-07-27 | End: 2024-07-27

## 2024-07-27 RX ADMIN — KETOROLAC TROMETHAMINE 15 MG: 30 INJECTION, SOLUTION INTRAMUSCULAR at 01:07

## 2024-07-27 NOTE — DISCHARGE INSTRUCTIONS
Thank you for coming to our Emergency Department today. It is important to remember that some problems or medical conditions are difficult to diagnose and may not be found or addressed during your Emergency Department visit.  These conditions often start with non-specific symptoms and can only be diagnosed on follow up visits with your primary care physician or specialist when the symptoms continue or change. Please remember that all medical conditions can change, and we cannot predict how you will be feeling tomorrow or the next day. Return to the ER with any questions/concerns, new/concerning symptoms including fever, chest pain, shortness of breath, loss of consciousness, dizziness, weakness, worsening symptoms, failure to improve, or any other concerns. Also, please follow up with your Primary Care Physician and/or Pediatrician in the next 1-2 days to review your ED visit in entirety and for re-evaluation.   Be sure to follow up with your primary care doctor and review all labs/imaging/tests that were performed during your ER visit with them. It is very common for us to identify non-emergent incidental findings which must be followed up with your primary care physician.  Some labs/imaging/tests may be outside of the normal range, and require non-emergent follow-up and/or further investigation/treatment/procedures/testing to help diagnose/exclude/prevent complications or other potentially serious medical conditions. Some abnormalities may not have been discussed or addressed during your ER visit. Some lab results may not return during your ER visit but can be accessible by downloading the free [facility] Mychart saeed or by visiting [email or web address] . It is important for you to review all labs/imaging/tests which are outside of the normal range with your physician.  An ER visit does not replace a primary care visit, and many screening tests or follow-up tests cannot be ordered by an ER doctor or performed by  the ER. Some tests may even require pre-approval.  If you do not have a primary care doctor, you may contact the one listed on your discharge paperwork or you may also call the [facility] Appointment Desk at [phone] , or [address] [place] [address] at  [phone] to schedule an appointment, or establish care with a primary care doctor or even a specialist and to obtain information about local resources. It is important to your health that you have a primary care doctor.  Please take all medications as directed. We have done our best to select a medication for you that will treat your condition however, all medications may potentially have side-effects and it is impossible to predict which medications may give you side-effects or what those side-effects (if any) those medications may give you.  If you feel that you are having a negative effect or side-effect of any medication you should stop taking those medications immediately and seek medical attention. If you feel that you are having a life-threatening reaction call 911.  Do not drive, swim, climb to height, take a bath, operate heavy machinery, drink alcohol or take potentially sedating medications, sign any legal documents or make any important decisions for 24 hours if you have received any pain medications, sedatives or mood altering drugs during your ER visit or within 24 hours of taking them if they have been prescribed to you.   You can find additional resources for Dentists, hearing aids, durable medical equipment, low cost pharmacies and other resources at [email or web address]  Patient agrees with this plan. Discussed with her strict return precautions, they verbalized understanding. Patient is stable for discharge.   § Please take all medication as prescribed.

## 2024-07-27 NOTE — ED TRIAGE NOTES
Pt presents to ED with complaint of flank pain since this morning. Pt re[orts having urinary symptoms x2 weeks that have resolved.

## 2024-07-27 NOTE — ED PROVIDER NOTES
Encounter Date: 7/27/2024       History     Chief Complaint   Patient presents with    Flank Pain     Left flank pain since waking this am. Patient reports urinary symptoms x 2 weeks ago, but is not currently present. Patient reports nausea.      The history is provided by the patient and medical records.   38-year-old female past medical history of polysubstance abuse, hypertension presenting to the emergency department today complaining of left flank pain that began this morning.  Patient also reports urinary symptoms that started 2 weeks ago with dysuria however these symptoms have resolved.  States she was had pyelonephritis in the past and typically presents like this.  No other complaints at this time.  Denies any fever, chest pain, shortness for breath, nausea, vomiting diarrhea, vaginal bleeding, vaginal discharge, urinary frequency, dysuria at this time.  Review of patient's allergies indicates:   Allergen Reactions    Strawberry Anaphylaxis and Hives    Morphine Other (See Comments)     Burns stomach    Toradol [ketorolac]      cramping    Tramadol      Abdominal pain     Past Medical History:   Diagnosis Date    Abnormal uterine bleeding 4/18/2018    Acute encephalopathy 12/24/12    secondary to drug overdose    ARF (acute renal failure) 12/24/12    secondary to drug overdose    Cigarette nicotine dependence without complication 1/10/2013    History of cardiac arrest 12/24/12    secondary to drug overdose    Hx of psychiatric care     Hypertension     Kidney stones 1/8/2018    MRSA (methicillin resistant Staphylococcus aureus) 12/24/12    Nephritis     Polysubstance abuse 12/24/12    Psychiatric problem     Systolic CHF, acute 12/24/12    secondary to drug overdose    Therapy     when younger; recently set up with Cassie Rockweiler, LCSW    UTI (urinary tract infection)      Past Surgical History:   Procedure Laterality Date    AUGMENTATION OF BREAST Bilateral 2001    CHOLECYSTECTOMY       ESOPHAGOGASTRODUODENOSCOPY N/A 01/02/2020    Procedure: EGD (ESOPHAGOGASTRODUODENOSCOPY);  Surgeon: Moe Tripathi MD;  Location: [facility];  Service: Endoscopy;  Laterality: N/A;    HYSTERECTOMY  2020    LAPAROSCOPIC SALPINGECTOMY Left 02/10/2021    Procedure: SALPINGECTOMY, LAPAROSCOPIC;  Surgeon: Doc Pena MD;  Location: [facility] OR;  Service: OB/GYN;  Laterality: Left;    LAPAROSCOPIC TOTAL HYSTERECTOMY N/A 02/10/2021    Procedure: HYSTERECTOMY, TOTAL, LAPAROSCOPIC;  Surgeon: Doc Pena MD;  Location: [facility] OR;  Service: OB/GYN;  Laterality: N/A;  RN PREOP 2/3/, --COVID NEGATIVE ON 2/9 and T/S done    TUBAL LIGATION      urinary stents       Family History   Problem Relation Name Age of Onset    Kidney disease Mother W     Kidney disease Daughter W     Cancer Maternal Grandmother W         throat    Cancer Maternal Grandfather W         prostate    Breast cancer Neg Hx      Colon cancer Neg Hx      Anxiety disorder Neg Hx      Depression Neg Hx      Dementia Neg Hx      Suicide Neg Hx       Social History     Tobacco Use    Smoking status: Former     Types: Vaping with nicotine    Smokeless tobacco: Current   Substance Use Topics    Alcohol use: Yes     Comment: occasional; social    Drug use: No     Types: Cocaine, Methamphetamines     Comment: PCP, patient states she had not done drugs since 2012     Review of Systems   Constitutional:  Negative for chills, diaphoresis, fatigue and fever.   HENT:  Negative for congestion, rhinorrhea and sore throat.    Eyes:  Negative for redness and visual disturbance.   Respiratory:  Negative for cough and shortness of breath.    Cardiovascular:  Negative for chest pain, palpitations and leg swelling.   Gastrointestinal:  Negative for abdominal pain, diarrhea, nausea and vomiting.   Genitourinary:  Positive for dysuria (Resolved) and flank pain (Left). Negative for difficulty urinating, frequency, hematuria, pelvic pain, vaginal bleeding and vaginal discharge.    Musculoskeletal:  Negative for arthralgias, back pain, myalgias, neck pain and neck stiffness.   Skin:  Negative for rash.   Neurological:  Negative for dizziness, weakness, light-headedness and headaches.   Hematological:  Does not bruise/bleed easily.       Physical Exam     Initial Vitals [07/27/24 1244]   BP Pulse Resp Temp SpO2   113/70 98 18 98.6 °F (37 °C) 99 %      MAP       --         Physical Exam    Nursing note and vitals reviewed.  Constitutional: She appears well-developed and well-nourished. She is not diaphoretic. No distress.   HENT:   Head: Normocephalic and atraumatic.   Right Ear: External ear normal.   Left Ear: External ear normal.   Nose: Nose normal.   Mouth/Throat: Oropharynx is clear and moist.   Eyes: Conjunctivae and EOM are normal. Pupils are equal, round, and reactive to light. Right eye exhibits no discharge. Left eye exhibits no discharge.   Neck: Neck supple.   Normal range of motion.   Full passive range of motion without pain.     Cardiovascular:  Normal rate, regular rhythm, normal heart sounds and normal pulses.     Exam reveals no distant heart sounds and no friction rub.       Pulmonary/Chest: Effort normal and breath sounds normal. No respiratory distress.   Abdominal: Abdomen is soft. Bowel sounds are normal. She exhibits no distension, no abdominal bruit, no pulsatile midline mass and no mass. There is no splenomegaly or hepatomegaly. There is abdominal tenderness in the suprapubic area.   No right CVA tenderness.  There is left CVA tenderness. There is no rebound and no guarding.   Musculoskeletal:         General: Normal range of motion.      Right shoulder: Normal.      Left shoulder: Normal.      Right elbow: Normal.      Left elbow: Normal.      Right wrist: Normal.      Left wrist: Normal.      Right hand: Normal.      Left hand: Normal.      Cervical back: Normal, full passive range of motion without pain, normal range of motion and neck supple.      Thoracic back:  Normal.      Lumbar back: Normal.      Right hip: Normal.      Left hip: Normal.      Right knee: Normal.      Left knee: Normal.      Right lower leg: Normal.      Left lower leg: Normal.      Right ankle: Normal.      Left ankle: Normal.      Right foot: Normal.      Left foot: Normal.     Neurological: She is alert and oriented to person, place, and time. She has normal strength. No cranial nerve deficit or sensory deficit. Gait normal.   Skin: Skin is warm and dry. Capillary refill takes less than 2 seconds. No bruising, no ecchymosis and no rash noted. No pallor.   Psychiatric: She has a normal mood and affect. Her speech is normal and behavior is normal. Thought content normal.         ED Course   Procedures  Labs Reviewed   CBC W/ AUTO DIFFERENTIAL - Abnormal       Result Value    WBC 7.15      RBC 4.64      Hemoglobin 14.2      Hematocrit 40.6      MCV 88      MCH 30.6      MCHC 35.0      RDW 12.0      Platelets 256      MPV 9.8      Immature Granulocytes 0.6 (*)     Gran # (ANC) 4.4      Immature Grans (Abs) 0.04      Lymph # 2.0      Mono # 0.6      Eos # 0.1      Baso # 0.04      nRBC 0      Gran % 61.9      Lymph % 27.4      Mono % 8.0      Eosinophil % 1.5      Basophil % 0.6      Differential Method Automated     URINALYSIS, REFLEX TO URINE CULTURE    Specimen UA Urine, Clean Catch      Color, UA Yellow      Appearance, UA Clear      pH, UA 6.0      Specific Gravity, UA 1.015      Protein, UA Negative      Glucose, UA Negative      Ketones, UA Negative      Bilirubin (UA) Negative      Occult Blood UA Negative      Nitrite, UA Negative      Urobilinogen, UA Negative      Leukocytes, UA Negative      Narrative:     Specimen Source->Urine   COMPREHENSIVE METABOLIC PANEL    Sodium 137      Potassium 3.9      Chloride 105      CO2 23      Glucose 98      BUN 10      Creatinine 0.8      Calcium 9.4      Total Protein 7.3      Albumin 4.4      Total Bilirubin 0.8      Alkaline Phosphatase 56      AST 22       ALT 39      eGFR >60      Anion Gap 9            Imaging Results              CT Renal Stone Study ABD Pelvis WO (Final result)  Result time 07/27/24 14:31:34      Final result by Shawn Guerrero MD (07/27/24 14:31:34)                   Impression:      1. Moderate to large colonic stool burden which may reflect constipation, without bowel obstruction.  Otherwise, no acute process seen within the abdomen or pelvis on this noncontrast study.  2. Left-sided nonobstructing nephrolithiasis.  3. Hepatosplenomegaly with suspected hepatic steatosis.  4. Cholecystectomy and hysterectomy.  5. Additional findings as above.      Electronically signed by: Shawn Guerrero MD  Date:    07/27/2024  Time:    14:31               Narrative:    EXAMINATION:  CT RENAL STONE STUDY ABD PELVIS WO    CLINICAL HISTORY:  Flank pain, kidney stone suspected;    TECHNIQUE:  Low dose axial images, sagittal and coronal reformations were obtained from the lung bases to the pubic symphysis.  Contrast was not administered.    COMPARISON:  CT abdomen and pelvis most recent 09/26/2023, renal ultrasound 09/01/2019    FINDINGS:  Lack of IV contrast limits evaluation of soft tissue and vascular structures.    Partially imaged bilateral breast implants.    Imaged lung bases show minimal dependent atelectasis without consolidation or pleural effusion.    Base of the heart is within normal limits.    Cholecystectomy.  No biliary ductal dilatation.    Liver is mildly enlarged with diffuse parenchymal hypoattenuation.  No discrete hepatic lesion seen.  Spleen is enlarged without focal process.    Noncontrast appearance of the pancreas, stomach, duodenum and bilateral adrenal glands are within normal limits.    Bilateral kidneys are normal in size, shape and location.  No radiopaque calculus seen within the right collecting system, left ureter or urinary bladder.  Few nephroliths noted at the left renal lower pole with the largest measuring 7-8 mm.  No  hydronephrosis or significant perinephric stranding.  Urinary bladder is within normal limits.  Uterus not identified and likely surgically absent.  No adnexal mass.  Pelvic phleboliths noted.    Appendix and terminal ileum are within normal limits.  Moderate to large amount of stool noted throughout the colon.  No evidence of bowel obstruction or acute inflammation.  No pneumatosis or portal venous gas.    No ascites, free air or lymphadenopathy by CT criteria.  No significant calcific atherosclerosis.  Aorta tapers normally.    Suspected remote operative change of the ventral abdominal wall.  Osseous structures appear stable without acute process seen.                                       Medications   ketorolac injection 15 mg (15 mg Intravenous Given 7/27/24 1326)     Medical Decision Making  38-year-old female past medical history of polysubstance abuse, hypertension presenting to the emergency department today complaining of left flank pain that began this morning.  Patient also reports urinary symptoms that started 2 weeks ago with dysuria however these symptoms have resolved.  States she was had pyelonephritis in the past and typically presents like this.  No other complaints at this time.  Denies any fever, chest pain, shortness for breath, nausea, vomiting diarrhea, vaginal bleeding, vaginal discharge, urinary frequency, dysuria at this time.  Patient's chart and medical history reviewed.  Patient's vitals reviewed.  They are afebrile, no respiratory distress, nontoxic-appearing in the ED.  Differential diagnosis is considered as following  - Gastroenteritis: considered with abdominal pain although unlikely without N/V/D  - ulcer/perforation:  Unlikely with No history of alcohol abuse, no history of H pylori, no history of chronic NSAID use.  - cholecystitis:  Unlikely with No right upper quadrant pain, negative Larry's sign.   - cholangitis:  Unlikely with No fever, no right upper quadrant pain, no  jaundice, unlikely  - pancreatitis:  Unlikely with no epigastric pain or tenderness.   - small bowel obstruction: Considered with history of abdominal surgeries with abdominal pain  - appendicitis:  Unlikely with No right lower quadrant pain, negative McBurney's point tenderness, negative Rovsing.   - AAA/Dissection:  Unlikely with 2+ pulses upper and lower extremities bilaterally, stable vital signs, no abdominal pulsating masses.  - Ischemic Bowel:  Unlikely with no pain out of proportion to exam, no Hx of a-fib, no hematochezia or melena.  - diverticulitis, Epiploic Appendagitis: unlikely with no LLQ pain or TTP   - UTI:  UA ordered for further evaluation due to presentation and unlikely with no evidence on UA  - Pyelonephritis: considered with CVA tenderness, will correlate with UA  - Pneumonia/Pneumothorax: considered with abdominal pain although Unlikely with clear lung sounds of all fields bilaterally.   - Nephrolithiasis/Urolithiasis: considered with flank pain. CT renal for evaluation.  Patient was charts notes allergy to Toradol causing cramping.  Discussed with the patient and she does want Toradol for pain at this time.  Patient's pain controlled at this time after Toradol.  CT evidence of a 7-8 mm stone in the left kidney with no hydronephrosis or obstruction.  Patient will be referred to Urology discharged home with [place] for pain and Flomax.  Patient agrees with the plan and has no questions at this time  At this time I'll discharge home to follow up with primary care physician in the next 1-2 days for further evaluation.  If the pain continues the pt will need to see urology for further evaluation.  The patient is comfortable with this plan and comfortable going home at this time. After taking into careful account the historical factors and physical exam findings of the patient's presentation today, in conjunction with the empirical and objective data obtained on ED workup, no acute emergent medical  condition has been identified. The patient appears to be low risk for an emergent medical condition and I feel it is safe and appropriate at this time for the patient to be discharged to follow-up as detailed in their discharge instructions for reevaluation and possible continued outpatient workup and management. I have discussed the specifics of the workup with the patient and the patient has verbalized understanding of the details of the workup, the diagnosis, the treatment plan, and the need for outpatient follow-up.  Although the patient has no emergent etiology today this does not preclude the development of an emergent condition so in addition, I have advised the patient that they can return to the ED and/or activate EMS at any time with worsening of their symptoms, change of their symptoms, new symptoms, continuing of symptoms, or with any other medical complaint.  The patient remained comfortable and stable during their visit in the ED.  Discharge and follow-up instructions discussed with the patient who expressed understanding and willingness to comply with my recommendations. I discussed with the patient/family the diagnosis, treatment plan, indications for return to the emergency department, and for expected follow-up. The patient/family is asked if there are any questions or concerns. We discuss the case, until all issues are addressed to the patient/family's satisfaction. Patient/family understands and is agreeable to the plan.    LADARIUS MIKE PA-C.    DISCLAIMER: This note was prepared with [employer] voice recognition transcription software. Garbled syntax, mangled pronouns, and other bizarre constructions may be attributed to that software system.      Amount and/or Complexity of Data Reviewed  Labs: ordered.  Radiology: ordered.    Risk  OTC drugs.  Prescription drug management.                                      Clinical Impression:  Final diagnoses:  [N20.0] Nephrolithiasis (Primary)  [N23] Renal  colic          ED Disposition Condition    Discharge Stable          ED Prescriptions       Medication Sig Dispense Start Date End Date Auth. Provider    tamsulosin (FLOMAX) 0.4 mg Cap Take 1 capsule (0.4 mg total) by mouth once daily. 10 capsule 7/27/2024 7/27/2025 Joseph Reyes PA-C    HYDROcodone-acetaminophen (NORCO) 5-325 mg per tablet Take 1 tablet by mouth every 6 (six) hours as needed for Pain. 12 tablet 7/27/2024 -- Joseph Reyes PA-C    acetaminophen (TYLENOL) 500 MG tablet Take 1 tablet (500 mg total) by mouth every 6 (six) hours as needed for Pain. 30 tablet 7/27/2024 -- Joseph Reyes PA-C          Follow-up Information       Follow up With Specialties Details Why Contact Info    Hermes Colvin MD Family Medicine Schedule an appointment as soon as possible for a visit in 1 day for follow up [address] [facility]  [place] [address]  [phone]      [facility] UROLOGY Urology Schedule an appointment as soon as possible for a visit in 2 days for follow up [address]  [facility] [place] [address] [phone]             Joseph Reyes PA-C  07/27/24 1859

## 2024-07-29 ENCOUNTER — PATIENT MESSAGE (OUTPATIENT)
Dept: FAMILY MEDICINE | Facility: CLINIC | Age: 44
End: 2024-07-29
Payer: COMMERCIAL

## 2024-07-29 ENCOUNTER — OFFICE VISIT (OUTPATIENT)
Dept: UROLOGY | Facility: CLINIC | Age: 44
End: 2024-07-29
Payer: COMMERCIAL

## 2024-07-29 VITALS — BODY MASS INDEX: 28.91 KG/M2 | WEIGHT: 158.06 LBS

## 2024-07-29 DIAGNOSIS — N20.0 NEPHROLITHIASIS: Primary | ICD-10-CM

## 2024-07-29 DIAGNOSIS — Z12.11 COLON CANCER SCREENING: Primary | ICD-10-CM

## 2024-07-29 DIAGNOSIS — R10.9 LEFT FLANK PAIN: ICD-10-CM

## 2024-07-29 PROCEDURE — 1159F MED LIST DOCD IN RCRD: CPT | Mod: CPTII,S$GLB,, | Performed by: UROLOGY

## 2024-07-29 PROCEDURE — 99203 OFFICE O/P NEW LOW 30 MIN: CPT | Mod: S$GLB,,, | Performed by: UROLOGY

## 2024-07-29 PROCEDURE — 3008F BODY MASS INDEX DOCD: CPT | Mod: CPTII,S$GLB,, | Performed by: UROLOGY

## 2024-07-29 PROCEDURE — 1160F RVW MEDS BY RX/DR IN RCRD: CPT | Mod: CPTII,S$GLB,, | Performed by: UROLOGY

## 2024-07-29 PROCEDURE — 99999 PR PBB SHADOW E&M-EST. PATIENT-LVL III: CPT | Mod: PBBFAC,,, | Performed by: UROLOGY

## 2024-07-29 NOTE — PROGRESS NOTES
Subjective:       Roslyn Vera is a 44 y.o. female who is a new patient who was referred by Joseph Reyes  for evaluation of stone.      Seen in ER 7/27/24 with L flank pain x 1 day and dysuria x 2 weeks. +nausea. Prior h/o pyelonephritis. CT showed nonobstructing 8mm LLP renal stone without hydronephrosis. This stone was also seen on CT 4/2023 and 11/2021 and remains unchanged (also present in 2017 but smaller, about 3-4mm then). Still having some flank pain. No fevers.     She has prior procedure with Dr Balderas - cysto/RPG/attempted L URS but ureter too narrow. Stent placed but she was unable to tolerate this and was removed about 1 week later.     She has h/o recurrent UTIs. Prior workup with Dr Luther [facility] in 2017.       The following portions of the patient's history were reviewed and updated as appropriate: allergies, current medications, past family history, past medical history, past social history, past surgical history and problem list.    Review of Systems  12 point review of systems completed. Pertinent positive and negatives listed in HPI        Objective:    Vitals: Wt 71.7 kg (158 lb 1.1 oz)   LMP 01/17/2021 (Exact Date) Comment: [facility] 2/10/21  BMI 28.91 kg/m²     Physical Exam   General: well developed, well nourished in no acute distress  Head: normocephalic, atraumatic  Neck: no obvious enlargement of thyroid  HEENT: EOMI, mucus membranes moist, sclera anicteric, no hearing impairment  Lungs: symmetric expansion, non-labored breathing  Neuro: alert and oriented x 3, no gross deficits  Psych: normal judgment and insight, normal mood/affect and non-anxious  Genitourinary:   deferred      Lab Review   Urine analysis today in clinic shows - no urine    Lab Results   Component Value Date    WBC 7.15 07/27/2024    HGB 14.2 07/27/2024    HCT 40.6 07/27/2024    HCT 40 04/12/2023    MCV 88 07/27/2024     07/27/2024     Lab Results   Component Value Date    CREATININE 0.8 07/27/2024    BUN  10 07/27/2024       Imaging  Images and reports were personally reviewed by me and discussed with patient  CTs reviewed       Assessment/Plan:      1. Nephrolithiasis    - 8mm LLP stone, stable to prior scans for several years   - Discussed possible treatment. Prior inability to do URS, and unable to tolerate stent. Due to lower pole location, ESWL not as successful. Okay to hold on treatment for now.   - No role for Flomax in this setting, no stone passing.    - Declines surveillance     2. Left flank pain    - Do not suspect LLP stone as cause of her pain at this time         Follow up PRN

## 2024-08-13 DIAGNOSIS — F98.8 ATTENTION DEFICIT DISORDER (ADD) IN ADULT: ICD-10-CM

## 2024-08-13 RX ORDER — LISDEXAMFETAMINE DIMESYLATE 20 MG/1
20 CAPSULE ORAL EVERY MORNING
Qty: 30 CAPSULE | Refills: 0 | Status: SHIPPED | OUTPATIENT
Start: 2024-08-13

## 2024-08-13 NOTE — TELEPHONE ENCOUNTER
Last Office Visit Info:   The patient's last visit with Hermes Colvin MD was on 5/20/2024.    No future appts.

## 2024-08-13 NOTE — TELEPHONE ENCOUNTER
No care due was identified.  [facility] Embedded Care Due Messages. Reference number: [id number].   8/13/2024 9:46:17 AM CDT

## 2024-08-15 ENCOUNTER — OFFICE VISIT (OUTPATIENT)
Dept: FAMILY MEDICINE | Facility: CLINIC | Age: 44
End: 2024-08-15
Payer: COMMERCIAL

## 2024-08-15 ENCOUNTER — HOSPITAL ENCOUNTER (EMERGENCY)
Facility: HOSPITAL | Age: 44
Discharge: HOME | End: 2024-08-15
Attending: STUDENT IN AN ORGANIZED HEALTH CARE EDUCATION/TRAINING PROGRAM
Payer: COMMERCIAL

## 2024-08-15 VITALS
RESPIRATION RATE: 16 BRPM | TEMPERATURE: 99 F | WEIGHT: 140 LBS | BODY MASS INDEX: 25.61 KG/M2 | OXYGEN SATURATION: 100 % | DIASTOLIC BLOOD PRESSURE: 57 MMHG | SYSTOLIC BLOOD PRESSURE: 108 MMHG | HEART RATE: 88 BPM

## 2024-08-15 DIAGNOSIS — R00.0 TACHYCARDIA: ICD-10-CM

## 2024-08-15 DIAGNOSIS — T78.40XS SEVERE ALLERGY, SEQUELA: Primary | ICD-10-CM

## 2024-08-15 DIAGNOSIS — T78.40XS SEVERE ALLERGY, SEQUELA: ICD-10-CM

## 2024-08-15 DIAGNOSIS — T78.40XA ALLERGIC REACTION, INITIAL ENCOUNTER: Primary | ICD-10-CM

## 2024-08-15 PROCEDURE — 93010 ELECTROCARDIOGRAM REPORT: CPT | Mod: ,,, | Performed by: INTERNAL MEDICINE

## 2024-08-15 PROCEDURE — 99284 EMERGENCY DEPT VISIT MOD MDM: CPT | Mod: 25

## 2024-08-15 PROCEDURE — 96374 THER/PROPH/DIAG INJ IV PUSH: CPT

## 2024-08-15 PROCEDURE — 63600175 PHARM REV CODE 636 W HCPCS: Performed by: EMERGENCY MEDICINE

## 2024-08-15 PROCEDURE — 99999 PR PBB SHADOW E&M-EST. PATIENT-LVL I: CPT | Mod: PBBFAC,,, | Performed by: FAMILY MEDICINE

## 2024-08-15 PROCEDURE — 25000003 PHARM REV CODE 250: Performed by: EMERGENCY MEDICINE

## 2024-08-15 PROCEDURE — 63600175 PHARM REV CODE 636 W HCPCS: Performed by: STUDENT IN AN ORGANIZED HEALTH CARE EDUCATION/TRAINING PROGRAM

## 2024-08-15 PROCEDURE — 96375 TX/PRO/DX INJ NEW DRUG ADDON: CPT

## 2024-08-15 PROCEDURE — 93005 ELECTROCARDIOGRAM TRACING: CPT

## 2024-08-15 RX ORDER — FAMOTIDINE 10 MG/ML
20 INJECTION INTRAVENOUS
Status: COMPLETED | OUTPATIENT
Start: 2024-08-15 | End: 2024-08-15

## 2024-08-15 RX ORDER — ONDANSETRON HYDROCHLORIDE 2 MG/ML
4 INJECTION, SOLUTION INTRAVENOUS
Status: COMPLETED | OUTPATIENT
Start: 2024-08-15 | End: 2024-08-15

## 2024-08-15 RX ORDER — EPINEPHRINE 0.3 MG/.3ML
1 INJECTION SUBCUTANEOUS ONCE
Qty: 0.3 ML | Refills: 0 | Status: SHIPPED | OUTPATIENT
Start: 2024-08-15 | End: 2024-08-15

## 2024-08-15 RX ORDER — METHYLPREDNISOLONE SOD SUCC 125 MG
125 VIAL (EA) INJECTION
Status: COMPLETED | OUTPATIENT
Start: 2024-08-15 | End: 2024-08-15

## 2024-08-15 RX ADMIN — FAMOTIDINE 20 MG: 10 INJECTION, SOLUTION INTRAVENOUS at 04:08

## 2024-08-15 RX ADMIN — METHYLPREDNISOLONE SODIUM SUCCINATE 125 MG: 125 INJECTION, POWDER, FOR SOLUTION INTRAMUSCULAR; INTRAVENOUS at 04:08

## 2024-08-15 RX ADMIN — ONDANSETRON 4 MG: 2 INJECTION INTRAMUSCULAR; INTRAVENOUS at 05:08

## 2024-08-15 NOTE — ED NOTES
"Pt reports she accidentally took a bite of ice cream that had strawberries in it. Pt is allergic, gave herself an epi pen PTA. Pt reports " heaviness when breathing" but denies cp. No acute respiratory distress noted. Pt talking in complete sentences. Pt connected to continuous pulse ox, cardiac and bp monitor   "

## 2024-08-15 NOTE — PROGRESS NOTES
HISTORY OF PRESENT ILLNESS:  Roslyn Vera is a 44 y.o. female who presents to the clinic today for Oral Allergy Syndrome  .       Acute allergic exposure with ingestion  Has epipen  Only took a small amount of liquid benadryl.  Then felt chest tightness and had daughter rush her to hospital  Bridge up and couldn't make it, came to the clinic for assessment.    Patient Active Problem List   Diagnosis    PMDD (premenstrual dysphoric disorder)    Anxiety    Mood disorder    Hepatosplenomegaly    GERD (gastroesophageal reflux disease)    Status post laparoscopic hysterectomy    Tobacco abuse, in remission    Class 1 obesity with serious comorbidity and body mass index (BMI) of 30.0 to 30.9 in adult    Attention deficit disorder (ADD) in adult           CARE TEAM:  Patient Care Team:  Hermes Colvin MD as PCP - General (Family Medicine)  Robson Hale II, MD as Consulting Physician (Gastroenterology)         ROS        PHYSICAL EXAM:   [facility] 01/17/2021 (Exact Date) Comment: [facility] 2/10/21  BP Readings from Last 5 Encounters:   07/27/24 113/70   01/29/24 100/68   11/08/23 110/82   09/26/23 123/60   04/12/23 130/78     Wt Readings from Last 5 Encounters:   07/29/24 71.7 kg (158 lb 1.1 oz)   07/27/24 65.8 kg (145 lb)   01/29/24 70 kg (154 lb 5.2 oz)   11/08/23 64.4 kg (141 lb 15.6 oz)   09/26/23 61.2 kg (135 lb)             Very anxious  Well known to me.  Chest tightness  No wheeze  No tongue swelling  Oropharynx widely patent  No stridor.  No nasal congestion  She has h/o anxiety and is very anxious.       Medication List with Changes/Refills   Current Medications    ACETAMINOPHEN (TYLENOL) 500 MG TABLET    Take 1 tablet (500 mg total) by mouth every 6 (six) hours as needed for Pain.    ALPRAZOLAM (XANAX) 1 MG TABLET    TAKE ONE TABLET BY MOUTH TWICE DAILY    CLINDAMYCIN (CLEOCIN T) 1 % EXTERNAL SOLUTION    APPLY TO AFFECTED AREA TWICE DAILY    CONJUGATED ESTROGENS (PREMARIN) VAGINAL CREAM    Place 1 g vaginally  twice a week.    EPINEPHRINE (EPIPEN 2-LUCIANA) 0.3 MG/0.3 ML ATIN    Inject 0.3 mLs (0.3 mg total) into the muscle once. for 1 dose    HYDROCODONE-ACETAMINOPHEN (NORCO) 5-325 MG PER TABLET    Take 1 tablet by mouth every 6 (six) hours as needed for Pain.    IBUPROFEN (ADVIL,MOTRIN) 800 MG TABLET    Take 1 tablet (800 mg total) by mouth every 6 (six) hours as needed for Pain.    IPRATROPIUM (ATROVENT) 21 MCG (0.03 %) NASAL SPRAY    2 sprays by Each Nostril route 2 (two) times daily.    LISDEXAMFETAMINE (VYVANSE) 30 MG CAPSULE    Take 1 capsule (30 mg total) by mouth every morning.    LISDEXAMFETAMINE (VYVANSE) 30 MG CAPSULE    Take 1 capsule (30 mg total) by mouth every morning.    LISDEXAMFETAMINE (VYVANSE) 30 MG CAPSULE    Take 1 capsule (30 mg total) by mouth every morning.    TAMSULOSIN (FLOMAX) 0.4 MG CAP    Take 1 capsule (0.4 mg total) by mouth once daily.    TAMSULOSIN (FLOMAX) 0.4 MG CAP    Take 1 capsule (0.4 mg total) by mouth once daily.    TRETINOIN (RETIN-A) 0.1 % CREAM    Apply topically every evening.    VYVANSE 20 MG CAPSULE    TAKE ONE CAPSULE BY MOUTH EVERY MORNING         ASSESSMENT AND PLAN:    Problem List Items Addressed This Visit    None  Visit Diagnoses       Severe allergy, sequela    -  Primary    Relevant Orders    Refer to Emergency Dept.          Patient presented after ingesting strawberries which she is allergic to.  She started having chest tightness and she has an EpiPen but she was not aware how to use it.  She was trying to make it to the emergency room but could be secondary to the bridge so she came over to the clinic where she gets care.  At the time she was in more distress from anxiety acutely.  Her airway was intact.  Me along with the nurse administered her EpiPen to her right thigh and she did take a little bit of Benadryl at the house.  She did feel better after the EpiPen we recheck her vital signs and her blood pressure was improved from 80/60 to 100/60 with a pulse  between 101 112.  I was able to calm her down and her tachypnea got much better and we were able to safely get her with her daughter to drive over to the emergency department for for the observation and workup for the ingestion.  Will leave acute management up to ER protocol whether she is admitted into the hospital or just observed.  From there I will follow up with her based on her course.    Future Appointments   Date Time Provider Department Center   8/15/2024  1:40 PM Hermes Colvin MD [facility]   9/12/2024 10:40 AM PRE-ADMIT NURSE 3, ENDO -[facility] ENDO4 [facility]       Follow up in about 2 weeks (around 8/29/2024), or if symptoms worsen or fail to improve, for reassess acute condition. or sooner as needed.

## 2024-08-15 NOTE — ED PROVIDER NOTES
"Encounter Date: 8/15/2024    SCRIBE #1 NOTE: I, Pauline Ramos, am scribing for, and in the presence of,  Adriana Smyth MD.       History     Chief Complaint   Patient presents with    Allergic Reaction     Pt allergic to strawberries and "accidentally" ate strawberries. Seen by PCP and given an epi injection 45 min PTA. Airway patent. Pt reported chest tightness. EKG completed. No hives noted. RR even and unlabored. Sats 98% on room air      Patient is a 44 y.o. female, with a PMHx of HTN, ARF, polysubstance abuse, strawberry allergy, who presents to the ED bib EMS for evaluation following an allergic reaction after eating strawberries around 1:00 PM today. She reports she was having a cheesecake blizzard from [employer] and she did not know there were strawberries in it. She reports she immediately spit out the strawberries and then drank some of her daughter's liquid benadryl. Patient reports she had 1 dose of epi-pen at approx. 1:30 PM today. She reports she had symptoms of SOB, mild CP, and hives on her chest, resolved after epi-pen dose. Reports she now has generalized body aches and chest tightness, described as having to take deep breaths and "pull" to breathe. She also reports nausea since arriving at the ED. She has not had an allergic reaction or had to use her epi-pen since childhood. No other exacerbating or alleviating factors. Denies vomiting, oral swelling or other associated symptoms. She admits to vaping nicotine, she formerly smoked cigarettes. She endorses compliance with xanax each morning only. She denies any cardiac or pulmonary PMHx.     The history is provided by the patient. No  was used.     Review of patient's allergies indicates:   Allergen Reactions    Strawberry Anaphylaxis and Hives    Morphine Other (See Comments)     Burns stomach    Toradol [ketorolac]      cramping    Tramadol      Abdominal pain     Past Medical History:   Diagnosis Date    Abnormal " uterine bleeding 4/18/2018    Acute encephalopathy 12/24/12    secondary to drug overdose    ARF (acute renal failure) 12/24/12    secondary to drug overdose    Cigarette nicotine dependence without complication 1/10/2013    History of cardiac arrest 12/24/12    secondary to drug overdose    Hx of psychiatric care     Hypertension     Kidney stones 1/8/2018    MRSA (methicillin resistant Staphylococcus aureus) 12/24/12    Nephritis     Polysubstance abuse 12/24/12    Psychiatric problem     Systolic CHF, acute 12/24/12    secondary to drug overdose    Therapy     when younger; recently set up with Cassie Rockweiler, LCSW    UTI (urinary tract infection)      Past Surgical History:   Procedure Laterality Date    AUGMENTATION OF BREAST Bilateral 2001    CHOLECYSTECTOMY      ESOPHAGOGASTRODUODENOSCOPY N/A 01/02/2020    Procedure: EGD (ESOPHAGOGASTRODUODENOSCOPY);  Surgeon: Moe Tripathi MD;  Location: [facility];  Service: Endoscopy;  Laterality: N/A;    HYSTERECTOMY  2020    LAPAROSCOPIC SALPINGECTOMY Left 02/10/2021    Procedure: SALPINGECTOMY, LAPAROSCOPIC;  Surgeon: Doc Pena MD;  Location: [facility] OR;  Service: OB/GYN;  Laterality: Left;    LAPAROSCOPIC TOTAL HYSTERECTOMY N/A 02/10/2021    Procedure: HYSTERECTOMY, TOTAL, LAPAROSCOPIC;  Surgeon: Doc Pena MD;  Location: [facility] OR;  Service: OB/GYN;  Laterality: N/A;  RN PREOP 2/3/, --COVID NEGATIVE ON 2/9 and T/S done    TUBAL LIGATION      urinary stents       Family History   Problem Relation Name Age of Onset    Kidney disease Mother W     Kidney disease Daughter W     Cancer Maternal Grandmother W         throat    Cancer Maternal Grandfather W         prostate    Breast cancer Neg Hx      Colon cancer Neg Hx      Anxiety disorder Neg Hx      Depression Neg Hx      Dementia Neg Hx      Suicide Neg Hx       Social History     Tobacco Use    Smoking status: Former     Types: Vaping with nicotine    Smokeless tobacco: Current   Substance Use Topics     Alcohol use: Yes     Comment: occasional; social    Drug use: No     Types: Cocaine, Methamphetamines     Comment: PCP, patient states she had not done drugs since 2012     Review of Systems   Constitutional:  Negative for chills and fever.   HENT:  Negative for trouble swallowing.         (-) oral swelling   Eyes:  Negative for photophobia and visual disturbance.   Respiratory:  Positive for chest tightness and shortness of breath (resolved).    Cardiovascular:  Positive for chest pain (resolved).   Gastrointestinal:  Positive for nausea. Negative for abdominal pain and vomiting.   Genitourinary:  Negative for dysuria and hematuria.   Musculoskeletal:  Positive for myalgias.   Skin:  Positive for rash (resolved).   Neurological:  Negative for syncope, weakness and numbness.   Psychiatric/Behavioral:  Negative for confusion.        Physical Exam     Initial Vitals [08/15/24 1410]   BP Pulse Resp Temp SpO2   (!) 104/58 108 16 98.5 °F (36.9 °C) 98 %      MAP       --         Physical Exam    Nursing note and vitals reviewed.  Constitutional: She appears well-developed and well-nourished. She is not diaphoretic. No distress.   HENT:   Head: Normocephalic and atraumatic.   Right Ear: External ear normal.   Left Ear: External ear normal.   Mouth/Throat: Oropharynx is clear and moist. No posterior oropharyngeal edema or posterior oropharyngeal erythema.   Eyes: Conjunctivae are normal.   Neck: Neck supple.   Normal range of motion.  Cardiovascular:  Normal rate, regular rhythm and normal heart sounds.           Pulmonary/Chest: Breath sounds normal. No stridor. No respiratory distress. She has no wheezes. She has no rhonchi. She has no rales.   Abdominal: Abdomen is soft. She exhibits no distension. There is no abdominal tenderness.   Musculoskeletal:         General: No tenderness or edema. Normal range of motion.      Cervical back: Normal range of motion and neck supple.      Right lower leg: No swelling. No edema.       Left lower leg: No swelling. No edema.     Neurological: She is alert and oriented to person, place, and time.   Skin: No rash noted.   Psychiatric: She has a normal mood and affect. Thought content normal.         ED Course   Procedures  Labs Reviewed - No data to display  EKG Readings: (Independently Interpreted)   Initial Reading: No STEMI. Rhythm: Sinus Tachycardia. Heart Rate: 114. Axis: Normal.       Imaging Results    None          Medications   methylPREDNISolone sodium succinate injection 125 mg (125 mg Intravenous Given 8/15/24 1632)   famotidine (PF) injection 20 mg (20 mg Intravenous Given 8/15/24 1632)   ondansetron injection 4 mg (4 mg Intravenous Given 8/15/24 1705)     Medical Decision Making  Roslyn Vera is a 44 y.o. female with h/o HTN, polysubstance abuse, who presents to the ED for evaluation for an allergic reaction after eating strawberries around 1:00 PM today.     Initial vitals notable for tachycardia. Physical exam reveals RRR, no respiratory distress, clear breath sounds bilaterally, no BLE swelling, no abdominal tenderness or distension, no oropharyngeal erythema or edema.     Patient presentation most consistent with anaphylaxis.  Given history, physical exam, vital signs and chart review, there is low concern for anaphylactic shock, sepsis, ACS, pneumonia, COPD/asthma.  Patient will be given famotidine and Solu-Medrol.  We will monitor her for 4 hours post EpiPen injection for additional dose needs.  If patient remains clinically stable without any further epi doses, anticipate she will be discharged home.    DISCLAIMER: This note was prepared with [employer] voice recognition transcription software. Garbled syntax, mangled pronouns, and other bizarre constructions may be attributed to that software system.     Amount and/or Complexity of Data Reviewed  Independent Historian: spouse  External Data Reviewed: notes.  ECG/medicine tests: ordered and independent interpretation  performed. Decision-making details documented in ED Course.    Risk  Prescription drug management.            Scribe Attestation:   Scribe #1: I performed the above scribed service and the documentation accurately describes the services I performed. I attest to the accuracy of the note.        ED Course as of 08/15/24 1838   Thu Aug 15, 2024   1803 Patient feels well. Last epi dose > four hours ago. She has tolerated PO. Repeat EKG in NSR. Will discharge with epi-pen refill, return precautions and PCP follow up [KI]      ED Course User Index  [KI] Adriana Smyth MD                         I, Nicki Smyth MD , personally performed the services described in this documentation.  All medical record entries made by the scribe were at my direction and in my presence.  I have reviewed the chart and agree that the record reflects my personal performance and is accurate and complete.      Clinical Impression:  Final diagnoses:  [R00.0] Tachycardia  [T78.40XA] Allergic reaction, initial encounter (Primary)          ED Disposition Condition    Discharge Stable          ED Prescriptions       Medication Sig Dispense Start Date End Date Auth. Provider    EPINEPHrine (EPIPEN 2-LUCIANA) 0.3 mg/0.3 mL AtIn (Expires today) Inject 0.3 mLs (0.3 mg total) into the muscle once. for 1 dose 0.3 mL 8/15/2024 8/15/2024 Adriana Smyth MD          Follow-up Information       Follow up With Specialties Details Why Contact Info    Hermes Colvin MD Family Medicine Schedule an appointment as soon as possible for a visit   [address] [facility]  [place] [address]  [phone]               Adriana Smyth MD  08/15/24 1838

## 2024-08-15 NOTE — DISCHARGE INSTRUCTIONS
You were seen in the emergency room for an allergic reaction.  You were given multiple medications to help control the reaction and prevent it from reoccurring.  Please return to the Emergency Department for any new or worsening symptoms including:  Vomiting, fever, chest pain, shortness of breath, loss of consciousness, dizziness, weakness, or any other concerns.     Please follow up with your Primary Care Provider within in the week. If you do not have one, you may contact the one listed on your discharge paperwork or you may also call the [facility] Appointment Desk at [phone] to schedule an appointment with one.     Please take all medication as prescribed.

## 2024-08-16 LAB
OHS QRS DURATION: 86 MS
OHS QTC CALCULATION: 460 MS

## 2024-09-16 ENCOUNTER — E-VISIT (OUTPATIENT)
Dept: FAMILY MEDICINE | Facility: CLINIC | Age: 44
End: 2024-09-16
Payer: COMMERCIAL

## 2024-09-16 DIAGNOSIS — F39 MOOD DISORDER: Primary | ICD-10-CM

## 2024-09-16 RX ORDER — NORTRIPTYLINE HYDROCHLORIDE 25 MG/1
25 CAPSULE ORAL NIGHTLY
Qty: 30 CAPSULE | Refills: 11 | Status: SHIPPED | OUTPATIENT
Start: 2024-09-16 | End: 2024-09-19

## 2024-09-17 NOTE — PROGRESS NOTES
Patient ID: Roslyn Vera is a 44 y.o. female.    Chief Complaint: General Illness (Entered automatically based on patient selection in [employer].)    The patient initiated a request through [employer] on 9/16/2024 for evaluation and management with a chief complaint of General Illness (Entered automatically based on patient selection in [employer].)     I evaluated the questionnaire submission on 9/16/2024.    [facility] Evisit [facility]-Medication    9/16/2024  1:32 PM CDT - Filed by Patient   What do you need help with? Medication Management   Do you agree to participate in an E-Visit? Yes   If you have any of the following symptoms, please present to your local emergency room or call 911:  I acknowledge   Medication requests for narcotics will not be addressed via an E-Visit.  Please schedule an appointment. I acknowledge   Are you pregnant, could you be pregnant, or are you breast feeding? None of the above   Do you want to address a new or existing medication? I would like to start a new medication that I do not already take   What is the main issue you would like addressed today? Add a new medication   What is the name of the medication that you would like to start? Not sure   Have you taken a similar medication in the past? No   Why are you requesting this particular medication? Need to add to what im taking now    What medical condition is the  medication intended to treat? Depression and anxiety   Provide any additional information you feel is important. I would like you to look at my medications im taking the vyvanse and alprazolam currently. I feel i need something else added i am constantly feeling completely overwhelmed and i have zero motivation to do anything and i do not like this feeling at all. .   Please attach any relevant images or files    Are you able to take your vital signs? No         Encounter Diagnosis   Name Primary?    Mood disorder Yes        No orders of the defined types were placed in  this encounter.     Medications Ordered This Encounter   Medications    nortriptyline (PAMELOR) 25 MG capsule     Sig: Take 1 capsule (25 mg total) by mouth every evening.     Dispense:  30 capsule     Refill:  11        Follow up in about 4 weeks (around 10/14/2024) for assess treatment plan.      E-Visit Time Tracking:    Day 1 Time (in minutes): 12    Total Time (in minutes): 12

## 2024-09-19 ENCOUNTER — TELEPHONE (OUTPATIENT)
Dept: ENDOSCOPY | Facility: HOSPITAL | Age: 44
End: 2024-09-19
Payer: COMMERCIAL

## 2024-09-19 ENCOUNTER — OFFICE VISIT (OUTPATIENT)
Dept: GASTROENTEROLOGY | Facility: CLINIC | Age: 44
End: 2024-09-19
Payer: COMMERCIAL

## 2024-09-19 VITALS
WEIGHT: 157 LBS | DIASTOLIC BLOOD PRESSURE: 81 MMHG | BODY MASS INDEX: 27.82 KG/M2 | HEIGHT: 63 IN | SYSTOLIC BLOOD PRESSURE: 115 MMHG | WEIGHT: 157.75 LBS | HEART RATE: 99 BPM | HEIGHT: 63 IN | BODY MASS INDEX: 27.95 KG/M2

## 2024-09-19 DIAGNOSIS — Z12.11 COLON CANCER SCREENING: Primary | ICD-10-CM

## 2024-09-19 DIAGNOSIS — R10.84 GENERALIZED ABDOMINAL PAIN: ICD-10-CM

## 2024-09-19 DIAGNOSIS — K59.04 CHRONIC IDIOPATHIC CONSTIPATION: Primary | ICD-10-CM

## 2024-09-19 DIAGNOSIS — R11.0 NAUSEA: ICD-10-CM

## 2024-09-19 DIAGNOSIS — R14.0 ABDOMINAL DISTENSION: ICD-10-CM

## 2024-09-19 PROCEDURE — 99999 PR PBB SHADOW E&M-EST. PATIENT-LVL IV: CPT | Mod: PBBFAC,,,

## 2024-09-19 NOTE — TELEPHONE ENCOUNTER
"----- Message from Lauren Gardiner PA-C sent at 2024 11:22 AM CDT -----  Regarding: Colonoscopy  Procedure: Colonoscopy    Diagnosis: Screening colonoscopy    Procedure Timin-12 weeks    #If within 4 weeks selected, please schuyler as high priority#    #If greater than 12 weeks, please select "5-12 weeks" and delay sending until 3 months prior to requested date#     Location: Any Site    Additional Scheduling Information: No scheduling concerns    Prep Specifications:Extended/Constipation prep    Is the patient taking a GLP-1 Agonist:no    Have you attached a patient to this message: yes  "

## 2024-09-19 NOTE — PROGRESS NOTES
"    [facility] Consultation Note    Reason for Consult:  The primary encounter diagnosis was Chronic idiopathic constipation. Diagnoses of Generalized abdominal pain, Abdominal distension, and Nausea were also pertinent to this visit.    PCP:   Hermes Colvin   No address on file    Referring MD:  Self, Aaareferral  No address on file    HPI:  This is a 44 y.o. female here for evaluation of chronic constipation since Tummy Tuck about a year ago. She reports additional surgical repair about 8 months ago. Pt states she has been going 1-2 weeks in between any type of BM. Usually has small, pebble-like stool. LBM 2 weeks ago consisting of small, hard pieces. Endorses abdominal pain, abdominal distension, hemorrhoids with rectal bleeding, nausea, and lack of appetite. States she feels pain on her sides because periumbilical region sensation is still decreased from her surgery. Pt has tried enemas, colace, probiotics, and Linzess 145 mcg with no improvement. Pt denies vomiting, dysphagia, reflux, melena, or unintentional weight loss. Denies known FHx of GI malignancies. She is due for CRC screening.       Objective Findings:    Vital Signs:  /81   Pulse 99   Ht 5' 3" (1.6 m)   Wt 71.6 kg (157 lb 11.8 oz)   LMP 01/17/2021 (Exact Date) Comment: [facility] 2/10/21  BMI 27.94 kg/m²   Body mass index is 27.94 kg/m².    Physical Exam:  General Appearance: Well appearing in no acute distress  Abdomen: Distended. TTP in epigastric region and along right and left sides of abdomen. No tenderness noted in periumbilical area. No hepatosplenomegaly, ascites, or mass.    I have personally reviewed labs and imaging results.     CT Renal Stone Study Abd Pelvis (07/27/2024)   Impression:  1. Moderate to large colonic stool burden which may reflect constipation, without bowel obstruction.  Otherwise, no acute process seen within the abdomen or pelvis on this noncontrast study.  2. Left-sided nonobstructing " nephrolithiasis.  3. Hepatosplenomegaly with suspected hepatic steatosis.  4. Cholecystectomy and hysterectomy.  5. Additional findings as above.    Assessment:  1. Chronic idiopathic constipation    2. Generalized abdominal pain    3. Abdominal distension    4. Nausea      This is a 44 y.o F here for evaluation of chronic constipation since Tummy Tuck about a year ago. Usually has small, pebble-like stool every 1-2 weeks. On exam, pt distended and tender. She has tried various OTC products and Linzess 145 with no relief. Pt also due for CRC screening.     - Prescribed bowel prep to be completed tomorrow   - After bowel prep, pt to start Linzess 290 mcg once daily  - Maintain adequate hydration  - Start daily fiber supplement  - Ordered colonoscopy, average risk, CRC screening, extended prep    RTC 3 months.     Order summary:  Orders Placed This Encounter    linaCLOtide (LINZESS) 290 mcg Cap capsule    polyethylene glycol (COLYTE) 240-22.72-6.72 -5.84 gram SolR     Thank you so much for allowing me to participate in the care of Roslyn Vera    Sarah Abukhader, PA-C [facility]

## 2024-09-19 NOTE — PATIENT INSTRUCTIONS
Complete bowel prep  Afterwards, start Linzess 290mcg once daily      Constipation Tips  - Eat more high fiber foods   - Take a Fiber supplement (Metamucil, Benefiber, Citrucell, etc)  - Drink at least 8 cups of water a day  - Get regular physical activity  - Use a stool or Squatty Potty  - Avoid sitting on the toilet >5 minutes to prevent hemorrhoids

## 2024-09-19 NOTE — TELEPHONE ENCOUNTER
Spoke to patient to reschedule Colonoscopy       Physician to perform procedure(s) Dr. GILSON Forbes   Date of Procedure (s) 9/25/24  Arrival Time 12:45 PM  Time of Procedure(s) 1:45 PM   Location of Procedure(s) [address]   Type of Rx Prep sent to patient's pharmacy: PEG extended  Instructions provided to patient via [facility]  Patient denies use of blood thinners, GLP-1 medications, and weight loss medications.  The following information was discussed with patient, and patient verbalized understanding:  Screening questionnaire reviewed with patient and complete. If procedure requires anesthesia, a responsible adult needs to be present to accompany the patient home. Appointment details are tentative, especially check-in time. Patient will receive a pre-op call 7 days prior to appointment to confirm check-in time for procedure. If applicable the patient should contact their pharmacy to verify Rx for procedure prep is ready for pick-up. Patient was instructed to call the scheduling department at [phone] if pharmacy states no Rx is available. Patient was also advised to call the endoscopy scheduling department if any questions or concerns arise.       Endoscopy Scheduling Department

## 2024-09-19 NOTE — TELEPHONE ENCOUNTER
----- Message from Jessi Venegas sent at 9/17/2024  9:35 AM CDT -----    ----- Message -----  From: Kulwant Costello MA  Sent: 9/16/2024   1:44 PM CDT  To: [facility] Endoscopy Schedulers    Patient is calling asking for a sooner date to have procedure please call patient

## 2024-09-23 DIAGNOSIS — Z12.11 ENCOUNTER FOR SCREENING COLONOSCOPY: Primary | ICD-10-CM

## 2024-09-23 DIAGNOSIS — F98.8 ATTENTION DEFICIT DISORDER (ADD) IN ADULT: ICD-10-CM

## 2024-09-23 RX ORDER — LISDEXAMFETAMINE DIMESYLATE 20 MG/1
20 CAPSULE ORAL EVERY MORNING
Qty: 30 CAPSULE | Refills: 0 | Status: SHIPPED | OUTPATIENT
Start: 2024-09-23

## 2024-09-23 NOTE — TELEPHONE ENCOUNTER
----- Message from Magda Mclaughlin MA sent at 9/23/2024  8:46 AM CDT -----  Regarding: FW: Awaiting Prep  Contact: Pt @ [phone]    ----- Message -----  From: Elba Saucedo  Sent: 9/23/2024   8:39 AM CDT  To: [facility] Staff  Subject: Awaiting Prep                                    Pt is calling to speak with someone in the office to get  prep sent to the pharmacy  for their upcoming procedure. . Thanks.         Additional Information:   [facility] - [place] - [address]  [address]  [place] [address]  Phone: [phone] Fax: [phone]

## 2024-09-23 NOTE — TELEPHONE ENCOUNTER
No care due was identified.  [facility] Embedded Care Due Messages. Reference number: [id number].   9/23/2024 11:54:12 AM CDT

## 2024-09-23 NOTE — TELEPHONE ENCOUNTER
No care due was identified.  [facility] Embedded Care Due Messages. Reference number: [id number].   9/23/2024 8:35:58 AM CDT

## 2024-09-24 ENCOUNTER — ANESTHESIA EVENT (OUTPATIENT)
Dept: ENDOSCOPY | Facility: HOSPITAL | Age: 44
End: 2024-09-24
Payer: COMMERCIAL

## 2024-09-25 ENCOUNTER — HOSPITAL ENCOUNTER (OUTPATIENT)
Facility: HOSPITAL | Age: 44
Discharge: HOME | End: 2024-09-25
Attending: INTERNAL MEDICINE | Admitting: INTERNAL MEDICINE
Payer: COMMERCIAL

## 2024-09-25 ENCOUNTER — ANESTHESIA (OUTPATIENT)
Dept: ENDOSCOPY | Facility: HOSPITAL | Age: 44
End: 2024-09-25
Payer: COMMERCIAL

## 2024-09-25 VITALS
DIASTOLIC BLOOD PRESSURE: 71 MMHG | RESPIRATION RATE: 20 BRPM | OXYGEN SATURATION: 99 % | SYSTOLIC BLOOD PRESSURE: 107 MMHG | TEMPERATURE: 98 F | HEART RATE: 79 BPM

## 2024-09-25 DIAGNOSIS — K59.04 CHRONIC IDIOPATHIC CONSTIPATION: Primary | ICD-10-CM

## 2024-09-25 DIAGNOSIS — Z12.11 COLON CANCER SCREENING: Primary | ICD-10-CM

## 2024-09-25 PROCEDURE — 63600175 PHARM REV CODE 636 W HCPCS: Performed by: STUDENT IN AN ORGANIZED HEALTH CARE EDUCATION/TRAINING PROGRAM

## 2024-09-25 PROCEDURE — 88305 TISSUE EXAM BY PATHOLOGIST: CPT | Mod: 26,,, | Performed by: PATHOLOGY

## 2024-09-25 PROCEDURE — 37000009 HC ANESTHESIA EA ADD 15 MINS: Performed by: INTERNAL MEDICINE

## 2024-09-25 PROCEDURE — 37000008 HC ANESTHESIA 1ST 15 MINUTES: Performed by: INTERNAL MEDICINE

## 2024-09-25 PROCEDURE — 27201012 HC FORCEPS, HOT/COLD, DISP: Performed by: INTERNAL MEDICINE

## 2024-09-25 PROCEDURE — 45380 COLONOSCOPY AND BIOPSY: CPT | Mod: 33,,, | Performed by: INTERNAL MEDICINE

## 2024-09-25 PROCEDURE — 25000003 PHARM REV CODE 250: Performed by: STUDENT IN AN ORGANIZED HEALTH CARE EDUCATION/TRAINING PROGRAM

## 2024-09-25 PROCEDURE — 45380 COLONOSCOPY AND BIOPSY: CPT | Mod: 33 | Performed by: INTERNAL MEDICINE

## 2024-09-25 PROCEDURE — 88305 TISSUE EXAM BY PATHOLOGIST: CPT | Performed by: PATHOLOGY

## 2024-09-25 RX ORDER — PROPOFOL 10 MG/ML
VIAL (ML) INTRAVENOUS
Status: DISCONTINUED
Start: 2024-09-25 | End: 2024-09-25 | Stop reason: HOSPADM

## 2024-09-25 RX ORDER — SODIUM CHLORIDE 9 MG/ML
INJECTION, SOLUTION INTRAVENOUS CONTINUOUS
Status: DISCONTINUED | OUTPATIENT
Start: 2024-09-25 | End: 2024-09-25 | Stop reason: HOSPADM

## 2024-09-25 RX ORDER — PROPOFOL 10 MG/ML
VIAL (ML) INTRAVENOUS
Status: DISCONTINUED | OUTPATIENT
Start: 2024-09-25 | End: 2024-09-25

## 2024-09-25 RX ORDER — LIDOCAINE HYDROCHLORIDE 20 MG/ML
INJECTION INTRAVENOUS
Status: DISCONTINUED | OUTPATIENT
Start: 2024-09-25 | End: 2024-09-25

## 2024-09-25 RX ORDER — LIDOCAINE HYDROCHLORIDE 20 MG/ML
INJECTION, SOLUTION EPIDURAL; INFILTRATION; INTRACAUDAL; PERINEURAL
Status: DISCONTINUED
Start: 2024-09-25 | End: 2024-09-25 | Stop reason: HOSPADM

## 2024-09-25 RX ORDER — LUBIPROSTONE 24 UG/1
24 CAPSULE ORAL 2 TIMES DAILY
Qty: 60 CAPSULE | Refills: 11 | Status: SHIPPED | OUTPATIENT
Start: 2024-09-25

## 2024-09-25 RX ADMIN — PROPOFOL 30 MG: 10 INJECTION, EMULSION INTRAVENOUS at 01:09

## 2024-09-25 RX ADMIN — PROPOFOL 50 MG: 10 INJECTION, EMULSION INTRAVENOUS at 01:09

## 2024-09-25 RX ADMIN — LIDOCAINE HYDROCHLORIDE 100 MG: 20 INJECTION, SOLUTION INTRAVENOUS at 01:09

## 2024-09-25 RX ADMIN — PROPOFOL 70 MG: 10 INJECTION, EMULSION INTRAVENOUS at 01:09

## 2024-09-25 RX ADMIN — SODIUM CHLORIDE: 0.9 INJECTION, SOLUTION INTRAVENOUS at 01:09

## 2024-09-25 NOTE — TRANSFER OF CARE
Anesthesia Transfer of Care Note    Patient: Roslyn Vera    Procedure(s) Performed: Procedure(s) (LRB):  COLONOSCOPY (N/A)    Patient location: GI    Anesthesia Type: general    Transport from OR: Transported from OR on room air with adequate spontaneous ventilation    Post pain: adequate analgesia    Post assessment: no apparent anesthetic complications and tolerated procedure well    Post vital signs: stable    Level of consciousness: sedated    Nausea/Vomiting: no nausea/vomiting    Complications: none    Transfer of care protocol was followed      Last vitals: Visit Vitals  /68 (BP Location: Left arm, Patient Position: Lying)   Pulse 80   Temp 36.4 °C (97.6 °F) (Oral)   Resp 18   LMP 01/17/2021 (Exact Date)   SpO2 100%   Breastfeeding No

## 2024-09-25 NOTE — PROVATION PATIENT INSTRUCTIONS
Discharge Summary/Instructions after an Endoscopic Procedure  Patient Name: Roslyn Vera  Patient MRN: [id number]  Patient YOB: 1980 Wednesday, September 25, 2024  Carley Forbes MD  Dear patient,  As a result of recent federal legislation (The Federal Cures Act), you may   receive lab or pathology results from your procedure in your [facility]   account before your physician is able to contact you. Your physician or   their representative will relay the results to you with their   recommendations at their soonest availability.  Thank you,  RESTRICTIONS:  During your procedure today, you received medications for sedation.  These   medications may affect your judgment, balance and coordination.  Therefore,   for 24 hours, you have the following restrictions:   - DO NOT drive a car, operate machinery, make legal/financial decisions,   sign important papers or drink alcohol.    ACTIVITY:  Today: no heavy lifting, straining or running due to procedural   sedation/anesthesia.  The following day: return to full activity including work.  DIET:  Eat and drink normally unless instructed otherwise.     TREATMENT FOR COMMON SIDE EFFECTS:  - Mild abdominal pain, nausea, belching, bloating or excessive gas:  rest,   eat lightly and use a heating pad.  - Sore Throat: treat with throat lozenges and/or gargle with warm salt   water.  - Because air was used during the procedure, expelling large amounts of air   from your rectum or belching is normal.  - If a bowel prep was taken, you may not have a bowel movement for 1-3 days.    This is normal.  SYMPTOMS TO WATCH FOR AND REPORT TO YOUR PHYSICIAN:  1. Abdominal pain or bloating, other than gas cramps.  2. Chest pain.  3. Back pain.  4. Signs of infection such as: chills or fever occurring within 24 hours   after the procedure.  5. Rectal bleeding, which would show as bright red, maroon, or black stools.   (A tablespoon of blood from the rectum is not serious,  especially if   hemorrhoids are present.)  6. Vomiting.  7. Weakness or dizziness.  GO DIRECTLY TO THE NEAREST EMERGENCY ROOM IF YOU HAVE ANY OF THE FOLLOWING:      Difficulty breathing              Chills and/or fever over 101 F   Persistent vomiting and/or vomiting blood   Severe abdominal pain   Severe chest pain   Black, tarry stools   Bleeding- more than one tablespoon   Any other symptom or condition that you feel may need urgent attention  Your doctor recommends these additional instructions:  If any biopsies were taken, your doctors clinic will contact you in 1 to 2   weeks with any results.  - Discharge patient to home.   - Resume previous diet.   - Continue present medications.   - Await pathology results.   - Addendum based on pathology results:  Repeat colonoscopy in 10 years.  For questions, problems or results please call your physician - Carley Forbes MD at Work:  [phone].  [facility] can be reached at [phone]     IF A COMPLICATION OR EMERGENCY SITUATION ARISES AND YOU ARE UNABLE TO REACH   YOUR PHYSICIAN - GO DIRECTLY TO THE EMERGENCY ROOM.  Carley Forbes MD  9/25/2024 2:03:14 PM  This report has been verified and signed electronically.  Dear patient,  As a result of recent federal legislation (The Federal Cures Act), you may   receive lab or pathology results from your procedure in your [facility]   account before your physician is able to contact you. Your physician or   their representative will relay the results to you with their   recommendations at their soonest availability.  Thank you,  PROVATION

## 2024-09-25 NOTE — H&P
Short Stay Endoscopy History and Physical    PCP - Hermes Colvin MD    Procedure - Colonoscopy  ASA - per anesthesia  Mallampati - per anesthesia  History of Anesthesia problems - no  Family history Anesthesia problems - no   Plan of anesthesia - General    HPI:  This is a 44 y.o. female here for evaluation of : asymptomatic screening exam      ROS:  Constitutional: No fevers, chills, No weight loss  CV: No chest pain  Pulm: No cough, No shortness of breath  GI: see HPI  Derm: No rash    Medical History:  has a past medical history of Abnormal uterine bleeding (4/18/2018), Acute encephalopathy (12/24/12), ARF (acute renal failure) (12/24/12), Cigarette nicotine dependence without complication (1/10/2013), History of cardiac arrest (12/24/12), psychiatric care, Hypertension, Kidney stones (1/8/2018), MRSA (methicillin resistant Staphylococcus aureus) (12/24/12), Nephritis, Polysubstance abuse (12/24/12), Psychiatric problem, Systolic CHF, acute (12/24/12), Therapy, and UTI (urinary tract infection).    Surgical History:  has a past surgical history that includes Tubal ligation; Cholecystectomy; urinary stents; Augmentation of breast (Bilateral, 2001); Esophagogastroduodenoscopy (N/A, 01/02/2020); Laparoscopic total hysterectomy (N/A, 02/10/2021); Laparoscopic salpingectomy (Left, 02/10/2021); and Hysterectomy (2020).    Family History: family history includes Cancer in her maternal grandfather and maternal grandmother; Kidney disease in her daughter and mother.. Otherwise no colon cancer, inflammatory bowel disease, or GI malignancies.    Social History:  reports that she has quit smoking. Her smoking use included vaping with nicotine. She uses smokeless tobacco. She reports current alcohol use. She reports that she does not use drugs.    Review of patient's allergies indicates:   Allergen Reactions    Strawberry Anaphylaxis and Hives    Morphine Other (See Comments)     Burns stomach    Toradol [ketorolac]       cramping    Tramadol      Abdominal pain       Medications:   Medications Prior to Admission   Medication Sig Dispense Refill Last Dose    acetaminophen (TYLENOL) 500 MG tablet Take 1 tablet (500 mg total) by mouth every 6 (six) hours as needed for Pain. 30 tablet 0     ALPRAZolam (XANAX) 1 MG tablet TAKE ONE TABLET BY MOUTH TWICE DAILY 60 tablet 2     conjugated estrogens (PREMARIN) vaginal cream Place 1 g vaginally twice a week. 30 g 1     EPINEPHrine (EPIPEN 2-LUCIANA) 0.3 mg/0.3 mL AtIn Inject 0.3 mLs (0.3 mg total) into the muscle once. for 1 dose 0.3 mL 0     linaCLOtide (LINZESS) 290 mcg Cap capsule Take 1 capsule (290 mcg total) by mouth before breakfast. 90 capsule 3     lisdexamfetamine (VYVANSE) 20 MG capsule Take 1 capsule (20 mg total) by mouth every morning. 30 capsule 0     polyethylene glycol (COLYTE) 240-22.72-6.72 -5.84 gram SolR Please follow the instructions from the provider office. 8000 mL 0     tretinoin (RETIN-A) 0.1 % cream Apply topically every evening. 45 g 1     VYVANSE 20 mg capsule TAKE ONE CAPSULE BY MOUTH EVERY MORNING 30 capsule 0          Physical Exam:    Vital Signs:   Vitals:    09/25/24 1215   BP: 131/74   Pulse: 89   Resp: 18   Temp: 97.6 °F (36.4 °C)       Gen: NAD, lying comfortably  HENT: NCAT, oropharynx clear  Eyes: anicteric sclerae, EOMI grossly  Neck: supple, no visible masses/goiter  Cardiac: RRR  Lungs: non-labored breathing  Abd: soft, NT/ND, normoactive BS  Ext: no LE edema, warm, well perfused  Skin: skin intact on exposed body parts, no visible rashes, lesions  Neuro: A&Ox4, neuro exam grossly intact, moves all extremities  Psych: appropriate mood, affect        Labs:  Lab Results   Component Value Date    WBC 7.15 07/27/2024    HGB 14.2 07/27/2024    HCT 40.6 07/27/2024     07/27/2024    CHOL 176 09/11/2023    TRIG 187 (H) 09/11/2023    HDL 35 (L) 09/11/2023    ALT 39 07/27/2024    AST 22 07/27/2024     07/27/2024    K 3.9 07/27/2024     07/27/2024     CREATININE 0.8 07/27/2024    BUN 10 07/27/2024    CO2 23 07/27/2024    TSH 0.935 09/11/2023    INR 1.1 01/09/2013    HGBA1C 5.1 09/11/2023       Plan:  Colonoscopy for colon cancer screening.    I have explained the risks and benefits of endoscopy procedures to the patient including but not limited to bleeding, perforation, infection, and death.  The patient was asked if they understand and allowed to ask any further questions to their satisfaction.    Carley Forbes MD

## 2024-09-27 ENCOUNTER — PATIENT MESSAGE (OUTPATIENT)
Dept: FAMILY MEDICINE | Facility: CLINIC | Age: 44
End: 2024-09-27
Payer: COMMERCIAL

## 2024-09-28 NOTE — ANESTHESIA PREPROCEDURE EVALUATION
09/27/2024  Roslyn Vera is a 44 y.o., female.      Pre-op Assessment    I have reviewed the Patient Summary Reports.     I have reviewed the Nursing Notes. I have reviewed the NPO Status.   I have reviewed the Medications.     Review of Systems  Hematology/Oncology:    Oncology Normal                                   EENT/Dental:  EENT/Dental Normal           Cardiovascular:     Hypertension                                        Pulmonary:  Pulmonary Normal                       Hepatic/GI:     GERD             Neurological:  Neurology Normal                                      Endocrine:  Endocrine Normal            Psych:   anxiety                    Anesthesia Plan  Type of Anesthesia, risks & benefits discussed:    Anesthesia Type: MAC, Gen Natural Airway  Intra-op Monitoring Plan: Standard ASA Monitors  Induction:  IV  Informed Consent: Informed consent signed with the Patient and all parties understand the risks and agree with anesthesia plan.  All questions answered.   ASA Score: 2  Day of Surgery Review of History & Physical: H&P Update referred to the surgeon/provider.    Ready For Surgery From Anesthesia Perspective.     .

## 2024-09-28 NOTE — ANESTHESIA POSTPROCEDURE EVALUATION
Anesthesia Post Evaluation    Patient: Roslyn Vera    Procedure(s) Performed: Procedure(s) (LRB):  COLONOSCOPY (N/A)    Final Anesthesia Type: general      Patient location during evaluation: PACU  Patient participation: Yes- Able to Participate  Level of consciousness: awake and alert  Post-procedure vital signs: reviewed and stable  Pain management: adequate  Airway patency: patent    PONV status at discharge: No PONV  Anesthetic complications: no      Respiratory status: spontaneous ventilation and room air  Hydration status: euvolemic  Follow-up not needed.              Vitals Value Taken Time   /71 09/25/24 1428   Temp 36.4 °C (97.6 °F) 09/25/24 1358   Pulse 79 09/25/24 1428   Resp 20 09/25/24 1428   SpO2 99 % 09/25/24 1428         Event Time   Out of Recovery 14:34:25         Pain/Luci Score: No data recorded

## 2024-09-30 LAB
FINAL PATHOLOGIC DIAGNOSIS: NORMAL
GROSS: NORMAL
Lab: NORMAL

## 2024-10-17 ENCOUNTER — HOSPITAL ENCOUNTER (EMERGENCY)
Facility: HOSPITAL | Age: 44
Discharge: HOME | End: 2024-10-17
Attending: EMERGENCY MEDICINE
Payer: COMMERCIAL

## 2024-10-17 VITALS
TEMPERATURE: 99 F | BODY MASS INDEX: 25.87 KG/M2 | HEIGHT: 63 IN | WEIGHT: 146 LBS | DIASTOLIC BLOOD PRESSURE: 62 MMHG | OXYGEN SATURATION: 96 % | RESPIRATION RATE: 20 BRPM | HEART RATE: 104 BPM | SYSTOLIC BLOOD PRESSURE: 108 MMHG

## 2024-10-17 DIAGNOSIS — T78.40XA ALLERGIC REACTION: Primary | ICD-10-CM

## 2024-10-17 DIAGNOSIS — T78.40XS SEVERE ALLERGY, SEQUELA: ICD-10-CM

## 2024-10-17 PROCEDURE — 99284 EMERGENCY DEPT VISIT MOD MDM: CPT | Mod: 25

## 2024-10-17 PROCEDURE — 93010 ELECTROCARDIOGRAM REPORT: CPT | Mod: ,,, | Performed by: INTERNAL MEDICINE

## 2024-10-17 PROCEDURE — 63600175 PHARM REV CODE 636 W HCPCS

## 2024-10-17 PROCEDURE — 25000003 PHARM REV CODE 250

## 2024-10-17 PROCEDURE — 96374 THER/PROPH/DIAG INJ IV PUSH: CPT

## 2024-10-17 PROCEDURE — 96375 TX/PRO/DX INJ NEW DRUG ADDON: CPT

## 2024-10-17 PROCEDURE — 93005 ELECTROCARDIOGRAM TRACING: CPT

## 2024-10-17 RX ORDER — PREDNISONE 20 MG/1
40 TABLET ORAL DAILY
Qty: 10 TABLET | Refills: 0 | Status: SHIPPED | OUTPATIENT
Start: 2024-10-17 | End: 2024-10-22

## 2024-10-17 RX ORDER — LORAZEPAM 2 MG/ML
1 INJECTION INTRAMUSCULAR
Status: COMPLETED | OUTPATIENT
Start: 2024-10-17 | End: 2024-10-17

## 2024-10-17 RX ORDER — DIPHENHYDRAMINE HYDROCHLORIDE 50 MG/ML
25 INJECTION INTRAMUSCULAR; INTRAVENOUS
Status: COMPLETED | OUTPATIENT
Start: 2024-10-17 | End: 2024-10-17

## 2024-10-17 RX ORDER — FAMOTIDINE 20 MG/1
20 TABLET, FILM COATED ORAL 2 TIMES DAILY
Qty: 10 TABLET | Refills: 0 | Status: SHIPPED | OUTPATIENT
Start: 2024-10-17 | End: 2024-10-22

## 2024-10-17 RX ORDER — EPINEPHRINE 0.3 MG/.3ML
2 INJECTION SUBCUTANEOUS ONCE
Qty: 0.3 ML | Refills: 1 | Status: SHIPPED | OUTPATIENT
Start: 2024-10-17 | End: 2024-10-17

## 2024-10-17 RX ORDER — FAMOTIDINE 10 MG/ML
20 INJECTION INTRAVENOUS
Status: COMPLETED | OUTPATIENT
Start: 2024-10-17 | End: 2024-10-17

## 2024-10-17 RX ORDER — METHYLPREDNISOLONE SOD SUCC 125 MG
125 VIAL (EA) INJECTION
Status: COMPLETED | OUTPATIENT
Start: 2024-10-17 | End: 2024-10-17

## 2024-10-17 RX ORDER — DIPHENHYDRAMINE HCL 25 MG
25 CAPSULE ORAL EVERY 6 HOURS PRN
Qty: 30 CAPSULE | Refills: 0 | Status: SHIPPED | OUTPATIENT
Start: 2024-10-17 | End: 2024-10-24 | Stop reason: ALTCHOICE

## 2024-10-17 RX ADMIN — FAMOTIDINE 20 MG: 10 INJECTION, SOLUTION INTRAVENOUS at 03:10

## 2024-10-17 RX ADMIN — METHYLPREDNISOLONE SODIUM SUCCINATE 125 MG: 125 INJECTION, POWDER, FOR SOLUTION INTRAMUSCULAR; INTRAVENOUS at 03:10

## 2024-10-17 RX ADMIN — DIPHENHYDRAMINE HYDROCHLORIDE 25 MG: 50 INJECTION INTRAMUSCULAR; INTRAVENOUS at 03:10

## 2024-10-17 RX ADMIN — LORAZEPAM 1 MG: 2 INJECTION INTRAMUSCULAR; INTRAVENOUS at 04:10

## 2024-10-17 NOTE — ED PROVIDER NOTES
"Encounter Date: 10/17/2024       History     Chief Complaint   Patient presents with    Allergic Reaction     Had a smoothie that had strawberries in it appx 15 minutes PTA. Tongue started swelling and throat felt "funny". Used epipen and took allegra just PTA.      44-year-old female with strawberry allergy, hypertension, history of psychiatric care, systolic CHF presents to ED for emergent evaluation of an allergic reaction after accidentally eating strawberries in a smoothie prior to arrival.  She reports noticing some tongue swelling and shortness of breath.  She took 1 dose of her EpiPen and 1 Allegra prior to arrival.  She denies any chest pain, fever, chills, dysphagia, abdominal pain, nausea, vomiting, diarrhea, dysuria, hematuria.  No other symptoms reported.    The history is provided by the patient. No  was used.     Review of patient's allergies indicates:   Allergen Reactions    Strawberry Anaphylaxis and Hives    Morphine Other (See Comments)     Burns stomach    Toradol [ketorolac]      cramping    Tramadol      Abdominal pain     Past Medical History:   Diagnosis Date    Abnormal uterine bleeding 4/18/2018    Acute encephalopathy 12/24/12    secondary to drug overdose    ARF (acute renal failure) 12/24/12    secondary to drug overdose    Cigarette nicotine dependence without complication 1/10/2013    History of cardiac arrest 12/24/12    secondary to drug overdose    Hx of psychiatric care     Hypertension     Kidney stones 1/8/2018    MRSA (methicillin resistant Staphylococcus aureus) 12/24/12    Nephritis     Polysubstance abuse 12/24/12    Psychiatric problem     Systolic CHF, acute 12/24/12    secondary to drug overdose    Therapy     when younger; recently set up with Cassie Rockweiler, LCSW    UTI (urinary tract infection)      Past Surgical History:   Procedure Laterality Date    AUGMENTATION OF BREAST Bilateral 2001    CHOLECYSTECTOMY      COLONOSCOPY N/A 9/25/2024    " Procedure: COLONOSCOPY;  Surgeon: Carley Forbes MD;  Location: [facility] ENDO;  Service: Endoscopy;  Laterality: N/A;  Lauren KEANE PA-C, peg ext , portal. Patient request date.ASam  9/19/24- pt moved to earlier date, instr to portal. DBM    ESOPHAGOGASTRODUODENOSCOPY N/A 01/02/2020    Procedure: EGD (ESOPHAGOGASTRODUODENOSCOPY);  Surgeon: Moe Tripathi MD;  Location: [facility] ENDO;  Service: Endoscopy;  Laterality: N/A;    HYSTERECTOMY  2020    LAPAROSCOPIC SALPINGECTOMY Left 02/10/2021    Procedure: SALPINGECTOMY, LAPAROSCOPIC;  Surgeon: Doc Pena MD;  Location: [facility] OR;  Service: OB/GYN;  Laterality: Left;    LAPAROSCOPIC TOTAL HYSTERECTOMY N/A 02/10/2021    Procedure: HYSTERECTOMY, TOTAL, LAPAROSCOPIC;  Surgeon: Doc Pena MD;  Location: [facility] OR;  Service: OB/GYN;  Laterality: N/A;  RN PREOP 2/3/, --COVID NEGATIVE ON 2/9 and T/S done    TUBAL LIGATION      urinary stents       Family History   Problem Relation Name Age of Onset    Kidney disease Mother W     Kidney disease Daughter W     Cancer Maternal Grandmother W         throat    Cancer Maternal Grandfather W         prostate    Breast cancer Neg Hx      Colon cancer Neg Hx      Anxiety disorder Neg Hx      Depression Neg Hx      Dementia Neg Hx      Suicide Neg Hx       Social History     Tobacco Use    Smoking status: Former     Types: Vaping with nicotine    Smokeless tobacco: Current   Substance Use Topics    Alcohol use: Yes     Comment: occasional; social    Drug use: No     Types: Cocaine, Methamphetamines     Comment: PCP, patient states she had not done drugs since 2012     Review of Systems   Constitutional:  Negative for chills and fever.   HENT:  Negative for congestion, ear pain, rhinorrhea, sore throat and trouble swallowing.         (+) tongue swelling   Eyes:  Negative for redness.   Respiratory:  Positive for shortness of breath. Negative for cough.    Cardiovascular:  Negative for chest pain.   Gastrointestinal:  Negative for  abdominal pain, diarrhea, nausea and vomiting.   Genitourinary:  Negative for decreased urine volume, difficulty urinating, dysuria, frequency, hematuria and urgency.   Musculoskeletal:  Negative for back pain and neck pain.   Skin:  Negative for rash.   Neurological:  Negative for headaches.   Psychiatric/Behavioral:  Negative for confusion.        Physical Exam     Initial Vitals [10/17/24 1455]   BP Pulse Resp Temp SpO2   (!) 145/64 109 (!) 22 98.3 °F (36.8 °C) 100 %      MAP       --         Physical Exam    Nursing note and vitals reviewed.  Constitutional: She appears well-developed and well-nourished.  Non-toxic appearance. She does not appear ill.   HENT:   Head: Normocephalic and atraumatic.   Right Ear: Hearing, tympanic membrane, external ear and ear canal normal. Tympanic membrane is not perforated, not erythematous and not bulging.   Left Ear: Hearing, tympanic membrane, external ear and ear canal normal. Tympanic membrane is not perforated, not erythematous and not bulging.   Nose: Nose normal. Mouth/Throat: Uvula is midline, oropharynx is clear and moist and mucous membranes are normal.   Full range motion of jaw.  No trismus.  Airway intact.  No tongue swelling.  Speaking in full sentences.  No hot potato voice.  Tolerating secretions.  No angioedema.   Eyes: Conjunctivae and EOM are normal.   Neck: Neck supple.   Normal range of motion.   Full passive range of motion without pain.     Cardiovascular:  Normal rate and regular rhythm.           Pulses:       Radial pulses are 2+ on the right side and 2+ on the left side.   Pulmonary/Chest: Effort normal and breath sounds normal. No accessory muscle usage. No respiratory distress. She has no decreased breath sounds.   Abdominal: Abdomen is soft. Bowel sounds are normal. She exhibits no distension. There is no abdominal tenderness. There is no rebound and no guarding.   Musculoskeletal:         General: Normal range of motion.      Cervical back: Full  passive range of motion without pain, normal range of motion and neck supple. No rigidity.     Neurological: She is alert. No cranial nerve deficit.   Neuro intact.  Strength and sensation intact bilateral upper and lower extremities.   Skin: Skin is warm and dry.   Psychiatric: She has a normal mood and affect.         ED Course   Procedures  Labs Reviewed - No data to display       Imaging Results    None          Medications   methylPREDNISolone sodium succinate injection 125 mg (125 mg Intravenous Given 10/17/24 1530)   famotidine (PF) injection 20 mg (20 mg Intravenous Given 10/17/24 1530)   diphenhydrAMINE injection 25 mg (25 mg Intravenous Given 10/17/24 1530)   LORazepam injection 1 mg (1 mg Intravenous Given 10/17/24 1603)     Medical Decision Making  This is a 44-year-old female with strawberry allergy, hypertension, history of psychiatric care, systolic CHF presents to ED for emergent evaluation of an allergic reaction after accidentally eating strawberries in a smoothie prior to arrival.  She reports noticing some tongue swelling and shortness of breath.  She took 1 dose of her EpiPen and 1 Allegra prior to arrival.  She denies any chest pain, fever, chills, dysphagia, abdominal pain, nausea, vomiting, diarrhea, dysuria, hematuria.  No other symptoms reported.    On physical exam, patient is well-appearing and in no acute distress.  Nontoxic appearing.  Lungs are clear to auscultation bilaterally.  Abdomen is soft and nontender.  No guarding, rigidity, rebound.  2+ radial pulses bilaterally.  Posterior oropharynx is not erythematous.  No edema or exudate.  Uvula midline.  Bilateral tympanic membrane is normal.  No erythema, bulging, or perforations.  Neuro intact.  Strength and sensation intact bilateral upper and lower extremities.  Full range motion of jaw.  No trismus.  Airway intact.  No tongue swelling.  Speaking in full sentences.  No hot potato voice.  Tolerating secretions.  Solu-Medrol, Pepcid,  benadryl ordered ordered.  Will watch the patient for total duration of 4 hours.  I was informed by the patient's nurse that the patient is feeling anxious.  She normally takes Xanax as needed for anxiety.  Ativan ordered.  Will reassess.    I re-evaluated the patient at 4:00 p.m. patient is currently resting comfortably.  No tongue swelling.  Airway intact.  Full range motion of jaw.  No angioedema.  Will continue to monitor the patient.    I re-evaluated the patient at 5:15 p.m. patient is still resting comfortably.  No tongue swelling.  Airway intact.  Full range motion of jaw.  No angioedema.    Upon re-evaluation, patient is still in no distress. She is resting comfortably. Airway intact. No angioedema. No tongue swelling. She's tolerating PO without difficulty. Will discharge patient on a short course of steroids, pepcid, and benadryl. Will discharge patient on epipen. Urged prompt f/u with PCP for further evaluation.    Strict return precautions given. I discussed with the patient/family the diagnosis, treatment plan, indications for return to the emergency department, and for expected follow-up. The patient/family verbalized an understanding. The patient/family is asked if there are any questions or concerns. We discuss the case, until all issues are addressed to the patient/family's satisfaction. Patient/family understands and is agreeable to the plan. Patient is stable and ready for discharge.      Risk  OTC drugs.  Prescription drug management.                                      Clinical Impression:  Final diagnoses:  [T78.40XA] Allergic reaction (Primary)          ED Disposition Condition    Discharge Stable          ED Prescriptions       Medication Sig Dispense Start Date End Date Auth. Provider    predniSONE (DELTASONE) 20 MG tablet Take 2 tablets (40 mg total) by mouth once daily. for 5 days 10 tablet 10/17/2024 10/22/2024 Gosia Abarca PA-C    diphenhydrAMINE (BENADRYL) 25 mg capsule Take 1  capsule (25 mg total) by mouth every 6 (six) hours as needed for Itching or Allergies. 30 capsule 10/17/2024 -- Gosia Abarca PA-C    famotidine (PEPCID) 20 MG tablet Take 1 tablet (20 mg total) by mouth 2 (two) times daily. for 5 days 10 tablet 10/17/2024 10/22/2024 Gosia Abarca PA-C    EPINEPHrine (EPIPEN 2-LUCIANA) 0.3 mg/0.3 mL AtIn (Expires today) Inject 0.6 mLs (0.6 mg total) into the muscle once. for 1 dose 0.3 mL 10/17/2024 10/17/2024 Gosia Abarca PA-C          Follow-up Information       Follow up With Specialties Details Why Contact Info    Hermes Colvin MD Family Medicine In 2 days for further evaluation [address] [facility] [place] [address]  [phone]      [facility] Emergency Medicine In 2 days If symptoms worsen [address] [facility] [place] [address] [phone]             Gosia Abarca PA-C  10/17/24 1942

## 2024-10-17 NOTE — ED NOTES
"Pt with a history of strawberry allergy, accidentally ate strawberries, then 15 min later, c/o swelling to her tongue, "throat feels funny" and flushed skin. No rashes noted. Pt used her Epi Pen and took Allegra PTA.  Pt is AAOx3, resp even and unlabored, skin warm and dry.  HOB elevated, SR up x 2, Call bell within reach. NAD noted. Family member at bedside.   "

## 2024-10-17 NOTE — DISCHARGE INSTRUCTIONS

## 2024-10-17 NOTE — ED NOTES
Pt sleeping, resp even and unlabored, skin warm and dry. NAD noted. SR up x 2. Call bell within reach.

## 2024-10-18 LAB
OHS QRS DURATION: 88 MS
OHS QTC CALCULATION: 483 MS

## 2024-10-24 ENCOUNTER — OFFICE VISIT (OUTPATIENT)
Dept: FAMILY MEDICINE | Facility: CLINIC | Age: 44
End: 2024-10-24
Payer: COMMERCIAL

## 2024-10-24 VITALS
HEIGHT: 62 IN | HEART RATE: 110 BPM | WEIGHT: 157.63 LBS | DIASTOLIC BLOOD PRESSURE: 72 MMHG | SYSTOLIC BLOOD PRESSURE: 114 MMHG | TEMPERATURE: 99 F | BODY MASS INDEX: 29.01 KG/M2 | OXYGEN SATURATION: 98 %

## 2024-10-24 DIAGNOSIS — F31.81 BIPOLAR II DISORDER: Primary | ICD-10-CM

## 2024-10-24 DIAGNOSIS — F98.8 ATTENTION DEFICIT DISORDER (ADD) IN ADULT: ICD-10-CM

## 2024-10-24 DIAGNOSIS — F41.9 ANXIETY: ICD-10-CM

## 2024-10-24 PROCEDURE — G2211 COMPLEX E/M VISIT ADD ON: HCPCS | Mod: S$GLB,,, | Performed by: FAMILY MEDICINE

## 2024-10-24 PROCEDURE — 3078F DIAST BP <80 MM HG: CPT | Mod: CPTII,S$GLB,, | Performed by: FAMILY MEDICINE

## 2024-10-24 PROCEDURE — 3074F SYST BP LT 130 MM HG: CPT | Mod: CPTII,S$GLB,, | Performed by: FAMILY MEDICINE

## 2024-10-24 PROCEDURE — 99999 PR PBB SHADOW E&M-EST. PATIENT-LVL III: CPT | Mod: PBBFAC,,, | Performed by: FAMILY MEDICINE

## 2024-10-24 PROCEDURE — 99215 OFFICE O/P EST HI 40 MIN: CPT | Mod: S$GLB,,, | Performed by: FAMILY MEDICINE

## 2024-10-24 PROCEDURE — 3008F BODY MASS INDEX DOCD: CPT | Mod: CPTII,S$GLB,, | Performed by: FAMILY MEDICINE

## 2024-10-24 RX ORDER — LISDEXAMFETAMINE DIMESYLATE 20 MG/1
20 CAPSULE ORAL EVERY MORNING
Qty: 30 CAPSULE | Refills: 0 | Status: SHIPPED | OUTPATIENT
Start: 2024-10-31

## 2024-10-24 RX ORDER — ALPRAZOLAM 1 MG/1
1 TABLET ORAL 2 TIMES DAILY
Qty: 60 TABLET | Refills: 2 | Status: SHIPPED | OUTPATIENT
Start: 2024-10-24

## 2024-10-24 NOTE — ASSESSMENT & PLAN NOTE
The current medical regimen is effective;  continue present plan and medications.  Stable and monitor

## 2024-10-24 NOTE — PROGRESS NOTES
"Chief Complaint   Patient presents with    Follow-up       SUBJECTIVE:  Roslyn Vera is a 44 y.o. female here for follow up of ADHD.   Patient has ADHD and is well controleld without drug side effects and doing well overall without any unusual symptoms or history.             Currently has co-morbidities including below problem list.        Patient Active Problem List    Diagnosis Date Noted    Bipolar II disorder 10/24/2024    Attention deficit disorder (ADD) in adult 12/18/2023     Used wellbutrin and strattera  Didn't help sufficient, strattera made her tired      Class 1 obesity with serious comorbidity and body mass index (BMI) of 30.0 to 30.9 in adult 02/11/2023     [facility] pharmacy, 280 dollars for 3-6 months depending on dose you need.  I will fax prescription.  Call them before going to  to be sure it is ready.  It [place] in [place].  [phone]  Start with 0.12 ml weekly for 2 weeks then check in with me on here on how you are doing.  Thyroid cancer and pancreatitis are black box warnings but the risk was in rats for cancer and diabetics for pancreatitis.  Make sure to get a good bowel regimen.  I recommend stool softener daily and stimulant laxative every 2-3 days if no good bowel movement.        Tobacco abuse, in remission 11/30/2022    Status post laparoscopic hysterectomy 02/10/2021    GERD (gastroesophageal reflux disease) 01/02/2020    Hepatosplenomegaly 11/18/2019    Mood disorder 05/10/2017    Anxiety 06/28/2016    PMDD (premenstrual dysphoric disorder) 07/23/2015       ROS    OBJECTIVE:  /72   Pulse 110   Temp 98.7 °F (37.1 °C) (Oral)   Ht 5' 2" (1.575 m)   Wt 71.5 kg (157 lb 10.1 oz)   LMP 01/17/2021 (Exact Date) Comment: TLH 2/10/21  SpO2 98%   BMI 28.83 kg/m²     Wt Readings from Last 3 Encounters:   10/24/24 71.5 kg (157 lb 10.1 oz)   10/17/24 66.2 kg (146 lb)   09/19/24 71.2 kg (157 lb)     BP Readings from Last 3 Encounters:   10/24/24 114/72   10/17/24 " 108/62   09/25/24 107/71       Patient is in usual state of health, alert and oriented without signs of intoxication.  No evidence on exam of illegal substance use or concerning symptoms involving abuse or intoxication (specifically evidence of IVDU, unusual bruising, slurred speech, unusual explanations).             Review of old Records:  Reviewed per epic and   NARx OD score/stimulant score: reviewed  Review of old labs:    Lab Results   Component Value Date    TSH 0.935 09/11/2023     Lab Results   Component Value Date    WBC 7.15 07/27/2024    HGB 14.2 07/27/2024    HCT 40.6 07/27/2024    MCV 88 07/27/2024     07/27/2024       Chemistry        Component Value Date/Time     07/27/2024 1325    K 3.9 07/27/2024 1325     07/27/2024 1325    CO2 23 07/27/2024 1325    BUN 10 07/27/2024 1325    CREATININE 0.8 07/27/2024 1325    GLU 98 07/27/2024 1325        Component Value Date/Time    CALCIUM 9.4 07/27/2024 1325    ALKPHOS 56 07/27/2024 1325    AST 22 07/27/2024 1325    ALT 39 07/27/2024 1325    BILITOT 0.8 07/27/2024 1325    ESTGFRAFRICA >60 07/24/2022 2120    EGFRNONAA >60 07/24/2022 2120        Lab Results   Component Value Date    CHOL 176 09/11/2023    CHOL 191 09/01/2022    CHOL 210 (H) 06/10/2020     Lab Results   Component Value Date    HDL 35 (L) 09/11/2023    HDL 36 (L) 09/01/2022    HDL 31 (L) 06/10/2020     Lab Results   Component Value Date    LDLCALC 103.6 09/11/2023    LDLCALC Invalid, Trig>400.0 09/01/2022    LDLCALC Invalid, Trig>400.0 06/10/2020     Lab Results   Component Value Date    TRIG 187 (H) 09/11/2023    TRIG 497 (H) 09/01/2022    TRIG 537 (H) 06/10/2020     Lab Results   Component Value Date    CHOLHDL 19.9 (L) 09/11/2023    CHOLHDL 18.8 (L) 09/01/2022    CHOLHDL 14.8 (L) 06/10/2020         Review of old imaging:  Reviewed last EKG tracing if available.          ASSESSMENT:    ICD-10-CM ICD-9-CM   1. Bipolar II disorder  F31.81 296.89   2. Anxiety  F41.9 300.00   3.  Attention deficit disorder (ADD) in adult  F98.8 314.00       No evidence of abuse/diversion/addiction noted through history and physical, or checking the .  Risks, benefits and alternate treatments are considered and plan of care reflects that shared decision with the patient.  Went over schedule II responsibilities, including abuse, side effects, refill restrictions, necessary visits, drug testing, protection of medication.  Patient voices understanding.      PLAN:    Problem List Items Addressed This Visit       Anxiety    Relevant Medications    ALPRAZolam (XANAX) 1 MG tablet    Other Relevant Orders    PHARMACOGENOMICS PANEL    Attention deficit disorder (ADD) in adult    Overview     Used wellbutrin and strattera  Didn't help sufficient, strattera made her tired         Relevant Medications    lisdexamfetamine (VYVANSE) 20 MG capsule (Start on 10/31/2024)    Other Relevant Orders    PHARMACOGENOMICS PANEL    Bipolar II disorder - Primary    Current Assessment & Plan     The current medical regimen is effective;  continue present plan and medications.  Stable and monitor         Relevant Orders    PHARMACOGENOMICS PANEL         Medication List with Changes/Refills   Current Medications    ACETAMINOPHEN (TYLENOL) 500 MG TABLET    Take 1 tablet (500 mg total) by mouth every 6 (six) hours as needed for Pain.    CONJUGATED ESTROGENS (PREMARIN) VAGINAL CREAM    Place 1 g vaginally twice a week.    EPINEPHRINE (EPIPEN 2-LUCIANA) 0.3 MG/0.3 ML ATIN    Inject 0.6 mLs (0.6 mg total) into the muscle once. for 1 dose    FAMOTIDINE (PEPCID) 20 MG TABLET    Take 1 tablet (20 mg total) by mouth 2 (two) times daily. for 5 days    METHYLPHENIDATE HCL (RITALIN) 20 MG TABLET    Take 1 tablet (20 mg total) by mouth 2 (two) times daily.    TRETINOIN (RETIN-A) 0.1 % CREAM    Apply topically every evening.   Changed and/or Refilled Medications    Modified Medication Previous Medication    ALPRAZOLAM (XANAX) 1 MG TABLET ALPRAZolam  (XANAX) 1 MG tablet       Take 1 tablet (1 mg total) by mouth 2 (two) times daily.    TAKE ONE TABLET BY MOUTH TWICE DAILY    LISDEXAMFETAMINE (VYVANSE) 20 MG CAPSULE VYVANSE 20 mg capsule       Take 1 capsule (20 mg total) by mouth every morning.    TAKE ONE CAPSULE BY MOUTH EVERY MORNING   Discontinued Medications    DIPHENHYDRAMINE (BENADRYL) 25 MG CAPSULE    Take 1 capsule (25 mg total) by mouth every 6 (six) hours as needed for Itching or Allergies.    LISDEXAMFETAMINE (VYVANSE) 20 MG CAPSULE    Take 1 capsule (20 mg total) by mouth every morning.    LUBIPROSTONE (AMITIZA) 24 MCG CAP    Take 1 capsule (24 mcg total) by mouth 2 (two) times daily.    POLYETHYLENE GLYCOL (COLYTE) 240-22.72-6.72 -5.84 GRAM SOLR    Please follow the instructions from the provider office.         Continue with current care unless changes noted below.  Switch back to vyvanse for side effeects  Use pharmocogenomics to aid with selection  High risk medication review completed per guidelines to insure safest use of medication.      We reviewed our goals of care:    Improvement in concentration and mood  Aid focus in completing tasks at work/school  Safety first priority      And discontinuation      Intolerable side effects  Evidence of abuse/misuse or diversion      Future Appointments   Date Time Provider Department Center   12/19/2024 10:40 AM Lauren Gardiner PA-C [facility]       3 months or sooner as needed

## 2024-11-04 DIAGNOSIS — M25.532 BILATERAL WRIST PAIN: Primary | ICD-10-CM

## 2024-11-04 DIAGNOSIS — M25.531 BILATERAL WRIST PAIN: Primary | ICD-10-CM

## 2024-11-04 NOTE — PROGRESS NOTES
Assessment: 44 y.o. female with B carpal tunnel syndrome    I explained my diagnostic impression and the reasoning behind it in detail, using layman's terms.      Plan:   - EMG  -continue night splinting  - desk ergonomics discussed  - wrist brace during the day to help with extensor wad pain  - Return to clinic after EMG is complete    All questions were answered in detail. The patient is in full agreement with the treatment plan and will proceed accordingly.    Chief Complaint   Patient presents with    Right Wrist - Pain, Numbness    Left Wrist - Pain, Numbness       Initial visit (11/11/24): Roslyn Vera is a 44 y.o. female who presents today complaining of Pain and Numbness of the Right Wrist and Pain and Numbness of the Left Wrist     Pain for several years that has gotten worse over the last few months   Numbness and tingling is localized to the median nerve distribution in both hands   Worse on right than the left   Also has some pain in the extensor muscle bellies on the right side and this does not radiate to the lateral epicondyle  No radicular symptoms  Has tried the Ace night support brace without relief  Has not had any injections  Has noticed weakness with     Right-hand dominant    Desk worker        This is the extent of the patient's complaints at this time.        Review of patient's allergies indicates:   Allergen Reactions    Strawberry Anaphylaxis and Hives    Morphine Other (See Comments)     Burns stomach    Toradol [ketorolac]      cramping    Tramadol      Abdominal pain         Current Outpatient Medications:     acetaminophen (TYLENOL) 500 MG tablet, Take 1 tablet (500 mg total) by mouth every 6 (six) hours as needed for Pain., Disp: 30 tablet, Rfl: 0    ALPRAZolam (XANAX) 1 MG tablet, Take 1 tablet (1 mg total) by mouth 2 (two) times daily., Disp: 60 tablet, Rfl: 2    conjugated estrogens (PREMARIN) vaginal cream, Place 1 g vaginally twice a week., Disp: 30 g, Rfl: 1     EPINEPHrine (EPIPEN 2-LUCIANA) 0.3 mg/0.3 mL AtIn, Inject 0.6 mLs (0.6 mg total) into the muscle once. for 1 dose, Disp: 0.3 mL, Rfl: 1    famotidine (PEPCID) 20 MG tablet, Take 1 tablet (20 mg total) by mouth 2 (two) times daily. for 5 days, Disp: 10 tablet, Rfl: 0    lisdexamfetamine (VYVANSE) 20 MG capsule, Take 1 capsule (20 mg total) by mouth every morning., Disp: 30 capsule, Rfl: 0    methylphenidate HCl (RITALIN) 20 MG tablet, Take 1 tablet (20 mg total) by mouth 2 (two) times daily., Disp: 60 tablet, Rfl: 0    tretinoin (RETIN-A) 0.1 % cream, Apply topically every evening., Disp: 45 g, Rfl: 1    Physical Exam:   Vitals:    11/11/24 1244   PainSc:   5   PainLoc: Wrist       General:  Patient is alert, awake and oriented to time, place and person. Mood and affect are appropriate.  Patient does not appear to be in any distress, denies any constitutional symptoms and appears stated age.   HEENT:  Pupils are equal and round, sclera are not injected. External examination of ears and nose reveals no abnormalities. Cranial nerves II-X are grossly intact  Skin:  no rashes, abrasions or open wounds on the affected extremity   Resp:  No respiratory distress or audible wheezing   CV: 2+  pulses, all extremities warm and well perfused   Bilateral Hand/Wrist Examination:    Observation/Inspection:  Swelling  none    Deformity  none  Discoloration  none     Scars   none    Atrophy  none    HAND/WRIST EXAMINATION:  Finkelstein's Test   Neg  WHAT Test    Neg  Snuff box tenderness   Neg  Muhammad's Test    Neg  Hook of Hamate Tenderness  Neg  CMC grind    Neg  Circumduction test   Neg    Neurovascular Exam:  Digits WWP, brisk CR < 3s throughout  NVI motor/LTS to M/R/U nerves, radial pulse 2+  Tinel's Test - Carpal Tunnel  Neg  Tinel's Test - Cubital Tunnel  Neg  Phalen's Test    positive bilaterally  Median Nerve Compression Test positive bilaterally    ROM hand/wrist/elbow full, painless     Imaging:  Three views of the bilateral  wrists are negative for degenerative changes or fracture    I personally reviewed and interpreted the patient's imaging obtained today in clinic     This note was created by combination of typed  and M-Modal dictation. Transcription and phonetic errors may be present.  If there are any questions, please contact me.    Past Medical History:   Diagnosis Date    Abnormal uterine bleeding 4/18/2018    Acute encephalopathy 12/24/12    secondary to drug overdose    ARF (acute renal failure) 12/24/12    secondary to drug overdose    Cigarette nicotine dependence without complication 1/10/2013    History of cardiac arrest 12/24/12    secondary to drug overdose    Hx of psychiatric care     Hypertension     Kidney stones 1/8/2018    MRSA (methicillin resistant Staphylococcus aureus) 12/24/12    Nephritis     Polysubstance abuse 12/24/12    Psychiatric problem     Systolic CHF, acute 12/24/12    secondary to drug overdose    Therapy     when younger; recently set up with Cassie Rockweiler, LCSW    UTI (urinary tract infection)        Active Problem List with Overview Notes    Diagnosis Date Noted    Bipolar II disorder 10/24/2024    Attention deficit disorder (ADD) in adult 12/18/2023     Used wellbutrin and strattera  Didn't help sufficient, strattera made her tired      Class 1 obesity with serious comorbidity and body mass index (BMI) of 30.0 to 30.9 in adult 02/11/2023     [facility] pharmacy, 280 dollars for 3-6 months depending on dose you need.  I will fax prescription.  Call them before going to  to be sure it is ready.  It is in [place].  [phone]  Start with 0.12 ml weekly for 2 weeks then check in with me on here on how you are doing.  Thyroid cancer and pancreatitis are black box warnings but the risk was in rats for cancer and diabetics for pancreatitis.  Make sure to get a good bowel regimen.  I recommend stool softener daily and stimulant laxative every 2-3 days if no good bowel  movement.        Tobacco abuse, in remission 11/30/2022    Status post laparoscopic hysterectomy 02/10/2021    GERD (gastroesophageal reflux disease) 01/02/2020    Hepatosplenomegaly 11/18/2019    Mood disorder 05/10/2017    Anxiety 06/28/2016    PMDD (premenstrual dysphoric disorder) 07/23/2015       Past Surgical History:   Procedure Laterality Date    AUGMENTATION OF BREAST Bilateral 2001    CHOLECYSTECTOMY      COLONOSCOPY N/A 9/25/2024    Procedure: COLONOSCOPY;  Surgeon: Carley Forbes MD;  Location: [facility] ENDO;  Service: Endoscopy;  Laterality: N/A;  Lauren KEANE PA-C, peg ext , portal. Patient request date.ASam  9/19/24- pt moved to earlier date, instr to portal. DBM    ESOPHAGOGASTRODUODENOSCOPY N/A 01/02/2020    Procedure: EGD (ESOPHAGOGASTRODUODENOSCOPY);  Surgeon: Moe Tripathi MD;  Location: [facility] ENDO;  Service: Endoscopy;  Laterality: N/A;    HYSTERECTOMY  2020    LAPAROSCOPIC SALPINGECTOMY Left 02/10/2021    Procedure: SALPINGECTOMY, LAPAROSCOPIC;  Surgeon: Doc Pena MD;  Location: [facility] OR;  Service: OB/GYN;  Laterality: Left;    LAPAROSCOPIC TOTAL HYSTERECTOMY N/A 02/10/2021    Procedure: HYSTERECTOMY, TOTAL, LAPAROSCOPIC;  Surgeon: Doc Pena MD;  Location: [facility] OR;  Service: OB/GYN;  Laterality: N/A;  RN PREOP 2/3/, --COVID NEGATIVE ON 2/9 and T/S done    TUBAL LIGATION      urinary stents         Family History   Problem Relation Name Age of Onset    Kidney disease Mother W     Kidney disease Daughter W     Cancer Maternal Grandmother W         throat    Cancer Maternal Grandfather W         prostate    Breast cancer Neg Hx      Colon cancer Neg Hx      Anxiety disorder Neg Hx      Depression Neg Hx      Dementia Neg Hx      Suicide Neg Hx

## 2024-11-11 ENCOUNTER — OFFICE VISIT (OUTPATIENT)
Dept: ORTHOPEDICS | Facility: CLINIC | Age: 44
End: 2024-11-11
Attending: ORTHOPAEDIC SURGERY
Payer: COMMERCIAL

## 2024-11-11 ENCOUNTER — APPOINTMENT (OUTPATIENT)
Dept: RADIOLOGY | Facility: HOSPITAL | Age: 44
End: 2024-11-11
Attending: ORTHOPAEDIC SURGERY
Payer: COMMERCIAL

## 2024-11-11 DIAGNOSIS — M25.532 BILATERAL WRIST PAIN: ICD-10-CM

## 2024-11-11 DIAGNOSIS — G56.03 BILATERAL CARPAL TUNNEL SYNDROME: Primary | ICD-10-CM

## 2024-11-11 DIAGNOSIS — M25.531 BILATERAL WRIST PAIN: ICD-10-CM

## 2024-11-11 PROCEDURE — 99204 OFFICE O/P NEW MOD 45 MIN: CPT | Mod: S$GLB,,, | Performed by: ORTHOPAEDIC SURGERY

## 2024-11-11 PROCEDURE — 99999 PR PBB SHADOW E&M-EST. PATIENT-LVL III: CPT | Mod: PBBFAC,,, | Performed by: ORTHOPAEDIC SURGERY

## 2024-11-11 PROCEDURE — 1160F RVW MEDS BY RX/DR IN RCRD: CPT | Mod: CPTII,S$GLB,, | Performed by: ORTHOPAEDIC SURGERY

## 2024-11-11 PROCEDURE — 73110 X-RAY EXAM OF WRIST: CPT | Mod: 26,,, | Performed by: RADIOLOGY

## 2024-11-11 PROCEDURE — 1159F MED LIST DOCD IN RCRD: CPT | Mod: CPTII,S$GLB,, | Performed by: ORTHOPAEDIC SURGERY

## 2024-11-11 PROCEDURE — 73110 X-RAY EXAM OF WRIST: CPT | Mod: TC,50,FY,PN

## 2024-11-12 ENCOUNTER — TELEPHONE (OUTPATIENT)
Dept: NEUROLOGY | Facility: CLINIC | Age: 44
End: 2024-11-12
Payer: COMMERCIAL

## 2024-11-12 DIAGNOSIS — G56.03 BILATERAL CARPAL TUNNEL SYNDROME: Primary | ICD-10-CM

## 2024-11-12 NOTE — TELEPHONE ENCOUNTER
----- Message from Med Assistant Hastings sent at 11/12/2024 10:45 AM CST -----  It is in . Thanks  
Staff spoke to patient and scheduled her EMG appt   
No

## 2024-11-19 ENCOUNTER — LAB VISIT (OUTPATIENT)
Dept: LAB | Facility: HOSPITAL | Age: 44
End: 2024-11-19
Attending: FAMILY MEDICINE
Payer: COMMERCIAL

## 2024-11-19 DIAGNOSIS — F41.9 ANXIETY: ICD-10-CM

## 2024-11-19 DIAGNOSIS — F98.8 ATTENTION DEFICIT DISORDER (ADD) IN ADULT: ICD-10-CM

## 2024-11-19 DIAGNOSIS — F31.81 BIPOLAR II DISORDER: ICD-10-CM

## 2024-11-19 PROCEDURE — 36415 COLL VENOUS BLD VENIPUNCTURE: CPT | Mod: PO | Performed by: FAMILY MEDICINE

## 2024-11-27 ENCOUNTER — PATIENT MESSAGE (OUTPATIENT)
Dept: FAMILY MEDICINE | Facility: CLINIC | Age: 44
End: 2024-11-27
Payer: COMMERCIAL

## 2024-11-27 DIAGNOSIS — F41.9 ANXIETY: Primary | ICD-10-CM

## 2024-11-27 DIAGNOSIS — F39 MOOD DISORDER: ICD-10-CM

## 2024-11-27 LAB
ONEOME COMMENT: NORMAL
ONEOME METHOD: NORMAL

## 2024-12-02 ENCOUNTER — PATIENT MESSAGE (OUTPATIENT)
Dept: FAMILY MEDICINE | Facility: CLINIC | Age: 44
End: 2024-12-02
Payer: COMMERCIAL

## 2024-12-02 DIAGNOSIS — F32.81 PMDD (PREMENSTRUAL DYSPHORIC DISORDER): ICD-10-CM

## 2024-12-02 DIAGNOSIS — F41.9 ANXIETY: Primary | ICD-10-CM

## 2024-12-03 ENCOUNTER — E-CONSULT (OUTPATIENT)
Dept: GENETICS | Facility: CLINIC | Age: 44
End: 2024-12-03
Payer: COMMERCIAL

## 2024-12-03 ENCOUNTER — DOCUMENTATION ONLY (OUTPATIENT)
Dept: HEMATOLOGY/ONCOLOGY | Facility: CLINIC | Age: 44
End: 2024-12-03
Payer: COMMERCIAL

## 2024-12-03 DIAGNOSIS — F39 MOOD DISORDER: ICD-10-CM

## 2024-12-03 DIAGNOSIS — F41.9 ANXIETY: Primary | ICD-10-CM

## 2024-12-03 PROCEDURE — 99451 NTRPROF PH1/NTRNET/EHR 5/>: CPT | Mod: S$GLB,,, | Performed by: MEDICAL GENETICS

## 2024-12-03 NOTE — PROGRESS NOTES
PGx Consult Note    Clinical Recommendations based on PGx Results  None of the patients current medications are likely impacted by her results.    Medications that are likely not impacted by the patients results or expected to be normal include the following and can be considered at usual starting doses as clinically appropriate:    Stimulant medications (amphetamine, dexamphetamine, methylphenidate)  Fluoxetine  Desvenlafaxine  Duloxetine     Patient's CYP2D6 Intermediate Metabolism may increase her risk of developing side effects with antidepressants primarily metabolized through [id number].    Reduce TCAs by 25% of starting dose   Paroxetine: Consider lower starting dose and slower titration   Venlafaxine: Consider at usual starting dose and monitor for side effects/lack of efficacy  Vortioxetine: Consider at usual starting dose and monitor for side effects/lack of efficacy    Patient's [id number] Intermediate Metabolism may increase her risk of developing side effects with antidepressants primarily metabolized through [id number].    Escitalopram/citalopram: Consider a slower titration schedule and lower maintenance dose   Sertraline: Consider a slower titration schedule and lower maintenance dose     Past intolerance/inefficacies and allergies   Atomoxetine (Straterra)  Patient's previous side effects (tiredness) with use of Strattera could possibly be explained by her CYP2D6 Intermediate Metabolism.    Future Medication Considerations based on PGx Results  Patient has 4 actionable results that may impact future medication selection.     TPMT Intermediate Metabolizer:  Thiopurines: Consider a dose reduction of mercaptopurine and azathioprine (30-80% of normal if starting dose if greater than 1.5 mg/kg/day) and adjust based on degree of myelosuppression. Allow 2-4 weeks to reach steady state.      CYP2B6 Intermediate Metabolizer:  Efavirenz: Consider initiating efavirenz with decreased dose of 400 mg/day due to increased  risk of CNS adverse effects.  Sertraline: Initiate therapy with the recommended starting dose. Consider a slower titration schedule and lower maintenance dose due to reduced metabolism to less active compounds.     [id number] Intermediate Metabolizer:  PPIs (omeprazole, lansoprazole, and pantoprazole): Initiate standard daily dose; for chronic therapy (>12 weeks) consider 50% reduction of dose due to increased plasma concentration and risk of toxicity.  Sertraline: Initiate therapy with recommended starting dose. Consider a slower titration schedule and lower maintenance dose than [id number] normal metabolizers.  Citalopram/Escitalopram: Initiate therapy with recommended starting dose. Consider a slower titration schedule and lower maintenance dose than normal metabolizers.  Clopidogrel: Avoid due to reduced conversion to active metabolite; use prasugrel or ticagrelor if no contraindication.     CYP2D6 Intermediate Metabolizer:  Tricyclic Antidepressants: Consider 25% reduction of recommended starting dose due to reduced metabolism and increased probability of side effects.   Tramadol/Codeine: Use tramadol or codeine label recommended dosing. If no response and opioid use is warranted, consider alternative to codeine and tramadol due to reduced active metabolite formation.   Hydrocodone: Use hydrocodone label recommended dosing. If no response and opioid use is warranted, consider non-codeine or non-tramadol opioid.   Metoprolol: If a gradual reduction in HR is desired, or in the event of symptomatic bradycardia: use smaller steps in dose titration and/or prescribe no more than 50% of the standard dose. All other cases: no action required.   Paroxetine: Consider a lower starting dose and slower titration schedule due to reduced metabolism to less active compounds.   Atomoxetine: Due to significantly decreased metabolism resulting in higher concentrations and increased risk of SEs, initiate with a dose of 40 mg/day and if  no clinical response and in the absence of adverse events after 2 weeks increase dose to 80 mg/day. If response is inadequate after 2 weeks, consider obtaining a plasma concentration 2-4 hours after dosing. If concentration is < 200 ng/mL, consider a proportional dose increase to achieve a concentration to approach 400 ng/mL.   Tamoxifen: Consider hormonal therapy such as an aromatase inhibitor for postmenopausal women or aromatase inhibitor along with ovarian function suppression in premenopausal women. If aromatase inhibitor use is contraindicated, consideration should be given to use a higher but FDA approved tamoxifen dose (40 mg/day). Avoid CYP2D6 strong to weak inhibitors (i.e., paroxetine, fluoxetine).      Thank you for this consult. If you have any additional questions, please don't hesitate to reach out to me or contact [phone] (GENE).    Latrice Pittman, PharmD  Clinical Pharmacogenomics Pharmacist

## 2024-12-03 NOTE — CONSULTS
[facility]  Response for E-Consult     Patient Name: Roslyn Vera  MRN: [id number]  Primary Care Provider: Hermes Colvin MD   Requesting Provider: Hermes Colvin MD  E-Consult to Genetics  Consult performed by: Khalida Bright MD  Consult ordered by: Hermes Colvin MD  Assessment/Recommendations: Need for Pharmacogenomics consult          Unfortunately, this eConsult has been declined due to: Other    Other:  This eConsult submission cannot be completed at this time due to Need PGx consult- sent info in message to referring provider.      Thank you for this eConsult referral.     MD [facility]

## 2024-12-08 ENCOUNTER — PATIENT MESSAGE (OUTPATIENT)
Dept: FAMILY MEDICINE | Facility: CLINIC | Age: 44
End: 2024-12-08
Payer: COMMERCIAL

## 2024-12-09 ENCOUNTER — HOSPITAL ENCOUNTER (EMERGENCY)
Facility: HOSPITAL | Age: 44
Discharge: HOME | End: 2024-12-09
Attending: EMERGENCY MEDICINE
Payer: COMMERCIAL

## 2024-12-09 ENCOUNTER — PATIENT MESSAGE (OUTPATIENT)
Dept: FAMILY MEDICINE | Facility: CLINIC | Age: 44
End: 2024-12-09
Payer: COMMERCIAL

## 2024-12-09 VITALS
HEIGHT: 62 IN | HEART RATE: 80 BPM | BODY MASS INDEX: 28.89 KG/M2 | WEIGHT: 157 LBS | SYSTOLIC BLOOD PRESSURE: 141 MMHG | TEMPERATURE: 99 F | DIASTOLIC BLOOD PRESSURE: 72 MMHG | OXYGEN SATURATION: 100 % | RESPIRATION RATE: 16 BRPM

## 2024-12-09 DIAGNOSIS — R10.31 RLQ ABDOMINAL PAIN: ICD-10-CM

## 2024-12-09 DIAGNOSIS — M54.50 ACUTE BILATERAL LOW BACK PAIN WITHOUT SCIATICA: ICD-10-CM

## 2024-12-09 DIAGNOSIS — N39.0 ACUTE UTI: Primary | ICD-10-CM

## 2024-12-09 LAB
ALBUMIN SERPL BCP-MCNC: 4.3 G/DL (ref 3.5–5.2)
ALP SERPL-CCNC: 55 U/L (ref 40–150)
ALT SERPL W/O P-5'-P-CCNC: 50 U/L (ref 10–44)
ANION GAP SERPL CALC-SCNC: 9 MMOL/L (ref 8–16)
AST SERPL-CCNC: 24 U/L (ref 10–40)
BACTERIA #/AREA URNS HPF: ABNORMAL /HPF
BASOPHILS # BLD AUTO: 0.04 K/UL (ref 0–0.2)
BASOPHILS NFR BLD: 0.5 % (ref 0–1.9)
BILIRUB SERPL-MCNC: 0.3 MG/DL (ref 0.1–1)
BILIRUB UR QL STRIP: NEGATIVE
BUN SERPL-MCNC: 13 MG/DL (ref 6–20)
CALCIUM SERPL-MCNC: 9.3 MG/DL (ref 8.7–10.5)
CHLORIDE SERPL-SCNC: 106 MMOL/L (ref 95–110)
CLARITY UR: CLEAR
CO2 SERPL-SCNC: 24 MMOL/L (ref 23–29)
COLOR UR: COLORLESS
CREAT SERPL-MCNC: 0.8 MG/DL (ref 0.5–1.4)
DIFFERENTIAL METHOD BLD: ABNORMAL
EOSINOPHIL # BLD AUTO: 0.1 K/UL (ref 0–0.5)
EOSINOPHIL NFR BLD: 1.7 % (ref 0–8)
ERYTHROCYTE [DISTWIDTH] IN BLOOD BY AUTOMATED COUNT: 11.8 % (ref 11.5–14.5)
EST. GFR  (NO RACE VARIABLE): >60 ML/MIN/1.73 M^2
GLUCOSE SERPL-MCNC: 107 MG/DL (ref 70–110)
GLUCOSE UR QL STRIP: NEGATIVE
HCT VFR BLD AUTO: 40.2 % (ref 37–48.5)
HGB BLD-MCNC: 14.2 G/DL (ref 12–16)
HGB UR QL STRIP: ABNORMAL
IMM GRANULOCYTES # BLD AUTO: 0.03 K/UL (ref 0–0.04)
IMM GRANULOCYTES NFR BLD AUTO: 0.4 % (ref 0–0.5)
KETONES UR QL STRIP: NEGATIVE
LACTATE SERPL-SCNC: 0.9 MMOL/L (ref 0.5–2.2)
LEUKOCYTE ESTERASE UR QL STRIP: ABNORMAL
LYMPHOCYTES # BLD AUTO: 2.2 K/UL (ref 1–4.8)
LYMPHOCYTES NFR BLD: 29.8 % (ref 18–48)
MCH RBC QN AUTO: 31.4 PG (ref 27–31)
MCHC RBC AUTO-ENTMCNC: 35.3 G/DL (ref 32–36)
MCV RBC AUTO: 89 FL (ref 82–98)
MICROSCOPIC COMMENT: ABNORMAL
MONOCYTES # BLD AUTO: 0.6 K/UL (ref 0.3–1)
MONOCYTES NFR BLD: 7.5 % (ref 4–15)
NEUTROPHILS # BLD AUTO: 4.5 K/UL (ref 1.8–7.7)
NEUTROPHILS NFR BLD: 60.1 % (ref 38–73)
NITRITE UR QL STRIP: NEGATIVE
NRBC BLD-RTO: 0 /100 WBC
PH UR STRIP: 6 [PH] (ref 5–8)
PLATELET # BLD AUTO: 252 K/UL (ref 150–450)
PMV BLD AUTO: 9.5 FL (ref 9.2–12.9)
POTASSIUM SERPL-SCNC: 3.9 MMOL/L (ref 3.5–5.1)
PROT SERPL-MCNC: 7.2 G/DL (ref 6–8.4)
PROT UR QL STRIP: NEGATIVE
RBC # BLD AUTO: 4.52 M/UL (ref 4–5.4)
RBC #/AREA URNS HPF: 4 /HPF (ref 0–4)
SODIUM SERPL-SCNC: 139 MMOL/L (ref 136–145)
SP GR UR STRIP: 1.01 (ref 1–1.03)
URN SPEC COLLECT METH UR: ABNORMAL
UROBILINOGEN UR STRIP-ACNC: NEGATIVE EU/DL
WBC # BLD AUTO: 7.45 K/UL (ref 3.9–12.7)
WBC #/AREA URNS HPF: 6 /HPF (ref 0–5)

## 2024-12-09 PROCEDURE — 96361 HYDRATE IV INFUSION ADD-ON: CPT

## 2024-12-09 PROCEDURE — 96375 TX/PRO/DX INJ NEW DRUG ADDON: CPT

## 2024-12-09 PROCEDURE — 80053 COMPREHEN METABOLIC PANEL: CPT | Performed by: PHYSICIAN ASSISTANT

## 2024-12-09 PROCEDURE — 96374 THER/PROPH/DIAG INJ IV PUSH: CPT

## 2024-12-09 PROCEDURE — 99285 EMERGENCY DEPT VISIT HI MDM: CPT | Mod: 25

## 2024-12-09 PROCEDURE — 85025 COMPLETE CBC W/AUTO DIFF WBC: CPT | Performed by: PHYSICIAN ASSISTANT

## 2024-12-09 PROCEDURE — 83605 ASSAY OF LACTIC ACID: CPT | Performed by: PHYSICIAN ASSISTANT

## 2024-12-09 PROCEDURE — 87491 CHLMYD TRACH DNA AMP PROBE: CPT | Performed by: PHYSICIAN ASSISTANT

## 2024-12-09 PROCEDURE — 63600175 PHARM REV CODE 636 W HCPCS: Performed by: PHYSICIAN ASSISTANT

## 2024-12-09 PROCEDURE — 25000003 PHARM REV CODE 250

## 2024-12-09 PROCEDURE — 25500020 PHARM REV CODE 255: Performed by: EMERGENCY MEDICINE

## 2024-12-09 PROCEDURE — 25000003 PHARM REV CODE 250: Performed by: PHYSICIAN ASSISTANT

## 2024-12-09 PROCEDURE — 81000 URINALYSIS NONAUTO W/SCOPE: CPT

## 2024-12-09 RX ORDER — ONDANSETRON 4 MG/1
8 TABLET, FILM COATED ORAL EVERY 6 HOURS PRN
Qty: 30 TABLET | Refills: 0 | Status: SHIPPED | OUTPATIENT
Start: 2024-12-09 | End: 2025-01-08

## 2024-12-09 RX ORDER — FLUCONAZOLE 200 MG/1
200 TABLET ORAL DAILY
Qty: 1 TABLET | Refills: 0 | Status: SHIPPED | OUTPATIENT
Start: 2024-12-09 | End: 2024-12-10

## 2024-12-09 RX ORDER — CEFTRIAXONE 2 G/1
2 INJECTION, POWDER, FOR SOLUTION INTRAMUSCULAR; INTRAVENOUS
Status: COMPLETED | OUTPATIENT
Start: 2024-12-09 | End: 2024-12-09

## 2024-12-09 RX ORDER — ACETAMINOPHEN 500 MG
1000 TABLET ORAL
Status: COMPLETED | OUTPATIENT
Start: 2024-12-09 | End: 2024-12-09

## 2024-12-09 RX ORDER — CEPHALEXIN 500 MG/1
1000 CAPSULE ORAL EVERY 12 HOURS
Qty: 40 CAPSULE | Refills: 0 | Status: SHIPPED | OUTPATIENT
Start: 2024-12-09 | End: 2024-12-19

## 2024-12-09 RX ORDER — ONDANSETRON HYDROCHLORIDE 2 MG/ML
8 INJECTION, SOLUTION INTRAVENOUS
Status: COMPLETED | OUTPATIENT
Start: 2024-12-09 | End: 2024-12-09

## 2024-12-09 RX ADMIN — ACETAMINOPHEN 1000 MG: 500 TABLET ORAL at 06:12

## 2024-12-09 RX ADMIN — ONDANSETRON 8 MG: 2 INJECTION INTRAMUSCULAR; INTRAVENOUS at 06:12

## 2024-12-09 RX ADMIN — CEFTRIAXONE 2 G: 2 INJECTION, POWDER, FOR SOLUTION INTRAMUSCULAR; INTRAVENOUS at 06:12

## 2024-12-09 RX ADMIN — SODIUM CHLORIDE 1000 ML: 9 INJECTION, SOLUTION INTRAVENOUS at 06:12

## 2024-12-09 RX ADMIN — IOHEXOL 75 ML: 350 INJECTION, SOLUTION INTRAVENOUS at 07:12

## 2024-12-10 NOTE — DISCHARGE INSTRUCTIONS
Thank you for coming to our Emergency Department today. It is important to remember that some problems or medical conditions are difficult to diagnose and may not be found or addressed during your Emergency Department visit.  These conditions often start with non-specific symptoms and can only be diagnosed on follow up visits with your primary care physician or specialist when the symptoms continue or change. Please remember that all medical conditions can change, and we cannot predict how you will be feeling tomorrow or the next day. Return to the ER with any questions/concerns, new/concerning symptoms, worsening or failure to improve.       Be sure to follow up with your primary care doctor and review all labs/imaging/tests that were performed during your ER visit with them. It is very common for us to identify non-emergent incidental findings which must be followed up with your primary care physician.  Some labs/imaging/tests may be outside of the normal range, and require non-emergent follow-up and/or further investigation/treatment/procedures/testing to help diagnose/exclude/prevent complications or other potentially serious medical conditions. Some abnormalities may not have been discussed or addressed during your ER visit.     An ER visit does not replace a primary care visit, and many screening tests or follow-up tests cannot be ordered by an ER doctor or performed by the ER. Some tests may even require pre-approval.    If you do not have a primary care doctor, you may contact the one listed on your discharge paperwork or you may also call the [facility] Appointment Desk at [phone] , or [address] [place] [address] at  [phone] to schedule an appointment, or establish care with a primary care doctor or even a specialist and to obtain information about local resources. It is important to your health that you have a primary care doctor.    Please take all medications as directed. We have done our best to select  a medication for you that will treat your condition however, all medications may potentially have side-effects and it is impossible to predict which medications may give you side-effects or what those side-effects (if any) those medications may give you.  If you feel that you are having a negative effect or side-effect of any medication you should stop taking those medications immediately and seek medical attention. If you feel that you are having a life-threatening reaction call 911.        Do not drive, swim, climb to height, take a bath, operate heavy machinery, drink alcohol or take potentially sedating medications, sign any legal documents or make any important decisions for 24 hours if you have received any pain medications, sedatives or mood altering drugs during your ER visit or within 24 hours of taking them if they have been prescribed to you.     You can find additional resources for Dentists, hearing aids, durable medical equipment, low cost pharmacies and other resources at [email or web address]

## 2024-12-10 NOTE — ED PROVIDER NOTES
Encounter Date: 12/9/2024    SCRIBE #1 NOTE: I, Ben Rose Do, am scribing for, and in the presence of,  Manjinder Coronel PA-C. I have scribed the following portions of the note - Other sections scribed: HPI, ROS.       History     Chief Complaint   Patient presents with    Abdominal Pain     Pt arrived in ED, c/o RLQ abd pain x 5 days. Pt also reports painful, burning urination. Pt also c/o nausea. Pt denies any CP, SOB, vomiting, or diarrhea.      Roslyn Vera is a 44 y.o. female, with a pertinent PMHx of drug overdose, Bipolar 2 disorder, HTN, kidney stones, MRSA, nephritis, polysubstance abuse, and CHF, who presents to the ED with dysuria for the past 5 days. Patient also reports right lower quadrant abdominal pain onset 2-3 days ago, mild nausea, headache, and bilateral lower back pain. She notes abdominal pain is exacerbated with walking and moving her back. She endorses previous kidney stones, denies previous abdominal pain during this episode. No other exacerbating or alleviating factors. Patient denies fever, chest pain, SOB, or other associated symptoms. Per chart review 02/10/21, patient had a hysterectomy. She denies a previous appendectomy.     The history is provided by the patient. No  was used.     Review of patient's allergies indicates:   Allergen Reactions    Strawberry Anaphylaxis and Hives    Morphine Other (See Comments)     Burns stomach    Toradol [ketorolac]      cramping    Tramadol      Abdominal pain     Past Medical History:   Diagnosis Date    Abnormal uterine bleeding 4/18/2018    Acute encephalopathy 12/24/12    secondary to drug overdose    ARF (acute renal failure) 12/24/12    secondary to drug overdose    Cigarette nicotine dependence without complication 1/10/2013    History of cardiac arrest 12/24/12    secondary to drug overdose    Hx of psychiatric care     Hypertension     Kidney stones 1/8/2018    MRSA (methicillin resistant Staphylococcus aureus)  12/24/12    Nephritis     Polysubstance abuse 12/24/12    Psychiatric problem     Systolic CHF, acute 12/24/12    secondary to drug overdose    Therapy     when younger; recently set up with Cassie Rockweiler, LCSW    UTI (urinary tract infection)      Past Surgical History:   Procedure Laterality Date    AUGMENTATION OF BREAST Bilateral 2001    CHOLECYSTECTOMY      COLONOSCOPY N/A 9/25/2024    Procedure: COLONOSCOPY;  Surgeon: Carley Forbes MD;  Location: [facility] ENDO;  Service: Endoscopy;  Laterality: N/A;  Lauren KEANE PA-C, peg ext , portal. Patient request date.ASam  9/19/24- pt moved to earlier date, instr to portal. DBM    ESOPHAGOGASTRODUODENOSCOPY N/A 01/02/2020    Procedure: EGD (ESOPHAGOGASTRODUODENOSCOPY);  Surgeon: Moe Tripathi MD;  Location: [facility] ENDO;  Service: Endoscopy;  Laterality: N/A;    HYSTERECTOMY  2020    LAPAROSCOPIC SALPINGECTOMY Left 02/10/2021    Procedure: SALPINGECTOMY, LAPAROSCOPIC;  Surgeon: Doc Pena MD;  Location: [facility] OR;  Service: OB/GYN;  Laterality: Left;    LAPAROSCOPIC TOTAL HYSTERECTOMY N/A 02/10/2021    Procedure: HYSTERECTOMY, TOTAL, LAPAROSCOPIC;  Surgeon: Doc Pena MD;  Location: [facility] OR;  Service: OB/GYN;  Laterality: N/A;  RN PREOP 2/3/, --COVID NEGATIVE ON 2/9 and T/S done    TUBAL LIGATION      urinary stents       Family History   Problem Relation Name Age of Onset    Kidney disease Mother W     Kidney disease Daughter W     Cancer Maternal Grandmother W         throat    Cancer Maternal Grandfather W         prostate    Breast cancer Neg Hx      Colon cancer Neg Hx      Anxiety disorder Neg Hx      Depression Neg Hx      Dementia Neg Hx      Suicide Neg Hx       Social History     Tobacco Use    Smoking status: Former     Types: Vaping with nicotine    Smokeless tobacco: Current   Substance Use Topics    Alcohol use: Yes     Comment: occasional; social    Drug use: No     Types: Cocaine, Methamphetamines     Comment: PCP, patient states she had not  done drugs since 2012     Review of Systems   Constitutional:  Negative for fever.   HENT:  Negative for congestion, sore throat and trouble swallowing.    Respiratory:  Negative for cough and shortness of breath.    Cardiovascular:  Negative for chest pain.   Gastrointestinal:  Positive for abdominal pain and nausea. Negative for constipation, diarrhea and vomiting.   Genitourinary:  Positive for dysuria. Negative for flank pain, frequency and urgency.   Musculoskeletal:  Positive for back pain.   Skin:  Negative for rash.   Neurological:  Positive for headaches.   All other systems reviewed and are negative.      Physical Exam     Initial Vitals [12/09/24 1701]   BP Pulse Resp Temp SpO2   123/72 92 18 (!) 101.7 °F (38.7 °C) 100 %      MAP       --         Physical Exam    Nursing note and vitals reviewed.  Constitutional: She appears well-developed and well-nourished. She is not diaphoretic. No distress.   HENT:   Head: Atraumatic.   Right Ear: External ear normal.   Left Ear: External ear normal.   Eyes: Conjunctivae and EOM are normal.   Neck: No tracheal deviation present. No JVD present.   Normal range of motion.  Cardiovascular:  Normal rate and regular rhythm.           Pulmonary/Chest: No accessory muscle usage or stridor. No tachypnea. No respiratory distress.   Abdominal: Abdomen is soft. She exhibits no distension. There is abdominal tenderness (Non well localized tenderness to the right lower quadrant).   No right CVA tenderness.  No left CVA tenderness. There is no rebound, no guarding, no tenderness at McBurney's point and negative Larry's sign. negative Rovsing's sign  Musculoskeletal:         General: Normal range of motion.      Cervical back: Normal range of motion.      Comments: Diffuse vague positional tenderness to the lower lumbar musculature without midline tenderness of spine.  No skin changes.     Neurological: She is alert and oriented to person, place, and time. She displays no tremor.  She displays no seizure activity. Coordination and gait normal.   Skin: Skin is intact. No rash noted. No pallor.         ED Course   Procedures  Labs Reviewed   URINALYSIS, REFLEX TO URINE CULTURE - Abnormal       Result Value    Specimen UA Urine, Clean Catch      Color, UA Colorless (*)     Appearance, UA Clear      pH, UA 6.0      Specific Gravity, UA 1.010      Protein, UA Negative      Glucose, UA Negative      Ketones, UA Negative      Bilirubin (UA) Negative      Occult Blood UA Trace (*)     Nitrite, UA Negative      Urobilinogen, UA Negative      Leukocytes, UA Trace (*)     Narrative:     Specimen Source->Urine   URINALYSIS MICROSCOPIC - Abnormal    RBC, UA 4      WBC, UA 6 (*)     Bacteria Rare      Microscopic Comment SEE COMMENT      Narrative:     Specimen Source->Urine   CBC W/ AUTO DIFFERENTIAL - Abnormal    WBC 7.45      RBC 4.52      Hemoglobin 14.2      Hematocrit 40.2      MCV 89      MCH 31.4 (*)     MCHC 35.3      RDW 11.8      Platelets 252      MPV 9.5      Immature Granulocytes 0.4      Gran # (ANC) 4.5      Immature Grans (Abs) 0.03      Lymph # 2.2      Mono # 0.6      Eos # 0.1      Baso # 0.04      nRBC 0      Gran % 60.1      Lymph % 29.8      Mono % 7.5      Eosinophil % 1.7      Basophil % 0.5      Differential Method Automated     COMPREHENSIVE METABOLIC PANEL - Abnormal    Sodium 139      Potassium 3.9      Chloride 106      CO2 24      Glucose 107      BUN 13      Creatinine 0.8      Calcium 9.3      Total Protein 7.2      Albumin 4.3      Total Bilirubin 0.3      Alkaline Phosphatase 55      AST 24      ALT 50 (*)     eGFR >60      Anion Gap 9     LACTIC ACID, PLASMA    Lactate (Lactic Acid) 0.9     C. TRACHOMATIS/N. GONORRHOEAE BY AMP DNA          Imaging Results              CT Abdomen Pelvis With IV Contrast NO Oral Contrast (Final result)  Result time 12/09/24 20:39:08      Final result by Sara Horner MD (12/09/24 20:39:08)                   Impression:      No acute  abdominal or pelvic pathology CT of the abdomen and pelvis with contrast.    Hepatosplenomegaly.  Hepatic steatosis.    Left nephrolithiasis.    Cholecystectomy.  Hysterectomy.  Bilateral breast prostheses.      Electronically signed by: Sara Horner  Date:    12/09/2024  Time:    20:39               Narrative:    EXAMINATION:  CT OF ABDOMEN PELVIS WITH    CLINICAL HISTORY:  RLQ abdominal pain (Age >= 14y);UTI, recurrent/complicated (Female);    TECHNIQUE:  5 mm enhanced axial images were obtained from the lung bases through the greater trochanters.  Seventy-five mL of Omnipaque 350 was injected.    COMPARISON:  07/27/2024    FINDINGS:  The liver and the spleen are enlarged.  There is fatty infiltration of the liver.    Pancreas, right kidney and adrenal glands are unremarkable. The gallbladder is surgically absent.    There is a 4 mm nonobstructing left lower pole renal calculus.    There is no definite evidence for abdominal adenopathy or ascites.  There is a prominent left periaortic lymph node measuring up to 8 mm.    There are no pelvic masses or adenopathy.  The uterus is surgically absent.  The appendix is not inflamed.    There is no free fluid in the pelvis.    There is mild bibasilar atelectasis.  There are bilateral breast prostheses.    There are degenerative changes of the lower lumbar spine and sacroiliac joints.                                       Medications   acetaminophen tablet 1,000 mg (1,000 mg Oral Given 12/9/24 1825)   sodium chloride 0.9% bolus 1,000 mL 1,000 mL (0 mLs Intravenous Stopped 12/9/24 1945)   cefTRIAXone injection 2 g (2 g Intravenous Given 12/9/24 1837)   ondansetron injection 8 mg (8 mg Intravenous Given 12/9/24 1832)   iohexoL (OMNIPAQUE 350) injection 75 mL (75 mLs Intravenous Given 12/9/24 1910)     Medical Decision Making  In the absence of other findings, may have ascending UTI. Fever noted but no leukocytosis or lactic acidosis suggestive of urosepsis at this time.   No evidence of acute appendicitis and Alexander = 3; unlikely appendicitis.  No signs of other acute process.  Remainder of workup essentially unremarkable.  Feeling better in the ED with antibiotics.    Amount and/or Complexity of Data Reviewed  External Data Reviewed: notes.     Details: See HPI.  Labs: ordered. Decision-making details documented in ED Course.  Radiology: ordered. Decision-making details documented in ED Course.    Risk  Prescription drug management.            Scribe Attestation:   Scribe #1: I performed the above scribed service and the documentation accurately describes the services I performed. I attest to the accuracy of the note.                               Clinical Impression:  Final diagnoses:  [N39.0] Acute UTI (Primary)  [R10.31] RLQ abdominal pain  [M54.50] Acute bilateral low back pain without sciatica       I, Manjinder Coronel PA-C, personally performed the services described in this documentation. All medical record entries made by the scribe were at my direction and in my presence. I have reviewed the chart and agree that the record reflects my personal performance and is accurate and complete.      DISCLAIMER: This note was prepared with [employer] voice recognition transcription software. Garbled syntax, mangled pronouns, and other bizarre constructions may be attributed to that software system.     ED Disposition Condition    Discharge Stable          ED Prescriptions       Medication Sig Dispense Start Date End Date Auth. Provider    cephALEXin (KEFLEX) 500 MG capsule Take 2 capsules (1,000 mg total) by mouth every 12 (twelve) hours. for 10 days 40 capsule 12/9/2024 12/19/2024 Manjinder Coronel PA-C    ondansetron (ZOFRAN) 4 MG tablet Take 2 tablets (8 mg total) by mouth every 6 (six) hours as needed for Nausea. 30 tablet 12/9/2024 1/8/2025 Manjinder Coronel PA-C    fluconazole (DIFLUCAN) 200 MG Tab Take 1 tablet (200 mg total) by mouth once daily. for 1 day 1 tablet 12/9/2024  12/10/2024 Manjinder Coronel PA-C          Follow-up Information       Follow up With Specialties Details Why Contact Info    Hermes Colvin MD Family Medicine Schedule an appointment as soon as possible for a visit in 1 day For re-evaluation [address] [place] [address]  [phone]      [facility] Emergency Medicine Go to  If symptoms worsen or new symptoms develop [address] [facility] [place] [id number] [phone]             Manjinder Coronel PA-C  12/09/24 2146

## 2024-12-14 LAB
C TRACH DNA SPEC QL NAA+PROBE: NOT DETECTED
N GONORRHOEA DNA SPEC QL NAA+PROBE: NOT DETECTED

## 2024-12-16 ENCOUNTER — E-CONSULT (OUTPATIENT)
Dept: PHARMACY | Facility: CLINIC | Age: 44
End: 2024-12-16
Payer: COMMERCIAL

## 2024-12-16 ENCOUNTER — OFFICE VISIT (OUTPATIENT)
Dept: FAMILY MEDICINE | Facility: CLINIC | Age: 44
End: 2024-12-16
Payer: COMMERCIAL

## 2024-12-16 VITALS
SYSTOLIC BLOOD PRESSURE: 106 MMHG | TEMPERATURE: 98 F | HEART RATE: 100 BPM | HEIGHT: 65 IN | BODY MASS INDEX: 26.38 KG/M2 | DIASTOLIC BLOOD PRESSURE: 68 MMHG | OXYGEN SATURATION: 98 % | WEIGHT: 158.31 LBS

## 2024-12-16 DIAGNOSIS — E66.811 CLASS 1 OBESITY WITH SERIOUS COMORBIDITY AND BODY MASS INDEX (BMI) OF 30.0 TO 30.9 IN ADULT, UNSPECIFIED OBESITY TYPE: ICD-10-CM

## 2024-12-16 DIAGNOSIS — F39 MOOD DISORDER: ICD-10-CM

## 2024-12-16 DIAGNOSIS — Z00.00 ANNUAL PHYSICAL EXAM: Primary | ICD-10-CM

## 2024-12-16 DIAGNOSIS — F39 MOOD DISORDER: Primary | ICD-10-CM

## 2024-12-16 DIAGNOSIS — Z12.31 OTHER SCREENING MAMMOGRAM: ICD-10-CM

## 2024-12-16 DIAGNOSIS — R16.2 HEPATOSPLENOMEGALY: ICD-10-CM

## 2024-12-16 PROCEDURE — 99999 PR PBB SHADOW E&M-EST. PATIENT-LVL IV: CPT | Mod: PBBFAC,,, | Performed by: FAMILY MEDICINE

## 2024-12-16 PROCEDURE — 3074F SYST BP LT 130 MM HG: CPT | Mod: CPTII,S$GLB,, | Performed by: FAMILY MEDICINE

## 2024-12-16 PROCEDURE — 99396 PREV VISIT EST AGE 40-64: CPT | Mod: S$GLB,,, | Performed by: FAMILY MEDICINE

## 2024-12-16 PROCEDURE — 3008F BODY MASS INDEX DOCD: CPT | Mod: CPTII,S$GLB,, | Performed by: FAMILY MEDICINE

## 2024-12-16 PROCEDURE — 1159F MED LIST DOCD IN RCRD: CPT | Mod: CPTII,S$GLB,, | Performed by: FAMILY MEDICINE

## 2024-12-16 PROCEDURE — 3078F DIAST BP <80 MM HG: CPT | Mod: CPTII,S$GLB,, | Performed by: FAMILY MEDICINE

## 2024-12-16 RX ORDER — TIRZEPATIDE 2.5 MG/.5ML
2.5 INJECTION, SOLUTION SUBCUTANEOUS
Qty: 2 ML | Refills: 0 | Status: SHIPPED | OUTPATIENT
Start: 2024-12-16 | End: 2024-12-18

## 2024-12-16 NOTE — PROGRESS NOTES
"Chief Complaint   Patient presents with    Results       SUBJECTIVE:   44 y.o. female for annual routine checkup.    Current Outpatient Medications   Medication Sig Dispense Refill    ALPRAZolam (XANAX) 1 MG tablet Take 1 tablet (1 mg total) by mouth 2 (two) times daily. 60 tablet 2    cephALEXin (KEFLEX) 500 MG capsule Take 2 capsules (1,000 mg total) by mouth every 12 (twelve) hours. for 10 days 40 capsule 0    EPINEPHrine (EPIPEN 2-LUCIANA) 0.3 mg/0.3 mL AtIn Inject 0.6 mLs (0.6 mg total) into the muscle once. for 1 dose 0.3 mL 1    lisdexamfetamine (VYVANSE) 20 MG capsule Take 1 capsule (20 mg total) by mouth every morning. 30 capsule 0    ondansetron (ZOFRAN) 4 MG tablet Take 2 tablets (8 mg total) by mouth every 6 (six) hours as needed for Nausea. 30 tablet 0    tirzepatide (MOUNJARO) 2.5 mg/0.5 mL PnIj Inject 2.5 mg into the skin every 7 days. 2 mL 0    tretinoin (RETIN-A) 0.1 % cream Apply topically every evening. 45 g 1     No current facility-administered medications for this visit.     Allergies: Strawberry, Morphine, Toradol [ketorolac], and Tramadol   Patient's last menstrual period was 01/17/2021 (exact date).    ROS:  Feeling well. No dyspnea or chest pain on exertion.  No abdominal pain, change in bowel habits, black or bloody stools.  No urinary tract symptoms. GYN ROS: normal menses, no abnormal bleeding, pelvic pain or discharge, no breast pain or new or enlarging lumps on self exam. No neurological complaints.    OBJECTIVE:   The patient appears well, alert, oriented x 3, in no distress.  /68   Pulse 100   Temp 98.2 °F (36.8 °C) (Oral)   Ht 5' 5" (1.651 m)   Wt 71.8 kg (158 lb 4.6 oz)   LMP 01/17/2021 (Exact Date) Comment: [facility] 2/10/21  SpO2 98%   BMI 26.34 kg/m²   Wt Readings from Last 5 Encounters:   12/16/24 71.8 kg (158 lb 4.6 oz)   12/09/24 71.2 kg (157 lb)   10/24/24 71.5 kg (157 lb 10.1 oz)   10/17/24 66.2 kg (146 lb)   09/19/24 71.2 kg (157 lb)       ENT normal.  Neck supple. No " adenopathy or thyromegaly. SOPHIE. Lungs are clear, good air entry, no wheezes, rhonchi or rales. S1 and S2 normal, no murmurs, regular rate and rhythm. Abdomen soft without tenderness, guarding, mass or organomegaly. Extremities show no edema, normal peripheral pulses. Neurological is normal, no focal findings.      BREAST EXAM: deferred    PELVIC EXAM: deferred    ASSESSMENT:   1. Annual physical exam    2. Other screening mammogram    3. Mood disorder    4. Hepatosplenomegaly    5. Class 1 obesity with serious comorbidity and body mass index (BMI) of 30.0 to 30.9 in adult, unspecified obesity type          PLAN:   Counseled on age appropriate medical preventative services, including age appropriate cancer screenings, over all nutritional health, need for a consistent exercise regimen and an over all push towards maintaining a vigorous and active lifestyle.  Counseled on age appropriate vaccines and discussed upcoming health care needs based on age/gender.  Spent time with patient counseling on need for a good patient/doctor relationship moving forward.  Discussed use of common OTC medications and supplements.  Discussed common dietary aids and use of caffeine and the need for good sleep hygiene and stress management.    Problem List Items Addressed This Visit       Mood disorder    Relevant Orders    E-Consult to Pharmacy    Hepatosplenomegaly    Relevant Medications    tirzepatide (MOUNJARO) 2.5 mg/0.5 mL PnIj    Class 1 obesity with serious comorbidity and body mass index (BMI) of 30.0 to 30.9 in adult    Overview     [facility], 280 dollars for 3-6 months depending on dose you need.  I will fax prescription.  Call them before going to  to be sure it is ready.  It is in [place].  [phone]  Start with 0.12 ml weekly for 2 weeks then check in with me on here on how you are doing.  Thyroid cancer and pancreatitis are black box warnings but the risk was in rats for cancer and diabetics for  pancreatitis.  Make sure to get a good bowel regimen.  I recommend stool softener daily and stimulant laxative every 2-3 days if no good bowel movement.           Relevant Medications    tirzepatide (MOUNJARO) 2.5 mg/0.5 mL PnIj     Other Visit Diagnoses       Annual physical exam    -  Primary    Other screening mammogram        Relevant Orders    Mammo Digital Screening Bilat w/ Satya            F/u in 1 year for wellness

## 2024-12-17 NOTE — CONSULTS
[facility] - Pharmacy/Wellness  Response for E-Consult     Patient Name: Roslyn Vera  MRN: [id number]  Primary Care Provider: Hermes Colvin MD   Requesting Provider: Hermes Colvin MD  Consults    Recommendation: Per the genetic lab testing results, the best SSRI or SNRI medication options for her mood disorder are as follows:  SSRI: fluoxetine seems to be the best option overall. The patient was on this medication for a while but was it discontinued a few times for reason therapy completed. It appears to have worked at least a little bit and side effects did not appear to be an issue. If you choose to restart it, you can aim for a higher maintenance dose for possible improved outcomes. Start at 10 mg daily then titrate up by adding 10 mg per week until reaching a maintenance dose of up to 50 - 60 mg per day. Be sure to monitor for signs and symptoms of serotonin syndrome since patient is also taking amphetamines and Zofran.  SNRI: desvenlafaxine is the second-best option but should be used with caution. The patient has also tried this medication previously but the reason why she stopped taking it isn't stated. If you choose to restart this medication, then you can also aim for a higher maintenance dose for possible improved outcomes. Start at 50 mg daily, then after 1 week you may increase to 100 mg once daily. But desvenlafaxine may precipitate a mixed/manic episode in patients with bipolar disorder, so if use then watch for signs of mike/hypomania, and be sure to monitor for signs and symptoms of serotonin syndrome since patient is also taking amphetamines and Zofran.    Contingency that warrants a repeat eConsult or referral: none    Total time of Consultation: 60 minute    I did not speak to the requesting provider verbally about this.     *This eConsult is based on the clinical data available to me and is furnished without benefit of a physical examination. The eConsult will need to be interpreted in  light of any clinical issues or changes in patient status not available to me at the time of filing this eConsults. Significant changes in patient condition or level of acuity should result in immediate formal consultation and reevaluation. Please alert me if you have further questions.    Thank you for this eConsult referral.     Mian Renee, PharmD  [facility] - Pharmacy/Wellness

## 2024-12-18 ENCOUNTER — PATIENT MESSAGE (OUTPATIENT)
Dept: FAMILY MEDICINE | Facility: CLINIC | Age: 44
End: 2024-12-18
Payer: COMMERCIAL

## 2024-12-18 DIAGNOSIS — R16.2 HEPATOSPLENOMEGALY: ICD-10-CM

## 2024-12-18 DIAGNOSIS — E66.811 CLASS 1 OBESITY WITH SERIOUS COMORBIDITY AND BODY MASS INDEX (BMI) OF 30.0 TO 30.9 IN ADULT, UNSPECIFIED OBESITY TYPE: ICD-10-CM

## 2024-12-18 RX ORDER — TIRZEPATIDE 2.5 MG/.5ML
2.5 INJECTION, SOLUTION SUBCUTANEOUS
Qty: 2 ML | Refills: 0 | Status: CANCELLED | OUTPATIENT
Start: 2024-12-18

## 2024-12-18 NOTE — TELEPHONE ENCOUNTER
----- Message from Turner sent at 12/18/2024  3:20 PM CST -----  Regarding: self    Type: Patient Call Back    Who called: self    What is the request in detail: Patients medication, tirzepatide 15 mg/0.5 mL PnIj, was sent to the wrong pharmacy. Please call patient to clarify.     Can the clinic reply by [facility]? No    Would the patient rather a call back or a response via [facility]? Call back    Best call back number: [phone]        [facility] [address], [address] [place] [address], [address] [address]  Phone: [phone] Fax: [phone]            Additional Information: Patient wants meds to go to the [facility] pharmacy    Thank you.

## 2024-12-19 DIAGNOSIS — F41.9 ANXIETY: Primary | ICD-10-CM

## 2024-12-19 RX ORDER — FLUOXETINE 10 MG/1
CAPSULE ORAL
Qty: 70 CAPSULE | Refills: 0 | Status: SHIPPED | OUTPATIENT
Start: 2024-12-19 | End: 2025-01-16

## 2025-01-06 ENCOUNTER — PROCEDURE VISIT (OUTPATIENT)
Dept: NEUROLOGY | Facility: CLINIC | Age: 45
End: 2025-01-06
Payer: COMMERCIAL

## 2025-01-06 DIAGNOSIS — G56.03 BILATERAL CARPAL TUNNEL SYNDROME: ICD-10-CM

## 2025-01-06 PROCEDURE — 95886 MUSC TEST DONE W/N TEST COMP: CPT | Mod: S$GLB,,, | Performed by: PHYSICAL MEDICINE & REHABILITATION

## 2025-01-06 PROCEDURE — 95911 NRV CNDJ TEST 9-10 STUDIES: CPT | Mod: S$GLB,,, | Performed by: PHYSICAL MEDICINE & REHABILITATION

## 2025-01-06 NOTE — PROGRESS NOTES
Assessment: 44 y.o. female with B wrist pain    I explained my diagnostic impression and the reasoning behind it in detail, using layman's terms.      Plan:   - numbness is not primary complaint today - complaining of bilateral wrist, hand and forearm pain  - Meloxicam - check in with PCP   - Labs   - Will message with results     All questions were answered in detail. The patient is in full agreement with the treatment plan and will proceed accordingly.    Chief Complaint   Patient presents with    Right Hand - Pain     Initial visit (11/11/24): Roslyn Vera is a 44 y.o. 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   Today is complaining of pain over dorsal hand - radiates into fingers, distal extensor wad  Goes through flares - worse for a few days  Better with bracing   Has tried taking NSAIDs but not for a consistent period of time   Has history of VARUN but recent labs are normal      This is the extent of the patient's complaints at this time.      Review of patient's allergies indicates:   Allergen Reactions    Strawberry Anaphylaxis and Hives    Morphine Other (See Comments)     Burns stomach    Toradol [ketorolac]      cramping    Tramadol      Abdominal pain       Current Outpatient Medications:     ALPRAZolam (XANAX) 1 MG tablet, Take 1 tablet (1 mg total) by mouth 2 (two) times daily., Disp: 60 tablet, Rfl: 2    EPINEPHrine (EPIPEN 2-LUCIANA) 0.3 mg/0.3 mL  AtIn, Inject 0.6 mLs (0.6 mg total) into the muscle once. for 1 dose, Disp: 0.3 mL, Rfl: 1    FLUoxetine 10 MG capsule, Take 1 capsule (10 mg total) by mouth once daily for 7 days, THEN 2 capsules (20 mg total) once daily for 7 days, THEN 3 capsules (30 mg total) once daily for 7 days, THEN 4 capsules (40 mg total) once daily for 7 days., Disp: 70 capsule, Rfl: 0    lisdexamfetamine (VYVANSE) 20 MG capsule, Take 1 capsule (20 mg total) by mouth every morning., Disp: 30 capsule, Rfl: 0    ondansetron (ZOFRAN) 4 MG tablet, Take 2 tablets (8 mg total) by mouth every 6 (six) hours as needed for Nausea., Disp: 30 tablet, Rfl: 0    tirzepatide 15 mg/0.5 mL PnIj, Compounded equivalent 20 mg/ml, inject 10 units SC weekly to start, maximum dose 75 units SC weekly, Disp: 3 mL, Rfl: 5    tretinoin (RETIN-A) 0.1 % cream, Apply topically every evening., Disp: 45 g, Rfl: 1    Physical Exam:   Vitals:    01/08/25 1314   PainSc:   2   PainLoc: Hand     General:  Patient is alert, awake and oriented to time, place and person. Mood and affect are appropriate.  Patient does not appear to be in any distress, denies any constitutional symptoms and appears stated age.   HEENT:  Pupils are equal and round, sclera are not injected. External examination of ears and nose reveals no abnormalities. Cranial nerves II-X are grossly intact  Skin:  no rashes, abrasions or open wounds on the affected extremity   Resp:  No respiratory distress or audible wheezing   CV: 2+  pulses, all extremities warm and well perfused   Bilateral Hand/Wrist Examination:    Observation/Inspection:  Swelling  none    Deformity  none  Discoloration  none     Scars   none    Atrophy  none    HAND/WRIST EXAMINATION:  Finkelstein's Test   Neg  WHAT Test    Neg  Snuff box tenderness   Neg  Muhammad's Test    Neg  Hook of Hamate Tenderness  Neg  CMC grind    Neg  Circumduction test   Neg    Neurovascular Exam:  Digits WWP, brisk CR < 3s throughout  NVI motor/LTS to M/R/U  nerves, radial pulse 2+  Tinel's Test - Carpal Tunnel  Neg  Tinel's Test - Cubital Tunnel  Neg  Phalen's Test    positive bilaterally  Median Nerve Compression Test positive bilaterally    No effusion   Non tender over dorsal hands  Non tender over ECRB     ROM hand/wrist/elbow full, painless     Imaging:  Three views of the bilateral wrists are negative for degenerative changes or fracture    I personally reviewed and interpreted the patient's imaging obtained today in clinic     This note was created by combination of typed  and M-Modal dictation. Transcription and phonetic errors may be present.  If there are any questions, please contact me.    Past Medical History:   Diagnosis Date    Abnormal uterine bleeding 4/18/2018    Acute encephalopathy 12/24/12    secondary to drug overdose    ARF (acute renal failure) 12/24/12    secondary to drug overdose    Cigarette nicotine dependence without complication 1/10/2013    History of cardiac arrest 12/24/12    secondary to drug overdose    Hx of psychiatric care     Hypertension     Kidney stones 1/8/2018    MRSA (methicillin resistant Staphylococcus aureus) 12/24/12    Nephritis     Polysubstance abuse 12/24/12    Psychiatric problem     Systolic CHF, acute 12/24/12    secondary to drug overdose    Therapy     when younger; recently set up with Cassie Rockweiler, LCSW    UTI (urinary tract infection)        Active Problem List with Overview Notes    Diagnosis Date Noted    Bipolar II disorder 10/24/2024    Attention deficit disorder (ADD) in adult 12/18/2023     Used wellbutrin and strattera  Didn't help sufficient, strattera made her tired      Class 1 obesity with serious comorbidity and body mass index (BMI) of 30.0 to 30.9 in adult 02/11/2023     [facility], 280 dollars for 3-6 months depending on dose you need.  I will fax prescription.  Call them before going to  to be sure it is ready.  It is in [employer].  [phone]  Start with  0.12 ml weekly for 2 weeks then check in with me on here on how you are doing.  Thyroid cancer and pancreatitis are black box warnings but the risk was in rats for cancer and diabetics for pancreatitis.  Make sure to get a good bowel regimen.  I recommend stool softener daily and stimulant laxative every 2-3 days if no good bowel movement.        Tobacco abuse, in remission 11/30/2022    Status post laparoscopic hysterectomy 02/10/2021    GERD (gastroesophageal reflux disease) 01/02/2020    Hepatosplenomegaly 11/18/2019    Mood disorder 05/10/2017    Anxiety 06/28/2016    PMDD (premenstrual dysphoric disorder) 07/23/2015       Past Surgical History:   Procedure Laterality Date    AUGMENTATION OF BREAST Bilateral 2001    CHOLECYSTECTOMY      COLONOSCOPY N/A 9/25/2024    Procedure: COLONOSCOPY;  Surgeon: Carley Forbes MD;  Location: [facility] ENDO;  Service: Endoscopy;  Laterality: N/A;  Lauren KEANE PA-C, peg ext , portal. Patient request date.ASam  9/19/24- pt moved to earlier date, instr to portal. DBM    ESOPHAGOGASTRODUODENOSCOPY N/A 01/02/2020    Procedure: EGD (ESOPHAGOGASTRODUODENOSCOPY);  Surgeon: Moe Tripathi MD;  Location: [facility] ENDO;  Service: Endoscopy;  Laterality: N/A;    HYSTERECTOMY  2020    LAPAROSCOPIC SALPINGECTOMY Left 02/10/2021    Procedure: SALPINGECTOMY, LAPAROSCOPIC;  Surgeon: Doc Pena MD;  Location: [facility] OR;  Service: OB/GYN;  Laterality: Left;    LAPAROSCOPIC TOTAL HYSTERECTOMY N/A 02/10/2021    Procedure: HYSTERECTOMY, TOTAL, LAPAROSCOPIC;  Surgeon: Doc Pena MD;  Location: [facility] OR;  Service: OB/GYN;  Laterality: N/A;  RN PREOP 2/3/, --COVID NEGATIVE ON 2/9 and T/S done    TUBAL LIGATION      urinary stents         Family History   Problem Relation Name Age of Onset    Kidney disease Mother W     Kidney disease Daughter W     Cancer Maternal Grandmother W         throat    Cancer Maternal Grandfather W         prostate    Breast cancer Neg Hx      Colon cancer Neg Hx       Anxiety disorder Neg Hx      Depression Neg Hx      Dementia Neg Hx      Suicide Neg Hx

## 2025-01-06 NOTE — PROCEDURES
Test Date:  2025    Patient: jackeline vera : 1980 Physician: Jose Tuttle D.O.   ID#: [id number] SEX: Female Ref. Phys: Mikayla Mao MD     HPI: Jackeline Vera is a 44 y.o.female who presents for NCS/EMG to evaluate for CTS bilaterally.    PROCEDURE:  Prior to the procedure, the procedure was discussed in detail with the patient.  All questions were answered, and verbal consent was obtained.  For nerve conduction studies, a combination of surface electrodes, bar electrodes, and/or ring electrodes were used as needed.  For needle EMG, each site was cleaned and prepped in usual fashion with an alcohol pad.  A monopolar needle (28G) was used.  There was no significant bleeding, and bandages were applied as needed.  The procedure was tolerated without adverse effect.  The patient was instructed on post-procedure care including ice if needed for 10-15 minutes up to 4 times/day for any sore muscles.  I discussed with the patient that the data would be reviewed and a report sent to the referring provider, where any follow up questions regarding next steps should be directed.        NCV & EMG Findings:  All nerve conduction studies (as indicated in the following tables) were within normal limits.  All examined muscles (as indicated in the following table) showed no evidence of electrical instability.      IMPRESSIONS:  This is a normal electrophysiologic study of the bilateral upper extremities.          ___________________________  Jose Tuttle D.O.        NCS+  Motor Nerve Results      Latency Amplitude F-Lat Segment Distance CV Comment   Site (ms) Norm (mV) Norm (ms)  (cm) (m/s) Norm    Left Median (APB)   Wrist 3.4  < 4.4 6.5  > 4.2         Elbow 6.6 - 6.9 -  Elbow-Wrist 20 63  > 51    Right Median (APB)   Wrist 3.6  < 4.4 5.7  > 4.2         Elbow 7.0 - 5.7 -  Elbow-Wrist 23 68  > 51    Left Ulnar (ADM)   Wrist 2.5  < 3.7 9.0  > 3.0         Bel Elbow 6.2 - 8.3 -  Bel Elbow-Wrist 23  62  > 52    Abv Elbow 7.9 - 7.5 -  Abv Elbow-Bel Elbow 10 59  > 43    Right Ulnar (ADM)   Wrist 2.6  < 3.7 10.6  > 3.0         Bel Elbow 6.0 - 7.4 -  Bel Elbow-Wrist 22 65  > 52    Abv Elbow 7.6 - 7.2 -  Abv Elbow-Bel Elbow 9 56  > 43      Sensory Nerve Results      Start Lat Latency (Peak) Amplitude (P-P) Segment Distance Start CV Comment   Site (ms) Norm (ms) (µV) Norm  (cm) (m/s) Norm    Left Median   Wrist-Dig I 2.3 - 3.0 67 - Wrist-Dig I 10 43 -    Wrist-Dig II 2.5  < 3.3 3.3 72  > 13 Wrist-Dig II 14 56  > 42    Palm-Wrist 1.45 - 1.93 54 - Palm-Wrist - - -    Right Median   Wrist-Dig I 2.0 - 2.6 61 - Wrist-Dig I 10 50 -    Wrist-Dig II 2.4  < 3.3 3.2 48  > 13 Wrist-Dig II 14 58  > 42    Palm-Wrist 1.28 - 1.75 32 - Palm-Wrist - - -    Left Ulnar (Rec:Wrist)   Palm 1.50 - 2.1 26 - Palm-Wrist - - -    Right Ulnar (Rec:Wrist)   Palm 1.28 - 1.83 6 - Palm-Wrist - - -    Left Radial (Rec:Dig I)   Wrist 2.3 - 3.1 10 - Wrist-Dig I 10 43 -    Right Radial (Rec:Dig I)   Wrist 2.2 - 2.8 11 - Wrist-Dig I 10 45 -      Inter-Nerve Comparisons     Nerve 1 Value 1 Nerve 2 Value 2 Parameter Result Normal   Sensory Sites   R Median Wrist-Dig I 2.6 ms R Radial Wrist-Dig I 2.8 ms Peak Lat Diff 0.20 ms <0.40   L Median Wrist-Dig I 3.0 ms L Radial Wrist-Dig I 3.1 ms Peak Lat Diff 0.10 ms <0.40   R Median Palm-Wrist 1.75 ms R Ulnar Palm-Wrist 1.83 ms Peak Lat Diff 0.08 ms <0.30   L Median Palm-Wrist 1.93 ms L Ulnar Palm-Wrist 2.1 ms Peak Lat Diff 0.17 ms <0.30     EMG+     Side Muscle Nerve Root Ins Act Fibs Psw Amp Dur Poly Recrt Int Pat Comment   Right Deltoid Axillary C5-C6 Nml Nml Nml Nml Nml 0 Nml Nml    Right Biceps Musculocut C5-C6 Nml Nml Nml Nml Nml 0 Nml Nml    Right Triceps Radial C6-C8 Nml Nml Nml Nml Nml 0 Nml Nml    Right Pronator Teres Median C6-C7 Nml Nml Nml Nml Nml 0 Nml Nml    Right FDI Ulnar C8-T1 Nml Nml Nml Nml Nml 0 Nml Nml            Waveforms:    Motor              Sensory

## 2025-01-08 ENCOUNTER — OFFICE VISIT (OUTPATIENT)
Dept: ORTHOPEDICS | Facility: CLINIC | Age: 45
End: 2025-01-08
Payer: COMMERCIAL

## 2025-01-08 DIAGNOSIS — M25.532 BILATERAL WRIST PAIN: Primary | ICD-10-CM

## 2025-01-08 DIAGNOSIS — M25.531 BILATERAL WRIST PAIN: Primary | ICD-10-CM

## 2025-01-08 PROCEDURE — 99999 PR PBB SHADOW E&M-EST. PATIENT-LVL III: CPT | Mod: PBBFAC,,, | Performed by: ORTHOPAEDIC SURGERY

## 2025-01-08 PROCEDURE — 99213 OFFICE O/P EST LOW 20 MIN: CPT | Mod: S$GLB,,, | Performed by: ORTHOPAEDIC SURGERY

## 2025-01-08 PROCEDURE — 1160F RVW MEDS BY RX/DR IN RCRD: CPT | Mod: CPTII,S$GLB,, | Performed by: ORTHOPAEDIC SURGERY

## 2025-01-08 PROCEDURE — 1159F MED LIST DOCD IN RCRD: CPT | Mod: CPTII,S$GLB,, | Performed by: ORTHOPAEDIC SURGERY

## 2025-01-08 RX ORDER — MELOXICAM 15 MG/1
15 TABLET ORAL DAILY
Qty: 30 TABLET | Refills: 0 | Status: SHIPPED | OUTPATIENT
Start: 2025-01-08

## 2025-01-28 ENCOUNTER — PATIENT MESSAGE (OUTPATIENT)
Dept: FAMILY MEDICINE | Facility: CLINIC | Age: 45
End: 2025-01-28
Payer: COMMERCIAL

## 2025-01-28 DIAGNOSIS — G47.00 INSOMNIA, UNSPECIFIED TYPE: ICD-10-CM

## 2025-01-28 DIAGNOSIS — F39 MOOD DISORDER: Primary | ICD-10-CM

## 2025-01-29 ENCOUNTER — HOSPITAL ENCOUNTER (OUTPATIENT)
Dept: RADIOLOGY | Facility: HOSPITAL | Age: 45
Discharge: HOME | End: 2025-01-29
Attending: FAMILY MEDICINE
Payer: COMMERCIAL

## 2025-01-29 DIAGNOSIS — Z12.31 OTHER SCREENING MAMMOGRAM: ICD-10-CM

## 2025-01-29 PROCEDURE — 77067 SCR MAMMO BI INCL CAD: CPT | Mod: 26,,, | Performed by: RADIOLOGY

## 2025-01-29 PROCEDURE — 77067 SCR MAMMO BI INCL CAD: CPT | Mod: TC

## 2025-01-29 PROCEDURE — 77063 BREAST TOMOSYNTHESIS BI: CPT | Mod: 26,,, | Performed by: RADIOLOGY

## 2025-02-08 ENCOUNTER — HOSPITAL ENCOUNTER (EMERGENCY)
Facility: HOSPITAL | Age: 45
Discharge: HOME | End: 2025-02-08
Attending: EMERGENCY MEDICINE
Payer: COMMERCIAL

## 2025-02-08 VITALS
TEMPERATURE: 98 F | WEIGHT: 145 LBS | BODY MASS INDEX: 26.68 KG/M2 | OXYGEN SATURATION: 100 % | SYSTOLIC BLOOD PRESSURE: 131 MMHG | HEART RATE: 108 BPM | HEIGHT: 62 IN | RESPIRATION RATE: 16 BRPM | DIASTOLIC BLOOD PRESSURE: 63 MMHG

## 2025-02-08 DIAGNOSIS — R10.32 LEFT LOWER QUADRANT ABDOMINAL PAIN: Primary | ICD-10-CM

## 2025-02-08 LAB
ALBUMIN SERPL BCP-MCNC: 4.1 G/DL (ref 3.5–5.2)
ALP SERPL-CCNC: 49 U/L (ref 40–150)
ALT SERPL W/O P-5'-P-CCNC: 41 U/L (ref 10–44)
ANION GAP SERPL CALC-SCNC: 7 MMOL/L (ref 8–16)
AST SERPL-CCNC: 24 U/L (ref 10–40)
BACTERIA #/AREA URNS HPF: ABNORMAL /HPF
BASOPHILS # BLD AUTO: 0.03 K/UL (ref 0–0.2)
BASOPHILS NFR BLD: 0.4 % (ref 0–1.9)
BILIRUB SERPL-MCNC: 0.4 MG/DL (ref 0.1–1)
BILIRUB UR QL STRIP: NEGATIVE
BUN SERPL-MCNC: 11 MG/DL (ref 6–20)
CALCIUM SERPL-MCNC: 8.9 MG/DL (ref 8.7–10.5)
CHLORIDE SERPL-SCNC: 104 MMOL/L (ref 95–110)
CLARITY UR: CLEAR
CO2 SERPL-SCNC: 28 MMOL/L (ref 23–29)
COLOR UR: YELLOW
CREAT SERPL-MCNC: 0.7 MG/DL (ref 0.5–1.4)
DIFFERENTIAL METHOD BLD: ABNORMAL
EOSINOPHIL # BLD AUTO: 0.1 K/UL (ref 0–0.5)
EOSINOPHIL NFR BLD: 1.7 % (ref 0–8)
ERYTHROCYTE [DISTWIDTH] IN BLOOD BY AUTOMATED COUNT: 12.2 % (ref 11.5–14.5)
EST. GFR  (NO RACE VARIABLE): >60 ML/MIN/1.73 M^2
GLUCOSE SERPL-MCNC: 90 MG/DL (ref 70–110)
GLUCOSE UR QL STRIP: NEGATIVE
HCT VFR BLD AUTO: 37.8 % (ref 37–48.5)
HGB BLD-MCNC: 13.2 G/DL (ref 12–16)
HGB UR QL STRIP: NEGATIVE
IMM GRANULOCYTES # BLD AUTO: 0.04 K/UL (ref 0–0.04)
IMM GRANULOCYTES NFR BLD AUTO: 0.6 % (ref 0–0.5)
KETONES UR QL STRIP: NEGATIVE
LEUKOCYTE ESTERASE UR QL STRIP: ABNORMAL
LIPASE SERPL-CCNC: 29 U/L (ref 4–60)
LYMPHOCYTES # BLD AUTO: 2.6 K/UL (ref 1–4.8)
LYMPHOCYTES NFR BLD: 36.4 % (ref 18–48)
MCH RBC QN AUTO: 31.4 PG (ref 27–31)
MCHC RBC AUTO-ENTMCNC: 34.9 G/DL (ref 32–36)
MCV RBC AUTO: 90 FL (ref 82–98)
MICROSCOPIC COMMENT: ABNORMAL
MONOCYTES # BLD AUTO: 0.6 K/UL (ref 0.3–1)
MONOCYTES NFR BLD: 7.7 % (ref 4–15)
NEUTROPHILS # BLD AUTO: 3.9 K/UL (ref 1.8–7.7)
NEUTROPHILS NFR BLD: 53.2 % (ref 38–73)
NITRITE UR QL STRIP: NEGATIVE
NRBC BLD-RTO: 0 /100 WBC
PH UR STRIP: 7 [PH] (ref 5–8)
PLATELET # BLD AUTO: 218 K/UL (ref 150–450)
PMV BLD AUTO: 9.3 FL (ref 9.2–12.9)
POTASSIUM SERPL-SCNC: 4.2 MMOL/L (ref 3.5–5.1)
PROT SERPL-MCNC: 6.6 G/DL (ref 6–8.4)
PROT UR QL STRIP: NEGATIVE
RBC # BLD AUTO: 4.2 M/UL (ref 4–5.4)
RBC #/AREA URNS HPF: 2 /HPF (ref 0–4)
SODIUM SERPL-SCNC: 139 MMOL/L (ref 136–145)
SP GR UR STRIP: 1.02 (ref 1–1.03)
SQUAMOUS #/AREA URNS HPF: 9 /HPF
URN SPEC COLLECT METH UR: ABNORMAL
UROBILINOGEN UR STRIP-ACNC: NEGATIVE EU/DL
WBC # BLD AUTO: 7.26 K/UL (ref 3.9–12.7)
WBC #/AREA URNS HPF: 6 /HPF (ref 0–5)
WBC CLUMPS URNS QL MICRO: ABNORMAL

## 2025-02-08 PROCEDURE — 80053 COMPREHEN METABOLIC PANEL: CPT | Performed by: EMERGENCY MEDICINE

## 2025-02-08 PROCEDURE — 99285 EMERGENCY DEPT VISIT HI MDM: CPT | Mod: 25

## 2025-02-08 PROCEDURE — 83690 ASSAY OF LIPASE: CPT | Performed by: EMERGENCY MEDICINE

## 2025-02-08 PROCEDURE — 81000 URINALYSIS NONAUTO W/SCOPE: CPT | Performed by: EMERGENCY MEDICINE

## 2025-02-08 PROCEDURE — 63600175 PHARM REV CODE 636 W HCPCS: Performed by: PHYSICIAN ASSISTANT

## 2025-02-08 PROCEDURE — 96375 TX/PRO/DX INJ NEW DRUG ADDON: CPT

## 2025-02-08 PROCEDURE — 96374 THER/PROPH/DIAG INJ IV PUSH: CPT

## 2025-02-08 PROCEDURE — 85025 COMPLETE CBC W/AUTO DIFF WBC: CPT | Performed by: EMERGENCY MEDICINE

## 2025-02-08 RX ORDER — METHOCARBAMOL 750 MG/1
750 TABLET, FILM COATED ORAL 3 TIMES DAILY PRN
Qty: 15 TABLET | Refills: 0 | Status: SHIPPED | OUTPATIENT
Start: 2025-02-08 | End: 2025-02-13

## 2025-02-08 RX ORDER — ONDANSETRON HYDROCHLORIDE 2 MG/ML
4 INJECTION, SOLUTION INTRAVENOUS
Status: COMPLETED | OUTPATIENT
Start: 2025-02-08 | End: 2025-02-08

## 2025-02-08 RX ORDER — HYDROMORPHONE HYDROCHLORIDE 1 MG/ML
0.5 INJECTION, SOLUTION INTRAMUSCULAR; INTRAVENOUS; SUBCUTANEOUS
Status: COMPLETED | OUTPATIENT
Start: 2025-02-08 | End: 2025-02-08

## 2025-02-08 RX ORDER — DICYCLOMINE HYDROCHLORIDE 20 MG/1
20 TABLET ORAL 2 TIMES DAILY PRN
Qty: 20 TABLET | Refills: 0 | Status: SHIPPED | OUTPATIENT
Start: 2025-02-08 | End: 2025-03-10

## 2025-02-08 RX ADMIN — ONDANSETRON 4 MG: 2 INJECTION INTRAMUSCULAR; INTRAVENOUS at 08:02

## 2025-02-08 RX ADMIN — HYDROMORPHONE HYDROCHLORIDE 0.5 MG: 1 INJECTION, SOLUTION INTRAMUSCULAR; INTRAVENOUS; SUBCUTANEOUS at 08:02

## 2025-02-09 RX ORDER — QUETIAPINE FUMARATE 25 MG/1
25-50 TABLET, FILM COATED ORAL NIGHTLY
Qty: 60 TABLET | Refills: 11 | Status: SHIPPED | OUTPATIENT
Start: 2025-02-09 | End: 2026-02-09

## 2025-02-09 NOTE — DISCHARGE INSTRUCTIONS
I a.m. uncertain what is causing your symptoms today.  There are no obstructing stones or stones that are moving that would be causing pain.  No signs of infection.  No signs of diverticulitis.  Not concerned for pelvic etiology as you have had a full hysterectomy.  This could be gas.  Could be musculoskeletal pain.  It is important that you follow up with your primary care doctor.  Return to the emergency department with any worsening symptoms or concerns.  Thank you for allowing us to take care of you today.
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
## 2025-03-03 NOTE — DISCHARGE INSTRUCTIONS
"  Migraine Headache: Stages and Treatment  A migraine headache tends to progress in stages. Learning these stages can help you better understand what is happening. Then you can learn ways to reduce pain and relieve other symptoms. Methods for relieving your symptoms include self-care and medicines.  Migraine stages  Migraines tend to progress through 4 stages. Many people don't have all stages, and stages may differ with each headache:  · Prodrome. A few hours to a day or so before the headache, you may feel tired, (yawning many times), uneasy, or lopez. You may also feel bloated or crave certain foods.  · Aura. Up to an hour before the headache starts, some migraine sufferers experience aura--flashing lights, blind spots, other vision problems, confusion, difficulty speaking, or other neurologic symptoms.  · Headache. Moderate to severe pain affects one side of the head and then can spread to both sides, often along with nausea. You may be highly sensitive to light, sound, and odors. Vomiting or diarrhea may also happen. This stage lasts 4 to 72 hours.  · Postdrome. After your headache ends, you may feel tired, achy, and "washed out." This may last for a day or so.    Self-care during a migraine  Here is what you can do:  · Use a cold compress. Wrap a thin cloth around a cold pack, a cold can of soda, or a bag of frozen vegetables. Apply this to your temple or other pain site.  · Drink fluids. If nausea makes it hard to drink, try sucking on ice.  · Rest. If possible, lie down. Try not to bend over, as this may increase your pain. Sometimes laying in a dark quiet room can help the migraine from being aggravated.    · Try caffeine. Some people find that drinking fluids with caffeine, such as coffee or tea, helps to lessen migraine pain.  Using medicines  Work with your healthcare provider to find the right medicines for you. Medicines for migraine may relieve pain (analgesics), relieve nausea, or attack the " migraine's root causes (migraine-specific medicines).  Rebound headache  Taking analgesics each day, or even several times a week, may lead to more frequent and severe headaches. These are called rebound headaches. If you think you're having rebound headaches, tell your healthcare provider. He or she can help you safely decrease your medicine. Rebound caffeine withdrawal headaches can also happen.    Date Last Reviewed: 10/9/2015  © 6736-0369 [employer]. [address] [place] [address], [place] [address]. All rights reserved. This information is not intended as a substitute for professional medical care. Always follow your healthcare professional's instructions.         Resident

## 2025-03-24 ENCOUNTER — OFFICE VISIT (OUTPATIENT)
Dept: GASTROENTEROLOGY | Facility: CLINIC | Age: 45
End: 2025-03-24
Payer: COMMERCIAL

## 2025-03-24 ENCOUNTER — LAB VISIT (OUTPATIENT)
Dept: LAB | Facility: HOSPITAL | Age: 45
End: 2025-03-24
Attending: INTERNAL MEDICINE
Payer: COMMERCIAL

## 2025-03-24 VITALS
HEART RATE: 91 BPM | WEIGHT: 152.56 LBS | SYSTOLIC BLOOD PRESSURE: 119 MMHG | BODY MASS INDEX: 28.07 KG/M2 | HEIGHT: 62 IN | DIASTOLIC BLOOD PRESSURE: 80 MMHG

## 2025-03-24 DIAGNOSIS — K59.04 CHRONIC IDIOPATHIC CONSTIPATION: ICD-10-CM

## 2025-03-24 DIAGNOSIS — K59.04 CHRONIC IDIOPATHIC CONSTIPATION: Primary | ICD-10-CM

## 2025-03-24 LAB
EAG (OHS): 88 MG/DL (ref 68–131)
HBA1C MFR BLD: 4.7 % (ref 4–5.6)
T4 FREE SERPL-MCNC: 0.99 NG/DL (ref 0.71–1.51)
TSH SERPL-ACNC: 2.08 UIU/ML (ref 0.4–4)

## 2025-03-24 PROCEDURE — 99999 PR PBB SHADOW E&M-EST. PATIENT-LVL III: CPT | Mod: PBBFAC,,, | Performed by: INTERNAL MEDICINE

## 2025-03-24 PROCEDURE — 36415 COLL VENOUS BLD VENIPUNCTURE: CPT

## 2025-03-24 PROCEDURE — 84439 ASSAY OF FREE THYROXINE: CPT

## 2025-03-24 PROCEDURE — 84443 ASSAY THYROID STIM HORMONE: CPT

## 2025-03-24 PROCEDURE — 83036 HEMOGLOBIN GLYCOSYLATED A1C: CPT

## 2025-03-24 NOTE — PROGRESS NOTES
Reason for visit: Left sided abdominal pain    HPI:  is a 44 year old lady with recent ER visit in Feb 2025 with LLQ abdominal pain. Her symptoms started suddenly and described as sharp in nature. Reported the pain as intermittent. CT renal stone protocol revealed two nonobstructing left renal stones with no acute intra-abdominal abnormalities. Previously seen a year ago by a GI specialist for evaluation of chronic constipation. Colonoscopy in 2024 revealed two small hyperplastic polyps. History of cholecystectomy, hysterectomy and bilateral breast prostheses.     Usually has small, pebble-like stool for the past 2 years.  Endorses abdominal pain, abdominal distension, hemorrhoids with rectal bleeding, nausea, and lack of appetite. States she feels pain on her sides because periumbilical region sensation is still decreased from her tummy tuck surgery. Pt has tried enemas, colace, probiotics, and Linzess 145 mcg with no improvement. Previously tried Linzess 290 mcg as well. Uses enemas to help with BMs.Takes stool softener bid, Linzess daily and drinks water and takes probiotics daily. Eats high fiber diet. Denies any dysphagia, odynophagia, nausea, vomiting, heartburn or acid regurgitation. No blood/ mucus in stool or unintentional weight loss. No melena or maroon stools. No recent changes in diet or medications. No family history of IBD, Celiac disease or GI malignancy. No regular NSAIDs, alcohol or tobacco use. No recent antibiotic use, travels or sick contacts. No prior history of C.diff.    Past medical, surgical, social and family history reviewed in epic    Medication allergies reviewed in epic    Review of systems:    Constitutional:  No fever, no chills, no weight loss, appetite is normal  Eyes:  No visual changes or red eyes  ENT:  No odynophagia or hoarseness of voice  Cardiovascular:  No angina or palpitation  Respiratory:  No shortness breath or wheezing  Genitourinary:  No dysuria or  frequency  Musculoskeletal:  No myalgias or arthralgias  Skin:  No pruritus or eczema  Neurologic:  No headache or seizures  Psychiatric:  No anxiety depression  Gastrointestinal:  See HPI    Physical exam:  Vitals see epic, awake, alert, oriented x3 in no acute distress    Neck:  Supple, no carotid bruit, no cervical adenopathy  Abdomen:  Obese, soft, nontender, nondistended, no masses palpable, no hepatosplenomegaly detected, bowel sounds are normal, no ascites clinically detectable  Eyes:  Conjunctivae anicteric, not injected  ENT:  Oral mucosa moist  Cardiovascular:  S1, S2 normal, no murmurs, no gallops, no abdominal bruits heard  Respiratory:  Bilateral air entry equal, no rhonchi, no crackles, normal effort  Skin:  No palmar erythema or spider angiomata  Neurologic:  No asterixis or tremors  Psychiatric:  Affect appropriate, proper judgment, proper insight, oriented to place and time  Lower extremities:  No pedal edema    Recent labs, imaging and endoscopy reports reviewed.      Impression: Chronic idiopathic constipation    Recommendations:     Senokot S 2-3 caps nightly  Linzess 290 mcg po daily  Stop Colace bid  TSH, Free T4, HbA1c  Follow up in 2 months

## 2025-03-25 ENCOUNTER — TELEPHONE (OUTPATIENT)
Dept: GASTROENTEROLOGY | Facility: CLINIC | Age: 45
End: 2025-03-25
Payer: COMMERCIAL

## 2025-03-25 ENCOUNTER — RESULTS FOLLOW-UP (OUTPATIENT)
Dept: GASTROENTEROLOGY | Facility: CLINIC | Age: 45
End: 2025-03-25

## 2025-03-25 ENCOUNTER — PATIENT MESSAGE (OUTPATIENT)
Dept: GASTROENTEROLOGY | Facility: CLINIC | Age: 45
End: 2025-03-25
Payer: COMMERCIAL

## 2025-03-25 NOTE — TELEPHONE ENCOUNTER
----- Message from Kush Mccormack MD sent at 3/25/2025  7:28 AM CDT -----  Thyroid function is normal and no diabetes mellitus.  ----- Message -----  From: Lab, Background User  Sent: 3/24/2025   7:23 PM CDT  To: Kush Mccormack MD

## 2025-04-05 DIAGNOSIS — F41.9 ANXIETY: ICD-10-CM

## 2025-04-05 NOTE — TELEPHONE ENCOUNTER
No care due was identified.  [facility] Embedded Care Due Messages. Reference number: [id number].   4/05/2025 9:48:31 AM CDT

## 2025-04-07 RX ORDER — ALPRAZOLAM 1 MG/1
1 TABLET ORAL 2 TIMES DAILY
Qty: 60 TABLET | Refills: 2 | Status: SHIPPED | OUTPATIENT
Start: 2025-04-07

## 2025-05-02 DIAGNOSIS — N95.2 VAGINAL ATROPHY: ICD-10-CM

## 2025-05-02 RX ORDER — CONJUGATED ESTROGENS 0.62 MG/G
CREAM VAGINAL
Qty: 30 G | Refills: 11 | Status: SHIPPED | OUTPATIENT
Start: 2025-05-02

## 2025-05-02 NOTE — TELEPHONE ENCOUNTER
No care due was identified.  [facility] Embedded Care Due Messages. Reference number: [id number].   5/02/2025 10:52:31 AM CDT

## 2025-05-19 ENCOUNTER — OFFICE VISIT (OUTPATIENT)
Dept: GASTROENTEROLOGY | Facility: CLINIC | Age: 45
End: 2025-05-19
Payer: COMMERCIAL

## 2025-05-19 VITALS
WEIGHT: 156.75 LBS | DIASTOLIC BLOOD PRESSURE: 73 MMHG | HEART RATE: 96 BPM | SYSTOLIC BLOOD PRESSURE: 111 MMHG | HEIGHT: 62 IN | BODY MASS INDEX: 28.84 KG/M2

## 2025-05-19 DIAGNOSIS — K59.00 CONSTIPATION, UNSPECIFIED CONSTIPATION TYPE: Primary | ICD-10-CM

## 2025-05-19 PROCEDURE — 3044F HG A1C LEVEL LT 7.0%: CPT | Mod: CPTII,S$GLB,, | Performed by: INTERNAL MEDICINE

## 2025-05-19 PROCEDURE — 3008F BODY MASS INDEX DOCD: CPT | Mod: CPTII,S$GLB,, | Performed by: INTERNAL MEDICINE

## 2025-05-19 PROCEDURE — 99214 OFFICE O/P EST MOD 30 MIN: CPT | Mod: S$GLB,,, | Performed by: INTERNAL MEDICINE

## 2025-05-19 PROCEDURE — 3074F SYST BP LT 130 MM HG: CPT | Mod: CPTII,S$GLB,, | Performed by: INTERNAL MEDICINE

## 2025-05-19 PROCEDURE — 99999 PR PBB SHADOW E&M-EST. PATIENT-LVL III: CPT | Mod: PBBFAC,,, | Performed by: INTERNAL MEDICINE

## 2025-05-19 PROCEDURE — 3078F DIAST BP <80 MM HG: CPT | Mod: CPTII,S$GLB,, | Performed by: INTERNAL MEDICINE

## 2025-05-19 PROCEDURE — 1160F RVW MEDS BY RX/DR IN RCRD: CPT | Mod: CPTII,S$GLB,, | Performed by: INTERNAL MEDICINE

## 2025-05-19 PROCEDURE — 1159F MED LIST DOCD IN RCRD: CPT | Mod: CPTII,S$GLB,, | Performed by: INTERNAL MEDICINE

## 2025-05-19 RX ORDER — POLYETHYLENE GLYCOL 3350, SODIUM SULFATE ANHYDROUS, SODIUM BICARBONATE, SODIUM CHLORIDE, POTASSIUM CHLORIDE 236; 22.74; 6.74; 5.86; 2.97 G/4L; G/4L; G/4L; G/4L; G/4L
4 POWDER, FOR SOLUTION ORAL ONCE
Qty: 4000 ML | Refills: 0 | Status: SHIPPED | OUTPATIENT
Start: 2025-05-19 | End: 2025-05-19

## 2025-05-19 RX ORDER — PLECANATIDE 3 MG/1
3 TABLET ORAL DAILY
Qty: 30 TABLET | Refills: 2 | Status: SHIPPED | OUTPATIENT
Start: 2025-05-19

## 2025-05-19 NOTE — PROGRESS NOTES
Reason for visit: Left sided abdominal pain     HPI:  is a 44 year old lady was seen in March after an ER visit in Feb 2025 with LLQ abdominal pain. Her symptoms started suddenly and described as sharp in nature. Reported the pain as being intermittent. CT renal stone protocol revealed two nonobstructing left renal stones with no acute intra-abdominal abnormalities. Previously seen a year ago by a GI specialist for evaluation of chronic constipation. Colonoscopy in 2024 revealed two small hyperplastic polyps. History of cholecystectomy, hysterectomy and bilateral breast prostheses.      Usually has small, pebble-like stool for the past 2 years.  Endorses abdominal pain, abdominal distension, hemorrhoids with rectal bleeding, nausea, and lack of appetite. States she feels pain on her sides because the sensation in the periumbilical region is still diminished following her tummy tuck surgery. Pt has tried enemas, colace, probiotics, and Linzess 145 mcg with no improvement. Previously tried Linzess 290 mcg as well. Uses enemas to help with BMs.Takes stool softener bid, Linzess daily and drinks water and takes probiotics daily. Eats high fiber diet. Denies any dysphagia, odynophagia, nausea, vomiting, heartburn or acid regurgitation. No blood/ mucus in stool or unintentional weight loss. No melena or maroon stools. No recent changes in diet or medications. No family history of IBD, Celiac disease or GI malignancy. No regular NSAIDs, alcohol or tobacco use. No recent antibiotic use, travels or sick contacts. No prior history of C.diff.    TSH, Free T4 and HbA1c was fine. Has tried Linzess 290 mcg daily without any improvement.     Past medical, surgical, social and family history reviewed in epic     Medication allergies reviewed in epic     Review of systems:     Constitutional:  No fever, no chills, no weight loss, appetite is normal  Eyes:  No visual changes or red eyes  ENT:  No odynophagia or hoarseness  of voice  Cardiovascular:  No angina or palpitation  Respiratory:  No shortness breath or wheezing  Genitourinary:  No dysuria or frequency  Musculoskeletal:  No myalgias or arthralgias  Skin:  No pruritus or eczema  Neurologic:  No headache or seizures  Psychiatric:  No anxiety depression  Gastrointestinal:  See HPI     Physical exam:  Vitals see epic, awake, alert, oriented x3 in no acute distress     Neck:  Supple, no carotid bruit, no cervical adenopathy  Abdomen:  Obese, soft, nontender, nondistended, no masses palpable, no hepatosplenomegaly detected, bowel sounds are normal, no ascites clinically detectable  Eyes:  Conjunctivae anicteric, not injected  ENT:  Oral mucosa moist  Cardiovascular:  S1, S2 normal, no murmurs, no gallops, no abdominal bruits heard  Respiratory:  Bilateral air entry equal, no rhonchi, no crackles, normal effort  Skin:  No palmar erythema or spider angiomata  Neurologic:  No asterixis or tremors  Psychiatric:  Affect appropriate, proper judgment, proper insight, oriented to place and time  Lower extremities:  No pedal edema     Recent labs, imaging and endoscopy reports reviewed.        Impression: Chronic idiopathic constipation     Recommendations:      Senokot S 2-3 caps nightly  Discontinue Linzess and try Plecanatide 3 mg daily  Florastor 1 capsule daily  Golytely for colonic lavage  Virtual visit in 2 months

## 2025-05-19 NOTE — PATIENT INSTRUCTIONS
"Take 1 Florastor capsule (probiotic) daily  Take Senokot S 2-3 caps nightly  Add Trulance (prescription) daily for constipation    Golytely (colon prep) for a "clean out".  "

## 2025-05-20 ENCOUNTER — TELEPHONE (OUTPATIENT)
Dept: ENDOSCOPY | Facility: HOSPITAL | Age: 45
End: 2025-05-20
Payer: COMMERCIAL

## 2025-05-20 ENCOUNTER — PATIENT MESSAGE (OUTPATIENT)
Dept: GASTROENTEROLOGY | Facility: CLINIC | Age: 45
End: 2025-05-20
Payer: COMMERCIAL

## 2025-05-20 NOTE — TELEPHONE ENCOUNTER
Called pt. To assist with issue. Pt. Stated she is not scheudled for any procedure. Dr. Mccormack's clinic phone number provided to pt.

## 2025-06-03 ENCOUNTER — OFFICE VISIT (OUTPATIENT)
Dept: OBSTETRICS AND GYNECOLOGY | Facility: CLINIC | Age: 45
End: 2025-06-03
Payer: COMMERCIAL

## 2025-06-03 ENCOUNTER — TELEPHONE (OUTPATIENT)
Dept: GASTROENTEROLOGY | Facility: CLINIC | Age: 45
End: 2025-06-03
Payer: COMMERCIAL

## 2025-06-03 VITALS
DIASTOLIC BLOOD PRESSURE: 68 MMHG | BODY MASS INDEX: 28.8 KG/M2 | HEIGHT: 62 IN | SYSTOLIC BLOOD PRESSURE: 114 MMHG | WEIGHT: 156.5 LBS

## 2025-06-03 DIAGNOSIS — Z01.419 WELL WOMAN EXAM WITH ROUTINE GYNECOLOGICAL EXAM: Primary | ICD-10-CM

## 2025-06-03 DIAGNOSIS — N81.10 CYSTOCELE WITH RECTOCELE: ICD-10-CM

## 2025-06-03 DIAGNOSIS — N81.6 CYSTOCELE WITH RECTOCELE: ICD-10-CM

## 2025-06-03 DIAGNOSIS — L70.9 ACNE, UNSPECIFIED ACNE TYPE: ICD-10-CM

## 2025-06-03 PROBLEM — E66.811 CLASS 1 OBESITY WITH SERIOUS COMORBIDITY AND BODY MASS INDEX (BMI) OF 30.0 TO 30.9 IN ADULT: Status: RESOLVED | Noted: 2023-02-11 | Resolved: 2025-06-03

## 2025-06-03 PROCEDURE — 99999 PR PBB SHADOW E&M-EST. PATIENT-LVL III: CPT | Mod: PBBFAC,,, | Performed by: OBSTETRICS & GYNECOLOGY

## 2025-06-03 RX ORDER — NORGESTIMATE AND ETHINYL ESTRADIOL 7DAYSX3 28
1 KIT ORAL DAILY
Qty: 30 TABLET | Refills: 11 | Status: SHIPPED | OUTPATIENT
Start: 2025-06-03 | End: 2026-06-03

## 2025-06-03 RX ORDER — SPIRONOLACTONE 50 MG/1
50 TABLET, FILM COATED ORAL
COMMUNITY
Start: 2025-05-02

## 2025-06-03 RX ORDER — DICYCLOMINE HYDROCHLORIDE 10 MG/1
10 CAPSULE ORAL 4 TIMES DAILY
COMMUNITY
Start: 2025-04-05

## 2025-06-03 RX ORDER — DOXYCYCLINE HYCLATE 100 MG
TABLET ORAL
COMMUNITY
Start: 2025-05-02

## 2025-06-15 ENCOUNTER — PATIENT MESSAGE (OUTPATIENT)
Dept: FAMILY MEDICINE | Facility: CLINIC | Age: 45
End: 2025-06-15
Payer: COMMERCIAL

## 2025-06-16 ENCOUNTER — TELEPHONE (OUTPATIENT)
Dept: EMERGENCY MEDICINE | Facility: HOSPITAL | Age: 45
End: 2025-06-16
Payer: COMMERCIAL

## 2025-06-16 ENCOUNTER — HOSPITAL ENCOUNTER (EMERGENCY)
Facility: HOSPITAL | Age: 45
Discharge: HOME | End: 2025-06-16
Attending: EMERGENCY MEDICINE
Payer: COMMERCIAL

## 2025-06-16 VITALS
RESPIRATION RATE: 20 BRPM | DIASTOLIC BLOOD PRESSURE: 73 MMHG | WEIGHT: 152 LBS | OXYGEN SATURATION: 99 % | SYSTOLIC BLOOD PRESSURE: 125 MMHG | BODY MASS INDEX: 27.8 KG/M2 | HEART RATE: 94 BPM | TEMPERATURE: 98 F

## 2025-06-16 DIAGNOSIS — N20.0 LEFT NEPHROLITHIASIS: ICD-10-CM

## 2025-06-16 DIAGNOSIS — N83.202 LEFT OVARIAN CYST: ICD-10-CM

## 2025-06-16 DIAGNOSIS — N83.202 LEFT OVARIAN CYST: Primary | ICD-10-CM

## 2025-06-16 LAB
ABSOLUTE EOSINOPHIL (OHS): 0.09 K/UL
ABSOLUTE MONOCYTE (OHS): 0.51 K/UL (ref 0.3–1)
ABSOLUTE NEUTROPHIL COUNT (OHS): 4.64 K/UL (ref 1.8–7.7)
ALBUMIN SERPL BCP-MCNC: 4.2 G/DL (ref 3.5–5.2)
ALP SERPL-CCNC: 50 UNIT/L (ref 40–150)
ALT SERPL W/O P-5'-P-CCNC: 36 UNIT/L (ref 10–44)
ANION GAP (OHS): 9 MMOL/L (ref 8–16)
AST SERPL-CCNC: 20 UNIT/L (ref 11–45)
BASOPHILS # BLD AUTO: 0.03 K/UL
BASOPHILS NFR BLD AUTO: 0.4 %
BILIRUB SERPL-MCNC: 0.4 MG/DL (ref 0.1–1)
BILIRUB UR QL STRIP.AUTO: NEGATIVE
BUN SERPL-MCNC: 12 MG/DL (ref 6–20)
CALCIUM SERPL-MCNC: 9.4 MG/DL (ref 8.7–10.5)
CHLORIDE SERPL-SCNC: 103 MMOL/L (ref 95–110)
CLARITY UR: CLEAR
CO2 SERPL-SCNC: 25 MMOL/L (ref 23–29)
COLOR UR AUTO: YELLOW
CREAT SERPL-MCNC: 0.7 MG/DL (ref 0.5–1.4)
ERYTHROCYTE [DISTWIDTH] IN BLOOD BY AUTOMATED COUNT: 12.2 % (ref 11.5–14.5)
GFR SERPLBLD CREATININE-BSD FMLA CKD-EPI: >60 ML/MIN/1.73/M2
GLUCOSE SERPL-MCNC: 98 MG/DL (ref 70–110)
GLUCOSE UR QL STRIP: NEGATIVE
HCT VFR BLD AUTO: 38.9 % (ref 37–48.5)
HGB BLD-MCNC: 13.7 GM/DL (ref 12–16)
HGB UR QL STRIP: ABNORMAL
HOLD SPECIMEN: NORMAL
IMM GRANULOCYTES # BLD AUTO: 0.04 K/UL (ref 0–0.04)
IMM GRANULOCYTES NFR BLD AUTO: 0.5 % (ref 0–0.5)
KETONES UR QL STRIP: NEGATIVE
LEUKOCYTE ESTERASE UR QL STRIP: NEGATIVE
LIPASE SERPL-CCNC: 39 U/L (ref 4–60)
LYMPHOCYTES # BLD AUTO: 2.06 K/UL (ref 1–4.8)
MCH RBC QN AUTO: 31.3 PG (ref 27–31)
MCHC RBC AUTO-ENTMCNC: 35.2 G/DL (ref 32–36)
MCV RBC AUTO: 89 FL (ref 82–98)
NITRITE UR QL STRIP: NEGATIVE
NUCLEATED RBC (/100WBC) (OHS): 0 /100 WBC
PH UR STRIP: 7 [PH]
PLATELET # BLD AUTO: 246 K/UL (ref 150–450)
PMV BLD AUTO: 9.6 FL (ref 9.2–12.9)
POTASSIUM SERPL-SCNC: 3.6 MMOL/L (ref 3.5–5.1)
PROT SERPL-MCNC: 7.3 GM/DL (ref 6–8.4)
PROT UR QL STRIP: NEGATIVE
RBC # BLD AUTO: 4.38 M/UL (ref 4–5.4)
RELATIVE EOSINOPHIL (OHS): 1.2 %
RELATIVE LYMPHOCYTE (OHS): 28 % (ref 18–48)
RELATIVE MONOCYTE (OHS): 6.9 % (ref 4–15)
RELATIVE NEUTROPHIL (OHS): 63 % (ref 38–73)
SODIUM SERPL-SCNC: 137 MMOL/L (ref 136–145)
SP GR UR STRIP: 1.01
UROBILINOGEN UR STRIP-ACNC: NEGATIVE EU/DL
WBC # BLD AUTO: 7.37 K/UL (ref 3.9–12.7)

## 2025-06-16 PROCEDURE — 81003 URINALYSIS AUTO W/O SCOPE: CPT

## 2025-06-16 PROCEDURE — 96372 THER/PROPH/DIAG INJ SC/IM: CPT | Performed by: NURSE PRACTITIONER

## 2025-06-16 PROCEDURE — 25000003 PHARM REV CODE 250: Performed by: NURSE PRACTITIONER

## 2025-06-16 PROCEDURE — 63600175 PHARM REV CODE 636 W HCPCS: Performed by: NURSE PRACTITIONER

## 2025-06-16 PROCEDURE — 85025 COMPLETE CBC W/AUTO DIFF WBC: CPT | Performed by: NURSE PRACTITIONER

## 2025-06-16 PROCEDURE — 83690 ASSAY OF LIPASE: CPT | Performed by: NURSE PRACTITIONER

## 2025-06-16 PROCEDURE — 84460 ALANINE AMINO (ALT) (SGPT): CPT | Performed by: NURSE PRACTITIONER

## 2025-06-16 PROCEDURE — 96375 TX/PRO/DX INJ NEW DRUG ADDON: CPT

## 2025-06-16 PROCEDURE — 25500020 PHARM REV CODE 255: Performed by: EMERGENCY MEDICINE

## 2025-06-16 PROCEDURE — 99285 EMERGENCY DEPT VISIT HI MDM: CPT | Mod: 25

## 2025-06-16 PROCEDURE — 96374 THER/PROPH/DIAG INJ IV PUSH: CPT

## 2025-06-16 RX ORDER — HYDROCODONE BITARTRATE AND ACETAMINOPHEN 5; 325 MG/1; MG/1
1 TABLET ORAL EVERY 6 HOURS PRN
Qty: 12 TABLET | Refills: 0 | Status: SHIPPED | OUTPATIENT
Start: 2025-06-16 | End: 2025-06-16 | Stop reason: SDUPTHER

## 2025-06-16 RX ORDER — NAPROXEN 500 MG/1
500 TABLET ORAL 2 TIMES DAILY WITH MEALS
Qty: 14 TABLET | Refills: 0 | Status: SHIPPED | OUTPATIENT
Start: 2025-06-16

## 2025-06-16 RX ORDER — HYDROCODONE BITARTRATE AND ACETAMINOPHEN 5; 325 MG/1; MG/1
1 TABLET ORAL
Refills: 0 | Status: COMPLETED | OUTPATIENT
Start: 2025-06-16 | End: 2025-06-16

## 2025-06-16 RX ORDER — ORPHENADRINE CITRATE 30 MG/ML
60 INJECTION INTRAMUSCULAR; INTRAVENOUS
Status: COMPLETED | OUTPATIENT
Start: 2025-06-16 | End: 2025-06-16

## 2025-06-16 RX ORDER — HYDROCODONE BITARTRATE AND ACETAMINOPHEN 5; 325 MG/1; MG/1
1 TABLET ORAL EVERY 6 HOURS PRN
Qty: 12 TABLET | Refills: 0 | Status: SHIPPED | OUTPATIENT
Start: 2025-06-16

## 2025-06-16 RX ORDER — NAPROXEN 500 MG/1
500 TABLET ORAL 2 TIMES DAILY WITH MEALS
Qty: 14 TABLET | Refills: 0 | Status: SHIPPED | OUTPATIENT
Start: 2025-06-16 | End: 2025-06-16

## 2025-06-16 RX ORDER — HYDROCODONE BITARTRATE AND ACETAMINOPHEN 5; 325 MG/1; MG/1
1 TABLET ORAL EVERY 6 HOURS PRN
Qty: 12 TABLET | Refills: 0 | Status: SHIPPED | OUTPATIENT
Start: 2025-06-16 | End: 2025-06-16

## 2025-06-16 RX ORDER — HYDROMORPHONE HYDROCHLORIDE 1 MG/ML
0.5 INJECTION, SOLUTION INTRAMUSCULAR; INTRAVENOUS; SUBCUTANEOUS
Status: COMPLETED | OUTPATIENT
Start: 2025-06-16 | End: 2025-06-16

## 2025-06-16 RX ORDER — ONDANSETRON HYDROCHLORIDE 2 MG/ML
4 INJECTION, SOLUTION INTRAVENOUS
Status: COMPLETED | OUTPATIENT
Start: 2025-06-16 | End: 2025-06-16

## 2025-06-16 RX ORDER — NAPROXEN 500 MG/1
500 TABLET ORAL 2 TIMES DAILY WITH MEALS
Qty: 14 TABLET | Refills: 0 | Status: SHIPPED | OUTPATIENT
Start: 2025-06-16 | End: 2025-06-16 | Stop reason: SDUPTHER

## 2025-06-16 RX ADMIN — ONDANSETRON 4 MG: 2 INJECTION INTRAMUSCULAR; INTRAVENOUS at 06:06

## 2025-06-16 RX ADMIN — HYDROMORPHONE HYDROCHLORIDE 0.5 MG: 1 INJECTION, SOLUTION INTRAMUSCULAR; INTRAVENOUS; SUBCUTANEOUS at 05:06

## 2025-06-16 RX ADMIN — IOHEXOL 75 ML: 350 INJECTION, SOLUTION INTRAVENOUS at 06:06

## 2025-06-16 RX ADMIN — ORPHENADRINE CITRATE 60 MG: 30 INJECTION, SOLUTION INTRAMUSCULAR; INTRAVENOUS at 05:06

## 2025-06-16 RX ADMIN — HYDROCODONE BITARTRATE AND ACETAMINOPHEN 1 TABLET: 5; 325 TABLET ORAL at 06:06

## 2025-06-16 NOTE — DISCHARGE INSTRUCTIONS
You have been prescribed naprosyn (naproxen sodium), an anti-inflammatory.  You have been given a medication that causes drowsiness.  Do not operate motor vehicles, drink alcohol, or operate heavy machinery while taking this medication. Take this medication whether you feel you need it or not.  Do not take ibuprofen, naproxen or other NSAID's medications while taking this medication. Return to the Emergency Department for any worsening, change in condition, or any emergent concerns.

## 2025-06-16 NOTE — ED PROVIDER NOTES
Encounter Date: 6/16/2025       History     Chief Complaint   Patient presents with    Back Pain     Lower back pain for 2 months progressively getting worse. Went to chiropractor last week. Now pain is worse.      Patient is a 44-year-old female who reports 2 months of lower back pain in the left side greater than the right that has worsened.  Reports it is cramping shooting and stabbing in character.  Denies alleviating or aggravating factors.  It radiates into the left lower quadrant of the abdomen is 8/10 severity.  Reports ibuprofen chiropractic manipulation Flexeril and Mobic were ineffective.  Endorses nonbloody diarrhea nausea frequency urgency and dysuria.  Denies fever vomiting constipation vaginal bleeding discharge and hematuria.    The history is provided by the patient. No  was used.     Review of patient's allergies indicates:   Allergen Reactions    Strawberry Anaphylaxis and Hives    Morphine Other (See Comments)     Burns stomach    Toradol [ketorolac]      cramping    Tramadol      Abdominal pain     Past Medical History:   Diagnosis Date    Abnormal uterine bleeding 4/18/2018    Acute encephalopathy 12/24/12    secondary to drug overdose    ARF (acute renal failure) 12/24/12    secondary to drug overdose    Cigarette nicotine dependence without complication 1/10/2013    History of cardiac arrest 12/24/12    secondary to drug overdose    Hx of psychiatric care     Hypertension     Kidney stones 1/8/2018    MRSA (methicillin resistant Staphylococcus aureus) 12/24/12    Nephritis     Polysubstance abuse 12/24/12    Psychiatric problem     Systolic CHF, acute 12/24/12    secondary to drug overdose    Therapy     when younger; recently set up with Cassie Rockweiler, LCSW    UTI (urinary tract infection)      Past Surgical History:   Procedure Laterality Date    AUGMENTATION OF BREAST Bilateral 2001    CHOLECYSTECTOMY      COLONOSCOPY N/A 9/25/2024    Procedure: COLONOSCOPY;   Surgeon: Carley Forbes MD;  Location: [facility] ENDO;  Service: Endoscopy;  Laterality: N/A;  Lauren TREVINO-NOEMY, peg ext , portal. Patient request date.ASam  9/19/24- pt moved to earlier date, instr to portal. DBM    ESOPHAGOGASTRODUODENOSCOPY N/A 01/02/2020    Procedure: EGD (ESOPHAGOGASTRODUODENOSCOPY);  Surgeon: Moe Tripathi MD;  Location: [facility] ENDO;  Service: Endoscopy;  Laterality: N/A;    HYSTERECTOMY  2020    LAPAROSCOPIC SALPINGECTOMY Left 02/10/2021    Procedure: SALPINGECTOMY, LAPAROSCOPIC;  Surgeon: Doc Pena MD;  Location: [facility] OR;  Service: OB/GYN;  Laterality: Left;    LAPAROSCOPIC TOTAL HYSTERECTOMY N/A 02/10/2021    Procedure: HYSTERECTOMY, TOTAL, LAPAROSCOPIC;  Surgeon: Doc Pena MD;  Location: [facility] OR;  Service: OB/GYN;  Laterality: N/A;  RN PREOP 2/3/, --COVID NEGATIVE ON 2/9 and T/S done    TUBAL LIGATION      urinary stents       Family History   Problem Relation Name Age of Onset    Kidney disease Mother W     Kidney disease Daughter W     Cancer Maternal Grandmother W         throat    Cancer Maternal Grandfather W         prostate    Breast cancer Neg Hx      Colon cancer Neg Hx      Anxiety disorder Neg Hx      Depression Neg Hx      Dementia Neg Hx      Suicide Neg Hx       Social History[1]  Review of Systems   Gastrointestinal:  Positive for abdominal pain and diarrhea.   Genitourinary:  Positive for dysuria, frequency and urgency.       Physical Exam     Initial Vitals [06/16/25 1624]   BP Pulse Resp Temp SpO2   129/86 104 18 98.1 °F (36.7 °C) 100 %      MAP       --         Physical Exam    Nursing note and vitals reviewed.  Constitutional: She appears well-developed and well-nourished.   HENT:   Head: Normocephalic and atraumatic.   Right Ear: External ear normal.   Left Ear: External ear normal.   Nose: Nose normal.   Eyes: Conjunctivae and EOM are normal. Pupils are equal, round, and reactive to light. Right eye exhibits no discharge. Left eye exhibits no discharge.    Neck:   Normal range of motion.  Abdominal: Abdomen is soft. Bowel sounds are normal. She exhibits no distension. There is abdominal tenderness in the left lower quadrant.   No right CVA tenderness.  No left CVA tenderness.   Musculoskeletal:         General: Normal range of motion.      Cervical back: Normal range of motion.     Neurological: She is alert and oriented to person, place, and time. She has normal strength. No cranial nerve deficit or sensory deficit. She exhibits normal muscle tone. She displays a negative Romberg sign. Coordination and gait normal. GCS eye subscore is 4. GCS verbal subscore is 5. GCS motor subscore is 6.   Equal  strength bilaterally, equal bicep flexion and tricep extension strength, leg extension and flexion strength appropriate and equal, foot plantar- and dorsi-flexion equal and appropriate   Skin: Skin is dry. Capillary refill takes less than 2 seconds. No rash noted.         ED Course   Procedures  Labs Reviewed   URINALYSIS, REFLEX TO URINE CULTURE - Abnormal       Result Value    Color, UA Yellow      Appearance, UA Clear      pH, UA 7.0      Spec Grav UA 1.015      Protein, UA Negative      Glucose, UA Negative      Ketones, UA Negative      Bilirubin, UA Negative      Blood, UA Trace (*)     Nitrites, UA Negative      Urobilinogen, UA Negative      Leukocyte Esterase, UA Negative     CBC WITH DIFFERENTIAL - Abnormal    WBC 7.37      RBC 4.38      HGB 13.7      HCT 38.9      MCV 89      MCH 31.3 (*)     MCHC 35.2      RDW 12.2      Platelet Count 246      MPV 9.6      Nucleated RBC 0      Neut % 63.0      Lymph % 28.0      Mono % 6.9      Eos % 1.2      Basophil % 0.4      Imm Grans % 0.5      Neut # 4.64      Lymph # 2.06      Mono # 0.51      Eos # 0.09      Baso # 0.03      Imm Grans # 0.04     COMPREHENSIVE METABOLIC PANEL - Normal    Sodium 137      Potassium 3.6      Chloride 103      CO2 25      Glucose 98      BUN 12      Creatinine 0.7      Calcium 9.4       Protein Total 7.3      Albumin 4.2      Bilirubin Total 0.4      ALP 50      AST 20      ALT 36      Anion Gap 9      eGFR >60     LIPASE - Normal    Lipase Level 39     CBC W/ AUTO DIFFERENTIAL    Narrative:     The following orders were created for panel order CBC auto differential.  Procedure                               Abnormality         Status                     ---------                               -----------         ------                     CBC with Differential[[id number]]       Abnormal            Final result                 Please view results for these tests on the individual orders.   GREY TOP URINE HOLD    Extra Tube Hold for add-ons.            Imaging Results              CT Abdomen Pelvis With IV Contrast NO Oral Contrast (Final result)  Result time 06/16/25 18:22:22      Final result by Mague Gardner MD (06/16/25 18:22:22)                   Impression:      1. No acute intra-abdominal abnormalities identified.  2. Nonobstructing left renal stone.  3. Small 2 cm left ovarian cyst or corpus luteum cyst.  4. Postsurgical changes and additional findings as detailed above.      Electronically signed by: Mague Gardner MD  Date:    06/16/2025  Time:    18:22               Narrative:    EXAMINATION:  CT ABDOMEN PELVIS WITH IV CONTRAST    CLINICAL HISTORY:  Flank pain, kidney stone suspected;LLQ abdominal pain;    TECHNIQUE:  Low dose axial images, sagittal and coronal reformations were obtained from the lung bases to the pubic symphysis following the IV administration of 75 mL of Omnipaque 350 .  Oral contrast was not given.    COMPARISON:  CT abdomen pelvis 02/08/2025.    FINDINGS:  The visualized portion of the heart is unremarkable.  The lung bases are clear.  Bilateral breast implants are partially visualized.    No significant hepatic abnormalities are identified.  There is no intra-or extrahepatic biliary ductal dilatation.  Gallbladder has been removed.  The stomach, pancreas, spleen,  and adrenal glands are unremarkable.    Kidneys enhance normally with no evidence of hydronephrosis.  Nonobstructing left renal stone is seen.  No abnormalities are seen along the ureteral courses.  Urinary bladder is unremarkable.  Uterus has been removed.  Small 2 cm left ovarian cyst or corpus luteum cyst is seen.  Otherwise no significant adnexal abnormalities are appreciated.    Appendix is visualized and is unremarkable.  The visualized loops of small and large bowel show no evidence of obstruction or inflammation.  No free air or free fluid.    Aorta tapers normally.    No acute osseous abnormality identified. Subcutaneous soft tissues show no significant abnormalities.                                       Medications   orphenadrine injection 60 mg (60 mg Intramuscular Given 6/16/25 1711)   HYDROmorphone injection 0.5 mg (0.5 mg Intravenous Given 6/16/25 1711)   iohexoL (OMNIPAQUE 350) injection 75 mL (75 mLs Intravenous Given 6/16/25 1808)   ondansetron injection 4 mg (4 mg Intravenous Given 6/16/25 1835)   HYDROcodone-acetaminophen 5-325 mg per tablet 1 tablet (1 tablet Oral Given 6/16/25 1835)     Medical Decision Making  Patient is a 44-year-old female who reports 2 months of lower back pain in the left side greater than the right that has worsened.  Reports it is cramping shooting and stabbing in character.  Denies alleviating or aggravating factors.  It radiates into the left lower quadrant of the abdomen is 8/10 severity.  Reports ibuprofen chiropractic manipulation Flexeril and Mobic were ineffective.  Endorses nonbloody diarrhea nausea frequency urgency and dysuria.  Denies fever vomiting constipation vaginal bleeding discharge and hematuria.    On physical exam the patient is afebrile nontoxic in no apparent distress the skin overlying the area of concern is without sign of rash.  Spine without tenderness or step-offs.  No CVA tenderness is noted.  There is tenderness in the left lower quadrant of  the abdomen the remainder is soft and nontender.    Differential diagnosis includes diverticulitis nephrolithiasis pyelonephritis    Problems Addressed:  Left nephrolithiasis: chronic illness or injury  Left ovarian cyst: acute illness or injury     Details: Patient was discharged on Naprosyn with a prescription for Norco drowsy warning provided.  Follow up with OBGYN.    Amount and/or Complexity of Data Reviewed  Labs: ordered. Decision-making details documented in ED Course.  Radiology: ordered. Decision-making details documented in ED Course.  Discussion of management or test interpretation with external provider(s): Vital signs at the time of disposition were:  /73 (BP Location: Left arm, Patient Position: Sitting)   Pulse 94   Temp 98.2 °F (36.8 °C) (Oral)   Resp 20   Wt 68.9 kg (152 lb)   LMP 01/17/2021 (Exact Date) Comment: TLH 2/10/21  SpO2 99%   Breastfeeding No   BMI 27.80 kg/m²       See AVS for additional recommendations. Medications listed herein were prescribed after reviewing the patient's allergies, medication list, history, most recent laboratories as available.  Referrals below were provided after reviewing the patient's previous medical providers. She understands she  should return for any worsening or changes in condition.  Prior to discharge the patient was asked if she  had any additional concerns or complaints and she declined. The patient was given an opportunity to ask questions and all were answered to her satisfaction.     Risk  Prescription drug management.  Diagnosis or treatment significantly limited by social determinants of health.               ED Course as of 06/16/25 2111 Mon Jun 16, 2025 1627 BP: 129/86 [VC]   1627 Temp: 98.1 °F (36.7 °C) [VC]   1627 Temp Source: Oral [VC]   1627 Pulse: 104 [VC]   1627 Resp: 18 [VC]   1627 SpO2: 100 % [VC]   1718 Urinalysis, Reflex to Urine Culture Urine, Clean Catch(!)  Normal ua. [VC]   1718 CBC auto differential(!)  Normal  cbc. [VC]   1718 Resp: 18 [VC]   1732 Comprehensive metabolic panel  Normal cmp. [VC]   1733 Lipase: 39 [VC]   1826 CT Abdomen Pelvis With IV Contrast NO Oral Contrast  1. No acute intra-abdominal abnormalities identified.  2. Nonobstructing left renal stone.  3. Small 2 cm left ovarian cyst or corpus luteum cyst.  4. Postsurgical changes and additional findings as detailed above.   [VC]      ED Course User Index  [VC] Curt Lozano DNP                           Clinical Impression:  Final diagnoses:  [N83.202] Left ovarian cyst (Primary)  [N20.0] Left nephrolithiasis          ED Disposition Condition    Discharge Stable          ED Prescriptions       Medication Sig Dispense Start Date End Date Auth. Provider    naproxen (NAPROSYN) 500 MG tablet  (Status: Discontinued) Take 1 tablet (500 mg total) by mouth 2 (two) times daily with meals. 14 tablet 6/16/2025 6/16/2025 Curt Lozano DNP    HYDROcodone-acetaminophen (NORCO) 5-325 mg per tablet  (Status: Discontinued) Take 1 tablet by mouth every 6 (six) hours as needed for Pain. 12 tablet 6/16/2025 6/16/2025 Curt Lozano DNP    HYDROcodone-acetaminophen (NORCO) 5-325 mg per tablet (Expires today) Take 1 tablet by mouth every 6 (six) hours as needed for Pain. 12 tablet 6/16/2025 6/16/2025 Curt Lozano DNP    naproxen (NAPROSYN) 500 MG tablet (Expires today) Take 1 tablet (500 mg total) by mouth 2 (two) times daily with meals. 14 tablet 6/16/2025 6/16/2025 Curt Lozano DNP          Follow-up Information       Follow up With Specialties Details Why Contact Info    Hermes Colvin MD Family Medicine Schedule an appointment as soon as possible for a visit   [address] [place] [address] [phone]                     [1]   Social History  Tobacco Use    Smoking status: Former     Types: Vaping with nicotine    Smokeless tobacco: Current   Substance Use Topics    Alcohol use: Yes     Comment: occasional; social    Drug  use: No     Types: Cocaine, Methamphetamines     Comment: PCP, patient states she had not done drugs since 2012        Curt Lozano, MIKE  06/16/25 2722

## 2025-06-23 ENCOUNTER — PATIENT MESSAGE (OUTPATIENT)
Dept: FAMILY MEDICINE | Facility: CLINIC | Age: 45
End: 2025-06-23
Payer: COMMERCIAL

## 2025-06-27 ENCOUNTER — PATIENT MESSAGE (OUTPATIENT)
Dept: FAMILY MEDICINE | Facility: CLINIC | Age: 45
End: 2025-06-27
Payer: COMMERCIAL

## 2025-06-27 DIAGNOSIS — F98.8 ATTENTION DEFICIT DISORDER (ADD) IN ADULT: ICD-10-CM

## 2025-06-27 NOTE — TELEPHONE ENCOUNTER
No care due was identified.  [facility] Embedded Care Due Messages. Reference number: [id number].   6/27/2025 4:09:41 PM CDT

## 2025-07-03 ENCOUNTER — TELEPHONE (OUTPATIENT)
Dept: GASTROENTEROLOGY | Facility: CLINIC | Age: 45
End: 2025-07-03
Payer: COMMERCIAL

## 2025-07-03 NOTE — TELEPHONE ENCOUNTER
MA called and tried to leave a voicemail but patient does not have a voicemail set up and this is the MA second time trying to reschedule appt because Provider will be out

## 2025-07-05 ENCOUNTER — E-VISIT (OUTPATIENT)
Dept: FAMILY MEDICINE | Facility: CLINIC | Age: 45
End: 2025-07-05
Payer: COMMERCIAL

## 2025-07-05 DIAGNOSIS — F98.8 ATTENTION DEFICIT DISORDER (ADD) IN ADULT: Primary | ICD-10-CM

## 2025-07-05 PROCEDURE — 99421 OL DIG E/M SVC 5-10 MIN: CPT | Mod: ,,, | Performed by: FAMILY MEDICINE

## 2025-07-05 RX ORDER — LISDEXAMFETAMINE DIMESYLATE 20 MG/1
20 CAPSULE ORAL EVERY MORNING
Qty: 30 CAPSULE | Refills: 0 | OUTPATIENT
Start: 2025-07-05

## 2025-07-22 RX ORDER — LISDEXAMFETAMINE DIMESYLATE 20 MG/1
20 CAPSULE ORAL EVERY MORNING
Qty: 30 CAPSULE | Refills: 0 | Status: SHIPPED | OUTPATIENT
Start: 2025-07-22

## 2025-07-22 NOTE — PROGRESS NOTES
Patient ID: Roslyn Vera is a 45 y.o. female.        E-Visit Time Tracking:   Day 1 Time (in minutes): 6  Total Time (in minutes): 6      Chief Complaint: ADD (Entered automatically based on patient selection in [facility] [facility].)      The patient initiated a request through [place] on 7/5/2025 for evaluation and management with a chief complaint of ADD (Entered automatically based on patient selection in [place].)     I evaluated the questionnaire submission on 07/22/2025.    [facility] Peq Evisit Adhd    7/19/2025 11:21 PM CDT - Filed by Patient   Do you agree to participate in an E-Visit? Yes   If you have any of the following symptoms, please present to your local emergency room or call 911: I acknowledge   Choose the state of your primary residence [place]   Do you have any of the following pregnancy-related conditions? (Pregnant, Possibly pregnant, Breast feeding, None) None   The following vitals are required in order to proceed    Systolic Blood Pressure: 114   Diastolic Blood Pressure: 79   Weight: 159   Height: 62   Pulse: 98   Reason for E-Visit Medication change   What is the name of the medication(s)?  Thee   How often are you supposed to take your medication? 1 time daily   What is your current dose? The dose is usually listed in milligrams or milliliters on the label. 30   Are you taking it as prescribed? No   Do you have any of the following side effects? Other   Please list the side effect(s) Want to go back to original dosage of 20 mg   How often do you take your medication as prescribed? Everyday   Have any of the following kept you from taking your medications as prescribed? (Select all that apply) Other   What is the reason? Dosage was too high original dosage worked best for mr   Would you like to change or continue your medication? (Change medication, Continue medication) Continue medication   Which option below best describes the reason for your request? (I am out of refills and need more, Prior  Authorization is required) I am out of refills and need more   What symptoms do you have? (Inattention, Hyperactivity, Impulsivity, Trouble staying organized, Forgetfulness, Restlessness, Difficulty following instructions, Poor time management, Lack of focus, Trouble with multitasking, None, Other) Inattention;  Hyperactivity;  Impulsivity;  Trouble staying organized;  Forgetfulness;  Restlessness;  Difficulty following instructions;  Poor time management;  Lack of focus;  Trouble with multitasking   How would you describe your symptoms of Attention Deficit Disorder (ADD)? (Improving, Worsening, Varies, Unchanged, Other) Other   Please describe the condition of your symptoms: Severe   Have you been diagnosed with any new heart condition? No   Does anyone in your immediate family have a history of sudden cardiac arrest at a young age (younger than 50)? No   Does anyone in your immediate family have any of the following heart conditions: Hypertrophic Cardiomyopathy, Prolonged QT Syndrome, Brugada Syndrome or Maurer Parkinson White Syndrome? No   Provide any information you feel is important to your history not asked above    Please attach any relevant images or files          Encounter Diagnosis   Name Primary?    Attention deficit disorder (ADD) in adult Yes        No orders of the defined types were placed in this encounter.     Medications Ordered This Encounter   Medications    lisdexamfetamine (VYVANSE) 20 MG capsule     Sig: Take 1 capsule (20 mg total) by mouth every morning.     Dispense:  30 capsule     Refill:  0        No follow-ups on file.

## 2025-07-26 DIAGNOSIS — F41.9 ANXIETY: ICD-10-CM

## 2025-07-26 NOTE — TELEPHONE ENCOUNTER
No care due was identified.  [facility] Embedded Care Due Messages. Reference number: [id number].   7/26/2025 8:03:49 AM CDT

## 2025-07-28 RX ORDER — ALPRAZOLAM 1 MG/1
1 TABLET ORAL 2 TIMES DAILY
Qty: 60 TABLET | Refills: 2 | Status: SHIPPED | OUTPATIENT
Start: 2025-07-28

## 2025-08-03 ENCOUNTER — HOSPITAL ENCOUNTER (EMERGENCY)
Facility: HOSPITAL | Age: 45
Discharge: HOME | End: 2025-08-03
Attending: EMERGENCY MEDICINE
Payer: COMMERCIAL

## 2025-08-03 VITALS
WEIGHT: 160 LBS | BODY MASS INDEX: 29.44 KG/M2 | TEMPERATURE: 98 F | HEIGHT: 62 IN | RESPIRATION RATE: 18 BRPM | DIASTOLIC BLOOD PRESSURE: 76 MMHG | SYSTOLIC BLOOD PRESSURE: 121 MMHG | OXYGEN SATURATION: 99 % | HEART RATE: 109 BPM

## 2025-08-03 DIAGNOSIS — R19.7 NAUSEA VOMITING AND DIARRHEA: ICD-10-CM

## 2025-08-03 DIAGNOSIS — R11.2 NAUSEA VOMITING AND DIARRHEA: ICD-10-CM

## 2025-08-03 DIAGNOSIS — R10.32 LEFT LOWER QUADRANT ABDOMINAL PAIN: ICD-10-CM

## 2025-08-03 DIAGNOSIS — R10.9 ACUTE LEFT FLANK PAIN: Primary | ICD-10-CM

## 2025-08-03 LAB
ABSOLUTE EOSINOPHIL (OHS): 0.09 K/UL
ABSOLUTE MONOCYTE (OHS): 0.45 K/UL (ref 0.3–1)
ABSOLUTE NEUTROPHIL COUNT (OHS): 3.42 K/UL (ref 1.8–7.7)
ALBUMIN SERPL BCP-MCNC: 5 G/DL (ref 3.5–5.2)
ALP SERPL-CCNC: 50 UNIT/L (ref 40–150)
ALT SERPL W/O P-5'-P-CCNC: 30 UNIT/L (ref 10–44)
ANION GAP (OHS): 9 MMOL/L (ref 8–16)
AST SERPL-CCNC: 30 UNIT/L (ref 11–45)
BASOPHILS # BLD AUTO: 0.03 K/UL
BASOPHILS NFR BLD AUTO: 0.5 %
BILIRUB SERPL-MCNC: 0.6 MG/DL (ref 0.1–1)
BILIRUB UR QL STRIP.AUTO: NEGATIVE
BUN SERPL-MCNC: 10 MG/DL (ref 6–20)
CALCIUM SERPL-MCNC: 9.6 MG/DL (ref 8.7–10.5)
CHLORIDE SERPL-SCNC: 106 MMOL/L (ref 95–110)
CLARITY UR: CLEAR
CO2 SERPL-SCNC: 21 MMOL/L (ref 23–29)
COLOR UR AUTO: YELLOW
CREAT SERPL-MCNC: 0.7 MG/DL (ref 0.5–1.4)
ERYTHROCYTE [DISTWIDTH] IN BLOOD BY AUTOMATED COUNT: 11.7 % (ref 11.5–14.5)
GFR SERPLBLD CREATININE-BSD FMLA CKD-EPI: >60 ML/MIN/1.73/M2
GLUCOSE SERPL-MCNC: 102 MG/DL (ref 70–110)
GLUCOSE UR QL STRIP: NEGATIVE
HCT VFR BLD AUTO: 42.7 % (ref 37–48.5)
HGB BLD-MCNC: 14.9 GM/DL (ref 12–16)
HGB UR QL STRIP: NEGATIVE
IMM GRANULOCYTES # BLD AUTO: 0.02 K/UL (ref 0–0.04)
IMM GRANULOCYTES NFR BLD AUTO: 0.4 % (ref 0–0.5)
KETONES UR QL STRIP: NEGATIVE
LEUKOCYTE ESTERASE UR QL STRIP: NEGATIVE
LIPASE SERPL-CCNC: 23 U/L (ref 4–60)
LYMPHOCYTES # BLD AUTO: 1.67 K/UL (ref 1–4.8)
MCH RBC QN AUTO: 30.7 PG (ref 27–31)
MCHC RBC AUTO-ENTMCNC: 34.9 G/DL (ref 32–36)
MCV RBC AUTO: 88 FL (ref 82–98)
NITRITE UR QL STRIP: NEGATIVE
NUCLEATED RBC (/100WBC) (OHS): 0 /100 WBC
PH UR STRIP: 7 [PH]
PLATELET # BLD AUTO: 245 K/UL (ref 150–450)
PMV BLD AUTO: 9.4 FL (ref 9.2–12.9)
POTASSIUM SERPL-SCNC: 3.8 MMOL/L (ref 3.5–5.1)
PROT SERPL-MCNC: 7.9 GM/DL (ref 6–8.4)
PROT UR QL STRIP: NEGATIVE
RBC # BLD AUTO: 4.85 M/UL (ref 4–5.4)
RELATIVE EOSINOPHIL (OHS): 1.6 %
RELATIVE LYMPHOCYTE (OHS): 29.4 % (ref 18–48)
RELATIVE MONOCYTE (OHS): 7.9 % (ref 4–15)
RELATIVE NEUTROPHIL (OHS): 60.2 % (ref 38–73)
SODIUM SERPL-SCNC: 136 MMOL/L (ref 136–145)
SP GR UR STRIP: >=1.03
UROBILINOGEN UR STRIP-ACNC: NEGATIVE EU/DL
WBC # BLD AUTO: 5.68 K/UL (ref 3.9–12.7)

## 2025-08-03 PROCEDURE — 25000003 PHARM REV CODE 250

## 2025-08-03 PROCEDURE — 83690 ASSAY OF LIPASE: CPT

## 2025-08-03 PROCEDURE — 96372 THER/PROPH/DIAG INJ SC/IM: CPT

## 2025-08-03 PROCEDURE — 81003 URINALYSIS AUTO W/O SCOPE: CPT

## 2025-08-03 PROCEDURE — 96376 TX/PRO/DX INJ SAME DRUG ADON: CPT

## 2025-08-03 PROCEDURE — 96375 TX/PRO/DX INJ NEW DRUG ADDON: CPT

## 2025-08-03 PROCEDURE — 25500020 PHARM REV CODE 255

## 2025-08-03 PROCEDURE — 85025 COMPLETE CBC W/AUTO DIFF WBC: CPT

## 2025-08-03 PROCEDURE — 99285 EMERGENCY DEPT VISIT HI MDM: CPT | Mod: 25

## 2025-08-03 PROCEDURE — 96374 THER/PROPH/DIAG INJ IV PUSH: CPT

## 2025-08-03 PROCEDURE — 63600175 PHARM REV CODE 636 W HCPCS

## 2025-08-03 PROCEDURE — 96361 HYDRATE IV INFUSION ADD-ON: CPT

## 2025-08-03 PROCEDURE — 80053 COMPREHEN METABOLIC PANEL: CPT

## 2025-08-03 RX ORDER — CYCLOBENZAPRINE HCL 10 MG
10 TABLET ORAL 3 TIMES DAILY PRN
Qty: 20 TABLET | Refills: 0 | Status: SHIPPED | OUTPATIENT
Start: 2025-08-03 | End: 2025-08-13

## 2025-08-03 RX ORDER — LIDOCAINE 50 MG/G
1 PATCH TOPICAL ONCE
Qty: 15 PATCH | Refills: 0 | Status: SHIPPED | OUTPATIENT
Start: 2025-08-03 | End: 2025-08-03

## 2025-08-03 RX ORDER — HYDROMORPHONE HYDROCHLORIDE 1 MG/ML
0.5 INJECTION, SOLUTION INTRAMUSCULAR; INTRAVENOUS; SUBCUTANEOUS
Refills: 0 | Status: COMPLETED | OUTPATIENT
Start: 2025-08-03 | End: 2025-08-03

## 2025-08-03 RX ORDER — NAPROXEN 500 MG/1
500 TABLET ORAL 2 TIMES DAILY WITH MEALS
Qty: 30 TABLET | Refills: 0 | Status: SHIPPED | OUTPATIENT
Start: 2025-08-03

## 2025-08-03 RX ORDER — DICYCLOMINE HYDROCHLORIDE 10 MG/ML
20 INJECTION INTRAMUSCULAR
Status: COMPLETED | OUTPATIENT
Start: 2025-08-03 | End: 2025-08-03

## 2025-08-03 RX ORDER — ONDANSETRON HYDROCHLORIDE 2 MG/ML
4 INJECTION, SOLUTION INTRAVENOUS
Status: COMPLETED | OUTPATIENT
Start: 2025-08-03 | End: 2025-08-03

## 2025-08-03 RX ORDER — ONDANSETRON 4 MG/1
4 TABLET, ORALLY DISINTEGRATING ORAL EVERY 6 HOURS PRN
Qty: 20 TABLET | Refills: 0 | Status: SHIPPED | OUTPATIENT
Start: 2025-08-03

## 2025-08-03 RX ADMIN — ONDANSETRON 4 MG: 2 INJECTION INTRAMUSCULAR; INTRAVENOUS at 11:08

## 2025-08-03 RX ADMIN — HYDROMORPHONE HYDROCHLORIDE 0.5 MG: 1 INJECTION, SOLUTION INTRAMUSCULAR; INTRAVENOUS; SUBCUTANEOUS at 02:08

## 2025-08-03 RX ADMIN — ONDANSETRON 4 MG: 2 INJECTION INTRAMUSCULAR; INTRAVENOUS at 02:08

## 2025-08-03 RX ADMIN — HYDROMORPHONE HYDROCHLORIDE 0.5 MG: 1 INJECTION, SOLUTION INTRAMUSCULAR; INTRAVENOUS; SUBCUTANEOUS at 01:08

## 2025-08-03 RX ADMIN — SODIUM CHLORIDE 1000 ML: 9 INJECTION, SOLUTION INTRAVENOUS at 11:08

## 2025-08-03 RX ADMIN — IOHEXOL 75 ML: 350 INJECTION, SOLUTION INTRAVENOUS at 01:08

## 2025-08-03 RX ADMIN — DICYCLOMINE HYDROCHLORIDE 20 MG: 10 INJECTION, SOLUTION INTRAMUSCULAR at 12:08

## 2025-08-03 NOTE — DISCHARGE INSTRUCTIONS
Please return to the Emergency Department for any new or worsening symptoms including: bladder/bowel incontinence, saddle anesthesia, fever, chest pain, shortness of breath, loss of consciousness, dizziness, weakness, or any other concerns.     Please follow up with your Primary Care Provider within in the week. If you do not have one, you may contact the one listed on your discharge paperwork or you may also call the [facility] Appointment Desk at [phone] to schedule an appointment with one.     Please take all medication as prescribed.

## 2025-08-03 NOTE — ED NOTES
Pt presents to ED due to left lower back pain which radiates to left side of groin x 1 week, pt report NVD, not sure if she had a fever, states she took Ibuprofen around 5am.

## 2025-08-03 NOTE — ED PROVIDER NOTES
Encounter Date: 8/3/2025       History     Chief Complaint   Patient presents with    Back Pain     Pt reports left sided back pain, no relief with otc medications, lidocaine patch x 1 week.  Denies dysuria. Hx: kidney stones     45-year-old female with past medical history of abnormal uterine bleeding, ARF, history of cardiac arrest, history of psychiatric care, kidney stones, pyelonephritis, hypertension presents to ED for emergent evaluation of 1 week history of left flank pain that radiates to her left lower quadrant abdomen with associated nausea, vomiting, and diarrhea.  She reports pain is intermittent.  She has been attempting Lidoderm patches with no relief.  She denies any associated trauma, fall, head trauma, LOC.  She denies any fever, chills, chest pain, shortness of breath, hematemesis, blood in stool, dysuria, hematuria, bladder/bowel incontinence, saddle anesthesia.  No other symptoms reported.    The history is provided by the patient. No  was used.     Review of patient's allergies indicates:   Allergen Reactions    Strawberry Anaphylaxis and Hives    Morphine Other (See Comments)     Burns stomach    Toradol [ketorolac]      cramping    Tramadol      Abdominal pain     Past Medical History:   Diagnosis Date    Abnormal uterine bleeding 4/18/2018    Acute encephalopathy 12/24/12    secondary to drug overdose    ARF (acute renal failure) 12/24/12    secondary to drug overdose    Cigarette nicotine dependence without complication 1/10/2013    History of cardiac arrest 12/24/12    secondary to drug overdose    Hx of psychiatric care     Hypertension     Kidney stones 1/8/2018    MRSA (methicillin resistant Staphylococcus aureus) 12/24/12    Nephritis     Polysubstance abuse 12/24/12    Psychiatric problem     Systolic CHF, acute 12/24/12    secondary to drug overdose    Therapy     when younger; recently set up with Cassie Rockweiler, LCSW    UTI (urinary tract infection)      Past  Surgical History:   Procedure Laterality Date    AUGMENTATION OF BREAST Bilateral 2001    CHOLECYSTECTOMY      COLONOSCOPY N/A 9/25/2024    Procedure: COLONOSCOPY;  Surgeon: Carley Forbes MD;  Location: [facility] ENDO;  Service: Endoscopy;  Laterality: N/A;  Lauren KEANE PA-C, peg ext , portal. Patient request date.ASam  9/19/24- pt moved to earlier date, instr to portal. DBM    ESOPHAGOGASTRODUODENOSCOPY N/A 01/02/2020    Procedure: EGD (ESOPHAGOGASTRODUODENOSCOPY);  Surgeon: Moe Tripathi MD;  Location: [facility] ENDO;  Service: Endoscopy;  Laterality: N/A;    HYSTERECTOMY  2020    LAPAROSCOPIC SALPINGECTOMY Left 02/10/2021    Procedure: SALPINGECTOMY, LAPAROSCOPIC;  Surgeon: Doc Pena MD;  Location: [facility] OR;  Service: OB/GYN;  Laterality: Left;    LAPAROSCOPIC TOTAL HYSTERECTOMY N/A 02/10/2021    Procedure: HYSTERECTOMY, TOTAL, LAPAROSCOPIC;  Surgeon: Doc Pena MD;  Location: [facility] OR;  Service: OB/GYN;  Laterality: N/A;  RN PREOP 2/3/, --COVID NEGATIVE ON 2/9 and T/S done    TUBAL LIGATION      urinary stents       Family History   Problem Relation Name Age of Onset    Kidney disease Mother W     Kidney disease Daughter W     Cancer Maternal Grandmother W         throat    Cancer Maternal Grandfather W         prostate    Breast cancer Neg Hx      Colon cancer Neg Hx      Anxiety disorder Neg Hx      Depression Neg Hx      Dementia Neg Hx      Suicide Neg Hx       Social History[1]  Review of Systems   Constitutional:  Negative for chills and fever.   HENT:  Negative for congestion, ear pain, rhinorrhea and sore throat.    Eyes:  Negative for redness.   Respiratory:  Negative for cough and shortness of breath.    Cardiovascular:  Negative for chest pain.   Gastrointestinal:  Positive for abdominal pain, diarrhea, nausea and vomiting. Negative for blood in stool.        (-) hematemesis   Genitourinary:  Positive for flank pain. Negative for decreased urine volume, difficulty urinating, dysuria, frequency,  hematuria, pelvic pain, urgency, vaginal bleeding, vaginal discharge and vaginal pain.   Musculoskeletal:  Negative for back pain and neck pain.   Skin:  Negative for rash.   Neurological:  Negative for headaches.   Psychiatric/Behavioral:  Negative for confusion.        Physical Exam     Initial Vitals [08/03/25 1125]   BP Pulse Resp Temp SpO2   121/76 109 16 98.2 °F (36.8 °C) 99 %      MAP       --         Physical Exam    Nursing note and vitals reviewed.  Constitutional: She appears well-developed and well-nourished.  Non-toxic appearance. She does not appear ill.   HENT:   Head: Normocephalic and atraumatic.   Right Ear: Hearing, tympanic membrane, external ear and ear canal normal. Tympanic membrane is not perforated, not erythematous and not bulging.   Left Ear: Hearing, tympanic membrane, external ear and ear canal normal. Tympanic membrane is not perforated, not erythematous and not bulging.   Nose: Nose normal. Mouth/Throat: Uvula is midline, oropharynx is clear and moist and mucous membranes are normal.   Eyes: Conjunctivae and EOM are normal.   Neck: Neck supple.   Normal range of motion.   Full passive range of motion without pain.     Cardiovascular:  Normal rate and regular rhythm.           Pulses:       Radial pulses are 2+ on the right side and 2+ on the left side.   Pulmonary/Chest: Effort normal and breath sounds normal. No accessory muscle usage. No respiratory distress. She has no decreased breath sounds.   Abdominal: Abdomen is soft. Bowel sounds are normal. She exhibits no distension. There is abdominal tenderness in the left lower quadrant.   No right CVA tenderness.  There is left CVA tenderness. There is no rebound and no guarding.   Musculoskeletal:         General: Normal range of motion.      Cervical back: Full passive range of motion without pain, normal range of motion and neck supple. No rigidity.      Comments: Full ROM of neck. No neck rigidity. No midline tenderness to cervical,  thoracic, or lumbar spine.  No bony step-offs.  Full range of motion of bilateral upper and lower extremities.  Strength and sensation intact bilateral upper and lower extremities.  Patient able to ambulate into the room.  No erythema, edema, bruising, rash, or cellulitis patient's back.      Neurological: She is alert. No cranial nerve deficit.   Neuro intact.  Strength and sensation intact bilateral upper and lower extremities.   Skin: Skin is warm and dry.   Psychiatric: She has a normal mood and affect.         ED Course   Procedures  Labs Reviewed   COMPREHENSIVE METABOLIC PANEL - Abnormal       Result Value    Sodium 136      Potassium 3.8      Chloride 106      CO2 21 (*)     Glucose 102      BUN 10      Creatinine 0.7      Calcium 9.6      Protein Total 7.9      Albumin 5.0      Bilirubin Total 0.6      ALP 50      AST 30      ALT 30      Anion Gap 9      eGFR >60      Narrative:     Specimen slightly hemolyzed.   URINALYSIS, REFLEX TO URINE CULTURE - Abnormal    Color, UA Yellow      Appearance, UA Clear      pH, UA 7.0      Spec Grav UA >=1.030 (*)     Protein, UA Negative      Glucose, UA Negative      Ketones, UA Negative      Bilirubin, UA Negative      Blood, UA Negative      Nitrites, UA Negative      Urobilinogen, UA Negative      Leukocyte Esterase, UA Negative     LIPASE - Normal    Lipase Level 23     CBC WITH DIFFERENTIAL - Normal    WBC 5.68      RBC 4.85      HGB 14.9      HCT 42.7      MCV 88      MCH 30.7      MCHC 34.9      RDW 11.7      Platelet Count 245      MPV 9.4      Nucleated RBC 0      Neut % 60.2      Lymph % 29.4      Mono % 7.9      Eos % 1.6      Basophil % 0.5      Imm Grans % 0.4      Neut # 3.42      Lymph # 1.67      Mono # 0.45      Eos # 0.09      Baso # 0.03      Imm Grans # 0.02     CBC W/ AUTO DIFFERENTIAL    Narrative:     The following orders were created for panel order CBC auto differential.  Procedure                               Abnormality         Status                      ---------                               -----------         ------                     CBC with Differential[[id number]]       Normal              Final result                 Please view results for these tests on the individual orders.   [facility] URINE HOLD          Imaging Results              CT Abdomen Pelvis With IV Contrast NO Oral Contrast (Final result)  Result time 08/03/25 13:56:36      Final result by Tommy Grier MD (08/03/25 13:56:36)                   Impression:      1. Left nonobstructive nephrolithiasis, no findings to suggest obstructive uropathy.  2. Findings suggest hepatic steatosis, correlation with LFTs recommended.  3. Please see above for several additional findings.      Electronically signed by: Tommy Grier MD  Date:    08/03/2025  Time:    13:56               Narrative:    EXAMINATION:  CT ABDOMEN PELVIS WITH IV CONTRAST    CLINICAL HISTORY:  Flank pain, kidney stone suspected;LLQ abdominal pain;    TECHNIQUE:  Low dose axial images, sagittal and coronal reformations were obtained from the lung bases to the pubic symphysis following the IV administration of 75 mL of Omnipaque 350 .  Oral contrast was not given.    COMPARISON:  CT 06/16/2025    FINDINGS:  Images of the lower thorax are remarkable for bilateral dependent atelectasis.    The liver is somewhat hypoattenuating, suggesting steatosis, correlation with LFTs recommended.  The spleen, pancreas and adrenal glands are grossly unremarkable.  The gallbladder is surgically absent.  The stomach is nondistended, no gastric wall thickening.  The portal vein, splenic vein, SMV, celiac axis and SMA all are patent.  No significant biliary dilation status post cholecystectomy.  No significant abdominal lymphadenopathy.    The kidneys enhance symmetrically without hydronephrosis.  There is left nonobstructive nephrolithiasis.  The bilateral ureters are unremarkable without calculi seen.  The urinary bladder is distended  without wall thickening.  The uterus is absent the adnexa is grossly unremarkable noting involuting follicle or cyst within the left ovary measuring 1.7 cm.  There is trace fluid in the pelvis.    The large bowel is for the most part decompressed.  The terminal ileum and appendix are grossly unremarkable noting there is some formed stool in the terminal ileum, could reflect sequela of developing constipation.  The small bowel is otherwise grossly unremarkable.  There are a few scattered shotty periaortic, pericaval, and mesenteric lymph nodes.    There are degenerative changes of the spine.  No significant inguinal lymphadenopathy.  There are bilateral breast prostheses.                                       Medications   ondansetron injection 4 mg (4 mg Intravenous Given 8/3/25 1154)   dicyclomine injection 20 mg (20 mg Intramuscular Given 8/3/25 1200)   sodium chloride 0.9% bolus 1,000 mL 1,000 mL (0 mLs Intravenous Stopped 8/3/25 1412)   HYDROmorphone injection 0.5 mg (0.5 mg Intravenous Given 8/3/25 1307)   iohexoL (OMNIPAQUE 350) injection 75 mL (75 mLs Intravenous Given 8/3/25 1349)   HYDROmorphone injection 0.5 mg (0.5 mg Intravenous Given 8/3/25 1433)   ondansetron injection 4 mg (4 mg Intravenous Given 8/3/25 1433)     Medical Decision Making  This is a 45-year-old female with past medical history of abnormal uterine bleeding, ARF, history of cardiac arrest, history of psychiatric care, kidney stones, pyelonephritis, hypertension presents to ED for emergent evaluation of 1 week history of left flank pain that radiates to her left lower quadrant abdomen with associated nausea, vomiting, and diarrhea.  She reports pain is intermittent.  She has been attempting Lidoderm patches with no relief.  She denies any associated trauma, fall, head trauma, LOC.  She denies any fever, chills, chest pain, shortness of breath, hematemesis, blood in stool, dysuria, hematuria, bladder/bowel incontinence, saddle anesthesia.   No other symptoms reported.    On physical exam, patient is well-appearing and in no acute distress.  Nontoxic appearing.  Lungs are clear to auscultation bilaterally.  Abdomen is soft.  No guarding, rigidity, rebound.  2+ radial pulses bilaterally.  Posterior oropharynx is not erythematous.  No edema or exudate.  Uvula midline.  Bilateral tympanic membrane is normal.  No erythema, bulging, or perforations.  Neuro intact.  Strength and sensation intact bilateral upper and lower extremities.  Left CVA tenderness.  Tenderness to palpation to left lower quadrant abdomen.  Bentyl, Zofran, fluids ordered.  We will reassess.  Upon reassessment, patient is still reports pain.  Patient reports severe abdominal cramping with morphine and Toradol.  Dilaudid ordered.  CBC without evidence of any leukocytosis or anemia.  CMP with CO2 21.  Otherwise no other electrolyte abnormality.  Lipase unremarkable.  Doubt pancreatitis.  UA unremarkable.  Doubt cystitis, pyelonephritis.  CT revealed: 1. Left nonobstructive nephrolithiasis, no findings to suggest obstructive uropathy.   2. Findings suggest hepatic steatosis, correlation with LFTs recommended.   3. Please see above for several additional findings.     Upon reassessment, patient still reports some mild pain and nausea.  Another dose of Dilaudid and Zofran ordered.  Upon reassessment, patient is resting comfortably.  I believe her pain is musculoskeletal in nature.  We will discharge patient on naproxen, Flexeril, Lidoderm patches, and Zofran.  Urged prompt follow up with PCP for further evaluation.  She states she has an appointment with back and spine this week.    Strict return precautions given. I discussed with the patient/family the diagnosis, treatment plan, indications for return to the emergency department, and for expected follow-up. The patient/family verbalized an understanding. The patient/family is asked if there are any questions or concerns. We discuss the case,  until all issues are addressed to the patient/family's satisfaction. Patient/family understands and is agreeable to the plan. Patient is stable and ready for discharge.      Amount and/or Complexity of Data Reviewed  Labs: ordered.  Radiology: ordered.    Risk  Prescription drug management.                                          Clinical Impression:  Final diagnoses:  [R10.9] Acute left flank pain (Primary)  [R10.32] Left lower quadrant abdominal pain  [R11.2, R19.7] Nausea vomiting and diarrhea          ED Disposition Condition    Discharge Stable          ED Prescriptions       Medication Sig Dispense Start Date End Date Auth. Provider    naproxen (NAPROSYN) 500 MG tablet Take 1 tablet (500 mg total) by mouth 2 (two) times daily with meals. 30 tablet 8/3/2025 -- Gosia Bai PA-C    cyclobenzaprine (FLEXERIL) 10 MG tablet Take 1 tablet (10 mg total) by mouth 3 (three) times daily as needed for Muscle spasms. 20 tablet 8/3/2025 8/13/2025 Gosia Bai PA-C    LIDOcaine (LIDODERM) 5 % (Expires today) Place 1 patch onto the skin once. Remove & Discard patch within 12 hours or as directed by MD for 1 dose 15 patch 8/3/2025 8/3/2025 Gosia Bai PA-C    ondansetron (ZOFRAN-ODT) 4 MG TbDL Take 1 tablet (4 mg total) by mouth every 6 (six) hours as needed (nausea). 20 tablet 8/3/2025 -- Gosia Bai PA-C          Follow-up Information       Follow up With Specialties Details Why Contact Info    Hermes Colvin MD Family Medicine In 2 days for further evaluation [id number] [facility]  [place] [address]  [phone]      [facility] [place] - Emergency Dept Emergency Medicine In 2 days If symptoms worsen [address] [facility] [place] [address] [phone]                 Gosia Bai PA-C  08/03/25 1742         [1]   Social History  Tobacco Use    Smoking status: Former     Types: Vaping with nicotine    Smokeless tobacco: Current   Substance  Use Topics    Alcohol use: Yes     Comment: occasional; social    Drug use: No     Types: Cocaine, Methamphetamines     Comment: PCP, patient states she had not done drugs since 2012        Gosia Bai PA-C  08/03/25 3571

## 2025-08-04 LAB — HOLD SPECIMEN: NORMAL

## 2025-08-05 ENCOUNTER — OFFICE VISIT (OUTPATIENT)
Dept: PAIN MEDICINE | Facility: CLINIC | Age: 45
End: 2025-08-05
Payer: COMMERCIAL

## 2025-08-05 ENCOUNTER — LAB VISIT (OUTPATIENT)
Dept: LAB | Facility: HOSPITAL | Age: 45
End: 2025-08-05
Attending: PAIN MEDICINE
Payer: COMMERCIAL

## 2025-08-05 VITALS
HEART RATE: 82 BPM | SYSTOLIC BLOOD PRESSURE: 107 MMHG | DIASTOLIC BLOOD PRESSURE: 68 MMHG | RESPIRATION RATE: 18 BRPM | WEIGHT: 153 LBS | BODY MASS INDEX: 27.98 KG/M2 | OXYGEN SATURATION: 100 %

## 2025-08-05 DIAGNOSIS — M54.9 DORSALGIA, UNSPECIFIED: ICD-10-CM

## 2025-08-05 DIAGNOSIS — M51.360 DEGENERATION OF INTERVERTEBRAL DISC OF LUMBAR REGION WITH DISCOGENIC BACK PAIN: Primary | ICD-10-CM

## 2025-08-05 DIAGNOSIS — M45.9 ANKYLOSING SPONDYLITIS, UNSPECIFIED SITE OF SPINE: ICD-10-CM

## 2025-08-05 DIAGNOSIS — M47.816 LUMBAR SPONDYLOSIS: ICD-10-CM

## 2025-08-05 DIAGNOSIS — M47.897 OTHER OSTEOARTHRITIS OF SPINE, LUMBOSACRAL REGION: ICD-10-CM

## 2025-08-05 DIAGNOSIS — M53.3 SACROILIAC JOINT PAIN: ICD-10-CM

## 2025-08-05 LAB
CRP SERPL-MCNC: 0.4 MG/L
ERYTHROCYTE [SEDIMENTATION RATE] IN BLOOD BY PHOTOMETRIC METHOD: <2 MM/HR

## 2025-08-05 PROCEDURE — 3044F HG A1C LEVEL LT 7.0%: CPT | Mod: CPTII,S$GLB,, | Performed by: PAIN MEDICINE

## 2025-08-05 PROCEDURE — 1160F RVW MEDS BY RX/DR IN RCRD: CPT | Mod: CPTII,S$GLB,, | Performed by: PAIN MEDICINE

## 2025-08-05 PROCEDURE — 86140 C-REACTIVE PROTEIN: CPT

## 2025-08-05 PROCEDURE — 81374 HLA I TYPING 1 ANTIGEN LR: CPT | Performed by: PAIN MEDICINE

## 2025-08-05 PROCEDURE — 3008F BODY MASS INDEX DOCD: CPT | Mod: CPTII,S$GLB,, | Performed by: PAIN MEDICINE

## 2025-08-05 PROCEDURE — 99999 PR PBB SHADOW E&M-EST. PATIENT-LVL IV: CPT | Mod: PBBFAC,,, | Performed by: PAIN MEDICINE

## 2025-08-05 PROCEDURE — 3074F SYST BP LT 130 MM HG: CPT | Mod: CPTII,S$GLB,, | Performed by: PAIN MEDICINE

## 2025-08-05 PROCEDURE — 3078F DIAST BP <80 MM HG: CPT | Mod: CPTII,S$GLB,, | Performed by: PAIN MEDICINE

## 2025-08-05 PROCEDURE — 99204 OFFICE O/P NEW MOD 45 MIN: CPT | Mod: S$GLB,,, | Performed by: PAIN MEDICINE

## 2025-08-05 PROCEDURE — 1159F MED LIST DOCD IN RCRD: CPT | Mod: CPTII,S$GLB,, | Performed by: PAIN MEDICINE

## 2025-08-05 PROCEDURE — 36415 COLL VENOUS BLD VENIPUNCTURE: CPT

## 2025-08-05 PROCEDURE — 85652 RBC SED RATE AUTOMATED: CPT

## 2025-08-05 NOTE — PROGRESS NOTES
Subjective:     Patient ID: Roslyn Vera is a 45 y.o. female    Chief Complaint: Low-back Pain (Midline stabbing achy shooting pain)      Referred by: Self, Aaareferral      HPI:    Initial Encounter (8/5/25):  Roslyn presents with chronic lower back pain ongoing for months to years, with significant increase in severity and consistency over the past 4-5 months. Pain is constant, primarily located in the lower back, more pronounced on the left side, and radiates to the buttocks and occasionally down to the mid-thigh area. She experiences intense morning stiffness, reporting difficulty moving upon waking.    Pain is exacerbated by prolonged sitting and activities such as getting into a car, causing shooting pains. 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 She expresses frustration with pain's impact on daily activities, limiting her ability to engage in weekend activities.    Previous treatments have included ibuprofen 800mg and muscle relaxants, which provided minimal relief. She desires to avoid pain medications. She has not undergone any PT or received injections for this condition. Recently, she visited the emergency room due to concerns about possible kidney involvement, but kidney stones were ruled out.    She denies numbness, tingling, weakness in legs, and urinary or bowel incontinence.       Physical Therapy:  No.  Given severity of pain, patient may not be able to tolerate therapy at this time.    Non-pharmacologic Treatment:  Activity helps         TENS?  No    Pain Medications:         Currently taking:  Ibuprofen, Flexeril    Has tried in the past:  NSAIDs    Has not tried:  Opioids, Tylenol, TCAs, SNRIs, anticonvulsants, topical creams    Blood thinners:  None    Interventional Therapies:  None    Relevant Surgeries:  None    Affecting sleep?  Yes    Affecting daily activities? yes    Depressive symptoms? No          SI/HI?  No    Work status: Employed    Pain Scores:    Best:       6/10  Worst:     10/10  Usually:   7/10  Today:    7/10    Pain Disability Index  Family/Home Responsibilities:: 10  Recreation:: 10  Social Activity:: 8  Occupation:: 10  Sexual Behavior:: 10  Self Care:: 7  Life-Support Activities:: 7  Pain Disability Index (PDI): 62    Review of Systems   Constitutional:  Negative for activity change, appetite change, chills, fatigue, fever and unexpected weight change.   HENT:  Negative for hearing loss.    Eyes:  Negative for visual disturbance.   Respiratory:  Negative for chest tightness and shortness of breath.    Cardiovascular:  Negative for chest pain.   Gastrointestinal:  Negative for abdominal pain, constipation, diarrhea, nausea and vomiting.   Genitourinary:  Negative for difficulty urinating.   Musculoskeletal:  Positive for back pain, gait problem and myalgias. Negative for neck pain.   Skin:  Negative for rash.   Neurological:  Negative for dizziness, weakness, light-headedness, numbness and headaches.   Psychiatric/Behavioral:  Positive for sleep disturbance. Negative for hallucinations and suicidal ideas. The patient is not nervous/anxious.        Past Medical History:   Diagnosis Date    Abnormal uterine bleeding 4/18/2018    Acute encephalopathy 12/24/12    secondary to drug overdose    ARF (acute renal failure) 12/24/12    secondary to drug overdose    Cigarette nicotine dependence without complication 1/10/2013    History of cardiac arrest 12/24/12    secondary to drug overdose    Hx of psychiatric care     Hypertension     Kidney stones 1/8/2018    MRSA (methicillin resistant Staphylococcus aureus) 12/24/12    Nephritis     Polysubstance abuse 12/24/12    Psychiatric problem     Systolic CHF, acute 12/24/12    secondary to drug overdose    Therapy     when younger; recently set up with Cassie Rockweiler, LCSW    UTI (urinary tract infection)        Past Surgical History:   Procedure Laterality Date     AUGMENTATION OF BREAST Bilateral 2001    CHOLECYSTECTOMY      COLONOSCOPY N/A 9/25/2024    Procedure: COLONOSCOPY;  Surgeon: Carley Forbes MD;  Location: [facility] ENDO;  Service: Endoscopy;  Laterality: N/A;  Lauren KEANE PA-C, peg ext , portal. Patient request date.ASam  9/19/24- pt moved to earlier date, instr to portal. DBM    ESOPHAGOGASTRODUODENOSCOPY N/A 01/02/2020    Procedure: EGD (ESOPHAGOGASTRODUODENOSCOPY);  Surgeon: Moe Tripathi MD;  Location: [facility] ENDO;  Service: Endoscopy;  Laterality: N/A;    HYSTERECTOMY  2020    LAPAROSCOPIC SALPINGECTOMY Left 02/10/2021    Procedure: SALPINGECTOMY, LAPAROSCOPIC;  Surgeon: Doc Pena MD;  Location: [facility] OR;  Service: OB/GYN;  Laterality: Left;    LAPAROSCOPIC TOTAL HYSTERECTOMY N/A 02/10/2021    Procedure: HYSTERECTOMY, TOTAL, LAPAROSCOPIC;  Surgeon: Doc Pena MD;  Location: [facility] OR;  Service: OB/GYN;  Laterality: N/A;  RN PREOP 2/3/, --COVID NEGATIVE ON 2/9 and T/S done    TUBAL LIGATION      urinary stents         Social History[1]    Review of patient's allergies indicates:   Allergen Reactions    Strawberry Anaphylaxis and Hives    Morphine Other (See Comments)     Burns stomach    Toradol [ketorolac]      cramping    Tramadol      Abdominal pain       Medications Ordered Prior to Encounter[2]    Objective:      /68 (BP Location: Right arm, Patient Position: Sitting)   Pulse 82   Resp 18   Wt 69.4 kg (153 lb)   LMP 01/17/2021 (Exact Date) Comment: [facility] 2/10/21  SpO2 100%   BMI 27.98 kg/m²     Exam:  GEN:  Well developed, well nourished.  No acute distress.  Normal pain behavior.  HEENT:  No trauma.  Mucous membranes moist.  Nares patent bilaterally.  PSYCH: Normal affect. Thought content appropriate.  CHEST:  Breathing symmetric.  No audible wheezing.  ABD: Soft, non-distended.  SKIN:  Warm, pink, dry.  No rash on exposed areas.    EXT:  No cyanosis, clubbing, or edema.  No color change or changes in nail or hair  growth.  NEURO/MUSCULOSKELETAL:  Fully alert, oriented, and appropriate. Speech normal vale. No cranial nerve deficits.   Gait:  Normal.  No trendelenburg sign bilaterally.   Motor Strength:  5/5 motor strength throughout lower extremities.   Sensory:  No sensory deficit in the lower extremities.   Reflexes:  2+ and symmetric throughout. No clonus or spasticity.  L-Spine:  Limited ROM with pain on extension more than flexion.  Positive pain with axial/facet loading bilaterally.  Negative SLR bilaterally.    SI Joint/Hip:  Positive GELACIO on the left.    Positive TTP over bilateral lower lumbar paraspinals, left sacroiliac joint      Imaging:    Narrative & Impression    EXAMINATION:  CT ABDOMEN PELVIS WITH IV CONTRAST     CLINICAL HISTORY:  Flank pain, kidney stone suspected;LLQ abdominal pain;     TECHNIQUE:  Low dose axial images, sagittal and coronal reformations were obtained from the lung bases to the pubic symphysis following the IV administration of 75 mL of Omnipaque 350 .  Oral contrast was not given.     COMPARISON:  CT 06/16/2025     FINDINGS:  Images of the lower thorax are remarkable for bilateral dependent atelectasis.     The liver is somewhat hypoattenuating, suggesting steatosis, correlation with LFTs recommended.  The spleen, pancreas and adrenal glands are grossly unremarkable.  The gallbladder is surgically absent.  The stomach is nondistended, no gastric wall thickening.  The portal vein, splenic vein, SMV, celiac axis and SMA all are patent.  No significant biliary dilation status post cholecystectomy.  No significant abdominal lymphadenopathy.     The kidneys enhance symmetrically without hydronephrosis.  There is left nonobstructive nephrolithiasis.  The bilateral ureters are unremarkable without calculi seen.  The urinary bladder is distended without wall thickening.  The uterus is absent the adnexa is grossly unremarkable noting involuting follicle or cyst within the left ovary measuring  1.7 cm.  There is trace fluid in the pelvis.     The large bowel is for the most part decompressed.  The terminal ileum and appendix are grossly unremarkable noting there is some formed stool in the terminal ileum, could reflect sequela of developing constipation.  The small bowel is otherwise grossly unremarkable.  There are a few scattered shotty periaortic, pericaval, and mesenteric lymph nodes.     There are degenerative changes of the spine.  No significant inguinal lymphadenopathy.  There are bilateral breast prostheses.     Impression:     1. Left nonobstructive nephrolithiasis, no findings to suggest obstructive uropathy.  2. Findings suggest hepatic steatosis, correlation with LFTs recommended.  3. Please see above for several additional findings.        Electronically signed by:Tommy Grier MD  Date:                                            08/03/2025  Time:                                           13:56       Assessment:       Encounter Diagnoses   Name Primary?    Degeneration of intervertebral disc of lumbar region with discogenic back pain Yes    Lumbar spondylosis     Other osteoarthritis of spine, lumbosacral region     Sacroiliac joint pain     Ankylosing spondylitis, unspecified site of spine     Dorsalgia, unspecified      Plan:       Roslyn was seen today for low-back pain.    Diagnoses and all orders for this visit:    Degeneration of intervertebral disc of lumbar region with discogenic back pain    Lumbar spondylosis  -     MRI Lumbar Spine Without Contrast; Future    Other osteoarthritis of spine, lumbosacral region  -     MRI Lumbar Spine Without Contrast; Future    Sacroiliac joint pain  -     MRI Lumbar Spine Without Contrast; Future    Ankylosing spondylitis, unspecified site of spine  -     Sedimentation rate; Future  -     C-Reactive Protein (In-House); Future  -     HLA B27 ANTIGEN; Future  -     MRI Lumbar Spine Without Contrast; Future    Dorsalgia, unspecified  -     MRI Lumbar  Spine Without Contrast; Future        Roslyn Vera is a 45 y.o. female with chronic bilateral low back pain left worse than right.  Does seem to be axial.  Has significant facet degeneration noted at the L4-5 level bilaterally.  Also some potential sclerosis of the anterior aspect of the left sacroiliac joint..    Pertinent imaging studies reviewed by me. Imaging results were discussed with patient.  Lumbar MRI to better characterize soft tissues of lumbar spine.    ESR and CRP to evaluate for increased levels of inflammation given sclerosis of left sacroiliac joint   HLA B27.      Return to clinic after MRI to review imaging and lab work results.  May consider lumbar medial branch blocks/RFA versus left sacroiliac joint injection.            This note was created by combination of typed  and M-Modal dictation. Transcription and phonetic errors may be present.  If there are any questions, please contact me.           [1]   Social History  Socioeconomic History    Marital status:     Number of children: 2   Tobacco Use    Smoking status: Former     Types: Vaping with nicotine    Smokeless tobacco: Current   Substance and Sexual Activity    Alcohol use: Yes     Comment: occasional; social    Drug use: No     Types: Cocaine, Methamphetamines     Comment: PCP, patient states she had not done drugs since 2012    Sexual activity: Yes     Partners: Male     Birth control/protection: See Surgical Hx   Social History Narrative    Together since 2014     2021    He is a retired .  Machine work    She is an      Social Drivers of Health     Financial Resource Strain: Low Risk  (6/2/2025)    Overall Financial Resource Strain (CARDIA)     Difficulty of Paying Living Expenses: Not very hard   Food Insecurity: No Food Insecurity (6/2/2025)    Hunger Vital Sign     Worried About Running Out of Food in the Last Year: Never true     Ran Out of Food in the Last Year: Never true    Transportation Needs: No Transportation Needs (6/2/2025)    PRAPARE - Transportation     Lack of Transportation (Medical): No     Lack of Transportation (Non-Medical): No   Physical Activity: Sufficiently Active (6/2/2025)    Exercise Vital Sign     Days of Exercise per Week: 2 days     Minutes of Exercise per Session: 100 min   Stress: Stress Concern Present (6/2/2025)    [employer] - Occupational Stress Questionnaire     Feeling of Stress : Very much   Housing Stability: Low Risk  (6/2/2025)    Housing Stability Vital Sign     Unable to Pay for Housing in the Last Year: No     Number of Times Moved in the Last Year: 0     Homeless in the Last Year: No   [2]   Current Outpatient Medications on File Prior to Visit   Medication Sig Dispense Refill    ALPRAZolam (XANAX) 1 MG tablet TAKE ONE TABLET BY MOUTH TWICE DAILY 60 tablet 2    cyclobenzaprine (FLEXERIL) 10 MG tablet Take 1 tablet (10 mg total) by mouth 3 (three) times daily as needed for Muscle spasms. 20 tablet 0    dicyclomine (BENTYL) 10 MG capsule Take 10 mg by mouth 4 (four) times daily.      doxycycline (VIBRA-TABS) 100 MG tablet TAKE ONE TABLET BY MOUTH DAILY WITH FOOD      lisdexamfetamine (VYVANSE) 20 MG capsule Take 1 capsule (20 mg total) by mouth every morning. 30 capsule 0    naproxen (NAPROSYN) 500 MG tablet Take 1 tablet (500 mg total) by mouth 2 (two) times daily with meals. 14 tablet 0    naproxen (NAPROSYN) 500 MG tablet Take 1 tablet (500 mg total) by mouth 2 (two) times daily with meals. 30 tablet 0    norgestimate-ethinyl estradioL (ORTHO TRI-CYCLEN,TRI-SPRINTEC) 0.18/0.215/0.25 mg-0.035mg (28) tablet Take 1 tablet by mouth once daily. 30 tablet 11    ondansetron (ZOFRAN-ODT) 4 MG TbDL Take 1 tablet (4 mg total) by mouth every 6 (six) hours as needed (nausea). 20 tablet 0    plecanatide (TRULANCE) 3 mg Tab Take 1 tablet (3 mg total) by mouth once daily. 30 tablet 2    spironolactone (ALDACTONE) 50 MG tablet  Take 50 mg by mouth.      tretinoin (RETIN-A) 0.1 % cream Apply topically every evening. 45 g 1    EPINEPHrine (EPIPEN 2-LUCIANA) 0.3 mg/0.3 mL AtIn Inject 0.6 mLs (0.6 mg total) into the muscle once. for 1 dose 0.3 mL 1    [DISCONTINUED] HYDROcodone-acetaminophen (NORCO) 5-325 mg per tablet Take 1 tablet by mouth every 6 (six) hours as needed for Pain. 12 tablet 0     No current facility-administered medications on file prior to visit.

## 2025-08-06 ENCOUNTER — PATIENT MESSAGE (OUTPATIENT)
Dept: FAMILY MEDICINE | Facility: CLINIC | Age: 45
End: 2025-08-06
Payer: COMMERCIAL

## 2025-08-09 ENCOUNTER — HOSPITAL ENCOUNTER (OUTPATIENT)
Dept: RADIOLOGY | Facility: HOSPITAL | Age: 45
Discharge: HOME | End: 2025-08-09
Attending: PAIN MEDICINE
Payer: COMMERCIAL

## 2025-08-09 DIAGNOSIS — M47.816 LUMBAR SPONDYLOSIS: ICD-10-CM

## 2025-08-09 DIAGNOSIS — M54.9 DORSALGIA, UNSPECIFIED: ICD-10-CM

## 2025-08-09 DIAGNOSIS — M45.9 ANKYLOSING SPONDYLITIS, UNSPECIFIED SITE OF SPINE: ICD-10-CM

## 2025-08-09 DIAGNOSIS — M47.897 OTHER OSTEOARTHRITIS OF SPINE, LUMBOSACRAL REGION: ICD-10-CM

## 2025-08-09 DIAGNOSIS — M53.3 SACROILIAC JOINT PAIN: ICD-10-CM

## 2025-08-09 PROCEDURE — 72148 MRI LUMBAR SPINE W/O DYE: CPT | Mod: TC

## 2025-08-09 PROCEDURE — 72148 MRI LUMBAR SPINE W/O DYE: CPT | Mod: 26,,, | Performed by: RADIOLOGY

## 2025-08-11 ENCOUNTER — OFFICE VISIT (OUTPATIENT)
Dept: PAIN MEDICINE | Facility: CLINIC | Age: 45
End: 2025-08-11
Payer: COMMERCIAL

## 2025-08-11 VITALS
OXYGEN SATURATION: 99 % | DIASTOLIC BLOOD PRESSURE: 79 MMHG | HEART RATE: 105 BPM | SYSTOLIC BLOOD PRESSURE: 118 MMHG | RESPIRATION RATE: 18 BRPM

## 2025-08-11 DIAGNOSIS — M54.16 LUMBAR RADICULOPATHY: ICD-10-CM

## 2025-08-11 DIAGNOSIS — M71.38 SYNOVIAL CYST OF LUMBAR FACET JOINT: ICD-10-CM

## 2025-08-11 DIAGNOSIS — M47.816 LUMBAR SPONDYLOSIS: Primary | ICD-10-CM

## 2025-08-11 PROCEDURE — 1160F RVW MEDS BY RX/DR IN RCRD: CPT | Mod: CPTII,S$GLB,, | Performed by: PAIN MEDICINE

## 2025-08-11 PROCEDURE — 3078F DIAST BP <80 MM HG: CPT | Mod: CPTII,S$GLB,, | Performed by: PAIN MEDICINE

## 2025-08-11 PROCEDURE — 3044F HG A1C LEVEL LT 7.0%: CPT | Mod: CPTII,S$GLB,, | Performed by: PAIN MEDICINE

## 2025-08-11 PROCEDURE — 3074F SYST BP LT 130 MM HG: CPT | Mod: CPTII,S$GLB,, | Performed by: PAIN MEDICINE

## 2025-08-11 PROCEDURE — 99999 PR PBB SHADOW E&M-EST. PATIENT-LVL V: CPT | Mod: PBBFAC,,, | Performed by: PAIN MEDICINE

## 2025-08-11 PROCEDURE — 1159F MED LIST DOCD IN RCRD: CPT | Mod: CPTII,S$GLB,, | Performed by: PAIN MEDICINE

## 2025-08-11 PROCEDURE — 99214 OFFICE O/P EST MOD 30 MIN: CPT | Mod: S$GLB,,, | Performed by: PAIN MEDICINE

## 2025-08-11 RX ORDER — GABAPENTIN 300 MG/1
600 CAPSULE ORAL 3 TIMES DAILY
Qty: 180 CAPSULE | Refills: 5 | Status: SHIPPED | OUTPATIENT
Start: 2025-08-11 | End: 2026-02-07

## 2025-08-15 ENCOUNTER — NURSE TRIAGE (OUTPATIENT)
Dept: ADMINISTRATIVE | Facility: CLINIC | Age: 45
End: 2025-08-15
Payer: COMMERCIAL

## 2025-08-15 ENCOUNTER — HOSPITAL ENCOUNTER (EMERGENCY)
Facility: HOSPITAL | Age: 45
Discharge: HOME | End: 2025-08-15
Attending: EMERGENCY MEDICINE
Payer: COMMERCIAL

## 2025-08-15 VITALS
SYSTOLIC BLOOD PRESSURE: 119 MMHG | RESPIRATION RATE: 17 BRPM | BODY MASS INDEX: 27.82 KG/M2 | HEIGHT: 63 IN | WEIGHT: 157 LBS | TEMPERATURE: 98 F | OXYGEN SATURATION: 100 % | DIASTOLIC BLOOD PRESSURE: 73 MMHG | HEART RATE: 90 BPM

## 2025-08-15 DIAGNOSIS — M71.30 SYNOVIAL CYST: Primary | ICD-10-CM

## 2025-08-15 DIAGNOSIS — M54.9 BACK PAIN, UNSPECIFIED BACK LOCATION, UNSPECIFIED BACK PAIN LATERALITY, UNSPECIFIED CHRONICITY: ICD-10-CM

## 2025-08-15 LAB
ABSOLUTE EOSINOPHIL (OHS): 0.06 K/UL
ABSOLUTE MONOCYTE (OHS): 0.41 K/UL (ref 0.3–1)
ABSOLUTE NEUTROPHIL COUNT (OHS): 3.6 K/UL (ref 1.8–7.7)
ALBUMIN SERPL BCP-MCNC: 4.5 G/DL (ref 3.5–5.2)
ALP SERPL-CCNC: 54 UNIT/L (ref 40–150)
ALT SERPL W/O P-5'-P-CCNC: 23 UNIT/L (ref 10–44)
ANION GAP (OHS): 11 MMOL/L (ref 8–16)
AST SERPL-CCNC: 24 UNIT/L (ref 11–45)
BACTERIA #/AREA URNS AUTO: ABNORMAL /HPF
BASOPHILS # BLD AUTO: 0.03 K/UL
BASOPHILS NFR BLD AUTO: 0.6 %
BILIRUB SERPL-MCNC: 0.9 MG/DL (ref 0.1–1)
BILIRUB UR QL STRIP.AUTO: NEGATIVE
BUN SERPL-MCNC: 11 MG/DL (ref 6–20)
CALCIUM SERPL-MCNC: 9.2 MG/DL (ref 8.7–10.5)
CHLORIDE SERPL-SCNC: 106 MMOL/L (ref 95–110)
CLARITY UR: ABNORMAL
CO2 SERPL-SCNC: 22 MMOL/L (ref 23–29)
COLOR UR AUTO: YELLOW
CREAT SERPL-MCNC: 0.7 MG/DL (ref 0.5–1.4)
ERYTHROCYTE [DISTWIDTH] IN BLOOD BY AUTOMATED COUNT: 11.7 % (ref 11.5–14.5)
GFR SERPLBLD CREATININE-BSD FMLA CKD-EPI: >60 ML/MIN/1.73/M2
GLUCOSE SERPL-MCNC: 104 MG/DL (ref 70–110)
GLUCOSE UR QL STRIP: NEGATIVE
HCT VFR BLD AUTO: 38.6 % (ref 37–48.5)
HGB BLD-MCNC: 13.6 GM/DL (ref 12–16)
HGB UR QL STRIP: ABNORMAL
HLA B27 INTERPRETATION: NORMAL
IMM GRANULOCYTES # BLD AUTO: 0.02 K/UL (ref 0–0.04)
IMM GRANULOCYTES NFR BLD AUTO: 0.4 % (ref 0–0.5)
KETONES UR QL STRIP: NEGATIVE
LEUKOCYTE ESTERASE UR QL STRIP: ABNORMAL
LYMPHOCYTES # BLD AUTO: 1.2 K/UL (ref 1–4.8)
Lab: NEGATIVE
Lab: NORMAL
MCH RBC QN AUTO: 31 PG (ref 27–31)
MCHC RBC AUTO-ENTMCNC: 35.2 G/DL (ref 32–36)
MCV RBC AUTO: 88 FL (ref 82–98)
MICROSCOPIC COMMENT: ABNORMAL
NITRITE UR QL STRIP: NEGATIVE
NUCLEATED RBC (/100WBC) (OHS): 0 /100 WBC
PH UR STRIP: 6 [PH]
PLATELET # BLD AUTO: 225 K/UL (ref 150–450)
PMV BLD AUTO: 9.9 FL (ref 9.2–12.9)
POTASSIUM SERPL-SCNC: 3.7 MMOL/L (ref 3.5–5.1)
PROT SERPL-MCNC: 7.2 GM/DL (ref 6–8.4)
PROT UR QL STRIP: ABNORMAL
RBC # BLD AUTO: 4.39 M/UL (ref 4–5.4)
RBC #/AREA URNS AUTO: 10 /HPF (ref 0–4)
RELATIVE EOSINOPHIL (OHS): 1.1 %
RELATIVE LYMPHOCYTE (OHS): 22.6 % (ref 18–48)
RELATIVE MONOCYTE (OHS): 7.7 % (ref 4–15)
RELATIVE NEUTROPHIL (OHS): 67.6 % (ref 38–73)
SODIUM SERPL-SCNC: 139 MMOL/L (ref 136–145)
SP GR UR STRIP: 1.02
SQUAMOUS #/AREA URNS AUTO: 16 /HPF
UROBILINOGEN UR STRIP-ACNC: NEGATIVE EU/DL
WBC # BLD AUTO: 5.32 K/UL (ref 3.9–12.7)
WBC #/AREA URNS AUTO: 19 /HPF (ref 0–5)

## 2025-08-15 PROCEDURE — 87077 CULTURE AEROBIC IDENTIFY: CPT | Performed by: EMERGENCY MEDICINE

## 2025-08-15 PROCEDURE — 96374 THER/PROPH/DIAG INJ IV PUSH: CPT

## 2025-08-15 PROCEDURE — 96361 HYDRATE IV INFUSION ADD-ON: CPT

## 2025-08-15 PROCEDURE — 84075 ASSAY ALKALINE PHOSPHATASE: CPT | Performed by: EMERGENCY MEDICINE

## 2025-08-15 PROCEDURE — 63600175 PHARM REV CODE 636 W HCPCS: Performed by: EMERGENCY MEDICINE

## 2025-08-15 PROCEDURE — 85025 COMPLETE CBC W/AUTO DIFF WBC: CPT | Performed by: EMERGENCY MEDICINE

## 2025-08-15 PROCEDURE — 99284 EMERGENCY DEPT VISIT MOD MDM: CPT

## 2025-08-15 PROCEDURE — 96375 TX/PRO/DX INJ NEW DRUG ADDON: CPT

## 2025-08-15 PROCEDURE — 25000003 PHARM REV CODE 250: Performed by: EMERGENCY MEDICINE

## 2025-08-15 PROCEDURE — 96376 TX/PRO/DX INJ SAME DRUG ADON: CPT

## 2025-08-15 PROCEDURE — 81003 URINALYSIS AUTO W/O SCOPE: CPT | Performed by: EMERGENCY MEDICINE

## 2025-08-15 RX ORDER — CEPHALEXIN 500 MG/1
500 CAPSULE ORAL 4 TIMES DAILY
Qty: 20 CAPSULE | Refills: 0 | Status: SHIPPED | OUTPATIENT
Start: 2025-08-15 | End: 2025-08-21 | Stop reason: CLARIF

## 2025-08-15 RX ORDER — HYDROMORPHONE HYDROCHLORIDE 1 MG/ML
1 INJECTION, SOLUTION INTRAMUSCULAR; INTRAVENOUS; SUBCUTANEOUS
Status: COMPLETED | OUTPATIENT
Start: 2025-08-15 | End: 2025-08-15

## 2025-08-15 RX ORDER — LIDOCAINE 50 MG/G
1 PATCH TOPICAL DAILY
Qty: 15 PATCH | Refills: 0 | Status: SHIPPED | OUTPATIENT
Start: 2025-08-15

## 2025-08-15 RX ORDER — CEFTRIAXONE 1 G/1
1 INJECTION, POWDER, FOR SOLUTION INTRAMUSCULAR; INTRAVENOUS
Status: COMPLETED | OUTPATIENT
Start: 2025-08-15 | End: 2025-08-15

## 2025-08-15 RX ORDER — ONDANSETRON HYDROCHLORIDE 2 MG/ML
4 INJECTION, SOLUTION INTRAVENOUS
Status: COMPLETED | OUTPATIENT
Start: 2025-08-15 | End: 2025-08-15

## 2025-08-15 RX ORDER — FLUCONAZOLE 150 MG/1
150 TABLET ORAL DAILY
Qty: 1 TABLET | Refills: 0 | Status: SHIPPED | OUTPATIENT
Start: 2025-08-15 | End: 2025-08-16

## 2025-08-15 RX ORDER — HYDROCODONE BITARTRATE AND ACETAMINOPHEN 5; 325 MG/1; MG/1
1 TABLET ORAL EVERY 6 HOURS PRN
Qty: 12 TABLET | Refills: 0 | Status: SHIPPED | OUTPATIENT
Start: 2025-08-15 | End: 2025-08-21 | Stop reason: CLARIF

## 2025-08-15 RX ORDER — METHOCARBAMOL 500 MG/1
1000 TABLET, FILM COATED ORAL
Status: COMPLETED | OUTPATIENT
Start: 2025-08-15 | End: 2025-08-15

## 2025-08-15 RX ORDER — LIDOCAINE 50 MG/G
1 PATCH TOPICAL ONCE
Status: DISCONTINUED | OUTPATIENT
Start: 2025-08-15 | End: 2025-08-15 | Stop reason: HOSPADM

## 2025-08-15 RX ORDER — METHOCARBAMOL 500 MG/1
1000 TABLET, FILM COATED ORAL 3 TIMES DAILY
Qty: 42 TABLET | Refills: 0 | Status: SHIPPED | OUTPATIENT
Start: 2025-08-15 | End: 2025-08-21 | Stop reason: CLARIF

## 2025-08-15 RX ORDER — METHYLPREDNISOLONE 4 MG/1
TABLET ORAL
Qty: 1 EACH | Refills: 0 | Status: SHIPPED | OUTPATIENT
Start: 2025-08-15 | End: 2025-08-21 | Stop reason: CLARIF

## 2025-08-15 RX ORDER — DEXAMETHASONE SODIUM PHOSPHATE 4 MG/ML
8 INJECTION, SOLUTION INTRA-ARTICULAR; INTRALESIONAL; INTRAMUSCULAR; INTRAVENOUS; SOFT TISSUE
Status: COMPLETED | OUTPATIENT
Start: 2025-08-15 | End: 2025-08-15

## 2025-08-15 RX ORDER — HYDROMORPHONE HYDROCHLORIDE 1 MG/ML
1 INJECTION, SOLUTION INTRAMUSCULAR; INTRAVENOUS; SUBCUTANEOUS
Refills: 0 | Status: COMPLETED | OUTPATIENT
Start: 2025-08-15 | End: 2025-08-15

## 2025-08-15 RX ADMIN — METHOCARBAMOL 1000 MG: 500 TABLET ORAL at 12:08

## 2025-08-15 RX ADMIN — HYDROMORPHONE HYDROCHLORIDE 1 MG: 1 INJECTION, SOLUTION INTRAMUSCULAR; INTRAVENOUS; SUBCUTANEOUS at 09:08

## 2025-08-15 RX ADMIN — HYDROMORPHONE HYDROCHLORIDE 1 MG: 1 INJECTION, SOLUTION INTRAMUSCULAR; INTRAVENOUS; SUBCUTANEOUS at 11:08

## 2025-08-15 RX ADMIN — ONDANSETRON 4 MG: 2 INJECTION INTRAMUSCULAR; INTRAVENOUS at 12:08

## 2025-08-15 RX ADMIN — ONDANSETRON 4 MG: 2 INJECTION INTRAMUSCULAR; INTRAVENOUS at 09:08

## 2025-08-15 RX ADMIN — LIDOCAINE 1 PATCH: 50 PATCH TOPICAL at 01:08

## 2025-08-15 RX ADMIN — SODIUM CHLORIDE 1000 ML: 9 INJECTION, SOLUTION INTRAVENOUS at 09:08

## 2025-08-15 RX ADMIN — DEXAMETHASONE SODIUM PHOSPHATE 8 MG: 4 INJECTION INTRA-ARTICULAR; INTRALESIONAL; INTRAMUSCULAR; INTRAVENOUS; SOFT TISSUE at 11:08

## 2025-08-15 RX ADMIN — CEFTRIAXONE 1 G: 1 INJECTION, POWDER, FOR SOLUTION INTRAMUSCULAR; INTRAVENOUS at 01:08

## 2025-08-16 ENCOUNTER — RESULTS FOLLOW-UP (OUTPATIENT)
Dept: EMERGENCY MEDICINE | Facility: HOSPITAL | Age: 45
End: 2025-08-16
Payer: COMMERCIAL

## 2025-08-16 LAB — HOLD SPECIMEN: NORMAL

## 2025-08-17 LAB — BACTERIA UR CULT: ABNORMAL

## 2025-08-17 RX ORDER — CIPROFLOXACIN 500 MG/1
500 TABLET, FILM COATED ORAL 2 TIMES DAILY
Qty: 14 TABLET | Refills: 0 | Status: SHIPPED | OUTPATIENT
Start: 2025-08-17 | End: 2025-08-24

## 2025-08-19 ENCOUNTER — TELEPHONE (OUTPATIENT)
Dept: NEUROSURGERY | Facility: CLINIC | Age: 45
End: 2025-08-19
Payer: COMMERCIAL

## 2025-08-21 ENCOUNTER — OFFICE VISIT (OUTPATIENT)
Dept: NEUROSURGERY | Facility: CLINIC | Age: 45
End: 2025-08-21
Attending: STUDENT IN AN ORGANIZED HEALTH CARE EDUCATION/TRAINING PROGRAM
Payer: COMMERCIAL

## 2025-08-21 ENCOUNTER — ANESTHESIA EVENT (OUTPATIENT)
Dept: SURGERY | Facility: HOSPITAL | Age: 45
End: 2025-08-21
Payer: COMMERCIAL

## 2025-08-21 ENCOUNTER — PATIENT MESSAGE (OUTPATIENT)
Dept: NEUROSURGERY | Facility: CLINIC | Age: 45
End: 2025-08-21

## 2025-08-21 ENCOUNTER — HOSPITAL ENCOUNTER (OUTPATIENT)
Dept: PREADMISSION TESTING | Facility: HOSPITAL | Age: 45
Discharge: HOME | End: 2025-08-21
Attending: STUDENT IN AN ORGANIZED HEALTH CARE EDUCATION/TRAINING PROGRAM
Payer: COMMERCIAL

## 2025-08-21 ENCOUNTER — HOSPITAL ENCOUNTER (OUTPATIENT)
Dept: RADIOLOGY | Facility: HOSPITAL | Age: 45
Discharge: HOME | End: 2025-08-21
Attending: STUDENT IN AN ORGANIZED HEALTH CARE EDUCATION/TRAINING PROGRAM
Payer: COMMERCIAL

## 2025-08-21 VITALS
OXYGEN SATURATION: 100 % | HEART RATE: 95 BPM | SYSTOLIC BLOOD PRESSURE: 114 MMHG | DIASTOLIC BLOOD PRESSURE: 74 MMHG | RESPIRATION RATE: 18 BRPM | TEMPERATURE: 97 F

## 2025-08-21 VITALS
DIASTOLIC BLOOD PRESSURE: 70 MMHG | BODY MASS INDEX: 27.81 KG/M2 | SYSTOLIC BLOOD PRESSURE: 110 MMHG | HEIGHT: 63 IN | HEART RATE: 106 BPM | OXYGEN SATURATION: 99 %

## 2025-08-21 DIAGNOSIS — M48.062 SPINAL STENOSIS OF LUMBAR REGION WITH NEUROGENIC CLAUDICATION: ICD-10-CM

## 2025-08-21 DIAGNOSIS — Z01.818 PREOP TESTING: Primary | ICD-10-CM

## 2025-08-21 DIAGNOSIS — M54.16 LUMBAR RADICULOPATHY: Primary | ICD-10-CM

## 2025-08-21 LAB
APTT PPP: 25.2 SECONDS (ref 21–32)
INDIRECT COOMBS: NORMAL
INR PPP: 1 (ref 0.8–1.2)
OHS QRS DURATION: 84 MS
OHS QTC CALCULATION: 449 MS
PROTHROMBIN TIME: 11.1 SECONDS (ref 9–12.5)
RH BLD: NORMAL
SPECIMEN OUTDATE: NORMAL

## 2025-08-21 PROCEDURE — 93010 ELECTROCARDIOGRAM REPORT: CPT | Mod: ,,, | Performed by: INTERNAL MEDICINE

## 2025-08-21 PROCEDURE — 85610 PROTHROMBIN TIME: CPT | Performed by: STUDENT IN AN ORGANIZED HEALTH CARE EDUCATION/TRAINING PROGRAM

## 2025-08-21 PROCEDURE — 85730 THROMBOPLASTIN TIME PARTIAL: CPT | Performed by: STUDENT IN AN ORGANIZED HEALTH CARE EDUCATION/TRAINING PROGRAM

## 2025-08-21 PROCEDURE — 71046 X-RAY EXAM CHEST 2 VIEWS: CPT | Mod: TC

## 2025-08-21 PROCEDURE — 3008F BODY MASS INDEX DOCD: CPT | Mod: CPTII,S$GLB,, | Performed by: STUDENT IN AN ORGANIZED HEALTH CARE EDUCATION/TRAINING PROGRAM

## 2025-08-21 PROCEDURE — 3044F HG A1C LEVEL LT 7.0%: CPT | Mod: CPTII,S$GLB,, | Performed by: STUDENT IN AN ORGANIZED HEALTH CARE EDUCATION/TRAINING PROGRAM

## 2025-08-21 PROCEDURE — 99204 OFFICE O/P NEW MOD 45 MIN: CPT | Mod: S$GLB,,, | Performed by: STUDENT IN AN ORGANIZED HEALTH CARE EDUCATION/TRAINING PROGRAM

## 2025-08-21 PROCEDURE — 86850 RBC ANTIBODY SCREEN: CPT | Performed by: STUDENT IN AN ORGANIZED HEALTH CARE EDUCATION/TRAINING PROGRAM

## 2025-08-21 PROCEDURE — 3074F SYST BP LT 130 MM HG: CPT | Mod: CPTII,S$GLB,, | Performed by: STUDENT IN AN ORGANIZED HEALTH CARE EDUCATION/TRAINING PROGRAM

## 2025-08-21 PROCEDURE — 71046 X-RAY EXAM CHEST 2 VIEWS: CPT | Mod: 26,,, | Performed by: RADIOLOGY

## 2025-08-21 PROCEDURE — 3078F DIAST BP <80 MM HG: CPT | Mod: CPTII,S$GLB,, | Performed by: STUDENT IN AN ORGANIZED HEALTH CARE EDUCATION/TRAINING PROGRAM

## 2025-08-21 PROCEDURE — 93005 ELECTROCARDIOGRAM TRACING: CPT

## 2025-08-21 RX ORDER — DIVALPROEX SODIUM 250 MG/1
TABLET, FILM COATED, EXTENDED RELEASE ORAL
COMMUNITY
End: 2025-08-21 | Stop reason: CLARIF

## 2025-08-22 DIAGNOSIS — F98.8 ATTENTION DEFICIT DISORDER (ADD) IN ADULT: ICD-10-CM

## 2025-08-22 RX ORDER — LISDEXAMFETAMINE DIMESYLATE 20 MG/1
20 CAPSULE ORAL EVERY MORNING
Qty: 30 CAPSULE | Refills: 0 | Status: SHIPPED | OUTPATIENT
Start: 2025-08-22

## 2025-08-25 ENCOUNTER — ANESTHESIA (OUTPATIENT)
Dept: SURGERY | Facility: HOSPITAL | Age: 45
End: 2025-08-25
Payer: COMMERCIAL

## 2025-08-25 ENCOUNTER — TELEPHONE (OUTPATIENT)
Dept: NEUROSURGERY | Facility: CLINIC | Age: 45
End: 2025-08-25
Payer: COMMERCIAL

## 2025-08-25 RX ORDER — MUPIROCIN 20 MG/G
1 OINTMENT TOPICAL
OUTPATIENT
Start: 2025-08-25

## 2025-08-27 ENCOUNTER — TELEPHONE (OUTPATIENT)
Dept: SURGERY | Facility: HOSPITAL | Age: 45
End: 2025-08-27
Payer: COMMERCIAL

## 2025-08-28 ENCOUNTER — HOSPITAL ENCOUNTER (OUTPATIENT)
Facility: HOSPITAL | Age: 45
Discharge: HOME | End: 2025-08-28
Attending: STUDENT IN AN ORGANIZED HEALTH CARE EDUCATION/TRAINING PROGRAM | Admitting: STUDENT IN AN ORGANIZED HEALTH CARE EDUCATION/TRAINING PROGRAM
Payer: COMMERCIAL

## 2025-08-28 VITALS
TEMPERATURE: 98 F | HEART RATE: 94 BPM | OXYGEN SATURATION: 98 % | RESPIRATION RATE: 17 BRPM | SYSTOLIC BLOOD PRESSURE: 109 MMHG | DIASTOLIC BLOOD PRESSURE: 67 MMHG | BODY MASS INDEX: 27.81 KG/M2 | WEIGHT: 157 LBS

## 2025-08-28 DIAGNOSIS — M71.38 SYNOVIAL CYST OF LUMBAR FACET JOINT: Primary | ICD-10-CM

## 2025-08-28 PROCEDURE — 63600175 PHARM REV CODE 636 W HCPCS: Performed by: NURSE ANESTHETIST, CERTIFIED REGISTERED

## 2025-08-28 PROCEDURE — 37000008 HC ANESTHESIA 1ST 15 MINUTES: Performed by: STUDENT IN AN ORGANIZED HEALTH CARE EDUCATION/TRAINING PROGRAM

## 2025-08-28 PROCEDURE — 25000003 PHARM REV CODE 250: Performed by: NURSE ANESTHETIST, CERTIFIED REGISTERED

## 2025-08-28 PROCEDURE — 71000033 HC RECOVERY, INTIAL HOUR: Performed by: STUDENT IN AN ORGANIZED HEALTH CARE EDUCATION/TRAINING PROGRAM

## 2025-08-28 PROCEDURE — 25000003 PHARM REV CODE 250: Performed by: ANESTHESIOLOGY

## 2025-08-28 PROCEDURE — 63600175 PHARM REV CODE 636 W HCPCS: Mod: JZ,TB | Performed by: STUDENT IN AN ORGANIZED HEALTH CARE EDUCATION/TRAINING PROGRAM

## 2025-08-28 PROCEDURE — 71000015 HC POSTOP RECOV 1ST HR: Performed by: STUDENT IN AN ORGANIZED HEALTH CARE EDUCATION/TRAINING PROGRAM

## 2025-08-28 PROCEDURE — 27201423 OPTIME MED/SURG SUP & DEVICES STERILE SUPPLY: Performed by: STUDENT IN AN ORGANIZED HEALTH CARE EDUCATION/TRAINING PROGRAM

## 2025-08-28 PROCEDURE — 37000009 HC ANESTHESIA EA ADD 15 MINS: Performed by: STUDENT IN AN ORGANIZED HEALTH CARE EDUCATION/TRAINING PROGRAM

## 2025-08-28 PROCEDURE — 71000039 HC RECOVERY, EACH ADD'L HOUR: Performed by: STUDENT IN AN ORGANIZED HEALTH CARE EDUCATION/TRAINING PROGRAM

## 2025-08-28 PROCEDURE — 71000016 HC POSTOP RECOV ADDL HR: Performed by: STUDENT IN AN ORGANIZED HEALTH CARE EDUCATION/TRAINING PROGRAM

## 2025-08-28 PROCEDURE — 36000710: Performed by: STUDENT IN AN ORGANIZED HEALTH CARE EDUCATION/TRAINING PROGRAM

## 2025-08-28 PROCEDURE — 63600175 PHARM REV CODE 636 W HCPCS: Performed by: ANESTHESIOLOGY

## 2025-08-28 PROCEDURE — 63267 EXCISE INTRSPINL LESION LMBR: CPT | Mod: ,,, | Performed by: STUDENT IN AN ORGANIZED HEALTH CARE EDUCATION/TRAINING PROGRAM

## 2025-08-28 PROCEDURE — 63600175 PHARM REV CODE 636 W HCPCS: Performed by: PHYSICIAN ASSISTANT

## 2025-08-28 PROCEDURE — 25000003 PHARM REV CODE 250: Performed by: PHYSICIAN ASSISTANT

## 2025-08-28 PROCEDURE — 25000003 PHARM REV CODE 250: Performed by: STUDENT IN AN ORGANIZED HEALTH CARE EDUCATION/TRAINING PROGRAM

## 2025-08-28 PROCEDURE — 69990 MICROSURGERY ADD-ON: CPT | Mod: ,,, | Performed by: STUDENT IN AN ORGANIZED HEALTH CARE EDUCATION/TRAINING PROGRAM

## 2025-08-28 PROCEDURE — 36000711: Performed by: STUDENT IN AN ORGANIZED HEALTH CARE EDUCATION/TRAINING PROGRAM

## 2025-08-28 RX ORDER — DIPHENHYDRAMINE HYDROCHLORIDE 50 MG/ML
INJECTION, SOLUTION INTRAMUSCULAR; INTRAVENOUS
Status: DISCONTINUED | OUTPATIENT
Start: 2025-08-28 | End: 2025-08-28

## 2025-08-28 RX ORDER — MIDAZOLAM HYDROCHLORIDE 1 MG/ML
INJECTION INTRAMUSCULAR; INTRAVENOUS
Status: DISCONTINUED | OUTPATIENT
Start: 2025-08-28 | End: 2025-08-28

## 2025-08-28 RX ORDER — MEPERIDINE HYDROCHLORIDE 25 MG/ML
12.5 INJECTION INTRAMUSCULAR; INTRAVENOUS; SUBCUTANEOUS EVERY 10 MIN PRN
Status: DISCONTINUED | OUTPATIENT
Start: 2025-08-28 | End: 2025-08-28 | Stop reason: HOSPADM

## 2025-08-28 RX ORDER — FENTANYL CITRATE 50 UG/ML
INJECTION, SOLUTION INTRAMUSCULAR; INTRAVENOUS
Status: DISCONTINUED | OUTPATIENT
Start: 2025-08-28 | End: 2025-08-28

## 2025-08-28 RX ORDER — HYDROMORPHONE HYDROCHLORIDE 2 MG/ML
0.2 INJECTION, SOLUTION INTRAMUSCULAR; INTRAVENOUS; SUBCUTANEOUS EVERY 5 MIN PRN
Status: DISCONTINUED | OUTPATIENT
Start: 2025-08-28 | End: 2025-08-28 | Stop reason: HOSPADM

## 2025-08-28 RX ORDER — ONDANSETRON HYDROCHLORIDE 2 MG/ML
INJECTION, SOLUTION INTRAVENOUS
Status: DISCONTINUED | OUTPATIENT
Start: 2025-08-28 | End: 2025-08-28

## 2025-08-28 RX ORDER — PROPOFOL 10 MG/ML
VIAL (ML) INTRAVENOUS
Status: DISCONTINUED | OUTPATIENT
Start: 2025-08-28 | End: 2025-08-28

## 2025-08-28 RX ORDER — PHENYLEPHRINE HYDROCHLORIDE 10 MG/ML
INJECTION INTRAVENOUS
Status: DISCONTINUED | OUTPATIENT
Start: 2025-08-28 | End: 2025-08-28

## 2025-08-28 RX ORDER — METHYLPREDNISOLONE ACETATE 40 MG/ML
INJECTION, SUSPENSION INTRA-ARTICULAR; INTRALESIONAL; INTRAMUSCULAR; SOFT TISSUE
Status: DISCONTINUED | OUTPATIENT
Start: 2025-08-28 | End: 2025-08-28 | Stop reason: HOSPADM

## 2025-08-28 RX ORDER — CEFAZOLIN 2 G/1
2 INJECTION, POWDER, FOR SOLUTION INTRAMUSCULAR; INTRAVENOUS
Status: COMPLETED | OUTPATIENT
Start: 2025-08-28 | End: 2025-08-28

## 2025-08-28 RX ORDER — DEXAMETHASONE SODIUM PHOSPHATE 4 MG/ML
INJECTION, SOLUTION INTRA-ARTICULAR; INTRALESIONAL; INTRAMUSCULAR; INTRAVENOUS; SOFT TISSUE
Status: DISCONTINUED | OUTPATIENT
Start: 2025-08-28 | End: 2025-08-28

## 2025-08-28 RX ORDER — SODIUM CHLORIDE, SODIUM LACTATE, POTASSIUM CHLORIDE, CALCIUM CHLORIDE 600; 310; 30; 20 MG/100ML; MG/100ML; MG/100ML; MG/100ML
INJECTION, SOLUTION INTRAVENOUS CONTINUOUS
Status: DISCONTINUED | OUTPATIENT
Start: 2025-08-28 | End: 2025-08-28 | Stop reason: HOSPADM

## 2025-08-28 RX ORDER — AMOXICILLIN 250 MG
2 CAPSULE ORAL DAILY
Qty: 20 TABLET | Refills: 0 | Status: SHIPPED | OUTPATIENT
Start: 2025-08-28

## 2025-08-28 RX ORDER — KETAMINE HYDROCHLORIDE 100 MG/ML
INJECTION, SOLUTION INTRAMUSCULAR; INTRAVENOUS
Status: DISCONTINUED | OUTPATIENT
Start: 2025-08-28 | End: 2025-08-28

## 2025-08-28 RX ORDER — LIDOCAINE HYDROCHLORIDE AND EPINEPHRINE 10; 10 UG/ML; MG/ML
INJECTION, SOLUTION INFILTRATION; PERINEURAL
Status: DISCONTINUED | OUTPATIENT
Start: 2025-08-28 | End: 2025-08-28 | Stop reason: HOSPADM

## 2025-08-28 RX ORDER — ACETAMINOPHEN 500 MG
500 TABLET ORAL EVERY 8 HOURS
Qty: 30 TABLET | Refills: 0 | Status: SHIPPED | OUTPATIENT
Start: 2025-08-28 | End: 2025-09-07

## 2025-08-28 RX ORDER — LIDOCAINE HYDROCHLORIDE 20 MG/ML
INJECTION INTRAVENOUS
Status: DISCONTINUED | OUTPATIENT
Start: 2025-08-28 | End: 2025-08-28

## 2025-08-28 RX ORDER — ONDANSETRON HYDROCHLORIDE 2 MG/ML
4 INJECTION, SOLUTION INTRAVENOUS ONCE
Status: COMPLETED | OUTPATIENT
Start: 2025-08-28 | End: 2025-08-28

## 2025-08-28 RX ORDER — OXYCODONE HYDROCHLORIDE 5 MG/1
5 TABLET ORAL ONCE
Refills: 0 | Status: DISCONTINUED | OUTPATIENT
Start: 2025-08-28 | End: 2025-08-28 | Stop reason: HOSPADM

## 2025-08-28 RX ORDER — SODIUM CHLORIDE 9 MG/ML
INJECTION, SOLUTION INTRAVENOUS CONTINUOUS
Status: DISCONTINUED | OUTPATIENT
Start: 2025-08-28 | End: 2025-08-28 | Stop reason: HOSPADM

## 2025-08-28 RX ORDER — ONDANSETRON HYDROCHLORIDE 4 MG/5ML
4 SOLUTION ORAL ONCE
Status: DISCONTINUED | OUTPATIENT
Start: 2025-08-28 | End: 2025-08-28

## 2025-08-28 RX ORDER — DIPHENHYDRAMINE HYDROCHLORIDE 50 MG/ML
25 INJECTION, SOLUTION INTRAMUSCULAR; INTRAVENOUS EVERY 6 HOURS PRN
Status: DISCONTINUED | OUTPATIENT
Start: 2025-08-28 | End: 2025-08-28 | Stop reason: HOSPADM

## 2025-08-28 RX ORDER — METHOCARBAMOL 500 MG/1
1000 TABLET, FILM COATED ORAL 3 TIMES DAILY
Qty: 60 TABLET | Refills: 0 | Status: SHIPPED | OUTPATIENT
Start: 2025-08-28 | End: 2025-09-07

## 2025-08-28 RX ORDER — ONDANSETRON 4 MG/1
4 TABLET, FILM COATED ORAL EVERY 8 HOURS PRN
Qty: 20 TABLET | Refills: 0 | Status: SHIPPED | OUTPATIENT
Start: 2025-08-28 | End: 2025-09-03 | Stop reason: SDUPTHER

## 2025-08-28 RX ORDER — ACETAMINOPHEN 500 MG
1000 TABLET ORAL
Status: COMPLETED | OUTPATIENT
Start: 2025-08-28 | End: 2025-08-28

## 2025-08-28 RX ORDER — OXYCODONE HYDROCHLORIDE 5 MG/1
5 TABLET ORAL EVERY 4 HOURS PRN
Qty: 30 TABLET | Refills: 0 | Status: SHIPPED | OUTPATIENT
Start: 2025-08-28 | End: 2025-09-03 | Stop reason: SDUPTHER

## 2025-08-28 RX ORDER — ONDANSETRON HYDROCHLORIDE 2 MG/ML
INJECTION, SOLUTION INTRAVENOUS
Status: DISCONTINUED
Start: 2025-08-28 | End: 2025-08-28 | Stop reason: HOSPADM

## 2025-08-28 RX ORDER — MUPIROCIN 20 MG/G
OINTMENT TOPICAL
Status: DISCONTINUED | OUTPATIENT
Start: 2025-08-28 | End: 2025-08-28 | Stop reason: HOSPADM

## 2025-08-28 RX ORDER — ROCURONIUM BROMIDE 10 MG/ML
INJECTION, SOLUTION INTRAVENOUS
Status: DISCONTINUED | OUTPATIENT
Start: 2025-08-28 | End: 2025-08-28

## 2025-08-28 RX ORDER — DEXMEDETOMIDINE HYDROCHLORIDE 100 UG/ML
INJECTION, SOLUTION INTRAVENOUS
Status: DISCONTINUED | OUTPATIENT
Start: 2025-08-28 | End: 2025-08-28

## 2025-08-28 RX ADMIN — FENTANYL CITRATE 100 MCG: 50 INJECTION, SOLUTION INTRAMUSCULAR; INTRAVENOUS at 07:08

## 2025-08-28 RX ADMIN — ACETAMINOPHEN 1000 MG: 500 TABLET ORAL at 05:08

## 2025-08-28 RX ADMIN — ROCURONIUM BROMIDE 10 MG: 10 INJECTION, SOLUTION INTRAVENOUS at 08:08

## 2025-08-28 RX ADMIN — HYDROMORPHONE HYDROCHLORIDE 0.2 MG: 2 INJECTION INTRAMUSCULAR; INTRAVENOUS; SUBCUTANEOUS at 10:08

## 2025-08-28 RX ADMIN — KETAMINE HYDROCHLORIDE 10 MG: 100 INJECTION, SOLUTION, CONCENTRATE INTRAMUSCULAR; INTRAVENOUS at 09:08

## 2025-08-28 RX ADMIN — FENTANYL CITRATE 50 MCG: 50 INJECTION, SOLUTION INTRAMUSCULAR; INTRAVENOUS at 07:08

## 2025-08-28 RX ADMIN — PHENYLEPHRINE HYDROCHLORIDE 50 MCG: 10 INJECTION INTRAVENOUS at 09:08

## 2025-08-28 RX ADMIN — PHENYLEPHRINE HYDROCHLORIDE 50 MCG: 10 INJECTION INTRAVENOUS at 08:08

## 2025-08-28 RX ADMIN — ROCURONIUM BROMIDE 10 MG: 10 INJECTION, SOLUTION INTRAVENOUS at 09:08

## 2025-08-28 RX ADMIN — PROPOFOL 50 MG: 10 INJECTION, EMULSION INTRAVENOUS at 09:08

## 2025-08-28 RX ADMIN — SODIUM CHLORIDE, SODIUM LACTATE, POTASSIUM CHLORIDE, AND CALCIUM CHLORIDE: .6; .31; .03; .02 INJECTION, SOLUTION INTRAVENOUS at 07:08

## 2025-08-28 RX ADMIN — ONDANSETRON 4 MG: 2 INJECTION, SOLUTION INTRAMUSCULAR; INTRAVENOUS at 07:08

## 2025-08-28 RX ADMIN — LIDOCAINE HYDROCHLORIDE 50 MG: 20 INJECTION, SOLUTION INTRAVENOUS at 09:08

## 2025-08-28 RX ADMIN — MIDAZOLAM HYDROCHLORIDE 2 MG: 1 INJECTION INTRAMUSCULAR; INTRAVENOUS at 07:08

## 2025-08-28 RX ADMIN — DEXMEDETOMIDINE HYDROCHLORIDE 4 MCG: 100 INJECTION, SOLUTION, CONCENTRATE INTRAVENOUS at 09:08

## 2025-08-28 RX ADMIN — PROPOFOL 120 MG: 10 INJECTION, EMULSION INTRAVENOUS at 07:08

## 2025-08-28 RX ADMIN — ROCURONIUM BROMIDE 20 MG: 10 INJECTION, SOLUTION INTRAVENOUS at 08:08

## 2025-08-28 RX ADMIN — KETAMINE HYDROCHLORIDE 20 MG: 100 INJECTION, SOLUTION, CONCENTRATE INTRAMUSCULAR; INTRAVENOUS at 07:08

## 2025-08-28 RX ADMIN — CEFAZOLIN 2 G: 2 INJECTION, POWDER, FOR SOLUTION INTRAMUSCULAR; INTRAVENOUS at 07:08

## 2025-08-28 RX ADMIN — DEXAMETHASONE SODIUM PHOSPHATE 8 MG: 4 INJECTION, SOLUTION INTRAMUSCULAR; INTRAVENOUS at 07:08

## 2025-08-28 RX ADMIN — ROCURONIUM BROMIDE 10 MG: 10 INJECTION, SOLUTION INTRAVENOUS at 07:08

## 2025-08-28 RX ADMIN — HYDROMORPHONE HYDROCHLORIDE 0.2 MG: 2 INJECTION INTRAMUSCULAR; INTRAVENOUS; SUBCUTANEOUS at 11:08

## 2025-08-28 RX ADMIN — SODIUM CHLORIDE, SODIUM LACTATE, POTASSIUM CHLORIDE, AND CALCIUM CHLORIDE: .6; .31; .03; .02 INJECTION, SOLUTION INTRAVENOUS at 09:08

## 2025-08-28 RX ADMIN — DEXMEDETOMIDINE HYDROCHLORIDE 20 MCG: 100 INJECTION, SOLUTION, CONCENTRATE INTRAVENOUS at 07:08

## 2025-08-28 RX ADMIN — LIDOCAINE HYDROCHLORIDE 100 MG: 20 INJECTION, SOLUTION INTRAVENOUS at 07:08

## 2025-08-28 RX ADMIN — FENTANYL CITRATE 25 MCG: 50 INJECTION, SOLUTION INTRAMUSCULAR; INTRAVENOUS at 09:08

## 2025-08-28 RX ADMIN — PHENYLEPHRINE HYDROCHLORIDE 100 MCG: 10 INJECTION INTRAVENOUS at 08:08

## 2025-08-28 RX ADMIN — SUGAMMADEX 300 MG: 100 INJECTION, SOLUTION INTRAVENOUS at 09:08

## 2025-08-28 RX ADMIN — DIPHENHYDRAMINE HYDROCHLORIDE 12.5 MG: 50 INJECTION INTRAMUSCULAR; INTRAVENOUS at 07:08

## 2025-08-28 RX ADMIN — ONDANSETRON 4 MG: 2 INJECTION INTRAMUSCULAR; INTRAVENOUS at 12:08

## 2025-08-28 RX ADMIN — MUPIROCIN: 20 OINTMENT TOPICAL at 05:08

## 2025-08-28 RX ADMIN — ROCURONIUM BROMIDE 50 MG: 10 INJECTION, SOLUTION INTRAVENOUS at 07:08

## 2025-08-29 RX ORDER — METHYLPREDNISOLONE 4 MG/1
TABLET ORAL
Qty: 21 EACH | Refills: 0 | Status: SHIPPED | OUTPATIENT
Start: 2025-08-29 | End: 2025-09-19

## 2025-09-03 DIAGNOSIS — M48.062 SPINAL STENOSIS OF LUMBAR REGION WITH NEUROGENIC CLAUDICATION: Primary | ICD-10-CM

## 2025-09-03 DIAGNOSIS — M54.16 LUMBAR RADICULOPATHY: ICD-10-CM

## 2025-09-03 DIAGNOSIS — M71.38 SYNOVIAL CYST OF LUMBAR FACET JOINT: ICD-10-CM

## 2025-09-03 RX ORDER — ONDANSETRON 4 MG/1
4 TABLET, FILM COATED ORAL EVERY 8 HOURS PRN
Qty: 20 TABLET | Refills: 0 | Status: SHIPPED | OUTPATIENT
Start: 2025-09-03

## 2025-09-03 RX ORDER — OXYCODONE HYDROCHLORIDE 5 MG/1
5 TABLET ORAL EVERY 4 HOURS PRN
Qty: 30 TABLET | Refills: 0 | Status: SHIPPED | OUTPATIENT
Start: 2025-09-03

## (undated) DEVICE — CANISTER SUCTION 2 LTR

## (undated) DEVICE — PENCIL BAYONET BOVIE

## (undated) DEVICE — ELECTRODE REM PLYHSV RETURN 9

## (undated) DEVICE — SOL IRR NACL .9% 3000ML

## (undated) DEVICE — SYR ONLY LUER LOCK 20CC

## (undated) DEVICE — KIT ANTIFOG

## (undated) DEVICE — TROCAR ENDOPATH XCEL 5X100MM

## (undated) DEVICE — COVER SNAP KAP 26IN

## (undated) DEVICE — SEE MEDLINE ITEM 146292

## (undated) DEVICE — Device

## (undated) DEVICE — GOWN B1 X-LG X-LONG

## (undated) DEVICE — SEE MEDLINE ITEM 152487

## (undated) DEVICE — BLANKET UPPER BODY 78.7X29.9IN

## (undated) DEVICE — SUT VICRYL PLUS 0 CT1 36IN

## (undated) DEVICE — TRAY CATH 1-LYR URIMTR 16FR

## (undated) DEVICE — DRAPE STERI LONG

## (undated) DEVICE — GLOVE SENSICARE PI MICRO 6.5

## (undated) DEVICE — ADAPT UROLOGICAL 2-WAY STOPCK

## (undated) DEVICE — APPLICATOR CHLORAPREP ORN 26ML

## (undated) DEVICE — NDL HYPO REG 25G X 1 1/2

## (undated) DEVICE — GLOVE SURGICAL LATEX SZ 7

## (undated) DEVICE — ENDOSTITCH INSTRUMENT

## (undated) DEVICE — GOWN NONREINF SET-IN SLV XL

## (undated) DEVICE — SUT VICRYL+ 27 UR-6 VIOL

## (undated) DEVICE — GLOVE SENSICARE PI GRN 6.5

## (undated) DEVICE — PACK LAPAROSCOPY/PELVISCOPY II

## (undated) DEVICE — BUR BONE CUT MICRO TPS 3X3.8MM

## (undated) DEVICE — SUT VLOC 180 ABSORB ESTITCH

## (undated) DEVICE — TIP RUMI BLUE DISPOSABLE 5/BX

## (undated) DEVICE — COVER OVERHEAD SURG LT BLUE

## (undated) DEVICE — SOL NS 1000CC

## (undated) DEVICE — MARKER FN REG DUAL UTIL RULER

## (undated) DEVICE — SUT MCRYL PLUS 4-0 PS2 27IN

## (undated) DEVICE — SUT VICRYL PLUS 4-0 P3 18IN

## (undated) DEVICE — SUT VICRYL PLUS 3-0 SH 18IN

## (undated) DEVICE — PAD PREP 50/CA

## (undated) DEVICE — DRAPE OPMI STERILE

## (undated) DEVICE — SEE MEDLINE ITEM 157150

## (undated) DEVICE — SUT VICRYL 2 0 CT 2

## (undated) DEVICE — KIT WING PAD POSITIONING

## (undated) DEVICE — KIT SPINAL PATIENT CARE JACK

## (undated) DEVICE — MAT QUICK 40X30 FLOOR FLUID LF

## (undated) DEVICE — GLOVE SURG BIOGEL LATEX SZ 7.5

## (undated) DEVICE — TRAY NEURO OMC

## (undated) DEVICE — SPONGE COTTON TRAY 4X4IN

## (undated) DEVICE — SYR 10CC LUER LOCK

## (undated) DEVICE — TUBE FRAZIER 5MM 2FT SOFT TIP

## (undated) DEVICE — GLOVE BIOGEL PI MICRO SZ 6

## (undated) DEVICE — TROCAR ENDOPATH XCEL 11MM 10CM

## (undated) DEVICE — UNDERGLOVES BIOGEL PI SZ 6 LF

## (undated) DEVICE — SEE MEDLINE ITEM 157110

## (undated) DEVICE — COVER MAYO STND XL 30X57IN

## (undated) DEVICE — ADAPTER DISP HAND SWITCH

## (undated) DEVICE — ADHESIVE DERMABOND MINI HV

## (undated) DEVICE — KIT SURGIFLO HEMOSTATIC MATRIX

## (undated) DEVICE — WIRE GUIDE .035 150CM.

## (undated) DEVICE — DURAPREP SURG SCRUB 26ML

## (undated) DEVICE — NDL SPINAL 18GX3.5 SPINOCAN

## (undated) DEVICE — IRRIGATOR ENDOSCOPY DISP.

## (undated) DEVICE — HOOK DISSECTING 5MM

## (undated) DEVICE — CLOSURE SKIN STERI STRIP 1/2X4

## (undated) DEVICE — CATH URTRL OPEN END STR TIP 5F

## (undated) DEVICE — GLOVE SENSICARE PI GRN 8

## (undated) DEVICE — BLADE SURG CARBON STEEL SZ11

## (undated) DEVICE — GLOVE, SURG,LATEX FREE SZ 7

## (undated) DEVICE — SYR 50CC LL

## (undated) DEVICE — SEE MEDLINE ITEM 154981

## (undated) DEVICE — GLOVE SENSICARE PI MICRO 7.5

## (undated) DEVICE — DRESSING ADH ISLAND 2.5 X 3

## (undated) DEVICE — DEVICE N-SEAL LAPROSCOPIC

## (undated) DEVICE — TRAY FOLEY 16FR INFECTION CONT

## (undated) DEVICE — SHEATH NAVIGATOR URETERAL

## (undated) DEVICE — DRESSING TRANS 4X4 TEGADERM

## (undated) DEVICE — SUT CTD VICRYL 2-0 CR/CT-2

## (undated) DEVICE — OCCLUDER COLPO-PNEUMO STERILE

## (undated) DEVICE — DRAPE LAP T SHT W/ INSTR PAD

## (undated) DEVICE — SOL CLEARIFY VISUALIZATION LAP

## (undated) DEVICE — SEE MEDLINE ITEM 152532

## (undated) DEVICE — TUBING INSUFFLATION 10

## (undated) DEVICE — UNDERGLOVE BIOGEL PI SZ 6.5 LF

## (undated) DEVICE — ENDOSTITCH POLYSORB 0 ES-9

## (undated) DEVICE — NDL INSUF ULTRA VERESS 120MM

## (undated) DEVICE — PAD SANITARY OB STERILE

## (undated) DEVICE — SUPPORT ULNA NERVE PROTECTOR